# Patient Record
Sex: FEMALE | Race: WHITE | NOT HISPANIC OR LATINO | Employment: FULL TIME | URBAN - METROPOLITAN AREA
[De-identification: names, ages, dates, MRNs, and addresses within clinical notes are randomized per-mention and may not be internally consistent; named-entity substitution may affect disease eponyms.]

---

## 2019-09-09 RX ORDER — LEVOTHYROXINE SODIUM 0.05 MG/1
50 TABLET ORAL DAILY
Qty: 90 TABLET | Refills: 0 | Status: SHIPPED | OUTPATIENT
Start: 2019-09-09 | End: 2019-12-09 | Stop reason: SDUPTHER

## 2019-10-23 ENCOUNTER — OFFICE VISIT (OUTPATIENT)
Dept: FAMILY MEDICINE CLINIC | Facility: CLINIC | Age: 44
End: 2019-10-23

## 2019-10-23 VITALS
SYSTOLIC BLOOD PRESSURE: 104 MMHG | HEART RATE: 67 BPM | OXYGEN SATURATION: 98 % | HEIGHT: 65 IN | WEIGHT: 162.4 LBS | BODY MASS INDEX: 27.06 KG/M2 | TEMPERATURE: 98.3 F | DIASTOLIC BLOOD PRESSURE: 62 MMHG

## 2019-10-23 DIAGNOSIS — G89.29 CHRONIC BILATERAL LOW BACK PAIN WITHOUT SCIATICA: Primary | ICD-10-CM

## 2019-10-23 DIAGNOSIS — N93.9 VAGINAL BLEEDING: ICD-10-CM

## 2019-10-23 DIAGNOSIS — H66.001 RIGHT ACUTE SUPPURATIVE OTITIS MEDIA: ICD-10-CM

## 2019-10-23 DIAGNOSIS — M54.50 CHRONIC BILATERAL LOW BACK PAIN WITHOUT SCIATICA: Primary | ICD-10-CM

## 2019-10-23 LAB
BILIRUB BLD-MCNC: ABNORMAL MG/DL
CLARITY, POC: CLEAR
COLOR UR: YELLOW
GLUCOSE UR STRIP-MCNC: NEGATIVE MG/DL
KETONES UR QL: NEGATIVE
LEUKOCYTE EST, POC: NEGATIVE
NITRITE UR-MCNC: NEGATIVE MG/ML
PH UR: 6 [PH] (ref 5–8)
PROT UR STRIP-MCNC: NEGATIVE MG/DL
RBC # UR STRIP: ABNORMAL /UL
SP GR UR: 1.02 (ref 1–1.03)
UROBILINOGEN UR QL: NORMAL

## 2019-10-23 PROCEDURE — 81003 URINALYSIS AUTO W/O SCOPE: CPT | Performed by: FAMILY MEDICINE

## 2019-10-23 PROCEDURE — 99214 OFFICE O/P EST MOD 30 MIN: CPT | Performed by: FAMILY MEDICINE

## 2019-10-23 RX ORDER — MELOXICAM 15 MG/1
15 TABLET ORAL DAILY
Qty: 30 TABLET | Refills: 0 | Status: SHIPPED | OUTPATIENT
Start: 2019-10-23 | End: 2020-04-01

## 2019-10-23 RX ORDER — CEFDINIR 300 MG/1
300 CAPSULE ORAL 2 TIMES DAILY
Qty: 20 CAPSULE | Refills: 0 | Status: SHIPPED | OUTPATIENT
Start: 2019-10-23 | End: 2019-11-18

## 2019-10-23 RX ORDER — CYCLOBENZAPRINE HCL 10 MG
10 TABLET ORAL 3 TIMES DAILY PRN
Qty: 30 TABLET | Refills: 0 | Status: SHIPPED | OUTPATIENT
Start: 2019-10-23 | End: 2020-04-01 | Stop reason: SDUPTHER

## 2019-10-23 RX ORDER — FLUOXETINE HYDROCHLORIDE 40 MG/1
CAPSULE ORAL
COMMUNITY
Start: 2019-10-15 | End: 2019-10-30 | Stop reason: SDUPTHER
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
## 2019-10-30 RX ORDER — FLUOXETINE HYDROCHLORIDE 40 MG/1
80 CAPSULE ORAL DAILY
Qty: 60 CAPSULE | Refills: 2 | Status: SHIPPED | OUTPATIENT
Start: 2019-10-30 | End: 2019-11-18 | Stop reason: SDUPTHER

## 2019-11-04 ENCOUNTER — OFFICE VISIT (OUTPATIENT)
Dept: FAMILY MEDICINE CLINIC | Facility: CLINIC | Age: 44
End: 2019-11-04

## 2019-11-04 VITALS
OXYGEN SATURATION: 99 % | HEART RATE: 80 BPM | SYSTOLIC BLOOD PRESSURE: 128 MMHG | DIASTOLIC BLOOD PRESSURE: 72 MMHG | TEMPERATURE: 98.1 F | WEIGHT: 164.6 LBS | BODY MASS INDEX: 27.42 KG/M2 | HEIGHT: 65 IN

## 2019-11-04 DIAGNOSIS — M54.50 CHRONIC BILATERAL LOW BACK PAIN WITHOUT SCIATICA: Primary | ICD-10-CM

## 2019-11-04 DIAGNOSIS — G89.29 CHRONIC BILATERAL LOW BACK PAIN WITHOUT SCIATICA: Primary | ICD-10-CM

## 2019-11-04 PROCEDURE — 96372 THER/PROPH/DIAG INJ SC/IM: CPT | Performed by: NURSE PRACTITIONER

## 2019-11-04 PROCEDURE — 99213 OFFICE O/P EST LOW 20 MIN: CPT | Performed by: NURSE PRACTITIONER

## 2019-11-04 RX ORDER — KETOROLAC TROMETHAMINE 30 MG/ML
60 INJECTION, SOLUTION INTRAMUSCULAR; INTRAVENOUS ONCE
Status: COMPLETED | OUTPATIENT
Start: 2019-11-04 | End: 2019-11-04

## 2019-11-04 RX ORDER — TRIAMCINOLONE ACETONIDE 40 MG/ML
40 INJECTION, SUSPENSION INTRA-ARTICULAR; INTRAMUSCULAR ONCE
Status: COMPLETED | OUTPATIENT
Start: 2019-11-04 | End: 2019-11-04

## 2019-11-04 RX ADMIN — KETOROLAC TROMETHAMINE 60 MG: 30 INJECTION, SOLUTION INTRAMUSCULAR; INTRAVENOUS at 16:12

## 2019-11-04 RX ADMIN — TRIAMCINOLONE ACETONIDE 40 MG: 40 INJECTION, SUSPENSION INTRA-ARTICULAR; INTRAMUSCULAR at 16:13

## 2019-11-04 NOTE — PROGRESS NOTES
Subjective   Nereida Woodruff is a 44 y.o. female.     Chief Complaint   Patient presents with   • Back Pain        History of Present Illness       Patient is here today for follow up on back pain. This is my first time seeing this patient. She saw Dr. Kehrer for back pain  On 10/23/2019.  She states she has not started PT.    Has been taking meloxicam and muscle relaxer and was helping, but threw it back out today.     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    Is having more muscle spasm in it.      Hasn't gotten call, but was trying to get other issues from that visit worked out.           The following portions of the patient's history were reviewed and updated as appropriate: allergies, current medications, past family history, past medical history, past social history, past surgical history and problem list.    Past Medical History:   Diagnosis Date   • Asthma    • Cancer (CMS/HCC)     BREAST   • COPD (chronic obstructive pulmonary disease) (CMS/HCC)    • Disease of thyroid gland        Past Surgical History:   Procedure Laterality Date   • APPENDECTOMY     • CHOLECYSTECTOMY     • HYSTERECTOMY     • LUNG SURGERY     • MASTECTOMY         Family History   Problem Relation Age of Onset   • Thyroid disease Mother    • Cancer Father        Social History     Socioeconomic History   • Marital status:      Spouse name: Not on file   • Number of children: Not on file   • Years of education: Not on file   • Highest education level: Not on file   Tobacco Use   • Smoking status: Never Smoker   • Smokeless tobacco: Never Used   Substance and Sexual Activity   • Alcohol use: No     Frequency: Never   • Drug use: No         Current Outpatient Medications:   •  albuterol (PROVENTIL HFA;VENTOLIN HFA) 108 (90 BASE) MCG/ACT inhaler, Inhale 2 puffs Every 4 (Four) Hours As Needed for wheezing., Disp: , Rfl:   •  albuterol (PROVENTIL) (2.5 MG/3ML) 0.083% nebulizer solution, Inhale 2.5  mg., Disp: , Rfl:   •  albuterol (PROVENTIL) 4 MG tablet, Take 4 mg by mouth 3 (Three) Times a Day., Disp: , Rfl:   •  cefdinir (OMNICEF) 300 MG capsule, Take 1 capsule by mouth 2 (Two) Times a Day., Disp: 20 capsule, Rfl: 0  •  cyclobenzaprine (FLEXERIL) 10 MG tablet, Take 1 tablet by mouth 3 (Three) Times a Day As Needed for Muscle Spasms., Disp: 30 tablet, Rfl: 0  •  fexofenadine (ALLEGRA) 180 MG tablet, Take 180 mg by mouth Daily., Disp: , Rfl:   •  FLUoxetine (PROzac) 40 MG capsule, Take 2 capsules by mouth Daily., Disp: 60 capsule, Rfl: 2  •  Fluticasone Furoate-Vilanterol (BREO ELLIPTA) 100-25 MCG/INH inhaler, Inhale 1 puff Daily., Disp: , Rfl:   •  leuprolide (LUPRON) 11.25 MG injection, Inject  into the appropriate muscle as directed by prescriber., Disp: , Rfl:   •  levothyroxine (SYNTHROID, LEVOTHROID) 50 MCG tablet, Take 1 tablet by mouth Daily., Disp: 90 tablet, Rfl: 0  •  meloxicam (MOBIC) 15 MG tablet, Take 1 tablet by mouth Daily., Disp: 30 tablet, Rfl: 0  •  montelukast (SINGULAIR) 10 MG tablet, Take 10 mg by mouth Daily., Disp: , Rfl:   •  Multiple Vitamin (MULTI-VITAMIN DAILY PO), Take  by mouth Daily., Disp: , Rfl:   •  MULTIPLE VITAMINS-MINERALS ER PO, Take  by mouth Daily., Disp: , Rfl:   •  ondansetron (ZOFRAN) 4 MG tablet, Take 4 mg by mouth., Disp: , Rfl:   •  pseudoephedrine-guaifenesin (MUCINEX D)  MG per 12 hr tablet, Take 1 tablet by mouth Daily., Disp: , Rfl:   •  TAMOXIFEN CITRATE PO, Take  by mouth., Disp: , Rfl:   •  THEOPHYLLINE CR PO, Take  by mouth., Disp: , Rfl:   •  valACYclovir (VALTREX) 500 MG tablet, Take 500 mg by mouth Daily., Disp: , Rfl:     Review of Systems   Constitutional: Negative for fatigue and fever.   Musculoskeletal: Positive for back pain (denies radicular symptoms. ) and myalgias. Negative for neck pain.       Objective   Vitals:    11/04/19 1546   BP: 128/72   Pulse: 80   Temp: 98.1 °F (36.7 °C)   SpO2: 99%   Weight: 74.7 kg (164 lb 9.6 oz)   Height:  "165.1 cm (65\")     Body mass index is 27.39 kg/m².  Physical Exam   Constitutional: She is oriented to person, place, and time. She appears well-developed and well-nourished.   Musculoskeletal:        Cervical back: She exhibits normal range of motion.        Thoracic back: She exhibits normal range of motion.        Lumbar back: She exhibits decreased range of motion, tenderness, swelling and spasm.   Neurological: She is alert and oriented to person, place, and time.         Assessment/Plan   Nereida was seen today for back pain.    Diagnoses and all orders for this visit:    Chronic bilateral low back pain without sciatica  -     ketorolac (TORADOL) injection 60 mg  -     triamcinolone acetonide (KENALOG-40) injection 40 mg                 Patient Instructions   Low Back Sprain Rehab  Ask your health care provider which exercises are safe for you. Do exercises exactly as told by your health care provider and adjust them as directed. It is normal to feel mild stretching, pulling, tightness, or discomfort as you do these exercises, but you should stop right away if you feel sudden pain or your pain gets worse. Do not begin these exercises until told by your health care provider.  Stretching and range of motion exercises  These exercises warm up your muscles and joints and improve the movement and flexibility of your back. These exercises also help to relieve pain, numbness, and tingling.  Exercise A: Lumbar rotation    1. Lie on your back on a firm surface and bend your knees.  2. Straighten your arms out to your sides so each arm forms an \"L\" shape with a side of your body (a 90 degree angle).  3. Slowly move both of your knees to one side of your body until you feel a stretch in your lower back. Try not to let your shoulders move off of the floor.  4. Hold for __________ seconds.  5. Tense your abdominal muscles and slowly move your knees back to the starting position.  6. Repeat this exercise on the other side " of your body.  Repeat __________ times. Complete this exercise __________ times a day.  Exercise B: Prone extension on elbows    1. Lie on your abdomen on a firm surface.  2. Prop yourself up on your elbows.  3. Use your arms to help lift your chest up until you feel a gentle stretch in your abdomen and your lower back.  ? This will place some of your body weight on your elbows. If this is uncomfortable, try stacking pillows under your chest.  ? Your hips should stay down, against the surface that you are lying on. Keep your hip and back muscles relaxed.  4. Hold for __________ seconds.  5. Slowly relax your upper body and return to the starting position.  Repeat __________ times. Complete this exercise __________ times a day.  Strengthening exercises  These exercises build strength and endurance in your back. Endurance is the ability to use your muscles for a long time, even after they get tired.  Exercise C: Pelvic tilt  1. Lie on your back on a firm surface. Bend your knees and keep your feet flat.  2. Tense your abdominal muscles. Tip your pelvis up toward the ceiling and flatten your lower back into the floor.  ? To help with this exercise, you may place a small towel under your lower back and try to push your back into the towel.  3. Hold for __________ seconds.  4. Let your muscles relax completely before you repeat this exercise.  Repeat __________ times. Complete this exercise __________ times a day.  Exercise D: Alternating arm and leg raises    1. Get on your hands and knees on a firm surface. If you are on a hard floor, you may want to use padding to cushion your knees, such as an exercise mat.  2. Line up your arms and legs. Your hands should be below your shoulders, and your knees should be below your hips.  3. Lift your left leg behind you. At the same time, raise your right arm and straighten it in front of you.  ? Do not lift your leg higher than your hip.  ? Do not lift your arm higher than your  shoulder.  ? Keep your abdominal and back muscles tight.  ? Keep your hips facing the ground.  ? Do not arch your back.  ? Keep your balance carefully, and do not hold your breath.  4. Hold for __________ seconds.  5. Slowly return to the starting position and repeat with your right leg and your left arm.  Repeat __________ times. Complete this exercise __________ times a day.  Exercise E: Abdominal set with straight leg raise    1. Lie on your back on a firm surface.  2. Bend one of your knees and keep your other leg straight.  3. Tense your abdominal muscles and lift your straight leg up, 4-6 inches (10-15 cm) off the ground.  4. Keep your abdominal muscles tight and hold for __________ seconds.  ? Do not hold your breath.  ? Do not arch your back. Keep it flat against the ground.  5. Keep your abdominal muscles tense as you slowly lower your leg back to the starting position.  6. Repeat with your other leg.  Repeat __________ times. Complete this exercise __________ times a day.  Posture and body mechanics    Body mechanics refers to the movements and positions of your body while you do your daily activities. Posture is part of body mechanics. Good posture and healthy body mechanics can help to relieve stress in your body's tissues and joints. Good posture means that your spine is in its natural S-curve position (your spine is neutral), your shoulders are pulled back slightly, and your head is not tipped forward. The following are general guidelines for applying improved posture and body mechanics to your everyday activities.  Standing    · When standing, keep your spine neutral and your feet about hip-width apart. Keep a slight bend in your knees. Your ears, shoulders, and hips should line up.  · When you do a task in which you  one place for a long time, place one foot up on a stable object that is 2-4 inches (5-10 cm) high, such as a footstool. This helps keep your spine neutral.  Sitting    · When  sitting, keep your spine neutral and keep your feet flat on the floor. Use a footrest, if necessary, and keep your thighs parallel to the floor. Avoid rounding your shoulders, and avoid tilting your head forward.  · When working at a desk or a computer, keep your desk at a height where your hands are slightly lower than your elbows. Slide your chair under your desk so you are close enough to maintain good posture.  · When working at a computer, place your monitor at a height where you are looking straight ahead and you do not have to tilt your head forward or downward to look at the screen.  Resting    · When lying down and resting, avoid positions that are most painful for you.  · If you have pain with activities such as sitting, bending, stooping, or squatting (flexion-based activities), lie in a position in which your body does not bend very much. For example, avoid curling up on your side with your arms and knees near your chest (fetal position).  · If you have pain with activities such as standing for a long time or reaching with your arms (extension-based activities), lie with your spine in a neutral position and bend your knees slightly. Try the following positions:  · Lying on your side with a pillow between your knees.  · Lying on your back with a pillow under your knees.  Lifting    · When lifting objects, keep your feet at least shoulder-width apart and tighten your abdominal muscles.  · Bend your knees and hips and keep your spine neutral. It is important to lift using the strength of your legs, not your back. Do not lock your knees straight out.  · Always ask for help to lift heavy or awkward objects.  This information is not intended to replace advice given to you by your health care provider. Make sure you discuss any questions you have with your health care provider.  Document Released: 12/18/2006 Document Revised: 08/24/2017 Document Reviewed: 09/28/2016  [employer] Interactive Patient Education ©  2019 [employer]      Referral reprinted for PT.    Continue use of medication as prescribed by Dr. Kehrer

## 2019-11-04 NOTE — PATIENT INSTRUCTIONS
"Low Back Sprain Rehab  Ask your health care provider which exercises are safe for you. Do exercises exactly as told by your health care provider and adjust them as directed. It is normal to feel mild stretching, pulling, tightness, or discomfort as you do these exercises, but you should stop right away if you feel sudden pain or your pain gets worse. Do not begin these exercises until told by your health care provider.  Stretching and range of motion exercises  These exercises warm up your muscles and joints and improve the movement and flexibility of your back. These exercises also help to relieve pain, numbness, and tingling.  Exercise A: Lumbar rotation    1. Lie on your back on a firm surface and bend your knees.  2. Straighten your arms out to your sides so each arm forms an \"L\" shape with a side of your body (a 90 degree angle).  3. Slowly move both of your knees to one side of your body until you feel a stretch in your lower back. Try not to let your shoulders move off of the floor.  4. Hold for __________ seconds.  5. Tense your abdominal muscles and slowly move your knees back to the starting position.  6. Repeat this exercise on the other side of your body.  Repeat __________ times. Complete this exercise __________ times a day.  Exercise B: Prone extension on elbows    1. Lie on your abdomen on a firm surface.  2. Prop yourself up on your elbows.  3. Use your arms to help lift your chest up until you feel a gentle stretch in your abdomen and your lower back.  ? This will place some of your body weight on your elbows. If this is uncomfortable, try stacking pillows under your chest.  ? Your hips should stay down, against the surface that you are lying on. Keep your hip and back muscles relaxed.  4. Hold for __________ seconds.  5. Slowly relax your upper body and return to the starting position.  Repeat __________ times. Complete this exercise __________ times a day.  Strengthening exercises  These " exercises build strength and endurance in your back. Endurance is the ability to use your muscles for a long time, even after they get tired.  Exercise C: Pelvic tilt  1. Lie on your back on a firm surface. Bend your knees and keep your feet flat.  2. Tense your abdominal muscles. Tip your pelvis up toward the ceiling and flatten your lower back into the floor.  ? To help with this exercise, you may place a small towel under your lower back and try to push your back into the towel.  3. Hold for __________ seconds.  4. Let your muscles relax completely before you repeat this exercise.  Repeat __________ times. Complete this exercise __________ times a day.  Exercise D: Alternating arm and leg raises    1. Get on your hands and knees on a firm surface. If you are on a hard floor, you may want to use padding to cushion your knees, such as an exercise mat.  2. Line up your arms and legs. Your hands should be below your shoulders, and your knees should be below your hips.  3. Lift your left leg behind you. At the same time, raise your right arm and straighten it in front of you.  ? Do not lift your leg higher than your hip.  ? Do not lift your arm higher than your shoulder.  ? Keep your abdominal and back muscles tight.  ? Keep your hips facing the ground.  ? Do not arch your back.  ? Keep your balance carefully, and do not hold your breath.  4. Hold for __________ seconds.  5. Slowly return to the starting position and repeat with your right leg and your left arm.  Repeat __________ times. Complete this exercise __________ times a day.  Exercise E: Abdominal set with straight leg raise    1. Lie on your back on a firm surface.  2. Bend one of your knees and keep your other leg straight.  3. Tense your abdominal muscles and lift your straight leg up, 4-6 inches (10-15 cm) off the ground.  4. Keep your abdominal muscles tight and hold for __________ seconds.  ? Do not hold your breath.  ? Do not arch your back. Keep it  flat against the ground.  5. Keep your abdominal muscles tense as you slowly lower your leg back to the starting position.  6. Repeat with your other leg.  Repeat __________ times. Complete this exercise __________ times a day.  Posture and body mechanics    Body mechanics refers to the movements and positions of your body while you do your daily activities. Posture is part of body mechanics. Good posture and healthy body mechanics can help to relieve stress in your body's tissues and joints. Good posture means that your spine is in its natural S-curve position (your spine is neutral), your shoulders are pulled back slightly, and your head is not tipped forward. The following are general guidelines for applying improved posture and body mechanics to your everyday activities.  Standing    · When standing, keep your spine neutral and your feet about hip-width apart. Keep a slight bend in your knees. Your ears, shoulders, and hips should line up.  · When you do a task in which you  one place for a long time, place one foot up on a stable object that is 2-4 inches (5-10 cm) high, such as a footstool. This helps keep your spine neutral.  Sitting    · When sitting, keep your spine neutral and keep your feet flat on the floor. Use a footrest, if necessary, and keep your thighs parallel to the floor. Avoid rounding your shoulders, and avoid tilting your head forward.  · When working at a desk or a computer, keep your desk at a height where your hands are slightly lower than your elbows. Slide your chair under your desk so you are close enough to maintain good posture.  · When working at a computer, place your monitor at a height where you are looking straight ahead and you do not have to tilt your head forward or downward to look at the screen.  Resting    · When lying down and resting, avoid positions that are most painful for you.  · If you have pain with activities such as sitting, bending, stooping, or squatting  (flexion-based activities), lie in a position in which your body does not bend very much. For example, avoid curling up on your side with your arms and knees near your chest (fetal position).  · If you have pain with activities such as standing for a long time or reaching with your arms (extension-based activities), lie with your spine in a neutral position and bend your knees slightly. Try the following positions:  · Lying on your side with a pillow between your knees.  · Lying on your back with a pillow under your knees.  Lifting    · When lifting objects, keep your feet at least shoulder-width apart and tighten your abdominal muscles.  · Bend your knees and hips and keep your spine neutral. It is important to lift using the strength of your legs, not your back. Do not lock your knees straight out.  · Always ask for help to lift heavy or awkward objects.  This information is not intended to replace advice given to you by your health care provider. Make sure you discuss any questions you have with your health care provider.  Document Released: 12/18/2006 Document Revised: 08/24/2017 Document Reviewed: 09/28/2016  Elsevier Interactive Patient Education © 2019 [employer]      Referral reprinted for PT.    Continue use of medication as prescribed by Dr. Kehrer

## 2019-11-18 ENCOUNTER — OFFICE VISIT (OUTPATIENT)
Dept: FAMILY MEDICINE CLINIC | Facility: CLINIC | Age: 44
End: 2019-11-18

## 2019-11-18 VITALS
HEART RATE: 72 BPM | WEIGHT: 165 LBS | BODY MASS INDEX: 27.49 KG/M2 | OXYGEN SATURATION: 98 % | HEIGHT: 65 IN | DIASTOLIC BLOOD PRESSURE: 80 MMHG | SYSTOLIC BLOOD PRESSURE: 126 MMHG

## 2019-11-18 DIAGNOSIS — J45.50 SEVERE PERSISTENT ASTHMA, UNSPECIFIED WHETHER COMPLICATED: ICD-10-CM

## 2019-11-18 DIAGNOSIS — F41.9 ANXIETY: ICD-10-CM

## 2019-11-18 DIAGNOSIS — E03.8 HYPOTHYROIDISM DUE TO HASHIMOTO'S THYROIDITIS: ICD-10-CM

## 2019-11-18 DIAGNOSIS — E06.3 HYPOTHYROIDISM DUE TO HASHIMOTO'S THYROIDITIS: ICD-10-CM

## 2019-11-18 DIAGNOSIS — E06.3 HASHIMOTO'S THYROIDITIS: Primary | ICD-10-CM

## 2019-11-18 DIAGNOSIS — F33.42 RECURRENT MAJOR DEPRESSIVE DISORDER, IN FULL REMISSION (HCC): ICD-10-CM

## 2019-11-18 PROBLEM — J45.909 ASTHMA: Status: ACTIVE | Noted: 2019-11-18

## 2019-11-18 PROBLEM — F32.9 DEPRESSION, MAJOR: Status: ACTIVE | Noted: 2019-11-18

## 2019-11-18 PROBLEM — E03.9 HYPOTHYROIDISM: Status: ACTIVE | Noted: 2019-11-18

## 2019-11-18 PROCEDURE — 99213 OFFICE O/P EST LOW 20 MIN: CPT | Performed by: FAMILY MEDICINE

## 2019-11-18 RX ORDER — FLUOXETINE HYDROCHLORIDE 40 MG/1
80 CAPSULE ORAL DAILY
Qty: 60 CAPSULE | Refills: 2 | Status: SHIPPED | OUTPATIENT
Start: 2019-11-18 | End: 2020-02-13 | Stop reason: SDUPTHER

## 2019-11-18 NOTE — PROGRESS NOTES
Subjective   Nereida Woodruff is a 44 y.o. female.     Chief Complaint   Patient presents with   • Hypothyroidism        Patient presents for routine follow-up and to establish my new office.  She has a longstanding history of depression and severe asthma.  She is also a breast cancer survivor doing well following up with her oncologist.  She is due for a TSH check on her Hashimoto's thyroiditis.  She has been compliant with her medication and has no significant complaints.  She states her mood is doing well with her fluoxetine.  She denies any SI or HI.  She also has OCD and that is under control.           The following portions of the patient's history were reviewed and updated as appropriate: allergies, current medications, past family history, past medical history, past social history, past surgical history and problem list.    Past Medical History:   Diagnosis Date   • Acute upper respiratory infection    • Allergic rhinitis    • Anxiety    • Asthma    • BMI 25.0-25.9,adult    • Cancer (CMS/[facility])     BREAST   • Community acquired pneumonia    • COPD (chronic obstructive pulmonary disease) (CMS/[facility])    • Depression, major    • Disease of thyroid gland    • Foreign body in ear    • Hashimoto's thyroiditis    • History of abnormal mammogram     Left breast   • History of acute sinusitis    • History of adenocarcinoma of breast    • History of cholelithiasis    • History of COPD    • History of dyspareunia in female    • History of lump of left breast    • History of malignant neoplasm of left breast    • History of nausea and vomiting    • History of seborrheic dermatitis    • History of strep pharyngitis    • History of suicidal ideation    • Hypothyroidism    • Hypoxemia     History of Hypoxemia   • Obsessive compulsive disorder    • Other screening mammogram    • Personal history of asthma    • Recurrent genital herpes simplex     History of    • Sore throat    • Symptomatic states associated with  artificial menopause     History of        Past Surgical History:   Procedure Laterality Date   • APPENDECTOMY     • BREAST AUGMENTATION Bilateral     Revised 2005, initial implants 1992   • CERVICAL CERCLAGE      x 2 during pregnancy, incompetent cervix   • CHOLECYSTECTOMY     • HYSTERECTOMY      due to uterine prolapse   • LUNG SURGERY     • MASTECTOMY         Family History   Problem Relation Age of Onset   • Thyroid disease Mother    • Arthritis Mother    • Uterine cancer Mother         in her 40's   • Cancer Father    • Bipolar disorder Father    • Depression Father    • Hyperlipidemia Father    • Hypertension Father    • Arthritis Maternal Grandmother    • Bleeding Disorder Maternal Grandmother    • Hyperlipidemia Maternal Grandmother    • Hypertension Maternal Grandmother    • Hypertension Maternal Grandfather    • Hypertension Paternal Grandfather    • Hyperlipidemia Paternal Grandfather    • Coronary artery disease Other         Maternal GrGF   • Skin cancer Other         Maternal GrGF   • Diabetes Other         Paternal GrGF   • Heart disease Other         FH in females before the age of 65       Social History     Socioeconomic History   • Marital status:      Spouse name: Not on file   • Number of children: Not on file   • Years of education: Not on file   • Highest education level: Not on file   Tobacco Use   • Smoking status: Never Smoker   • Smokeless tobacco: Never Used   Substance and Sexual Activity   • Alcohol use: Yes     Frequency: Never     Comment: rare use   • Drug use: No         Current Outpatient Medications:   •  albuterol (PROVENTIL HFA;VENTOLIN HFA) 108 (90 BASE) MCG/ACT inhaler, Inhale 2 puffs Every 4 (Four) Hours As Needed for wheezing., Disp: , Rfl:   •  albuterol (PROVENTIL) (2.5 MG/3ML) 0.083% nebulizer solution, Inhale 2.5 mg., Disp: , Rfl:   •  albuterol (PROVENTIL) 4 MG tablet, Take 4 mg by mouth 3 (Three) Times a Day., Disp: , Rfl:   •  cyclobenzaprine (FLEXERIL) 10 MG  tablet, Take 1 tablet by mouth 3 (Three) Times a Day As Needed for Muscle Spasms., Disp: 30 tablet, Rfl: 0  •  fexofenadine (ALLEGRA) 180 MG tablet, Take 180 mg by mouth Daily., Disp: , Rfl:   •  FLUoxetine (PROzac) 40 MG capsule, Take 2 capsules by mouth Daily., Disp: 60 capsule, Rfl: 2  •  Fluticasone Furoate-Vilanterol (BREO ELLIPTA) 100-25 MCG/INH inhaler, Inhale 1 puff Daily., Disp: , Rfl:   •  leuprolide (LUPRON) 11.25 MG injection, Inject  into the appropriate muscle as directed by prescriber., Disp: , Rfl:   •  levothyroxine (SYNTHROID, LEVOTHROID) 50 MCG tablet, Take 1 tablet by mouth Daily., Disp: 90 tablet, Rfl: 0  •  meloxicam (MOBIC) 15 MG tablet, Take 1 tablet by mouth Daily., Disp: 30 tablet, Rfl: 0  •  montelukast (SINGULAIR) 10 MG tablet, Take 10 mg by mouth Daily., Disp: , Rfl:   •  Multiple Vitamin (MULTI-VITAMIN DAILY PO), Take  by mouth Daily., Disp: , Rfl:   •  pseudoephedrine-guaifenesin (MUCINEX D)  MG per 12 hr tablet, Take 1 tablet by mouth Daily., Disp: , Rfl:   •  TAMOXIFEN CITRATE PO, Take  by mouth., Disp: , Rfl:   •  THEOPHYLLINE CR PO, Take  by mouth., Disp: , Rfl:   •  valACYclovir (VALTREX) 500 MG tablet, Take 500 mg by mouth Daily., Disp: , Rfl:     Review of Systems   Constitutional: Negative for chills, fatigue and fever.   HENT: Negative for congestion, rhinorrhea and sore throat.    Respiratory: Negative for cough and shortness of breath.    Cardiovascular: Negative for chest pain and leg swelling.   Gastrointestinal: Negative for abdominal pain.   Endocrine: Negative for polydipsia and polyuria.   Genitourinary: Negative for dysuria.   Musculoskeletal: Negative for arthralgias and myalgias.   Skin: Negative for rash.   Neurological: Negative for dizziness.   Hematological: Does not bruise/bleed easily.   Psychiatric/Behavioral: Negative for sleep disturbance.       Objective   Vitals:    11/18/19 1550   BP: 126/80   Pulse: 72   SpO2: 98%   Weight: 74.8 kg (165 lb)  "  Height: 165.1 cm (65\")     Body mass index is 27.46 kg/m².  Physical Exam   Constitutional: She is oriented to person, place, and time. She appears well-developed and well-nourished.   HENT:   Head: Normocephalic and atraumatic.   Eyes: Conjunctivae and EOM are normal. Pupils are equal, round, and reactive to light.   Neck: Neck supple. No thyromegaly present.   Cardiovascular: Normal rate and regular rhythm.   No murmur heard.  Pulmonary/Chest: Effort normal and breath sounds normal. She has no wheezes.   Abdominal: Soft.   Musculoskeletal: Normal range of motion.   Neurological: She is alert and oriented to person, place, and time. No cranial nerve deficit.   Skin: Skin is warm and dry.   Psychiatric: She has a normal mood and affect.   Nursing note and vitals reviewed.        Assessment/Plan   Nereida was seen today for hypothyroidism.    Diagnoses and all orders for this visit:    Hashimoto's thyroiditis    Hypothyroidism due to Hashimoto's thyroiditis  -     TSH    Recurrent major depressive disorder, in full remission (CMS/[facility])  -     FLUoxetine (PROzac) 40 MG capsule; Take 2 capsules by mouth Daily.    Anxiety    Severe persistent asthma, unspecified whether complicated               There are no Patient Instructions on file for this visit.  "

## 2019-11-19 LAB — TSH SERPL DL<=0.005 MIU/L-ACNC: 1.72 UIU/ML (ref 0.45–4.5)

## 2019-12-09 DIAGNOSIS — E06.3 HYPOTHYROIDISM DUE TO HASHIMOTO'S THYROIDITIS: Primary | ICD-10-CM

## 2019-12-09 DIAGNOSIS — E03.8 HYPOTHYROIDISM DUE TO HASHIMOTO'S THYROIDITIS: Primary | ICD-10-CM

## 2019-12-09 RX ORDER — LEVOTHYROXINE SODIUM 50 MCG
TABLET ORAL
Qty: 90 TABLET | Refills: 0 | Status: SHIPPED | OUTPATIENT
Start: 2019-12-09 | End: 2020-05-19 | Stop reason: SDUPTHER

## 2020-02-13 ENCOUNTER — OFFICE VISIT (OUTPATIENT)
Dept: FAMILY MEDICINE CLINIC | Facility: CLINIC | Age: 45
End: 2020-02-13

## 2020-02-13 VITALS
SYSTOLIC BLOOD PRESSURE: 112 MMHG | BODY MASS INDEX: 25.09 KG/M2 | TEMPERATURE: 98.7 F | WEIGHT: 150.6 LBS | HEIGHT: 65 IN | OXYGEN SATURATION: 96 % | DIASTOLIC BLOOD PRESSURE: 68 MMHG | HEART RATE: 84 BPM

## 2020-02-13 DIAGNOSIS — F33.42 RECURRENT MAJOR DEPRESSIVE DISORDER, IN FULL REMISSION (HCC): ICD-10-CM

## 2020-02-13 DIAGNOSIS — J45.901 EXACERBATION OF ASTHMA, UNSPECIFIED ASTHMA SEVERITY, UNSPECIFIED WHETHER PERSISTENT: ICD-10-CM

## 2020-02-13 DIAGNOSIS — R50.9 FEVER, UNSPECIFIED FEVER CAUSE: ICD-10-CM

## 2020-02-13 DIAGNOSIS — J11.1 INFLUENZA: Primary | ICD-10-CM

## 2020-02-13 LAB
EXPIRATION DATE: NORMAL
EXPIRATION DATE: NORMAL
FLUAV AG NPH QL: NEGATIVE
FLUBV AG NPH QL: NEGATIVE
INTERNAL CONTROL: NORMAL
INTERNAL CONTROL: NORMAL
Lab: NORMAL
Lab: NORMAL
S PYO AG THROAT QL: NEGATIVE

## 2020-02-13 PROCEDURE — 87804 INFLUENZA ASSAY W/OPTIC: CPT | Performed by: FAMILY MEDICINE

## 2020-02-13 PROCEDURE — 87880 STREP A ASSAY W/OPTIC: CPT | Performed by: FAMILY MEDICINE

## 2020-02-13 PROCEDURE — 99214 OFFICE O/P EST MOD 30 MIN: CPT | Performed by: FAMILY MEDICINE

## 2020-02-13 RX ORDER — PREDNISONE 20 MG/1
20 TABLET ORAL 2 TIMES DAILY
Qty: 10 TABLET | Refills: 0 | Status: SHIPPED | OUTPATIENT
Start: 2020-02-13 | End: 2020-02-18

## 2020-02-13 RX ORDER — THEOPHYLLINE 300 MG/1
TABLET, EXTENDED RELEASE ORAL
COMMUNITY
Start: 2019-11-27 | End: 2020-04-01 | Stop reason: SDUPTHER

## 2020-02-13 RX ORDER — FLUOXETINE HYDROCHLORIDE 40 MG/1
80 CAPSULE ORAL DAILY
Qty: 180 CAPSULE | Refills: 3 | Status: SHIPPED | OUTPATIENT
Start: 2020-02-13 | End: 2020-11-23 | Stop reason: SDUPTHER

## 2020-02-13 RX ORDER — OSELTAMIVIR PHOSPHATE 75 MG/1
75 CAPSULE ORAL 2 TIMES DAILY
Qty: 10 CAPSULE | Refills: 0 | Status: SHIPPED | OUTPATIENT
Start: 2020-02-13 | End: 2020-05-19

## 2020-02-13 RX ORDER — TAMOXIFEN CITRATE 20 MG/1
TABLET ORAL
COMMUNITY
Start: 2020-02-10 | End: 2021-11-05

## 2020-02-13 NOTE — PROGRESS NOTES
Subjective   Nereida Woodruff is a 44 y.o. female.     Chief Complaint   Patient presents with   • Cough   • Headache   • Fever   • Sore Throat        Patient presents with fever chills cough congestion headache and myalgias for the past 24+ hours.  Her temperature went to 100.8 last night.  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  She has had a little bit of wheezing and cough is a little productive.  She has a history of severe asthma with multiple hospitalizations in the past.  No nausea vomiting or diarrhea.         The following portions of the patient's history were reviewed and updated as appropriate: allergies, current medications, past family history, past medical history, past social history, past surgical history and problem list.    Past Medical History:   Diagnosis Date   • Acute upper respiratory infection    • Allergic rhinitis    • Anxiety    • Asthma    • BMI 25.0-25.9,adult    • Cancer (CMS/[facility])     BREAST   • Community acquired pneumonia    • COPD (chronic obstructive pulmonary disease) (CMS/[facility])    • Depression, major    • Disease of thyroid gland    • Foreign body in ear    • Hashimoto's thyroiditis    • History of abnormal mammogram     Left breast   • History of acute sinusitis    • History of adenocarcinoma of breast    • History of cholelithiasis    • History of COPD    • History of dyspareunia in female    • History of lump of left breast    • History of malignant neoplasm of left breast    • History of nausea and vomiting    • History of seborrheic dermatitis    • History of strep pharyngitis    • History of suicidal ideation    • Hypothyroidism    • Hypoxemia     History of Hypoxemia   • Obsessive compulsive disorder    • Other screening mammogram    • Personal history of asthma    • Recurrent genital herpes simplex     History of    • Sore throat    • Symptomatic states associated with artificial menopause     History of        Past Surgical History:    Procedure Laterality Date   • APPENDECTOMY     • BREAST AUGMENTATION Bilateral     Revised 2005, initial implants 1992   • CERVICAL CERCLAGE      x 2 during pregnancy, incompetent cervix   • CHOLECYSTECTOMY     • HYSTERECTOMY      due to uterine prolapse   • LUNG SURGERY     • MASTECTOMY         Family History   Problem Relation Age of Onset   • Thyroid disease Mother    • Arthritis Mother    • Uterine cancer Mother         in her 40's   • Cancer Father    • Bipolar disorder Father    • Depression Father    • Hyperlipidemia Father    • Hypertension Father    • Arthritis Maternal Grandmother    • Bleeding Disorder Maternal Grandmother    • Hyperlipidemia Maternal Grandmother    • Hypertension Maternal Grandmother    • Hypertension Maternal Grandfather    • Hypertension Paternal Grandfather    • Hyperlipidemia Paternal Grandfather    • Coronary artery disease Other         Maternal GrGF   • Skin cancer Other         Maternal GrGF   • Diabetes Other         Paternal GrGF   • Heart disease Other         FH in females before the age of 65       Social History     Socioeconomic History   • Marital status:      Spouse name: Not on file   • Number of children: Not on file   • Years of education: Not on file   • Highest education level: Not on file   Tobacco Use   • Smoking status: Never Smoker   • Smokeless tobacco: Never Used   Substance and Sexual Activity   • Alcohol use: Yes     Frequency: Never     Comment: rare use   • Drug use: No         Current Outpatient Medications:   •  albuterol (PROVENTIL HFA;VENTOLIN HFA) 108 (90 BASE) MCG/ACT inhaler, Inhale 2 puffs Every 4 (Four) Hours As Needed for wheezing., Disp: , Rfl:   •  albuterol (PROVENTIL) (2.5 MG/3ML) 0.083% nebulizer solution, Inhale 2.5 mg., Disp: , Rfl:   •  cyclobenzaprine (FLEXERIL) 10 MG tablet, Take 1 tablet by mouth 3 (Three) Times a Day As Needed for Muscle Spasms., Disp: 30 tablet, Rfl: 0  •  fexofenadine (ALLEGRA) 180 MG tablet, Take 180 mg by  "mouth Daily., Disp: , Rfl:   •  FLUoxetine (PROzac) 40 MG capsule, Take 2 capsules by mouth Daily., Disp: 60 capsule, Rfl: 2  •  Fluticasone Furoate-Vilanterol (BREO ELLIPTA) 100-25 MCG/INH inhaler, Inhale 1 puff Daily., Disp: , Rfl:   •  leuprolide (LUPRON) 11.25 MG injection, Inject  into the appropriate muscle as directed by prescriber., Disp: , Rfl:   •  montelukast (SINGULAIR) 10 MG tablet, Take 10 mg by mouth Daily., Disp: , Rfl:   •  Multiple Vitamin (MULTI-VITAMIN DAILY PO), Take  by mouth Daily., Disp: , Rfl:   •  pseudoephedrine-guaifenesin (MUCINEX D)  MG per 12 hr tablet, Take 1 tablet by mouth Daily., Disp: , Rfl:   •  SYNTHROID 50 MCG tablet, TAKE ONE TABLET BY MOUTH DAILY, Disp: 90 tablet, Rfl: 0  •  tamoxifen (NOLVADEX) 20 MG chemo tablet, , Disp: , Rfl:   •  theophylline (THEODUR) 300 MG 12 hr tablet, , Disp: , Rfl:   •  valACYclovir (VALTREX) 500 MG tablet, Take 500 mg by mouth Daily., Disp: , Rfl:   •  meloxicam (MOBIC) 15 MG tablet, Take 1 tablet by mouth Daily., Disp: 30 tablet, Rfl: 0    Review of Systems   Constitutional: Positive for chills and fever. Negative for fatigue.   HENT: Positive for congestion, ear pain, rhinorrhea, sinus pressure and sore throat.    Respiratory: Positive for cough and shortness of breath.    Cardiovascular: Negative for chest pain and leg swelling.   Gastrointestinal: Negative for abdominal pain, diarrhea and vomiting.   Endocrine: Negative for polydipsia and polyuria.   Genitourinary: Negative for dysuria.   Musculoskeletal: Negative for arthralgias and myalgias.   Skin: Negative for rash.   Neurological: Positive for headaches. Negative for dizziness.   Hematological: Does not bruise/bleed easily.   Psychiatric/Behavioral: Negative for sleep disturbance.       Objective   Vitals:    02/13/20 0949   BP: 112/68   Pulse: 84   Temp: 98.7 °F (37.1 °C)   SpO2: 96%   Weight: 68.3 kg (150 lb 9.6 oz)   Height: 165.1 cm (65\")     Body mass index is 25.06 " kg/m².  Physical Exam   Constitutional: She is oriented to person, place, and time. She appears well-developed and well-nourished.   HENT:   Head: Normocephalic and atraumatic.   Right Ear: Tympanic membrane and ear canal normal.   Left Ear: Tympanic membrane and ear canal normal.   Nose: Mucosal edema and rhinorrhea present.   Mouth/Throat: Posterior oropharyngeal erythema present. No oropharyngeal exudate or posterior oropharyngeal edema.   Eyes: Pupils are equal, round, and reactive to light. Conjunctivae and EOM are normal.   Neck: Neck supple. No thyromegaly present.   Cardiovascular: Normal rate and regular rhythm.   No murmur heard.  Pulmonary/Chest: Effort normal. No respiratory distress. She has wheezes. She has no rhonchi. She has no rales.   Abdominal: Soft.   Musculoskeletal: Normal range of motion.   Neurological: She is alert and oriented to person, place, and time. No cranial nerve deficit.   Skin: Skin is warm and dry.   Psychiatric: She has a normal mood and affect.       Office Visit on 02/13/2020   Component Date Value Ref Range Status   • Rapid Strep A Screen 02/13/2020 Negative  Negative, VALID, INVALID, Not Performed Final   • Internal Control 02/13/2020 Passed  Passed Final   • Lot Number 02/13/2020 [id number]   Final   • Expiration Date 02/13/2020 4/18/2022   Final   • Rapid Influenza A Ag 02/13/2020 Negative  Negative Final   • Rapid Influenza B Ag 02/13/2020 Negative  Negative Final   • Internal Control 02/13/2020 Passed  Passed Final   • Lot Number 02/13/2020 [id number]   Final   • Expiration Date 02/13/2020 5/6/2022   Final          Assessment/Plan   Nereida was seen today for cough, headache, fever and sore throat.    Diagnoses and all orders for this visit:    Influenza    Fever, unspecified fever cause  -     POC Rapid Strep A  -     POC Influenza A / B    Exacerbation of asthma, unspecified asthma severity, unspecified whether persistent               There are no Patient Instructions  on file for this visit.

## 2020-02-14 ENCOUNTER — TELEPHONE (OUTPATIENT)
Dept: FAMILY MEDICINE CLINIC | Facility: CLINIC | Age: 45
End: 2020-02-14

## 2020-02-14 NOTE — TELEPHONE ENCOUNTER
Pt called stated she was seen yesterday for the flu and started on Tamiflu. She stated she has since developed lightheadedness. I spoke with the APRN who stated that Tamiflu can cause the lightheadedness. Instructed patient if symptoms worsen over the weekend to go to the ER, but that a side effect of the Tamiflu is lightheadedness. Patient stated understanding.

## 2020-03-20 RX ORDER — ALBUTEROL SULFATE 90 UG/1
2 AEROSOL, METERED RESPIRATORY (INHALATION) EVERY 4 HOURS PRN
Qty: 8.5 INHALER | Refills: 2 | Status: SHIPPED | OUTPATIENT
Start: 2020-03-20 | End: 2021-05-28 | Stop reason: SDUPTHER

## 2020-04-01 ENCOUNTER — TELEPHONE (OUTPATIENT)
Dept: FAMILY MEDICINE CLINIC | Facility: CLINIC | Age: 45
End: 2020-04-01

## 2020-04-01 ENCOUNTER — TELEMEDICINE (OUTPATIENT)
Dept: FAMILY MEDICINE CLINIC | Facility: CLINIC | Age: 45
End: 2020-04-01

## 2020-04-01 DIAGNOSIS — M54.50 CHRONIC BILATERAL LOW BACK PAIN WITHOUT SCIATICA: ICD-10-CM

## 2020-04-01 DIAGNOSIS — G89.29 CHRONIC BILATERAL LOW BACK PAIN WITHOUT SCIATICA: ICD-10-CM

## 2020-04-01 DIAGNOSIS — M54.31 SCIATICA OF RIGHT SIDE: Primary | ICD-10-CM

## 2020-04-01 PROCEDURE — 99214 OFFICE O/P EST MOD 30 MIN: CPT | Performed by: FAMILY MEDICINE

## 2020-04-01 RX ORDER — KETOROLAC TROMETHAMINE 10 MG/1
10 TABLET, FILM COATED ORAL EVERY 6 HOURS PRN
Qty: 20 TABLET | Refills: 0 | Status: SHIPPED | OUTPATIENT
Start: 2020-04-01 | End: 2020-04-01

## 2020-04-01 RX ORDER — CYCLOBENZAPRINE HCL 10 MG
10 TABLET ORAL 3 TIMES DAILY PRN
Qty: 30 TABLET | Refills: 0 | Status: SHIPPED | OUTPATIENT
Start: 2020-04-01 | End: 2020-05-19 | Stop reason: SDUPTHER

## 2020-04-01 RX ORDER — ALBUTEROL SULFATE 4 MG/1
4 TABLET ORAL NIGHTLY
Qty: 90 TABLET | Refills: 0 | Status: SHIPPED | OUTPATIENT
Start: 2020-04-01 | End: 2020-05-19 | Stop reason: SDUPTHER

## 2020-04-01 RX ORDER — THEOPHYLLINE 300 MG/1
300 TABLET, EXTENDED RELEASE ORAL NIGHTLY
Qty: 90 TABLET | Refills: 0 | Status: SHIPPED | OUTPATIENT
Start: 2020-04-01 | End: 2020-04-21 | Stop reason: SDUPTHER

## 2020-04-01 RX ORDER — TRAMADOL HYDROCHLORIDE 50 MG/1
50-100 TABLET ORAL EVERY 6 HOURS PRN
Qty: 30 TABLET | Refills: 0 | Status: SHIPPED | OUTPATIENT
Start: 2020-04-01 | End: 2020-09-10 | Stop reason: SDUPTHER

## 2020-04-01 NOTE — PATIENT INSTRUCTIONS
Call back if not better early next week.      Back Exercises  The following exercises strengthen the muscles that help to support the trunk and back. They also help to keep the lower back flexible. Doing these exercises can help to prevent back pain or lessen existing pain.  · If you have back pain or discomfort, try doing these exercises 2-3 times each day or as told by your health care provider.  · As your pain improves, do them once each day, but increase the number of times that you repeat the steps for each exercise (do more repetitions).  · To prevent the recurrence of back pain, continue to do these exercises once each day or as told by your health care provider.  Do exercises exactly as told by your health care provider and adjust them as directed. It is normal to feel mild stretching, pulling, tightness, or discomfort as you do these exercises, but you should stop right away if you feel sudden pain or your pain gets worse.  Exercises  Single knee to chest  Repeat these steps 3-5 times for each le. Lie on your back on a firm bed or the floor with your legs extended.  2. Bring one knee to your chest. Your other leg should stay extended and in contact with the floor.  3. Hold your knee in place by grabbing your knee or thigh with both hands and hold.  4. Pull on your knee until you feel a gentle stretch in your lower back or buttocks.  5. Hold the stretch for 10-30 seconds.  6. Slowly release and straighten your leg.  Pelvic tilt  Repeat these steps 5-10 times:  1. Lie on your back on a firm bed or the floor with your legs extended.  2. Bend your knees so they are pointing toward the ceiling and your feet are flat on the floor.  3. Tighten your lower abdominal muscles to press your lower back against the floor. This motion will tilt your pelvis so your tailbone points up toward the ceiling instead of pointing to your feet or the floor.  4. With gentle tension and even breathing, hold this position for  5-10 seconds.  Cat-cow  Repeat these steps until your lower back becomes more flexible:  1. Get into a hands-and-knees position on a firm surface. Keep your hands under your shoulders, and keep your knees under your hips. You may place padding under your knees for comfort.  2. Let your head hang down toward your chest. Contract your abdominal muscles and point your tailbone toward the floor so your lower back becomes rounded like the back of a cat.  3. Hold this position for 5 seconds.  4. Slowly lift your head, let your abdominal muscles relax and point your tailbone up toward the ceiling so your back forms a sagging arch like the back of a cow.  5. Hold this position for 5 seconds.    Press-ups  Repeat these steps 5-10 times:  1. Lie on your abdomen (face-down) on the floor.  2. Place your palms near your head, about shoulder-width apart.  3. Keeping your back as relaxed as possible and keeping your hips on the floor, slowly straighten your arms to raise the top half of your body and lift your shoulders. Do not use your back muscles to raise your upper torso. You may adjust the placement of your hands to make yourself more comfortable.  4. Hold this position for 5 seconds while you keep your back relaxed.  5. Slowly return to lying flat on the floor.    Bridges  Repeat these steps 10 times:  1. Lie on your back on a firm surface.  2. Bend your knees so they are pointing toward the ceiling and your feet are flat on the floor. Your arms should be flat at your sides, next to your body.  3. Tighten your buttocks muscles and lift your buttocks off the floor until your waist is at almost the same height as your knees. You should feel the muscles working in your buttocks and the back of your thighs. If you do not feel these muscles, slide your feet 1-2 inches farther away from your buttocks.  4. Hold this position for 3-5 seconds.  5. Slowly lower your hips to the starting position, and allow your buttocks muscles to  relax completely.  If this exercise is too easy, try doing it with your arms crossed over your chest.  Abdominal crunches  Repeat these steps 5-10 times:  1. Lie on your back on a firm bed or the floor with your legs extended.  2. Bend your knees so they are pointing toward the ceiling and your feet are flat on the floor.  3. Cross your arms over your chest.  4. Tip your chin slightly toward your chest without bending your neck.  5. Tighten your abdominal muscles and slowly raise your trunk (torso) high enough to lift your shoulder blades a tiny bit off the floor. Avoid raising your torso higher than that because it can put too much stress on your low back and does not help to strengthen your abdominal muscles.  6. Slowly return to your starting position.  Back lifts  Repeat these steps 5-10 times:  1. Lie on your abdomen (face-down) with your arms at your sides, and rest your forehead on the floor.  2. Tighten the muscles in your legs and your buttocks.  3. Slowly lift your chest off the floor while you keep your hips pressed to the floor. Keep the back of your head in line with the curve in your back. Your eyes should be looking at the floor.  4. Hold this position for 3-5 seconds.  5. Slowly return to your starting position.  Contact a health care provider if:  · Your back pain or discomfort gets much worse when you do an exercise.  · Your worsening back pain or discomfort does not lessen within 2 hours after you exercise.  If you have any of these problems, stop doing these exercises right away. Do not do them again unless your health care provider says that you can.  Get help right away if:  · You develop sudden, severe back pain. If this happens, stop doing the exercises right away. Do not do them again unless your health care provider says that you can.  This information is not intended to replace advice given to you by your health care provider. Make sure you discuss any questions you have with your health  care provider.  Document Released: 01/25/2006 Document Revised: 09/19/2019 Document Reviewed: 09/19/2019  [employer] Interactive Patient Education © 2020 [employer]    Sciatica    Sciatica is pain, numbness, weakness, or tingling along the path of the sciatic nerve. The sciatic nerve starts in the lower back and runs down the back of each leg. The nerve controls the muscles in the lower leg and in the back of the knee. It also provides feeling (sensation) to the back of the thigh, the lower leg, and the sole of the foot. Sciatica is a symptom of another medical condition that pinches or puts pressure on the sciatic nerve.  Generally, sciatica only affects one side of the body. Sciatica usually goes away on its own or with treatment. In some cases, sciatica may keep coming back (recur).  What are the causes?  This condition is caused by pressure on the sciatic nerve, or pinching of the sciatic nerve. This may be the result of:  · A disk in between the bones of the spine (vertebrae) bulging out too far (herniated disk).  · Age-related changes in the spinal disks (degenerative disk disease).  · A pain disorder that affects a muscle in the buttock (piriformis syndrome).  · Extra bone growth (bone spur) near the sciatic nerve.  · An injury or break (fracture) of the pelvis.  · Pregnancy.  · Tumor (rare).  What increases the risk?  The following factors may make you more likely to develop this condition:  · Playing sports that place pressure or stress on the spine, such as football or weight lifting.  · Having poor strength and flexibility.  · A history of back injury.  · A history of back surgery.  · Sitting for long periods of time.  · Doing activities that involve repetitive bending or lifting.  · Obesity.  What are the signs or symptoms?  Symptoms can vary from mild to very severe, and they may include:  · Any of these problems in the lower back, leg, hip, or buttock:  ? Mild tingling or dull aches.  ? Burning  sensations.  ? Sharp pains.  · Numbness in the back of the calf or the sole of the foot.  · Leg weakness.  · Severe back pain that makes movement difficult.  These symptoms may get worse when you cough, sneeze, or laugh, or when you sit or stand for long periods of time. Being overweight may also make symptoms worse. In some cases, symptoms may recur over time.  How is this diagnosed?  This condition may be diagnosed based on:  · Your symptoms.  · A physical exam. Your health care provider may ask you to do certain movements to check whether those movements trigger your symptoms.  · You may have tests, including:  ? Blood tests.  ? X-rays.  ? MRI.  ? CT scan.  How is this treated?  In many cases, this condition improves on its own, without any treatment. However, treatment may include:  · Reducing or modifying physical activity during periods of pain.  · Exercising and stretching to strengthen your abdomen and improve the flexibility of your spine.  · Icing and applying heat to the affected area.  · Medicines that help:  ? To relieve pain and swelling.  ? To relax your muscles.  · Injections of medicines that help to relieve pain, irritation, and inflammation around the sciatic nerve (steroids).  · Surgery.  Follow these instructions at home:  Medicines  · Take over-the-counter and prescription medicines only as told by your health care provider.  · Do not drive or operate heavy machinery while taking prescription pain medicine.  Managing pain  · If directed, apply ice to the affected area.  ? Put ice in a plastic bag.  ? Place a towel between your skin and the bag.  ? Leave the ice on for 20 minutes, 2-3 times a day.  · After icing, apply heat to the affected area before you exercise or as often as told by your health care provider. Use the heat source that your health care provider recommends, such as a moist heat pack or a heating pad.  ? Place a towel between your skin and the heat source.  ? Leave the heat on  for 20-30 minutes.  ? Remove the heat if your skin turns bright red. This is especially important if you are unable to feel pain, heat, or cold. You may have a greater risk of getting burned.  Activity  · Return to your normal activities as told by your health care provider. Ask your health care provider what activities are safe for you.  ? Avoid activities that make your symptoms worse.  · Take brief periods of rest throughout the day. Resting in a lying or standing position is usually better than sitting to rest.  ? When you rest for longer periods, mix in some mild activity or stretching between periods of rest. This will help to prevent stiffness and pain.  ? Avoid sitting for long periods of time without moving. Get up and move around at least one time each hour.  · Exercise and stretch regularly, as told by your health care provider.  · Do not lift anything that is heavier than 10 lb (4.5 kg) while you have symptoms of sciatica. When you do not have symptoms, you should still avoid heavy lifting, especially repetitive heavy lifting.  · When you lift objects, always use proper lifting technique, which includes:  ? Bending your knees.  ? Keeping the load close to your body.  ? Avoiding twisting.  General instructions  · Use good posture.  ? Avoid leaning forward while sitting.  ? Avoid hunching over while standing.  · Maintain a healthy weight. Excess weight puts extra stress on your back and makes it difficult to maintain good posture.  · Wear supportive, comfortable shoes. Avoid wearing high heels.  · Avoid sleeping on a mattress that is too soft or too hard. A mattress that is firm enough to support your back when you sleep may help to reduce your pain.  · Keep all follow-up visits as told by your health care provider. This is important.  Contact a health care provider if:  · You have pain that wakes you up when you are sleeping.  · You have pain that gets worse when you lie down.  · Your pain is worse than  you have experienced in the past.  · Your pain lasts longer than 4 weeks.  · You experience unexplained weight loss.  Get help right away if:  · You lose control of your bowel or bladder (incontinence).  · You have:  ? Weakness in your lower back, pelvis, buttocks, or legs that gets worse.  ? Redness or swelling of your back.  ? A burning sensation when you urinate.  This information is not intended to replace advice given to you by your health care provider. Make sure you discuss any questions you have with your health care provider.  Document Released: 12/12/2002 Document Revised: 05/23/2017 Document Reviewed: 08/26/2016  [employer] Interactive Patient Education © 2019 [employer]

## 2020-04-01 NOTE — PROGRESS NOTES
Subjective   Nereida Woodruff is a 44 y.o. female.     Chief Complaint   Patient presents with   • Back Pain        Patient attempted video visit but her phone would not support the platform.  We had 20-minute discussion over the telephone instead.  Patient did not seem to be in distress over the audio.  She has had severe right-sided back pain for the past 2 days that go down into her right buttock.  There is no numbness or tingling going down the leg.  There is generalized weakness.  There is no loss of control of bowel or bladder.  She has had similar episodes in the past that have responded well to treatment.  She is tried some leftover cyclobenzaprine that she had over the past couple days with minimal relief.  She has not tried any anti-inflammatories or tried ice it yet.  She had been to a boarding school to  her son in [facility].  She states she is trying to self isolate but does live with a young adult daughter and a teenage son.           The following portions of the patient's history were reviewed and updated as appropriate: allergies, current medications, past family history, past medical history, past social history, past surgical history and problem list.    Past Medical History:   Diagnosis Date   • Acute upper respiratory infection    • Allergic rhinitis    • Anxiety    • Asthma    • BMI 25.0-25.9,adult    • Cancer (CMS/HCC)     BREAST   • Community acquired pneumonia    • COPD (chronic obstructive pulmonary disease) (CMS/HCC)    • Depression, major    • Disease of thyroid gland    • Foreign body in ear    • Hashimoto's thyroiditis    • History of abnormal mammogram     Left breast   • History of acute sinusitis    • History of adenocarcinoma of breast    • History of cholelithiasis    • History of COPD    • History of dyspareunia in female    • History of lump of left breast    • History of malignant neoplasm of left breast    • History of nausea and vomiting    • History of  seborrheic dermatitis    • History of strep pharyngitis    • History of suicidal ideation    • Hypothyroidism    • Hypoxemia     History of Hypoxemia   • Obsessive compulsive disorder    • Other screening mammogram    • Personal history of asthma    • Recurrent genital herpes simplex     History of    • Sore throat    • Symptomatic states associated with artificial menopause     History of        Past Surgical History:   Procedure Laterality Date   • APPENDECTOMY     • BREAST AUGMENTATION Bilateral     Revised 2005, initial implants 1992   • CERVICAL CERCLAGE      x 2 during pregnancy, incompetent cervix   • CHOLECYSTECTOMY     • HYSTERECTOMY      due to uterine prolapse   • LUNG SURGERY     • MASTECTOMY         Family History   Problem Relation Age of Onset   • Thyroid disease Mother    • Arthritis Mother    • Uterine cancer Mother         in her 40's   • Cancer Father    • Bipolar disorder Father    • Depression Father    • Hyperlipidemia Father    • Hypertension Father    • Arthritis Maternal Grandmother    • Bleeding Disorder Maternal Grandmother    • Hyperlipidemia Maternal Grandmother    • Hypertension Maternal Grandmother    • Hypertension Maternal Grandfather    • Hypertension Paternal Grandfather    • Hyperlipidemia Paternal Grandfather    • Coronary artery disease Other         Maternal GrGF   • Skin cancer Other         Maternal GrGF   • Diabetes Other         Paternal GrGF   • Heart disease Other         FH in females before the age of 65       Social History     Socioeconomic History   • Marital status:      Spouse name: Not on file   • Number of children: Not on file   • Years of education: Not on file   • Highest education level: Not on file   Tobacco Use   • Smoking status: Never Smoker   • Smokeless tobacco: Never Used   Substance and Sexual Activity   • Alcohol use: Yes     Frequency: Never     Comment: rare use   • Drug use: No         Current Outpatient Medications:   •  albuterol  (PROVENTIL) (2.5 MG/3ML) 0.083% nebulizer solution, Inhale 2.5 mg., Disp: , Rfl:   •  albuterol sulfate  (90 Base) MCG/ACT inhaler, Inhale 2 puffs Every 4 (Four) Hours As Needed for Wheezing., Disp: 8.5 inhaler, Rfl: 2  •  cyclobenzaprine (FLEXERIL) 10 MG tablet, Take 1 tablet by mouth 3 (Three) Times a Day As Needed for Muscle Spasms., Disp: 30 tablet, Rfl: 0  •  fexofenadine (ALLEGRA) 180 MG tablet, Take 180 mg by mouth Daily., Disp: , Rfl:   •  FLUoxetine (PROzac) 40 MG capsule, Take 2 capsules by mouth Daily., Disp: 180 capsule, Rfl: 3  •  Fluticasone Furoate-Vilanterol (BREO ELLIPTA) 100-25 MCG/INH inhaler, Inhale 1 puff Daily., Disp: , Rfl:   •  leuprolide (LUPRON) 11.25 MG injection, Inject  into the appropriate muscle as directed by prescriber., Disp: , Rfl:   •  montelukast (SINGULAIR) 10 MG tablet, Take 10 mg by mouth Daily., Disp: , Rfl:   •  Multiple Vitamin (MULTI-VITAMIN DAILY PO), Take  by mouth Daily., Disp: , Rfl:   •  SYNTHROID 50 MCG tablet, TAKE ONE TABLET BY MOUTH DAILY, Disp: 90 tablet, Rfl: 0  •  tamoxifen (NOLVADEX) 20 MG chemo tablet, , Disp: , Rfl:   •  theophylline (THEODUR) 300 MG 12 hr tablet, Take 1 tablet by mouth Every Night., Disp: 90 tablet, Rfl: 0  •  valACYclovir (VALTREX) 500 MG tablet, Take 500 mg by mouth Daily., Disp: , Rfl:   •  albuterol (PROVENTIL) 4 MG tablet, Take 1 tablet by mouth Every Night., Disp: 90 tablet, Rfl: 0  •  ketorolac (TORADOL) 10 MG tablet, Take 1 tablet by mouth Every 6 (Six) Hours As Needed for Moderate Pain ., Disp: 20 tablet, Rfl: 0  •  oseltamivir (TAMIFLU) 75 MG capsule, Take 1 capsule by mouth 2 (Two) Times a Day., Disp: 10 capsule, Rfl: 0  •  pseudoephedrine-guaifenesin (MUCINEX D)  MG per 12 hr tablet, Take 1 tablet by mouth Daily., Disp: , Rfl:   •  traMADol (ULTRAM) 50 MG tablet, Take 1-2 tablets by mouth Every 6 (Six) Hours As Needed for Moderate Pain  or Severe Pain ., Disp: 30 tablet, Rfl: 0    Review of Systems   Constitutional:  Negative for chills, fatigue and fever.   HENT: Negative for congestion, rhinorrhea and sore throat.    Respiratory: Negative for cough and shortness of breath.    Cardiovascular: Negative for chest pain and leg swelling.   Gastrointestinal: Negative for abdominal pain.   Endocrine: Negative for polydipsia and polyuria.   Genitourinary: Negative for dysuria.   Musculoskeletal: Positive for back pain. Negative for arthralgias and myalgias.   Skin: Negative for rash.   Neurological: Positive for weakness. Negative for dizziness and numbness.   Hematological: Does not bruise/bleed easily.   Psychiatric/Behavioral: Negative for sleep disturbance.       Objective   There were no vitals filed for this visit.  There is no height or weight on file to calculate BMI.  Physical Exam   Phone visit      Assessment/Plan   Nereida was seen today for back pain.    Diagnoses and all orders for this visit:    Sciatica of right side  -     ketorolac (TORADOL) 10 MG tablet; Take 1 tablet by mouth Every 6 (Six) Hours As Needed for Moderate Pain .  -     traMADol (ULTRAM) 50 MG tablet; Take 1-2 tablets by mouth Every 6 (Six) Hours As Needed for Moderate Pain  or Severe Pain .  -     cyclobenzaprine (FLEXERIL) 10 MG tablet; Take 1 tablet by mouth 3 (Three) Times a Day As Needed for Muscle Spasms.    Chronic bilateral low back pain without sciatica    Other orders  -     theophylline (THEODUR) 300 MG 12 hr tablet; Take 1 tablet by mouth Every Night.  -     albuterol (PROVENTIL) 4 MG tablet; Take 1 tablet by mouth Every Night.               Patient Instructions   Call back if not better early next week.      Back Exercises  The following exercises strengthen the muscles that help to support the trunk and back. They also help to keep the lower back flexible. Doing these exercises can help to prevent back pain or lessen existing pain.  · If you have back pain or discomfort, try doing these exercises 2-3 times each day or as told by your  health care provider.  · As your pain improves, do them once each day, but increase the number of times that you repeat the steps for each exercise (do more repetitions).  · To prevent the recurrence of back pain, continue to do these exercises once each day or as told by your health care provider.  Do exercises exactly as told by your health care provider and adjust them as directed. It is normal to feel mild stretching, pulling, tightness, or discomfort as you do these exercises, but you should stop right away if you feel sudden pain or your pain gets worse.  Exercises  Single knee to chest  Repeat these steps 3-5 times for each le. Lie on your back on a firm bed or the floor with your legs extended.  2. Bring one knee to your chest. Your other leg should stay extended and in contact with the floor.  3. Hold your knee in place by grabbing your knee or thigh with both hands and hold.  4. Pull on your knee until you feel a gentle stretch in your lower back or buttocks.  5. Hold the stretch for 10-30 seconds.  6. Slowly release and straighten your leg.  Pelvic tilt  Repeat these steps 5-10 times:  1. Lie on your back on a firm bed or the floor with your legs extended.  2. Bend your knees so they are pointing toward the ceiling and your feet are flat on the floor.  3. Tighten your lower abdominal muscles to press your lower back against the floor. This motion will tilt your pelvis so your tailbone points up toward the ceiling instead of pointing to your feet or the floor.  4. With gentle tension and even breathing, hold this position for 5-10 seconds.  Cat-cow  Repeat these steps until your lower back becomes more flexible:  1. Get into a hands-and-knees position on a firm surface. Keep your hands under your shoulders, and keep your knees under your hips. You may place padding under your knees for comfort.  2. Let your head hang down toward your chest. Contract your abdominal muscles and point your tailbone  toward the floor so your lower back becomes rounded like the back of a cat.  3. Hold this position for 5 seconds.  4. Slowly lift your head, let your abdominal muscles relax and point your tailbone up toward the ceiling so your back forms a sagging arch like the back of a cow.  5. Hold this position for 5 seconds.    Press-ups  Repeat these steps 5-10 times:  1. Lie on your abdomen (face-down) on the floor.  2. Place your palms near your head, about shoulder-width apart.  3. Keeping your back as relaxed as possible and keeping your hips on the floor, slowly straighten your arms to raise the top half of your body and lift your shoulders. Do not use your back muscles to raise your upper torso. You may adjust the placement of your hands to make yourself more comfortable.  4. Hold this position for 5 seconds while you keep your back relaxed.  5. Slowly return to lying flat on the floor.    Bridges  Repeat these steps 10 times:  1. Lie on your back on a firm surface.  2. Bend your knees so they are pointing toward the ceiling and your feet are flat on the floor. Your arms should be flat at your sides, next to your body.  3. Tighten your buttocks muscles and lift your buttocks off the floor until your waist is at almost the same height as your knees. You should feel the muscles working in your buttocks and the back of your thighs. If you do not feel these muscles, slide your feet 1-2 inches farther away from your buttocks.  4. Hold this position for 3-5 seconds.  5. Slowly lower your hips to the starting position, and allow your buttocks muscles to relax completely.  If this exercise is too easy, try doing it with your arms crossed over your chest.  Abdominal crunches  Repeat these steps 5-10 times:  1. Lie on your back on a firm bed or the floor with your legs extended.  2. Bend your knees so they are pointing toward the ceiling and your feet are flat on the floor.  3. Cross your arms over your chest.  4. Tip your chin  slightly toward your chest without bending your neck.  5. Tighten your abdominal muscles and slowly raise your trunk (torso) high enough to lift your shoulder blades a tiny bit off the floor. Avoid raising your torso higher than that because it can put too much stress on your low back and does not help to strengthen your abdominal muscles.  6. Slowly return to your starting position.  Back lifts  Repeat these steps 5-10 times:  1. Lie on your abdomen (face-down) with your arms at your sides, and rest your forehead on the floor.  2. Tighten the muscles in your legs and your buttocks.  3. Slowly lift your chest off the floor while you keep your hips pressed to the floor. Keep the back of your head in line with the curve in your back. Your eyes should be looking at the floor.  4. Hold this position for 3-5 seconds.  5. Slowly return to your starting position.  Contact a health care provider if:  · Your back pain or discomfort gets much worse when you do an exercise.  · Your worsening back pain or discomfort does not lessen within 2 hours after you exercise.  If you have any of these problems, stop doing these exercises right away. Do not do them again unless your health care provider says that you can.  Get help right away if:  · You develop sudden, severe back pain. If this happens, stop doing the exercises right away. Do not do them again unless your health care provider says that you can.  This information is not intended to replace advice given to you by your health care provider. Make sure you discuss any questions you have with your health care provider.  Document Released: 01/25/2006 Document Revised: 09/19/2019 Document Reviewed: 09/19/2019  [employer] Interactive Patient Education © 2020 [employer]    Sciatica    Sciatica is pain, numbness, weakness, or tingling along the path of the sciatic nerve. The sciatic nerve starts in the lower back and runs down the back of each leg. The nerve controls the muscles in  the lower leg and in the back of the knee. It also provides feeling (sensation) to the back of the thigh, the lower leg, and the sole of the foot. Sciatica is a symptom of another medical condition that pinches or puts pressure on the sciatic nerve.  Generally, sciatica only affects one side of the body. Sciatica usually goes away on its own or with treatment. In some cases, sciatica may keep coming back (recur).  What are the causes?  This condition is caused by pressure on the sciatic nerve, or pinching of the sciatic nerve. This may be the result of:  · A disk in between the bones of the spine (vertebrae) bulging out too far (herniated disk).  · Age-related changes in the spinal disks (degenerative disk disease).  · A pain disorder that affects a muscle in the buttock (piriformis syndrome).  · Extra bone growth (bone spur) near the sciatic nerve.  · An injury or break (fracture) of the pelvis.  · Pregnancy.  · Tumor (rare).  What increases the risk?  The following factors may make you more likely to develop this condition:  · Playing sports that place pressure or stress on the spine, such as football or weight lifting.  · Having poor strength and flexibility.  · A history of back injury.  · A history of back surgery.  · Sitting for long periods of time.  · Doing activities that involve repetitive bending or lifting.  · Obesity.  What are the signs or symptoms?  Symptoms can vary from mild to very severe, and they may include:  · Any of these problems in the lower back, leg, hip, or buttock:  ? Mild tingling or dull aches.  ? Burning sensations.  ? Sharp pains.  · Numbness in the back of the calf or the sole of the foot.  · Leg weakness.  · Severe back pain that makes movement difficult.  These symptoms may get worse when you cough, sneeze, or laugh, or when you sit or stand for long periods of time. Being overweight may also make symptoms worse. In some cases, symptoms may recur over time.  How is this  diagnosed?  This condition may be diagnosed based on:  · Your symptoms.  · A physical exam. Your health care provider may ask you to do certain movements to check whether those movements trigger your symptoms.  · You may have tests, including:  ? Blood tests.  ? X-rays.  ? MRI.  ? CT scan.  How is this treated?  In many cases, this condition improves on its own, without any treatment. However, treatment may include:  · Reducing or modifying physical activity during periods of pain.  · Exercising and stretching to strengthen your abdomen and improve the flexibility of your spine.  · Icing and applying heat to the affected area.  · Medicines that help:  ? To relieve pain and swelling.  ? To relax your muscles.  · Injections of medicines that help to relieve pain, irritation, and inflammation around the sciatic nerve (steroids).  · Surgery.  Follow these instructions at home:  Medicines  · Take over-the-counter and prescription medicines only as told by your health care provider.  · Do not drive or operate heavy machinery while taking prescription pain medicine.  Managing pain  · If directed, apply ice to the affected area.  ? Put ice in a plastic bag.  ? Place a towel between your skin and the bag.  ? Leave the ice on for 20 minutes, 2-3 times a day.  · After icing, apply heat to the affected area before you exercise or as often as told by your health care provider. Use the heat source that your health care provider recommends, such as a moist heat pack or a heating pad.  ? Place a towel between your skin and the heat source.  ? Leave the heat on for 20-30 minutes.  ? Remove the heat if your skin turns bright red. This is especially important if you are unable to feel pain, heat, or cold. You may have a greater risk of getting burned.  Activity  · Return to your normal activities as told by your health care provider. Ask your health care provider what activities are safe for you.  ? Avoid activities that make your  symptoms worse.  · Take brief periods of rest throughout the day. Resting in a lying or standing position is usually better than sitting to rest.  ? When you rest for longer periods, mix in some mild activity or stretching between periods of rest. This will help to prevent stiffness and pain.  ? Avoid sitting for long periods of time without moving. Get up and move around at least one time each hour.  · Exercise and stretch regularly, as told by your health care provider.  · Do not lift anything that is heavier than 10 lb (4.5 kg) while you have symptoms of sciatica. When you do not have symptoms, you should still avoid heavy lifting, especially repetitive heavy lifting.  · When you lift objects, always use proper lifting technique, which includes:  ? Bending your knees.  ? Keeping the load close to your body.  ? Avoiding twisting.  General instructions  · Use good posture.  ? Avoid leaning forward while sitting.  ? Avoid hunching over while standing.  · Maintain a healthy weight. Excess weight puts extra stress on your back and makes it difficult to maintain good posture.  · Wear supportive, comfortable shoes. Avoid wearing high heels.  · Avoid sleeping on a mattress that is too soft or too hard. A mattress that is firm enough to support your back when you sleep may help to reduce your pain.  · Keep all follow-up visits as told by your health care provider. This is important.  Contact a health care provider if:  · You have pain that wakes you up when you are sleeping.  · You have pain that gets worse when you lie down.  · Your pain is worse than you have experienced in the past.  · Your pain lasts longer than 4 weeks.  · You experience unexplained weight loss.  Get help right away if:  · You lose control of your bowel or bladder (incontinence).  · You have:  ? Weakness in your lower back, pelvis, buttocks, or legs that gets worse.  ? Redness or swelling of your back.  ? A burning sensation when you urinate.  This  information is not intended to replace advice given to you by your health care provider. Make sure you discuss any questions you have with your health care provider.  Document Released: 12/12/2002 Document Revised: 05/23/2017 Document Reviewed: 08/26/2016  [employer] Interactive Patient Education © 2019 [employer]

## 2020-04-01 NOTE — TELEPHONE ENCOUNTER
DAQUANNIDIA CALLED STATED THEY CAN NOT DISPENSE THE ORAL TORADOL UNTIL SHE HAS HAD A RECENT SHOT OF IT DUE TO THERE BEING A BLACK BOX WARNING. I EXPLAINED TO THEM WE ARE TRYING TO KEEP HIGH RISK PATIENT OUT OF THE OFFICE AND SHE IS HIGH RISK BUT SHE HAS PREVIOUSLY HAD A SHOT THE LAST ONE BEING IN November 2019. THEY STATED THEY ARE STILL UNABLE TO DISPENSE THE TORADOL.

## 2020-04-01 NOTE — TELEPHONE ENCOUNTER
There is concern for steroids weakening the immune system, I was just going to do a Medrol Dosepak which is not a high dose steroid.

## 2020-04-01 NOTE — TELEPHONE ENCOUNTER
Pt aware. Per Dr. Kehrer patient has at home prednisone 10mg she was instructed to do 2 tablets for two days and then one tablet for three days instead of the toradolol shot.

## 2020-04-01 NOTE — TELEPHONE ENCOUNTER
Ask the patient if she is okay to do a steroid Dosepak for the inflammation instead of the Toradol.

## 2020-04-01 NOTE — TELEPHONE ENCOUNTER
Patient called stated her concern with the steroid dose pack is what will it do to her immune system.

## 2020-04-21 ENCOUNTER — TELEMEDICINE (OUTPATIENT)
Dept: FAMILY MEDICINE CLINIC | Facility: CLINIC | Age: 45
End: 2020-04-21

## 2020-04-21 VITALS — OXYGEN SATURATION: 97 %

## 2020-04-21 DIAGNOSIS — J45.51 SEVERE PERSISTENT ASTHMA WITH ACUTE EXACERBATION: ICD-10-CM

## 2020-04-21 DIAGNOSIS — J45.901 EXACERBATION OF ASTHMA, UNSPECIFIED ASTHMA SEVERITY, UNSPECIFIED WHETHER PERSISTENT: Primary | ICD-10-CM

## 2020-04-21 PROCEDURE — 99213 OFFICE O/P EST LOW 20 MIN: CPT | Performed by: FAMILY MEDICINE

## 2020-04-21 RX ORDER — SULFAMETHOXAZOLE AND TRIMETHOPRIM 800; 160 MG/1; MG/1
1 TABLET ORAL 2 TIMES DAILY
Qty: 14 TABLET | Refills: 0 | Status: SHIPPED | OUTPATIENT
Start: 2020-04-21 | End: 2020-05-19

## 2020-04-21 RX ORDER — THEOPHYLLINE 300 MG/1
300 TABLET, EXTENDED RELEASE ORAL NIGHTLY
Qty: 90 TABLET | Refills: 0 | Status: SHIPPED | OUTPATIENT
Start: 2020-04-21 | End: 2020-05-19 | Stop reason: SDUPTHER

## 2020-04-21 RX ORDER — PREDNISONE 20 MG/1
20 TABLET ORAL 2 TIMES DAILY
Qty: 10 TABLET | Refills: 0 | Status: SHIPPED | OUTPATIENT
Start: 2020-04-21 | End: 2020-05-19

## 2020-04-21 NOTE — PROGRESS NOTES
This was an audio and video enabled telemedicine encounter.  Spent 28 minutes on video encounter.    Subjective   Nereida Woodruff is a 44 y.o. female.     Chief Complaint   Patient presents with   • Shortness of Breath   Spent 28 minutes on video encounter.    Patient requests a video visit during the global pandemic.  She has a longstanding history of severe asthma on theophylline.    She is also managed by pulmonary.  Her mother and her mother's new  have had COVID 19 disease.  He was in the hospital for 16 days.  She is dropped food over there but has not been exposed to them.  Patient is concerned because she has had some increased shortness of breath over the past couple of days but has not heard a lot of wheezing.  She denies fever chills or headache.    She has had some nausea but no vomiting or diarrhea.    Her children have been practicing good social distancing and they have all been in quarantine for the past 4 weeks.  Her sats have been doing well at home running from 95 to 98%.  She does not know where her peak flow meter is.         The following portions of the patient's history were reviewed and updated as appropriate: allergies, current medications, past family history, past medical history, past social history, past surgical history and problem list.    Past Medical History:   Diagnosis Date   • Acute upper respiratory infection    • Allergic rhinitis    • Anxiety    • Asthma    • BMI 25.0-25.9,adult    • Cancer (CMS/[facility])     BREAST   • Community acquired pneumonia    • COPD (chronic obstructive pulmonary disease) (CMS/[facility])    • Depression, major    • Disease of thyroid gland    • Foreign body in ear    • Hashimoto's thyroiditis    • History of abnormal mammogram     Left breast   • History of acute sinusitis    • History of adenocarcinoma of breast    • History of cholelithiasis    • History of COPD    • History of dyspareunia in female    • History of lump of left breast    •  History of malignant neoplasm of left breast    • History of nausea and vomiting    • History of seborrheic dermatitis    • History of strep pharyngitis    • History of suicidal ideation    • Hypothyroidism    • Hypoxemia     History of Hypoxemia   • Obsessive compulsive disorder    • Other screening mammogram    • Personal history of asthma    • Recurrent genital herpes simplex     History of    • Sore throat    • Symptomatic states associated with artificial menopause     History of        Past Surgical History:   Procedure Laterality Date   • APPENDECTOMY     • BREAST AUGMENTATION Bilateral     Revised 2005, initial implants 1992   • CERVICAL CERCLAGE      x 2 during pregnancy, incompetent cervix   • CHOLECYSTECTOMY     • HYSTERECTOMY      due to uterine prolapse   • LUNG SURGERY     • MASTECTOMY         Family History   Problem Relation Age of Onset   • Thyroid disease Mother    • Arthritis Mother    • Uterine cancer Mother         in her 40's   • Cancer Father    • Bipolar disorder Father    • Depression Father    • Hyperlipidemia Father    • Hypertension Father    • Arthritis Maternal Grandmother    • Bleeding Disorder Maternal Grandmother    • Hyperlipidemia Maternal Grandmother    • Hypertension Maternal Grandmother    • Hypertension Maternal Grandfather    • Hypertension Paternal Grandfather    • Hyperlipidemia Paternal Grandfather    • Coronary artery disease Other         Maternal GrGF   • Skin cancer Other         Maternal GrGF   • Diabetes Other         Paternal GrGF   • Heart disease Other         FH in females before the age of 65       Social History     Socioeconomic History   • Marital status:      Spouse name: Not on file   • Number of children: Not on file   • Years of education: Not on file   • Highest education level: Not on file   Tobacco Use   • Smoking status: Never Smoker   • Smokeless tobacco: Never Used   Substance and Sexual Activity   • Alcohol use: Yes     Frequency: Never      Comment: rare use   • Drug use: No         Current Outpatient Medications:   •  albuterol (PROVENTIL) (2.5 MG/3ML) 0.083% nebulizer solution, Inhale 2.5 mg., Disp: , Rfl:   •  albuterol (PROVENTIL) 4 MG tablet, Take 1 tablet by mouth Every Night., Disp: 90 tablet, Rfl: 0  •  albuterol sulfate  (90 Base) MCG/ACT inhaler, Inhale 2 puffs Every 4 (Four) Hours As Needed for Wheezing., Disp: 8.5 inhaler, Rfl: 2  •  cyclobenzaprine (FLEXERIL) 10 MG tablet, Take 1 tablet by mouth 3 (Three) Times a Day As Needed for Muscle Spasms., Disp: 30 tablet, Rfl: 0  •  fexofenadine (ALLEGRA) 180 MG tablet, Take 180 mg by mouth Daily., Disp: , Rfl:   •  FLUoxetine (PROzac) 40 MG capsule, Take 2 capsules by mouth Daily., Disp: 180 capsule, Rfl: 3  •  Fluticasone Furoate-Vilanterol (BREO ELLIPTA) 100-25 MCG/INH inhaler, Inhale 1 puff Daily., Disp: , Rfl:   •  leuprolide (LUPRON) 11.25 MG injection, Inject  into the appropriate muscle as directed by prescriber., Disp: , Rfl:   •  montelukast (SINGULAIR) 10 MG tablet, Take 10 mg by mouth Daily., Disp: , Rfl:   •  Multiple Vitamin (MULTI-VITAMIN DAILY PO), Take  by mouth Daily., Disp: , Rfl:   •  oseltamivir (TAMIFLU) 75 MG capsule, Take 1 capsule by mouth 2 (Two) Times a Day., Disp: 10 capsule, Rfl: 0  •  predniSONE (DELTASONE) 20 MG tablet, Take 1 tablet by mouth 2 (Two) Times a Day., Disp: 10 tablet, Rfl: 0  •  pseudoephedrine-guaifenesin (MUCINEX D)  MG per 12 hr tablet, Take 1 tablet by mouth Daily., Disp: , Rfl:   •  sulfamethoxazole-trimethoprim (Bactrim DS) 800-160 MG per tablet, Take 1 tablet by mouth 2 (Two) Times a Day., Disp: 14 tablet, Rfl: 0  •  SYNTHROID 50 MCG tablet, TAKE ONE TABLET BY MOUTH DAILY, Disp: 90 tablet, Rfl: 0  •  tamoxifen (NOLVADEX) 20 MG chemo tablet, , Disp: , Rfl:   •  theophylline (THEODUR) 300 MG 12 hr tablet, Take 1 tablet by mouth Every Night., Disp: 90 tablet, Rfl: 0  •  traMADol (ULTRAM) 50 MG tablet, Take 1-2 tablets by mouth Every 6  (Six) Hours As Needed for Moderate Pain  or Severe Pain ., Disp: 30 tablet, Rfl: 0  •  valACYclovir (VALTREX) 500 MG tablet, Take 500 mg by mouth Daily., Disp: , Rfl:     Review of Systems   Constitutional: Negative for chills, fatigue and fever.   HENT: Negative for congestion, rhinorrhea and sore throat.    Respiratory: Positive for shortness of breath. Negative for cough and wheezing.    Cardiovascular: Negative for chest pain and leg swelling.   Gastrointestinal: Positive for nausea. Negative for abdominal pain, diarrhea and vomiting.   Endocrine: Negative for polydipsia and polyuria.   Genitourinary: Negative for dysuria.   Musculoskeletal: Negative for arthralgias and myalgias.   Skin: Negative for rash.   Neurological: Negative for dizziness.   Hematological: Does not bruise/bleed easily.   Psychiatric/Behavioral: Negative for sleep disturbance.       Objective   Vitals:    04/21/20 1320   SpO2: 97%     There is no height or weight on file to calculate BMI.  Physical Exam   Constitutional: She is oriented to person, place, and time. She appears well-developed and well-nourished. No distress.   HENT:   Head: Normocephalic and atraumatic.   Eyes: Pupils are equal, round, and reactive to light. Conjunctivae are normal.   Pulmonary/Chest: Effort normal. No respiratory distress.   Neurological: She is alert and oriented to person, place, and time.   Psychiatric: She has a normal mood and affect.   Nursing note and vitals reviewed.        Assessment/Plan   Nereida was seen today for shortness of breath.    Diagnoses and all orders for this visit:    Exacerbation of asthma, unspecified asthma severity, unspecified whether persistent    Severe persistent asthma with acute exacerbation  -     predniSONE (DELTASONE) 20 MG tablet; Take 1 tablet by mouth 2 (Two) Times a Day.  -     sulfamethoxazole-trimethoprim (Bactrim DS) 800-160 MG per tablet; Take 1 tablet by mouth 2 (Two) Times a Day.  -     theophylline (THEODUR)  300 MG 12 hr tablet; Take 1 tablet by mouth Every Night.               There are no Patient Instructions on file for this visit.

## 2020-04-21 NOTE — PATIENT INSTRUCTIONS
We will treat as an asthma exacerbation.  Let me know if any worrisome symptoms develop.  Use your inhaler or nebulizer several times a day for the next couple days.

## 2020-05-19 ENCOUNTER — TELEMEDICINE (OUTPATIENT)
Dept: FAMILY MEDICINE CLINIC | Facility: CLINIC | Age: 45
End: 2020-05-19

## 2020-05-19 DIAGNOSIS — E06.3 HYPOTHYROIDISM DUE TO HASHIMOTO'S THYROIDITIS: ICD-10-CM

## 2020-05-19 DIAGNOSIS — J45.51 SEVERE PERSISTENT ASTHMA WITH ACUTE EXACERBATION: ICD-10-CM

## 2020-05-19 DIAGNOSIS — E03.8 HYPOTHYROIDISM DUE TO HASHIMOTO'S THYROIDITIS: ICD-10-CM

## 2020-05-19 DIAGNOSIS — F33.42 RECURRENT MAJOR DEPRESSIVE DISORDER, IN FULL REMISSION (HCC): Primary | ICD-10-CM

## 2020-05-19 DIAGNOSIS — M54.31 SCIATICA OF RIGHT SIDE: ICD-10-CM

## 2020-05-19 PROCEDURE — 99214 OFFICE O/P EST MOD 30 MIN: CPT | Performed by: FAMILY MEDICINE

## 2020-05-19 RX ORDER — ALBUTEROL SULFATE 4 MG/1
4 TABLET ORAL NIGHTLY
Qty: 90 TABLET | Refills: 1 | Status: SHIPPED | OUTPATIENT
Start: 2020-05-19 | End: 2021-02-08

## 2020-05-19 RX ORDER — THEOPHYLLINE 300 MG/1
300 TABLET, EXTENDED RELEASE ORAL NIGHTLY
Qty: 90 TABLET | Refills: 1 | Status: SHIPPED | OUTPATIENT
Start: 2020-05-19 | End: 2020-11-23 | Stop reason: SDUPTHER

## 2020-05-19 RX ORDER — LEVOTHYROXINE SODIUM 50 MCG
50 TABLET ORAL DAILY
Qty: 90 TABLET | Refills: 3 | Status: SHIPPED | OUTPATIENT
Start: 2020-05-19 | End: 2020-11-23 | Stop reason: SDUPTHER

## 2020-05-19 RX ORDER — CYCLOBENZAPRINE HCL 10 MG
10 TABLET ORAL 3 TIMES DAILY PRN
Qty: 30 TABLET | Refills: 2 | Status: SHIPPED | OUTPATIENT
Start: 2020-05-19 | End: 2020-12-02 | Stop reason: SDUPTHER

## 2020-05-19 NOTE — PROGRESS NOTES
This was an audio and video enabled telemedicine encounter.  Length of video visit 28 minutes.    Subjective   Nereida Woodruff is a 44 y.o. female.     Chief Complaint   Patient presents with   • Hypothyroidism   • Depression   • Anxiety   • Asthma        Patient presents for video visit during global pandemic.  She is doing very well in general.  She has not had any flares of her asthma.  As a cancer survivor in a patient with severe asthma she has been working really hard on her social distancing.  Her mother's new  had COVID pneumonia was in the hospital for couple weeks.  He has been recovering at home now.  Her depression and anxiety have been under control with her medication without complaints.  She sees her pulmonologist later this summer.  She is due for a TSH.  She is compliant with all her medications as listed.  She has the occasional flare of her back pain and would like to have a refill of the Flexeril at home.         The following portions of the patient's history were reviewed and updated as appropriate: allergies, current medications, past family history, past medical history, past social history, past surgical history and problem list.    Past Medical History:   Diagnosis Date   • Acute upper respiratory infection    • Allergic rhinitis    • Anxiety    • Asthma    • BMI 25.0-25.9,adult    • Cancer (CMS/HCC)     BREAST   • Community acquired pneumonia    • COPD (chronic obstructive pulmonary disease) (CMS/HCC)    • Depression, major    • Disease of thyroid gland    • Foreign body in ear    • Hashimoto's thyroiditis    • History of abnormal mammogram     Left breast   • History of acute sinusitis    • History of adenocarcinoma of breast    • History of cholelithiasis    • History of COPD    • History of dyspareunia in female    • History of lump of left breast    • History of malignant neoplasm of left breast    • History of nausea and vomiting    • History of seborrheic dermatitis     • History of strep pharyngitis    • History of suicidal ideation    • Hypothyroidism    • Hypoxemia     History of Hypoxemia   • Obsessive compulsive disorder    • Other screening mammogram    • Personal history of asthma    • Recurrent genital herpes simplex     History of    • Sore throat    • Symptomatic states associated with artificial menopause     History of        Past Surgical History:   Procedure Laterality Date   • APPENDECTOMY     • BREAST AUGMENTATION Bilateral     Revised 2005, initial implants 1992   • CERVICAL CERCLAGE      x 2 during pregnancy, incompetent cervix   • CHOLECYSTECTOMY     • HYSTERECTOMY      due to uterine prolapse   • LUNG SURGERY     • MASTECTOMY         Family History   Problem Relation Age of Onset   • Thyroid disease Mother    • Arthritis Mother    • Uterine cancer Mother         in her 40's   • Cancer Father    • Bipolar disorder Father    • Depression Father    • Hyperlipidemia Father    • Hypertension Father    • Arthritis Maternal Grandmother    • Bleeding Disorder Maternal Grandmother    • Hyperlipidemia Maternal Grandmother    • Hypertension Maternal Grandmother    • Hypertension Maternal Grandfather    • Hypertension Paternal Grandfather    • Hyperlipidemia Paternal Grandfather    • Coronary artery disease Other         Maternal GrGF   • Skin cancer Other         Maternal GrGF   • Diabetes Other         Paternal GrGF   • Heart disease Other         FH in females before the age of 65       Social History     Socioeconomic History   • Marital status:      Spouse name: Not on file   • Number of children: Not on file   • Years of education: Not on file   • Highest education level: Not on file   Tobacco Use   • Smoking status: Never Smoker   • Smokeless tobacco: Never Used   Substance and Sexual Activity   • Alcohol use: Yes     Frequency: Never     Comment: rare use   • Drug use: No         Current Outpatient Medications:   •  albuterol (PROVENTIL) (2.5 MG/3ML) 0.083%  nebulizer solution, Inhale 2.5 mg., Disp: , Rfl:   •  albuterol (PROVENTIL) 4 MG tablet, Take 1 tablet by mouth Every Night., Disp: 90 tablet, Rfl: 1  •  albuterol sulfate  (90 Base) MCG/ACT inhaler, Inhale 2 puffs Every 4 (Four) Hours As Needed for Wheezing., Disp: 8.5 inhaler, Rfl: 2  •  cyclobenzaprine (FLEXERIL) 10 MG tablet, Take 1 tablet by mouth 3 (Three) Times a Day As Needed for Muscle Spasms., Disp: 30 tablet, Rfl: 2  •  fexofenadine (ALLEGRA) 180 MG tablet, Take 180 mg by mouth Daily., Disp: , Rfl:   •  FLUoxetine (PROzac) 40 MG capsule, Take 2 capsules by mouth Daily., Disp: 180 capsule, Rfl: 3  •  Fluticasone Furoate-Vilanterol (BREO ELLIPTA) 100-25 MCG/INH inhaler, Inhale 1 puff Daily., Disp: , Rfl:   •  leuprolide (LUPRON) 11.25 MG injection, Inject  into the appropriate muscle as directed by prescriber., Disp: , Rfl:   •  montelukast (SINGULAIR) 10 MG tablet, Take 10 mg by mouth Daily., Disp: , Rfl:   •  Multiple Vitamin (MULTI-VITAMIN DAILY PO), Take  by mouth Daily., Disp: , Rfl:   •  SYNTHROID 50 MCG tablet, Take 1 tablet by mouth Daily., Disp: 90 tablet, Rfl: 3  •  tamoxifen (NOLVADEX) 20 MG chemo tablet, , Disp: , Rfl:   •  theophylline (THEODUR) 300 MG 12 hr tablet, Take 1 tablet by mouth Every Night., Disp: 90 tablet, Rfl: 1  •  valACYclovir (VALTREX) 500 MG tablet, Take 500 mg by mouth Daily., Disp: , Rfl:   •  traMADol (ULTRAM) 50 MG tablet, Take 1-2 tablets by mouth Every 6 (Six) Hours As Needed for Moderate Pain  or Severe Pain ., Disp: 30 tablet, Rfl: 0    Review of Systems   Constitutional: Negative for chills, fatigue and fever.   HENT: Negative for congestion, rhinorrhea and sore throat.    Respiratory: Negative for cough and shortness of breath.    Cardiovascular: Negative for chest pain and leg swelling.   Gastrointestinal: Negative for abdominal pain.   Endocrine: Negative for polydipsia and polyuria.   Genitourinary: Negative for dysuria.   Musculoskeletal: Negative for  arthralgias and myalgias.   Skin: Negative for rash.   Neurological: Negative for dizziness.   Hematological: Does not bruise/bleed easily.   Psychiatric/Behavioral: Negative for decreased concentration, dysphoric mood, hallucinations, self-injury, sleep disturbance and suicidal ideas. The patient is not nervous/anxious and is not hyperactive.        Objective   There were no vitals filed for this visit.  There is no height or weight on file to calculate BMI.  Physical Exam   Constitutional: She is oriented to person, place, and time. She appears well-developed and well-nourished. No distress.   HENT:   Head: Normocephalic and atraumatic.   Eyes: Pupils are equal, round, and reactive to light. Conjunctivae are normal.   Pulmonary/Chest: Effort normal. No respiratory distress.   Neurological: She is alert and oriented to person, place, and time.   Psychiatric: She has a normal mood and affect.         Assessment/Plan   Nereida was seen today for hypothyroidism, depression, anxiety and asthma.    Diagnoses and all orders for this visit:    Recurrent major depressive disorder, in full remission (CMS/[facility])    Hypothyroidism due to Hashimoto's thyroiditis  -     SYNTHROID 50 MCG tablet; Take 1 tablet by mouth Daily.  -     TSH; Future    Severe persistent asthma with acute exacerbation  -     albuterol (PROVENTIL) 4 MG tablet; Take 1 tablet by mouth Every Night.  -     theophylline (THEODUR) 300 MG 12 hr tablet; Take 1 tablet by mouth Every Night.    Sciatica of right side  -     cyclobenzaprine (FLEXERIL) 10 MG tablet; Take 1 tablet by mouth 3 (Three) Times a Day As Needed for Muscle Spasms.               There are no Patient Instructions on file for this visit.

## 2020-05-22 ENCOUNTER — TELEPHONE (OUTPATIENT)
Dept: FAMILY MEDICINE CLINIC | Facility: CLINIC | Age: 45
End: 2020-05-22

## 2020-05-22 ENCOUNTER — RESULTS ENCOUNTER (OUTPATIENT)
Dept: FAMILY MEDICINE CLINIC | Facility: CLINIC | Age: 45
End: 2020-05-22

## 2020-05-22 DIAGNOSIS — E06.3 HYPOTHYROIDISM DUE TO HASHIMOTO'S THYROIDITIS: ICD-10-CM

## 2020-05-22 DIAGNOSIS — E03.8 HYPOTHYROIDISM DUE TO HASHIMOTO'S THYROIDITIS: ICD-10-CM

## 2020-05-22 NOTE — TELEPHONE ENCOUNTER
PATIENT IS REQUESTING A NOTE STATING SHE CAN NOT BE AROUND CIGARETTE SMOKE. SHE HAS NEIGHBORS THAT SMOKE IN HER APARTMENT BUILDING.    PLEASE CALL [phone]    THANKS!

## 2020-05-23 LAB — TSH SERPL DL<=0.005 MIU/L-ACNC: 1.46 UIU/ML (ref 0.45–4.5)

## 2020-05-26 NOTE — TELEPHONE ENCOUNTER
She has severe asthma and this is true.  If you can write a note, I would appreciate it or send this back to me and I will do it when I have time sometime this week.

## 2020-05-26 NOTE — TELEPHONE ENCOUNTER
I would just say that she has severe persistent asthma and she is at very high risk of exacerbations if she is around any cigarette smoke whatsoever.

## 2020-09-10 ENCOUNTER — OFFICE VISIT (OUTPATIENT)
Dept: FAMILY MEDICINE CLINIC | Facility: CLINIC | Age: 45
End: 2020-09-10

## 2020-09-10 VITALS
BODY MASS INDEX: 36.84 KG/M2 | WEIGHT: 159.2 LBS | TEMPERATURE: 97.3 F | DIASTOLIC BLOOD PRESSURE: 82 MMHG | SYSTOLIC BLOOD PRESSURE: 114 MMHG | HEIGHT: 55 IN | OXYGEN SATURATION: 99 % | HEART RATE: 57 BPM

## 2020-09-10 DIAGNOSIS — S39.012A STRAIN OF LUMBAR REGION, INITIAL ENCOUNTER: Primary | ICD-10-CM

## 2020-09-10 DIAGNOSIS — M54.31 SCIATICA OF RIGHT SIDE: ICD-10-CM

## 2020-09-10 PROCEDURE — 99214 OFFICE O/P EST MOD 30 MIN: CPT | Performed by: FAMILY MEDICINE

## 2020-09-10 PROCEDURE — 96372 THER/PROPH/DIAG INJ SC/IM: CPT | Performed by: FAMILY MEDICINE

## 2020-09-10 RX ORDER — KETOROLAC TROMETHAMINE 30 MG/ML
60 INJECTION, SOLUTION INTRAMUSCULAR; INTRAVENOUS ONCE
Status: COMPLETED | OUTPATIENT
Start: 2020-09-10 | End: 2020-09-10

## 2020-09-10 RX ORDER — MELOXICAM 15 MG/1
15 TABLET ORAL DAILY
Qty: 30 TABLET | Refills: 0 | Status: SHIPPED | OUTPATIENT
Start: 2020-09-10 | End: 2022-01-13

## 2020-09-10 RX ORDER — TRAMADOL HYDROCHLORIDE 50 MG/1
50-100 TABLET ORAL EVERY 6 HOURS PRN
Qty: 30 TABLET | Refills: 0 | Status: SHIPPED | OUTPATIENT
Start: 2020-09-10 | End: 2021-01-14

## 2020-09-10 RX ADMIN — KETOROLAC TROMETHAMINE 60 MG: 30 INJECTION, SOLUTION INTRAMUSCULAR; INTRAVENOUS at 10:56

## 2020-09-10 NOTE — PATIENT INSTRUCTIONS
Follow up if no better next week.      Low Back Sprain or Strain Rehab  Ask your health care provider which exercises are safe for you. Do exercises exactly as told by your health care provider and adjust them as directed. It is normal to feel mild stretching, pulling, tightness, or discomfort as you do these exercises. Stop right away if you feel sudden pain or your pain gets worse. Do not begin these exercises until told by your health care provider.  Stretching and range-of-motion exercises  These exercises warm up your muscles and joints and improve the movement and flexibility of your back. These exercises also help to relieve pain, numbness, and tingling.  Lumbar rotation    1. Lie on your back on a firm surface and bend your knees.  2. Straighten your arms out to your sides so each arm forms a 90-degree angle (right angle) with a side of your body.  3. Slowly move (rotate) both of your knees to one side of your body until you feel a stretch in your lower back (lumbar). Try not to let your shoulders lift off the floor.  4. Hold this position for __________ seconds.  5. Tense your abdominal muscles and slowly move your knees back to the starting position.  6. Repeat this exercise on the other side of your body.  Repeat __________ times. Complete this exercise __________ times a day.  Single knee to chest    1. Lie on your back on a firm surface with both legs straight.  2. Bend one of your knees. Use your hands to move your knee up toward your chest until you feel a gentle stretch in your lower back and buttock.  ? Hold your leg in this position by holding on to the front of your knee.  ? Keep your other leg as straight as possible.  3. Hold this position for __________ seconds.  4. Slowly return to the starting position.  5. Repeat with your other leg.  Repeat __________ times. Complete this exercise __________ times a day.  Prone extension on elbows    1. Lie on your abdomen on a firm surface (prone  position).  2. Prop yourself up on your elbows.  3. Use your arms to help lift your chest up until you feel a gentle stretch in your abdomen and your lower back.  ? This will place some of your body weight on your elbows. If this is uncomfortable, try stacking pillows under your chest.  ? Your hips should stay down, against the surface that you are lying on. Keep your hip and back muscles relaxed.  4. Hold this position for __________ seconds.  5. Slowly relax your upper body and return to the starting position.  Repeat __________ times. Complete this exercise __________ times a day.  Strengthening exercises  These exercises build strength and endurance in your back. Endurance is the ability to use your muscles for a long time, even after they get tired.  Pelvic tilt  This exercise strengthens the muscles that lie deep in the abdomen.  1. Lie on your back on a firm surface. Bend your knees and keep your feet flat on the floor.  2. Tense your abdominal muscles. Tip your pelvis up toward the ceiling and flatten your lower back into the floor.  ? To help with this exercise, you may place a small towel under your lower back and try to push your back into the towel.  3. Hold this position for __________ seconds.  4. Let your muscles relax completely before you repeat this exercise.  Repeat __________ times. Complete this exercise __________ times a day.  Alternating arm and leg raises    1. Get on your hands and knees on a firm surface. If you are on a hard floor, you may want to use padding, such as an exercise mat, to cushion your knees.  2. Line up your arms and legs. Your hands should be directly below your shoulders, and your knees should be directly below your hips.  3. Lift your left leg behind you. At the same time, raise your right arm and straighten it in front of you.  ? Do not lift your leg higher than your hip.  ? Do not lift your arm higher than your shoulder.  ? Keep your abdominal and back muscles  tight.  ? Keep your hips facing the ground.  ? Do not arch your back.  ? Keep your balance carefully, and do not hold your breath.  4. Hold this position for __________ seconds.  5. Slowly return to the starting position.  6. Repeat with your right leg and your left arm.  Repeat __________ times. Complete this exercise __________ times a day.  Abdominal set with straight leg raise    1. Lie on your back on a firm surface.  2. Bend one of your knees and keep your other leg straight.  3. Tense your abdominal muscles and lift your straight leg up, 4-6 inches (10-15 cm) off the ground.  4. Keep your abdominal muscles tight and hold this position for __________ seconds.  ? Do not hold your breath.  ? Do not arch your back. Keep it flat against the ground.  5. Keep your abdominal muscles tense as you slowly lower your leg back to the starting position.  6. Repeat with your other leg.  Repeat __________ times. Complete this exercise __________ times a day.  Single leg lower with bent knees  1. Lie on your back on a firm surface.  2. Tense your abdominal muscles and lift your feet off the floor, one foot at a time, so your knees and hips are bent in 90-degree angles (right angles).  ? Your knees should be over your hips and your lower legs should be parallel to the floor.  3. Keeping your abdominal muscles tense and your knee bent, slowly lower one of your legs so your toe touches the ground.  4. Lift your leg back up to return to the starting position.  ? Do not hold your breath.  ? Do not let your back arch. Keep your back flat against the ground.  5. Repeat with your other leg.  Repeat __________ times. Complete this exercise __________ times a day.  Posture and body mechanics  Good posture and healthy body mechanics can help to relieve stress in your body's tissues and joints. Body mechanics refers to the movements and positions of your body while you do your daily activities. Posture is part of body mechanics. Good  posture means:  · Your spine is in its natural S-curve position (neutral).  · Your shoulders are pulled back slightly.  · Your head is not tipped forward.  Follow these guidelines to improve your posture and body mechanics in your everyday activities.  Standing    · When standing, keep your spine neutral and your feet about hip width apart. Keep a slight bend in your knees. Your ears, shoulders, and hips should line up.  · When you do a task in which you  one place for a long time, place one foot up on a stable object that is 2-4 inches (5-10 cm) high, such as a footstool. This helps keep your spine neutral.  Sitting    · When sitting, keep your spine neutral and keep your feet flat on the floor. Use a footrest, if necessary, and keep your thighs parallel to the floor. Avoid rounding your shoulders, and avoid tilting your head forward.  · When working at a desk or a computer, keep your desk at a height where your hands are slightly lower than your elbows. Slide your chair under your desk so you are close enough to maintain good posture.  · When working at a computer, place your monitor at a height where you are looking straight ahead and you do not have to tilt your head forward or downward to look at the screen.  Resting  · When lying down and resting, avoid positions that are most painful for you.  · If you have pain with activities such as sitting, bending, stooping, or squatting, lie in a position in which your body does not bend very much. For example, avoid curling up on your side with your arms and knees near your chest (fetal position).  · If you have pain with activities such as standing for a long time or reaching with your arms, lie with your spine in a neutral position and bend your knees slightly. Try the following positions:  ? Lying on your side with a pillow between your knees.  ? Lying on your back with a pillow under your knees.  Lifting    · When lifting objects, keep your feet at least  shoulder width apart and tighten your abdominal muscles.  · Bend your knees and hips and keep your spine neutral. It is important to lift using the strength of your legs, not your back. Do not lock your knees straight out.  · Always ask for help to lift heavy or awkward objects.  This information is not intended to replace advice given to you by your health care provider. Make sure you discuss any questions you have with your health care provider.  Document Released: 12/18/2006 Document Revised: 04/10/2020 Document Reviewed: 01/09/2020  [employer]vier Patient Education © 2020 [employer]

## 2020-09-10 NOTE — PROGRESS NOTES
Subjective   Nereida Woodruff is a 44 y.o. female.     Chief Complaint   Patient presents with   • Back Pain     Bent over to  object and came back up with severe pain        Sudden onset lower back pain yesterday bending over.  In the middle but goes toward the right.  No sciatia.  Has used heat, ice, flexeril, and lidocaine patch.  Some sleep last night.  Resting helps the most.  Did not try an NSAID.            The following portions of the patient's history were reviewed and updated as appropriate: allergies, current medications, past family history, past medical history, past social history, past surgical history and problem list.    Past Medical History:   Diagnosis Date   • Acute upper respiratory infection    • Allergic rhinitis    • Anxiety    • Asthma    • BMI 25.0-25.9,adult    • Cancer (CMS/[facility])     BREAST   • Community acquired pneumonia    • COPD (chronic obstructive pulmonary disease) (CMS/[facility])    • Depression, major    • Disease of thyroid gland    • Foreign body in ear    • Hashimoto's thyroiditis    • History of abnormal mammogram     Left breast   • History of acute sinusitis    • History of adenocarcinoma of breast    • History of cholelithiasis    • History of COPD    • History of dyspareunia in female    • History of lump of left breast    • History of malignant neoplasm of left breast    • History of nausea and vomiting    • History of seborrheic dermatitis    • History of strep pharyngitis    • History of suicidal ideation    • Hypothyroidism    • Hypoxemia     History of Hypoxemia   • Obsessive compulsive disorder    • Other screening mammogram    • Personal history of asthma    • Recurrent genital herpes simplex     History of    • Sore throat    • Symptomatic states associated with artificial menopause     History of        Past Surgical History:   Procedure Laterality Date   • APPENDECTOMY     • BREAST AUGMENTATION Bilateral     Revised 2005, initial implants 1992   •  CERVICAL CERCLAGE      x 2 during pregnancy, incompetent cervix   • CHOLECYSTECTOMY     • HYSTERECTOMY      due to uterine prolapse   • LUNG SURGERY     • MASTECTOMY         Family History   Problem Relation Age of Onset   • Thyroid disease Mother    • Arthritis Mother    • Uterine cancer Mother         in her 40's   • Cancer Father    • Bipolar disorder Father    • Depression Father    • Hyperlipidemia Father    • Hypertension Father    • Arthritis Maternal Grandmother    • Bleeding Disorder Maternal Grandmother    • Hyperlipidemia Maternal Grandmother    • Hypertension Maternal Grandmother    • Hypertension Maternal Grandfather    • Hypertension Paternal Grandfather    • Hyperlipidemia Paternal Grandfather    • Coronary artery disease Other         Maternal GrGF   • Skin cancer Other         Maternal GrGF   • Diabetes Other         Paternal GrGF   • Heart disease Other         FH in females before the age of 65       Social History     Socioeconomic History   • Marital status:      Spouse name: Not on file   • Number of children: Not on file   • Years of education: Not on file   • Highest education level: Not on file   Tobacco Use   • Smoking status: Never Smoker   • Smokeless tobacco: Never Used   Substance and Sexual Activity   • Alcohol use: Yes     Frequency: Never     Comment: rare use   • Drug use: No         Current Outpatient Medications:   •  albuterol (PROVENTIL) (2.5 MG/3ML) 0.083% nebulizer solution, Inhale 2.5 mg., Disp: , Rfl:   •  albuterol (PROVENTIL) 4 MG tablet, Take 1 tablet by mouth Every Night., Disp: 90 tablet, Rfl: 1  •  albuterol sulfate  (90 Base) MCG/ACT inhaler, Inhale 2 puffs Every 4 (Four) Hours As Needed for Wheezing., Disp: 8.5 inhaler, Rfl: 2  •  cyclobenzaprine (FLEXERIL) 10 MG tablet, Take 1 tablet by mouth 3 (Three) Times a Day As Needed for Muscle Spasms., Disp: 30 tablet, Rfl: 2  •  fexofenadine (ALLEGRA) 180 MG tablet, Take 180 mg by mouth Daily., Disp: , Rfl:  "  •  FLUoxetine (PROzac) 40 MG capsule, Take 2 capsules by mouth Daily., Disp: 180 capsule, Rfl: 3  •  Fluticasone Furoate-Vilanterol (BREO ELLIPTA) 100-25 MCG/INH inhaler, Inhale 1 puff Daily., Disp: , Rfl:   •  leuprolide (LUPRON) 11.25 MG injection, Inject  into the appropriate muscle as directed by prescriber., Disp: , Rfl:   •  montelukast (SINGULAIR) 10 MG tablet, Take 10 mg by mouth Daily., Disp: , Rfl:   •  Multiple Vitamin (MULTI-VITAMIN DAILY PO), Take  by mouth Daily., Disp: , Rfl:   •  SYNTHROID 50 MCG tablet, Take 1 tablet by mouth Daily., Disp: 90 tablet, Rfl: 3  •  tamoxifen (NOLVADEX) 20 MG chemo tablet, , Disp: , Rfl:   •  theophylline (THEODUR) 300 MG 12 hr tablet, Take 1 tablet by mouth Every Night., Disp: 90 tablet, Rfl: 1  •  traMADol (ULTRAM) 50 MG tablet, Take 1-2 tablets by mouth Every 6 (Six) Hours As Needed for Moderate Pain  or Severe Pain ., Disp: 30 tablet, Rfl: 0  •  valACYclovir (VALTREX) 500 MG tablet, Take 500 mg by mouth Daily., Disp: , Rfl:     Review of Systems   Constitutional: Negative for chills, fatigue and fever.   HENT: Negative for congestion, rhinorrhea and sore throat.    Respiratory: Negative for cough and shortness of breath.    Cardiovascular: Negative for chest pain and leg swelling.   Gastrointestinal: Negative for abdominal pain.   Endocrine: Negative for polydipsia and polyuria.   Genitourinary: Negative for dysuria.   Musculoskeletal: Positive for back pain. Negative for arthralgias and myalgias.   Skin: Negative for rash.   Neurological: Negative for dizziness.   Hematological: Does not bruise/bleed easily.   Psychiatric/Behavioral: Negative for sleep disturbance.       Objective   Vitals:    09/10/20 1028   BP: 114/82   Pulse: 57   Temp: 97.3 °F (36.3 °C)   SpO2: 99%   Weight: 72.2 kg (159 lb 3.2 oz)   Height: 65 cm (25.59\")     Body mass index is 170.91 kg/m².  Physical Exam   Constitutional: She is oriented to person, place, and time. She appears " well-developed and well-nourished. No distress.   HENT:   Head: Normocephalic and atraumatic.   Eyes: Pupils are equal, round, and reactive to light. Conjunctivae are normal.   Pulmonary/Chest: Effort normal. No respiratory distress.   Musculoskeletal: She exhibits tenderness.   Very tender to palpation bilateral lower lumbar paraspinous muscles.  Spine nontender.  Range of motion limited by pain.  Negative straight leg raise.   Neurological: She is alert and oriented to person, place, and time. She displays normal reflexes. No sensory deficit. She exhibits normal muscle tone. Coordination normal.   Psychiatric: She has a normal mood and affect.   Nursing note and vitals reviewed.        Assessment/Plan   Nereida was seen today for back pain.    Diagnoses and all orders for this visit:    Strain of lumbar region, initial encounter    Sciatica of right side               Patient Instructions   Follow up if no better next week.      Low Back Sprain or Strain Rehab  Ask your health care provider which exercises are safe for you. Do exercises exactly as told by your health care provider and adjust them as directed. It is normal to feel mild stretching, pulling, tightness, or discomfort as you do these exercises. Stop right away if you feel sudden pain or your pain gets worse. Do not begin these exercises until told by your health care provider.  Stretching and range-of-motion exercises  These exercises warm up your muscles and joints and improve the movement and flexibility of your back. These exercises also help to relieve pain, numbness, and tingling.  Lumbar rotation    1. Lie on your back on a firm surface and bend your knees.  2. Straighten your arms out to your sides so each arm forms a 90-degree angle (right angle) with a side of your body.  3. Slowly move (rotate) both of your knees to one side of your body until you feel a stretch in your lower back (lumbar). Try not to let your shoulders lift off the  floor.  4. Hold this position for __________ seconds.  5. Tense your abdominal muscles and slowly move your knees back to the starting position.  6. Repeat this exercise on the other side of your body.  Repeat __________ times. Complete this exercise __________ times a day.  Single knee to chest    1. Lie on your back on a firm surface with both legs straight.  2. Bend one of your knees. Use your hands to move your knee up toward your chest until you feel a gentle stretch in your lower back and buttock.  ? Hold your leg in this position by holding on to the front of your knee.  ? Keep your other leg as straight as possible.  3. Hold this position for __________ seconds.  4. Slowly return to the starting position.  5. Repeat with your other leg.  Repeat __________ times. Complete this exercise __________ times a day.  Prone extension on elbows    1. Lie on your abdomen on a firm surface (prone position).  2. Prop yourself up on your elbows.  3. Use your arms to help lift your chest up until you feel a gentle stretch in your abdomen and your lower back.  ? This will place some of your body weight on your elbows. If this is uncomfortable, try stacking pillows under your chest.  ? Your hips should stay down, against the surface that you are lying on. Keep your hip and back muscles relaxed.  4. Hold this position for __________ seconds.  5. Slowly relax your upper body and return to the starting position.  Repeat __________ times. Complete this exercise __________ times a day.  Strengthening exercises  These exercises build strength and endurance in your back. Endurance is the ability to use your muscles for a long time, even after they get tired.  Pelvic tilt  This exercise strengthens the muscles that lie deep in the abdomen.  1. Lie on your back on a firm surface. Bend your knees and keep your feet flat on the floor.  2. Tense your abdominal muscles. Tip your pelvis up toward the ceiling and flatten your lower back  into the floor.  ? To help with this exercise, you may place a small towel under your lower back and try to push your back into the towel.  3. Hold this position for __________ seconds.  4. Let your muscles relax completely before you repeat this exercise.  Repeat __________ times. Complete this exercise __________ times a day.  Alternating arm and leg raises    1. Get on your hands and knees on a firm surface. If you are on a hard floor, you may want to use padding, such as an exercise mat, to cushion your knees.  2. Line up your arms and legs. Your hands should be directly below your shoulders, and your knees should be directly below your hips.  3. Lift your left leg behind you. At the same time, raise your right arm and straighten it in front of you.  ? Do not lift your leg higher than your hip.  ? Do not lift your arm higher than your shoulder.  ? Keep your abdominal and back muscles tight.  ? Keep your hips facing the ground.  ? Do not arch your back.  ? Keep your balance carefully, and do not hold your breath.  4. Hold this position for __________ seconds.  5. Slowly return to the starting position.  6. Repeat with your right leg and your left arm.  Repeat __________ times. Complete this exercise __________ times a day.  Abdominal set with straight leg raise    1. Lie on your back on a firm surface.  2. Bend one of your knees and keep your other leg straight.  3. Tense your abdominal muscles and lift your straight leg up, 4-6 inches (10-15 cm) off the ground.  4. Keep your abdominal muscles tight and hold this position for __________ seconds.  ? Do not hold your breath.  ? Do not arch your back. Keep it flat against the ground.  5. Keep your abdominal muscles tense as you slowly lower your leg back to the starting position.  6. Repeat with your other leg.  Repeat __________ times. Complete this exercise __________ times a day.  Single leg lower with bent knees  1. Lie on your back on a firm surface.  2. Tense  your abdominal muscles and lift your feet off the floor, one foot at a time, so your knees and hips are bent in 90-degree angles (right angles).  ? Your knees should be over your hips and your lower legs should be parallel to the floor.  3. Keeping your abdominal muscles tense and your knee bent, slowly lower one of your legs so your toe touches the ground.  4. Lift your leg back up to return to the starting position.  ? Do not hold your breath.  ? Do not let your back arch. Keep your back flat against the ground.  5. Repeat with your other leg.  Repeat __________ times. Complete this exercise __________ times a day.  Posture and body mechanics  Good posture and healthy body mechanics can help to relieve stress in your body's tissues and joints. Body mechanics refers to the movements and positions of your body while you do your daily activities. Posture is part of body mechanics. Good posture means:  · Your spine is in its natural S-curve position (neutral).  · Your shoulders are pulled back slightly.  · Your head is not tipped forward.  Follow these guidelines to improve your posture and body mechanics in your everyday activities.  Standing    · When standing, keep your spine neutral and your feet about hip width apart. Keep a slight bend in your knees. Your ears, shoulders, and hips should line up.  · When you do a task in which you  one place for a long time, place one foot up on a stable object that is 2-4 inches (5-10 cm) high, such as a footstool. This helps keep your spine neutral.  Sitting    · When sitting, keep your spine neutral and keep your feet flat on the floor. Use a footrest, if necessary, and keep your thighs parallel to the floor. Avoid rounding your shoulders, and avoid tilting your head forward.  · When working at a desk or a computer, keep your desk at a height where your hands are slightly lower than your elbows. Slide your chair under your desk so you are close enough to maintain good  posture.  · When working at a computer, place your monitor at a height where you are looking straight ahead and you do not have to tilt your head forward or downward to look at the screen.  Resting  · When lying down and resting, avoid positions that are most painful for you.  · If you have pain with activities such as sitting, bending, stooping, or squatting, lie in a position in which your body does not bend very much. For example, avoid curling up on your side with your arms and knees near your chest (fetal position).  · If you have pain with activities such as standing for a long time or reaching with your arms, lie with your spine in a neutral position and bend your knees slightly. Try the following positions:  ? Lying on your side with a pillow between your knees.  ? Lying on your back with a pillow under your knees.  Lifting    · When lifting objects, keep your feet at least shoulder width apart and tighten your abdominal muscles.  · Bend your knees and hips and keep your spine neutral. It is important to lift using the strength of your legs, not your back. Do not lock your knees straight out.  · Always ask for help to lift heavy or awkward objects.  This information is not intended to replace advice given to you by your health care provider. Make sure you discuss any questions you have with your health care provider.  Document Released: 12/18/2006 Document Revised: 04/10/2020 Document Reviewed: 01/09/2020  [employer] Patient Education © 2020 [employer]

## 2020-11-23 ENCOUNTER — OFFICE VISIT (OUTPATIENT)
Dept: FAMILY MEDICINE CLINIC | Facility: CLINIC | Age: 45
End: 2020-11-23

## 2020-11-23 VITALS
DIASTOLIC BLOOD PRESSURE: 84 MMHG | HEIGHT: 65 IN | BODY MASS INDEX: 26.12 KG/M2 | HEART RATE: 67 BPM | OXYGEN SATURATION: 98 % | SYSTOLIC BLOOD PRESSURE: 110 MMHG | TEMPERATURE: 98.2 F | WEIGHT: 156.8 LBS

## 2020-11-23 DIAGNOSIS — J45.51 SEVERE PERSISTENT ASTHMA WITH ACUTE EXACERBATION: ICD-10-CM

## 2020-11-23 DIAGNOSIS — J45.50 SEVERE PERSISTENT ASTHMA, UNSPECIFIED WHETHER COMPLICATED: ICD-10-CM

## 2020-11-23 DIAGNOSIS — L21.9 SEBORRHEIC DERMATITIS: ICD-10-CM

## 2020-11-23 DIAGNOSIS — F33.42 RECURRENT MAJOR DEPRESSIVE DISORDER, IN FULL REMISSION (HCC): ICD-10-CM

## 2020-11-23 DIAGNOSIS — E06.3 HYPOTHYROIDISM DUE TO HASHIMOTO'S THYROIDITIS: Primary | ICD-10-CM

## 2020-11-23 DIAGNOSIS — E03.8 HYPOTHYROIDISM DUE TO HASHIMOTO'S THYROIDITIS: Primary | ICD-10-CM

## 2020-11-23 PROCEDURE — 99214 OFFICE O/P EST MOD 30 MIN: CPT | Performed by: FAMILY MEDICINE

## 2020-11-23 RX ORDER — FLUOXETINE HYDROCHLORIDE 40 MG/1
80 CAPSULE ORAL DAILY
Qty: 180 CAPSULE | Refills: 3 | Status: SHIPPED | OUTPATIENT
Start: 2020-11-23 | End: 2021-05-28 | Stop reason: SDUPTHER

## 2020-11-23 RX ORDER — KETOCONAZOLE 20 MG/G
CREAM TOPICAL DAILY PRN
Qty: 30 G | Refills: 0 | Status: SHIPPED | OUTPATIENT
Start: 2020-11-23 | End: 2021-05-28

## 2020-11-23 RX ORDER — THEOPHYLLINE 300 MG/1
300 TABLET, EXTENDED RELEASE ORAL NIGHTLY
Qty: 90 TABLET | Refills: 1 | Status: SHIPPED | OUTPATIENT
Start: 2020-11-23 | End: 2021-05-28 | Stop reason: SDUPTHER

## 2020-11-23 RX ORDER — LEVOTHYROXINE SODIUM 50 MCG
50 TABLET ORAL DAILY
Qty: 90 TABLET | Refills: 3 | Status: SHIPPED | OUTPATIENT
Start: 2020-11-23 | End: 2021-05-28 | Stop reason: SDUPTHER

## 2020-11-23 NOTE — PROGRESS NOTES
Subjective   Nereida Woodruff is a 45 y.o. female.     Chief Complaint   Patient presents with   • Med Refill   • Skin Lesion     Dry skin, mouth, eyes and nose        Patient presents for follow-up.  She is due for her thyroid blood work.  He is compliant with her medication daily.  She is doing very well with her depression on her fluoxetine.  Her mood is good and her OCD has been under control.  She is up-to-date with her oncologist for her breast cancer.  She wants to be getting her axillary flaps removed soon.  Her asthma has been under good control.  She states her pulmonologist in a few months.  She has had some dry skin around her mouth eyes and nose.  She has tried steroids as well as moisturizers without any results.         The following portions of the patient's history were reviewed and updated as appropriate: allergies, current medications, past family history, past medical history, past social history, past surgical history and problem list.    Past Medical History:   Diagnosis Date   • Acute upper respiratory infection    • Allergic rhinitis    • Anxiety    • Asthma    • BMI 25.0-25.9,adult    • Cancer (CMS/Formerly [facility])     BREAST   • Community acquired pneumonia    • COPD (chronic obstructive pulmonary disease) (CMS/Formerly [facility])    • Depression, major    • Disease of thyroid gland    • Foreign body in ear    • Hashimoto's thyroiditis    • History of abnormal mammogram     Left breast   • History of acute sinusitis    • History of adenocarcinoma of breast    • History of cholelithiasis    • History of COPD    • History of dyspareunia in female    • History of lump of left breast    • History of malignant neoplasm of left breast    • History of nausea and vomiting    • History of seborrheic dermatitis    • History of strep pharyngitis    • History of suicidal ideation    • Hypothyroidism    • Hypoxemia     History of Hypoxemia   • Obsessive compulsive disorder    • Other screening mammogram    • Personal  history of asthma    • Recurrent genital herpes simplex     History of    • Sore throat    • Symptomatic states associated with artificial menopause     History of        Past Surgical History:   Procedure Laterality Date   • APPENDECTOMY     • BREAST AUGMENTATION Bilateral     Revised 2005, initial implants 1992   • CERVICAL CERCLAGE      x 2 during pregnancy, incompetent cervix   • CHOLECYSTECTOMY     • HYSTERECTOMY      due to uterine prolapse   • LUNG SURGERY     • MASTECTOMY         Family History   Problem Relation Age of Onset   • Thyroid disease Mother    • Arthritis Mother    • Uterine cancer Mother         in her 40's   • Cancer Father    • Bipolar disorder Father    • Depression Father    • Hyperlipidemia Father    • Hypertension Father    • Arthritis Maternal Grandmother    • Bleeding Disorder Maternal Grandmother    • Hyperlipidemia Maternal Grandmother    • Hypertension Maternal Grandmother    • Hypertension Maternal Grandfather    • Hypertension Paternal Grandfather    • Hyperlipidemia Paternal Grandfather    • Coronary artery disease Other         Maternal GrGF   • Skin cancer Other         Maternal GrGF   • Diabetes Other         Paternal GrGF   • Heart disease Other         FH in females before the age of 65       Social History     Socioeconomic History   • Marital status:      Spouse name: Not on file   • Number of children: Not on file   • Years of education: Not on file   • Highest education level: Not on file   Tobacco Use   • Smoking status: Never Smoker   • Smokeless tobacco: Never Used   Substance and Sexual Activity   • Alcohol use: Yes     Frequency: Never     Comment: rare use   • Drug use: No         Current Outpatient Medications:   •  albuterol (PROVENTIL) (2.5 MG/3ML) 0.083% nebulizer solution, Inhale 2.5 mg., Disp: , Rfl:   •  albuterol (PROVENTIL) 4 MG tablet, Take 1 tablet by mouth Every Night., Disp: 90 tablet, Rfl: 1  •  albuterol sulfate  (90 Base) MCG/ACT  inhaler, Inhale 2 puffs Every 4 (Four) Hours As Needed for Wheezing., Disp: 8.5 inhaler, Rfl: 2  •  cyclobenzaprine (FLEXERIL) 10 MG tablet, Take 1 tablet by mouth 3 (Three) Times a Day As Needed for Muscle Spasms., Disp: 30 tablet, Rfl: 2  •  fexofenadine (ALLEGRA) 180 MG tablet, Take 180 mg by mouth Daily., Disp: , Rfl:   •  FLUoxetine (PROzac) 40 MG capsule, Take 2 capsules by mouth Daily., Disp: 180 capsule, Rfl: 3  •  Fluticasone Furoate-Vilanterol (BREO ELLIPTA) 100-25 MCG/INH inhaler, Inhale 1 puff Daily., Disp: , Rfl:   •  leuprolide (LUPRON) 11.25 MG injection, Inject  into the appropriate muscle as directed by prescriber., Disp: , Rfl:   •  meloxicam (MOBIC) 15 MG tablet, Take 1 tablet by mouth Daily., Disp: 30 tablet, Rfl: 0  •  montelukast (SINGULAIR) 10 MG tablet, Take 10 mg by mouth Daily., Disp: , Rfl:   •  Multiple Vitamin (MULTI-VITAMIN DAILY PO), Take  by mouth Daily., Disp: , Rfl:   •  Synthroid 50 MCG tablet, Take 1 tablet by mouth Daily., Disp: 90 tablet, Rfl: 3  •  tamoxifen (NOLVADEX) 20 MG chemo tablet, , Disp: , Rfl:   •  theophylline (THEODUR) 300 MG 12 hr tablet, Take 1 tablet by mouth Every Night., Disp: 90 tablet, Rfl: 1  •  traMADol (ULTRAM) 50 MG tablet, Take 1-2 tablets by mouth Every 6 (Six) Hours As Needed for Moderate Pain  or Severe Pain ., Disp: 30 tablet, Rfl: 0  •  valACYclovir (VALTREX) 500 MG tablet, Take 500 mg by mouth Daily., Disp: , Rfl:   •  ketoconazole (NIZORAL) 2 % cream, Apply  topically to the appropriate area as directed Daily As Needed for Itching or Rash., Disp: 30 g, Rfl: 0    Review of Systems   Constitutional: Negative for chills, fatigue and fever.   HENT: Negative for congestion, rhinorrhea and sore throat.    Respiratory: Negative for cough and shortness of breath.    Cardiovascular: Negative for chest pain and leg swelling.   Gastrointestinal: Negative for abdominal pain.   Endocrine: Negative for polydipsia and polyuria.   Genitourinary: Negative for  "dysuria.   Musculoskeletal: Negative for arthralgias and myalgias.   Skin: Positive for rash.   Neurological: Negative for dizziness.   Hematological: Does not bruise/bleed easily.   Psychiatric/Behavioral: Negative for sleep disturbance.       Objective   Vitals:    11/23/20 1343   BP: 110/84   Pulse: 67   Temp: 98.2 °F (36.8 °C)   SpO2: 98%   Weight: 71.1 kg (156 lb 12.8 oz)   Height: 165.1 cm (65\")     Body mass index is 26.09 kg/m².  Physical Exam  Constitutional:       General: She is not in acute distress.     Appearance: Normal appearance. She is well-developed.   HENT:      Head: Normocephalic and atraumatic.      Right Ear: Tympanic membrane, ear canal and external ear normal.      Left Ear: Tympanic membrane, ear canal and external ear normal.      Mouth/Throat:      Mouth: Mucous membranes are moist.      Pharynx: Oropharynx is clear.   Eyes:      Conjunctiva/sclera: Conjunctivae normal.      Pupils: Pupils are equal, round, and reactive to light.   Neck:      Musculoskeletal: Neck supple.      Thyroid: No thyromegaly.   Cardiovascular:      Rate and Rhythm: Normal rate and regular rhythm.      Heart sounds: No murmur.   Pulmonary:      Effort: Pulmonary effort is normal.      Breath sounds: Normal breath sounds. No wheezing.   Abdominal:      General: Bowel sounds are normal.      Palpations: Abdomen is soft.      Tenderness: There is no abdominal tenderness.   Musculoskeletal: Normal range of motion.   Lymphadenopathy:      Cervical: No cervical adenopathy.   Skin:     General: Skin is warm and dry.      Comments: Erythema and scale in the center of face   Neurological:      Mental Status: She is alert and oriented to person, place, and time.   Psychiatric:         Mood and Affect: Mood normal.         Behavior: Behavior normal.           Assessment/Plan   Diagnoses and all orders for this visit:    1. Hypothyroidism due to Hashimoto's thyroiditis (Primary)  -     TSH  -     Synthroid 50 MCG tablet; " Take 1 tablet by mouth Daily.  Dispense: 90 tablet; Refill: 3    2. Recurrent major depressive disorder, in full remission (CMS/HCC)  -     FLUoxetine (PROzac) 40 MG capsule; Take 2 capsules by mouth Daily.  Dispense: 180 capsule; Refill: 3    3. Severe persistent asthma, unspecified whether complicated    4. Seborrheic dermatitis  -     ketoconazole (NIZORAL) 2 % cream; Apply  topically to the appropriate area as directed Daily As Needed for Itching or Rash.  Dispense: 30 g; Refill: 0    5. Severe persistent asthma with acute exacerbation  -     theophylline (THEODUR) 300 MG 12 hr tablet; Take 1 tablet by mouth Every Night.  Dispense: 90 tablet; Refill: 1               Patient Instructions   Let me know if ketoconazole does not help rash.

## 2020-11-24 LAB — TSH SERPL DL<=0.005 MIU/L-ACNC: 1.47 UIU/ML (ref 0.45–4.5)

## 2020-12-01 ENCOUNTER — TELEPHONE (OUTPATIENT)
Dept: FAMILY MEDICINE CLINIC | Facility: CLINIC | Age: 45
End: 2020-12-01

## 2020-12-01 DIAGNOSIS — L21.9 SEBORRHEIC DERMATITIS: Primary | ICD-10-CM

## 2020-12-01 RX ORDER — DESONIDE 0.5 MG/G
CREAM TOPICAL 2 TIMES DAILY
Qty: 15 G | Refills: 0 | Status: SHIPPED | OUTPATIENT
Start: 2020-12-01 | End: 2021-05-28

## 2020-12-01 NOTE — TELEPHONE ENCOUNTER
Called pt and made her aware per as below. She states it has gotten worse, it has spread and now it goes up her nose and around other parts of her face that it wasn't before. Please advise..

## 2020-12-01 NOTE — TELEPHONE ENCOUNTER
I sent in a moderate potency steroid cream for her to try.  Go ahead and stop the antifungal since it is making it worse.

## 2020-12-01 NOTE — TELEPHONE ENCOUNTER
If it is not making it worse, I would recommend she stick with it.  The antifungals can take a few weeks to work.

## 2020-12-02 ENCOUNTER — OFFICE VISIT (OUTPATIENT)
Dept: FAMILY MEDICINE CLINIC | Facility: CLINIC | Age: 45
End: 2020-12-02

## 2020-12-02 VITALS
HEIGHT: 65 IN | DIASTOLIC BLOOD PRESSURE: 80 MMHG | TEMPERATURE: 97.3 F | WEIGHT: 159 LBS | OXYGEN SATURATION: 98 % | SYSTOLIC BLOOD PRESSURE: 110 MMHG | HEART RATE: 62 BPM | BODY MASS INDEX: 26.49 KG/M2

## 2020-12-02 DIAGNOSIS — M54.50 MIDLINE LOW BACK PAIN WITHOUT SCIATICA, UNSPECIFIED CHRONICITY: Primary | ICD-10-CM

## 2020-12-02 DIAGNOSIS — M54.31 SCIATICA OF RIGHT SIDE: ICD-10-CM

## 2020-12-02 PROCEDURE — 99213 OFFICE O/P EST LOW 20 MIN: CPT | Performed by: FAMILY MEDICINE

## 2020-12-02 PROCEDURE — 96372 THER/PROPH/DIAG INJ SC/IM: CPT | Performed by: FAMILY MEDICINE

## 2020-12-02 RX ORDER — CYCLOBENZAPRINE HCL 10 MG
10 TABLET ORAL 3 TIMES DAILY PRN
Qty: 30 TABLET | Refills: 2 | Status: SHIPPED | OUTPATIENT
Start: 2020-12-02 | End: 2021-05-28 | Stop reason: SDUPTHER

## 2020-12-02 RX ORDER — KETOROLAC TROMETHAMINE 30 MG/ML
60 INJECTION, SOLUTION INTRAMUSCULAR; INTRAVENOUS ONCE
Status: COMPLETED | OUTPATIENT
Start: 2020-12-02 | End: 2020-12-02

## 2020-12-02 RX ADMIN — KETOROLAC TROMETHAMINE 60 MG: 30 INJECTION, SOLUTION INTRAMUSCULAR; INTRAVENOUS at 10:42

## 2020-12-02 NOTE — PATIENT INSTRUCTIONS
Let me know if symptoms get worse instead of better.  We can set you up for physical therapy next week if not improved.  Use the meloxicam that you have at home starting tomorrow and her muscle relaxer.  Do not forget to ice it for 20 minutes a few times a day.      Low Back Sprain or Strain Rehab  Ask your health care provider which exercises are safe for you. Do exercises exactly as told by your health care provider and adjust them as directed. It is normal to feel mild stretching, pulling, tightness, or discomfort as you do these exercises. Stop right away if you feel sudden pain or your pain gets worse. Do not begin these exercises until told by your health care provider.  Stretching and range-of-motion exercises  These exercises warm up your muscles and joints and improve the movement and flexibility of your back. These exercises also help to relieve pain, numbness, and tingling.  Lumbar rotation    1. Lie on your back on a firm surface and bend your knees.  2. Straighten your arms out to your sides so each arm forms a 90-degree angle (right angle) with a side of your body.  3. Slowly move (rotate) both of your knees to one side of your body until you feel a stretch in your lower back (lumbar). Try not to let your shoulders lift off the floor.  4. Hold this position for __________ seconds.  5. Tense your abdominal muscles and slowly move your knees back to the starting position.  6. Repeat this exercise on the other side of your body.  Repeat __________ times. Complete this exercise __________ times a day.  Single knee to chest    1. Lie on your back on a firm surface with both legs straight.  2. Bend one of your knees. Use your hands to move your knee up toward your chest until you feel a gentle stretch in your lower back and buttock.  ? Hold your leg in this position by holding on to the front of your knee.  ? Keep your other leg as straight as possible.  3. Hold this position for __________  seconds.  4. Slowly return to the starting position.  5. Repeat with your other leg.  Repeat __________ times. Complete this exercise __________ times a day.  Prone extension on elbows    1. Lie on your abdomen on a firm surface (prone position).  2. Prop yourself up on your elbows.  3. Use your arms to help lift your chest up until you feel a gentle stretch in your abdomen and your lower back.  ? This will place some of your body weight on your elbows. If this is uncomfortable, try stacking pillows under your chest.  ? Your hips should stay down, against the surface that you are lying on. Keep your hip and back muscles relaxed.  4. Hold this position for __________ seconds.  5. Slowly relax your upper body and return to the starting position.  Repeat __________ times. Complete this exercise __________ times a day.  Strengthening exercises  These exercises build strength and endurance in your back. Endurance is the ability to use your muscles for a long time, even after they get tired.  Pelvic tilt  This exercise strengthens the muscles that lie deep in the abdomen.  1. Lie on your back on a firm surface. Bend your knees and keep your feet flat on the floor.  2. Tense your abdominal muscles. Tip your pelvis up toward the ceiling and flatten your lower back into the floor.  ? To help with this exercise, you may place a small towel under your lower back and try to push your back into the towel.  3. Hold this position for __________ seconds.  4. Let your muscles relax completely before you repeat this exercise.  Repeat __________ times. Complete this exercise __________ times a day.  Alternating arm and leg raises    1. Get on your hands and knees on a firm surface. If you are on a hard floor, you may want to use padding, such as an exercise mat, to cushion your knees.  2. Line up your arms and legs. Your hands should be directly below your shoulders, and your knees should be directly below your hips.  3. Lift your  left leg behind you. At the same time, raise your right arm and straighten it in front of you.  ? Do not lift your leg higher than your hip.  ? Do not lift your arm higher than your shoulder.  ? Keep your abdominal and back muscles tight.  ? Keep your hips facing the ground.  ? Do not arch your back.  ? Keep your balance carefully, and do not hold your breath.  4. Hold this position for __________ seconds.  5. Slowly return to the starting position.  6. Repeat with your right leg and your left arm.  Repeat __________ times. Complete this exercise __________ times a day.  Abdominal set with straight leg raise    1. Lie on your back on a firm surface.  2. Bend one of your knees and keep your other leg straight.  3. Tense your abdominal muscles and lift your straight leg up, 4-6 inches (10-15 cm) off the ground.  4. Keep your abdominal muscles tight and hold this position for __________ seconds.  ? Do not hold your breath.  ? Do not arch your back. Keep it flat against the ground.  5. Keep your abdominal muscles tense as you slowly lower your leg back to the starting position.  6. Repeat with your other leg.  Repeat __________ times. Complete this exercise __________ times a day.  Single leg lower with bent knees  1. Lie on your back on a firm surface.  2. Tense your abdominal muscles and lift your feet off the floor, one foot at a time, so your knees and hips are bent in 90-degree angles (right angles).  ? Your knees should be over your hips and your lower legs should be parallel to the floor.  3. Keeping your abdominal muscles tense and your knee bent, slowly lower one of your legs so your toe touches the ground.  4. Lift your leg back up to return to the starting position.  ? Do not hold your breath.  ? Do not let your back arch. Keep your back flat against the ground.  5. Repeat with your other leg.  Repeat __________ times. Complete this exercise __________ times a day.  Posture and body mechanics  Good posture  and healthy body mechanics can help to relieve stress in your body's tissues and joints. Body mechanics refers to the movements and positions of your body while you do your daily activities. Posture is part of body mechanics. Good posture means:  · Your spine is in its natural S-curve position (neutral).  · Your shoulders are pulled back slightly.  · Your head is not tipped forward.  Follow these guidelines to improve your posture and body mechanics in your everyday activities.  Standing    · When standing, keep your spine neutral and your feet about hip width apart. Keep a slight bend in your knees. Your ears, shoulders, and hips should line up.  · When you do a task in which you  one place for a long time, place one foot up on a stable object that is 2-4 inches (5-10 cm) high, such as a footstool. This helps keep your spine neutral.  Sitting    · When sitting, keep your spine neutral and keep your feet flat on the floor. Use a footrest, if necessary, and keep your thighs parallel to the floor. Avoid rounding your shoulders, and avoid tilting your head forward.  · When working at a desk or a computer, keep your desk at a height where your hands are slightly lower than your elbows. Slide your chair under your desk so you are close enough to maintain good posture.  · When working at a computer, place your monitor at a height where you are looking straight ahead and you do not have to tilt your head forward or downward to look at the screen.  Resting  · When lying down and resting, avoid positions that are most painful for you.  · If you have pain with activities such as sitting, bending, stooping, or squatting, lie in a position in which your body does not bend very much. For example, avoid curling up on your side with your arms and knees near your chest (fetal position).  · If you have pain with activities such as standing for a long time or reaching with your arms, lie with your spine in a neutral position  and bend your knees slightly. Try the following positions:  ? Lying on your side with a pillow between your knees.  ? Lying on your back with a pillow under your knees.  Lifting    · When lifting objects, keep your feet at least shoulder width apart and tighten your abdominal muscles.  · Bend your knees and hips and keep your spine neutral. It is important to lift using the strength of your legs, not your back. Do not lock your knees straight out.  · Always ask for help to lift heavy or awkward objects.  This information is not intended to replace advice given to you by your health care provider. Make sure you discuss any questions you have with your health care provider.  Document Revised: 04/10/2020 Document Reviewed: 01/09/2020  [employer]vier Patient Education © 2020 [employer]    Lumbar Strain  A lumbar strain, which is sometimes called a low-back strain, is a stretch or tear in a muscle or the strong cords of tissue that attach muscle to bone (tendons) in the lower back (lumbar spine). This type of injury occurs when muscles or tendons are torn or are stretched beyond their limits.  Lumbar strains can range from mild to severe. Mild strains may involve stretching a muscle or tendon without tearing it. These may heal in 1-2 weeks. More severe strains involve tearing of muscle fibers or tendons. These will cause more pain and may take 6-8 weeks to heal.  What are the causes?  This condition may be caused by:  · Trauma, such as a fall or a hit to the body.  · Twisting or overstretching the back. This may result from doing activities that need a lot of energy, such as lifting heavy objects.  What increases the risk?  This injury is more common in:  · Athletes.  · People with obesity.  · People who do repeated lifting, bending, or other movements that involve their back.  What are the signs or symptoms?  Symptoms of this condition may include:  · Sharp or dull pain in the lower back that does not go away. The pain may  extend to the buttocks.  · Stiffness or limited range of motion.  · Sudden muscle tightening (spasms).  How is this diagnosed?  This condition may be diagnosed based on:  · Your symptoms.  · Your medical history.  · A physical exam.  · Imaging tests, such as:  ? X-rays.  ? MRI.  How is this treated?  Treatment for this condition may include:  · Rest.  · Applying heat and cold to the affected area.  · Over-the-counter medicines to help relieve pain and inflammation, such as NSAIDs.  · Prescription pain medicine and muscle relaxants may be needed for a short time.  · Physical therapy.  Follow these instructions at home:  Managing pain, stiffness, and swelling         · If directed, put ice on the injured area during the first 24 hours after your injury.  ? Put ice in a plastic bag.  ? Place a towel between your skin and the bag.  ? Leave the ice on for 20 minutes, 2-3 times a day.  · If directed, apply heat to the affected area as often as told by your health care provider. Use the heat source that your health care provider recommends, such as a moist heat pack or a heating pad.  ? Place a towel between your skin and the heat source.  ? Leave the heat on for 20-30 minutes.  ? Remove the heat if your skin turns bright red. This is especially important if you are unable to feel pain, heat, or cold. You may have a greater risk of getting burned.  Activity  · Rest and return to your normal activities as told by your health care provider. Ask your health care provider what activities are safe for you.  · Do exercises as told by your health care provider.  Medicines  · Take over-the-counter and prescription medicines only as told by your health care provider.  · Ask your health care provider if the medicine prescribed to you:  ? Requires you to avoid driving or using heavy machinery.  ? Can cause constipation. You may need to take these actions to prevent or treat constipation:  § Drink enough fluid to keep your urine pale  yellow.  § Take over-the-counter or prescription medicines.  § Eat foods that are high in fiber, such as beans, whole grains, and fresh fruits and vegetables.  § Limit foods that are high in fat and processed sugars, such as fried or sweet foods.  Injury prevention  To prevent a future low-back injury:  · Always warm up properly before physical activity or sports.  · Cool down and stretch after being active.  · Use correct form when playing sports and lifting heavy objects. Bend your knees before you lift heavy objects.  · Use good posture when sitting and standing.  · Stay physically fit and keep a healthy weight.  ? Do at least 150 minutes of moderate-intensity exercise each week, such as brisk walking or water aerobics.  ? Do strength exercises at least 2 times each week.    General instructions  · Do not use any products that contain nicotine or tobacco, such as cigarettes, e-cigarettes, and chewing tobacco. If you need help quitting, ask your health care provider.  · Keep all follow-up visits as told by your health care provider. This is important.  Contact a health care provider if:  · Your back pain does not improve after 6 weeks of treatment.  · Your symptoms get worse.  Get help right away if:  · Your back pain is severe.  · You are unable to stand or walk.  · You develop pain in your legs.  · You develop weakness in your buttocks or legs.  · You have difficulty controlling when you urinate or when you have a bowel movement.  ? You have frequent, painful, or bloody urination.  ? You have a temperature over 101.0°F (38.3°C)  Summary  · A lumbar strain, which is sometimes called a low-back strain, is a stretch or tear in a muscle or the strong cords of tissue that attach muscle to bone (tendons) in the lower back (lumbar spine).  · This type of injury occurs when muscles or tendons are torn or are stretched beyond their limits.  · Rest and return to your normal activities as told by your health care provider.  If directed, apply heat and ice to the affected area as often as told by your health care provider.  · Take over-the-counter and prescription medicines only as told by your health care provider.  · Contact a health care provider if you have new or worsening symptoms.  This information is not intended to replace advice given to you by your health care provider. Make sure you discuss any questions you have with your health care provider.  Document Revised: 10/17/2019 Document Reviewed: 10/17/2019  Elsevier Patient Education © 2020 [employer]

## 2020-12-02 NOTE — PROGRESS NOTES
Subjective   Nereida Woodruff is a 45 y.o. female.     Chief Complaint   Patient presents with   • Back Pain     TRIED TO PICK SOMETHING UP LAST NIGHT AND HER BACK WENT OUT        Patient's presents with a severe flare of her chronic recurrent low back pain.  She had a hydrocodone from previously and tramadol.  Neither 1 of those helped her yesterday.  It all happened when she bent over the couch to pick something up.  The pain is all located in her low back and buttocks.  It does not radiate down the legs like it did last time.  She had really good relief with the Toradol last time.  She still has some meloxicam and Flexeril at home but does need a refill of the Flexeril.         The following portions of the patient's history were reviewed and updated as appropriate: allergies, current medications, past family history, past medical history, past social history, past surgical history and problem list.    Past Medical History:   Diagnosis Date   • Acute upper respiratory infection    • Allergic rhinitis    • Anxiety    • Asthma    • BMI 25.0-25.9,adult    • Cancer (CMS/[facility])     BREAST   • Community acquired pneumonia    • COPD (chronic obstructive pulmonary disease) (CMS/[facility])    • Depression, major    • Disease of thyroid gland    • Foreign body in ear    • Hashimoto's thyroiditis    • History of abnormal mammogram     Left breast   • History of acute sinusitis    • History of adenocarcinoma of breast    • History of cholelithiasis    • History of COPD    • History of dyspareunia in female    • History of lump of left breast    • History of malignant neoplasm of left breast    • History of nausea and vomiting    • History of seborrheic dermatitis    • History of strep pharyngitis    • History of suicidal ideation    • Hypothyroidism    • Hypoxemia     History of Hypoxemia   • Obsessive compulsive disorder    • Other screening mammogram    • Personal history of asthma    • Recurrent genital herpes simplex      History of    • Sore throat    • Symptomatic states associated with artificial menopause     History of        Past Surgical History:   Procedure Laterality Date   • APPENDECTOMY     • BREAST AUGMENTATION Bilateral     Revised 2005, initial implants 1992   • CERVICAL CERCLAGE      x 2 during pregnancy, incompetent cervix   • CHOLECYSTECTOMY     • HYSTERECTOMY      due to uterine prolapse   • LUNG SURGERY     • MASTECTOMY         Family History   Problem Relation Age of Onset   • Thyroid disease Mother    • Arthritis Mother    • Uterine cancer Mother         in her 40's   • Cancer Father    • Bipolar disorder Father    • Depression Father    • Hyperlipidemia Father    • Hypertension Father    • Arthritis Maternal Grandmother    • Bleeding Disorder Maternal Grandmother    • Hyperlipidemia Maternal Grandmother    • Hypertension Maternal Grandmother    • Hypertension Maternal Grandfather    • Hypertension Paternal Grandfather    • Hyperlipidemia Paternal Grandfather    • Coronary artery disease Other         Maternal GrGF   • Skin cancer Other         Maternal GrGF   • Diabetes Other         Paternal GrGF   • Heart disease Other         FH in females before the age of 65       Social History     Socioeconomic History   • Marital status:      Spouse name: Not on file   • Number of children: Not on file   • Years of education: Not on file   • Highest education level: Not on file   Tobacco Use   • Smoking status: Never Smoker   • Smokeless tobacco: Never Used   Substance and Sexual Activity   • Alcohol use: Yes     Frequency: Never     Comment: rare use   • Drug use: No         Current Outpatient Medications:   •  albuterol (PROVENTIL) (2.5 MG/3ML) 0.083% nebulizer solution, Inhale 2.5 mg., Disp: , Rfl:   •  albuterol (PROVENTIL) 4 MG tablet, Take 1 tablet by mouth Every Night., Disp: 90 tablet, Rfl: 1  •  albuterol sulfate  (90 Base) MCG/ACT inhaler, Inhale 2 puffs Every 4 (Four) Hours As Needed for  Wheezing., Disp: 8.5 inhaler, Rfl: 2  •  cyclobenzaprine (FLEXERIL) 10 MG tablet, Take 1 tablet by mouth 3 (Three) Times a Day As Needed for Muscle Spasms., Disp: 30 tablet, Rfl: 2  •  desonide (DesOwen) 0.05 % cream, Apply  topically to the appropriate area as directed 2 (Two) Times a Day., Disp: 15 g, Rfl: 0  •  fexofenadine (ALLEGRA) 180 MG tablet, Take 180 mg by mouth Daily., Disp: , Rfl:   •  FLUoxetine (PROzac) 40 MG capsule, Take 2 capsules by mouth Daily., Disp: 180 capsule, Rfl: 3  •  Fluticasone Furoate-Vilanterol (BREO ELLIPTA) 100-25 MCG/INH inhaler, Inhale 1 puff Daily., Disp: , Rfl:   •  ketoconazole (NIZORAL) 2 % cream, Apply  topically to the appropriate area as directed Daily As Needed for Itching or Rash., Disp: 30 g, Rfl: 0  •  leuprolide (LUPRON) 11.25 MG injection, Inject  into the appropriate muscle as directed by prescriber., Disp: , Rfl:   •  meloxicam (MOBIC) 15 MG tablet, Take 1 tablet by mouth Daily., Disp: 30 tablet, Rfl: 0  •  montelukast (SINGULAIR) 10 MG tablet, Take 10 mg by mouth Daily., Disp: , Rfl:   •  Multiple Vitamin (MULTI-VITAMIN DAILY PO), Take  by mouth Daily., Disp: , Rfl:   •  Synthroid 50 MCG tablet, Take 1 tablet by mouth Daily., Disp: 90 tablet, Rfl: 3  •  tamoxifen (NOLVADEX) 20 MG chemo tablet, , Disp: , Rfl:   •  theophylline (THEODUR) 300 MG 12 hr tablet, Take 1 tablet by mouth Every Night., Disp: 90 tablet, Rfl: 1  •  traMADol (ULTRAM) 50 MG tablet, Take 1-2 tablets by mouth Every 6 (Six) Hours As Needed for Moderate Pain  or Severe Pain ., Disp: 30 tablet, Rfl: 0  •  valACYclovir (VALTREX) 500 MG tablet, Take 500 mg by mouth Daily., Disp: , Rfl:   No current facility-administered medications for this visit.     Review of Systems   Constitutional: Negative for chills, fatigue and fever.   HENT: Negative for congestion, rhinorrhea and sore throat.    Respiratory: Negative for cough and shortness of breath.    Cardiovascular: Negative for chest pain and leg swelling.  "  Gastrointestinal: Negative for abdominal pain.   Endocrine: Negative for polydipsia and polyuria.   Genitourinary: Negative for dysuria.   Musculoskeletal: Positive for back pain. Negative for arthralgias and myalgias.   Skin: Negative for rash.   Neurological: Negative for dizziness, weakness and numbness.   Hematological: Does not bruise/bleed easily.   Psychiatric/Behavioral: Negative for sleep disturbance.       Objective   Vitals:    12/02/20 1013   BP: 110/80   Pulse: 62   Temp: 97.3 °F (36.3 °C)   SpO2: 98%   Weight: 72.1 kg (159 lb)   Height: 165.1 cm (65\")     Body mass index is 26.46 kg/m².  Physical Exam  Constitutional:       General: She is not in acute distress.     Appearance: She is well-developed.   HENT:      Head: Normocephalic and atraumatic.   Eyes:      Conjunctiva/sclera: Conjunctivae normal.      Pupils: Pupils are equal, round, and reactive to light.   Pulmonary:      Effort: Pulmonary effort is normal. No respiratory distress.   Musculoskeletal: Normal range of motion.         General: Tenderness present. No swelling, deformity or signs of injury.      Right lower leg: No edema.      Left lower leg: No edema.      Comments: Very tender to palpation bilateral lower lumbar paraspinous muscles.  Pain with range of motion.  Negative straight leg raise bilaterally.   Neurological:      Mental Status: She is alert and oriented to person, place, and time.      Sensory: No sensory deficit.      Motor: No weakness.      Coordination: Coordination normal.      Gait: Gait normal.      Deep Tendon Reflexes: Reflexes normal.           Assessment/Plan   Diagnoses and all orders for this visit:    1. Midline low back pain without sciatica, unspecified chronicity (Primary)  -     ketorolac (TORADOL) injection 60 mg    2. Sciatica of right side  -     cyclobenzaprine (FLEXERIL) 10 MG tablet; Take 1 tablet by mouth 3 (Three) Times a Day As Needed for Muscle Spasms.  Dispense: 30 tablet; Refill: 2           "     Patient Instructions   Let me know if symptoms get worse instead of better.  We can set you up for physical therapy next week if not improved.  Use the meloxicam that you have at home starting tomorrow and her muscle relaxer.  Do not forget to ice it for 20 minutes a few times a day.      Low Back Sprain or Strain Rehab  Ask your health care provider which exercises are safe for you. Do exercises exactly as told by your health care provider and adjust them as directed. It is normal to feel mild stretching, pulling, tightness, or discomfort as you do these exercises. Stop right away if you feel sudden pain or your pain gets worse. Do not begin these exercises until told by your health care provider.  Stretching and range-of-motion exercises  These exercises warm up your muscles and joints and improve the movement and flexibility of your back. These exercises also help to relieve pain, numbness, and tingling.  Lumbar rotation    1. Lie on your back on a firm surface and bend your knees.  2. Straighten your arms out to your sides so each arm forms a 90-degree angle (right angle) with a side of your body.  3. Slowly move (rotate) both of your knees to one side of your body until you feel a stretch in your lower back (lumbar). Try not to let your shoulders lift off the floor.  4. Hold this position for __________ seconds.  5. Tense your abdominal muscles and slowly move your knees back to the starting position.  6. Repeat this exercise on the other side of your body.  Repeat __________ times. Complete this exercise __________ times a day.  Single knee to chest    1. Lie on your back on a firm surface with both legs straight.  2. Bend one of your knees. Use your hands to move your knee up toward your chest until you feel a gentle stretch in your lower back and buttock.  ? Hold your leg in this position by holding on to the front of your knee.  ? Keep your other leg as straight as possible.  3. Hold this position for  __________ seconds.  4. Slowly return to the starting position.  5. Repeat with your other leg.  Repeat __________ times. Complete this exercise __________ times a day.  Prone extension on elbows    1. Lie on your abdomen on a firm surface (prone position).  2. Prop yourself up on your elbows.  3. Use your arms to help lift your chest up until you feel a gentle stretch in your abdomen and your lower back.  ? This will place some of your body weight on your elbows. If this is uncomfortable, try stacking pillows under your chest.  ? Your hips should stay down, against the surface that you are lying on. Keep your hip and back muscles relaxed.  4. Hold this position for __________ seconds.  5. Slowly relax your upper body and return to the starting position.  Repeat __________ times. Complete this exercise __________ times a day.  Strengthening exercises  These exercises build strength and endurance in your back. Endurance is the ability to use your muscles for a long time, even after they get tired.  Pelvic tilt  This exercise strengthens the muscles that lie deep in the abdomen.  1. Lie on your back on a firm surface. Bend your knees and keep your feet flat on the floor.  2. Tense your abdominal muscles. Tip your pelvis up toward the ceiling and flatten your lower back into the floor.  ? To help with this exercise, you may place a small towel under your lower back and try to push your back into the towel.  3. Hold this position for __________ seconds.  4. Let your muscles relax completely before you repeat this exercise.  Repeat __________ times. Complete this exercise __________ times a day.  Alternating arm and leg raises    1. Get on your hands and knees on a firm surface. If you are on a hard floor, you may want to use padding, such as an exercise mat, to cushion your knees.  2. Line up your arms and legs. Your hands should be directly below your shoulders, and your knees should be directly below your  hips.  3. Lift your left leg behind you. At the same time, raise your right arm and straighten it in front of you.  ? Do not lift your leg higher than your hip.  ? Do not lift your arm higher than your shoulder.  ? Keep your abdominal and back muscles tight.  ? Keep your hips facing the ground.  ? Do not arch your back.  ? Keep your balance carefully, and do not hold your breath.  4. Hold this position for __________ seconds.  5. Slowly return to the starting position.  6. Repeat with your right leg and your left arm.  Repeat __________ times. Complete this exercise __________ times a day.  Abdominal set with straight leg raise    1. Lie on your back on a firm surface.  2. Bend one of your knees and keep your other leg straight.  3. Tense your abdominal muscles and lift your straight leg up, 4-6 inches (10-15 cm) off the ground.  4. Keep your abdominal muscles tight and hold this position for __________ seconds.  ? Do not hold your breath.  ? Do not arch your back. Keep it flat against the ground.  5. Keep your abdominal muscles tense as you slowly lower your leg back to the starting position.  6. Repeat with your other leg.  Repeat __________ times. Complete this exercise __________ times a day.  Single leg lower with bent knees  1. Lie on your back on a firm surface.  2. Tense your abdominal muscles and lift your feet off the floor, one foot at a time, so your knees and hips are bent in 90-degree angles (right angles).  ? Your knees should be over your hips and your lower legs should be parallel to the floor.  3. Keeping your abdominal muscles tense and your knee bent, slowly lower one of your legs so your toe touches the ground.  4. Lift your leg back up to return to the starting position.  ? Do not hold your breath.  ? Do not let your back arch. Keep your back flat against the ground.  5. Repeat with your other leg.  Repeat __________ times. Complete this exercise __________ times a day.  Posture and body  mechanics  Good posture and healthy body mechanics can help to relieve stress in your body's tissues and joints. Body mechanics refers to the movements and positions of your body while you do your daily activities. Posture is part of body mechanics. Good posture means:  · Your spine is in its natural S-curve position (neutral).  · Your shoulders are pulled back slightly.  · Your head is not tipped forward.  Follow these guidelines to improve your posture and body mechanics in your everyday activities.  Standing    · When standing, keep your spine neutral and your feet about hip width apart. Keep a slight bend in your knees. Your ears, shoulders, and hips should line up.  · When you do a task in which you  one place for a long time, place one foot up on a stable object that is 2-4 inches (5-10 cm) high, such as a footstool. This helps keep your spine neutral.  Sitting    · When sitting, keep your spine neutral and keep your feet flat on the floor. Use a footrest, if necessary, and keep your thighs parallel to the floor. Avoid rounding your shoulders, and avoid tilting your head forward.  · When working at a desk or a computer, keep your desk at a height where your hands are slightly lower than your elbows. Slide your chair under your desk so you are close enough to maintain good posture.  · When working at a computer, place your monitor at a height where you are looking straight ahead and you do not have to tilt your head forward or downward to look at the screen.  Resting  · When lying down and resting, avoid positions that are most painful for you.  · If you have pain with activities such as sitting, bending, stooping, or squatting, lie in a position in which your body does not bend very much. For example, avoid curling up on your side with your arms and knees near your chest (fetal position).  · If you have pain with activities such as standing for a long time or reaching with your arms, lie with your  spine in a neutral position and bend your knees slightly. Try the following positions:  ? Lying on your side with a pillow between your knees.  ? Lying on your back with a pillow under your knees.  Lifting    · When lifting objects, keep your feet at least shoulder width apart and tighten your abdominal muscles.  · Bend your knees and hips and keep your spine neutral. It is important to lift using the strength of your legs, not your back. Do not lock your knees straight out.  · Always ask for help to lift heavy or awkward objects.  This information is not intended to replace advice given to you by your health care provider. Make sure you discuss any questions you have with your health care provider.  Document Revised: 04/10/2020 Document Reviewed: 01/09/2020  [employer]vier Patient Education © 2020 [employer]    Lumbar Strain  A lumbar strain, which is sometimes called a low-back strain, is a stretch or tear in a muscle or the strong cords of tissue that attach muscle to bone (tendons) in the lower back (lumbar spine). This type of injury occurs when muscles or tendons are torn or are stretched beyond their limits.  Lumbar strains can range from mild to severe. Mild strains may involve stretching a muscle or tendon without tearing it. These may heal in 1-2 weeks. More severe strains involve tearing of muscle fibers or tendons. These will cause more pain and may take 6-8 weeks to heal.  What are the causes?  This condition may be caused by:  · Trauma, such as a fall or a hit to the body.  · Twisting or overstretching the back. This may result from doing activities that need a lot of energy, such as lifting heavy objects.  What increases the risk?  This injury is more common in:  · Athletes.  · People with obesity.  · People who do repeated lifting, bending, or other movements that involve their back.  What are the signs or symptoms?  Symptoms of this condition may include:  · Sharp or dull pain in the lower back that  does not go away. The pain may extend to the buttocks.  · Stiffness or limited range of motion.  · Sudden muscle tightening (spasms).  How is this diagnosed?  This condition may be diagnosed based on:  · Your symptoms.  · Your medical history.  · A physical exam.  · Imaging tests, such as:  ? X-rays.  ? MRI.  How is this treated?  Treatment for this condition may include:  · Rest.  · Applying heat and cold to the affected area.  · Over-the-counter medicines to help relieve pain and inflammation, such as NSAIDs.  · Prescription pain medicine and muscle relaxants may be needed for a short time.  · Physical therapy.  Follow these instructions at home:  Managing pain, stiffness, and swelling         · If directed, put ice on the injured area during the first 24 hours after your injury.  ? Put ice in a plastic bag.  ? Place a towel between your skin and the bag.  ? Leave the ice on for 20 minutes, 2-3 times a day.  · If directed, apply heat to the affected area as often as told by your health care provider. Use the heat source that your health care provider recommends, such as a moist heat pack or a heating pad.  ? Place a towel between your skin and the heat source.  ? Leave the heat on for 20-30 minutes.  ? Remove the heat if your skin turns bright red. This is especially important if you are unable to feel pain, heat, or cold. You may have a greater risk of getting burned.  Activity  · Rest and return to your normal activities as told by your health care provider. Ask your health care provider what activities are safe for you.  · Do exercises as told by your health care provider.  Medicines  · Take over-the-counter and prescription medicines only as told by your health care provider.  · Ask your health care provider if the medicine prescribed to you:  ? Requires you to avoid driving or using heavy machinery.  ? Can cause constipation. You may need to take these actions to prevent or treat constipation:  § Drink enough  fluid to keep your urine pale yellow.  § Take over-the-counter or prescription medicines.  § Eat foods that are high in fiber, such as beans, whole grains, and fresh fruits and vegetables.  § Limit foods that are high in fat and processed sugars, such as fried or sweet foods.  Injury prevention  To prevent a future low-back injury:  · Always warm up properly before physical activity or sports.  · Cool down and stretch after being active.  · Use correct form when playing sports and lifting heavy objects. Bend your knees before you lift heavy objects.  · Use good posture when sitting and standing.  · Stay physically fit and keep a healthy weight.  ? Do at least 150 minutes of moderate-intensity exercise each week, such as brisk walking or water aerobics.  ? Do strength exercises at least 2 times each week.    General instructions  · Do not use any products that contain nicotine or tobacco, such as cigarettes, e-cigarettes, and chewing tobacco. If you need help quitting, ask your health care provider.  · Keep all follow-up visits as told by your health care provider. This is important.  Contact a health care provider if:  · Your back pain does not improve after 6 weeks of treatment.  · Your symptoms get worse.  Get help right away if:  · Your back pain is severe.  · You are unable to stand or walk.  · You develop pain in your legs.  · You develop weakness in your buttocks or legs.  · You have difficulty controlling when you urinate or when you have a bowel movement.  ? You have frequent, painful, or bloody urination.  ? You have a temperature over 101.0°F (38.3°C)  Summary  · A lumbar strain, which is sometimes called a low-back strain, is a stretch or tear in a muscle or the strong cords of tissue that attach muscle to bone (tendons) in the lower back (lumbar spine).  · This type of injury occurs when muscles or tendons are torn or are stretched beyond their limits.  · Rest and return to your normal activities as told  by your health care provider. If directed, apply heat and ice to the affected area as often as told by your health care provider.  · Take over-the-counter and prescription medicines only as told by your health care provider.  · Contact a health care provider if you have new or worsening symptoms.  This information is not intended to replace advice given to you by your health care provider. Make sure you discuss any questions you have with your health care provider.  Document Revised: 10/17/2019 Document Reviewed: 10/17/2019  Elsevier Patient Education © 2020 [employer]

## 2021-01-14 ENCOUNTER — TELEMEDICINE (OUTPATIENT)
Dept: FAMILY MEDICINE CLINIC | Facility: CLINIC | Age: 46
End: 2021-01-14

## 2021-01-14 VITALS — TEMPERATURE: 97.1 F | OXYGEN SATURATION: 98 %

## 2021-01-14 DIAGNOSIS — J45.901 EXACERBATION OF ASTHMA, UNSPECIFIED ASTHMA SEVERITY, UNSPECIFIED WHETHER PERSISTENT: Chronic | ICD-10-CM

## 2021-01-14 DIAGNOSIS — J06.9 UPPER RESPIRATORY TRACT INFECTION, UNSPECIFIED TYPE: Primary | ICD-10-CM

## 2021-01-14 PROCEDURE — 99214 OFFICE O/P EST MOD 30 MIN: CPT | Performed by: FAMILY MEDICINE

## 2021-01-14 RX ORDER — PREDNISONE 20 MG/1
TABLET ORAL
Qty: 12 TABLET | Refills: 0 | Status: SHIPPED | OUTPATIENT
Start: 2021-01-14 | End: 2021-01-19

## 2021-01-14 RX ORDER — DOXYCYCLINE 100 MG/1
1 TABLET ORAL 2 TIMES DAILY
COMMUNITY
Start: 2020-12-18 | End: 2021-01-25 | Stop reason: HOSPADM

## 2021-01-14 NOTE — PROGRESS NOTES
This was an audio and video enabled telemedicine encounter.  Length of visit 15 minutes.    Chief Complaint  Cough    Subjective          Nereida Woodruff presents to [facility] for   Started feeling congested 4 days ago.  The next day started using Vicks.  Feels it in chest and sinuses.  No fever.  No known ill contacts.  Son was around a sick contact so he left the house.   She has been compliant with her chronic medications for her severe theophylline-dependent asthma.  She has had no loss of taste and smell.  She has a headache and aches all over.  Her son is gone from the house.  He left yesterday after finding out that one of his friends had been around last week had Covid.      Objective   Vital Signs:   Temp 97.1 °F (36.2 °C)   SpO2 98%     Physical Exam  Constitutional:       General: She is not in acute distress.     Appearance: Normal appearance. She is well-developed.   HENT:      Head: Normocephalic and atraumatic.      Right Ear: Tympanic membrane, ear canal and external ear normal.      Left Ear: Tympanic membrane, ear canal and external ear normal.      Mouth/Throat:      Mouth: Mucous membranes are moist.      Pharynx: Oropharynx is clear.   Eyes:      Conjunctiva/sclera: Conjunctivae normal.      Pupils: Pupils are equal, round, and reactive to light.   Neck:      Musculoskeletal: Neck supple.      Thyroid: No thyromegaly.   Cardiovascular:      Rate and Rhythm: Normal rate and regular rhythm.      Heart sounds: No murmur.   Pulmonary:      Effort: Pulmonary effort is normal.      Breath sounds: Normal breath sounds. No wheezing.   Abdominal:      General: Bowel sounds are normal.      Palpations: Abdomen is soft.      Tenderness: There is no abdominal tenderness.   Musculoskeletal: Normal range of motion.   Lymphadenopathy:      Cervical: No cervical adenopathy.   Skin:     General: Skin is warm and dry.   Neurological:      Mental Status: She is alert and  oriented to person, place, and time.   Psychiatric:         Mood and Affect: Mood normal.         Behavior: Behavior normal.        Result Review :                 Assessment and Plan    Problem List Items Addressed This Visit     None      Visit Diagnoses     Upper respiratory tract infection, unspecified type    -  Primary    Relevant Medications    doxycycline (ADOXA) 100 MG tablet    Exacerbation of asthma, unspecified asthma severity, unspecified whether persistent  (Chronic)             Follow Up   No follow-ups on file.  Patient was given instructions and counseling regarding her condition or for health maintenance advice. Please see specific information pulled into the AVS if appropriate.

## 2021-01-16 LAB — SARS-COV-2 RNA RESP QL NAA+PROBE: DETECTED

## 2021-01-20 ENCOUNTER — HOSPITAL ENCOUNTER (INPATIENT)
Facility: HOSPITAL | Age: 46
LOS: 5 days | Discharge: HOME | End: 2021-01-25
Attending: EMERGENCY MEDICINE | Admitting: HOSPITALIST

## 2021-01-20 ENCOUNTER — APPOINTMENT (OUTPATIENT)
Dept: GENERAL RADIOLOGY | Facility: HOSPITAL | Age: 46
End: 2021-01-20

## 2021-01-20 ENCOUNTER — APPOINTMENT (OUTPATIENT)
Dept: CT IMAGING | Facility: HOSPITAL | Age: 46
End: 2021-01-20

## 2021-01-20 ENCOUNTER — TELEPHONE (OUTPATIENT)
Dept: FAMILY MEDICINE CLINIC | Facility: CLINIC | Age: 46
End: 2021-01-20

## 2021-01-20 DIAGNOSIS — J12.82 PNEUMONIA DUE TO COVID-19 VIRUS: Primary | ICD-10-CM

## 2021-01-20 DIAGNOSIS — R09.02 HYPOXIA: ICD-10-CM

## 2021-01-20 DIAGNOSIS — R06.02 SHORTNESS OF BREATH: ICD-10-CM

## 2021-01-20 DIAGNOSIS — U07.1 PNEUMONIA DUE TO COVID-19 VIRUS: Primary | ICD-10-CM

## 2021-01-20 LAB
ALBUMIN SERPL-MCNC: 3.8 G/DL (ref 3.5–5.2)
ALBUMIN/GLOB SERPL: 1.3 G/DL
ALP SERPL-CCNC: 75 U/L (ref 39–117)
ALT SERPL W P-5'-P-CCNC: 19 U/L (ref 1–33)
ANION GAP SERPL CALCULATED.3IONS-SCNC: 11 MMOL/L (ref 5–15)
AST SERPL-CCNC: 16 U/L (ref 1–32)
BASOPHILS # BLD AUTO: 0.04 10*3/MM3 (ref 0–0.2)
BASOPHILS NFR BLD AUTO: 0.5 % (ref 0–1.5)
BILIRUB SERPL-MCNC: 0.3 MG/DL (ref 0–1.2)
BUN SERPL-MCNC: 15 MG/DL (ref 6–20)
BUN/CREAT SERPL: 22.4 (ref 7–25)
CALCIUM SPEC-SCNC: 8.9 MG/DL (ref 8.6–10.5)
CHLORIDE SERPL-SCNC: 105 MMOL/L (ref 98–107)
CO2 SERPL-SCNC: 27 MMOL/L (ref 22–29)
CREAT SERPL-MCNC: 0.67 MG/DL (ref 0.57–1)
D-LACTATE SERPL-SCNC: 1.1 MMOL/L (ref 0.5–2)
DEPRECATED RDW RBC AUTO: 40.3 FL (ref 37–54)
EOSINOPHIL # BLD AUTO: 0.38 10*3/MM3 (ref 0–0.4)
EOSINOPHIL NFR BLD AUTO: 5 % (ref 0.3–6.2)
ERYTHROCYTE [DISTWIDTH] IN BLOOD BY AUTOMATED COUNT: 12.5 % (ref 12.3–15.4)
GFR SERPL CREATININE-BSD FRML MDRD: 94 ML/MIN/1.73
GLOBULIN UR ELPH-MCNC: 2.9 GM/DL
GLUCOSE SERPL-MCNC: 115 MG/DL (ref 65–99)
HCT VFR BLD AUTO: 41.8 % (ref 34–46.6)
HGB BLD-MCNC: 14.5 G/DL (ref 12–15.9)
IMM GRANULOCYTES # BLD AUTO: 0.03 10*3/MM3 (ref 0–0.05)
IMM GRANULOCYTES NFR BLD AUTO: 0.4 % (ref 0–0.5)
LYMPHOCYTES # BLD AUTO: 0.77 10*3/MM3 (ref 0.7–3.1)
LYMPHOCYTES NFR BLD AUTO: 10.2 % (ref 19.6–45.3)
MCH RBC QN AUTO: 31.1 PG (ref 26.6–33)
MCHC RBC AUTO-ENTMCNC: 34.7 G/DL (ref 31.5–35.7)
MCV RBC AUTO: 89.7 FL (ref 79–97)
MONOCYTES # BLD AUTO: 0.4 10*3/MM3 (ref 0.1–0.9)
MONOCYTES NFR BLD AUTO: 5.3 % (ref 5–12)
NEUTROPHILS NFR BLD AUTO: 5.95 10*3/MM3 (ref 1.7–7)
NEUTROPHILS NFR BLD AUTO: 78.6 % (ref 42.7–76)
NRBC BLD AUTO-RTO: 0 /100 WBC (ref 0–0.2)
NT-PROBNP SERPL-MCNC: 40.7 PG/ML (ref 0–450)
PLATELET # BLD AUTO: 276 10*3/MM3 (ref 140–450)
PMV BLD AUTO: 9.6 FL (ref 6–12)
POTASSIUM SERPL-SCNC: 3.3 MMOL/L (ref 3.5–5.2)
PROCALCITONIN SERPL-MCNC: 0.06 NG/ML (ref 0–0.25)
PROT SERPL-MCNC: 6.7 G/DL (ref 6–8.5)
RBC # BLD AUTO: 4.66 10*6/MM3 (ref 3.77–5.28)
SODIUM SERPL-SCNC: 143 MMOL/L (ref 136–145)
THEOPHYLLINE SERPL-MCNC: 9.9 MCG/ML (ref 10–20)
TROPONIN T SERPL-MCNC: <0.01 NG/ML (ref 0–0.03)
WBC # BLD AUTO: 7.57 10*3/MM3 (ref 3.4–10.8)

## 2021-01-20 PROCEDURE — 84145 PROCALCITONIN (PCT): CPT | Performed by: EMERGENCY MEDICINE

## 2021-01-20 PROCEDURE — 84484 ASSAY OF TROPONIN QUANT: CPT | Performed by: EMERGENCY MEDICINE

## 2021-01-20 PROCEDURE — 87040 BLOOD CULTURE FOR BACTERIA: CPT | Performed by: EMERGENCY MEDICINE

## 2021-01-20 PROCEDURE — 71045 X-RAY EXAM CHEST 1 VIEW: CPT

## 2021-01-20 PROCEDURE — 93010 ELECTROCARDIOGRAM REPORT: CPT | Performed by: INTERNAL MEDICINE

## 2021-01-20 PROCEDURE — 93005 ELECTROCARDIOGRAM TRACING: CPT | Performed by: EMERGENCY MEDICINE

## 2021-01-20 PROCEDURE — 80053 COMPREHEN METABOLIC PANEL: CPT | Performed by: EMERGENCY MEDICINE

## 2021-01-20 PROCEDURE — 83605 ASSAY OF LACTIC ACID: CPT | Performed by: EMERGENCY MEDICINE

## 2021-01-20 PROCEDURE — 0 IOPAMIDOL PER 1 ML: Performed by: EMERGENCY MEDICINE

## 2021-01-20 PROCEDURE — 83880 ASSAY OF NATRIURETIC PEPTIDE: CPT | Performed by: EMERGENCY MEDICINE

## 2021-01-20 PROCEDURE — 71275 CT ANGIOGRAPHY CHEST: CPT

## 2021-01-20 PROCEDURE — 25010000002 DEXAMETHASONE SODIUM PHOSPHATE 10 MG/ML SOLUTION: Performed by: EMERGENCY MEDICINE

## 2021-01-20 PROCEDURE — 80198 ASSAY OF THEOPHYLLINE: CPT | Performed by: EMERGENCY MEDICINE

## 2021-01-20 PROCEDURE — 85025 COMPLETE CBC W/AUTO DIFF WBC: CPT | Performed by: EMERGENCY MEDICINE

## 2021-01-20 PROCEDURE — 99284 EMERGENCY DEPT VISIT MOD MDM: CPT

## 2021-01-20 RX ORDER — SODIUM CHLORIDE 0.9 % (FLUSH) 0.9 %
10 SYRINGE (ML) INJECTION AS NEEDED
Status: DISCONTINUED | OUTPATIENT
Start: 2021-01-20 | End: 2021-01-25 | Stop reason: HOSPADM

## 2021-01-20 RX ORDER — DEXAMETHASONE SODIUM PHOSPHATE 10 MG/ML
6 INJECTION, SOLUTION INTRAMUSCULAR; INTRAVENOUS ONCE
Status: COMPLETED | OUTPATIENT
Start: 2021-01-20 | End: 2021-01-20

## 2021-01-20 RX ADMIN — DEXAMETHASONE SODIUM PHOSPHATE 6 MG: 10 INJECTION, SOLUTION INTRAMUSCULAR; INTRAVENOUS at 20:49

## 2021-01-20 RX ADMIN — SODIUM CHLORIDE, POTASSIUM CHLORIDE, SODIUM LACTATE AND CALCIUM CHLORIDE 1000 ML: 600; 310; 30; 20 INJECTION, SOLUTION INTRAVENOUS at 18:58

## 2021-01-20 RX ADMIN — IOPAMIDOL 95 ML: 755 INJECTION, SOLUTION INTRAVENOUS at 20:44

## 2021-01-21 PROBLEM — R06.02 SHORTNESS OF BREATH: Status: ACTIVE | Noted: 2021-01-21

## 2021-01-21 PROBLEM — R09.02 HYPOXIA: Status: ACTIVE | Noted: 2021-01-21

## 2021-01-21 LAB
ALBUMIN SERPL-MCNC: 3.9 G/DL (ref 3.5–5.2)
ALP SERPL-CCNC: 73 U/L (ref 39–117)
ALT SERPL W P-5'-P-CCNC: 21 U/L (ref 1–33)
AST SERPL-CCNC: 25 U/L (ref 1–32)
BILIRUB CONJ SERPL-MCNC: <0.2 MG/DL (ref 0–0.3)
BILIRUB INDIRECT SERPL-MCNC: NORMAL MG/DL
BILIRUB SERPL-MCNC: 0.3 MG/DL (ref 0–1.2)
CREAT SERPL-MCNC: 0.61 MG/DL (ref 0.57–1)
D DIMER PPP FEU-MCNC: 0.36 MCGFEU/ML (ref 0–0.49)
FERRITIN SERPL-MCNC: 85 NG/ML (ref 13–150)
GFR SERPL CREATININE-BSD FRML MDRD: 105 ML/MIN/1.73
LDH SERPL-CCNC: 153 U/L (ref 135–214)
PROCALCITONIN SERPL-MCNC: 0.08 NG/ML (ref 0–0.25)
PROT SERPL-MCNC: 6.6 G/DL (ref 6–8.5)
QT INTERVAL: 429 MS

## 2021-01-21 PROCEDURE — 94799 UNLISTED PULMONARY SVC/PX: CPT

## 2021-01-21 PROCEDURE — 85379 FIBRIN DEGRADATION QUANT: CPT | Performed by: NURSE PRACTITIONER

## 2021-01-21 PROCEDURE — 82728 ASSAY OF FERRITIN: CPT | Performed by: NURSE PRACTITIONER

## 2021-01-21 PROCEDURE — 63710000001 PROMETHAZINE PER 12.5 MG: Performed by: NURSE PRACTITIONER

## 2021-01-21 PROCEDURE — 82565 ASSAY OF CREATININE: CPT | Performed by: HOSPITALIST

## 2021-01-21 PROCEDURE — 84145 PROCALCITONIN (PCT): CPT | Performed by: NURSE PRACTITIONER

## 2021-01-21 PROCEDURE — XW033E5 INTRODUCTION OF REMDESIVIR ANTI-INFECTIVE INTO PERIPHERAL VEIN, PERCUTANEOUS APPROACH, NEW TECHNOLOGY GROUP 5: ICD-10-PCS | Performed by: HOSPITALIST

## 2021-01-21 PROCEDURE — 94640 AIRWAY INHALATION TREATMENT: CPT

## 2021-01-21 PROCEDURE — 25010000002 ENOXAPARIN PER 10 MG: Performed by: HOSPITALIST

## 2021-01-21 PROCEDURE — 94664 DEMO&/EVAL PT USE INHALER: CPT

## 2021-01-21 PROCEDURE — 25010000002 DEXAMETHASONE SODIUM PHOSPHATE 10 MG/ML SOLUTION: Performed by: NURSE PRACTITIONER

## 2021-01-21 PROCEDURE — 80076 HEPATIC FUNCTION PANEL: CPT | Performed by: HOSPITALIST

## 2021-01-21 PROCEDURE — 83615 LACTATE (LD) (LDH) ENZYME: CPT | Performed by: NURSE PRACTITIONER

## 2021-01-21 RX ORDER — GUAIFENESIN/DEXTROMETHORPHAN 100-10MG/5
5 SYRUP ORAL EVERY 4 HOURS PRN
Status: DISCONTINUED | OUTPATIENT
Start: 2021-01-21 | End: 2021-01-25 | Stop reason: HOSPADM

## 2021-01-21 RX ORDER — CETIRIZINE HYDROCHLORIDE 10 MG/1
10 TABLET ORAL DAILY
Status: DISCONTINUED | OUTPATIENT
Start: 2021-01-21 | End: 2021-01-25 | Stop reason: HOSPADM

## 2021-01-21 RX ORDER — SODIUM CHLORIDE 0.9 % (FLUSH) 0.9 %
10 SYRINGE (ML) INJECTION EVERY 12 HOURS SCHEDULED
Status: DISCONTINUED | OUTPATIENT
Start: 2021-01-21 | End: 2021-01-25 | Stop reason: HOSPADM

## 2021-01-21 RX ORDER — ASPIRIN 81 MG/1
81 TABLET ORAL DAILY
Status: DISCONTINUED | OUTPATIENT
Start: 2021-01-21 | End: 2021-01-25 | Stop reason: HOSPADM

## 2021-01-21 RX ORDER — PROMETHAZINE HYDROCHLORIDE 12.5 MG/1
12.5 TABLET ORAL EVERY 6 HOURS PRN
Status: DISCONTINUED | OUTPATIENT
Start: 2021-01-21 | End: 2021-01-25 | Stop reason: HOSPADM

## 2021-01-21 RX ORDER — DIPHENOXYLATE HYDROCHLORIDE AND ATROPINE SULFATE 2.5; .025 MG/1; MG/1
1 TABLET ORAL DAILY
Status: DISCONTINUED | OUTPATIENT
Start: 2021-01-21 | End: 2021-01-25 | Stop reason: HOSPADM

## 2021-01-21 RX ORDER — DEXAMETHASONE 6 MG/1
6 TABLET ORAL
Status: DISCONTINUED | OUTPATIENT
Start: 2021-01-21 | End: 2021-01-25 | Stop reason: HOSPADM

## 2021-01-21 RX ORDER — ZOLPIDEM TARTRATE 5 MG/1
5 TABLET ORAL NIGHTLY PRN
Status: DISCONTINUED | OUTPATIENT
Start: 2021-01-21 | End: 2021-01-25 | Stop reason: HOSPADM

## 2021-01-21 RX ORDER — THEOPHYLLINE 300 MG/1
300 TABLET, EXTENDED RELEASE ORAL NIGHTLY
Status: DISCONTINUED | OUTPATIENT
Start: 2021-01-21 | End: 2021-01-25 | Stop reason: HOSPADM

## 2021-01-21 RX ORDER — POTASSIUM CHLORIDE 750 MG/1
40 TABLET, FILM COATED, EXTENDED RELEASE ORAL DAILY
Status: DISCONTINUED | OUTPATIENT
Start: 2021-01-21 | End: 2021-01-25 | Stop reason: HOSPADM

## 2021-01-21 RX ORDER — MONTELUKAST SODIUM 10 MG/1
10 TABLET ORAL DAILY
Status: DISCONTINUED | OUTPATIENT
Start: 2021-01-21 | End: 2021-01-25 | Stop reason: HOSPADM

## 2021-01-21 RX ORDER — DEXAMETHASONE SODIUM PHOSPHATE 10 MG/ML
6 INJECTION, SOLUTION INTRAMUSCULAR; INTRAVENOUS
Status: DISCONTINUED | OUTPATIENT
Start: 2021-01-21 | End: 2021-01-25

## 2021-01-21 RX ORDER — LEVOTHYROXINE SODIUM 0.05 MG/1
50 TABLET ORAL
Status: DISCONTINUED | OUTPATIENT
Start: 2021-01-21 | End: 2021-01-25 | Stop reason: HOSPADM

## 2021-01-21 RX ORDER — ACETAMINOPHEN 650 MG/1
650 SUPPOSITORY RECTAL EVERY 4 HOURS PRN
Status: DISCONTINUED | OUTPATIENT
Start: 2021-01-21 | End: 2021-01-25 | Stop reason: HOSPADM

## 2021-01-21 RX ORDER — BUDESONIDE AND FORMOTEROL FUMARATE DIHYDRATE 80; 4.5 UG/1; UG/1
2 AEROSOL RESPIRATORY (INHALATION)
Status: DISCONTINUED | OUTPATIENT
Start: 2021-01-21 | End: 2021-01-25 | Stop reason: HOSPADM

## 2021-01-21 RX ORDER — ACETAMINOPHEN 325 MG/1
650 TABLET ORAL EVERY 4 HOURS PRN
Status: DISCONTINUED | OUTPATIENT
Start: 2021-01-21 | End: 2021-01-25 | Stop reason: HOSPADM

## 2021-01-21 RX ORDER — SODIUM CHLORIDE 0.9 % (FLUSH) 0.9 %
10 SYRINGE (ML) INJECTION AS NEEDED
Status: DISCONTINUED | OUTPATIENT
Start: 2021-01-21 | End: 2021-01-25 | Stop reason: HOSPADM

## 2021-01-21 RX ORDER — TAMOXIFEN CITRATE 10 MG/1
20 TABLET ORAL DAILY
Status: DISCONTINUED | OUTPATIENT
Start: 2021-01-21 | End: 2021-01-25 | Stop reason: HOSPADM

## 2021-01-21 RX ORDER — FLUOXETINE HYDROCHLORIDE 20 MG/1
80 CAPSULE ORAL DAILY
Status: DISCONTINUED | OUTPATIENT
Start: 2021-01-21 | End: 2021-01-21 | Stop reason: DRUGHIGH

## 2021-01-21 RX ORDER — FLUOXETINE HYDROCHLORIDE 20 MG/1
40 CAPSULE ORAL 2 TIMES DAILY
Status: DISCONTINUED | OUTPATIENT
Start: 2021-01-21 | End: 2021-01-25 | Stop reason: HOSPADM

## 2021-01-21 RX ORDER — ACETAMINOPHEN 160 MG/5ML
650 SOLUTION ORAL EVERY 4 HOURS PRN
Status: DISCONTINUED | OUTPATIENT
Start: 2021-01-21 | End: 2021-01-25 | Stop reason: HOSPADM

## 2021-01-21 RX ORDER — BENZONATATE 100 MG/1
200 CAPSULE ORAL 3 TIMES DAILY PRN
Status: DISCONTINUED | OUTPATIENT
Start: 2021-01-21 | End: 2021-01-25 | Stop reason: HOSPADM

## 2021-01-21 RX ADMIN — ENOXAPARIN SODIUM 40 MG: 40 INJECTION SUBCUTANEOUS at 14:24

## 2021-01-21 RX ADMIN — PROMETHAZINE HYDROCHLORIDE 12.5 MG: 12.5 TABLET ORAL at 02:49

## 2021-01-21 RX ADMIN — ZOLPIDEM TARTRATE 5 MG: 5 TABLET ORAL at 21:54

## 2021-01-21 RX ADMIN — BUDESONIDE AND FORMOTEROL FUMARATE DIHYDRATE 2 PUFF: 80; 4.5 AEROSOL RESPIRATORY (INHALATION) at 11:00

## 2021-01-21 RX ADMIN — REMDESIVIR 200 MG: 100 INJECTION, POWDER, LYOPHILIZED, FOR SOLUTION INTRAVENOUS at 14:24

## 2021-01-21 RX ADMIN — BUDESONIDE AND FORMOTEROL FUMARATE DIHYDRATE 2 PUFF: 80; 4.5 AEROSOL RESPIRATORY (INHALATION) at 20:35

## 2021-01-21 RX ADMIN — ACETAMINOPHEN 650 MG: 325 TABLET, FILM COATED ORAL at 21:54

## 2021-01-21 RX ADMIN — POTASSIUM CHLORIDE 40 MEQ: 750 TABLET, EXTENDED RELEASE ORAL at 14:24

## 2021-01-21 RX ADMIN — ASPIRIN 81 MG: 81 TABLET, COATED ORAL at 14:24

## 2021-01-21 RX ADMIN — SODIUM CHLORIDE, PRESERVATIVE FREE 10 ML: 5 INJECTION INTRAVENOUS at 02:49

## 2021-01-21 RX ADMIN — ACETAMINOPHEN 650 MG: 325 TABLET, FILM COATED ORAL at 02:48

## 2021-01-21 RX ADMIN — LEVOTHYROXINE SODIUM 50 MCG: 0.05 TABLET ORAL at 06:04

## 2021-01-21 RX ADMIN — Medication 1 TABLET: at 08:43

## 2021-01-21 RX ADMIN — THEOPHYLLINE 300 MG: 300 TABLET, EXTENDED RELEASE ORAL at 02:49

## 2021-01-21 RX ADMIN — FLUOXETINE HYDROCHLORIDE 40 MG: 20 CAPSULE ORAL at 08:43

## 2021-01-21 RX ADMIN — TAMOXIFEN CITRATE 20 MG: 10 TABLET, FILM COATED ORAL at 08:43

## 2021-01-21 RX ADMIN — THEOPHYLLINE 300 MG: 300 TABLET, EXTENDED RELEASE ORAL at 20:22

## 2021-01-21 RX ADMIN — SODIUM CHLORIDE, PRESERVATIVE FREE 10 ML: 5 INJECTION INTRAVENOUS at 20:22

## 2021-01-21 RX ADMIN — DEXAMETHASONE SODIUM PHOSPHATE 6 MG: 10 INJECTION, SOLUTION INTRAMUSCULAR; INTRAVENOUS at 08:52

## 2021-01-21 RX ADMIN — FLUOXETINE HYDROCHLORIDE 40 MG: 20 CAPSULE ORAL at 20:22

## 2021-01-21 RX ADMIN — MONTELUKAST SODIUM 10 MG: 10 TABLET, FILM COATED ORAL at 08:42

## 2021-01-21 RX ADMIN — SODIUM CHLORIDE, PRESERVATIVE FREE 10 ML: 5 INJECTION INTRAVENOUS at 08:43

## 2021-01-22 LAB
ALBUMIN SERPL-MCNC: 3.4 G/DL (ref 3.5–5.2)
ALBUMIN/GLOB SERPL: 1.3 G/DL
ALP SERPL-CCNC: 58 U/L (ref 39–117)
ALT SERPL W P-5'-P-CCNC: 14 U/L (ref 1–33)
ANION GAP SERPL CALCULATED.3IONS-SCNC: 5.9 MMOL/L (ref 5–15)
AST SERPL-CCNC: 10 U/L (ref 1–32)
BASOPHILS # BLD AUTO: 0.03 10*3/MM3 (ref 0–0.2)
BASOPHILS NFR BLD AUTO: 0.4 % (ref 0–1.5)
BILIRUB CONJ SERPL-MCNC: <0.2 MG/DL (ref 0–0.3)
BILIRUB SERPL-MCNC: 0.2 MG/DL (ref 0–1.2)
BUN SERPL-MCNC: 14 MG/DL (ref 6–20)
BUN/CREAT SERPL: 21.5 (ref 7–25)
CALCIUM SPEC-SCNC: 8.6 MG/DL (ref 8.6–10.5)
CHLORIDE SERPL-SCNC: 108 MMOL/L (ref 98–107)
CK SERPL-CCNC: 31 U/L (ref 20–180)
CO2 SERPL-SCNC: 25.1 MMOL/L (ref 22–29)
CREAT SERPL-MCNC: 0.65 MG/DL (ref 0.57–1)
CRP SERPL-MCNC: 1.29 MG/DL (ref 0–0.5)
D DIMER PPP FEU-MCNC: <0.27 MCGFEU/ML (ref 0–0.49)
DEPRECATED RDW RBC AUTO: 40 FL (ref 37–54)
EOSINOPHIL # BLD AUTO: 0.09 10*3/MM3 (ref 0–0.4)
EOSINOPHIL NFR BLD AUTO: 1.3 % (ref 0.3–6.2)
ERYTHROCYTE [DISTWIDTH] IN BLOOD BY AUTOMATED COUNT: 12.3 % (ref 12.3–15.4)
FERRITIN SERPL-MCNC: 87.2 NG/ML (ref 13–150)
FIBRINOGEN PPP-MCNC: 319 MG/DL (ref 219–464)
GFR SERPL CREATININE-BSD FRML MDRD: 98 ML/MIN/1.73
GLOBULIN UR ELPH-MCNC: 2.6 GM/DL
GLUCOSE SERPL-MCNC: 90 MG/DL (ref 65–99)
HCT VFR BLD AUTO: 38.4 % (ref 34–46.6)
HGB BLD-MCNC: 12.6 G/DL (ref 12–15.9)
IMM GRANULOCYTES # BLD AUTO: 0.02 10*3/MM3 (ref 0–0.05)
IMM GRANULOCYTES NFR BLD AUTO: 0.3 % (ref 0–0.5)
LDH SERPL-CCNC: 152 U/L (ref 135–214)
LYMPHOCYTES # BLD AUTO: 1.42 10*3/MM3 (ref 0.7–3.1)
LYMPHOCYTES NFR BLD AUTO: 20.5 % (ref 19.6–45.3)
MCH RBC QN AUTO: 29.9 PG (ref 26.6–33)
MCHC RBC AUTO-ENTMCNC: 32.8 G/DL (ref 31.5–35.7)
MCV RBC AUTO: 91.2 FL (ref 79–97)
MONOCYTES # BLD AUTO: 0.43 10*3/MM3 (ref 0.1–0.9)
MONOCYTES NFR BLD AUTO: 6.2 % (ref 5–12)
NEUTROPHILS NFR BLD AUTO: 4.94 10*3/MM3 (ref 1.7–7)
NEUTROPHILS NFR BLD AUTO: 71.3 % (ref 42.7–76)
NRBC BLD AUTO-RTO: 0 /100 WBC (ref 0–0.2)
PLATELET # BLD AUTO: 256 10*3/MM3 (ref 140–450)
PMV BLD AUTO: 10 FL (ref 6–12)
POTASSIUM SERPL-SCNC: 3.4 MMOL/L (ref 3.5–5.2)
PROT SERPL-MCNC: 6 G/DL (ref 6–8.5)
RBC # BLD AUTO: 4.21 10*6/MM3 (ref 3.77–5.28)
SODIUM SERPL-SCNC: 139 MMOL/L (ref 136–145)
WBC # BLD AUTO: 6.93 10*3/MM3 (ref 3.4–10.8)

## 2021-01-22 PROCEDURE — 86140 C-REACTIVE PROTEIN: CPT | Performed by: NURSE PRACTITIONER

## 2021-01-22 PROCEDURE — 82550 ASSAY OF CK (CPK): CPT | Performed by: NURSE PRACTITIONER

## 2021-01-22 PROCEDURE — 82248 BILIRUBIN DIRECT: CPT | Performed by: HOSPITALIST

## 2021-01-22 PROCEDURE — 82728 ASSAY OF FERRITIN: CPT | Performed by: NURSE PRACTITIONER

## 2021-01-22 PROCEDURE — 85379 FIBRIN DEGRADATION QUANT: CPT | Performed by: NURSE PRACTITIONER

## 2021-01-22 PROCEDURE — 80053 COMPREHEN METABOLIC PANEL: CPT | Performed by: NURSE PRACTITIONER

## 2021-01-22 PROCEDURE — 94799 UNLISTED PULMONARY SVC/PX: CPT

## 2021-01-22 PROCEDURE — 63710000001 PROMETHAZINE PER 12.5 MG: Performed by: NURSE PRACTITIONER

## 2021-01-22 PROCEDURE — 85025 COMPLETE CBC W/AUTO DIFF WBC: CPT | Performed by: NURSE PRACTITIONER

## 2021-01-22 PROCEDURE — 25010000002 ENOXAPARIN PER 10 MG: Performed by: HOSPITALIST

## 2021-01-22 PROCEDURE — 83615 LACTATE (LD) (LDH) ENZYME: CPT | Performed by: NURSE PRACTITIONER

## 2021-01-22 PROCEDURE — 85384 FIBRINOGEN ACTIVITY: CPT | Performed by: NURSE PRACTITIONER

## 2021-01-22 RX ADMIN — LEVOTHYROXINE SODIUM 50 MCG: 0.05 TABLET ORAL at 06:05

## 2021-01-22 RX ADMIN — ACETAMINOPHEN 650 MG: 325 TABLET, FILM COATED ORAL at 14:09

## 2021-01-22 RX ADMIN — BUDESONIDE AND FORMOTEROL FUMARATE DIHYDRATE 2 PUFF: 80; 4.5 AEROSOL RESPIRATORY (INHALATION) at 19:33

## 2021-01-22 RX ADMIN — REMDESIVIR 100 MG: 100 INJECTION, POWDER, LYOPHILIZED, FOR SOLUTION INTRAVENOUS at 14:10

## 2021-01-22 RX ADMIN — SODIUM CHLORIDE, PRESERVATIVE FREE 10 ML: 5 INJECTION INTRAVENOUS at 08:14

## 2021-01-22 RX ADMIN — FLUOXETINE HYDROCHLORIDE 40 MG: 20 CAPSULE ORAL at 08:13

## 2021-01-22 RX ADMIN — Medication 1 TABLET: at 08:14

## 2021-01-22 RX ADMIN — CETIRIZINE HYDROCHLORIDE ALLERGY 10 MG: 10 TABLET ORAL at 08:14

## 2021-01-22 RX ADMIN — ASPIRIN 81 MG: 81 TABLET, COATED ORAL at 08:14

## 2021-01-22 RX ADMIN — ENOXAPARIN SODIUM 40 MG: 40 INJECTION SUBCUTANEOUS at 08:13

## 2021-01-22 RX ADMIN — FLUOXETINE HYDROCHLORIDE 40 MG: 20 CAPSULE ORAL at 20:25

## 2021-01-22 RX ADMIN — ENOXAPARIN SODIUM 40 MG: 40 INJECTION SUBCUTANEOUS at 20:26

## 2021-01-22 RX ADMIN — POTASSIUM CHLORIDE 40 MEQ: 750 TABLET, EXTENDED RELEASE ORAL at 08:14

## 2021-01-22 RX ADMIN — PROMETHAZINE HYDROCHLORIDE 12.5 MG: 12.5 TABLET ORAL at 15:17

## 2021-01-22 RX ADMIN — BUDESONIDE AND FORMOTEROL FUMARATE DIHYDRATE 2 PUFF: 80; 4.5 AEROSOL RESPIRATORY (INHALATION) at 11:13

## 2021-01-22 RX ADMIN — MONTELUKAST SODIUM 10 MG: 10 TABLET, FILM COATED ORAL at 08:14

## 2021-01-22 RX ADMIN — PROMETHAZINE HYDROCHLORIDE 12.5 MG: 12.5 TABLET ORAL at 20:31

## 2021-01-22 RX ADMIN — ACETAMINOPHEN 650 MG: 325 TABLET, FILM COATED ORAL at 20:26

## 2021-01-22 RX ADMIN — TAMOXIFEN CITRATE 20 MG: 10 TABLET, FILM COATED ORAL at 08:14

## 2021-01-22 RX ADMIN — DEXAMETHASONE 6 MG: 6 TABLET ORAL at 08:14

## 2021-01-22 RX ADMIN — SODIUM CHLORIDE, PRESERVATIVE FREE 10 ML: 5 INJECTION INTRAVENOUS at 20:26

## 2021-01-22 RX ADMIN — THEOPHYLLINE 300 MG: 300 TABLET, EXTENDED RELEASE ORAL at 20:25

## 2021-01-23 LAB
ALBUMIN SERPL-MCNC: 3.6 G/DL (ref 3.5–5.2)
ALBUMIN/GLOB SERPL: 1.5 G/DL
ALP SERPL-CCNC: 57 U/L (ref 39–117)
ALT SERPL W P-5'-P-CCNC: 14 U/L (ref 1–33)
ANION GAP SERPL CALCULATED.3IONS-SCNC: 7.2 MMOL/L (ref 5–15)
AST SERPL-CCNC: 11 U/L (ref 1–32)
BASOPHILS # BLD AUTO: 0.01 10*3/MM3 (ref 0–0.2)
BASOPHILS NFR BLD AUTO: 0.1 % (ref 0–1.5)
BILIRUB CONJ SERPL-MCNC: <0.2 MG/DL (ref 0–0.3)
BILIRUB SERPL-MCNC: <0.2 MG/DL (ref 0–1.2)
BUN SERPL-MCNC: 12 MG/DL (ref 6–20)
BUN/CREAT SERPL: 24.5 (ref 7–25)
CALCIUM SPEC-SCNC: 8.5 MG/DL (ref 8.6–10.5)
CHLORIDE SERPL-SCNC: 110 MMOL/L (ref 98–107)
CK SERPL-CCNC: 27 U/L (ref 20–180)
CO2 SERPL-SCNC: 26.8 MMOL/L (ref 22–29)
CREAT SERPL-MCNC: 0.49 MG/DL (ref 0.57–1)
CRP SERPL-MCNC: 0.52 MG/DL (ref 0–0.5)
DEPRECATED RDW RBC AUTO: 40.9 FL (ref 37–54)
EOSINOPHIL # BLD AUTO: 0.05 10*3/MM3 (ref 0–0.4)
EOSINOPHIL NFR BLD AUTO: 0.7 % (ref 0.3–6.2)
ERYTHROCYTE [DISTWIDTH] IN BLOOD BY AUTOMATED COUNT: 12.5 % (ref 12.3–15.4)
FERRITIN SERPL-MCNC: 73.3 NG/ML (ref 13–150)
GFR SERPL CREATININE-BSD FRML MDRD: 135 ML/MIN/1.73
GLOBULIN UR ELPH-MCNC: 2.4 GM/DL
GLUCOSE SERPL-MCNC: 95 MG/DL (ref 65–99)
HCT VFR BLD AUTO: 36.4 % (ref 34–46.6)
HGB BLD-MCNC: 12.6 G/DL (ref 12–15.9)
IMM GRANULOCYTES # BLD AUTO: 0.02 10*3/MM3 (ref 0–0.05)
IMM GRANULOCYTES NFR BLD AUTO: 0.3 % (ref 0–0.5)
LYMPHOCYTES # BLD AUTO: 1.28 10*3/MM3 (ref 0.7–3.1)
LYMPHOCYTES NFR BLD AUTO: 17.6 % (ref 19.6–45.3)
MCH RBC QN AUTO: 31.1 PG (ref 26.6–33)
MCHC RBC AUTO-ENTMCNC: 34.6 G/DL (ref 31.5–35.7)
MCV RBC AUTO: 89.9 FL (ref 79–97)
MONOCYTES # BLD AUTO: 0.62 10*3/MM3 (ref 0.1–0.9)
MONOCYTES NFR BLD AUTO: 8.5 % (ref 5–12)
NEUTROPHILS NFR BLD AUTO: 5.29 10*3/MM3 (ref 1.7–7)
NEUTROPHILS NFR BLD AUTO: 72.8 % (ref 42.7–76)
NRBC BLD AUTO-RTO: 0 /100 WBC (ref 0–0.2)
PLATELET # BLD AUTO: 275 10*3/MM3 (ref 140–450)
PMV BLD AUTO: 9.8 FL (ref 6–12)
POTASSIUM SERPL-SCNC: 3.6 MMOL/L (ref 3.5–5.2)
PROT SERPL-MCNC: 6 G/DL (ref 6–8.5)
RBC # BLD AUTO: 4.05 10*6/MM3 (ref 3.77–5.28)
SODIUM SERPL-SCNC: 144 MMOL/L (ref 136–145)
WBC # BLD AUTO: 7.27 10*3/MM3 (ref 3.4–10.8)

## 2021-01-23 PROCEDURE — 25010000002 ENOXAPARIN PER 10 MG: Performed by: HOSPITALIST

## 2021-01-23 PROCEDURE — 94799 UNLISTED PULMONARY SVC/PX: CPT

## 2021-01-23 PROCEDURE — 82550 ASSAY OF CK (CPK): CPT | Performed by: NURSE PRACTITIONER

## 2021-01-23 PROCEDURE — 82728 ASSAY OF FERRITIN: CPT | Performed by: NURSE PRACTITIONER

## 2021-01-23 PROCEDURE — 86140 C-REACTIVE PROTEIN: CPT | Performed by: NURSE PRACTITIONER

## 2021-01-23 PROCEDURE — 85025 COMPLETE CBC W/AUTO DIFF WBC: CPT | Performed by: NURSE PRACTITIONER

## 2021-01-23 PROCEDURE — 82248 BILIRUBIN DIRECT: CPT | Performed by: HOSPITALIST

## 2021-01-23 PROCEDURE — 80053 COMPREHEN METABOLIC PANEL: CPT | Performed by: NURSE PRACTITIONER

## 2021-01-23 PROCEDURE — 63710000001 PROMETHAZINE PER 12.5 MG: Performed by: NURSE PRACTITIONER

## 2021-01-23 RX ADMIN — MONTELUKAST SODIUM 10 MG: 10 TABLET, FILM COATED ORAL at 08:55

## 2021-01-23 RX ADMIN — SODIUM CHLORIDE, PRESERVATIVE FREE 10 ML: 5 INJECTION INTRAVENOUS at 08:56

## 2021-01-23 RX ADMIN — BUDESONIDE AND FORMOTEROL FUMARATE DIHYDRATE 2 PUFF: 80; 4.5 AEROSOL RESPIRATORY (INHALATION) at 19:51

## 2021-01-23 RX ADMIN — REMDESIVIR 100 MG: 100 INJECTION, POWDER, LYOPHILIZED, FOR SOLUTION INTRAVENOUS at 12:40

## 2021-01-23 RX ADMIN — FLUOXETINE HYDROCHLORIDE 40 MG: 20 CAPSULE ORAL at 20:36

## 2021-01-23 RX ADMIN — ENOXAPARIN SODIUM 40 MG: 40 INJECTION SUBCUTANEOUS at 08:56

## 2021-01-23 RX ADMIN — THEOPHYLLINE 300 MG: 300 TABLET, EXTENDED RELEASE ORAL at 20:36

## 2021-01-23 RX ADMIN — ASPIRIN 81 MG: 81 TABLET, COATED ORAL at 08:55

## 2021-01-23 RX ADMIN — BUDESONIDE AND FORMOTEROL FUMARATE DIHYDRATE 2 PUFF: 80; 4.5 AEROSOL RESPIRATORY (INHALATION) at 11:06

## 2021-01-23 RX ADMIN — DEXAMETHASONE 6 MG: 6 TABLET ORAL at 08:55

## 2021-01-23 RX ADMIN — ENOXAPARIN SODIUM 40 MG: 40 INJECTION SUBCUTANEOUS at 20:36

## 2021-01-23 RX ADMIN — LEVOTHYROXINE SODIUM 50 MCG: 0.05 TABLET ORAL at 05:16

## 2021-01-23 RX ADMIN — FLUOXETINE HYDROCHLORIDE 40 MG: 20 CAPSULE ORAL at 08:55

## 2021-01-23 RX ADMIN — ACETAMINOPHEN 650 MG: 325 TABLET, FILM COATED ORAL at 05:16

## 2021-01-23 RX ADMIN — POTASSIUM CHLORIDE 40 MEQ: 750 TABLET, EXTENDED RELEASE ORAL at 08:55

## 2021-01-23 RX ADMIN — PROMETHAZINE HYDROCHLORIDE 12.5 MG: 12.5 TABLET ORAL at 05:16

## 2021-01-23 RX ADMIN — TAMOXIFEN CITRATE 20 MG: 10 TABLET, FILM COATED ORAL at 12:39

## 2021-01-23 RX ADMIN — Medication 1 TABLET: at 08:55

## 2021-01-24 LAB
ALBUMIN SERPL-MCNC: 3.4 G/DL (ref 3.5–5.2)
ALBUMIN/GLOB SERPL: 1.3 G/DL
ALP SERPL-CCNC: 58 U/L (ref 39–117)
ALT SERPL W P-5'-P-CCNC: 11 U/L (ref 1–33)
ANION GAP SERPL CALCULATED.3IONS-SCNC: 9.3 MMOL/L (ref 5–15)
AST SERPL-CCNC: 10 U/L (ref 1–32)
BASOPHILS # BLD AUTO: 0.01 10*3/MM3 (ref 0–0.2)
BASOPHILS NFR BLD AUTO: 0.1 % (ref 0–1.5)
BILIRUB CONJ SERPL-MCNC: <0.2 MG/DL (ref 0–0.3)
BILIRUB SERPL-MCNC: <0.2 MG/DL (ref 0–1.2)
BUN SERPL-MCNC: 14 MG/DL (ref 6–20)
BUN/CREAT SERPL: 28 (ref 7–25)
CALCIUM SPEC-SCNC: 8.8 MG/DL (ref 8.6–10.5)
CHLORIDE SERPL-SCNC: 109 MMOL/L (ref 98–107)
CK SERPL-CCNC: 21 U/L (ref 20–180)
CO2 SERPL-SCNC: 23.7 MMOL/L (ref 22–29)
CREAT SERPL-MCNC: 0.5 MG/DL (ref 0.57–1)
CRP SERPL-MCNC: 0.24 MG/DL (ref 0–0.5)
D DIMER PPP FEU-MCNC: <0.27 MCGFEU/ML (ref 0–0.49)
DEPRECATED RDW RBC AUTO: 41.3 FL (ref 37–54)
EOSINOPHIL # BLD AUTO: 0.01 10*3/MM3 (ref 0–0.4)
EOSINOPHIL NFR BLD AUTO: 0.1 % (ref 0.3–6.2)
ERYTHROCYTE [DISTWIDTH] IN BLOOD BY AUTOMATED COUNT: 12.6 % (ref 12.3–15.4)
FERRITIN SERPL-MCNC: 65.9 NG/ML (ref 13–150)
FIBRINOGEN PPP-MCNC: 309 MG/DL (ref 219–464)
GFR SERPL CREATININE-BSD FRML MDRD: 132 ML/MIN/1.73
GLOBULIN UR ELPH-MCNC: 2.6 GM/DL
GLUCOSE SERPL-MCNC: 91 MG/DL (ref 65–99)
HCT VFR BLD AUTO: 38.1 % (ref 34–46.6)
HGB BLD-MCNC: 12.8 G/DL (ref 12–15.9)
IMM GRANULOCYTES # BLD AUTO: 0.05 10*3/MM3 (ref 0–0.05)
IMM GRANULOCYTES NFR BLD AUTO: 0.6 % (ref 0–0.5)
LDH SERPL-CCNC: 139 U/L (ref 135–214)
LYMPHOCYTES # BLD AUTO: 1.22 10*3/MM3 (ref 0.7–3.1)
LYMPHOCYTES NFR BLD AUTO: 15.4 % (ref 19.6–45.3)
MCH RBC QN AUTO: 30.3 PG (ref 26.6–33)
MCHC RBC AUTO-ENTMCNC: 33.6 G/DL (ref 31.5–35.7)
MCV RBC AUTO: 90.1 FL (ref 79–97)
MONOCYTES # BLD AUTO: 0.55 10*3/MM3 (ref 0.1–0.9)
MONOCYTES NFR BLD AUTO: 7 % (ref 5–12)
NEUTROPHILS NFR BLD AUTO: 6.07 10*3/MM3 (ref 1.7–7)
NEUTROPHILS NFR BLD AUTO: 76.8 % (ref 42.7–76)
NRBC BLD AUTO-RTO: 0 /100 WBC (ref 0–0.2)
PLATELET # BLD AUTO: 299 10*3/MM3 (ref 140–450)
PMV BLD AUTO: 9.8 FL (ref 6–12)
POTASSIUM SERPL-SCNC: 3.7 MMOL/L (ref 3.5–5.2)
PROT SERPL-MCNC: 6 G/DL (ref 6–8.5)
RBC # BLD AUTO: 4.23 10*6/MM3 (ref 3.77–5.28)
SODIUM SERPL-SCNC: 142 MMOL/L (ref 136–145)
WBC # BLD AUTO: 7.91 10*3/MM3 (ref 3.4–10.8)

## 2021-01-24 PROCEDURE — 82248 BILIRUBIN DIRECT: CPT | Performed by: HOSPITALIST

## 2021-01-24 PROCEDURE — 85384 FIBRINOGEN ACTIVITY: CPT | Performed by: NURSE PRACTITIONER

## 2021-01-24 PROCEDURE — 85025 COMPLETE CBC W/AUTO DIFF WBC: CPT | Performed by: NURSE PRACTITIONER

## 2021-01-24 PROCEDURE — 82550 ASSAY OF CK (CPK): CPT | Performed by: NURSE PRACTITIONER

## 2021-01-24 PROCEDURE — 86140 C-REACTIVE PROTEIN: CPT | Performed by: NURSE PRACTITIONER

## 2021-01-24 PROCEDURE — 83615 LACTATE (LD) (LDH) ENZYME: CPT | Performed by: NURSE PRACTITIONER

## 2021-01-24 PROCEDURE — 85379 FIBRIN DEGRADATION QUANT: CPT | Performed by: NURSE PRACTITIONER

## 2021-01-24 PROCEDURE — 80053 COMPREHEN METABOLIC PANEL: CPT | Performed by: NURSE PRACTITIONER

## 2021-01-24 PROCEDURE — 63710000001 PROMETHAZINE PER 12.5 MG: Performed by: NURSE PRACTITIONER

## 2021-01-24 PROCEDURE — 25010000002 ENOXAPARIN PER 10 MG: Performed by: HOSPITALIST

## 2021-01-24 PROCEDURE — 82728 ASSAY OF FERRITIN: CPT | Performed by: NURSE PRACTITIONER

## 2021-01-24 RX ADMIN — MONTELUKAST SODIUM 10 MG: 10 TABLET, FILM COATED ORAL at 08:37

## 2021-01-24 RX ADMIN — ENOXAPARIN SODIUM 40 MG: 40 INJECTION SUBCUTANEOUS at 20:43

## 2021-01-24 RX ADMIN — ASPIRIN 81 MG: 81 TABLET, COATED ORAL at 08:36

## 2021-01-24 RX ADMIN — FLUOXETINE HYDROCHLORIDE 40 MG: 20 CAPSULE ORAL at 20:43

## 2021-01-24 RX ADMIN — PROMETHAZINE HYDROCHLORIDE 12.5 MG: 12.5 TABLET ORAL at 20:43

## 2021-01-24 RX ADMIN — TAMOXIFEN CITRATE 20 MG: 10 TABLET, FILM COATED ORAL at 08:37

## 2021-01-24 RX ADMIN — FLUOXETINE HYDROCHLORIDE 40 MG: 20 CAPSULE ORAL at 08:36

## 2021-01-24 RX ADMIN — REMDESIVIR 100 MG: 100 INJECTION, POWDER, LYOPHILIZED, FOR SOLUTION INTRAVENOUS at 12:18

## 2021-01-24 RX ADMIN — DEXAMETHASONE 6 MG: 6 TABLET ORAL at 08:37

## 2021-01-24 RX ADMIN — SODIUM CHLORIDE, PRESERVATIVE FREE 10 ML: 5 INJECTION INTRAVENOUS at 20:44

## 2021-01-24 RX ADMIN — LEVOTHYROXINE SODIUM 50 MCG: 0.05 TABLET ORAL at 05:22

## 2021-01-24 RX ADMIN — POTASSIUM CHLORIDE 40 MEQ: 750 TABLET, EXTENDED RELEASE ORAL at 08:37

## 2021-01-24 RX ADMIN — THEOPHYLLINE 300 MG: 300 TABLET, EXTENDED RELEASE ORAL at 20:43

## 2021-01-24 RX ADMIN — BUDESONIDE AND FORMOTEROL FUMARATE DIHYDRATE 2 PUFF: 80; 4.5 AEROSOL RESPIRATORY (INHALATION) at 08:41

## 2021-01-24 RX ADMIN — Medication 1 TABLET: at 08:37

## 2021-01-24 RX ADMIN — ENOXAPARIN SODIUM 40 MG: 40 INJECTION SUBCUTANEOUS at 08:37

## 2021-01-25 ENCOUNTER — READMISSION MANAGEMENT (OUTPATIENT)
Dept: CALL CENTER | Facility: HOSPITAL | Age: 46
End: 2021-01-25

## 2021-01-25 VITALS
WEIGHT: 155 LBS | RESPIRATION RATE: 20 BRPM | SYSTOLIC BLOOD PRESSURE: 114 MMHG | HEART RATE: 55 BPM | DIASTOLIC BLOOD PRESSURE: 68 MMHG | OXYGEN SATURATION: 95 % | BODY MASS INDEX: 25.83 KG/M2 | HEIGHT: 65 IN | TEMPERATURE: 97.5 F

## 2021-01-25 LAB
ALBUMIN SERPL-MCNC: 3.4 G/DL (ref 3.5–5.2)
ALBUMIN/GLOB SERPL: 1.4 G/DL
ALP SERPL-CCNC: 55 U/L (ref 39–117)
ALT SERPL W P-5'-P-CCNC: 18 U/L (ref 1–33)
ANION GAP SERPL CALCULATED.3IONS-SCNC: 9.8 MMOL/L (ref 5–15)
AST SERPL-CCNC: 16 U/L (ref 1–32)
BACTERIA SPEC AEROBE CULT: NORMAL
BACTERIA SPEC AEROBE CULT: NORMAL
BASOPHILS # BLD AUTO: 0.02 10*3/MM3 (ref 0–0.2)
BASOPHILS NFR BLD AUTO: 0.2 % (ref 0–1.5)
BILIRUB CONJ SERPL-MCNC: <0.2 MG/DL (ref 0–0.3)
BILIRUB SERPL-MCNC: <0.2 MG/DL (ref 0–1.2)
BUN SERPL-MCNC: 15 MG/DL (ref 6–20)
BUN/CREAT SERPL: 25.4 (ref 7–25)
CALCIUM SPEC-SCNC: 8.7 MG/DL (ref 8.6–10.5)
CHLORIDE SERPL-SCNC: 107 MMOL/L (ref 98–107)
CK SERPL-CCNC: 20 U/L (ref 20–180)
CO2 SERPL-SCNC: 25.2 MMOL/L (ref 22–29)
CREAT SERPL-MCNC: 0.59 MG/DL (ref 0.57–1)
CRP SERPL-MCNC: 0.13 MG/DL (ref 0–0.5)
DEPRECATED RDW RBC AUTO: 41.3 FL (ref 37–54)
EOSINOPHIL # BLD AUTO: 0.02 10*3/MM3 (ref 0–0.4)
EOSINOPHIL NFR BLD AUTO: 0.2 % (ref 0.3–6.2)
ERYTHROCYTE [DISTWIDTH] IN BLOOD BY AUTOMATED COUNT: 12.7 % (ref 12.3–15.4)
FERRITIN SERPL-MCNC: 63.6 NG/ML (ref 13–150)
GFR SERPL CREATININE-BSD FRML MDRD: 109 ML/MIN/1.73
GLOBULIN UR ELPH-MCNC: 2.4 GM/DL
GLUCOSE SERPL-MCNC: 88 MG/DL (ref 65–99)
HCT VFR BLD AUTO: 38 % (ref 34–46.6)
HGB BLD-MCNC: 12.7 G/DL (ref 12–15.9)
IMM GRANULOCYTES # BLD AUTO: 0.06 10*3/MM3 (ref 0–0.05)
IMM GRANULOCYTES NFR BLD AUTO: 0.7 % (ref 0–0.5)
LYMPHOCYTES # BLD AUTO: 1.54 10*3/MM3 (ref 0.7–3.1)
LYMPHOCYTES NFR BLD AUTO: 19 % (ref 19.6–45.3)
MCH RBC QN AUTO: 30.1 PG (ref 26.6–33)
MCHC RBC AUTO-ENTMCNC: 33.4 G/DL (ref 31.5–35.7)
MCV RBC AUTO: 90 FL (ref 79–97)
MONOCYTES # BLD AUTO: 0.58 10*3/MM3 (ref 0.1–0.9)
MONOCYTES NFR BLD AUTO: 7.2 % (ref 5–12)
NEUTROPHILS NFR BLD AUTO: 5.88 10*3/MM3 (ref 1.7–7)
NEUTROPHILS NFR BLD AUTO: 72.7 % (ref 42.7–76)
NRBC BLD AUTO-RTO: 0 /100 WBC (ref 0–0.2)
PLATELET # BLD AUTO: 309 10*3/MM3 (ref 140–450)
PMV BLD AUTO: 10 FL (ref 6–12)
POTASSIUM SERPL-SCNC: 3.4 MMOL/L (ref 3.5–5.2)
PROT SERPL-MCNC: 5.8 G/DL (ref 6–8.5)
RBC # BLD AUTO: 4.22 10*6/MM3 (ref 3.77–5.28)
SODIUM SERPL-SCNC: 142 MMOL/L (ref 136–145)
TSH SERPL DL<=0.05 MIU/L-ACNC: 3.48 UIU/ML (ref 0.27–4.2)
WBC # BLD AUTO: 8.1 10*3/MM3 (ref 3.4–10.8)

## 2021-01-25 PROCEDURE — 82248 BILIRUBIN DIRECT: CPT | Performed by: HOSPITALIST

## 2021-01-25 PROCEDURE — 82728 ASSAY OF FERRITIN: CPT | Performed by: NURSE PRACTITIONER

## 2021-01-25 PROCEDURE — 85025 COMPLETE CBC W/AUTO DIFF WBC: CPT | Performed by: NURSE PRACTITIONER

## 2021-01-25 PROCEDURE — 94799 UNLISTED PULMONARY SVC/PX: CPT

## 2021-01-25 PROCEDURE — 80053 COMPREHEN METABOLIC PANEL: CPT | Performed by: NURSE PRACTITIONER

## 2021-01-25 PROCEDURE — 86140 C-REACTIVE PROTEIN: CPT | Performed by: NURSE PRACTITIONER

## 2021-01-25 PROCEDURE — 82550 ASSAY OF CK (CPK): CPT | Performed by: NURSE PRACTITIONER

## 2021-01-25 PROCEDURE — 84443 ASSAY THYROID STIM HORMONE: CPT | Performed by: HOSPITALIST

## 2021-01-25 PROCEDURE — 25010000002 ENOXAPARIN PER 10 MG: Performed by: HOSPITALIST

## 2021-01-25 RX ORDER — POTASSIUM CHLORIDE 1.5 G/1.77G
20 POWDER, FOR SOLUTION ORAL ONCE
Status: COMPLETED | OUTPATIENT
Start: 2021-01-25 | End: 2021-01-25

## 2021-01-25 RX ORDER — BENZONATATE 200 MG/1
200 CAPSULE ORAL 3 TIMES DAILY PRN
Qty: 15 CAPSULE | Refills: 0 | Status: SHIPPED | OUTPATIENT
Start: 2021-01-25 | End: 2021-06-29 | Stop reason: SDUPTHER

## 2021-01-25 RX ORDER — DEXAMETHASONE 6 MG/1
6 TABLET ORAL
Qty: 5 TABLET | Refills: 0 | Status: SHIPPED | OUTPATIENT
Start: 2021-01-26 | End: 2021-01-31

## 2021-01-25 RX ADMIN — LEVOTHYROXINE SODIUM 50 MCG: 0.05 TABLET ORAL at 06:06

## 2021-01-25 RX ADMIN — Medication 1 TABLET: at 09:02

## 2021-01-25 RX ADMIN — ENOXAPARIN SODIUM 40 MG: 40 INJECTION SUBCUTANEOUS at 09:02

## 2021-01-25 RX ADMIN — FLUOXETINE HYDROCHLORIDE 40 MG: 20 CAPSULE ORAL at 09:02

## 2021-01-25 RX ADMIN — TAMOXIFEN CITRATE 20 MG: 10 TABLET, FILM COATED ORAL at 09:03

## 2021-01-25 RX ADMIN — BUDESONIDE AND FORMOTEROL FUMARATE DIHYDRATE 2 PUFF: 80; 4.5 AEROSOL RESPIRATORY (INHALATION) at 11:58

## 2021-01-25 RX ADMIN — POTASSIUM CHLORIDE 20 MEQ: 1.5 POWDER, FOR SOLUTION ORAL at 12:48

## 2021-01-25 RX ADMIN — REMDESIVIR 100 MG: 100 INJECTION, POWDER, LYOPHILIZED, FOR SOLUTION INTRAVENOUS at 12:46

## 2021-01-25 RX ADMIN — SODIUM CHLORIDE, PRESERVATIVE FREE 10 ML: 5 INJECTION INTRAVENOUS at 09:02

## 2021-01-25 RX ADMIN — ASPIRIN 81 MG: 81 TABLET, COATED ORAL at 09:01

## 2021-01-25 RX ADMIN — DEXAMETHASONE 6 MG: 6 TABLET ORAL at 09:01

## 2021-01-25 RX ADMIN — MONTELUKAST SODIUM 10 MG: 10 TABLET, FILM COATED ORAL at 09:02

## 2021-01-25 RX ADMIN — POTASSIUM CHLORIDE 40 MEQ: 750 TABLET, EXTENDED RELEASE ORAL at 09:01

## 2021-01-25 NOTE — OUTREACH NOTE
Prep Survey      Responses   [facility] patient discharged from?  [place]   Is LACE score < 7 ?  No   Emergency Room discharge w/ pulse ox?  No   Eligibility  [facility]   Date of Admission  01/20/21   Date of Discharge  01/25/21   Discharge Disposition  Home or Self Care   Discharge diagnosis  PNA due to Covid 19   Does the patient have one of the following disease processes/diagnoses(primary or secondary)?  COVID-19   Does the patient have Home health ordered?  No   Is there a DME ordered?  No   Prep survey completed?  Yes          Caty Bass RN

## 2021-01-26 ENCOUNTER — TRANSITIONAL CARE MANAGEMENT TELEPHONE ENCOUNTER (OUTPATIENT)
Dept: CALL CENTER | Facility: HOSPITAL | Age: 46
End: 2021-01-26

## 2021-01-26 NOTE — OUTREACH NOTE
Call Center TCM Note      Responses   [facility] [place] patient discharged from?  [place]   Does the patient have one of the following disease processes/diagnoses(primary or secondary)?  COVID-19   COVID-19 underlying condition?  None   TCM attempt successful?  Yes   Call start time  0939   Call end time  0945   Discharge diagnosis  PNA due to Covid 19   Meds reviewed with patient/caregiver?  Yes   Is the patient having any side effects they believe may be caused by any medication additions or changes?  No   Does the patient have all medications ordered at discharge?  Yes   Is the patient taking all medications as directed (includes completed medication regime)?  Yes   Does the patient have a primary care provider?   Yes   Does the patient have an appointment with their PCP or specialist within 7 days of discharge?  No   Urgent appointment interventions  Facilitated patient appointment   Comments  [facility] appt for 1/28 @ 3:45   Has home health visited the patient within 72 hours of discharge?  N/A   Psychosocial issues?  No   Did the patient receive a copy of their discharge instructions?  Yes   Did the patient receive a copy of COVID-19 specific instructions?  Yes   Nursing interventions  Reviewed instructions with patient   What is the patient's perception of their health status since discharge?  Improving   Does the patient have any of the following symptoms?  Cough, Shortness of breath, Loss of taste/smell [Taste is coming back but still has loss of smell]   Nursing Interventions  Nurse provided patient education   Pulse Ox monitoring  Intermittent   Pulse Ox device source  Patient   O2 Sat comments  95-96% on Room air   O2 Sat: education provided  Sat levels, When to seek care   Is the patient/caregiver able to teach back steps to recovery at home?  Set small, achievable goals for return to baseline health, Rest and rebuild strength, gradually increase activity, Make a list of questions for provider's  appointment, Eat a well-balance diet   If the patient is a current smoker, are they able to teach back resources for cessation?  Not a smoker   Is the patient/caregiver able to teach back the hierarchy of who to call/visit for symptoms/problems? PCP, Specialist, Home health nurse, Urgent Care, ED, 911  Yes   TCM call completed?  Yes   Wrap up additional comments  States she has cleaned high traffic areas and changed out toothbrush.  She states she is no longer in quarantine due to symptoms started over 13 days ago          Misty Cunningham LPN    1/26/2021, 09:48 EST

## 2021-01-27 ENCOUNTER — READMISSION MANAGEMENT (OUTPATIENT)
Dept: CALL CENTER | Facility: HOSPITAL | Age: 46
End: 2021-01-27

## 2021-01-27 NOTE — OUTREACH NOTE
COVID-19 Week 1 Survey      Responses   [facility] patient discharged from?  [place]   Does the patient have one of the following disease processes/diagnoses(primary or secondary)?  COVID-19   COVID-19 underlying condition?  None   Call Number  Call 2   Week 1 Call successful?  Yes   Call start time  1132   Call end time  1136   Discharge diagnosis  PNA due to Covid 19   Meds reviewed with patient/caregiver?  Yes   Is the patient having any side effects they believe may be caused by any medication additions or changes?  No   Does the patient have all medications ordered at discharge?  Yes   Is the patient taking all medications as directed (includes completed medication regime)?  Yes   Does the patient have a primary care provider?   Yes   Does the patient have an appointment with their PCP or specialist within 7 days of discharge?  Yes   Has the patient kept scheduled appointments due by today?  N/A   Comments  [facility] appt for 1/28 @ 3:45   Has home health visited the patient within 72 hours of discharge?  N/A   Psychosocial issues?  No   Did the patient receive a copy of their discharge instructions?  Yes   Did the patient receive a copy of COVID-19 specific instructions?  Yes   Nursing interventions  Reviewed instructions with patient   What is the patient's perception of their health status since discharge?  Improving   Does the patient have any of the following symptoms?  Cough, Loss of taste/smell, Shortness of breath [Mild cough and loss of smell]   Nursing Interventions  Nurse provided patient education   Pulse Ox monitoring  Intermittent   Pulse Ox device source  Patient   O2 Sat comments  95-96% on room air   O2 Sat: education provided  Sat levels, When to seek care   Is the patient/caregiver able to teach back steps to recovery at home?  Set small, achievable goals for return to baseline health, Rest and rebuild strength, gradually increase activity, Eat a well-balance diet, Make a list of  questions for provider's appointment   If the patient is a current smoker, are they able to teach back resources for cessation?  Not a smoker   Is the patient/caregiver able to teach back the hierarchy of who to call/visit for symptoms/problems? PCP, Specialist, Home health nurse, Urgent Care, ED, 911  Yes   COVID-19 call completed?  Yes   Wrap up additional comments  States she is doing good.  With no questions or needs at this time          Misty Cunningham LPN

## 2021-01-28 ENCOUNTER — READMISSION MANAGEMENT (OUTPATIENT)
Dept: CALL CENTER | Facility: HOSPITAL | Age: 46
End: 2021-01-28

## 2021-01-28 ENCOUNTER — OFFICE VISIT (OUTPATIENT)
Dept: FAMILY MEDICINE CLINIC | Facility: CLINIC | Age: 46
End: 2021-01-28

## 2021-01-28 VITALS
TEMPERATURE: 95.7 F | OXYGEN SATURATION: 98 % | SYSTOLIC BLOOD PRESSURE: 132 MMHG | HEART RATE: 81 BPM | HEIGHT: 65 IN | BODY MASS INDEX: 26.86 KG/M2 | DIASTOLIC BLOOD PRESSURE: 68 MMHG | WEIGHT: 161.2 LBS

## 2021-01-28 DIAGNOSIS — E87.6 HYPOKALEMIA: ICD-10-CM

## 2021-01-28 DIAGNOSIS — E06.3 HYPOTHYROIDISM DUE TO HASHIMOTO'S THYROIDITIS: ICD-10-CM

## 2021-01-28 DIAGNOSIS — J45.901 EXACERBATION OF ASTHMA, UNSPECIFIED ASTHMA SEVERITY, UNSPECIFIED WHETHER PERSISTENT: ICD-10-CM

## 2021-01-28 DIAGNOSIS — J12.82 PNEUMONIA DUE TO COVID-19 VIRUS: Primary | ICD-10-CM

## 2021-01-28 DIAGNOSIS — E03.8 HYPOTHYROIDISM DUE TO HASHIMOTO'S THYROIDITIS: ICD-10-CM

## 2021-01-28 DIAGNOSIS — U07.1 PNEUMONIA DUE TO COVID-19 VIRUS: Primary | ICD-10-CM

## 2021-01-28 DIAGNOSIS — R00.1 BRADYCARDIA: ICD-10-CM

## 2021-01-28 PROCEDURE — 99214 OFFICE O/P EST MOD 30 MIN: CPT | Performed by: FAMILY MEDICINE

## 2021-01-28 NOTE — PROGRESS NOTES
"Chief Complaint  Hospital Follow Up Visit ([facility]  01/14/2021-01/2/2021 for covid )    Subjective          Nereida Woodruff presents to [facility] for   Patient presents for follow-up of Covid pneumonia admission to the hospital.  She was discharged 3 days ago.  She is now 19 days since she started symptoms.  She is tired and weak but her breathing is feeling better.  She was discharged not requiring oxygen.  She has 3 more days of once daily Decadron.  She has not needed her nebulizer since discharge.  She is hoping she will feel strong enough to go back to work in a couple weeks.  During the hospitalization she was noted to have bradycardia and the thyroid was normal.  She also was hypokalemic at discharge.  Her inflammatory markers were never very elevated.        Objective   Vital Signs:   /68   Pulse 81   Temp 95.7 °F (35.4 °C)   Ht 165.1 cm (65\")   Wt 73.1 kg (161 lb 3.2 oz)   SpO2 98%   BMI 26.83 kg/m²     Physical Exam  Constitutional:       General: She is not in acute distress.     Appearance: Normal appearance. She is well-developed.   HENT:      Head: Normocephalic and atraumatic.      Right Ear: Tympanic membrane, ear canal and external ear normal.      Left Ear: Tympanic membrane, ear canal and external ear normal.      Mouth/Throat:      Mouth: Mucous membranes are moist.      Pharynx: Oropharynx is clear.   Eyes:      Conjunctiva/sclera: Conjunctivae normal.      Pupils: Pupils are equal, round, and reactive to light.   Neck:      Musculoskeletal: Neck supple.      Thyroid: No thyromegaly.   Cardiovascular:      Rate and Rhythm: Normal rate and regular rhythm.      Heart sounds: No murmur.   Pulmonary:      Effort: Pulmonary effort is normal.      Breath sounds: Normal breath sounds. No wheezing.   Abdominal:      General: Bowel sounds are normal.      Palpations: Abdomen is soft.      Tenderness: There is no abdominal tenderness. "   Musculoskeletal: Normal range of motion.   Lymphadenopathy:      Cervical: No cervical adenopathy.   Skin:     General: Skin is warm and dry.   Neurological:      Mental Status: She is alert and oriented to person, place, and time.   Psychiatric:         Mood and Affect: Mood normal.         Behavior: Behavior normal.        Result Review :   The following data was reviewed by: Meredith Lea Kehrer, MD on 01/28/2021:  Common labs    Common Labsle 1/23/21 1/23/21 1/24/21 1/24/21 1/25/21 1/25/21    [id number] 0704 0505 0505   BUN  12  14  15   Creatinine  0.49 (A)  0.50 (A)  0.59   eGFR Non  Am  135  132  109   Sodium  144  142  142   Potassium  3.6  3.7  3.4 (A)   Chloride  110 (A)  109 (A)  107   Calcium  8.5 (A)  8.8  8.7   Albumin  3.60  3.40 (A)  3.40 (A)   Total Bilirubin  <0.2  <0.2  <0.2   Alkaline Phosphatase  57  58  55   AST (SGOT)  11  10  16   ALT (SGPT)  14  11  18   WBC 7.27  7.91  8.10    Hemoglobin 12.6  12.8  12.7    Hematocrit 36.4  38.1  38.0    Platelets 275  299  309    (A) Abnormal value            Data reviewed: Recent hospitalization notes Below H&P and discharge summary read.         ED Provider Notes by Austin Wisdom MD (01/20/2021 18:51)  Discharge Summary by Donis Jackman MD (01/25/2021 10:46)  Bilirubin, Direct (01/25/2021 05:05)  TSH (01/25/2021 11:53)  Ferritin (01/25/2021 05:05)  C-reactive Protein (01/25/2021 05:05)  CK (01/25/2021 05:05)  Comprehensive Metabolic Panel (01/25/2021 05:05)  CBC & Differential (01/25/2021 05:05)    Assessment and Plan    Problem List Items Addressed This Visit        Endocrine and Metabolic    Hypothyroidism       Other    Pneumonia due to COVID-19 virus - Primary      Other Visit Diagnoses     Exacerbation of asthma, unspecified asthma severity, unspecified whether persistent        Bradycardia        Hypokalemia        Relevant Orders    Basic Metabolic Panel          Follow Up   Return in about 2 weeks (around 2/11/2021) for  Recheck after covid.  Patient was given instructions and counseling regarding her condition or for health maintenance advice. Please see specific information pulled into the AVS if appropriate.

## 2021-01-28 NOTE — OUTREACH NOTE
COVID-19 Week 1 Survey      Responses   [facility] patient discharged from?  [place]   Does the patient have one of the following disease processes/diagnoses(primary or secondary)?  COVID-19   COVID-19 underlying condition?  None   Call Number  Call 3   Week 1 Call successful?  Yes   Call start time  1401   Call end time  1401   Discharge diagnosis  PNA due to Covid 19   Comments regarding PCP  1/28/21   Has the patient kept scheduled appointments due by today?  Yes   COVID-19 call completed?  Yes   Wrap up additional comments  Call was brief as patient was sitting in PCP office          Josette Grimes RN

## 2021-01-29 LAB
BUN SERPL-MCNC: 20 MG/DL (ref 6–24)
BUN/CREAT SERPL: 24 (ref 9–23)
CALCIUM SERPL-MCNC: 10.2 MG/DL (ref 8.7–10.2)
CHLORIDE SERPL-SCNC: 103 MMOL/L (ref 96–106)
CO2 SERPL-SCNC: 20 MMOL/L (ref 20–29)
CREAT SERPL-MCNC: 0.83 MG/DL (ref 0.57–1)
GLUCOSE SERPL-MCNC: 111 MG/DL (ref 65–99)
POTASSIUM SERPL-SCNC: 4.1 MMOL/L (ref 3.5–5.2)
SODIUM SERPL-SCNC: 142 MMOL/L (ref 134–144)

## 2021-02-03 ENCOUNTER — TELEPHONE (OUTPATIENT)
Dept: FAMILY MEDICINE CLINIC | Facility: CLINIC | Age: 46
End: 2021-02-03

## 2021-02-03 ENCOUNTER — READMISSION MANAGEMENT (OUTPATIENT)
Dept: CALL CENTER | Facility: HOSPITAL | Age: 46
End: 2021-02-03

## 2021-02-03 NOTE — OUTREACH NOTE
COVID-19 Week 2 Survey      Responses   [facility] patient discharged from?  [place]   Does the patient have one of the following disease processes/diagnoses(primary or secondary)?  COVID-19   COVID-19 underlying condition?  None   Call Number  Call 1   COVID-19 Week 2: Call 1 attempt successful?  No   Discharge diagnosis  PNA due to Covid 19          Madhuri Sanchez RN

## 2021-02-03 NOTE — TELEPHONE ENCOUNTER
PATIENT WOULD LIKE TO LET THE DR KNOW SHE HAS NO ENERGY. SHE ALSO GOT A NOTE FROM WORK THAT SHE COULD GET THE VACCINE NEXT WEEK. SHE WANTS TO KNOW WHEN SHE CAN TAKE THE VACCINE SINCE SHE IS RECOVERING FROM IT NOW ( 3 WEEKS)  [phone]

## 2021-02-06 DIAGNOSIS — J45.51 SEVERE PERSISTENT ASTHMA WITH ACUTE EXACERBATION: ICD-10-CM

## 2021-02-08 RX ORDER — ALBUTEROL SULFATE 4 MG/1
TABLET ORAL
Qty: 90 TABLET | Refills: 0 | Status: SHIPPED | OUTPATIENT
Start: 2021-02-08 | End: 2021-05-10

## 2021-02-09 ENCOUNTER — TELEMEDICINE (OUTPATIENT)
Dept: FAMILY MEDICINE CLINIC | Facility: CLINIC | Age: 46
End: 2021-02-09

## 2021-02-09 VITALS
OXYGEN SATURATION: 95 % | HEIGHT: 65 IN | WEIGHT: 165.4 LBS | SYSTOLIC BLOOD PRESSURE: 123 MMHG | BODY MASS INDEX: 27.56 KG/M2 | DIASTOLIC BLOOD PRESSURE: 78 MMHG | TEMPERATURE: 95.7 F | HEART RATE: 70 BPM

## 2021-02-09 DIAGNOSIS — R07.9 CHEST PAIN, UNSPECIFIED TYPE: ICD-10-CM

## 2021-02-09 DIAGNOSIS — J02.9 SORE THROAT: Primary | ICD-10-CM

## 2021-02-09 DIAGNOSIS — J12.82 PNEUMONIA DUE TO COVID-19 VIRUS: ICD-10-CM

## 2021-02-09 DIAGNOSIS — J45.50 SEVERE PERSISTENT ASTHMA, UNSPECIFIED WHETHER COMPLICATED: ICD-10-CM

## 2021-02-09 DIAGNOSIS — U07.1 PNEUMONIA DUE TO COVID-19 VIRUS: ICD-10-CM

## 2021-02-09 LAB
EXPIRATION DATE: NORMAL
INTERNAL CONTROL: NORMAL
Lab: NORMAL
S PYO AG THROAT QL: NEGATIVE

## 2021-02-09 PROCEDURE — 99214 OFFICE O/P EST MOD 30 MIN: CPT | Performed by: FAMILY MEDICINE

## 2021-02-09 PROCEDURE — 87880 STREP A ASSAY W/OPTIC: CPT | Performed by: FAMILY MEDICINE

## 2021-02-09 NOTE — PROGRESS NOTES
"This was an audio and video enabled telemedicine encounter.  Length of visit 30 minutes.     Chief Complaint  Shortness of Breath (pt was supposed to have a covid follow up on thursday but moved to today due to symptoms), Headache, Sore Throat, and Pain (pain between shoulder blades and rib pain)    Subjective          Nereida Woodruff presents to [facility]  Here for virtual visit.  Started with new symptoms a couple days ago.  Sats 93-96%.  Drops wit exertion but no lower than 93%.  Throat looks red but no white patches.  Has been around her children, fiance and mother.  No one else is sick.  Now she has cough and chest pain again.  Pain is between her shoulders.  Discussed headache.  She had a negative CTA of the chest initially.    Patient was asked to come in person for visit.          Review of Systems   Constitutional: Negative for chills and fever.   HENT: Positive for sore throat.    Respiratory: Positive for cough, chest tightness and shortness of breath.    Cardiovascular: Positive for chest pain.   Musculoskeletal: Positive for arthralgias.     Objective   Vital Signs:   /78   Pulse 70   Temp 95.7 °F (35.4 °C)   Ht 165.1 cm (65\")   Wt 75 kg (165 lb 6.4 oz)   SpO2 95%   BMI 27.52 kg/m²     Physical Exam  Constitutional:       General: She is not in acute distress.     Appearance: Normal appearance. She is well-developed.   HENT:      Head: Normocephalic and atraumatic.      Right Ear: Tympanic membrane, ear canal and external ear normal.      Left Ear: Tympanic membrane, ear canal and external ear normal.      Mouth/Throat:      Mouth: Mucous membranes are moist.      Pharynx: Oropharynx is clear. Posterior oropharyngeal erythema present. No oropharyngeal exudate.      Comments: Mild erythema of throat without any red or white patches or petechiae.  Eyes:      Conjunctiva/sclera: Conjunctivae normal.      Pupils: Pupils are equal, round, and reactive to " light.   Neck:      Musculoskeletal: Neck supple.      Thyroid: No thyromegaly.      Comments: Shotty anterior cervical lymphadenopathy.  Cardiovascular:      Rate and Rhythm: Normal rate and regular rhythm.      Heart sounds: No murmur.   Pulmonary:      Effort: Pulmonary effort is normal.      Breath sounds: Decreased breath sounds present. No wheezing, rhonchi or rales.      Comments: Sounds are decreased but she has good air movement throughout without any adventitious sounds.  Abdominal:      General: Bowel sounds are normal.      Palpations: Abdomen is soft.      Tenderness: There is no abdominal tenderness.   Musculoskeletal: Normal range of motion.   Lymphadenopathy:      Cervical: Cervical adenopathy present.   Skin:     General: Skin is warm and dry.   Neurological:      Mental Status: She is alert and oriented to person, place, and time.   Psychiatric:         Mood and Affect: Mood normal.         Behavior: Behavior normal.        Result Review :   The following data was reviewed by: Meredith Lea Kehrer, MD on 02/09/2021:  Strep    Common Labsle 2/9/21   POC Strep A, Molecular Negative                ECG 12 Lead    Date/Time: 2/9/2021 12:11 PM  Performed by: Kehrer, Meredith Lea, MD  Authorized by: Kehrer, Meredith Lea, MD   Comparison: not compared with previous ECG   Previous ECG: no previous ECG available  Rhythm: sinus rhythm and sinus bradycardia  Rate: bradycardic  BPM: 55  Conduction: conduction normal  ST Segments: ST segments normal  QRS axis: normal  Other: no other findings    Clinical impression: normal ECG  Comments: EKG normal except bradycardia             XR Chest 2 View (02/09/2021 11:54)  XR Chest AP (01/20/2021 19:42)  CT Angiogram Chest (01/20/2021 20:43)  Discharge Summary by Donis Jackman MD (01/25/2021 10:46)    Assessment and Plan    Diagnoses and all orders for this visit:    1. Sore throat (Primary)  -     POC Rapid Strep A  -     Virus, Respiratory Panel DFA - Lavage,  Nasopharynx    2. Pneumonia due to COVID-19 virus  -     XR Chest 2 View; Future  -     D-dimer, Quantitative; Future  -     Troponin I ([facility]); Future  -     CK; Future  -     CK MB; Future  -     C-reactive Protein; Future  -     CBC & Differential; Future  -     Comprehensive Metabolic Panel; Future  -     Ferritin; Future    3. Chest pain, unspecified type  -     XR Chest 2 View; Future  -     D-dimer, Quantitative; Future  -     Troponin I ([facility]); Future  -     CK; Future  -     CK MB; Future  -     C-reactive Protein; Future  -     CBC & Differential; Future  -     Comprehensive Metabolic Panel; Future  -     Ferritin; Future  -     ECG 12 Lead    4. Severe persistent asthma, unspecified whether complicated  -     D-dimer, Quantitative; Future  -     Troponin I ([facility]); Future  -     CK; Future  -     CK MB; Future  -     C-reactive Protein; Future  -     CBC & Differential; Future  -     Comprehensive Metabolic Panel; Future  -     Ferritin; Future        Follow Up   No follow-ups on file.  Patient was given instructions and counseling regarding her condition or for health maintenance advice. Please see specific information pulled into the AVS if appropriate.

## 2021-02-10 DIAGNOSIS — J45.50 SEVERE PERSISTENT ASTHMA, UNSPECIFIED WHETHER COMPLICATED: ICD-10-CM

## 2021-02-10 DIAGNOSIS — U07.1 PNEUMONIA DUE TO COVID-19 VIRUS: ICD-10-CM

## 2021-02-10 DIAGNOSIS — R07.9 CHEST PAIN, UNSPECIFIED TYPE: ICD-10-CM

## 2021-02-10 DIAGNOSIS — J12.82 PNEUMONIA DUE TO COVID-19 VIRUS: ICD-10-CM

## 2021-02-11 ENCOUNTER — CLINICAL SUPPORT (OUTPATIENT)
Dept: FAMILY MEDICINE CLINIC | Facility: CLINIC | Age: 46
End: 2021-02-11

## 2021-02-11 ENCOUNTER — TELEPHONE (OUTPATIENT)
Dept: FAMILY MEDICINE CLINIC | Facility: CLINIC | Age: 46
End: 2021-02-11

## 2021-02-11 DIAGNOSIS — U07.1 PNEUMONIA DUE TO COVID-19 VIRUS: Primary | ICD-10-CM

## 2021-02-11 DIAGNOSIS — J12.82 PNEUMONIA DUE TO COVID-19 VIRUS: Primary | ICD-10-CM

## 2021-02-11 DIAGNOSIS — J02.9 SORE THROAT: Primary | ICD-10-CM

## 2021-02-11 DIAGNOSIS — U07.1 PNEUMONIA DUE TO COVID-19 VIRUS: ICD-10-CM

## 2021-02-11 DIAGNOSIS — J45.901 EXACERBATION OF ASTHMA, UNSPECIFIED ASTHMA SEVERITY, UNSPECIFIED WHETHER PERSISTENT: ICD-10-CM

## 2021-02-11 DIAGNOSIS — J02.9 SORE THROAT: ICD-10-CM

## 2021-02-11 DIAGNOSIS — J12.82 PNEUMONIA DUE TO COVID-19 VIRUS: ICD-10-CM

## 2021-02-11 LAB
FLUAV AG SPEC QL IF: NORMAL
FLUBV AG SPEC QL IF: NORMAL
HADV AG SPEC QL IF: NORMAL
HMPV AG SPEC QL IF: NORMAL
HPIV AG SPEC QL IF: NORMAL
RSV AG SPEC QL IF: NORMAL
SPECIMEN STATUS: NORMAL

## 2021-02-11 RX ORDER — DEXAMETHASONE 2 MG/1
TABLET ORAL
Qty: 13 TABLET | Refills: 0 | Status: SHIPPED | OUTPATIENT
Start: 2021-02-11 | End: 2021-02-18

## 2021-02-11 NOTE — TELEPHONE ENCOUNTER
Pt stated that when you were seeing her son yesterday you told her that you would send her in another RX of dexamethazone,  [facility].  Pt also needs another note to be off work from tomorrow 02/12/2021 thu next Friday 02/19/2021.   Please advise

## 2021-02-15 LAB
FLUAV AG SPEC QL IF: NEGATIVE
FLUBV AG SPEC QL IF: NEGATIVE
HADV AG SPEC QL IF: NEGATIVE
HMPV AG SPEC QL IF: NEGATIVE
HPIV AG SPEC QL IF: NEGATIVE
RSV AG SPEC QL IF: NEGATIVE

## 2021-02-19 ENCOUNTER — TELEPHONE (OUTPATIENT)
Dept: FAMILY MEDICINE CLINIC | Facility: CLINIC | Age: 46
End: 2021-02-19

## 2021-02-19 NOTE — TELEPHONE ENCOUNTER
Patient said she is ready to go back she just wants her employer to know that she may be SOA with exertion. She did not want to do a visit (in person or video)

## 2021-02-19 NOTE — TELEPHONE ENCOUNTER
PATIENT CALLED STATING THAT SHE NEEDS A LETTER FROM DR. KEHRER STATING THAT SHE CAN RETURN TO WORK ON 02/22/21, THIS LETTER ALSO NEEDS TO STATE ANY OR NO RESTRICTIONS THAT THE PATIENT WILL HAVE TO FOLLOW.    PATIENT REQUESTED THIS BE PUT IN HER MYCHART SO THAT SHE CAN PRINT IT OFF FROM THERE. REQUESTING HIGH PRIORITY SO THAT THIS CAN BE COMPLETED ON TIME.    PLEASE ADVISE  [phone]

## 2021-02-19 NOTE — TELEPHONE ENCOUNTER
That may go on for a long time.  The question is can she go back to work on Monday?  If she is unsure, have her set up a visit with me this afternoon to discuss.

## 2021-02-22 NOTE — TELEPHONE ENCOUNTER
PT  STATED THAT SHE HER RETURN TO WORK TO SAY SHE CAN RETURN TO WORK AND NOTHING ELSE. PLEASE CONTACT PT IF ANY QUESTIONS. PT WOULD ALSO LIKE TO BE NOTIFIED WHEN ITS AVAILABLE TO PRINT FROM MY CHART.    CALL BACK:[phone]

## 2021-04-02 ENCOUNTER — BULK ORDERING (OUTPATIENT)
Dept: CASE MANAGEMENT | Facility: OTHER | Age: 46
End: 2021-04-02

## 2021-04-02 DIAGNOSIS — Z23 IMMUNIZATION DUE: ICD-10-CM

## 2021-04-23 ENCOUNTER — OFFICE VISIT (OUTPATIENT)
Dept: FAMILY MEDICINE CLINIC | Facility: CLINIC | Age: 46
End: 2021-04-23

## 2021-04-23 VITALS
TEMPERATURE: 97.7 F | BODY MASS INDEX: 27.89 KG/M2 | DIASTOLIC BLOOD PRESSURE: 64 MMHG | WEIGHT: 167.4 LBS | SYSTOLIC BLOOD PRESSURE: 116 MMHG | OXYGEN SATURATION: 97 % | HEART RATE: 72 BPM | HEIGHT: 65 IN

## 2021-04-23 DIAGNOSIS — J40 BRONCHITIS: ICD-10-CM

## 2021-04-23 DIAGNOSIS — J01.00 ACUTE MAXILLARY SINUSITIS, RECURRENCE NOT SPECIFIED: ICD-10-CM

## 2021-04-23 DIAGNOSIS — R68.83 CHILLS: Primary | ICD-10-CM

## 2021-04-23 LAB
EXPIRATION DATE: NORMAL
EXPIRATION DATE: NORMAL
FLUAV AG NPH QL: NEGATIVE
FLUBV AG NPH QL: NEGATIVE
INTERNAL CONTROL: NORMAL
INTERNAL CONTROL: NORMAL
Lab: 2780
Lab: 3708
S PYO AG THROAT QL: NEGATIVE

## 2021-04-23 PROCEDURE — 99214 OFFICE O/P EST MOD 30 MIN: CPT | Performed by: NURSE PRACTITIONER

## 2021-04-23 PROCEDURE — 87880 STREP A ASSAY W/OPTIC: CPT | Performed by: NURSE PRACTITIONER

## 2021-04-23 PROCEDURE — 87804 INFLUENZA ASSAY W/OPTIC: CPT | Performed by: NURSE PRACTITIONER

## 2021-04-23 RX ORDER — LEVOFLOXACIN 500 MG/1
500 TABLET, FILM COATED ORAL DAILY
Qty: 7 TABLET | Refills: 0 | Status: SHIPPED | OUTPATIENT
Start: 2021-04-23 | End: 2021-05-28

## 2021-04-23 NOTE — PROGRESS NOTES
"Chief Complaint  Cough, Nasal Congestion, Sore Throat, and Chills    Subjective          Nereida Woodruff presents to [facility]  History of Present Illness    Patient is here today with complaint of allergy like symptoms that started over a week ago.  This is my first time seeing this patient.  She is a patient of Dr. Meredith Kehrer.  She reports a couple days ago started with worsening symptoms of chills, cough, congestion, and sore throat    Denies fever that she is aware of.     OTC: ibuprofen and tylenol  Takes allegra and singulair for allergy symptoms.     Had covid back in January.       Review of Systems   Constitutional: Positive for chills and fatigue. Negative for fever.   HENT: Positive for congestion, ear pain, rhinorrhea, sinus pressure and sore throat.    Respiratory: Positive for cough, chest tightness, shortness of breath and wheezing (upper respiratory reported).    Cardiovascular: Negative for chest pain.   Gastrointestinal: Negative for abdominal pain, blood in stool, constipation, nausea and vomiting.   Genitourinary: Negative for dysuria and urgency.   Skin: Negative.    Neurological: Negative for dizziness.     Objective   Vital Signs:   /64   Pulse 72   Temp 97.7 °F (36.5 °C)   Ht 165.1 cm (65\")   Wt 75.9 kg (167 lb 6.4 oz)   SpO2 97%   BMI 27.86 kg/m²     Physical Exam  Constitutional:       Appearance: Normal appearance.   HENT:      Head: Normocephalic and atraumatic.      Right Ear: Tympanic membrane has decreased mobility.      Left Ear: Tympanic membrane has decreased mobility.      Nose: Congestion and rhinorrhea present.      Right Sinus: Maxillary sinus tenderness present. No frontal sinus tenderness.      Left Sinus: Maxillary sinus tenderness present. No frontal sinus tenderness.      Mouth/Throat:      Lips: Pink.      Mouth: Mucous membranes are moist.      Tonsils: 0 on the right. 0 on the left.      Comments: Post nasal " drainage noted  Cardiovascular:      Rate and Rhythm: Normal rate and regular rhythm.   Pulmonary:      Effort: Pulmonary effort is normal.      Breath sounds: Decreased breath sounds present.   Neurological:      Mental Status: She is alert and oriented to person, place, and time.   Psychiatric:         Mood and Affect: Mood normal.         Behavior: Behavior normal.         Thought Content: Thought content normal.         Judgment: Judgment normal.        Result Review :                 Assessment and Plan    Diagnoses and all orders for this visit:    1. Chills (Primary)  -     POCT Influenza A/B  -     POCT rapid strep A    2. Acute maxillary sinusitis, recurrence not specified    3. Bronchitis    Other orders  -     levoFLOXacin (Levaquin) 500 MG tablet; Take 1 tablet by mouth Daily.  Dispense: 7 tablet; Refill: 0      Will treat for sinus infection and bronchitis.  She wants to try to avoid steroids if possible.  She has doses at home however.  We will treat with antibiotic.  Continue allergy and asthma medication.  Follow-up if no improvement.  Verbalized understanding.      Follow Up   Return if symptoms worsen or fail to improve.  Patient was given instructions and counseling regarding her condition or for health maintenance advice. Please see specific information pulled into the AVS if appropriate.

## 2021-05-09 DIAGNOSIS — J45.51 SEVERE PERSISTENT ASTHMA WITH ACUTE EXACERBATION: ICD-10-CM

## 2021-05-10 RX ORDER — ALBUTEROL SULFATE 4 MG/1
TABLET ORAL
Qty: 90 TABLET | Refills: 0 | Status: SHIPPED | OUTPATIENT
Start: 2021-05-10 | End: 2021-05-28 | Stop reason: SDUPTHER

## 2021-05-11 RX ORDER — VALACYCLOVIR HYDROCHLORIDE 500 MG/1
500 TABLET, FILM COATED ORAL DAILY
Qty: 90 TABLET | Refills: 1 | Status: SHIPPED | OUTPATIENT
Start: 2021-05-11 | End: 2021-05-28 | Stop reason: SDUPTHER

## 2021-05-28 ENCOUNTER — OFFICE VISIT (OUTPATIENT)
Dept: FAMILY MEDICINE CLINIC | Facility: CLINIC | Age: 46
End: 2021-05-28

## 2021-05-28 VITALS
TEMPERATURE: 98 F | SYSTOLIC BLOOD PRESSURE: 126 MMHG | HEART RATE: 76 BPM | WEIGHT: 167.2 LBS | BODY MASS INDEX: 27.86 KG/M2 | HEIGHT: 65 IN | OXYGEN SATURATION: 99 % | DIASTOLIC BLOOD PRESSURE: 84 MMHG

## 2021-05-28 DIAGNOSIS — F33.42 RECURRENT MAJOR DEPRESSIVE DISORDER, IN FULL REMISSION (HCC): ICD-10-CM

## 2021-05-28 DIAGNOSIS — J45.50 SEVERE PERSISTENT ASTHMA, UNSPECIFIED WHETHER COMPLICATED: ICD-10-CM

## 2021-05-28 DIAGNOSIS — M54.31 SCIATICA OF RIGHT SIDE: ICD-10-CM

## 2021-05-28 DIAGNOSIS — R63.5 WEIGHT GAIN: ICD-10-CM

## 2021-05-28 DIAGNOSIS — E03.8 HYPOTHYROIDISM DUE TO HASHIMOTO'S THYROIDITIS: Primary | ICD-10-CM

## 2021-05-28 DIAGNOSIS — J45.51 SEVERE PERSISTENT ASTHMA WITH ACUTE EXACERBATION: ICD-10-CM

## 2021-05-28 DIAGNOSIS — E06.3 HYPOTHYROIDISM DUE TO HASHIMOTO'S THYROIDITIS: Primary | ICD-10-CM

## 2021-05-28 PROCEDURE — 99214 OFFICE O/P EST MOD 30 MIN: CPT | Performed by: FAMILY MEDICINE

## 2021-05-28 RX ORDER — LEVOTHYROXINE SODIUM 50 MCG
50 TABLET ORAL DAILY
Qty: 90 TABLET | Refills: 3 | Status: SHIPPED | OUTPATIENT
Start: 2021-05-28 | End: 2021-10-21 | Stop reason: SDUPTHER

## 2021-05-28 RX ORDER — MONTELUKAST SODIUM 10 MG/1
10 TABLET ORAL DAILY
Qty: 90 TABLET | Refills: 3 | Status: SHIPPED | OUTPATIENT
Start: 2021-05-28 | End: 2021-11-05 | Stop reason: SDUPTHER

## 2021-05-28 RX ORDER — ALBUTEROL SULFATE 4 MG/1
4 TABLET ORAL NIGHTLY
Qty: 90 TABLET | Refills: 1 | Status: SHIPPED | OUTPATIENT
Start: 2021-05-28 | End: 2021-11-05 | Stop reason: SDUPTHER

## 2021-05-28 RX ORDER — THEOPHYLLINE 300 MG/1
300 TABLET, EXTENDED RELEASE ORAL NIGHTLY
Qty: 90 TABLET | Refills: 1 | Status: SHIPPED | OUTPATIENT
Start: 2021-05-28 | End: 2021-11-05 | Stop reason: SDUPTHER

## 2021-05-28 RX ORDER — CYCLOBENZAPRINE HCL 10 MG
10 TABLET ORAL 3 TIMES DAILY PRN
Qty: 30 TABLET | Refills: 2 | Status: SHIPPED | OUTPATIENT
Start: 2021-05-28 | End: 2022-06-15

## 2021-05-28 RX ORDER — VALACYCLOVIR HYDROCHLORIDE 500 MG/1
500 TABLET, FILM COATED ORAL DAILY
Qty: 90 TABLET | Refills: 3 | Status: SHIPPED | OUTPATIENT
Start: 2021-05-28 | End: 2021-11-05 | Stop reason: SDUPTHER

## 2021-05-28 RX ORDER — FLUOXETINE HYDROCHLORIDE 40 MG/1
80 CAPSULE ORAL DAILY
Qty: 180 CAPSULE | Refills: 3 | Status: SHIPPED | OUTPATIENT
Start: 2021-05-28 | End: 2021-11-05 | Stop reason: SDUPTHER

## 2021-05-28 RX ORDER — ALBUTEROL SULFATE 90 UG/1
2 AEROSOL, METERED RESPIRATORY (INHALATION) EVERY 4 HOURS PRN
Qty: 36 G | Refills: 3 | Status: SHIPPED | OUTPATIENT
Start: 2021-05-28 | End: 2021-11-05 | Stop reason: SDUPTHER

## 2021-05-28 NOTE — PATIENT INSTRUCTIONS
For your allergies doing nondrowsy in the morning like Zyrtec or Allegra.  Take a sedating, older generation antihistamine at night such as Benadryl or Chlor-Trimeton.

## 2021-05-28 NOTE — PROGRESS NOTES
"Chief Complaint  Med Refill, Hypothyroidism, and Asthma    Subjective          Nereida Woodruff presents to [facility]  Presents for follow-up on her depression, hypothyroidism and asthma.  She is doing well in general except for some weight gain.  Her asthma is been a little flared because of allergies.  She is compliant with her allergy medicine.  She has not had any significant shortness of breath.  As far as her depression she denies any suicidal homicidal ideation.        Objective   Vital Signs:   /84   Pulse 76   Temp 98 °F (36.7 °C)   Ht 165.1 cm (65\")   Wt 75.8 kg (167 lb 3.2 oz)   SpO2 99%   BMI 27.82 kg/m²     Physical Exam  Constitutional:       General: She is not in acute distress.     Appearance: Normal appearance. She is well-developed.   HENT:      Head: Normocephalic and atraumatic.      Right Ear: Tympanic membrane, ear canal and external ear normal.      Left Ear: Tympanic membrane, ear canal and external ear normal.      Mouth/Throat:      Mouth: Mucous membranes are moist.      Pharynx: Oropharynx is clear.   Eyes:      Conjunctiva/sclera: Conjunctivae normal.      Pupils: Pupils are equal, round, and reactive to light.   Neck:      Thyroid: No thyromegaly.   Cardiovascular:      Rate and Rhythm: Normal rate and regular rhythm.      Heart sounds: No murmur heard.     Pulmonary:      Effort: Pulmonary effort is normal.      Breath sounds: Normal breath sounds. No wheezing.   Musculoskeletal:         General: Normal range of motion.      Cervical back: Neck supple.   Lymphadenopathy:      Cervical: No cervical adenopathy.   Skin:     General: Skin is warm and dry.   Neurological:      Mental Status: She is alert and oriented to person, place, and time.   Psychiatric:         Mood and Affect: Mood normal.         Behavior: Behavior normal.        Result Review :                 Assessment and Plan    Diagnoses and all orders for this visit:    1. " Hypothyroidism due to Hashimoto's thyroiditis (Primary)  -     TSH  -     Synthroid 50 MCG tablet; Take 1 tablet by mouth Daily.  Dispense: 90 tablet; Refill: 3    2. Recurrent major depressive disorder, in full remission (CMS/HCC)  -     FLUoxetine (PROzac) 40 MG capsule; Take 2 capsules by mouth Daily.  Dispense: 180 capsule; Refill: 3    3. Severe persistent asthma, unspecified whether complicated    4. Weight gain  -     TSH  -     CBC & Differential  -     Comprehensive Metabolic Panel  -     Cortisol    5. Sciatica of right side  -     cyclobenzaprine (FLEXERIL) 10 MG tablet; Take 1 tablet by mouth 3 (Three) Times a Day As Needed for Muscle Spasms.  Dispense: 30 tablet; Refill: 2    6. Severe persistent asthma with acute exacerbation  -     albuterol (PROVENTIL) 4 MG tablet; Take 1 tablet by mouth Every Night.  Dispense: 90 tablet; Refill: 1  -     theophylline (THEODUR) 300 MG 12 hr tablet; Take 1 tablet by mouth Every Night.  Dispense: 90 tablet; Refill: 1    Other orders  -     albuterol sulfate  (90 Base) MCG/ACT inhaler; Inhale 2 puffs Every 4 (Four) Hours As Needed for Wheezing.  Dispense: 36 g; Refill: 3  -     Fluticasone Furoate-Vilanterol (Breo Ellipta) 100-25 MCG/INH inhaler; Inhale 1 puff Daily.  Dispense: 180 each; Refill: 3  -     montelukast (SINGULAIR) 10 MG tablet; Take 1 tablet by mouth Daily.  Dispense: 90 tablet; Refill: 3  -     valACYclovir (VALTREX) 500 MG tablet; Take 1 tablet by mouth Daily.  Dispense: 90 tablet; Refill: 3        Follow Up   Return in about 6 months (around 11/28/2021) for Annual physical.  Patient was given instructions and counseling regarding her condition or for health maintenance advice. Please see specific information pulled into the AVS if appropriate.

## 2021-05-29 LAB
ALBUMIN SERPL-MCNC: 4.1 G/DL (ref 3.8–4.8)
ALBUMIN/GLOB SERPL: 1.9 {RATIO} (ref 1.2–2.2)
ALP SERPL-CCNC: 70 IU/L (ref 48–121)
ALT SERPL-CCNC: 14 IU/L (ref 0–32)
AST SERPL-CCNC: 20 IU/L (ref 0–40)
BASOPHILS # BLD AUTO: 0.1 X10E3/UL (ref 0–0.2)
BASOPHILS NFR BLD AUTO: 1 %
BILIRUB SERPL-MCNC: <0.2 MG/DL (ref 0–1.2)
BUN SERPL-MCNC: 12 MG/DL (ref 6–24)
BUN/CREAT SERPL: 14 (ref 9–23)
CALCIUM SERPL-MCNC: 9.2 MG/DL (ref 8.7–10.2)
CHLORIDE SERPL-SCNC: 107 MMOL/L (ref 96–106)
CO2 SERPL-SCNC: 26 MMOL/L (ref 20–29)
CORTIS SERPL-MCNC: 11.5 UG/DL
CREAT SERPL-MCNC: 0.86 MG/DL (ref 0.57–1)
EOSINOPHIL # BLD AUTO: 0.7 X10E3/UL (ref 0–0.4)
EOSINOPHIL NFR BLD AUTO: 14 %
ERYTHROCYTE [DISTWIDTH] IN BLOOD BY AUTOMATED COUNT: 12.6 % (ref 11.7–15.4)
GLOBULIN SER CALC-MCNC: 2.2 G/DL (ref 1.5–4.5)
GLUCOSE SERPL-MCNC: 101 MG/DL (ref 65–99)
HCT VFR BLD AUTO: 37.7 % (ref 34–46.6)
HGB BLD-MCNC: 12.3 G/DL (ref 11.1–15.9)
IMM GRANULOCYTES # BLD AUTO: 0 X10E3/UL (ref 0–0.1)
IMM GRANULOCYTES NFR BLD AUTO: 0 %
LYMPHOCYTES # BLD AUTO: 1 X10E3/UL (ref 0.7–3.1)
LYMPHOCYTES NFR BLD AUTO: 21 %
MCH RBC QN AUTO: 30 PG (ref 26.6–33)
MCHC RBC AUTO-ENTMCNC: 32.6 G/DL (ref 31.5–35.7)
MCV RBC AUTO: 92 FL (ref 79–97)
MONOCYTES # BLD AUTO: 0.4 X10E3/UL (ref 0.1–0.9)
MONOCYTES NFR BLD AUTO: 8 %
NEUTROPHILS # BLD AUTO: 2.8 X10E3/UL (ref 1.4–7)
NEUTROPHILS NFR BLD AUTO: 56 %
PLATELET # BLD AUTO: 315 X10E3/UL (ref 150–450)
POTASSIUM SERPL-SCNC: 3.9 MMOL/L (ref 3.5–5.2)
PROT SERPL-MCNC: 6.3 G/DL (ref 6–8.5)
RBC # BLD AUTO: 4.1 X10E6/UL (ref 3.77–5.28)
SODIUM SERPL-SCNC: 144 MMOL/L (ref 134–144)
TSH SERPL DL<=0.005 MIU/L-ACNC: 1.49 UIU/ML (ref 0.45–4.5)
WBC # BLD AUTO: 5 X10E3/UL (ref 3.4–10.8)

## 2021-06-01 ENCOUNTER — E-VISIT (OUTPATIENT)
Dept: FAMILY MEDICINE CLINIC | Facility: TELEHEALTH | Age: 46
End: 2021-06-01

## 2021-06-01 ENCOUNTER — TELEPHONE (OUTPATIENT)
Dept: FAMILY MEDICINE CLINIC | Facility: CLINIC | Age: 46
End: 2021-06-01

## 2021-06-01 DIAGNOSIS — H10.9 CONJUNCTIVITIS OF BOTH EYES, UNSPECIFIED CONJUNCTIVITIS TYPE: Primary | ICD-10-CM

## 2021-06-01 PROCEDURE — 99422 OL DIG E/M SVC 11-20 MIN: CPT | Performed by: NURSE PRACTITIONER

## 2021-06-01 RX ORDER — TOBRAMYCIN 3 MG/ML
1 SOLUTION/ DROPS OPHTHALMIC EVERY 6 HOURS SCHEDULED
Qty: 5 ML | Refills: 0 | Status: SHIPPED | OUTPATIENT
Start: 2021-06-01 | End: 2021-09-10

## 2021-06-01 NOTE — TELEPHONE ENCOUNTER
PATIENT CALLED C/O WAKING UP WITH PINK EYE IN BOTH EYES, REQUESTED MEDICATION TO BE CALLED INTO HER PHARMACY     [facility] AT  [address] - [phone]  - [phone] FX

## 2021-06-01 NOTE — TELEPHONE ENCOUNTER
It is unlikely to be bacterial if she woke up with both her eyes.  Does she think it could be allergic?  It would be good if we can get a hold of her to discuss this further.  Most infectious pinkeye is viral.

## 2021-06-01 NOTE — PROGRESS NOTES
Nereida Woodruff    1975  [id number]    I have reviewed the e-Visit questionnaire and patient's answers, my assessment and plan are as follows:      [facility] [place]  Nereida Woodruff is a 45 y.o. with a 1 day history of eye redness, redness of skin around eyes, itching eyes, drainage and crusting, eyelid swelling.  She denies fever, change in vision, injury to eyes, foreign body in eyes.    Review of Systems - Negative except symptoms listed in [facility] [place]      Diagnoses and all orders for this visit:    1. Conjunctivitis of both eyes, unspecified conjunctivitis type (Primary)  -     tobramycin (Tobrex) 0.3 % solution ophthalmic solution; Administer 1 drop to both eyes Every 6 (Six) Hours.  Dispense: 5 mL; Refill: 0    --apply drops to both eyes for 7 days as prescribed  --if no improvement in 24-48 hours, will need to follow-up with eye doctor for further evaluation    Any medications prescribed have been sent electronically to   [address] - [address] [place] [address] AT  [address] - [phone]  - [phone] [address] [address]  Phone: [phone] Fax: [phone]      Time Documentation  Counseled patient  Counseling topics: diagnosis, treatment options and return instructions  Total encounter time: counseling time more than 50% of visit: 20 minutes        RISHI Dean  06/01/21  11:43 EDT

## 2021-06-01 NOTE — TELEPHONE ENCOUNTER
You just saw her Friday, I know she didn't have this at the time. Called pt to see if she could make an appt to be seen for this na and no vm

## 2021-06-29 ENCOUNTER — OFFICE VISIT (OUTPATIENT)
Dept: FAMILY MEDICINE CLINIC | Facility: CLINIC | Age: 46
End: 2021-06-29

## 2021-06-29 VITALS
OXYGEN SATURATION: 98 % | BODY MASS INDEX: 27.22 KG/M2 | SYSTOLIC BLOOD PRESSURE: 122 MMHG | TEMPERATURE: 98.6 F | WEIGHT: 163.4 LBS | HEART RATE: 72 BPM | DIASTOLIC BLOOD PRESSURE: 68 MMHG | HEIGHT: 65 IN

## 2021-06-29 DIAGNOSIS — J06.9 ACUTE URI: Primary | ICD-10-CM

## 2021-06-29 DIAGNOSIS — J02.9 SORE THROAT: ICD-10-CM

## 2021-06-29 PROCEDURE — 87880 STREP A ASSAY W/OPTIC: CPT | Performed by: FAMILY MEDICINE

## 2021-06-29 PROCEDURE — 99213 OFFICE O/P EST LOW 20 MIN: CPT | Performed by: FAMILY MEDICINE

## 2021-06-29 PROCEDURE — 87804 INFLUENZA ASSAY W/OPTIC: CPT | Performed by: FAMILY MEDICINE

## 2021-06-29 RX ORDER — DEXTROMETHORPHAN HYDROBROMIDE AND PROMETHAZINE HYDROCHLORIDE 15; 6.25 MG/5ML; MG/5ML
5 SYRUP ORAL 4 TIMES DAILY PRN
Qty: 118 ML | Refills: 0 | Status: SHIPPED | OUTPATIENT
Start: 2021-06-29 | End: 2021-11-05

## 2021-06-29 RX ORDER — BENZONATATE 200 MG/1
200 CAPSULE ORAL 3 TIMES DAILY PRN
Qty: 30 CAPSULE | Refills: 0 | Status: SHIPPED | OUTPATIENT
Start: 2021-06-29 | End: 2021-11-18 | Stop reason: SDUPTHER

## 2021-06-29 NOTE — PROGRESS NOTES
"Chief Complaint  Sore Throat, URI, Generalized Body Aches, and Headache    Subjective          Nereida Woodruff presents to [facility]  Patient is here today with a new problem.  She generally sees Dr. Kehrer.  Her symptoms started 2 days ago.  She started with sore throat and chest congestion.  She has had a cough and headache as well.  She denies any fever or chills.  She works as a early .  Patient has had the COVID-19 vaccine.  Patient denies any known ill contacts.  Patient did have COVID-19 in January of this year.  Patient has not lost her sense of taste or smell      Objective   Vital Signs:   /68   Pulse 72   Temp 98.6 °F (37 °C)   Ht 165.1 cm (65\")   Wt 74.1 kg (163 lb 6.4 oz)   SpO2 98%   BMI 27.19 kg/m²     Physical Exam  Vitals and nursing note reviewed.   Constitutional:       Appearance: Normal appearance. She is well-developed.   HENT:      Head: Normocephalic and atraumatic.      Right Ear: External ear normal.      Left Ear: External ear normal.      Nose: Nose normal.   Eyes:      General: No scleral icterus.     Conjunctiva/sclera: Conjunctivae normal.   Cardiovascular:      Rate and Rhythm: Normal rate and regular rhythm.      Heart sounds: Normal heart sounds.   Pulmonary:      Effort: Pulmonary effort is normal.      Breath sounds: Normal breath sounds.   Musculoskeletal:      Cervical back: Normal range of motion and neck supple.   Lymphadenopathy:      Cervical: No cervical adenopathy.   Skin:     General: Skin is warm and dry.      Findings: No rash.   Neurological:      Mental Status: She is alert and oriented to person, place, and time.   Psychiatric:         Mood and Affect: Mood normal.         Behavior: Behavior normal.         Thought Content: Thought content normal.         Judgment: Judgment normal.        Result Review :                 Assessment and Plan    Diagnoses and all orders for this visit:    1. Acute URI " (Primary)  -     COVID-19,[facility] ROUTINE, NP/OP SWAB IN TRANSPORT MEDIA OR ESWAB 72 HR TAT - Swab, Oropharynx  -     benzonatate (TESSALON) 200 MG capsule; Take 1 capsule by mouth 3 (Three) Times a Day As Needed for Cough.  Dispense: 30 capsule; Refill: 0  -     promethazine-dextromethorphan (PROMETHAZINE-DM) 6.25-15 MG/5ML syrup; Take 5 mL by mouth 4 (Four) Times a Day As Needed for Cough.  Dispense: 118 mL; Refill: 0    2. Sore throat  -     POC Influenza A / B  -     POC Rapid Strep A    Patient is here today for a new problem of acute URI.  I have advised her to follow the guidelines of social distancing, wearing a mask, and good hand hygiene order not to spread any infection to others.  We will go ahead and test for COVID-19 considering her work atmosphere.  Patient was advised that if her symptoms are worsening after a week she should follow-up.      Follow Up   Return if symptoms worsen or fail to improve.  Patient was given instructions and counseling regarding her condition or for health maintenance advice. Please see specific information pulled into the AVS if appropriate.

## 2021-08-13 ENCOUNTER — TELEPHONE (OUTPATIENT)
Dept: FAMILY MEDICINE CLINIC | Facility: CLINIC | Age: 46
End: 2021-08-13

## 2021-08-13 NOTE — TELEPHONE ENCOUNTER
Caller: Steff Bhatt (Mother)    Pharmacy: [address] AT  [address] - [phone] [facility] [phone] FX  [phone]      Phone Number: [phone] (M)    PATIENT IS ASKING IF THEY CAN COME IN TODAY AND GRAB THE TUBING AND A BASKET TO A NEBULIZER. PATIENT'S CURRENT ONE HAS GONE BAD AND THE MEDICATION ISN'T TRANSMITTING PROPERLY.    PLEASE CALL AND ADVISE.

## 2021-08-13 NOTE — TELEPHONE ENCOUNTER
----- Message from Patria Sanders MA sent at 8/13/2021 12:00 PM EDT -----  Regarding: FW: Visit Follow-Up Question  Contact: [phone]      ----- Message -----  From: Nereida Myles  Sent: 8/13/2021  10:41 AM EDT  To: Raquel [facility]  Subject: Visit Follow-Up Question                         Is there any way I could come by the office today and pickup new tubing for my nebulizer? I am sick and I cannot get to [facility]. Thank you.

## 2021-08-17 ENCOUNTER — OFFICE VISIT (OUTPATIENT)
Dept: FAMILY MEDICINE CLINIC | Facility: CLINIC | Age: 46
End: 2021-08-17

## 2021-08-17 ENCOUNTER — TELEPHONE (OUTPATIENT)
Dept: FAMILY MEDICINE CLINIC | Facility: CLINIC | Age: 46
End: 2021-08-17

## 2021-08-17 VITALS
DIASTOLIC BLOOD PRESSURE: 72 MMHG | BODY MASS INDEX: 27.86 KG/M2 | HEIGHT: 65 IN | HEART RATE: 75 BPM | SYSTOLIC BLOOD PRESSURE: 126 MMHG | TEMPERATURE: 98.1 F | WEIGHT: 167.2 LBS | OXYGEN SATURATION: 96 %

## 2021-08-17 DIAGNOSIS — J06.9 ACUTE URI: Primary | ICD-10-CM

## 2021-08-17 DIAGNOSIS — J02.9 SORE THROAT: ICD-10-CM

## 2021-08-17 DIAGNOSIS — J45.901 EXACERBATION OF ASTHMA, UNSPECIFIED ASTHMA SEVERITY, UNSPECIFIED WHETHER PERSISTENT: ICD-10-CM

## 2021-08-17 LAB
EXPIRATION DATE: NORMAL
INTERNAL CONTROL: NORMAL
Lab: NORMAL
S PYO AG THROAT QL: NEGATIVE

## 2021-08-17 PROCEDURE — 87880 STREP A ASSAY W/OPTIC: CPT | Performed by: FAMILY MEDICINE

## 2021-08-17 PROCEDURE — 99214 OFFICE O/P EST MOD 30 MIN: CPT | Performed by: FAMILY MEDICINE

## 2021-08-17 RX ORDER — PREDNISONE 20 MG/1
20 TABLET ORAL 2 TIMES DAILY
Qty: 10 TABLET | Refills: 0 | Status: SHIPPED | OUTPATIENT
Start: 2021-08-17 | End: 2021-09-10

## 2021-08-17 NOTE — TELEPHONE ENCOUNTER
We had discussed her staying off until I got her Covid test results.  I am okay giving her the rest of this week off because of her severe asthma.

## 2021-08-17 NOTE — PROGRESS NOTES
"Chief Complaint  Sinusitis and Asthma    Subjective          Nereida Myles presents to [facility]  Has been sick for the past 6 days with SOA.  Congestion, sinus pressure.  Using her nebulizer.  Got worse Sunday.  No fever but chills.  ST, Cough, HA, fatigue.  No ill contacts.       Objective   Vital Signs:   /72   Pulse 75   Temp 98.1 °F (36.7 °C)   Ht 165.1 cm (65\")   Wt 75.8 kg (167 lb 3.2 oz)   SpO2 96%   BMI 27.82 kg/m²     Physical Exam  Constitutional:       General: She is not in acute distress.     Appearance: Normal appearance. She is well-developed.   HENT:      Head: Normocephalic and atraumatic.      Right Ear: Tympanic membrane, ear canal and external ear normal.      Left Ear: Tympanic membrane, ear canal and external ear normal.      Nose: Congestion present.      Mouth/Throat:      Mouth: Mucous membranes are moist.      Pharynx: Oropharynx is clear. Posterior oropharyngeal erythema present.   Eyes:      Conjunctiva/sclera: Conjunctivae normal.      Pupils: Pupils are equal, round, and reactive to light.   Neck:      Thyroid: No thyromegaly.   Cardiovascular:      Rate and Rhythm: Normal rate and regular rhythm.      Heart sounds: No murmur heard.     Pulmonary:      Effort: Pulmonary effort is normal.      Breath sounds: Wheezing and rales present.   Abdominal:      General: Bowel sounds are normal.      Palpations: Abdomen is soft.      Tenderness: There is no abdominal tenderness.   Musculoskeletal:         General: Normal range of motion.      Cervical back: Neck supple.   Lymphadenopathy:      Cervical: No cervical adenopathy.   Skin:     General: Skin is warm and dry.   Neurological:      Mental Status: She is alert and oriented to person, place, and time.   Psychiatric:         Mood and Affect: Mood normal.         Behavior: Behavior normal.        Result Review :                 Assessment and Plan    Diagnoses and all orders for this " visit:    1. Acute URI (Primary)  -     XR Chest 2 View; Future  -     COVID-19,[facility] ROUTINE, NP/OP SWAB IN TRANSPORT MEDIA OR ESWAB 72 HR TAT - Swab, Oropharynx; Future  -     COVID-19,[facility] ROUTINE, NP/OP SWAB IN TRANSPORT MEDIA OR ESWAB 72 HR TAT - Swab, Oropharynx    2. Exacerbation of asthma, unspecified asthma severity, unspecified whether persistent  -     XR Chest 2 View; Future  -     predniSONE (DELTASONE) 20 MG tablet; Take 1 tablet by mouth 2 (Two) Times a Day.  Dispense: 10 tablet; Refill: 0    3. Sore throat  -     COVID-19,[facility] ROUTINE, NP/OP SWAB IN TRANSPORT MEDIA OR ESWAB 72 HR TAT - Swab, Oropharynx; Future  -     POC Rapid Strep A  -     COVID-19,[facility] ROUTINE, NP/OP SWAB IN TRANSPORT MEDIA OR ESWAB 72 HR TAT - Swab, Oropharynx        Follow Up   No follow-ups on file.  Patient was given instructions and counseling regarding her condition or for health maintenance advice. Please see specific information pulled into the AVS if appropriate.

## 2021-08-18 ENCOUNTER — TELEPHONE (OUTPATIENT)
Dept: FAMILY MEDICINE CLINIC | Facility: CLINIC | Age: 46
End: 2021-08-18

## 2021-08-18 DIAGNOSIS — J06.9 ACUTE URI: ICD-10-CM

## 2021-08-18 DIAGNOSIS — J45.901 EXACERBATION OF ASTHMA, UNSPECIFIED ASTHMA SEVERITY, UNSPECIFIED WHETHER PERSISTENT: ICD-10-CM

## 2021-08-18 LAB
LABCORP SARS-COV-2, NAA 2 DAY TAT: NORMAL
SARS-COV-2 RNA RESP QL NAA+PROBE: NOT DETECTED

## 2021-09-10 ENCOUNTER — OFFICE VISIT (OUTPATIENT)
Dept: FAMILY MEDICINE CLINIC | Facility: CLINIC | Age: 46
End: 2021-09-10

## 2021-09-10 VITALS
BODY MASS INDEX: 27.92 KG/M2 | HEIGHT: 65 IN | SYSTOLIC BLOOD PRESSURE: 126 MMHG | DIASTOLIC BLOOD PRESSURE: 78 MMHG | HEART RATE: 90 BPM | OXYGEN SATURATION: 99 % | TEMPERATURE: 99.6 F | WEIGHT: 167.6 LBS

## 2021-09-10 DIAGNOSIS — S39.012D STRAIN OF LUMBAR REGION, SUBSEQUENT ENCOUNTER: Primary | ICD-10-CM

## 2021-09-10 PROCEDURE — 96372 THER/PROPH/DIAG INJ SC/IM: CPT | Performed by: NURSE PRACTITIONER

## 2021-09-10 PROCEDURE — 99213 OFFICE O/P EST LOW 20 MIN: CPT | Performed by: NURSE PRACTITIONER

## 2021-09-10 RX ORDER — KETOROLAC TROMETHAMINE 30 MG/ML
60 INJECTION, SOLUTION INTRAMUSCULAR; INTRAVENOUS ONCE
Status: COMPLETED | OUTPATIENT
Start: 2021-09-10 | End: 2021-09-10

## 2021-09-10 RX ADMIN — KETOROLAC TROMETHAMINE 60 MG: 30 INJECTION, SOLUTION INTRAMUSCULAR; INTRAVENOUS at 14:44

## 2021-09-10 NOTE — PROGRESS NOTES
"Chief Complaint  Back Pain (threw back out standing up from chair)    Subjective          Nereida Myles presents to [facility]  History of Present Illness    Patient is a patient of Dr. Kehrer's. She states that she threw her back out this morning at work standing up from chair. States it has been bothering her last couple days, but went out this morning.      Takes meloxicam just when back is strained as well as muscle relaxer.  Doesn't take regularly.       Took meloxicam when it happened. Didn't touch it. Will take meloxicam when gets home    Objective   Vital Signs:   /78   Pulse 90   Temp 99.6 °F (37.6 °C)   Ht 165.1 cm (65\")   Wt 76 kg (167 lb 9.6 oz)   SpO2 99%   BMI 27.89 kg/m²     Physical Exam  Constitutional:       Appearance: Normal appearance.   Musculoskeletal:      Lumbar back: Tenderness present. Decreased range of motion.   Neurological:      Mental Status: She is alert and oriented to person, place, and time.   Psychiatric:         Mood and Affect: Mood normal.         Behavior: Behavior normal.         Thought Content: Thought content normal.         Judgment: Judgment normal.        Result Review :                 Assessment and Plan    Diagnoses and all orders for this visit:    1. Strain of lumbar region, subsequent encounter (Primary)  -     ketorolac (TORADOL) injection 60 mg      Restart meloxicam and Flexeril.  Patient has some left at home.  Toradol injection given today.  I discussed stretches and use of ice 20-minute increments 4 times daily.  She verbalizes understanding.  Follow-up as needed      Follow Up   No follow-ups on file.  Patient was given instructions and counseling regarding her condition or for health maintenance advice. Please see specific information pulled into the AVS if appropriate.       "

## 2021-10-21 DIAGNOSIS — E03.8 HYPOTHYROIDISM DUE TO HASHIMOTO'S THYROIDITIS: ICD-10-CM

## 2021-10-21 DIAGNOSIS — E06.3 HYPOTHYROIDISM DUE TO HASHIMOTO'S THYROIDITIS: ICD-10-CM

## 2021-10-21 RX ORDER — LEVOTHYROXINE SODIUM 50 MCG
50 TABLET ORAL DAILY
Qty: 90 TABLET | Refills: 0 | Status: SHIPPED | OUTPATIENT
Start: 2021-10-21 | End: 2021-12-23

## 2021-10-25 ENCOUNTER — OFFICE VISIT (OUTPATIENT)
Dept: FAMILY MEDICINE CLINIC | Facility: CLINIC | Age: 46
End: 2021-10-25

## 2021-10-25 VITALS
TEMPERATURE: 98 F | HEART RATE: 65 BPM | WEIGHT: 167.4 LBS | BODY MASS INDEX: 27.89 KG/M2 | HEIGHT: 65 IN | OXYGEN SATURATION: 98 % | SYSTOLIC BLOOD PRESSURE: 122 MMHG | DIASTOLIC BLOOD PRESSURE: 60 MMHG

## 2021-10-25 DIAGNOSIS — A05.9 FOOD POISONING: Primary | ICD-10-CM

## 2021-10-25 PROCEDURE — 96372 THER/PROPH/DIAG INJ SC/IM: CPT | Performed by: FAMILY MEDICINE

## 2021-10-25 PROCEDURE — 99213 OFFICE O/P EST LOW 20 MIN: CPT | Performed by: FAMILY MEDICINE

## 2021-10-25 RX ORDER — ONDANSETRON 2 MG/ML
4 INJECTION INTRAMUSCULAR; INTRAVENOUS EVERY 6 HOURS PRN
Status: DISCONTINUED | OUTPATIENT
Start: 2021-10-25 | End: 2022-01-13

## 2021-10-25 RX ADMIN — ONDANSETRON 4 MG: 2 INJECTION INTRAMUSCULAR; INTRAVENOUS at 16:41

## 2021-10-25 NOTE — PATIENT INSTRUCTIONS
Food Poisoning  Food poisoning is an illness that is caused by eating or drinking contaminated foods or drinks. In most cases, food poisoning is mild and lasts 1-2 days. However, some cases can be serious, especially for people who have weak body defense systems (immune systems), older people, children and infants, and pregnant women.  What are the causes?  This condition is caused by contaminated food. Foods can become contaminated with viruses, bacteria, parasites, or mold due to:  · Poor personal hygiene, such as poor hand-washing practices.  · Storing food improperly, such as not refrigerating raw meat.  · Using unclean surfaces for preparing, serving, and storing food.  · Cooking or eating with unclean utensils.  If contaminated food is eaten, viruses, bacteria, or parasites can harm the intestine. This often causes severe diarrhea. The most common causes of food poisoning include:  · Viruses, such as:  ? Norovirus.  ? Rotavirus.  · Bacteria, such as:  ? Salmonella.  ? Listeria.  ? E. coli (Escherichia coli).  · Parasites, such as:  ? Giardia.  ? Toxoplasma gondii.  What are the signs or symptoms?  Symptoms may take several hours to appear after you consume contaminated food or drink. Symptoms include:  · Nausea.  · Vomiting.  · Cramping.  · Diarrhea.  · Fever and chills.  · Muscle aches.  · Dehydration. Dehydration can cause you to be tired and thirsty, have a dry mouth, and urinate less frequently.  How is this diagnosed?  Your health care provider can diagnose food poisoning with your medical history and a physical exam. This will include asking you what you have recently eaten. You may also have tests, including:  · Blood tests.  · Stool tests.  How is this treated?  Treatment focuses on relieving your symptoms and making sure that you are hydrated. You may also be given medicines. In severe cases, hospitalization may be required and you may need to receive fluids through an IV.  Follow these instructions  at home:  Eating and drinking    · Drink enough fluids to keep your urine pale yellow. You may need to drink small amounts of clear liquids frequently.  · Avoid milk, caffeine, and alcohol.  · Ask your health care provider for specific rehydration instructions.  · Eat small, frequent meals rather than large meals.    Medicines  · Take over-the-counter and prescription medicines only as told by your health care provider. Ask your health care provider if you should continue to take any of your regular prescribed and over-the-counter medicines.  · If you were prescribed an antibiotic medicine, take it as told by your health care provider. Do not stop taking the antibiotic even if you start to feel better.  General instructions    · Wash your hands thoroughly before you prepare food and after you go to the bathroom (use the toilet). Make sure that the people who live with you also wash their hands often.  · Rest at home until you feel better.  · Clean surfaces that you touch with a product that contains chlorine bleach.  · Keep all follow-up visits as told by your health care provider. This is important.    How is this prevented?  · Wash your hands, food preparation surfaces, and utensils thoroughly before and after you handle raw foods.  · Use separate food preparation surfaces and storage spaces for raw meat and for fruits and vegetables.  · Keep refrigerated foods colder than 40°F (5°C).  · Serve hot foods immediately or keep them heated above 140°F (60°C).  · Store dry foods in cool, dry spaces away from excess heat or moisture. Throw out any foods that do not smell right or are in cans that are bulging.  · Follow approved elyse procedures.  · Heat canned foods thoroughly before you taste them.  · Drink bottled or sterile water when you travel.  Get help right away if:  · You have difficulty breathing, swallowing, talking, or moving.  · You develop blurred vision.  · You cannot eat or drink without  vomiting.  · You faint.  · Your eyes turn yellow.  · Your vomiting or diarrhea is persistent.  · Abdominal pain develops, increases, or localizes in one small area.  · You have a fever.  · You have blood or mucus in your stools, or your stools look dark black and tarry.  · You have signs of dehydration, such as:  ? Dark urine, very little urine, or no urine.  ? Cracked lips.  ? Not making tears while crying.  ? Dry mouth.  ? Sunken eyes.  ? Sleepiness.  ? Weakness.  ? Dizziness.  These symptoms may represent a serious problem that is an emergency. Do not wait to see if the symptoms will go away. Get medical help right away. Call your local emergency services (911 in the [place] Do not drive yourself to the hospital.  Summary  · Food poisoning is an illness that is caused by eating or drinking contaminated foods or drinks.  · Symptoms may include nausea, vomiting, diarrhea, muscle aches, cramping, fever, chills, and dehydration.  · In most cases, food poisoning is mild and lasts 1-2 days.  · In severe cases, hospitalization may be required.  This information is not intended to replace advice given to you by your health care provider. Make sure you discuss any questions you have with your health care provider.  Document Revised: 10/02/2019 Document Reviewed: 10/02/2019  [email or web address] Patient Education © 2021 [employer]

## 2021-10-25 NOTE — PROGRESS NOTES
"Chief Complaint  Vomiting (food poisoning )    Subjective          Nereida Myles presents to [facility]  Started vomiting yesterday afternoon about 1 PM.  Diarrhea started later that evening.  Still having emesis.  Diarrhea has lessened.  Was up all night on the commode.  No blood or bile in emesis.  No coffee grounds.  No abdominal pain.  No black or bloody stool.  Last emesis was a couple hours ago.  Has not been able to keep anything down yet.  Has not been able to take her medication yet.   Ate some potato soup yesterday am at a restaurant..  No one else ate the potato soup and no one else has gotten sick.      Objective   Vital Signs:   /60   Pulse 65   Temp 98 °F (36.7 °C)   Ht 165.1 cm (65\")   Wt 75.9 kg (167 lb 6.4 oz)   SpO2 98%   BMI 27.86 kg/m²     Physical Exam  Constitutional:       General: She is not in acute distress.     Appearance: Normal appearance. She is well-developed.   HENT:      Head: Normocephalic and atraumatic.      Right Ear: Tympanic membrane, ear canal and external ear normal.      Left Ear: Tympanic membrane, ear canal and external ear normal.      Mouth/Throat:      Mouth: Mucous membranes are moist.      Pharynx: Oropharynx is clear.   Eyes:      Conjunctiva/sclera: Conjunctivae normal.      Pupils: Pupils are equal, round, and reactive to light.   Neck:      Thyroid: No thyromegaly.   Cardiovascular:      Rate and Rhythm: Normal rate and regular rhythm.      Heart sounds: No murmur heard.      Pulmonary:      Effort: Pulmonary effort is normal.      Breath sounds: Normal breath sounds. No wheezing.   Abdominal:      General: Bowel sounds are increased. There is no distension.      Palpations: Abdomen is soft.      Tenderness: There is no abdominal tenderness. There is no guarding or rebound.   Musculoskeletal:         General: Normal range of motion.      Cervical back: Neck supple.   Lymphadenopathy:      Cervical: No cervical " adenopathy.   Skin:     General: Skin is warm and dry.      Capillary Refill: Capillary refill takes less than 2 seconds.   Neurological:      Mental Status: She is alert and oriented to person, place, and time.   Psychiatric:         Mood and Affect: Mood normal.         Behavior: Behavior normal.        Result Review :                 Assessment and Plan    Diagnoses and all orders for this visit:    1. Food poisoning (Primary)  -     ondansetron (ZOFRAN) injection 4 mg    Vomiting and diarrhea-likely food poisoning with history, do a shot of Zofran here and she will go home and try to get fluids in her.  Patient voiced understanding of how to advance diet when she starts consuming liquids.  She was given warnings on when to go to the emergency room.    Follow Up   No follow-ups on file.  Patient was given instructions and counseling regarding her condition or for health maintenance advice. Please see specific information pulled into the AVS if appropriate.

## 2021-10-26 ENCOUNTER — TELEPHONE (OUTPATIENT)
Dept: FAMILY MEDICINE CLINIC | Facility: CLINIC | Age: 46
End: 2021-10-26

## 2021-10-26 NOTE — TELEPHONE ENCOUNTER
Spoke with pt and she wants to stay on the LIDIA prescription. I called [facility] and let them know

## 2021-10-27 ENCOUNTER — TELEPHONE (OUTPATIENT)
Dept: FAMILY MEDICINE CLINIC | Facility: CLINIC | Age: 46
End: 2021-10-27

## 2021-10-27 NOTE — TELEPHONE ENCOUNTER
Caller: Nereida Myles    Relationship: Self    Best call back number: [phone]    What is the best time to reach you: ANYTIME    Who are you requesting to speak with (clinical staff, provider,  specific staff member): CLINICAL STAFF    What was the call regarding: PATIENT VISITED ER YESTERDAY WITH A LACERATION ON HER RIGHT HAND AND GOT 5 STICHES. PATIENT NEEDS A LETTER FOR HER WORK STATING SHE CAN WORK WEARING A GLOVE OR ANYTHING THAT CAN GET HER BACK INTO WORK.      Do you require a callback: YES

## 2021-10-27 NOTE — TELEPHONE ENCOUNTER
Regarding: FW: Laceration on R hand       ----- Message -----  From: Kelsey Chung MA  Sent: 10/27/2021   7:49 AM EDT  To: Meredith Lea Kehrer, MD  Subject: Laceration on R hand                             ----- Message from Kelsey Chung MA sent at 10/27/2021  7:49 AM EDT -----       ----- Message from Kelty, Elizabeth J to Kehrer, Meredith Lea, MD sent at 10/26/2021  8:22 PM -----   I Visited the emergency room today with a laceration on my right hand. I receive five stitches. My work is requiring a letter stating my follow up care and if I can wash my hands. The emergency room physician suggested covering my hand with a glove to do my job. He did not put this on my discharge papers. Would you be able to write a letter stating that I can wear a glove and wash my hands with the glove on to protect the sutures? The recommendation is to follow up with you in 14 days to remove my sutures.I look forward to your response. Thank you, Nereida Myles

## 2021-11-01 ENCOUNTER — TELEPHONE (OUTPATIENT)
Dept: FAMILY MEDICINE CLINIC | Facility: CLINIC | Age: 46
End: 2021-11-01

## 2021-11-01 NOTE — TELEPHONE ENCOUNTER
Caller: Nereida Myles    Relationship to patient: Self    Best call back number: [phone]    Chief complaint: SUTURE REMOVAL     Type of visit: IN OFFICE PROCEDURE     Requested date: 11/05    If rescheduling, when is the original appointment: 11/08    Additional notes: HUB UNABLE TO SCHEDULE PER WORKFLOW

## 2021-11-05 ENCOUNTER — TELEPHONE (OUTPATIENT)
Dept: FAMILY MEDICINE CLINIC | Facility: CLINIC | Age: 46
End: 2021-11-05

## 2021-11-05 ENCOUNTER — OFFICE VISIT (OUTPATIENT)
Dept: FAMILY MEDICINE CLINIC | Facility: CLINIC | Age: 46
End: 2021-11-05

## 2021-11-05 VITALS
OXYGEN SATURATION: 97 % | DIASTOLIC BLOOD PRESSURE: 64 MMHG | BODY MASS INDEX: 27.59 KG/M2 | HEIGHT: 65 IN | HEART RATE: 69 BPM | SYSTOLIC BLOOD PRESSURE: 122 MMHG | WEIGHT: 165.6 LBS | TEMPERATURE: 97.5 F

## 2021-11-05 DIAGNOSIS — F33.42 RECURRENT MAJOR DEPRESSIVE DISORDER, IN FULL REMISSION (HCC): ICD-10-CM

## 2021-11-05 DIAGNOSIS — Z48.02 VISIT FOR SUTURE REMOVAL: Primary | ICD-10-CM

## 2021-11-05 DIAGNOSIS — J45.51 SEVERE PERSISTENT ASTHMA WITH ACUTE EXACERBATION: ICD-10-CM

## 2021-11-05 PROCEDURE — 99212 OFFICE O/P EST SF 10 MIN: CPT | Performed by: NURSE PRACTITIONER

## 2021-11-05 RX ORDER — ALBUTEROL SULFATE 90 UG/1
2 AEROSOL, METERED RESPIRATORY (INHALATION) EVERY 4 HOURS PRN
Qty: 36 G | Refills: 0 | Status: SHIPPED | OUTPATIENT
Start: 2021-11-05 | End: 2022-03-21

## 2021-11-05 RX ORDER — ALBUTEROL SULFATE 4 MG/1
4 TABLET ORAL NIGHTLY
Qty: 90 TABLET | Refills: 0 | Status: SHIPPED | OUTPATIENT
Start: 2021-11-05 | End: 2022-02-07

## 2021-11-05 RX ORDER — VALACYCLOVIR HYDROCHLORIDE 500 MG/1
500 TABLET, FILM COATED ORAL DAILY
Qty: 90 TABLET | Refills: 0 | Status: SHIPPED | OUTPATIENT
Start: 2021-11-05 | End: 2022-02-07

## 2021-11-05 RX ORDER — FLUOXETINE HYDROCHLORIDE 40 MG/1
40 CAPSULE ORAL DAILY
Qty: 90 CAPSULE | Refills: 0 | Status: SHIPPED | OUTPATIENT
Start: 2021-11-05 | End: 2022-01-11

## 2021-11-05 RX ORDER — LETROZOLE 2.5 MG/1
2.5 TABLET, FILM COATED ORAL NIGHTLY
COMMUNITY
Start: 2021-10-26 | End: 2022-11-04

## 2021-11-05 RX ORDER — MONTELUKAST SODIUM 10 MG/1
10 TABLET ORAL DAILY
Qty: 90 TABLET | Refills: 0 | Status: SHIPPED | OUTPATIENT
Start: 2021-11-05 | End: 2021-12-15 | Stop reason: SDUPTHER

## 2021-11-05 RX ORDER — THEOPHYLLINE 300 MG/1
300 TABLET, EXTENDED RELEASE ORAL NIGHTLY
Qty: 90 TABLET | Refills: 0 | Status: SHIPPED | OUTPATIENT
Start: 2021-11-05 | End: 2022-02-07

## 2021-11-05 RX ORDER — DUPILUMAB 300 MG/2ML
300 INJECTION, SOLUTION SUBCUTANEOUS
COMMUNITY
Start: 2021-11-02

## 2021-11-05 NOTE — TELEPHONE ENCOUNTER
PATIENT STATES [facility] HAS SENT A PRIOR AUTHORIZATION FOR 5 MEDICATIONS ON 10/20.    PATIENT IS WONDERING IF THE OFFICE HAS RECEIVED THIS REQUEST     CALLBACK [phone]

## 2021-11-05 NOTE — PROGRESS NOTES
"Chief Complaint  Hospital Follow Up Visit (er [facility] ) and Suture / Staple Removal    Subjective          Nereida Myles presents to [facility]  History of Present Illness     Patient is here today for suture removal .She had them placed at  on 10/26/2021.  She reports she is having itching but no pain or inflammation at this point.  She cut her hand on dishes at that time.    Objective   Vital Signs:   /64   Pulse 69   Temp 97.5 °F (36.4 °C)   Ht 165.1 cm (65\")   Wt 75.1 kg (165 lb 9.6 oz)   SpO2 97%   BMI 27.56 kg/m²       Physical Exam  Constitutional:       Appearance: Normal appearance.   Skin:     General: Skin is warm and dry.      Comments: Sutures in place base right pointer finger x5.   Neurological:      Mental Status: She is alert and oriented to person, place, and time.   Psychiatric:         Mood and Affect: Mood normal.         Behavior: Behavior normal.         Thought Content: Thought content normal.         Judgment: Judgment normal.        Result Review :                 Assessment and Plan    Diagnoses and all orders for this visit:    1. Visit for suture removal (Primary)      Suture Removal    Date/Time: 11/5/2021 10:24 AM  Performed by: Km Wagner APRN  Authorized by: Km Wagner APRN   Body area: upper extremity  Location details: right hand  Wound Appearance: clean  Sutures Removed: 5  Patient tolerance: patient tolerated the procedure well with no immediate complications  Comments: Incision left open to air.  Completely clean and intact.  Discussed with patient she may want to cover if she is doing any activity that could catch scabbing.  Otherwise may leave open to air and just clean and regular interval.  If any signs of infection notify provider for further evaluation.  She verbalizes understanding.            Follow Up   No follow-ups on file.  Patient was given instructions and counseling regarding her condition or for health " maintenance advice. Please see specific information pulled into the AVS if appropriate.

## 2021-11-18 ENCOUNTER — TELEMEDICINE (OUTPATIENT)
Dept: FAMILY MEDICINE CLINIC | Facility: CLINIC | Age: 46
End: 2021-11-18

## 2021-11-18 ENCOUNTER — TELEPHONE (OUTPATIENT)
Dept: FAMILY MEDICINE CLINIC | Facility: CLINIC | Age: 46
End: 2021-11-18

## 2021-11-18 DIAGNOSIS — J02.9 SORE THROAT: ICD-10-CM

## 2021-11-18 DIAGNOSIS — J06.9 ACUTE URI: Primary | ICD-10-CM

## 2021-11-18 DIAGNOSIS — J45.51 SEVERE PERSISTENT ASTHMA WITH ACUTE EXACERBATION: ICD-10-CM

## 2021-11-18 PROCEDURE — 87880 STREP A ASSAY W/OPTIC: CPT | Performed by: FAMILY MEDICINE

## 2021-11-18 PROCEDURE — 87804 INFLUENZA ASSAY W/OPTIC: CPT | Performed by: FAMILY MEDICINE

## 2021-11-18 PROCEDURE — 99214 OFFICE O/P EST MOD 30 MIN: CPT | Performed by: FAMILY MEDICINE

## 2021-11-18 RX ORDER — AMOXICILLIN 500 MG/1
1000 CAPSULE ORAL 2 TIMES DAILY
Qty: 28 CAPSULE | Refills: 0 | Status: SHIPPED | OUTPATIENT
Start: 2021-11-18 | End: 2021-11-25

## 2021-11-18 RX ORDER — BENZONATATE 200 MG/1
200 CAPSULE ORAL 3 TIMES DAILY PRN
Qty: 30 CAPSULE | Refills: 0 | Status: SHIPPED | OUTPATIENT
Start: 2021-11-18 | End: 2022-07-18 | Stop reason: SDUPTHER

## 2021-11-18 RX ORDER — DEXTROMETHORPHAN HYDROBROMIDE AND PROMETHAZINE HYDROCHLORIDE 15; 6.25 MG/5ML; MG/5ML
5 SYRUP ORAL 4 TIMES DAILY PRN
Qty: 180 ML | Refills: 0 | OUTPATIENT
Start: 2021-11-18 | End: 2022-01-13

## 2021-11-18 NOTE — TELEPHONE ENCOUNTER
Spoke to patient and informed she would need to be seen since this is new symptoms. Transferred up front to schedule appt.

## 2021-11-18 NOTE — TELEPHONE ENCOUNTER
Caller: Nereida Myles    Relationship: Self    Best call back number: [phone]    What medication are you requesting: COUGH MEDICINE FOR NIGHT TIME     What are your current symptoms: CONGESTION, COUGH, SINUS PRESSURE     How long have you been experiencing symptoms: 4 DAYS     Have you had these symptoms before:    [x] Yes  [] No    Have you been treated for these symptoms before:   [x] Yes  [] No    If a prescription is needed, what is your preferred pharmacy and phone number: [facility] [address] - [phone]  - [phone] FX

## 2021-11-18 NOTE — PROGRESS NOTES
Mode of Visit: Video  Location of patient: other: car  You have chosen to receive care through a telehealth visit.  Does the patient consent to use a video/audio connection for your medical care today? Yes  The visit included audio and video interaction. No technical issues occurred during this visit.     Chief Complaint  Cough (needs cough medicine) and Sore Throat    Subjective          Nereida Myles presents to [facility]  Patient presents for video visit for exacerbation of her asthma from a cold.  She started with cough and congestion and sore throat and headache.  She doesn't feel like she has much wheezing is usually with asthma exacerbation.  She has been taking all her maintenance medication.  She denies any significant shortness of breath.      Objective   Vital Signs:   There were no vitals taken for this visit.    Physical Exam   Constitutional: She appears well-developed and well-nourished. No distress.   HENT:   Head: Normocephalic and atraumatic.   Eyes: Conjunctivae are normal.   Psychiatric: She has a normal mood and affect.     Result Review :                 Assessment and Plan    Diagnoses and all orders for this visit:    1. Acute URI (Primary)  -     promethazine-dextromethorphan (PROMETHAZINE-DM) 6.25-15 MG/5ML syrup; Take 5 mL by mouth 4 (Four) Times a Day As Needed for Cough.  Dispense: 180 mL; Refill: 0  -     POC Influenza A / B  -     COVID-19,[facility] ROUTINE, NP/OP SWAB IN TRANSPORT MEDIA OR ESWAB 72 HR TAT - Swab, Oropharynx; Future  -     benzonatate (TESSALON) 200 MG capsule; Take 1 capsule by mouth 3 (Three) Times a Day As Needed for Cough.  Dispense: 30 capsule; Refill: 0  -     amoxicillin (AMOXIL) 500 MG capsule; Take 2 capsules by mouth 2 (Two) Times a Day for 7 days.  Dispense: 28 capsule; Refill: 0  -     COVID-19,[facility] ROUTINE, NP/OP SWAB IN TRANSPORT MEDIA OR ESWAB 72 HR TAT - Swab, Oropharynx    2. Severe persistent asthma with acute  exacerbation    3. Sore throat  -     POC Rapid Strep A  -     amoxicillin (AMOXIL) 500 MG capsule; Take 2 capsules by mouth 2 (Two) Times a Day for 7 days.  Dispense: 28 capsule; Refill: 0    Other orders  -     SARS-CoV-2, ALESIA 2 DAY TAT - ,    URI-we'll rule out Covid since flu test was negative  Due to her severe persistent asthma, will give a course of antibiotics and await steroids for now. Continues inhaler and cough medicine as needed    Follow Up   No follow-ups on file.  Patient was given instructions and counseling regarding her condition or for health maintenance advice. Please see specific information pulled into the AVS if appropriate.

## 2021-11-19 LAB
LABCORP SARS-COV-2, NAA 2 DAY TAT: NORMAL
SARS-COV-2 RNA RESP QL NAA+PROBE: NOT DETECTED

## 2021-12-03 ENCOUNTER — OFFICE VISIT (OUTPATIENT)
Dept: FAMILY MEDICINE CLINIC | Facility: CLINIC | Age: 46
End: 2021-12-03

## 2021-12-03 ENCOUNTER — PATIENT MESSAGE (OUTPATIENT)
Dept: FAMILY MEDICINE CLINIC | Facility: CLINIC | Age: 46
End: 2021-12-03

## 2021-12-03 VITALS
HEIGHT: 65 IN | SYSTOLIC BLOOD PRESSURE: 128 MMHG | BODY MASS INDEX: 27.96 KG/M2 | HEART RATE: 72 BPM | WEIGHT: 167.8 LBS | DIASTOLIC BLOOD PRESSURE: 64 MMHG | OXYGEN SATURATION: 99 % | TEMPERATURE: 97.7 F

## 2021-12-03 DIAGNOSIS — J45.50 SEVERE PERSISTENT ASTHMA, UNSPECIFIED WHETHER COMPLICATED: Primary | ICD-10-CM

## 2021-12-03 PROCEDURE — 99214 OFFICE O/P EST MOD 30 MIN: CPT | Performed by: NURSE PRACTITIONER

## 2021-12-03 RX ORDER — LEVOFLOXACIN 500 MG/1
500 TABLET, FILM COATED ORAL DAILY
Qty: 10 TABLET | Refills: 0 | OUTPATIENT
Start: 2021-12-03 | End: 2022-01-13

## 2021-12-03 NOTE — PROGRESS NOTES
"Chief Complaint  Asthma    Subjective          Nereida Myles presents to [facility]  History of Present Illness    Patient is here today for URI symptoms. States she was seen as telehealth with Dr. Kehrer and got better and then symptoms started yesterday.  States ribs, back, lungs hurt.  Feeling short of breath, coughing, exhaustion, nauseous    Denies fever, loss of sense of taste or smell.     Had strep, flu and COVID 2 weeks ago and was all negative.        Objective   Vital Signs:   /64   Pulse 72   Temp 97.7 °F (36.5 °C)   Ht 165.1 cm (65\")   Wt 76.1 kg (167 lb 12.8 oz)   SpO2 99%   BMI 27.92 kg/m²     Physical Exam  Constitutional:       Appearance: Normal appearance.   Cardiovascular:      Rate and Rhythm: Normal rate and regular rhythm.      Pulses: Normal pulses.   Pulmonary:      Effort: Pulmonary effort is normal.      Comments: bronchial sounds noted  Skin:     General: Skin is warm and dry.   Neurological:      Mental Status: She is alert.   Psychiatric:         Mood and Affect: Mood normal.         Behavior: Behavior normal.         Thought Content: Thought content normal.         Judgment: Judgment normal.        Result Review :                 Assessment and Plan    Diagnoses and all orders for this visit:    1. Severe persistent asthma, unspecified whether complicated (Primary)    Other orders  -     levoFLOXacin (Levaquin) 500 MG tablet; Take 1 tablet by mouth Daily.  Dispense: 10 tablet; Refill: 0      Patient has steroid at home.  Tries not to take them. Will start if symptoms don't improve with antibiotic in next couple days.    Follow up as needed. Go to ER if worsening shortness of breath.        Follow Up   No follow-ups on file.  Patient was given instructions and counseling regarding her condition or for health maintenance advice. Please see specific information pulled into the AVS if appropriate.       "

## 2021-12-03 NOTE — TELEPHONE ENCOUNTER
From: Nereida Myles  To: Meredith Lea Kehrer, MD  Sent: 12/3/2021 8:43 AM EST  Subject: Concerned     I had a zoom w you a couple weeks ago. I did get better but this has hit me again yesterday I’m my lungs, head, and chest. Taking mucinex, cough meds, Tylenol, inhaler, treatments at night. Pulse ox is still 96/97.

## 2021-12-15 RX ORDER — MONTELUKAST SODIUM 10 MG/1
10 TABLET ORAL DAILY
Qty: 90 TABLET | Refills: 0 | Status: SHIPPED | OUTPATIENT
Start: 2021-12-15 | End: 2022-03-21

## 2021-12-22 DIAGNOSIS — E06.3 HYPOTHYROIDISM DUE TO HASHIMOTO'S THYROIDITIS: ICD-10-CM

## 2021-12-22 DIAGNOSIS — E03.8 HYPOTHYROIDISM DUE TO HASHIMOTO'S THYROIDITIS: ICD-10-CM

## 2021-12-23 RX ORDER — LEVOTHYROXINE SODIUM 50 MCG
TABLET ORAL
Qty: 90 TABLET | Refills: 0 | Status: SHIPPED | OUTPATIENT
Start: 2021-12-23 | End: 2022-03-21

## 2022-01-07 ENCOUNTER — APPOINTMENT (OUTPATIENT)
Dept: PREADMISSION TESTING | Facility: HOSPITAL | Age: 47
End: 2022-01-07

## 2022-01-10 DIAGNOSIS — F33.42 RECURRENT MAJOR DEPRESSIVE DISORDER, IN FULL REMISSION: ICD-10-CM

## 2022-01-11 RX ORDER — FLUOXETINE HYDROCHLORIDE 40 MG/1
CAPSULE ORAL
Qty: 90 CAPSULE | Refills: 3 | Status: SHIPPED | OUTPATIENT
Start: 2022-01-11 | End: 2022-08-18 | Stop reason: SDUPTHER

## 2022-01-17 ENCOUNTER — OFFICE VISIT (OUTPATIENT)
Dept: FAMILY MEDICINE CLINIC | Facility: CLINIC | Age: 47
End: 2022-01-17

## 2022-01-17 VITALS
DIASTOLIC BLOOD PRESSURE: 78 MMHG | OXYGEN SATURATION: 99 % | HEIGHT: 65 IN | HEART RATE: 86 BPM | BODY MASS INDEX: 28.69 KG/M2 | TEMPERATURE: 97.5 F | WEIGHT: 172.2 LBS | SYSTOLIC BLOOD PRESSURE: 120 MMHG

## 2022-01-17 DIAGNOSIS — E03.8 HYPOTHYROIDISM DUE TO HASHIMOTO'S THYROIDITIS: ICD-10-CM

## 2022-01-17 DIAGNOSIS — E06.3 HYPOTHYROIDISM DUE TO HASHIMOTO'S THYROIDITIS: ICD-10-CM

## 2022-01-17 DIAGNOSIS — F33.42 RECURRENT MAJOR DEPRESSIVE DISORDER, IN FULL REMISSION: ICD-10-CM

## 2022-01-17 DIAGNOSIS — Z00.00 ROUTINE HEALTH MAINTENANCE: Primary | ICD-10-CM

## 2022-01-17 DIAGNOSIS — J45.50 SEVERE PERSISTENT ASTHMA, UNSPECIFIED WHETHER COMPLICATED: ICD-10-CM

## 2022-01-17 DIAGNOSIS — J06.9 UPPER RESPIRATORY TRACT INFECTION, UNSPECIFIED TYPE: ICD-10-CM

## 2022-01-17 DIAGNOSIS — Z85.3 HISTORY OF BREAST CANCER: ICD-10-CM

## 2022-01-17 PROCEDURE — 99396 PREV VISIT EST AGE 40-64: CPT | Performed by: FAMILY MEDICINE

## 2022-01-17 NOTE — PROGRESS NOTES
Subjective   Nereida Myles is a 46 y.o. female who presents for annual female wellness exam.  Chief Complaint   Patient presents with   • Annual Exam     Here for annual.  Has had a cold.  Tested negative for Covid.  + exposure though.  Little SOA with exertion.  Doing well with her mood medication and her thyroid medication.  Up-to-date with her oncologist.    Menstrual History: s/p hysterectomy  Pregnancy History: G3  Sexual History: with    Contraception: na  Hormone Replacement Therapy: na  Diet: standard  Exercise: standard  Do you feel safe? yes  Have you ever been abused? No      Mammogram: na  Pap Smear: na  Bone Density: na  Colon Cancer Screening: up to date, 11/2019    Immunization History   Administered Date(s) Administered   • COVID-19 (MODERNA) 1st, 2nd, 3rd Dose Only 02/25/2021, 03/25/2021   • Flu Vaccine Intradermal Quad 18-64YR 09/19/2017, 10/16/2019   • Flu Vaccine Quad PF >18YRS 10/19/2016   • Flu Vaccine Split Quad 10/19/2016, 09/19/2017, 09/28/2020   • FluLaval/Fluarix/Fluzone >6 10/19/2016, 09/28/2020   • Influenza Quad Vaccine (Inpatient) 10/19/2016   • Influenza Split Preservative Free ID 10/06/2015   • Influenza TIV (IM) 10/19/2016   • Influenza, Unspecified 09/24/2018, 10/09/2019, 10/11/2021   • Pneumococcal Conjugate 13-Valent (PCV13) 10/06/2015   • Tdap 03/27/2015       The following portions of the patient's history were reviewed and updated as appropriate: allergies, current medications, past family history, past medical history, past social history, past surgical history and problem list.    Past Medical History:   Diagnosis Date   • Acute upper respiratory infection    • Allergic rhinitis    • Anxiety    • Asthma    • BMI 25.0-25.9,adult    • Cancer (HCC)     BREAST   • Community acquired pneumonia    • COPD (chronic obstructive pulmonary disease) (HCC)    • Depression, major    • Disease of thyroid gland    • Foreign body in ear    • Hashimoto's thyroiditis    • History of  abnormal mammogram     Left breast   • History of acute sinusitis    • History of adenocarcinoma of breast    • History of cholelithiasis    • History of COPD    • History of dyspareunia in female    • History of lump of left breast    • History of malignant neoplasm of left breast    • History of nausea and vomiting    • History of seborrheic dermatitis    • History of strep pharyngitis    • History of suicidal ideation    • Hypothyroidism    • Hypoxemia     History of Hypoxemia   • Obsessive compulsive disorder    • Other screening mammogram    • Personal history of asthma    • Recurrent genital herpes simplex     History of    • Sore throat    • Symptomatic states associated with artificial menopause     History of        Past Surgical History:   Procedure Laterality Date   • APPENDECTOMY     • BREAST AUGMENTATION Bilateral     Revised 2005, initial implants 1992   • CERVICAL CERCLAGE      x 2 during pregnancy, incompetent cervix   • CHOLECYSTECTOMY     • HYSTERECTOMY      due to uterine prolapse   • LUNG SURGERY     • MASTECTOMY         Family History   Problem Relation Age of Onset   • Thyroid disease Mother    • Arthritis Mother    • Uterine cancer Mother         in her 40's   • Cancer Father    • Bipolar disorder Father    • Depression Father    • Hyperlipidemia Father    • Hypertension Father    • Arthritis Maternal Grandmother    • Bleeding Disorder Maternal Grandmother    • Hyperlipidemia Maternal Grandmother    • Hypertension Maternal Grandmother    • Hypertension Maternal Grandfather    • Hypertension Paternal Grandfather    • Hyperlipidemia Paternal Grandfather    • Coronary artery disease Other         Maternal GrGF   • Skin cancer Other         Maternal GrGF   • Diabetes Other         Paternal GrGF   • Heart disease Other         FH in females before the age of 65       Social History     Socioeconomic History   • Marital status:    Tobacco Use   • Smoking status: Never Smoker   • Smokeless  tobacco: Never Used   Substance and Sexual Activity   • Alcohol use: Yes     Comment: rare use   • Drug use: No         Current Outpatient Medications:   •  albuterol (PROVENTIL) (2.5 MG/3ML) 0.083% nebulizer solution, Inhale 2.5 mg., Disp: , Rfl:   •  albuterol (PROVENTIL) 4 MG tablet, Take 1 tablet by mouth Every Night., Disp: 90 tablet, Rfl: 0  •  albuterol sulfate  (90 Base) MCG/ACT inhaler, Inhale 2 puffs Every 4 (Four) Hours As Needed for Wheezing., Disp: 36 g, Rfl: 0  •  benzonatate (TESSALON) 200 MG capsule, Take 1 capsule by mouth 3 (Three) Times a Day As Needed for Cough., Disp: 30 capsule, Rfl: 0  •  cyclobenzaprine (FLEXERIL) 10 MG tablet, Take 1 tablet by mouth 3 (Three) Times a Day As Needed for Muscle Spasms., Disp: 30 tablet, Rfl: 2  •  Dupixent 300 MG/2ML solution pen-injector, , Disp: , Rfl:   •  fexofenadine (ALLEGRA) 180 MG tablet, Take 180 mg by mouth Daily., Disp: , Rfl:   •  FLUoxetine (PROzac) 40 MG capsule, TAKE 1 CAPSULE DAILY, Disp: 90 capsule, Rfl: 3  •  Fluticasone Furoate-Vilanterol (Breo Ellipta) 100-25 MCG/INH inhaler, Inhale 1 puff Daily., Disp: 180 each, Rfl: 3  •  guaiFENesin-codeine (GUAIFENESIN AC) 100-10 MG/5ML liquid, 1-2 tsp po  q6 hours prn, Disp: 118 mL, Rfl: 0  •  letrozole (FEMARA) 2.5 MG tablet, Take 2.5 mg by mouth Daily., Disp: , Rfl:   •  leuprolide (LUPRON) 11.25 MG injection, Inject  into the appropriate muscle as directed by prescriber., Disp: , Rfl:   •  montelukast (SINGULAIR) 10 MG tablet, Take 1 tablet by mouth Daily., Disp: 90 tablet, Rfl: 0  •  Multiple Vitamin (MULTI-VITAMIN DAILY PO), Take  by mouth Daily., Disp: , Rfl:   •  predniSONE (DELTASONE) 20 MG tablet, Take 3 tablets po every morning, Disp: 15 tablet, Rfl: 0  •  Synthroid 50 MCG tablet, TAKE 1 TABLET DAILY, Disp: 90 tablet, Rfl: 0  •  theophylline (THEODUR) 300 MG 12 hr tablet, Take 1 tablet by mouth Every Night., Disp: 90 tablet, Rfl: 0  •  valACYclovir (VALTREX) 500 MG tablet, Take 1 tablet  "by mouth Daily., Disp: 90 tablet, Rfl: 0    Review of Systems   Constitutional: Negative for chills, fatigue and fever.   HENT: Positive for congestion. Negative for rhinorrhea and sore throat.    Respiratory: Positive for cough. Negative for shortness of breath.    Cardiovascular: Negative for chest pain and leg swelling.   Gastrointestinal: Negative for abdominal pain.   Endocrine: Negative for polydipsia and polyuria.   Genitourinary: Negative for dysuria.   Musculoskeletal: Negative for arthralgias and myalgias.   Skin: Negative for rash.   Neurological: Negative for dizziness.   Hematological: Does not bruise/bleed easily.   Psychiatric/Behavioral: Negative for sleep disturbance.       Objective   Vitals:    01/17/22 1450   BP: 120/78   BP Location: Right arm   Patient Position: Sitting   Pulse: 86   Temp: 97.5 °F (36.4 °C)   SpO2: 99%   Weight: 78.1 kg (172 lb 3.2 oz)   Height: 165.1 cm (65\")     Body mass index is 28.66 kg/m².  Physical Exam  Vitals and nursing note reviewed.   Constitutional:       General: She is not in acute distress.     Appearance: Normal appearance. She is well-developed.   HENT:      Head: Normocephalic and atraumatic.      Right Ear: Tympanic membrane, ear canal and external ear normal.      Left Ear: Tympanic membrane, ear canal and external ear normal.      Nose: Nose normal.      Mouth/Throat:      Mouth: Mucous membranes are moist.      Pharynx: Oropharynx is clear. No oropharyngeal exudate or posterior oropharyngeal erythema.   Eyes:      Conjunctiva/sclera: Conjunctivae normal.      Pupils: Pupils are equal, round, and reactive to light.   Neck:      Thyroid: No thyromegaly.   Cardiovascular:      Rate and Rhythm: Normal rate and regular rhythm.      Heart sounds: No murmur heard.      Pulmonary:      Effort: Pulmonary effort is normal.      Breath sounds: Normal breath sounds. No wheezing.   Abdominal:      General: Abdomen is flat. Bowel sounds are normal. There is no " distension.      Palpations: Abdomen is soft. There is no mass.      Tenderness: There is no abdominal tenderness.      Hernia: No hernia is present.   Musculoskeletal:         General: No swelling. Normal range of motion.      Cervical back: Normal range of motion and neck supple.      Right lower leg: No edema.      Left lower leg: No edema.   Lymphadenopathy:      Cervical: No cervical adenopathy.   Skin:     General: Skin is warm and dry.      Capillary Refill: Capillary refill takes less than 2 seconds.      Findings: No rash.   Neurological:      General: No focal deficit present.      Mental Status: She is alert and oriented to person, place, and time.      Cranial Nerves: No cranial nerve deficit.   Psychiatric:         Mood and Affect: Mood normal.         Behavior: Behavior normal.           Assessment/Plan   Diagnoses and all orders for this visit:    1. Routine health maintenance (Primary)  -     Lipid Panel  -     CBC & Differential  -     Comprehensive Metabolic Panel    2. Recurrent major depressive disorder, in full remission (HCC)    3. Hypothyroidism due to Hashimoto's thyroiditis  -     TSH  -     Theophylline level    4. Severe persistent asthma, unspecified whether complicated    5. History of breast cancer  -     Lipid Panel  -     CBC & Differential  -     Comprehensive Metabolic Panel    6. Upper respiratory tract infection, unspecified type               Discussed the importance of maintaining a healthy weight and getting regular exercise.  Educated patient on the benefits of healthy diet.  Advise follow-up annually for wellness exams.    There are no Patient Instructions on file for this visit.

## 2022-01-18 LAB
ALBUMIN SERPL-MCNC: 4.5 G/DL (ref 3.8–4.8)
ALBUMIN/GLOB SERPL: 1.9 {RATIO} (ref 1.2–2.2)
ALP SERPL-CCNC: 94 IU/L (ref 44–121)
ALT SERPL-CCNC: 24 IU/L (ref 0–32)
AST SERPL-CCNC: 22 IU/L (ref 0–40)
BASOPHILS # BLD AUTO: 0 X10E3/UL (ref 0–0.2)
BASOPHILS NFR BLD AUTO: 1 %
BILIRUB SERPL-MCNC: 0.2 MG/DL (ref 0–1.2)
BUN SERPL-MCNC: 15 MG/DL (ref 6–24)
BUN/CREAT SERPL: 21 (ref 9–23)
CALCIUM SERPL-MCNC: 9.8 MG/DL (ref 8.7–10.2)
CHLORIDE SERPL-SCNC: 104 MMOL/L (ref 96–106)
CHOLEST SERPL-MCNC: 193 MG/DL (ref 100–199)
CO2 SERPL-SCNC: 23 MMOL/L (ref 20–29)
CREAT SERPL-MCNC: 0.7 MG/DL (ref 0.57–1)
EOSINOPHIL # BLD AUTO: 0.4 X10E3/UL (ref 0–0.4)
EOSINOPHIL NFR BLD AUTO: 8 %
ERYTHROCYTE [DISTWIDTH] IN BLOOD BY AUTOMATED COUNT: 13.1 % (ref 11.7–15.4)
GLOBULIN SER CALC-MCNC: 2.4 G/DL (ref 1.5–4.5)
GLUCOSE SERPL-MCNC: 83 MG/DL (ref 65–99)
HCT VFR BLD AUTO: 40.9 % (ref 34–46.6)
HDLC SERPL-MCNC: 80 MG/DL
HGB BLD-MCNC: 13.9 G/DL (ref 11.1–15.9)
IMM GRANULOCYTES # BLD AUTO: 0 X10E3/UL (ref 0–0.1)
IMM GRANULOCYTES NFR BLD AUTO: 0 %
LDLC SERPL CALC-MCNC: 81 MG/DL (ref 0–99)
LYMPHOCYTES # BLD AUTO: 0.6 X10E3/UL (ref 0.7–3.1)
LYMPHOCYTES NFR BLD AUTO: 11 %
MCH RBC QN AUTO: 30.4 PG (ref 26.6–33)
MCHC RBC AUTO-ENTMCNC: 34 G/DL (ref 31.5–35.7)
MCV RBC AUTO: 90 FL (ref 79–97)
MONOCYTES # BLD AUTO: 0.4 X10E3/UL (ref 0.1–0.9)
MONOCYTES NFR BLD AUTO: 7 %
NEUTROPHILS # BLD AUTO: 3.9 X10E3/UL (ref 1.4–7)
NEUTROPHILS NFR BLD AUTO: 73 %
PLATELET # BLD AUTO: 331 X10E3/UL (ref 150–450)
POTASSIUM SERPL-SCNC: 4 MMOL/L (ref 3.5–5.2)
PROT SERPL-MCNC: 6.9 G/DL (ref 6–8.5)
RBC # BLD AUTO: 4.57 X10E6/UL (ref 3.77–5.28)
SODIUM SERPL-SCNC: 140 MMOL/L (ref 134–144)
THEOPHYLLINE SERPL-MCNC: 7.5 UG/ML (ref 10–20)
TRIGL SERPL-MCNC: 192 MG/DL (ref 0–149)
TSH SERPL DL<=0.005 MIU/L-ACNC: 1.51 UIU/ML (ref 0.45–4.5)
VLDLC SERPL CALC-MCNC: 32 MG/DL (ref 5–40)
WBC # BLD AUTO: 5.3 X10E3/UL (ref 3.4–10.8)

## 2022-01-24 ENCOUNTER — PATIENT MESSAGE (OUTPATIENT)
Dept: FAMILY MEDICINE CLINIC | Facility: CLINIC | Age: 47
End: 2022-01-24

## 2022-01-24 ENCOUNTER — TELEMEDICINE (OUTPATIENT)
Dept: FAMILY MEDICINE CLINIC | Facility: CLINIC | Age: 47
End: 2022-01-24

## 2022-01-24 DIAGNOSIS — J45.51 SEVERE PERSISTENT ASTHMA WITH ACUTE EXACERBATION: Primary | ICD-10-CM

## 2022-01-24 DIAGNOSIS — J20.9 ACUTE BRONCHITIS, UNSPECIFIED ORGANISM: ICD-10-CM

## 2022-01-24 PROCEDURE — 99214 OFFICE O/P EST MOD 30 MIN: CPT | Performed by: FAMILY MEDICINE

## 2022-01-24 RX ORDER — DOXYCYCLINE HYCLATE 100 MG/1
100 CAPSULE ORAL 2 TIMES DAILY
Qty: 20 CAPSULE | Refills: 0 | Status: SHIPPED | OUTPATIENT
Start: 2022-01-24 | End: 2022-02-03

## 2022-01-24 RX ORDER — PREDNISONE 20 MG/1
TABLET ORAL
Qty: 19 TABLET | Refills: 0 | Status: SHIPPED | OUTPATIENT
Start: 2022-01-24 | End: 2022-02-03

## 2022-01-24 NOTE — TELEPHONE ENCOUNTER
From: Nereida Myles  To: Meredith Lea Kehrer, MD  Sent: 1/24/2022 3:02 AM EST  Subject: Telehealth Apt    Does Dr Kehrer, or any of the physicians, have an available appointment today over zoom? I had a cold when I saw her last Monday and it has gone into bronchitis.   Thank you-

## 2022-01-24 NOTE — PROGRESS NOTES
Mode of Visit: Video  Location of patient: other: work  You have chosen to receive care through a telehealth visit.  The patient has signed the video visit consent form.  The visit included audio and video interaction. No technical issues occurred during this visit.     Chief Complaint  Bronchitis, Fever, Cough, Sore Throat, and Nasal Congestion    Subjective          Nereida Myles presents to [facility]  Patient presents for video visit because of worsening cough.    She has had increased wheezing and using her nebulizer.  She has had more sore throat and congestion and sweats and chills since I last saw her.  She is tested negative for Covid twice.  No ill contacts.  Pulse ox has been doing well at home and last checked was 97.  She does not have a fever this morning.        Objective   Vital Signs:   There were no vitals taken for this visit.    Physical Exam   Constitutional: She appears well-developed and well-nourished. No distress.   HENT:   Head: Normocephalic and atraumatic.   Eyes: Conjunctivae are normal.   Psychiatric: She has a normal mood and affect.     Result Review :                 Assessment and Plan    Diagnoses and all orders for this visit:    1. Severe persistent asthma with acute exacerbation (Primary)  -     predniSONE (DELTASONE) 20 MG tablet; Take 3 tablets by mouth Daily for 3 days, THEN 2 tablets Daily for 3 days, THEN 1 tablet Daily for 4 days.  Dispense: 19 tablet; Refill: 0  -     doxycycline (VIBRAMYCIN) 100 MG capsule; Take 1 capsule by mouth 2 (Two) Times a Day for 10 days.  Dispense: 20 capsule; Refill: 0    2. Acute bronchitis, unspecified organism  -     predniSONE (DELTASONE) 20 MG tablet; Take 3 tablets by mouth Daily for 3 days, THEN 2 tablets Daily for 3 days, THEN 1 tablet Daily for 4 days.  Dispense: 19 tablet; Refill: 0  -     doxycycline (VIBRAMYCIN) 100 MG capsule; Take 1 capsule by mouth 2 (Two) Times a Day for 10 days.  Dispense: 20  capsule; Refill: 0    URI causing exacerbation of asthma bronchitis-we will go ahead and start antibiotic and 10-day prednisone taper from 60-20, she will go and get another rapid Covid test and follow-up with me for PCR if she wants to if that is negative, use her cough and cold symptomatic relief medications as needed, use albuterol as needed    Follow Up   No follow-ups on file.  Patient was given instructions and counseling regarding her condition or for health maintenance advice. Please see specific information pulled into the AVS if appropriate.

## 2022-02-06 DIAGNOSIS — J45.51 SEVERE PERSISTENT ASTHMA WITH ACUTE EXACERBATION: ICD-10-CM

## 2022-02-07 RX ORDER — THEOPHYLLINE 300 MG/1
TABLET, EXTENDED RELEASE ORAL
Qty: 90 TABLET | Refills: 3 | Status: SHIPPED | OUTPATIENT
Start: 2022-02-07

## 2022-02-07 RX ORDER — VALACYCLOVIR HYDROCHLORIDE 500 MG/1
TABLET, FILM COATED ORAL
Qty: 90 TABLET | Refills: 3 | Status: SHIPPED | OUTPATIENT
Start: 2022-02-07 | End: 2023-03-20

## 2022-02-07 RX ORDER — ALBUTEROL SULFATE 4 MG/1
TABLET ORAL
Qty: 90 TABLET | Refills: 3 | Status: SHIPPED | OUTPATIENT
Start: 2022-02-07

## 2022-02-15 ENCOUNTER — TELEPHONE (OUTPATIENT)
Dept: FAMILY MEDICINE CLINIC | Facility: CLINIC | Age: 47
End: 2022-02-15

## 2022-02-15 NOTE — TELEPHONE ENCOUNTER
These rarely are used in an outpatient setting.  If she needs to be seen today, we can get her worked in this morning.  Please find out what anti-inflammatory she has tried.

## 2022-02-15 NOTE — TELEPHONE ENCOUNTER
Spoke to patient and she is stating that she would like to have the pills if possible.  Informed her that we are booked for the day and she may need to try UC please advise if we can call this in

## 2022-02-15 NOTE — TELEPHONE ENCOUNTER
Caller: Nereida Myles    Relationship: Self    Best call back number: [phone]     What medication are you requesting: TORADOL    What are your current symptoms: BACK PAIN    How long have you been experiencing symptoms: ONGOING    Have you had these symptoms before:    [x] Yes  [] No    Have you been treated for these symptoms before:   [x] Yes  [] No    If a prescription is needed, what is your preferred pharmacy and phone number: [address] - [phone]  - [phone] FX     Additional notes: PATIENT STATED THAT SHE'S BEEN GIVEN THIS MEDICATION IN THE PAST.    UNABLE TO FIND ON MED LIST.

## 2022-02-15 NOTE — TELEPHONE ENCOUNTER
If she has tried 800 mg of ibuprofen and tried her Flexeril as well as using ice for her back then she probably needs to be seen. Toradol is usually only started after somebody has been seen and given an injection first.

## 2022-03-07 ENCOUNTER — APPOINTMENT (OUTPATIENT)
Dept: PREADMISSION TESTING | Facility: HOSPITAL | Age: 47
End: 2022-03-07

## 2022-03-20 DIAGNOSIS — E03.8 HYPOTHYROIDISM DUE TO HASHIMOTO'S THYROIDITIS: ICD-10-CM

## 2022-03-20 DIAGNOSIS — E06.3 HYPOTHYROIDISM DUE TO HASHIMOTO'S THYROIDITIS: ICD-10-CM

## 2022-03-21 RX ORDER — ALBUTEROL SULFATE 90 UG/1
AEROSOL, METERED RESPIRATORY (INHALATION)
Qty: 34 G | Refills: 0 | Status: SHIPPED | OUTPATIENT
Start: 2022-03-21

## 2022-03-21 RX ORDER — MONTELUKAST SODIUM 10 MG/1
TABLET ORAL
Qty: 90 TABLET | Refills: 0 | Status: SHIPPED | OUTPATIENT
Start: 2022-03-21 | End: 2022-07-29

## 2022-03-21 RX ORDER — LEVOTHYROXINE SODIUM 50 MCG
TABLET ORAL
Qty: 90 TABLET | Refills: 0 | Status: SHIPPED | OUTPATIENT
Start: 2022-03-21 | End: 2022-07-29

## 2022-03-22 ENCOUNTER — TELEPHONE (OUTPATIENT)
Dept: FAMILY MEDICINE CLINIC | Facility: CLINIC | Age: 47
End: 2022-03-22

## 2022-03-22 NOTE — TELEPHONE ENCOUNTER
fyi      [facility] called stating that they could not fill the patient synthroid because it was marked LIDIA but they use synthroid as the generic.  I did not see LIDIA on script informed pharm Julio Martino to take the LIDIA off the script and fill the patients sythroid.

## 2022-03-29 NOTE — TELEPHONE ENCOUNTER
Caller: Nereida Myles    Relationship: Self    Best call back number: [phone]     Requested Prescriptions:   Requested Prescriptions     Pending Prescriptions Disp Refills   • albuterol (PROVENTIL) (2.5 MG/3ML) 0.083% nebulizer solution       Si.5 mg.        Pharmacy where request should be sent: [facility] [address] [facility] [address] - [phone] [facility] [phone] FX     Does the patient have less than a 3 day supply:  [x] Yes  [] No    Romy Ragland Rep   22 16:13 EDT

## 2022-03-30 RX ORDER — ALBUTEROL SULFATE 2.5 MG/3ML
2.5 SOLUTION RESPIRATORY (INHALATION) EVERY 4 HOURS PRN
Qty: 360 ML | Refills: 3 | Status: SHIPPED | OUTPATIENT
Start: 2022-03-30 | End: 2022-04-29

## 2022-05-25 ENCOUNTER — APPOINTMENT (OUTPATIENT)
Dept: GENERAL RADIOLOGY | Facility: HOSPITAL | Age: 47
End: 2022-05-25

## 2022-05-25 ENCOUNTER — OFFICE VISIT (OUTPATIENT)
Dept: FAMILY MEDICINE CLINIC | Facility: CLINIC | Age: 47
End: 2022-05-25

## 2022-05-25 ENCOUNTER — HOSPITAL ENCOUNTER (EMERGENCY)
Facility: HOSPITAL | Age: 47
Discharge: HOME | End: 2022-05-25
Attending: EMERGENCY MEDICINE | Admitting: EMERGENCY MEDICINE

## 2022-05-25 VITALS
HEART RATE: 71 BPM | RESPIRATION RATE: 24 BRPM | SYSTOLIC BLOOD PRESSURE: 120 MMHG | HEIGHT: 65 IN | TEMPERATURE: 100.8 F | BODY MASS INDEX: 27.49 KG/M2 | OXYGEN SATURATION: 90 % | WEIGHT: 165 LBS | DIASTOLIC BLOOD PRESSURE: 81 MMHG

## 2022-05-25 VITALS
WEIGHT: 164 LBS | RESPIRATION RATE: 32 BRPM | TEMPERATURE: 98.4 F | BODY MASS INDEX: 27.32 KG/M2 | HEIGHT: 65 IN | OXYGEN SATURATION: 97 % | HEART RATE: 110 BPM

## 2022-05-25 DIAGNOSIS — R50.9 FEVER IN ADULT: ICD-10-CM

## 2022-05-25 DIAGNOSIS — J45.901 EXACERBATION OF ASTHMA, UNSPECIFIED ASTHMA SEVERITY, UNSPECIFIED WHETHER PERSISTENT: ICD-10-CM

## 2022-05-25 DIAGNOSIS — B34.8 RHINOVIRUS INFECTION: Primary | ICD-10-CM

## 2022-05-25 DIAGNOSIS — J45.51 SEVERE PERSISTENT ASTHMA WITH EXACERBATION: Primary | ICD-10-CM

## 2022-05-25 LAB
ALBUMIN SERPL-MCNC: 4.5 G/DL (ref 3.5–5.2)
ALBUMIN/GLOB SERPL: 1.9 G/DL
ALP SERPL-CCNC: 95 U/L (ref 39–117)
ALT SERPL W P-5'-P-CCNC: 22 U/L (ref 1–33)
ANION GAP SERPL CALCULATED.3IONS-SCNC: 10.7 MMOL/L (ref 5–15)
AST SERPL-CCNC: 17 U/L (ref 1–32)
B PARAPERT DNA SPEC QL NAA+PROBE: NOT DETECTED
B PERT DNA SPEC QL NAA+PROBE: NOT DETECTED
BASOPHILS # BLD AUTO: 0.04 10*3/MM3 (ref 0–0.2)
BASOPHILS NFR BLD AUTO: 0.7 % (ref 0–1.5)
BILIRUB SERPL-MCNC: 0.3 MG/DL (ref 0–1.2)
BUN SERPL-MCNC: 8 MG/DL (ref 6–20)
BUN/CREAT SERPL: 10.1 (ref 7–25)
C PNEUM DNA NPH QL NAA+NON-PROBE: NOT DETECTED
CALCIUM SPEC-SCNC: 9.1 MG/DL (ref 8.6–10.5)
CHLORIDE SERPL-SCNC: 102 MMOL/L (ref 98–107)
CO2 SERPL-SCNC: 22.3 MMOL/L (ref 22–29)
CREAT SERPL-MCNC: 0.79 MG/DL (ref 0.57–1)
DEPRECATED RDW RBC AUTO: 46.1 FL (ref 37–54)
EGFRCR SERPLBLD CKD-EPI 2021: 92.4 ML/MIN/1.73
EOSINOPHIL # BLD AUTO: 0.24 10*3/MM3 (ref 0–0.4)
EOSINOPHIL NFR BLD AUTO: 4.1 % (ref 0.3–6.2)
ERYTHROCYTE [DISTWIDTH] IN BLOOD BY AUTOMATED COUNT: 13.3 % (ref 12.3–15.4)
FLUAV SUBTYP SPEC NAA+PROBE: NOT DETECTED
FLUBV RNA ISLT QL NAA+PROBE: NOT DETECTED
GLOBULIN UR ELPH-MCNC: 2.4 GM/DL
GLUCOSE SERPL-MCNC: 94 MG/DL (ref 65–99)
HADV DNA SPEC NAA+PROBE: NOT DETECTED
HCOV 229E RNA SPEC QL NAA+PROBE: NOT DETECTED
HCOV HKU1 RNA SPEC QL NAA+PROBE: NOT DETECTED
HCOV NL63 RNA SPEC QL NAA+PROBE: NOT DETECTED
HCOV OC43 RNA SPEC QL NAA+PROBE: NOT DETECTED
HCT VFR BLD AUTO: 41.2 % (ref 34–46.6)
HGB BLD-MCNC: 13.3 G/DL (ref 12–15.9)
HMPV RNA NPH QL NAA+NON-PROBE: NOT DETECTED
HPIV1 RNA ISLT QL NAA+PROBE: NOT DETECTED
HPIV2 RNA SPEC QL NAA+PROBE: NOT DETECTED
HPIV3 RNA NPH QL NAA+PROBE: NOT DETECTED
HPIV4 P GENE NPH QL NAA+PROBE: NOT DETECTED
IMM GRANULOCYTES # BLD AUTO: 0.01 10*3/MM3 (ref 0–0.05)
IMM GRANULOCYTES NFR BLD AUTO: 0.2 % (ref 0–0.5)
LYMPHOCYTES # BLD AUTO: 0.35 10*3/MM3 (ref 0.7–3.1)
LYMPHOCYTES NFR BLD AUTO: 5.9 % (ref 19.6–45.3)
M PNEUMO IGG SER IA-ACNC: NOT DETECTED
MCH RBC QN AUTO: 30.4 PG (ref 26.6–33)
MCHC RBC AUTO-ENTMCNC: 32.3 G/DL (ref 31.5–35.7)
MCV RBC AUTO: 94.1 FL (ref 79–97)
MONOCYTES # BLD AUTO: 0.53 10*3/MM3 (ref 0.1–0.9)
MONOCYTES NFR BLD AUTO: 9 % (ref 5–12)
NEUTROPHILS NFR BLD AUTO: 4.74 10*3/MM3 (ref 1.7–7)
NEUTROPHILS NFR BLD AUTO: 80.1 % (ref 42.7–76)
NRBC BLD AUTO-RTO: 0 /100 WBC (ref 0–0.2)
PLATELET # BLD AUTO: 305 10*3/MM3 (ref 140–450)
PMV BLD AUTO: 9.5 FL (ref 6–12)
POTASSIUM SERPL-SCNC: 3.5 MMOL/L (ref 3.5–5.2)
PROT SERPL-MCNC: 6.9 G/DL (ref 6–8.5)
QT INTERVAL: 365 MS
RBC # BLD AUTO: 4.38 10*6/MM3 (ref 3.77–5.28)
RHINOVIRUS RNA SPEC NAA+PROBE: DETECTED
RSV RNA NPH QL NAA+NON-PROBE: NOT DETECTED
SARS-COV-2 RNA NPH QL NAA+NON-PROBE: NOT DETECTED
SODIUM SERPL-SCNC: 135 MMOL/L (ref 136–145)
THEOPHYLLINE SERPL-MCNC: 3.1 MCG/ML (ref 10–20)
WBC NRBC COR # BLD: 5.91 10*3/MM3 (ref 3.4–10.8)

## 2022-05-25 PROCEDURE — 96375 TX/PRO/DX INJ NEW DRUG ADDON: CPT

## 2022-05-25 PROCEDURE — 25010000002 METHYLPREDNISOLONE PER 125 MG: Performed by: NURSE PRACTITIONER

## 2022-05-25 PROCEDURE — 94799 UNLISTED PULMONARY SVC/PX: CPT

## 2022-05-25 PROCEDURE — 94664 DEMO&/EVAL PT USE INHALER: CPT

## 2022-05-25 PROCEDURE — 96374 THER/PROPH/DIAG INJ IV PUSH: CPT

## 2022-05-25 PROCEDURE — 93010 ELECTROCARDIOGRAM REPORT: CPT | Performed by: INTERNAL MEDICINE

## 2022-05-25 PROCEDURE — 0202U NFCT DS 22 TRGT SARS-COV-2: CPT | Performed by: NURSE PRACTITIONER

## 2022-05-25 PROCEDURE — 80053 COMPREHEN METABOLIC PANEL: CPT | Performed by: NURSE PRACTITIONER

## 2022-05-25 PROCEDURE — 71045 X-RAY EXAM CHEST 1 VIEW: CPT

## 2022-05-25 PROCEDURE — 94761 N-INVAS EAR/PLS OXIMETRY MLT: CPT

## 2022-05-25 PROCEDURE — 94640 AIRWAY INHALATION TREATMENT: CPT

## 2022-05-25 PROCEDURE — 85025 COMPLETE CBC W/AUTO DIFF WBC: CPT | Performed by: NURSE PRACTITIONER

## 2022-05-25 PROCEDURE — 99214 OFFICE O/P EST MOD 30 MIN: CPT | Performed by: FAMILY MEDICINE

## 2022-05-25 PROCEDURE — 25010000002 ONDANSETRON PER 1 MG: Performed by: NURSE PRACTITIONER

## 2022-05-25 PROCEDURE — 80198 ASSAY OF THEOPHYLLINE: CPT

## 2022-05-25 PROCEDURE — 93005 ELECTROCARDIOGRAM TRACING: CPT

## 2022-05-25 PROCEDURE — 93005 ELECTROCARDIOGRAM TRACING: CPT | Performed by: EMERGENCY MEDICINE

## 2022-05-25 PROCEDURE — 99283 EMERGENCY DEPT VISIT LOW MDM: CPT

## 2022-05-25 RX ORDER — METHYLPREDNISOLONE 4 MG/1
TABLET ORAL
Qty: 1 EACH | Refills: 0 | Status: SHIPPED | OUTPATIENT
Start: 2022-05-25 | End: 2022-06-02

## 2022-05-25 RX ORDER — ONDANSETRON 4 MG/1
4 TABLET, ORALLY DISINTEGRATING ORAL EVERY 8 HOURS PRN
Qty: 10 TABLET | Refills: 0 | Status: SHIPPED | OUTPATIENT
Start: 2022-05-25 | End: 2022-06-21 | Stop reason: HOSPADM

## 2022-05-25 RX ORDER — ALBUTEROL SULFATE 90 UG/1
2 AEROSOL, METERED RESPIRATORY (INHALATION) ONCE
Status: COMPLETED | OUTPATIENT
Start: 2022-05-25 | End: 2022-05-25

## 2022-05-25 RX ORDER — ACETAMINOPHEN 500 MG
1000 TABLET ORAL ONCE
Status: COMPLETED | OUTPATIENT
Start: 2022-05-25 | End: 2022-05-25

## 2022-05-25 RX ORDER — ONDANSETRON 2 MG/ML
4 INJECTION INTRAMUSCULAR; INTRAVENOUS ONCE
Status: COMPLETED | OUTPATIENT
Start: 2022-05-25 | End: 2022-05-25

## 2022-05-25 RX ORDER — METHYLPREDNISOLONE SODIUM SUCCINATE 125 MG/2ML
125 INJECTION, POWDER, LYOPHILIZED, FOR SOLUTION INTRAMUSCULAR; INTRAVENOUS ONCE
Status: COMPLETED | OUTPATIENT
Start: 2022-05-25 | End: 2022-05-25

## 2022-05-25 RX ADMIN — ONDANSETRON 4 MG: 2 INJECTION INTRAMUSCULAR; INTRAVENOUS at 14:55

## 2022-05-25 RX ADMIN — ALBUTEROL SULFATE 2 PUFF: 90 AEROSOL, METERED RESPIRATORY (INHALATION) at 15:22

## 2022-05-25 RX ADMIN — ACETAMINOPHEN 1000 MG: 500 TABLET ORAL at 14:53

## 2022-05-25 RX ADMIN — SODIUM CHLORIDE 1000 ML: 9 INJECTION, SOLUTION INTRAVENOUS at 14:54

## 2022-05-25 RX ADMIN — METHYLPREDNISOLONE SODIUM SUCCINATE 125 MG: 125 INJECTION, POWDER, FOR SOLUTION INTRAMUSCULAR; INTRAVENOUS at 14:56

## 2022-05-25 NOTE — PROGRESS NOTES
"Chief Complaint  Sinusitis, Cough, and Vomiting    Subjective          Nereida Myles presents to [facility] PRIMARY [facility]  History of Present Illness.    Sinus pressure  The patient reports that she has been experiencing sinus pressure, productive cough and unable to keep anything down due to her persistent vomiting. She reports use of Tylenol and Allegra that provided no relief of symptoms and notes taking theophyline this morning.  The patient denies use of nebulizer treatment before today's visit because she was too shaky to do anything. Currently she feels mild shortness of breath and noticed the onset of rigors to begin this morning. The patient states that she had a fever of 100 degrees fahrenheit accompanied by headache pain that developed last night. The patient reports that she tried her inhaler approximately 1 hour ago however was unable to get  2 puffs in before vomiting. She denies exposure to anyone sick and indicates mild  that she is sore between her shoulder blades. The patient confirms having a nebulizer at home however states she was to unstable and \"shaky\" to do anything. She confirms driving to [facility] appointment herself.     Surgical history  The patient reports that she had carpal tunnel surgery 3 weeks ago to remove her thumb tendon.    Objective   Vital Signs:  Pulse 110   Temp 98.4 °F (36.9 °C)   Resp (!) 32   Ht 165.1 cm (65\")   Wt 74.4 kg (164 lb)   SpO2 97%   BMI 27.29 kg/m²         Physical Exam  Constitutional:       General: She is not in acute distress.     Appearance: Normal appearance. She is well-developed.   HENT:      Head: Normocephalic and atraumatic.      Right Ear: Tympanic membrane, ear canal and external ear normal.      Left Ear: Tympanic membrane, ear canal and external ear normal.      Mouth/Throat:      Mouth: Mucous membranes are moist.      Pharynx: Oropharynx is clear.   Eyes:      Conjunctiva/sclera: Conjunctivae normal.      Pupils: " Pupils are equal, round, and reactive to light.   Neck:      Thyroid: No thyromegaly.   Cardiovascular:      Rate and Rhythm: Normal rate and regular rhythm.      Heart sounds: No murmur heard.  Pulmonary:      Effort: Pulmonary effort is normal. Tachypnea present.      Breath sounds: Normal breath sounds. No wheezing.      Comments: *Moderate tachypnea with increased work of breathing.   *Lungs relatively clear with mild end expiratory wheezes but decreased breath sounds.  *Moderate erythema posterior pharynx and erythematous boggy nasal mucosa.  * Symptoms not improved after nebulizer treatment, still tachypneic with diffuse wheezing and shaking  Abdominal:      General: Bowel sounds are normal.      Palpations: Abdomen is soft.      Tenderness: There is no abdominal tenderness.   Musculoskeletal:         General: Normal range of motion.      Cervical back: Neck supple.   Lymphadenopathy:      Cervical: No cervical adenopathy.   Skin:     General: Skin is warm and dry.   Neurological:      Mental Status: She is alert and oriented to person, place, and time.   Psychiatric:         Mood and Affect: Mood normal.         Behavior: Behavior normal.        Result Review :                  Assessment and Plan    Diagnoses and all orders for this visit:    1. Severe persistent asthma with exacerbation (Primary)    Patient still significantly tachypneic after nebula agreed to go to ER.   came to pick her up.  Recheck sats still 98%.       Follow Up   No follow-ups on file.  Patient was given instructions and counseling regarding her condition or for health maintenance advice. Please see specific information pulled into the AVS if appropriate.     Transcribed from ambient dictation for Meredith Lea Kehrer, MD by Annelise Cast.  05/25/22   12:48 EDT    Patient verbalized consent to the visit recording.

## 2022-06-02 ENCOUNTER — PRE-ADMISSION TESTING (OUTPATIENT)
Dept: PREADMISSION TESTING | Facility: HOSPITAL | Age: 47
End: 2022-06-02

## 2022-06-02 LAB
ANION GAP SERPL CALCULATED.3IONS-SCNC: 11.8 MMOL/L (ref 5–15)
BUN SERPL-MCNC: 12 MG/DL (ref 6–20)
BUN/CREAT SERPL: 17.4 (ref 7–25)
CALCIUM SPEC-SCNC: 9.1 MG/DL (ref 8.6–10.5)
CHLORIDE SERPL-SCNC: 103 MMOL/L (ref 98–107)
CO2 SERPL-SCNC: 27.2 MMOL/L (ref 22–29)
CREAT SERPL-MCNC: 0.69 MG/DL (ref 0.57–1)
DEPRECATED RDW RBC AUTO: 40.7 FL (ref 37–54)
EGFRCR SERPLBLD CKD-EPI 2021: 107.2 ML/MIN/1.73
ERYTHROCYTE [DISTWIDTH] IN BLOOD BY AUTOMATED COUNT: 12.6 % (ref 12.3–15.4)
GLUCOSE SERPL-MCNC: 81 MG/DL (ref 65–99)
HCT VFR BLD AUTO: 39.2 % (ref 34–46.6)
HGB BLD-MCNC: 13.3 G/DL (ref 12–15.9)
MCH RBC QN AUTO: 30.3 PG (ref 26.6–33)
MCHC RBC AUTO-ENTMCNC: 33.9 G/DL (ref 31.5–35.7)
MCV RBC AUTO: 89.3 FL (ref 79–97)
PLATELET # BLD AUTO: 334 10*3/MM3 (ref 140–450)
PMV BLD AUTO: 9.6 FL (ref 6–12)
POTASSIUM SERPL-SCNC: 3.7 MMOL/L (ref 3.5–5.2)
RBC # BLD AUTO: 4.39 10*6/MM3 (ref 3.77–5.28)
SODIUM SERPL-SCNC: 142 MMOL/L (ref 136–145)
WBC NRBC COR # BLD: 5.18 10*3/MM3 (ref 3.4–10.8)

## 2022-06-02 PROCEDURE — 80048 BASIC METABOLIC PNL TOTAL CA: CPT

## 2022-06-02 PROCEDURE — 36415 COLL VENOUS BLD VENIPUNCTURE: CPT

## 2022-06-02 PROCEDURE — 85027 COMPLETE CBC AUTOMATED: CPT

## 2022-06-02 RX ORDER — ALBUTEROL SULFATE 2.5 MG/3ML
2.5 SOLUTION RESPIRATORY (INHALATION) EVERY 4 HOURS PRN
COMMUNITY

## 2022-06-02 NOTE — DISCHARGE INSTRUCTIONS
Take the following medications the morning of surgery:    Allegra   synthroid   valtrex   theodur   prozac   and any as needed meds or inhaler or neb treatments    If you are on prescription narcotic pain medication to control your pain you may also take that medication the morning of surgery.    General Instructions:  Do not eat solid food after midnight the night before surgery.  You may drink clear liquids day of surgery but must stop at least one hour before your hospital arrival time.  It is beneficial for you to have a clear drink that contains carbohydrates the day of surgery.  We suggest a 12 to 20 ounce bottle of Gatorade or Powerade for non-diabetic patients or a 12 to 20 ounce bottle of G2 or Powerade Zero for diabetic patients. (Pediatric patients, are not advised to drink a 12 to 20 ounce carbohydrate drink)    Clear liquids are liquids you can see through.  Nothing red in color.     Plain water                               Sports drinks  Sodas                                   Gelatin (Jell-O)  Fruit juices without pulp such as white grape juice and apple juice  Popsicles that contain no fruit or yogurt  Tea or coffee (no cream or milk added)  Gatorade / Powerade  G2 / Powerade Zero    Infants may have breast milk up to four hours before surgery.  Infants drinking formula may drink formula up to six hours before surgery.   Patients who avoid smoking, chewing tobacco and alcohol for 4 weeks prior to surgery have a reduced risk of post-operative complications.  Quit smoking as many days before surgery as you can.  Do not smoke, use chewing tobacco or drink alcohol the day of surgery.   If applicable bring your C-PAP/ BI-PAP machine.  Bring any papers given to you in the doctor’s office.  Wear clean comfortable clothes.  Do not wear contact lenses, false eyelashes or make-up.  Bring a case for your glasses.   Bring crutches or walker if applicable.  Remove all piercings.  Leave jewelry and any other  valuables at home.  Hair extensions with metal clips must be removed prior to surgery.  The Pre-Admission Testing nurse will instruct you to bring medications if unable to obtain an accurate list in Pre-Admission Testing.        If you were given a blood bank ID arm band remember to bring it with you the day of surgery.    Preventing a Surgical Site Infection:  For 2 to 3 days before surgery, avoid shaving with a razor because the razor can irritate skin and make it easier to develop an infection.    Any areas of open skin can increase the risk of a post-operative wound infection by allowing bacteria to enter and travel throughout the body.  Notify your surgeon if you have any skin wounds / rashes even if it is not near the expected surgical site.  The area will need assessed to determine if surgery should be delayed until it is healed.  The night prior to surgery shower using a fresh bar of anti-bacterial soap (such as Dial) and clean washcloth.  Sleep in a clean bed with clean clothing.  Do not allow pets to sleep with you.  Shower on the morning of surgery using a fresh bar of anti-bacterial soap (such as Dial) and clean washcloth.  Dry with a clean towel and dress in clean clothing.  Ask your surgeon if you will be receiving antibiotics prior to surgery.  Make sure you, your family, and all healthcare providers clean their hands with soap and water or an alcohol based hand  before caring for you or your wound.    Day of surgery:6/21/2022  1100  Your arrival time is approximately two hours before your scheduled surgery time.  Upon arrival, a Pre-op nurse and Anesthesiologist will review your health history, obtain vital signs, and answer questions you may have.  The only belongings needed at this time will be a list of your home medications and if applicable your C-PAP/BI-PAP machine.  A Pre-op nurse will start an IV and you may receive medication in preparation for surgery, including something to help  you relax.     Please be aware that surgery does come with discomfort.  We want to make every effort to control your discomfort so please discuss any uncontrolled symptoms with your nurse.   Your doctor will most likely have prescribed pain medications.      If you are going home after surgery you will receive individualized written care instructions before being discharged.  A responsible adult must drive you to and from the hospital on the day of your surgery and stay with you for 24 hours.  Discharge prescriptions can be filled by the hospital pharmacy during regular pharmacy hours.  If you are having surgery late in the day/evening your prescription may be e-prescribed to your pharmacy.  Please verify your pharmacy hours or chose a 24 hour pharmacy to avoid not having access to your prescription because your pharmacy has closed for the day.    If you are staying overnight following surgery, you will be transported to your hospital room following the recovery period.  [facility] has all private rooms.    If you have any questions please call Pre-Admission Testing at [phone].  Deductibles and co-payments are collected on the day of service. Please be prepared to pay the required co-pay, deductible or deposit on the day of service as defined by your plan.    Patient Education for Self-Quarantine Process    Following your COVID testing, we strongly recommend that you wear a mask when you are with other people and practice social distancing.   Limit your activities to only required outings.  Wash your hands with soap and water frequently for at least 20 seconds.   Avoid touching your eyes, nose and mouth with unwashed hands.  Do not share anything - utensils, drinking glasses, food from the same bowl.   Sanitize household surfaces daily. Include all high touch areas (door handles, light switches, phones, countertops, etc.)    Call your surgeon immediately if you experience any of the following  symptoms:  Sore Throat  Shortness of Breath or difficulty breathing  Cough  Chills  Body soreness or muscle pain  Headache  Fever  New loss of taste or smell  Do not arrive for your surgery ill.  Your procedure will need to be rescheduled to another time.  You will need to call your physician before the day of surgery to avoid any unnecessary exposure to hospital staff as well as other patients.

## 2022-06-15 DIAGNOSIS — M54.31 SCIATICA OF RIGHT SIDE: ICD-10-CM

## 2022-06-15 RX ORDER — CYCLOBENZAPRINE HCL 10 MG
TABLET ORAL
Qty: 30 TABLET | Refills: 2 | Status: SHIPPED | OUTPATIENT
Start: 2022-06-15

## 2022-06-18 ENCOUNTER — LAB (OUTPATIENT)
Dept: LAB | Facility: HOSPITAL | Age: 47
End: 2022-06-18

## 2022-06-18 LAB — SARS-COV-2 ORF1AB RESP QL NAA+PROBE: NOT DETECTED

## 2022-06-18 PROCEDURE — C9803 HOPD COVID-19 SPEC COLLECT: HCPCS

## 2022-06-18 PROCEDURE — U0004 COV-19 TEST NON-CDC HGH THRU: HCPCS

## 2022-06-21 ENCOUNTER — HOSPITAL ENCOUNTER (OUTPATIENT)
Facility: HOSPITAL | Age: 47
Setting detail: HOSPITAL OUTPATIENT SURGERY
Discharge: HOME | End: 2022-06-21
Attending: PLASTIC SURGERY | Admitting: PLASTIC SURGERY

## 2022-06-21 ENCOUNTER — ANESTHESIA (OUTPATIENT)
Dept: PERIOP | Facility: HOSPITAL | Age: 47
End: 2022-06-21

## 2022-06-21 ENCOUNTER — ANESTHESIA EVENT (OUTPATIENT)
Dept: PERIOP | Facility: HOSPITAL | Age: 47
End: 2022-06-21

## 2022-06-21 VITALS
DIASTOLIC BLOOD PRESSURE: 79 MMHG | WEIGHT: 169.53 LBS | OXYGEN SATURATION: 95 % | SYSTOLIC BLOOD PRESSURE: 140 MMHG | RESPIRATION RATE: 16 BRPM | BODY MASS INDEX: 28.21 KG/M2 | TEMPERATURE: 98.4 F | HEART RATE: 87 BPM

## 2022-06-21 DIAGNOSIS — Z90.13 STATUS POST BILATERAL MASTECTOMY: ICD-10-CM

## 2022-06-21 DIAGNOSIS — Z85.3 HISTORY OF LEFT BREAST CANCER: ICD-10-CM

## 2022-06-21 DIAGNOSIS — C50.919 MALIGNANT NEOPLASM OF FEMALE BREAST, UNSPECIFIED ESTROGEN RECEPTOR STATUS, UNSPECIFIED LATERALITY, UNSPECIFIED SITE OF BREAST: Primary | ICD-10-CM

## 2022-06-21 PROCEDURE — 25010000002 PHENYLEPHRINE 10 MG/ML SOLUTION: Performed by: NURSE ANESTHETIST, CERTIFIED REGISTERED

## 2022-06-21 PROCEDURE — 25010000002 CEFAZOLIN IN DEXTROSE 2-4 GM/100ML-% SOLUTION: Performed by: PLASTIC SURGERY

## 2022-06-21 PROCEDURE — 25010000002 FENTANYL CITRATE (PF) 50 MCG/ML SOLUTION: Performed by: ANESTHESIOLOGY

## 2022-06-21 PROCEDURE — 25010000002 FENTANYL CITRATE (PF) 50 MCG/ML SOLUTION: Performed by: NURSE ANESTHETIST, CERTIFIED REGISTERED

## 2022-06-21 PROCEDURE — 25010000002 PROPOFOL 10 MG/ML EMULSION: Performed by: NURSE ANESTHETIST, CERTIFIED REGISTERED

## 2022-06-21 PROCEDURE — 88302 TISSUE EXAM BY PATHOLOGIST: CPT | Performed by: PLASTIC SURGERY

## 2022-06-21 PROCEDURE — 25010000002 MIDAZOLAM PER 1 MG: Performed by: ANESTHESIOLOGY

## 2022-06-21 PROCEDURE — 25010000002 DEXAMETHASONE PER 1 MG: Performed by: NURSE ANESTHETIST, CERTIFIED REGISTERED

## 2022-06-21 PROCEDURE — 25010000002 ONDANSETRON PER 1 MG: Performed by: NURSE ANESTHETIST, CERTIFIED REGISTERED

## 2022-06-21 PROCEDURE — 25010000002 HYDROMORPHONE PER 4 MG: Performed by: NURSE ANESTHETIST, CERTIFIED REGISTERED

## 2022-06-21 DEVICE — KNOTLESS TISSUE CONTROL DEVICE, UNDYED UNIDIRECTIONAL (ANTIBACTERIAL) SYNTHETIC ABSORBABLE DEVICE
Type: IMPLANTABLE DEVICE | Site: BREAST | Status: FUNCTIONAL
Brand: STRATAFIX

## 2022-06-21 RX ORDER — ONDANSETRON 2 MG/ML
4 INJECTION INTRAMUSCULAR; INTRAVENOUS ONCE AS NEEDED
Status: DISCONTINUED | OUTPATIENT
Start: 2022-06-21 | End: 2022-06-21 | Stop reason: HOSPADM

## 2022-06-21 RX ORDER — DIPHENHYDRAMINE HCL 25 MG
25 CAPSULE ORAL
Status: DISCONTINUED | OUTPATIENT
Start: 2022-06-21 | End: 2022-06-21 | Stop reason: HOSPADM

## 2022-06-21 RX ORDER — NALOXONE HCL 0.4 MG/ML
0.2 VIAL (ML) INJECTION AS NEEDED
Status: DISCONTINUED | OUTPATIENT
Start: 2022-06-21 | End: 2022-06-21 | Stop reason: HOSPADM

## 2022-06-21 RX ORDER — FLUMAZENIL 0.1 MG/ML
0.2 INJECTION INTRAVENOUS AS NEEDED
Status: DISCONTINUED | OUTPATIENT
Start: 2022-06-21 | End: 2022-06-21 | Stop reason: HOSPADM

## 2022-06-21 RX ORDER — SCOLOPAMINE TRANSDERMAL SYSTEM 1 MG/1
1 PATCH, EXTENDED RELEASE TRANSDERMAL ONCE
Status: DISCONTINUED | OUTPATIENT
Start: 2022-06-21 | End: 2022-06-21 | Stop reason: SDUPTHER

## 2022-06-21 RX ORDER — SODIUM CHLORIDE, SODIUM LACTATE, POTASSIUM CHLORIDE, CALCIUM CHLORIDE 600; 310; 30; 20 MG/100ML; MG/100ML; MG/100ML; MG/100ML
9 INJECTION, SOLUTION INTRAVENOUS CONTINUOUS
Status: DISCONTINUED | OUTPATIENT
Start: 2022-06-21 | End: 2022-06-21 | Stop reason: HOSPADM

## 2022-06-21 RX ORDER — GLYCOPYRROLATE 0.2 MG/ML
INJECTION INTRAMUSCULAR; INTRAVENOUS AS NEEDED
Status: DISCONTINUED | OUTPATIENT
Start: 2022-06-21 | End: 2022-06-21 | Stop reason: SURG

## 2022-06-21 RX ORDER — FAMOTIDINE 10 MG/ML
20 INJECTION, SOLUTION INTRAVENOUS ONCE
Status: COMPLETED | OUTPATIENT
Start: 2022-06-21 | End: 2022-06-21

## 2022-06-21 RX ORDER — ONDANSETRON 2 MG/ML
INJECTION INTRAMUSCULAR; INTRAVENOUS AS NEEDED
Status: DISCONTINUED | OUTPATIENT
Start: 2022-06-21 | End: 2022-06-21 | Stop reason: SURG

## 2022-06-21 RX ORDER — PROMETHAZINE HYDROCHLORIDE 25 MG/1
25 TABLET ORAL ONCE AS NEEDED
Status: DISCONTINUED | OUTPATIENT
Start: 2022-06-21 | End: 2022-06-21 | Stop reason: HOSPADM

## 2022-06-21 RX ORDER — SCOLOPAMINE TRANSDERMAL SYSTEM 1 MG/1
1 PATCH, EXTENDED RELEASE TRANSDERMAL ONCE
Status: DISCONTINUED | OUTPATIENT
Start: 2022-06-21 | End: 2022-06-21 | Stop reason: HOSPADM

## 2022-06-21 RX ORDER — DIPHENHYDRAMINE HYDROCHLORIDE 50 MG/ML
12.5 INJECTION INTRAMUSCULAR; INTRAVENOUS
Status: DISCONTINUED | OUTPATIENT
Start: 2022-06-21 | End: 2022-06-21 | Stop reason: HOSPADM

## 2022-06-21 RX ORDER — HYDROCODONE BITARTRATE AND ACETAMINOPHEN 7.5; 325 MG/1; MG/1
1 TABLET ORAL ONCE AS NEEDED
Status: COMPLETED | OUTPATIENT
Start: 2022-06-21 | End: 2022-06-21

## 2022-06-21 RX ORDER — PROMETHAZINE HYDROCHLORIDE 25 MG/1
25 SUPPOSITORY RECTAL ONCE AS NEEDED
Status: DISCONTINUED | OUTPATIENT
Start: 2022-06-21 | End: 2022-06-21 | Stop reason: HOSPADM

## 2022-06-21 RX ORDER — SODIUM CHLORIDE 0.9 % (FLUSH) 0.9 %
3 SYRINGE (ML) INJECTION EVERY 12 HOURS SCHEDULED
Status: DISCONTINUED | OUTPATIENT
Start: 2022-06-21 | End: 2022-06-21 | Stop reason: HOSPADM

## 2022-06-21 RX ORDER — PROPOFOL 10 MG/ML
VIAL (ML) INTRAVENOUS AS NEEDED
Status: DISCONTINUED | OUTPATIENT
Start: 2022-06-21 | End: 2022-06-21 | Stop reason: SURG

## 2022-06-21 RX ORDER — CEFAZOLIN SODIUM 2 G/100ML
2 INJECTION, SOLUTION INTRAVENOUS ONCE
Status: COMPLETED | OUTPATIENT
Start: 2022-06-21 | End: 2022-06-21

## 2022-06-21 RX ORDER — MIDAZOLAM HYDROCHLORIDE 1 MG/ML
1 INJECTION INTRAMUSCULAR; INTRAVENOUS
Status: DISCONTINUED | OUTPATIENT
Start: 2022-06-21 | End: 2022-06-21 | Stop reason: HOSPADM

## 2022-06-21 RX ORDER — HYDRALAZINE HYDROCHLORIDE 20 MG/ML
5 INJECTION INTRAMUSCULAR; INTRAVENOUS
Status: DISCONTINUED | OUTPATIENT
Start: 2022-06-21 | End: 2022-06-21 | Stop reason: HOSPADM

## 2022-06-21 RX ORDER — LIDOCAINE HYDROCHLORIDE 20 MG/ML
INJECTION, SOLUTION INFILTRATION; PERINEURAL AS NEEDED
Status: DISCONTINUED | OUTPATIENT
Start: 2022-06-21 | End: 2022-06-21 | Stop reason: SURG

## 2022-06-21 RX ORDER — HYDROCODONE BITARTRATE AND ACETAMINOPHEN 7.5; 325 MG/1; MG/1
1 TABLET ORAL EVERY 4 HOURS PRN
Qty: 30 TABLET | Refills: 0 | Status: SHIPPED | OUTPATIENT
Start: 2022-06-21 | End: 2023-01-10 | Stop reason: HOSPADM

## 2022-06-21 RX ORDER — FENTANYL CITRATE 50 UG/ML
50 INJECTION, SOLUTION INTRAMUSCULAR; INTRAVENOUS
Status: DISCONTINUED | OUTPATIENT
Start: 2022-06-21 | End: 2022-06-21 | Stop reason: HOSPADM

## 2022-06-21 RX ORDER — HYDROMORPHONE HYDROCHLORIDE 1 MG/ML
0.5 INJECTION, SOLUTION INTRAMUSCULAR; INTRAVENOUS; SUBCUTANEOUS
Status: DISCONTINUED | OUTPATIENT
Start: 2022-06-21 | End: 2022-06-21 | Stop reason: HOSPADM

## 2022-06-21 RX ORDER — DEXAMETHASONE SODIUM PHOSPHATE 10 MG/ML
INJECTION INTRAMUSCULAR; INTRAVENOUS AS NEEDED
Status: DISCONTINUED | OUTPATIENT
Start: 2022-06-21 | End: 2022-06-21 | Stop reason: SURG

## 2022-06-21 RX ORDER — BUPIVACAINE HYDROCHLORIDE AND EPINEPHRINE 5; 5 MG/ML; UG/ML
INJECTION, SOLUTION EPIDURAL; INTRACAUDAL; PERINEURAL AS NEEDED
Status: DISCONTINUED | OUTPATIENT
Start: 2022-06-21 | End: 2022-06-21 | Stop reason: HOSPADM

## 2022-06-21 RX ORDER — LIDOCAINE HYDROCHLORIDE 10 MG/ML
0.5 INJECTION, SOLUTION EPIDURAL; INFILTRATION; INTRACAUDAL; PERINEURAL ONCE AS NEEDED
Status: DISCONTINUED | OUTPATIENT
Start: 2022-06-21 | End: 2022-06-21 | Stop reason: HOSPADM

## 2022-06-21 RX ORDER — MAGNESIUM HYDROXIDE 1200 MG/15ML
LIQUID ORAL AS NEEDED
Status: DISCONTINUED | OUTPATIENT
Start: 2022-06-21 | End: 2022-06-21 | Stop reason: HOSPADM

## 2022-06-21 RX ORDER — OXYCODONE AND ACETAMINOPHEN 7.5; 325 MG/1; MG/1
1 TABLET ORAL EVERY 4 HOURS PRN
Status: DISCONTINUED | OUTPATIENT
Start: 2022-06-21 | End: 2022-06-21 | Stop reason: HOSPADM

## 2022-06-21 RX ORDER — SODIUM CHLORIDE, SODIUM LACTATE, POTASSIUM CHLORIDE, CALCIUM CHLORIDE 600; 310; 30; 20 MG/100ML; MG/100ML; MG/100ML; MG/100ML
INJECTION, SOLUTION INTRAVENOUS CONTINUOUS PRN
Status: DISCONTINUED | OUTPATIENT
Start: 2022-06-21 | End: 2022-06-21 | Stop reason: SURG

## 2022-06-21 RX ORDER — FENTANYL CITRATE 50 UG/ML
INJECTION, SOLUTION INTRAMUSCULAR; INTRAVENOUS AS NEEDED
Status: DISCONTINUED | OUTPATIENT
Start: 2022-06-21 | End: 2022-06-21 | Stop reason: SURG

## 2022-06-21 RX ORDER — EPHEDRINE SULFATE 50 MG/ML
5 INJECTION, SOLUTION INTRAVENOUS ONCE AS NEEDED
Status: DISCONTINUED | OUTPATIENT
Start: 2022-06-21 | End: 2022-06-21 | Stop reason: HOSPADM

## 2022-06-21 RX ORDER — LABETALOL HYDROCHLORIDE 5 MG/ML
5 INJECTION, SOLUTION INTRAVENOUS
Status: DISCONTINUED | OUTPATIENT
Start: 2022-06-21 | End: 2022-06-21 | Stop reason: HOSPADM

## 2022-06-21 RX ORDER — SODIUM CHLORIDE 0.9 % (FLUSH) 0.9 %
3-10 SYRINGE (ML) INJECTION AS NEEDED
Status: DISCONTINUED | OUTPATIENT
Start: 2022-06-21 | End: 2022-06-21 | Stop reason: HOSPADM

## 2022-06-21 RX ORDER — PROMETHAZINE HYDROCHLORIDE 25 MG/1
25 TABLET ORAL EVERY 6 HOURS PRN
Qty: 12 TABLET | Refills: 2 | Status: SHIPPED | OUTPATIENT
Start: 2022-06-21

## 2022-06-21 RX ORDER — PHENYLEPHRINE HYDROCHLORIDE 10 MG/ML
INJECTION INTRAVENOUS AS NEEDED
Status: DISCONTINUED | OUTPATIENT
Start: 2022-06-21 | End: 2022-06-21 | Stop reason: SURG

## 2022-06-21 RX ADMIN — LIDOCAINE HYDROCHLORIDE 60 MG: 20 INJECTION, SOLUTION INFILTRATION; PERINEURAL at 13:23

## 2022-06-21 RX ADMIN — GLYCOPYRROLATE 0.2 MG: 0.2 INJECTION INTRAMUSCULAR; INTRAVENOUS at 13:31

## 2022-06-21 RX ADMIN — PROPOFOL 25 MCG/KG/MIN: 10 INJECTION, EMULSION INTRAVENOUS at 13:34

## 2022-06-21 RX ADMIN — SCOPALAMINE 1 PATCH: 1 PATCH, EXTENDED RELEASE TRANSDERMAL at 12:37

## 2022-06-21 RX ADMIN — FENTANYL CITRATE 50 MCG: 50 INJECTION INTRAMUSCULAR; INTRAVENOUS at 16:22

## 2022-06-21 RX ADMIN — PROPOFOL 200 MG: 10 INJECTION, EMULSION INTRAVENOUS at 13:23

## 2022-06-21 RX ADMIN — FENTANYL CITRATE 50 MCG: 50 INJECTION INTRAMUSCULAR; INTRAVENOUS at 15:40

## 2022-06-21 RX ADMIN — SODIUM CHLORIDE, POTASSIUM CHLORIDE, SODIUM LACTATE AND CALCIUM CHLORIDE 9 ML/HR: 600; 310; 30; 20 INJECTION, SOLUTION INTRAVENOUS at 12:37

## 2022-06-21 RX ADMIN — FENTANYL CITRATE 50 MCG: 50 INJECTION INTRAMUSCULAR; INTRAVENOUS at 13:48

## 2022-06-21 RX ADMIN — FENTANYL CITRATE 50 MCG: 50 INJECTION INTRAMUSCULAR; INTRAVENOUS at 12:43

## 2022-06-21 RX ADMIN — MIDAZOLAM 1 MG: 1 INJECTION INTRAMUSCULAR; INTRAVENOUS at 12:44

## 2022-06-21 RX ADMIN — ONDANSETRON 4 MG: 2 INJECTION INTRAMUSCULAR; INTRAVENOUS at 15:28

## 2022-06-21 RX ADMIN — SODIUM CHLORIDE, POTASSIUM CHLORIDE, SODIUM LACTATE AND CALCIUM CHLORIDE: 600; 310; 30; 20 INJECTION, SOLUTION INTRAVENOUS at 13:14

## 2022-06-21 RX ADMIN — HYDROMORPHONE HYDROCHLORIDE 0.5 MG: 1 INJECTION, SOLUTION INTRAMUSCULAR; INTRAVENOUS; SUBCUTANEOUS at 16:52

## 2022-06-21 RX ADMIN — FENTANYL CITRATE 50 MCG: 50 INJECTION INTRAMUSCULAR; INTRAVENOUS at 13:23

## 2022-06-21 RX ADMIN — FENTANYL CITRATE 50 MCG: 50 INJECTION INTRAMUSCULAR; INTRAVENOUS at 15:28

## 2022-06-21 RX ADMIN — FAMOTIDINE 20 MG: 10 INJECTION INTRAVENOUS at 12:44

## 2022-06-21 RX ADMIN — CEFAZOLIN SODIUM 2 G: 2 INJECTION, SOLUTION INTRAVENOUS at 13:06

## 2022-06-21 RX ADMIN — PHENYLEPHRINE HYDROCHLORIDE 100 MCG: 10 INJECTION, SOLUTION INTRAVENOUS at 13:31

## 2022-06-21 RX ADMIN — DEXAMETHASONE SODIUM PHOSPHATE 8 MG: 10 INJECTION INTRAMUSCULAR; INTRAVENOUS at 14:08

## 2022-06-21 RX ADMIN — HYDROCODONE BITARTRATE AND ACETAMINOPHEN 1 TABLET: 7.5; 325 TABLET ORAL at 16:16

## 2022-06-21 NOTE — ANESTHESIA PROCEDURE NOTES
Airway  Urgency: elective    Date/Time: 6/21/2022 1:25 PM  Airway not difficult    General Information and Staff    Patient location during procedure: OR    Indications and Patient Condition  Indications for airway management: airway protection    Preoxygenated: yes  Mask difficulty assessment: 0 - not attempted    Final Airway Details  Final airway type: supraglottic airway      Successful airway: classic  Size 4    Number of attempts at approach: 1  Assessment: lips, teeth, and gum same as pre-op and atraumatic intubation

## 2022-06-21 NOTE — OP NOTE
PREOPERATIVE DIAGNOSIS: Irregular contour, right and left chest, following mastectomies and radiation therapy to the left chest.    POSTOPERATIVE DIAGNOSIS: Irregular contour, right and left chest, following mastectomies and radiation therapy to the left chest.    PROCEDURES PERFORMED:  1.  Revision right chest wall and mastectomy scar, 25 cm x approximately 5 cm at greatest length and width, with primary closure.  2.  Revision left mastectomy scar with elevation and advancement of upper and lower skin flaps, for a total of 27 cm length, with primary closure.      SURGEON: Maurine Waterhouse, MD    ANESTHESIA: General endotracheal anesthesia, supplemented with local injection using 0.25% Marcaine with epinephrine.     INDICATIONS: This is a 46-year-old female who underwent bilateral mastectomies approximately 4 years ago. She had an initial attempt at reconstruction with expanders, however, due to the tight skin closure and radiation resulted in loss of the expander. She has been considering her options with either tissue flap reconstruction or revision of the mastectomies, and has decided to proceed with mastectomy revision in order to obtain a smooth contour of the entire chest with the plan to wear a bra with the prosthesis.     DESCRIPTION OF PROCEDURE: The patient was marked preoperatively and brought to the operating room, where she was placed under general endotracheal anesthesia. The patient was marked preoperatively in a standing position to josefa the large dog ears laterally on each chest extending up into the axilla. A small dog ear medially on the right scar was also marked out. The left chest was marked with an incision following her previous scar. The upper skin was moderately redundant and flapped over the scar with very tight lower pole skin following radiation, resulting in this very tightly adherent to the chest wall.     The chest was prepped and draped out. The excess skin and adipose tissue on  the right was injected with local anesthetic. This was excised along the marking and divided with cautery extending down to prepectoral fascia. Bleeding points were cauterized. This was then closed with interrupted and running 2-0 Vicryl subcutaneous and dermal stitches, followed by a 2-0 Vicryl dermal stitch and a 3-0 Monocryl Stratafix skin closure.     The left side was then treated, first injecting the marked incision along the mid portion of the chest. The skin was incised, and the skin and underlying adipose tissue elevated with cautery, releasing scar tissue from the prepectoral fascia resulting in laxity of this upper flap. The lower skin was then elevated using also using a rake and cautery to elevate all of the prepectoral fascia, moving down to near her previous inframammary fold where a tight radiated scar had created this flap to be very adherent. Once this was adequately loosened, further dissection was carried laterally incising along the redundant dog ear extending up into the axilla and excising this. Bleeding points were cauterized. Transversely the skin was close with interrupted 2-0 Vicryl subcutaneous sutures. The skin was then approximated centrally with staples and the patient sat upright. There appeared a mild amount of redundant skin centrally and therefore this excess skin was marked along the lower flap in order to remove the damaged radiated skin. The patinet was returned to supine position. This excess skin was removed sharply and the incision then closed with interrupted and running 2-0 Vicryl subcutaneous and dermal stitches, followed by skin closure with a 3-0 Monocryl Stratafix skin closure. Benzoin and Steri-Strips were placed across all incisions. The patient was sat upright and wrapped with Kerlix and Ace wrap. The excised left breast scar was sent to pathology for permanent section. Blood loss was approximately 50 mL. The patient tolerated the procedure well and was discharged to  the recovery room in stable condition.

## 2022-06-21 NOTE — H&P
Patient Care Team:  Kehrer, Meredith Lea, MD as PCP - General (Family Medicine)  Demarcus Baker MD as Consulting Physician (Pulmonary Disease)  Trevin Rosen MD as Consulting Physician (Hematology and Oncology)  Miguel A Cyr MD as Surgeon (Hand Surgery)    Chief complaint irregular contour chest wall s/p bilateral mastectomies    Subjective     Patient is a 46 y.o. female presents with large dog ears at lateral chest to axilla and irregular surface to radiated left chest    Review of Systems   Pertinent items are noted in HPI, all other systems reviewed and negative    History  Past Medical History:   Diagnosis Date   • Allergic rhinitis    • Anxiety    • Asthma     has inhaler and neb treatments   • BMI 25.0-25.9,adult    • Cancer (HCC)     left breast   • Community acquired pneumonia    • COPD (chronic obstructive pulmonary disease) (HCC)    • Depression, major    • Disease of thyroid gland    • Diverticular disease    • Foreign body in ear    • GERD (gastroesophageal reflux disease)    • Hashimoto's thyroiditis    • History of abnormal mammogram     Left breast   • History of acute sinusitis    • History of adenocarcinoma of breast    • History of COPD    • History of dyspareunia in female    • History of kidney stones    • History of lump of left breast    • History of malignant neoplasm of left breast    • History of nausea and vomiting    • History of seborrheic dermatitis    • History of strep pharyngitis    • History of suicidal ideation    • Hypothyroidism    • Hypoxemia     History of Hypoxemia   • Obsessive compulsive disorder    • Other screening mammogram    • Personal history of asthma    • PONV (postoperative nausea and vomiting)    • Recurrent genital herpes simplex     History of    • Symptomatic states associated with artificial menopause     History of      Past Surgical History:   Procedure Laterality Date   • APPENDECTOMY     • BREAST AUGMENTATION Bilateral     Revised  2005, initial implants 1992   • BREAST RECONSTRUCTION, BREAST TISSUE EXPANDER INSERTION Bilateral    • CERVICAL CERCLAGE      x 2 during pregnancy, incompetent cervix   • CHOLECYSTECTOMY     • COLONOSCOPY     • HYSTERECTOMY      due to uterine prolapse   • KIDNEY STONE SURGERY     • LUNG SURGERY      bronchoscopy   • MASTECTOMY Bilateral 01/2016    removed lymph nodes on left side   • TISSUE EXPANDER PLACEMENT Bilateral     due to infection     Medications Prior to Admission   Medication Sig Dispense Refill Last Dose   • albuterol (PROVENTIL) 4 MG tablet TAKE 1 TABLET EVERY NIGHT 90 tablet 3 6/20/2022 at 2130   • FLUoxetine (PROzac) 40 MG capsule TAKE 1 CAPSULE DAILY (Patient taking differently: Take 40 mg by mouth 2 (Two) Times a Day.) 90 capsule 3 6/21/2022 at 0930   • Fluticasone Furoate-Vilanterol (Breo Ellipta) 100-25 MCG/INH inhaler Inhale 1 puff Daily. 180 each 3 6/20/2022 at 0800   • letrozole (FEMARA) 2.5 MG tablet Take 2.5 mg by mouth Every Night.   6/20/2022 at 2130   • leuprolide (LUPRON) 11.25 MG injection Inject  into the appropriate muscle as directed by prescriber Every 30 (Thirty) Days.   6/20/2022 at 0900   • montelukast (SINGULAIR) 10 MG tablet TAKE 1 TABLET DAILY (Patient taking differently: Every Night.) 90 tablet 0 6/20/2022 at 2130   • Synthroid 50 MCG tablet TAKE 1 TABLET DAILY (Patient taking differently: Take 50 mcg by mouth Every Morning.) 90 tablet 0 6/21/2022 at 0930   • theophylline (THEODUR) 300 MG 12 hr tablet TAKE 1 TABLET EVERY NIGHT (Patient taking differently: Every Morning.) 90 tablet 3 6/21/2022 at 0930   • valACYclovir (VALTREX) 500 MG tablet TAKE 1 TABLET DAILY (Patient taking differently: Take 500 mg by mouth Every Morning.) 90 tablet 3 6/21/2022 at 0930   • albuterol (PROVENTIL) (2.5 MG/3ML) 0.083% nebulizer solution Take 2.5 mg by nebulization Every 4 (Four) Hours As Needed for Wheezing.   6/7/2022   • albuterol sulfate  (90 Base) MCG/ACT inhaler USE 2 INHALATIONS  EVERY 4 HOURS AS NEEDED FOR WHEEZING 34 g 0 6/18/2022   • benzonatate (TESSALON) 200 MG capsule Take 1 capsule by mouth 3 (Three) Times a Day As Needed for Cough. 30 capsule 0 More than a month at Unknown time   • cyclobenzaprine (FLEXERIL) 10 MG tablet TAKE ONE TABLET BY MOUTH THREE TIMES A DAY AS NEEDED FOR MUSCLE SPASMS 30 tablet 2 6/19/2022   • Dupixent 300 MG/2ML solution pen-injector Inject 300 mg under the skin into the appropriate area as directed. Every 15 days or 2 times monthly   6/15/2022   • fexofenadine (ALLEGRA) 180 MG tablet Take 180 mg by mouth Every Morning.   6/7/2022   • guaiFENesin-codeine (GUAIFENESIN AC) 100-10 MG/5ML liquid 1-2 tsp po  q6 hours prn 118 mL 0 More than a month at Unknown time   • Multiple Vitamin (MULTI-VITAMIN DAILY PO) Take 1 tablet by mouth Every Night.   6/13/2022   • ondansetron ODT (ZOFRAN-ODT) 4 MG disintegrating tablet Place 1 tablet on the tongue Every 8 (Eight) Hours As Needed for Vomiting. 10 tablet 0 More than a month at Unknown time     Allergies:  Patient has no known allergies.    Objective     Vital Signs  Temp:  [98 °F (36.7 °C)] 98 °F (36.7 °C)  Heart Rate:  [70-74] 74  Resp:  [20] 20  BP: (122)/(58) 122/58    Physical Exam:      General Appearance:    Alert, cooperative, in no acute distress   Head:    Normocephalic, without obvious abnormality, atraumatic   Eyes:            Lids and lashes normal, conjunctivae and sclerae normal, no   icterus, no pallor, corneas clear, PERRLA   Ears:    Ears appear intact with no abnormalities noted   Throat:   No oral lesions, no thrush, oral mucosa moist   Neck:   No adenopathy, supple, trachea midline, no thyromegaly, no     carotid bruit, no JVD   Back:     No kyphosis present, no scoliosis present, no skin lesions,       erythema or scars, no tenderness to percussion or                   palpation,   range of motion normal   Lungs:     Clear to auscultation,respirations regular, even and                   unlabored     Heart:    Regular rhythm and normal rate, normal S1 and S2, no            murmur, no gallop, no rub, no click   Breast Exam:    Large lateral chest dog ears and irregular contour to left radiated chest   Abdomen:     Normal bowel sounds, no masses, no organomegaly, soft        non-tender, non-distended, no guarding, no rebound                 tenderness   Genitalia:    Deferred   Extremities:   Moves all extremities well, no edema, no cyanosis, no              redness   Pulses:   Pulses palpable and equal bilaterally   Skin:   No bleeding, bruising or rash   Lymph nodes:   No palpable adenopathy   Neurologic:   Cranial nerves 2 - 12 grossly intact, sensation intact, DTR        present and equal bilaterally              Assessment & Plan       * No active hospital problems. *      Contour irregularities s/p bilateral mastectomies and left XRT- requests contouring closure    I discussed the patients findings and my recommendations with patient.     Maurine Waterhouse, MD  06/21/22  13:02 EDT

## 2022-06-21 NOTE — ANESTHESIA PREPROCEDURE EVALUATION
Anesthesia Evaluation     Patient summary reviewed and Nursing notes reviewed   NPO Solid Status: > 8 hours  NPO Liquid Status: > 8 hours           Airway   Mallampati: II  Neck ROM: full  No difficulty expected  Dental - normal exam     Pulmonary     breath sounds clear to auscultation  (+) pneumonia , COPD, asthma,shortness of breath,   Cardiovascular     Rhythm: regular        Neuro/Psych  (+) psychiatric history Depression and Anxiety,    GI/Hepatic/Renal/Endo    (+)  GERD,  thyroid problem hypothyroidism    Musculoskeletal     Abdominal    Substance History      OB/GYN          Other      history of cancer      Other Comment: Recent surgery on right wrist                  Anesthesia Plan    ASA 2     general     intravenous induction     Anesthetic plan, risks, benefits, and alternatives have been provided, discussed and informed consent has been obtained with: patient.        CODE STATUS:

## 2022-06-21 NOTE — ANESTHESIA POSTPROCEDURE EVALUATION
Patient: Nereida Myles    Procedure Summary     Date: 06/21/22 Room / Location: [facility]  / [facility] OR    Anesthesia Start: 1314 Anesthesia Stop: 1547    Procedure: SCAR REVISION LEFT CHEST 27 CENTIMETERS AND RIGHT CHEST 25 CENTIMETERS FOR AESTHETIC FLAT CLOSURE FOLLOWING MASTECTOMY (Bilateral Breast) Diagnosis:     Surgeons: Waterhouse, Maurine, MD Provider: Cricket Molina MD    Anesthesia Type: general ASA Status: 2          Anesthesia Type: general    Vitals  Vitals Value Taken Time   /71 06/21/22 1601   Temp 36.6 °C (97.9 °F) 06/21/22 1544   Pulse 84 06/21/22 1603   Resp 16 06/21/22 1550   SpO2 91 % 06/21/22 1603   Vitals shown include unvalidated device data.        Post Anesthesia Care and Evaluation    Patient location during evaluation: PACU  Patient participation: complete - patient participated  Level of consciousness: awake and alert  Pain management: adequate    Airway patency: patent  Anesthetic complications: No anesthetic complications    Cardiovascular status: acceptable  Respiratory status: acceptable  Hydration status: acceptable    Comments: --------------------            06/21/22               1550     --------------------   BP:       133/67     Pulse:      84       Resp:       16       Temp:                SpO2:      94%      --------------------

## 2022-06-23 LAB
LAB AP CASE REPORT: NORMAL
PATH REPORT.FINAL DX SPEC: NORMAL
PATH REPORT.GROSS SPEC: NORMAL

## 2022-07-18 ENCOUNTER — PATIENT MESSAGE (OUTPATIENT)
Dept: FAMILY MEDICINE CLINIC | Facility: CLINIC | Age: 47
End: 2022-07-18

## 2022-07-18 DIAGNOSIS — J06.9 ACUTE URI: ICD-10-CM

## 2022-07-18 RX ORDER — BENZONATATE 200 MG/1
200 CAPSULE ORAL 3 TIMES DAILY PRN
Qty: 30 CAPSULE | Refills: 0 | Status: SHIPPED | OUTPATIENT
Start: 2022-07-18 | End: 2023-01-18

## 2022-07-18 NOTE — TELEPHONE ENCOUNTER
From: Nereida Myles  To: Meredith Lea Kehrer, MD  Sent: 7/18/2022 6:59 AM EDT  Subject: Perscription    Good morning,     Dr Kehrer had previously given me Benzonatate for coughing. I do not have a refill. Can I please have it called in to [facility] in [place]?     Thank you.

## 2022-07-28 DIAGNOSIS — E03.8 HYPOTHYROIDISM DUE TO HASHIMOTO'S THYROIDITIS: ICD-10-CM

## 2022-07-28 DIAGNOSIS — E06.3 HYPOTHYROIDISM DUE TO HASHIMOTO'S THYROIDITIS: ICD-10-CM

## 2022-07-29 RX ORDER — MONTELUKAST SODIUM 10 MG/1
TABLET ORAL
Qty: 90 TABLET | Refills: 0 | Status: SHIPPED | OUTPATIENT
Start: 2022-07-29 | End: 2022-12-12

## 2022-07-29 RX ORDER — LEVOTHYROXINE SODIUM 0.05 MG/1
TABLET ORAL
Qty: 90 TABLET | Refills: 0 | Status: SHIPPED | OUTPATIENT
Start: 2022-07-29 | End: 2022-10-11

## 2022-08-03 ENCOUNTER — PATIENT MESSAGE (OUTPATIENT)
Dept: FAMILY MEDICINE CLINIC | Facility: CLINIC | Age: 47
End: 2022-08-03

## 2022-08-03 DIAGNOSIS — E06.3 HYPOTHYROIDISM DUE TO HASHIMOTO'S THYROIDITIS: Primary | ICD-10-CM

## 2022-08-03 DIAGNOSIS — E03.8 HYPOTHYROIDISM DUE TO HASHIMOTO'S THYROIDITIS: Primary | ICD-10-CM

## 2022-08-04 NOTE — TELEPHONE ENCOUNTER
From: Nereida Myles  Sent: 8/3/2022 6:10 PM EDT  To: [facility]  Subject: Thyroid    That was not the question I asked. I am asking:”Am I due to have my thyroid checked?” Thank you.

## 2022-08-17 DIAGNOSIS — J45.51 SEVERE PERSISTENT ASTHMA WITH ACUTE EXACERBATION: ICD-10-CM

## 2022-08-17 DIAGNOSIS — F33.42 RECURRENT MAJOR DEPRESSIVE DISORDER, IN FULL REMISSION: ICD-10-CM

## 2022-08-17 RX ORDER — THEOPHYLLINE 300 MG/1
TABLET, EXTENDED RELEASE ORAL
Qty: 30 TABLET | OUTPATIENT
Start: 2022-08-17

## 2022-08-17 RX ORDER — FLUOXETINE HYDROCHLORIDE 40 MG/1
CAPSULE ORAL
Qty: 60 CAPSULE | OUTPATIENT
Start: 2022-08-17

## 2022-08-18 DIAGNOSIS — F33.42 RECURRENT MAJOR DEPRESSIVE DISORDER, IN FULL REMISSION: ICD-10-CM

## 2022-08-18 RX ORDER — FLUOXETINE HYDROCHLORIDE 40 MG/1
80 CAPSULE ORAL DAILY
Qty: 180 CAPSULE | Refills: 1 | Status: ON HOLD | OUTPATIENT
Start: 2022-08-18 | End: 2023-01-09

## 2022-09-19 ENCOUNTER — TELEPHONE (OUTPATIENT)
Dept: FAMILY MEDICINE CLINIC | Facility: CLINIC | Age: 47
End: 2022-09-19

## 2022-09-21 ENCOUNTER — OFFICE VISIT (OUTPATIENT)
Dept: FAMILY MEDICINE CLINIC | Facility: CLINIC | Age: 47
End: 2022-09-21

## 2022-09-21 VITALS
SYSTOLIC BLOOD PRESSURE: 120 MMHG | DIASTOLIC BLOOD PRESSURE: 80 MMHG | WEIGHT: 173.4 LBS | HEIGHT: 65 IN | BODY MASS INDEX: 28.89 KG/M2 | OXYGEN SATURATION: 98 % | HEART RATE: 76 BPM | TEMPERATURE: 98.4 F

## 2022-09-21 DIAGNOSIS — K59.00 CONSTIPATION, UNSPECIFIED CONSTIPATION TYPE: Primary | ICD-10-CM

## 2022-09-21 DIAGNOSIS — R12 HEARTBURN: ICD-10-CM

## 2022-09-21 PROCEDURE — 99213 OFFICE O/P EST LOW 20 MIN: CPT | Performed by: FAMILY MEDICINE

## 2022-09-21 NOTE — PROGRESS NOTES
"Chief Complaint  GI Problem (Indigestion  abnormal bowel movements  going on about 3 weeks )    Subjective        Nereida Myles presents to [facility]  History of Present Illness  Nereida Myles is a 46-year-old female who presents to clinic today with complaints of indigestion and abnormal bowel movements. She is a patient of Dr. Kehrer.     The patient states that she has been experiencing GI symptoms for the last couple of weeks that include indigestion, heartburn, reflux, and significantly abnormal bowel movements. She has not had issues with heartburn in the past. She notes that she is typically constipated and it is normal for her to have a bowel movement once every 3 days. She states that she has not been on a bowel regimen consisting of products such as Benefiber or MiraLax for years, but she has used fiber smoothies in the past. However, over the past couple of weeks, the frequency of bowel movements has been reduced and the size of her bowel movements have decreased as well. She feels significantly bloated and \"full\". She also has had some mild abdominal pain, but notes that this has only been occasional and is not currently experiencing any pain, just discomfort due to bloating. She has noticed a mucus substance in her bowel movements and is concerned about this.     She has tried taking Prevacid for her heartburn. She has also tried Senokot for the constipation, which she used during chemotherapy. She states she tried taking 2 of these for 2 or 3 days, but was only was eliminating mucus. She states that she then used a Fleet Enema on Monday, 09/19/2022, but only eliminated a small amount of very formed stool. She notes that she has a hemorrhoid, thus using the enema caused a burning sensation. She has since eliminated small amounts of stool \"here and there\".     She states that she has been experiencing nausea when she eats and is unsure if being bloated may be " "attributing to this. She reports that she is eating, however.    Of note, she has a history of diverticulitis and as well as had a volvulus approximately 9 years ago. She notes that the volvulus was thought to have been a complication from a prior hysterectomy. She had a colonoscopy performed 2 to 3 years ago and reports that this was normal.     Objective   Vital Signs:  /80   Pulse 76   Temp 98.4 °F (36.9 °C)   Ht 165.1 cm (65\")   Wt 78.7 kg (173 lb 6.4 oz)   SpO2 98%   BMI 28.86 kg/m²   Estimated body mass index is 28.86 kg/m² as calculated from the following:    Height as of this encounter: 165.1 cm (65\").    Weight as of this encounter: 78.7 kg (173 lb 6.4 oz).          Physical Exam  Vitals and nursing note reviewed.   Constitutional:       Appearance: Normal appearance. She is well-developed. She is obese.   HENT:      Head: Normocephalic and atraumatic.      Right Ear: External ear normal.      Left Ear: External ear normal.      Nose: Nose normal.   Eyes:      General: No scleral icterus.     Conjunctiva/sclera: Conjunctivae normal.   Pulmonary:      Effort: Pulmonary effort is normal.   Abdominal:      General: Bowel sounds are normal. There is no distension.      Palpations: Abdomen is soft. There is no mass.      Tenderness: There is no abdominal tenderness. There is no right CVA tenderness, left CVA tenderness, guarding or rebound.      Hernia: No hernia is present.   Musculoskeletal:      Cervical back: Normal range of motion and neck supple.      Right lower leg: No edema.      Left lower leg: No edema.   Lymphadenopathy:      Cervical: No cervical adenopathy.   Skin:     General: Skin is warm and dry.      Findings: No rash.   Neurological:      Mental Status: She is alert and oriented to person, place, and time.   Psychiatric:         Mood and Affect: Mood normal.         Behavior: Behavior normal.         Thought Content: Thought content normal.         Judgment: Judgment normal.      "   Result Review :  The following data was reviewed by: Liz Justice DO on 09/21/2022:  Common labs    Common Labs 1/17/22 1/17/22 1/17/22 5/25/22 5/25/22 6/2/22 6/2/22    1535 1535 1535 1408 1408 1526 1526   Glucose   83  94  81   BUN   15  8  12   Creatinine   0.70  0.79  0.69   eGFR Non  Am   103       eGFR African Am   118       Sodium   140  135 (A)  142   Potassium   4.0  3.5  3.7   Chloride   104  102  103   Calcium   9.8  9.1  9.1   Total Protein   6.9       Albumin   4.5  4.50     Total Bilirubin   0.2  0.3     Alkaline Phosphatase   94  95     AST (SGOT)   22  17     ALT (SGPT)   24  22     WBC  5.3  5.91  5.18    Hemoglobin  13.9  13.3  13.3    Hematocrit  40.9  41.2  39.2    Platelets  331  305  334    Total Cholesterol 193         Triglycerides 192 (A)         HDL Cholesterol 80         LDL Cholesterol  81         (A) Abnormal value       Comments are available for some flowsheets but are not being displayed.                     Assessment and Plan   Diagnoses and all orders for this visit:    1. Constipation, unspecified constipation type (Primary)    2. Heartburn      I discussed that, based on her subjective report of her symptoms, this is most likely constipation. On exam, she did not experience pain when pressing on her abdomen and her abdomen felt soft. Additionally, she is not currently experiencing any pain, thus, I explained that we have the option of trying to resolve the constipation using a few different tactics.  I recommend she try using a total of 2 glycerin suppositories, 1 every 12 hours. If she has no improvement in symptoms after the second suppository, she can then try a Fleet Enema. I discussed that A&E ointment or Aquaphor can be used to protect her rectum prior to insertion. I advised her to go to the ER if she is experiencing any pain at all, as pain would be an indication that these self-treatment methods are not working. I discussed that if she is able to tolerate  MiraLAX, she can take 8 ounces of MiraLAX twice a day, once in the morning and once in the evening.  I advised her to hydrate throughout the day and to consume more plant-based proteins than animal-based proteins as well as minimize her dairy intake. I also advised her to avoid caffeine, alcohol, and carbonated beverages.  If these efforts work and the patient has resolution of her constipation she should continue maintenance MiraLAX dosing daily.    For heartburn, I recommend she use Pepcid 20 mg twice a day, as this typically works faster and is more effective than Prevacid or omeprazole. I advised her against taking Tums or other calcium products as this could cause increased constipation.          Follow Up   No follow-ups on file.  Patient was given instructions and counseling regarding her condition or for health maintenance advice. Please see specific information pulled into the AVS if appropriate.     Transcribed from ambient dictation for Liz Justice DO by KEELEY MCMAHON, Quality .  09/21/22   15:20 EDT    Patient verbalized consent to the visit recording.

## 2022-10-11 DIAGNOSIS — E03.8 HYPOTHYROIDISM DUE TO HASHIMOTO'S THYROIDITIS: ICD-10-CM

## 2022-10-11 DIAGNOSIS — E06.3 HYPOTHYROIDISM DUE TO HASHIMOTO'S THYROIDITIS: ICD-10-CM

## 2022-10-11 RX ORDER — LEVOTHYROXINE SODIUM 0.05 MG/1
TABLET ORAL
Qty: 90 TABLET | Refills: 3 | Status: SHIPPED | OUTPATIENT
Start: 2022-10-11

## 2022-10-19 ENCOUNTER — FLU SHOT (OUTPATIENT)
Dept: FAMILY MEDICINE CLINIC | Facility: CLINIC | Age: 47
End: 2022-10-19

## 2022-10-19 DIAGNOSIS — Z23 NEEDS FLU SHOT: Primary | ICD-10-CM

## 2022-10-19 DIAGNOSIS — Z23 NEED FOR PROPHYLACTIC VACCINATION AGAINST STREPTOCOCCUS PNEUMONIAE (PNEUMOCOCCUS): ICD-10-CM

## 2022-10-19 PROCEDURE — 90471 IMMUNIZATION ADMIN: CPT | Performed by: FAMILY MEDICINE

## 2022-10-19 PROCEDURE — 90677 PCV20 VACCINE IM: CPT | Performed by: FAMILY MEDICINE

## 2022-10-19 PROCEDURE — 90472 IMMUNIZATION ADMIN EACH ADD: CPT | Performed by: FAMILY MEDICINE

## 2022-10-19 PROCEDURE — 90686 IIV4 VACC NO PRSV 0.5 ML IM: CPT | Performed by: FAMILY MEDICINE

## 2022-11-03 ENCOUNTER — HOSPITAL ENCOUNTER (OUTPATIENT)
Dept: CT IMAGING | Facility: HOSPITAL | Age: 47
Discharge: HOME | End: 2022-11-03
Admitting: FAMILY MEDICINE

## 2022-11-03 ENCOUNTER — OFFICE VISIT (OUTPATIENT)
Dept: FAMILY MEDICINE CLINIC | Facility: CLINIC | Age: 47
End: 2022-11-03

## 2022-11-03 ENCOUNTER — TELEPHONE (OUTPATIENT)
Dept: FAMILY MEDICINE CLINIC | Facility: CLINIC | Age: 47
End: 2022-11-03

## 2022-11-03 VITALS
WEIGHT: 172.2 LBS | DIASTOLIC BLOOD PRESSURE: 70 MMHG | BODY MASS INDEX: 28.69 KG/M2 | HEART RATE: 85 BPM | SYSTOLIC BLOOD PRESSURE: 106 MMHG | HEIGHT: 65 IN | TEMPERATURE: 98.4 F | OXYGEN SATURATION: 100 %

## 2022-11-03 DIAGNOSIS — R10.31 RIGHT LOWER QUADRANT ABDOMINAL PAIN: ICD-10-CM

## 2022-11-03 DIAGNOSIS — R31.29 OTHER MICROSCOPIC HEMATURIA: ICD-10-CM

## 2022-11-03 DIAGNOSIS — R11.0 NAUSEA: ICD-10-CM

## 2022-11-03 DIAGNOSIS — N20.0 KIDNEY STONE: Primary | ICD-10-CM

## 2022-11-03 LAB
BILIRUB BLD-MCNC: NEGATIVE MG/DL
CLARITY, POC: CLEAR
COLOR UR: YELLOW
EXPIRATION DATE: ABNORMAL
GLUCOSE UR STRIP-MCNC: NEGATIVE MG/DL
KETONES UR QL: NEGATIVE
LEUKOCYTE EST, POC: NEGATIVE
Lab: ABNORMAL
NITRITE UR-MCNC: NEGATIVE MG/ML
PH UR: 6 [PH] (ref 5–8)
PROT UR STRIP-MCNC: NEGATIVE MG/DL
RBC # UR STRIP: ABNORMAL /UL
SP GR UR: 1.02 (ref 1–1.03)
UROBILINOGEN UR QL: NORMAL

## 2022-11-03 PROCEDURE — 81003 URINALYSIS AUTO W/O SCOPE: CPT | Performed by: FAMILY MEDICINE

## 2022-11-03 PROCEDURE — 96372 THER/PROPH/DIAG INJ SC/IM: CPT | Performed by: FAMILY MEDICINE

## 2022-11-03 PROCEDURE — 74176 CT ABD & PELVIS W/O CONTRAST: CPT

## 2022-11-03 PROCEDURE — 99214 OFFICE O/P EST MOD 30 MIN: CPT | Performed by: FAMILY MEDICINE

## 2022-11-03 RX ORDER — ONDANSETRON 2 MG/ML
4 INJECTION INTRAMUSCULAR; INTRAVENOUS ONCE
Status: COMPLETED | OUTPATIENT
Start: 2022-11-03 | End: 2022-11-03

## 2022-11-03 RX ORDER — TAMSULOSIN HYDROCHLORIDE 0.4 MG/1
1 CAPSULE ORAL DAILY
Qty: 14 CAPSULE | Refills: 0 | Status: SHIPPED | OUTPATIENT
Start: 2022-11-03

## 2022-11-03 RX ORDER — TAMOXIFEN CITRATE 20 MG/1
20 TABLET ORAL DAILY
Qty: 30 TABLET | Refills: 2 | COMMUNITY
Start: 2022-10-17 | End: 2023-01-15

## 2022-11-03 RX ORDER — KETOROLAC TROMETHAMINE 30 MG/ML
60 INJECTION, SOLUTION INTRAMUSCULAR; INTRAVENOUS ONCE
Status: COMPLETED | OUTPATIENT
Start: 2022-11-03 | End: 2022-11-03

## 2022-11-03 RX ADMIN — ONDANSETRON 4 MG: 2 INJECTION INTRAMUSCULAR; INTRAVENOUS at 09:50

## 2022-11-03 RX ADMIN — KETOROLAC TROMETHAMINE 60 MG: 30 INJECTION, SOLUTION INTRAMUSCULAR; INTRAVENOUS at 09:49

## 2022-11-03 NOTE — PROGRESS NOTES
"Chief Complaint  Flank Pain (Right side history of kidney stones )    Subjective        Neredia Myles presents to [facility]  History of Present Illness    The patient presents today with complaints of right lower back pain. She is a patient of Dr. Kehrer.     The patient states she has been experiencing right lower back pain for approximately 6 weeks. She states she was switched from Femara to tamoxifen due to muscular and joint pain and \"horrible\" side effects. The patient states she believes she has a kidney stone. She states she has had kidney stones in the past, and the pain is coming around the right flank into her abdomen now. The patient states the more she walks, the more it hurts into the right side of her abdomen.     She states she has been urinating more frequently. The patient denies any urinary symptoms such as burning. She states the last time she had issues with a kidney stone it was on the Right side    She states she was diagnosed with breast cancer in 10/2015. The patient states she is currently in remission. She states she was on tamoxifen for a long time and then she was put on Femara for 6 months, but it did not help. The patient states it was to the point she could not walk or function due to side effects of Femara. She states she will be on tamoxifen for 10 years. The patient states over the past few days her pain has been getting progressively worse and it has gotten more intense. She denies any new locations of pain, but states she has been experiencing it more frequently.     The patient states she has been nauseous for the past 24 hours. She states she has had a cholecystectomy. The patient states she had chills last night.  Denies fevers.  She states she has Zofran and Phenergan at home, but she has not been able to swallow a pill.     Objective   Vital Signs:  /70   Pulse 85   Temp 98.4 °F (36.9 °C)   Ht 165.1 cm (65\")   Wt 78.1 kg (172 lb " "3.2 oz)   SpO2 100%   BMI 28.66 kg/m²   Estimated body mass index is 28.66 kg/m² as calculated from the following:    Height as of this encounter: 165.1 cm (65\").    Weight as of this encounter: 78.1 kg (172 lb 3.2 oz).          Physical Exam  Vitals and nursing note reviewed.   Constitutional:       Appearance: Normal appearance. She is well-developed and overweight.   HENT:      Head: Normocephalic and atraumatic.      Right Ear: External ear normal.      Left Ear: External ear normal.      Nose: Nose normal.   Eyes:      General: No scleral icterus.     Conjunctiva/sclera: Conjunctivae normal.   Cardiovascular:      Rate and Rhythm: Normal rate and regular rhythm.      Heart sounds: Normal heart sounds.   Pulmonary:      Effort: Pulmonary effort is normal.      Breath sounds: Normal breath sounds.   Abdominal:      General: Bowel sounds are normal. There is no distension.      Palpations: Abdomen is soft. There is no mass.      Tenderness: There is abdominal tenderness (Mild RLQ). There is no right CVA tenderness, left CVA tenderness, guarding or rebound.      Hernia: No hernia is present.   Musculoskeletal:      Cervical back: Normal range of motion and neck supple.   Lymphadenopathy:      Cervical: No cervical adenopathy.   Skin:     General: Skin is warm and dry.      Findings: No rash.   Neurological:      Mental Status: She is alert and oriented to person, place, and time.   Psychiatric:         Mood and Affect: Mood normal.         Behavior: Behavior normal.         Thought Content: Thought content normal.         Judgment: Judgment normal.        Result Review :      Data reviewed: Radiologic studies Below         CT Abdomen Pelvis Without Contrast (11/03/2022 14:35)  CT Abdomen & Pelvis W IV Contrast Without Oral Contrast (08/19/2019 15:30)    Assessment and Plan   Diagnoses and all orders for this visit:    1. Kidney stone (Primary)  -     POCT urinalysis dipstick, automated    2. Right lower quadrant " "abdominal pain  -     Urine Culture - Urine, Urine, Clean Catch  -     Amylase  -     CBC & Differential  -     Comprehensive Metabolic Panel  -     Lipase  -     CT Abdomen Pelvis Without Contrast; Future  -     ketorolac (TORADOL) injection 60 mg    3. Nausea  -     Amylase  -     CBC & Differential  -     Comprehensive Metabolic Panel  -     Lipase  -     CT Abdomen Pelvis Without Contrast; Future  -     ondansetron (ZOFRAN) injection 4 mg    4. Other microscopic hematuria  -     Urine Culture - Urine, Urine, Clean Catch  -     CT Abdomen Pelvis Without Contrast; Future        Patient seen today for an acute new problem of right lower quadrant and right flank pain, microscopic hematuria and nausea.  Her CT showed a right ureteral stone as well as a nonobstructive stone in the right kidney.  There was no hydronephrosis but there was slight dilation in the right renal collecting system.  Patient has seen Dr. Ornelas, urology in the past.  I entered a urgent referral.  The patient was given a prescription for Flomax and advised to take that daily until she sees the urologist.  If she has any worsening symptoms she was advised to go to the ER.  She was also advised to drink plenty of water and avoid alcohol and caffeine until her symptoms are gone.  Results of the blood work will be called to the patient tomorrow when available.  Of note the CT without contrast noted nonspecific low-attenuation liver lesions.  Although the patient is a breast cancer survivor she has had a prior CT with contrast of the abdomen and pelvis in 2019 that showed \"multiple liver cysts.\"  Therefore I discussed with the patient that I did not feel it necessary at this time to work this up further but that if she felt it was important at a later date she should bring it up with her PCP, Dr. Kehrer.    I spent 36 minutes caring for Nereida on this date of service. This time includes time spent by me in the following activities:performing a " medically appropriate examination and/or evaluation , counseling and educating the patient/family/caregiver, ordering medications, tests, or procedures, documenting information in the medical record and independently interpreting results and communicating that information with the patient/family/caregiver  Follow Up   Return per results of imaging and labs.  Patient was given instructions and counseling regarding her condition or for health maintenance advice. Please see specific information pulled into the AVS if appropriate.     Transcribed from ambient dictation for Liz Justice DO by Dipika Chavez.  11/03/22   11:03 EDT    Patient or patient representative verbalized consent to the visit recording.  I have personally performed the services described in this document as transcribed by the above individual, and it is both accurate and complete.

## 2022-11-03 NOTE — TELEPHONE ENCOUNTER
Caller: Nereida Myles    Relationship: Self    Best call back number: [phone]    What test was performed: CT SCAN    When was the test performed: 11/03/2022    Additional notes: PATIENT STATED THAT SHE HAD A CT SCAN DONE TODAY AND WANTED TO CHECK IF HER RESULTS WERE AVAILABLE BEFORE THE OFFICE CLOSES. PLEASE ADVISE.

## 2022-11-05 LAB
ALBUMIN SERPL-MCNC: 4.7 G/DL (ref 3.5–5.2)
ALBUMIN/GLOB SERPL: 2.4 G/DL
ALP SERPL-CCNC: 129 U/L (ref 39–117)
ALT SERPL-CCNC: 15 U/L (ref 1–33)
AMYLASE SERPL-CCNC: 48 U/L (ref 28–100)
AST SERPL-CCNC: 19 U/L (ref 1–32)
BACTERIA UR CULT: NORMAL
BACTERIA UR CULT: NORMAL
BASOPHILS # BLD AUTO: 0.06 10*3/MM3 (ref 0–0.2)
BASOPHILS NFR BLD AUTO: 0.6 % (ref 0–1.5)
BILIRUB SERPL-MCNC: 0.7 MG/DL (ref 0–1.2)
BUN SERPL-MCNC: 11 MG/DL (ref 6–20)
BUN/CREAT SERPL: 14.1 (ref 7–25)
CALCIUM SERPL-MCNC: 9.8 MG/DL (ref 8.6–10.5)
CHLORIDE SERPL-SCNC: 103 MMOL/L (ref 98–107)
CO2 SERPL-SCNC: 26 MMOL/L (ref 22–29)
CREAT SERPL-MCNC: 0.78 MG/DL (ref 0.57–1)
EGFRCR SERPLBLD CKD-EPI 2021: 93.2 ML/MIN/1.73
EOSINOPHIL # BLD AUTO: 0.37 10*3/MM3 (ref 0–0.4)
EOSINOPHIL NFR BLD AUTO: 3.7 % (ref 0.3–6.2)
ERYTHROCYTE [DISTWIDTH] IN BLOOD BY AUTOMATED COUNT: 13.1 % (ref 12.3–15.4)
GLOBULIN SER CALC-MCNC: 2 GM/DL
GLUCOSE SERPL-MCNC: 97 MG/DL (ref 65–99)
HCT VFR BLD AUTO: 40 % (ref 34–46.6)
HGB BLD-MCNC: 13.5 G/DL (ref 12–15.9)
IMM GRANULOCYTES # BLD AUTO: 0.03 10*3/MM3 (ref 0–0.05)
IMM GRANULOCYTES NFR BLD AUTO: 0.3 % (ref 0–0.5)
LIPASE SERPL-CCNC: 24 U/L (ref 13–60)
LYMPHOCYTES # BLD AUTO: 1.08 10*3/MM3 (ref 0.7–3.1)
LYMPHOCYTES NFR BLD AUTO: 10.8 % (ref 19.6–45.3)
MCH RBC QN AUTO: 30.4 PG (ref 26.6–33)
MCHC RBC AUTO-ENTMCNC: 33.8 G/DL (ref 31.5–35.7)
MCV RBC AUTO: 90.1 FL (ref 79–97)
MONOCYTES # BLD AUTO: 0.73 10*3/MM3 (ref 0.1–0.9)
MONOCYTES NFR BLD AUTO: 7.3 % (ref 5–12)
NEUTROPHILS # BLD AUTO: 7.7 10*3/MM3 (ref 1.7–7)
NEUTROPHILS NFR BLD AUTO: 77.3 % (ref 42.7–76)
NRBC BLD AUTO-RTO: 0 /100 WBC (ref 0–0.2)
PLATELET # BLD AUTO: 406 10*3/MM3 (ref 140–450)
POTASSIUM SERPL-SCNC: 4.4 MMOL/L (ref 3.5–5.2)
PROT SERPL-MCNC: 6.7 G/DL (ref 6–8.5)
RBC # BLD AUTO: 4.44 10*6/MM3 (ref 3.77–5.28)
SODIUM SERPL-SCNC: 140 MMOL/L (ref 136–145)
WBC # BLD AUTO: 9.97 10*3/MM3 (ref 3.4–10.8)

## 2022-12-12 RX ORDER — MONTELUKAST SODIUM 10 MG/1
TABLET ORAL
Qty: 90 TABLET | Refills: 1 | Status: SHIPPED | OUTPATIENT
Start: 2022-12-12 | End: 2023-01-18 | Stop reason: SDUPTHER

## 2023-01-07 ENCOUNTER — HOSPITAL ENCOUNTER (OUTPATIENT)
Facility: HOSPITAL | Age: 48
Setting detail: OBSERVATION
Discharge: HOME | End: 2023-01-10
Attending: EMERGENCY MEDICINE | Admitting: HOSPITALIST
Payer: COMMERCIAL

## 2023-01-07 ENCOUNTER — APPOINTMENT (OUTPATIENT)
Dept: CT IMAGING | Facility: HOSPITAL | Age: 48
End: 2023-01-07
Payer: COMMERCIAL

## 2023-01-07 DIAGNOSIS — R11.2 NAUSEA AND VOMITING, UNSPECIFIED VOMITING TYPE: ICD-10-CM

## 2023-01-07 DIAGNOSIS — R50.9 FEVER, UNSPECIFIED FEVER CAUSE: ICD-10-CM

## 2023-01-07 DIAGNOSIS — C50.919 MALIGNANT NEOPLASM OF FEMALE BREAST, UNSPECIFIED ESTROGEN RECEPTOR STATUS, UNSPECIFIED LATERALITY, UNSPECIFIED SITE OF BREAST: ICD-10-CM

## 2023-01-07 DIAGNOSIS — F33.42 RECURRENT MAJOR DEPRESSIVE DISORDER, IN FULL REMISSION: ICD-10-CM

## 2023-01-07 DIAGNOSIS — N12 PYELONEPHRITIS: Primary | ICD-10-CM

## 2023-01-07 PROBLEM — J44.9 COPD (CHRONIC OBSTRUCTIVE PULMONARY DISEASE): Status: ACTIVE | Noted: 2023-01-07

## 2023-01-07 LAB
ALBUMIN SERPL-MCNC: 4.2 G/DL (ref 3.5–5.2)
ALBUMIN/GLOB SERPL: 1.5 G/DL
ALP SERPL-CCNC: 97 U/L (ref 39–117)
ALT SERPL W P-5'-P-CCNC: 14 U/L (ref 1–33)
ANION GAP SERPL CALCULATED.3IONS-SCNC: 10 MMOL/L (ref 5–15)
AST SERPL-CCNC: 15 U/L (ref 1–32)
BACTERIA UR QL AUTO: ABNORMAL /HPF
BASOPHILS # BLD AUTO: 0.04 10*3/MM3 (ref 0–0.2)
BASOPHILS NFR BLD AUTO: 0.4 % (ref 0–1.5)
BILIRUB SERPL-MCNC: 0.7 MG/DL (ref 0–1.2)
BILIRUB UR QL STRIP: NEGATIVE
BUN SERPL-MCNC: 13 MG/DL (ref 6–20)
BUN/CREAT SERPL: 15.1 (ref 7–25)
CALCIUM SPEC-SCNC: 9.2 MG/DL (ref 8.6–10.5)
CHLORIDE SERPL-SCNC: 106 MMOL/L (ref 98–107)
CLARITY UR: ABNORMAL
CO2 SERPL-SCNC: 24 MMOL/L (ref 22–29)
COLOR UR: ABNORMAL
CREAT SERPL-MCNC: 0.86 MG/DL (ref 0.57–1)
D-LACTATE SERPL-SCNC: 0.9 MMOL/L (ref 0.5–2)
DEPRECATED RDW RBC AUTO: 41.5 FL (ref 37–54)
EGFRCR SERPLBLD CKD-EPI 2021: 82.9 ML/MIN/1.73
EOSINOPHIL # BLD AUTO: 0.07 10*3/MM3 (ref 0–0.4)
EOSINOPHIL NFR BLD AUTO: 0.7 % (ref 0.3–6.2)
ERYTHROCYTE [DISTWIDTH] IN BLOOD BY AUTOMATED COUNT: 12.7 % (ref 12.3–15.4)
GLOBULIN UR ELPH-MCNC: 2.8 GM/DL
GLUCOSE SERPL-MCNC: 100 MG/DL (ref 65–99)
GLUCOSE UR STRIP-MCNC: NEGATIVE MG/DL
HCT VFR BLD AUTO: 37.8 % (ref 34–46.6)
HGB BLD-MCNC: 12.6 G/DL (ref 12–15.9)
HGB UR QL STRIP.AUTO: ABNORMAL
HYALINE CASTS UR QL AUTO: ABNORMAL /LPF
IMM GRANULOCYTES # BLD AUTO: 0.05 10*3/MM3 (ref 0–0.05)
IMM GRANULOCYTES NFR BLD AUTO: 0.5 % (ref 0–0.5)
KETONES UR QL STRIP: ABNORMAL
LEUKOCYTE ESTERASE UR QL STRIP.AUTO: ABNORMAL
LYMPHOCYTES # BLD AUTO: 0.8 10*3/MM3 (ref 0.7–3.1)
LYMPHOCYTES NFR BLD AUTO: 8 % (ref 19.6–45.3)
MCH RBC QN AUTO: 29.3 PG (ref 26.6–33)
MCHC RBC AUTO-ENTMCNC: 33.3 G/DL (ref 31.5–35.7)
MCV RBC AUTO: 87.9 FL (ref 79–97)
MONOCYTES # BLD AUTO: 0.84 10*3/MM3 (ref 0.1–0.9)
MONOCYTES NFR BLD AUTO: 8.4 % (ref 5–12)
NEUTROPHILS NFR BLD AUTO: 8.25 10*3/MM3 (ref 1.7–7)
NEUTROPHILS NFR BLD AUTO: 82 % (ref 42.7–76)
NITRITE UR QL STRIP: POSITIVE
NRBC BLD AUTO-RTO: 0 /100 WBC (ref 0–0.2)
PH UR STRIP.AUTO: 7.5 [PH] (ref 5–8)
PLATELET # BLD AUTO: 311 10*3/MM3 (ref 140–450)
PMV BLD AUTO: 9.5 FL (ref 6–12)
POTASSIUM SERPL-SCNC: 3.8 MMOL/L (ref 3.5–5.2)
PROCALCITONIN SERPL-MCNC: 0.07 NG/ML (ref 0–0.25)
PROT SERPL-MCNC: 7 G/DL (ref 6–8.5)
PROT UR QL STRIP: ABNORMAL
RBC # BLD AUTO: 4.3 10*6/MM3 (ref 3.77–5.28)
RBC # UR STRIP: ABNORMAL /HPF
REF LAB TEST METHOD: ABNORMAL
SODIUM SERPL-SCNC: 140 MMOL/L (ref 136–145)
SP GR UR STRIP: 1.01 (ref 1–1.03)
SQUAMOUS #/AREA URNS HPF: ABNORMAL /HPF
UROBILINOGEN UR QL STRIP: ABNORMAL
WBC # UR STRIP: ABNORMAL /HPF
WBC NRBC COR # BLD: 10.05 10*3/MM3 (ref 3.4–10.8)

## 2023-01-07 PROCEDURE — 83605 ASSAY OF LACTIC ACID: CPT | Performed by: EMERGENCY MEDICINE

## 2023-01-07 PROCEDURE — G0378 HOSPITAL OBSERVATION PER HR: HCPCS

## 2023-01-07 PROCEDURE — 99284 EMERGENCY DEPT VISIT MOD MDM: CPT

## 2023-01-07 PROCEDURE — 87040 BLOOD CULTURE FOR BACTERIA: CPT | Performed by: EMERGENCY MEDICINE

## 2023-01-07 PROCEDURE — 25010000002 HYDROMORPHONE PER 4 MG: Performed by: EMERGENCY MEDICINE

## 2023-01-07 PROCEDURE — 87086 URINE CULTURE/COLONY COUNT: CPT | Performed by: EMERGENCY MEDICINE

## 2023-01-07 PROCEDURE — 96375 TX/PRO/DX INJ NEW DRUG ADDON: CPT

## 2023-01-07 PROCEDURE — 74176 CT ABD & PELVIS W/O CONTRAST: CPT

## 2023-01-07 PROCEDURE — 81001 URINALYSIS AUTO W/SCOPE: CPT | Performed by: EMERGENCY MEDICINE

## 2023-01-07 PROCEDURE — 96365 THER/PROPH/DIAG IV INF INIT: CPT

## 2023-01-07 PROCEDURE — 85025 COMPLETE CBC W/AUTO DIFF WBC: CPT | Performed by: EMERGENCY MEDICINE

## 2023-01-07 PROCEDURE — 84145 PROCALCITONIN (PCT): CPT | Performed by: EMERGENCY MEDICINE

## 2023-01-07 PROCEDURE — 36415 COLL VENOUS BLD VENIPUNCTURE: CPT

## 2023-01-07 PROCEDURE — 25010000002 ONDANSETRON PER 1 MG: Performed by: EMERGENCY MEDICINE

## 2023-01-07 PROCEDURE — 80053 COMPREHEN METABOLIC PANEL: CPT | Performed by: EMERGENCY MEDICINE

## 2023-01-07 PROCEDURE — 25010000002 HYDROMORPHONE PER 4 MG: Performed by: INTERNAL MEDICINE

## 2023-01-07 PROCEDURE — 96376 TX/PRO/DX INJ SAME DRUG ADON: CPT

## 2023-01-07 PROCEDURE — 25010000002 CEFTRIAXONE PER 250 MG: Performed by: EMERGENCY MEDICINE

## 2023-01-07 RX ORDER — ALBUTEROL SULFATE 2.5 MG/3ML
2.5 SOLUTION RESPIRATORY (INHALATION) EVERY 4 HOURS PRN
Status: DISCONTINUED | OUTPATIENT
Start: 2023-01-07 | End: 2023-01-10 | Stop reason: HOSPADM

## 2023-01-07 RX ORDER — MONTELUKAST SODIUM 10 MG/1
10 TABLET ORAL DAILY
Status: DISCONTINUED | OUTPATIENT
Start: 2023-01-08 | End: 2023-01-10 | Stop reason: HOSPADM

## 2023-01-07 RX ORDER — ACETAMINOPHEN 500 MG
1000 TABLET ORAL ONCE
Status: COMPLETED | OUTPATIENT
Start: 2023-01-07 | End: 2023-01-07

## 2023-01-07 RX ORDER — PHENAZOPYRIDINE HYDROCHLORIDE 200 MG/1
200 TABLET, FILM COATED ORAL
Status: DISCONTINUED | OUTPATIENT
Start: 2023-01-07 | End: 2023-01-10 | Stop reason: HOSPADM

## 2023-01-07 RX ORDER — HYDROMORPHONE HYDROCHLORIDE 1 MG/ML
0.5 INJECTION, SOLUTION INTRAMUSCULAR; INTRAVENOUS; SUBCUTANEOUS ONCE
Status: COMPLETED | OUTPATIENT
Start: 2023-01-07 | End: 2023-01-07

## 2023-01-07 RX ORDER — CYCLOBENZAPRINE HCL 10 MG
10 TABLET ORAL 3 TIMES DAILY PRN
Status: DISCONTINUED | OUTPATIENT
Start: 2023-01-07 | End: 2023-01-10 | Stop reason: HOSPADM

## 2023-01-07 RX ORDER — HYDROMORPHONE HYDROCHLORIDE 1 MG/ML
0.5 INJECTION, SOLUTION INTRAMUSCULAR; INTRAVENOUS; SUBCUTANEOUS
Status: DISCONTINUED | OUTPATIENT
Start: 2023-01-07 | End: 2023-01-08

## 2023-01-07 RX ORDER — DIPHENOXYLATE HYDROCHLORIDE AND ATROPINE SULFATE 2.5; .025 MG/1; MG/1
1 TABLET ORAL NIGHTLY
Status: DISCONTINUED | OUTPATIENT
Start: 2023-01-07 | End: 2023-01-10 | Stop reason: HOSPADM

## 2023-01-07 RX ORDER — ONDANSETRON 2 MG/ML
4 INJECTION INTRAMUSCULAR; INTRAVENOUS ONCE
Status: COMPLETED | OUTPATIENT
Start: 2023-01-07 | End: 2023-01-07

## 2023-01-07 RX ORDER — ACETAMINOPHEN 500 MG
500 TABLET ORAL EVERY 6 HOURS PRN
Status: DISCONTINUED | OUTPATIENT
Start: 2023-01-07 | End: 2023-01-10 | Stop reason: HOSPADM

## 2023-01-07 RX ORDER — FLUOXETINE HYDROCHLORIDE 20 MG/1
80 CAPSULE ORAL DAILY
Status: DISCONTINUED | OUTPATIENT
Start: 2023-01-07 | End: 2023-01-10 | Stop reason: HOSPADM

## 2023-01-07 RX ORDER — CETIRIZINE HYDROCHLORIDE 10 MG/1
10 TABLET ORAL DAILY
Status: DISCONTINUED | OUTPATIENT
Start: 2023-01-08 | End: 2023-01-10 | Stop reason: HOSPADM

## 2023-01-07 RX ORDER — SODIUM CHLORIDE 0.9 % (FLUSH) 0.9 %
10 SYRINGE (ML) INJECTION AS NEEDED
Status: DISCONTINUED | OUTPATIENT
Start: 2023-01-07 | End: 2023-01-10 | Stop reason: HOSPADM

## 2023-01-07 RX ORDER — LEVOTHYROXINE SODIUM 0.05 MG/1
50 TABLET ORAL
Status: DISCONTINUED | OUTPATIENT
Start: 2023-01-08 | End: 2023-01-10 | Stop reason: HOSPADM

## 2023-01-07 RX ORDER — PROMETHAZINE HYDROCHLORIDE 25 MG/1
25 TABLET ORAL EVERY 6 HOURS PRN
Status: DISCONTINUED | OUTPATIENT
Start: 2023-01-07 | End: 2023-01-10 | Stop reason: HOSPADM

## 2023-01-07 RX ORDER — TAMOXIFEN CITRATE 10 MG/1
20 TABLET ORAL DAILY
Status: DISCONTINUED | OUTPATIENT
Start: 2023-01-08 | End: 2023-01-10 | Stop reason: HOSPADM

## 2023-01-07 RX ORDER — HYDROCODONE BITARTRATE AND ACETAMINOPHEN 5; 325 MG/1; MG/1
1 TABLET ORAL EVERY 4 HOURS PRN
Status: DISCONTINUED | OUTPATIENT
Start: 2023-01-07 | End: 2023-01-09

## 2023-01-07 RX ORDER — ALBUTEROL SULFATE 4 MG/1
4 TABLET ORAL NIGHTLY
Status: DISCONTINUED | OUTPATIENT
Start: 2023-01-07 | End: 2023-01-10 | Stop reason: HOSPADM

## 2023-01-07 RX ORDER — SODIUM CHLORIDE, SODIUM LACTATE, POTASSIUM CHLORIDE, CALCIUM CHLORIDE 600; 310; 30; 20 MG/100ML; MG/100ML; MG/100ML; MG/100ML
125 INJECTION, SOLUTION INTRAVENOUS CONTINUOUS
Status: DISCONTINUED | OUTPATIENT
Start: 2023-01-07 | End: 2023-01-10 | Stop reason: HOSPADM

## 2023-01-07 RX ORDER — BUDESONIDE AND FORMOTEROL FUMARATE DIHYDRATE 80; 4.5 UG/1; UG/1
2 AEROSOL RESPIRATORY (INHALATION)
Status: DISCONTINUED | OUTPATIENT
Start: 2023-01-07 | End: 2023-01-10 | Stop reason: HOSPADM

## 2023-01-07 RX ORDER — TAMSULOSIN HYDROCHLORIDE 0.4 MG/1
0.4 CAPSULE ORAL DAILY
Status: DISCONTINUED | OUTPATIENT
Start: 2023-01-08 | End: 2023-01-10 | Stop reason: HOSPADM

## 2023-01-07 RX ORDER — THEOPHYLLINE 300 MG/1
300 TABLET, EXTENDED RELEASE ORAL DAILY
Status: DISCONTINUED | OUTPATIENT
Start: 2023-01-08 | End: 2023-01-10 | Stop reason: HOSPADM

## 2023-01-07 RX ORDER — BENZONATATE 100 MG/1
200 CAPSULE ORAL 3 TIMES DAILY PRN
Status: DISCONTINUED | OUTPATIENT
Start: 2023-01-07 | End: 2023-01-10 | Stop reason: HOSPADM

## 2023-01-07 RX ADMIN — ACETAMINOPHEN 1000 MG: 500 TABLET ORAL at 17:28

## 2023-01-07 RX ADMIN — PHENAZOPYRIDINE 200 MG: 200 TABLET ORAL at 23:43

## 2023-01-07 RX ADMIN — HYDROMORPHONE HYDROCHLORIDE 0.5 MG: 1 INJECTION, SOLUTION INTRAMUSCULAR; INTRAVENOUS; SUBCUTANEOUS at 21:16

## 2023-01-07 RX ADMIN — SODIUM CHLORIDE, POTASSIUM CHLORIDE, SODIUM LACTATE AND CALCIUM CHLORIDE 75 ML/HR: 600; 310; 30; 20 INJECTION, SOLUTION INTRAVENOUS at 23:46

## 2023-01-07 RX ADMIN — SODIUM CHLORIDE, POTASSIUM CHLORIDE, SODIUM LACTATE AND CALCIUM CHLORIDE 1000 ML: 600; 310; 30; 20 INJECTION, SOLUTION INTRAVENOUS at 17:30

## 2023-01-07 RX ADMIN — HYDROMORPHONE HYDROCHLORIDE 0.5 MG: 1 INJECTION, SOLUTION INTRAMUSCULAR; INTRAVENOUS; SUBCUTANEOUS at 17:28

## 2023-01-07 RX ADMIN — CEFTRIAXONE SODIUM 1 G: 1 INJECTION, POWDER, FOR SOLUTION INTRAMUSCULAR; INTRAVENOUS at 19:59

## 2023-01-07 RX ADMIN — Medication 1 TABLET: at 23:43

## 2023-01-07 RX ADMIN — FLUOXETINE HYDROCHLORIDE 40 MG: 20 CAPSULE ORAL at 23:43

## 2023-01-07 RX ADMIN — ONDANSETRON 4 MG: 2 INJECTION INTRAMUSCULAR; INTRAVENOUS at 17:28

## 2023-01-07 NOTE — ED TRIAGE NOTES
Patient from home via private vehicle. Reports developing dysuria in the middle of the week. Today patient is febrile with bilateral flank pain.

## 2023-01-07 NOTE — ED PROVIDER NOTES
EMERGENCY DEPARTMENT ENCOUNTER    Room Number:  36/36  Date seen:  1/7/2023  PCP: Kehrer, Meredith Lea, MD  Historian: Patient      HPI:  Chief Complaint: Flank pain, dysuria, fever  A complete HPI/ROS/PMH/PSH/SH/FH are unobtainable due to: N/A  Context: Nereida Myles is a 47 y.o. female who presents to the ED c/o new onset dysuria symptoms about 3 days ago.  She began taking some Pyridium tablets at home but did not bring any relief for her dysuria.  Now, she has had increasing dysuria symptoms with new onset flank pain all across the back, worse on the right side and radiating into the abdomen.  She additionally developed a fever with some nausea and vomiting today.  Patient has had previous kidney stone problems in the past.  [facility] has [facility] and has required ureteral stenting in the past.  Last kidney stone problem was approximately 1 month ago.      PAST MEDICAL HISTORY  Active Ambulatory Problems     Diagnosis Date Noted   • Hashimoto's thyroiditis 11/18/2019   • Hypothyroidism 11/18/2019   • Depression, major 11/18/2019   • Anxiety 11/18/2019   • Asthma 11/18/2019   • Pneumonia due to COVID-19 virus 01/20/2021   • Hypoxia 01/21/2021   • Shortness of breath 01/21/2021     Resolved Ambulatory Problems     Diagnosis Date Noted   • No Resolved Ambulatory Problems     Past Medical History:   Diagnosis Date   • Allergic rhinitis    • BMI 25.0-25.9,adult    • Cancer (HCC)    • Community acquired pneumonia    • COPD (chronic obstructive pulmonary disease) (HCC)    • Disease of thyroid gland    • Diverticular disease    • Foreign body in ear    • GERD (gastroesophageal reflux disease)    • History of abnormal mammogram    • History of acute sinusitis    • History of adenocarcinoma of breast    • History of COPD    • History of dyspareunia in female    • History of kidney stones    • History of lump of left breast    • History of malignant neoplasm of left breast    • History  of nausea and vomiting    • History of seborrheic dermatitis    • History of strep pharyngitis    • History of suicidal ideation    • Hypoxemia    • Obsessive compulsive disorder    • Other screening mammogram    • Personal history of asthma    • PONV (postoperative nausea and vomiting)    • Recurrent genital herpes simplex    • Symptomatic states associated with artificial menopause          PAST SURGICAL HISTORY  Past Surgical History:   Procedure Laterality Date   • APPENDECTOMY     • BREAST AUGMENTATION Bilateral     Revised 2005, initial implants 1992   • BREAST RECONSTRUCTION, BREAST TISSUE EXPANDER INSERTION Bilateral    • CERVICAL CERCLAGE      x 2 during pregnancy, incompetent cervix   • CHOLECYSTECTOMY     • COLONOSCOPY     • HAND SURGERY Right     may2022   • HYSTERECTOMY      due to uterine prolapse   • KIDNEY STONE SURGERY     • LUNG SURGERY      bronchoscopy   • MASTECTOMY Bilateral 01/2016    removed lymph nodes on left side   • SCAR REVISION BREAST Bilateral 06/21/2022    Procedure: SCAR REVISION LEFT CHEST 27 CENTIMETERS AND RIGHT CHEST 25 CENTIMETERS FOR AESTHETIC FLAT CLOSURE FOLLOWING MASTECTOMY;  Surgeon: Waterhouse, Maurine, MD;  Location: [facility];  Service: Plastics;  Laterality: Bilateral;   • TISSUE EXPANDER PLACEMENT Bilateral     due to infection         FAMILY HISTORY  Family History   Problem Relation Age of Onset   • Thyroid disease Mother    • Arthritis Mother    • Uterine cancer Mother         in her 40's   • Cancer Father    • Bipolar disorder Father    • Depression Father    • Hyperlipidemia Father    • Hypertension Father    • Arthritis Maternal Grandmother    • Bleeding Disorder Maternal Grandmother    • Hyperlipidemia Maternal Grandmother    • Hypertension Maternal Grandmother    • Hypertension Maternal Grandfather    • Hypertension Paternal Grandfather    • Hyperlipidemia Paternal Grandfather    • Coronary artery disease Other         Maternal GrGF   • Skin cancer Other          Maternal GrGF   • Diabetes Other         Paternal GrGF   • Heart disease Other         FH in females before the age of 65   • Malig Hyperthermia Neg Hx          SOCIAL HISTORY  Social History     Socioeconomic History   • Marital status:    Tobacco Use   • Smoking status: Never   • Smokeless tobacco: Never   Vaping Use   • Vaping Use: Never used   Substance and Sexual Activity   • Alcohol use: Yes     Alcohol/week: 4.0 standard drinks     Types: 4 Glasses of wine per week   • Drug use: Never         ALLERGIES  Patient has no known allergies.        REVIEW OF SYSTEMS  Review of Systems   Constitutional: Positive for activity change, appetite change, chills and fever.   HENT: Negative.    Eyes: Negative for pain and visual disturbance.   Respiratory: Negative for cough and shortness of breath.    Cardiovascular: Negative for chest pain.   Gastrointestinal: Positive for abdominal pain, nausea and vomiting. Negative for diarrhea.   Genitourinary: Positive for dysuria, flank pain, frequency and urgency.   Skin: Negative for color change and rash.   Neurological: Negative for syncope and headaches.   All other systems reviewed and are negative.       PHYSICAL EXAM  ED Triage Vitals [01/07/23 1657]   Temp Heart Rate Resp BP SpO2   (!) 101.4 °F (38.6 °C) 108 19 -- 95 %      Temp src Heart Rate Source Patient Position BP Location FiO2 (%)   -- -- -- -- --       Physical Exam      GENERAL: Pleasant lady, appears somewhat ill and uncomfortable, no diaphoresis, lying on her side  HENT: nares patent, normocephalic and atraumatic  EYES: no scleral icterus, EOMI, normal conjunctiva  CV: regular rhythm, normal rate, no murmurs  RESPIRATORY: normal effort, lungs clear  ABDOMEN: soft, mild to moderate tenderness in the right hemiabdomen as well as bilateral CVA regions.  No peritoneal features noted  MUSCULOSKELETAL: no deformity, no edema  NEURO: alert, moves all extremities, follows commands  PSYCH:  calm,  cooperative  SKIN: warm, dry    Vital signs and nursing notes reviewed.          LAB RESULTS  Recent Results (from the past 24 hour(s))   Comprehensive Metabolic Panel    Collection Time: 01/07/23  5:21 PM    Specimen: Blood   Result Value Ref Range    Glucose 100 (H) 65 - 99 mg/dL    BUN 13 6 - 20 mg/dL    Creatinine 0.86 0.57 - 1.00 mg/dL    Sodium 140 136 - 145 mmol/L    Potassium 3.8 3.5 - 5.2 mmol/L    Chloride 106 98 - 107 mmol/L    CO2 24.0 22.0 - 29.0 mmol/L    Calcium 9.2 8.6 - 10.5 mg/dL    Total Protein 7.0 6.0 - 8.5 g/dL    Albumin 4.2 3.5 - 5.2 g/dL    ALT (SGPT) 14 1 - 33 U/L    AST (SGOT) 15 1 - 32 U/L    Alkaline Phosphatase 97 39 - 117 U/L    Total Bilirubin 0.7 0.0 - 1.2 mg/dL    Globulin 2.8 gm/dL    A/G Ratio 1.5 g/dL    BUN/Creatinine Ratio 15.1 7.0 - 25.0    Anion Gap 10.0 5.0 - 15.0 mmol/L    eGFR 82.9 >60.0 mL/min/1.73   Lactic Acid, Plasma    Collection Time: 01/07/23  5:21 PM    Specimen: Blood   Result Value Ref Range    Lactate 0.9 0.5 - 2.0 mmol/L   Procalcitonin    Collection Time: 01/07/23  5:21 PM    Specimen: Blood   Result Value Ref Range    Procalcitonin 0.07 0.00 - 0.25 ng/mL   CBC Auto Differential    Collection Time: 01/07/23  5:21 PM    Specimen: Blood   Result Value Ref Range    WBC 10.05 3.40 - 10.80 10*3/mm3    RBC 4.30 3.77 - 5.28 10*6/mm3    Hemoglobin 12.6 12.0 - 15.9 g/dL    Hematocrit 37.8 34.0 - 46.6 %    MCV 87.9 79.0 - 97.0 fL    MCH 29.3 26.6 - 33.0 pg    MCHC 33.3 31.5 - 35.7 g/dL    RDW 12.7 12.3 - 15.4 %    RDW-SD 41.5 37.0 - 54.0 fl    MPV 9.5 6.0 - 12.0 fL    Platelets 311 140 - 450 10*3/mm3    Neutrophil % 82.0 (H) 42.7 - 76.0 %    Lymphocyte % 8.0 (L) 19.6 - 45.3 %    Monocyte % 8.4 5.0 - 12.0 %    Eosinophil % 0.7 0.3 - 6.2 %    Basophil % 0.4 0.0 - 1.5 %    Immature Grans % 0.5 0.0 - 0.5 %    Neutrophils, Absolute 8.25 (H) 1.70 - 7.00 10*3/mm3    Lymphocytes, Absolute 0.80 0.70 - 3.10 10*3/mm3    Monocytes, Absolute 0.84 0.10 - 0.90 10*3/mm3    Eosinophils,  Absolute 0.07 0.00 - 0.40 10*3/mm3    Basophils, Absolute 0.04 0.00 - 0.20 10*3/mm3    Immature Grans, Absolute 0.05 0.00 - 0.05 10*3/mm3    nRBC 0.0 0.0 - 0.2 /100 WBC   Urinalysis With Culture If Indicated - Urine, Clean Catch    Collection Time: 01/07/23  7:06 PM    Specimen: Urine, Clean Catch   Result Value Ref Range    Color, UA Dark Yellow (A) Yellow, Straw    Appearance, UA Turbid (A) Clear    pH, UA 7.5 5.0 - 8.0    Specific Gravity, UA 1.014 1.005 - 1.030    Glucose, UA Negative Negative    Ketones, UA Trace (A) Negative    Bilirubin, UA Negative Negative    Blood, UA Small (1+) (A) Negative    Protein,  mg/dL (2+) (A) Negative    Leuk Esterase, UA Large (3+) (A) Negative    Nitrite, UA Positive (A) Negative    Urobilinogen, UA 1.0 E.U./dL 0.2 - 1.0 E.U./dL   Urinalysis, Microscopic Only - Urine, Clean Catch    Collection Time: 01/07/23  7:06 PM    Specimen: Urine, Clean Catch   Result Value Ref Range    RBC, UA 21-30 (A) None Seen, 0-2 /HPF    WBC, UA Too Numerous to Count (A) None Seen, 0-2 /HPF    Bacteria, UA 4+ (A) None Seen /HPF    Squamous Epithelial Cells, UA 21-30 (A) None Seen, 0-2 /HPF    Hyaline Casts, UA 0-2 None Seen /LPF    Methodology Automated Microscopy        Ordered the above labs and reviewed the results.        RADIOLOGY  CT Abdomen Pelvis Without Contrast    Result Date: 1/7/2023  CT OF THE ABDOMEN AND PELVIS WITHOUT CONTRAST  HISTORY: Bilateral flank pain, right greater than left  COMPARISON: None available.  TECHNIQUE: Axial CT imaging was obtained through the abdomen and pelvis. No IV contrast was administered.  FINDINGS: Images through the lung bases are clear. The stomach, duodenum, adrenal glands and spleen, and pancreas are all normal. Gallbladder is absent. A few tiny cysts are noted within the liver. The patient has mild right-sided pelvocaliectasis. There is perinephric stranding noted on the right. No ureteral or bladder stones are seen. There is no hydronephrosis on  the left. No distal ureteral or bladder stones are seen on the left. Urinary bladder is thick walled, with adjacent perivesical stranding. Constellation of findings is favored to represent cystitis and right-sided pyelonephritis. Full assessment of the right kidney is limited in the absence of contrast material. Appendix is absent. There is no bowel obstruction. No acute osseous abnormalities are seen. There is mild lumbar scoliosis, with convexity to the right.       1. Mild right-sided pelvocaliectasis, with adjacent perinephric stranding. Urinary bladder is also thick walled, with perivesical stranding. Constellation of findings is favored to represent cystitis and right-sided pyelonephritis. Full assessment is limited in the absence of contrast material.  Radiation dose reduction techniques were utilized, including automated exposure control and exposure modulation based on body size.  This report was finalized on 1/7/2023 7:09 PM by Dr. Josette Bhardwaj M.D.        Ordered the above noted radiological studies. Reviewed by me in PACS.            PROCEDURES  Procedures        MEDICATIONS GIVEN IN ER  Medications   sodium chloride 0.9 % flush 10 mL (has no administration in time range)   cefTRIAXone (ROCEPHIN) 1 g in sodium chloride 0.9 % 100 mL IVPB-VTB (has no administration in time range)   lactated ringers bolus 1,000 mL (0 mL Intravenous Stopped 1/7/23 1902)   acetaminophen (TYLENOL) tablet 1,000 mg (1,000 mg Oral Given 1/7/23 1728)   ondansetron (ZOFRAN) injection 4 mg (4 mg Intravenous Given 1/7/23 1728)   HYDROmorphone (DILAUDID) injection 0.5 mg (0.5 mg Intravenous Given 1/7/23 1728)                   MEDICAL DECISION MAKING, PROGRESS, and CONSULTS    All labs have been independently reviewed by me.  All radiology studies have been reviewed by me and I have also reviewed the radiology report.   EKG's independently viewed and interpreted by me.  Discussion below represents my analysis of pertinent  findings related to patient's condition, differential diagnosis, treatment plan and final disposition.      Additional sources:  - Discussed/ obtained information from independent historians: None    - External (non-ED) record review: I reviewed patient's most recent progress note from PCP on November 3, 2022 when she was evaluated for kidney stone with right lower quadrant pain    - Chronic or social conditions impacting care: History of recurrent UTI problems as well as kidney stones.      Orders placed during this visit:  Orders Placed This Encounter   Procedures   • Blood Culture - Blood,   • Blood Culture - Blood,   • Urine Culture - Urine,   • CT Abdomen Pelvis Without Contrast   • Comprehensive Metabolic Panel   • Lactic Acid, Plasma   • Procalcitonin   • Urinalysis With Culture If Indicated - Urine, Clean Catch   • CBC Auto Differential   • Urinalysis, Microscopic Only - Urine, Clean Catch   • [facility] (on-call MD unless specified) Details   • Insert Peripheral IV   • Initiate Observation Status   • ED Bed Request   • CBC & Differential               Differential diagnosis:    My differential diagnosis for abdominal pain includes but is not limited to:  Gastritis, gastroenteritis, peptic ulcer disease, GERD, esophageal perforation, acute appendicitis, mesenteric adenitis, Meckel’s diverticulum, epiploic appendagitis, diverticulitis, colon cancer, ulcerative colitis, Crohn’s disease, intussusception, small bowel obstruction, adhesions, ischemic bowel, perforated viscus, ileus, obstipation, biliary colic, cholecystitis, cholelithiasis, Kali-Grant Jace, hepatitis, pancreatitis, common bile duct obstruction, cholangitis, bile leak, splenic trauma, splenic rupture, splenic infarction, splenic abscess, abdominal abscess, ascites, spontaneous bacterial peritonitis, hernia, UTI, cystitis, prostatitis, ureterolithiasis, urinary obstruction, AAA, myocardial infarction, pneumonia, cancer, porphyria, DKA, medications,  sickle cell, viral syndrome, zoster        Independent interpretation of labs, radiology studies, and discussions with consultants:  ED Course as of 01/07/23 1956   Sat Jan 07, 2023 1709 Patient presents with flank pain, dysuria and fever.  Initial impression is worrisome for pyelonephritis.  She has had kidney stones in the past as well.  We will check CT scan without contrast to evaluate for any new stone obstructive uropathy problem.  Treating her fever with Tylenol and some IV fluids.  Anticipate IV antibiotics [MICHEAL]   1926 Nitrite, UA(!): Positive [MICHEAL]   1952 Bacteria, UA(!): 4+ [MICHEAL]   1952 WBC, UA(!): Too Numerous to Count [MICHEAL]   1952 RBC, UA(!): 21-30 [MICHEAL]   1952 Urinalysis is strongly indicative of pyelonephritis.  This is confirmed with CT features suggesting inflammatory changes towards the right kidney.  Patient seems to be improving clinically after temperature is coming down and IV fluids given.  Starting a dose of Rocephin at this time.  There are no previous recent urine microbiology cultures to guide antibiotic selection. [MICHEAL]   1954 To spoke with Dr. Sosa from [facility].  He agrees to accept patient to hospitalist service [facility] for further supportive care.  Patient may benefit from urology consult as well given her history of recurrent complicated UTI problems. [MICHEAL]      ED Course User Index  [MICHEAL] Johnny Finn MD             Patient was placed in face mask during triage.  Patient was wearing face mask throughout encounter.  I wore personal protective equipment throughout the encounter.  Hand hygiene was performed before and after patient encounter.     DIAGNOSIS  Final diagnoses:   Pyelonephritis   Fever, unspecified fever cause   Nausea and vomiting, unspecified vomiting type         DISPOSITION  Observation to [facility]            Latest Documented Vital Signs:  As of 19:56 EST  BP-   HR- 77 Temp- (!) 101.4 °F (38.6 °C) O2 sat- 93%              --    Please note that portions of this were completed  with a voice recognition program.       Note Disclaimer: At [facility], we believe that sharing information builds trust and better relationships. You are receiving this note because you are receiving care at [facility] or recently visited. It is possible you will see health information before a provider has talked with you about it. This kind of information can be easy to misunderstand. To help you fully understand what it means for your health, we urge you to discuss this note with your provider.           Johnny Finn MD  01/07/23 1956

## 2023-01-07 NOTE — Clinical Note
Level of Care: Med/Surg [1]   Diagnosis: Pyelonephritis [918026]   Admitting Physician: OCHOA MARTINEZ [[id number]]   Attending Physician: OCHOA MARTINEZ [[id number]]

## 2023-01-08 LAB — BACTERIA SPEC AEROBE CULT: NORMAL

## 2023-01-08 PROCEDURE — G0378 HOSPITAL OBSERVATION PER HR: HCPCS

## 2023-01-08 PROCEDURE — 63710000001 PROMETHAZINE PER 25 MG: Performed by: NURSE PRACTITIONER

## 2023-01-08 PROCEDURE — 94799 UNLISTED PULMONARY SVC/PX: CPT

## 2023-01-08 PROCEDURE — 96361 HYDRATE IV INFUSION ADD-ON: CPT

## 2023-01-08 PROCEDURE — 94640 AIRWAY INHALATION TREATMENT: CPT

## 2023-01-08 PROCEDURE — 25010000002 HYDROMORPHONE PER 4 MG: Performed by: NURSE PRACTITIONER

## 2023-01-08 PROCEDURE — 25010000002 ONDANSETRON PER 1 MG: Performed by: HOSPITALIST

## 2023-01-08 PROCEDURE — 96376 TX/PRO/DX INJ SAME DRUG ADON: CPT

## 2023-01-08 PROCEDURE — 25010000002 CEFTRIAXONE PER 250 MG: Performed by: NURSE PRACTITIONER

## 2023-01-08 PROCEDURE — 94761 N-INVAS EAR/PLS OXIMETRY MLT: CPT

## 2023-01-08 PROCEDURE — 25010000002 MORPHINE PER 10 MG: Performed by: HOSPITALIST

## 2023-01-08 PROCEDURE — 96375 TX/PRO/DX INJ NEW DRUG ADDON: CPT

## 2023-01-08 RX ORDER — MORPHINE SULFATE 2 MG/ML
2 INJECTION, SOLUTION INTRAMUSCULAR; INTRAVENOUS EVERY 4 HOURS PRN
Status: DISCONTINUED | OUTPATIENT
Start: 2023-01-08 | End: 2023-01-09

## 2023-01-08 RX ORDER — DOCUSATE SODIUM 100 MG/1
100 CAPSULE, LIQUID FILLED ORAL 2 TIMES DAILY
Status: DISCONTINUED | OUTPATIENT
Start: 2023-01-08 | End: 2023-01-09

## 2023-01-08 RX ORDER — ONDANSETRON 2 MG/ML
4 INJECTION INTRAMUSCULAR; INTRAVENOUS EVERY 6 HOURS PRN
Status: DISCONTINUED | OUTPATIENT
Start: 2023-01-08 | End: 2023-01-10 | Stop reason: HOSPADM

## 2023-01-08 RX ORDER — MORPHINE SULFATE 2 MG/ML
4 INJECTION, SOLUTION INTRAMUSCULAR; INTRAVENOUS EVERY 4 HOURS PRN
Status: DISCONTINUED | OUTPATIENT
Start: 2023-01-08 | End: 2023-01-09

## 2023-01-08 RX ORDER — AMOXICILLIN 250 MG
2 CAPSULE ORAL NIGHTLY
Status: DISCONTINUED | OUTPATIENT
Start: 2023-01-08 | End: 2023-01-09

## 2023-01-08 RX ORDER — AMOXICILLIN 250 MG
1 CAPSULE ORAL 2 TIMES DAILY PRN
Status: DISCONTINUED | OUTPATIENT
Start: 2023-01-08 | End: 2023-01-08

## 2023-01-08 RX ORDER — POLYETHYLENE GLYCOL 3350 17 G/17G
17 POWDER, FOR SOLUTION ORAL 2 TIMES DAILY
Status: DISCONTINUED | OUTPATIENT
Start: 2023-01-08 | End: 2023-01-10 | Stop reason: HOSPADM

## 2023-01-08 RX ADMIN — TAMSULOSIN HYDROCHLORIDE 0.4 MG: 0.4 CAPSULE ORAL at 08:07

## 2023-01-08 RX ADMIN — PHENAZOPYRIDINE 200 MG: 200 TABLET ORAL at 12:38

## 2023-01-08 RX ADMIN — PROMETHAZINE HYDROCHLORIDE 25 MG: 25 TABLET ORAL at 08:39

## 2023-01-08 RX ADMIN — HYDROMORPHONE HYDROCHLORIDE 0.5 MG: 1 INJECTION, SOLUTION INTRAMUSCULAR; INTRAVENOUS; SUBCUTANEOUS at 10:39

## 2023-01-08 RX ADMIN — MORPHINE SULFATE 2 MG: 2 INJECTION, SOLUTION INTRAMUSCULAR; INTRAVENOUS at 17:20

## 2023-01-08 RX ADMIN — ONDANSETRON 4 MG: 2 INJECTION INTRAMUSCULAR; INTRAVENOUS at 17:16

## 2023-01-08 RX ADMIN — HYDROMORPHONE HYDROCHLORIDE 0.5 MG: 1 INJECTION, SOLUTION INTRAMUSCULAR; INTRAVENOUS; SUBCUTANEOUS at 00:35

## 2023-01-08 RX ADMIN — CETIRIZINE HYDROCHLORIDE 10 MG: 10 TABLET ORAL at 08:07

## 2023-01-08 RX ADMIN — HYDROCODONE BITARTRATE AND ACETAMINOPHEN 1 TABLET: 5; 325 TABLET ORAL at 01:46

## 2023-01-08 RX ADMIN — SODIUM CHLORIDE, POTASSIUM CHLORIDE, SODIUM LACTATE AND CALCIUM CHLORIDE 75 ML/HR: 600; 310; 30; 20 INJECTION, SOLUTION INTRAVENOUS at 12:38

## 2023-01-08 RX ADMIN — PHENAZOPYRIDINE 200 MG: 200 TABLET ORAL at 18:09

## 2023-01-08 RX ADMIN — PHENAZOPYRIDINE 200 MG: 200 TABLET ORAL at 08:07

## 2023-01-08 RX ADMIN — CEFTRIAXONE SODIUM 1 G: 1 INJECTION, POWDER, FOR SOLUTION INTRAMUSCULAR; INTRAVENOUS at 20:54

## 2023-01-08 RX ADMIN — MONTELUKAST SODIUM 10 MG: 10 TABLET, FILM COATED ORAL at 08:07

## 2023-01-08 RX ADMIN — TAMOXIFEN CITRATE 20 MG: 10 TABLET, FILM COATED ORAL at 00:35

## 2023-01-08 RX ADMIN — PROMETHAZINE HYDROCHLORIDE 25 MG: 25 TABLET ORAL at 01:46

## 2023-01-08 RX ADMIN — THEOPHYLLINE 300 MG: 300 TABLET, EXTENDED RELEASE ORAL at 08:07

## 2023-01-08 RX ADMIN — BUDESONIDE AND FORMOTEROL FUMARATE DIHYDRATE 2 PUFF: 80; 4.5 AEROSOL RESPIRATORY (INHALATION) at 07:21

## 2023-01-08 RX ADMIN — LEVOTHYROXINE SODIUM 50 MCG: 0.05 TABLET ORAL at 06:13

## 2023-01-08 RX ADMIN — MORPHINE SULFATE 2 MG: 2 INJECTION, SOLUTION INTRAMUSCULAR; INTRAVENOUS at 21:20

## 2023-01-08 RX ADMIN — SODIUM CHLORIDE, POTASSIUM CHLORIDE, SODIUM LACTATE AND CALCIUM CHLORIDE 75 ML/HR: 600; 310; 30; 20 INJECTION, SOLUTION INTRAVENOUS at 23:24

## 2023-01-08 RX ADMIN — Medication 1 TABLET: at 20:58

## 2023-01-08 RX ADMIN — ACETAMINOPHEN 500 MG: 500 TABLET, FILM COATED ORAL at 18:09

## 2023-01-08 RX ADMIN — DOCUSATE SODIUM 50MG AND SENNOSIDES 8.6MG 1 TABLET: 8.6; 5 TABLET, FILM COATED ORAL at 10:21

## 2023-01-08 RX ADMIN — HYDROCODONE BITARTRATE AND ACETAMINOPHEN 1 TABLET: 5; 325 TABLET ORAL at 06:10

## 2023-01-08 RX ADMIN — PROMETHAZINE HYDROCHLORIDE 25 MG: 25 TABLET ORAL at 20:55

## 2023-01-08 RX ADMIN — ALBUTEROL SULFATE 4 MG: 4 TABLET ORAL at 23:22

## 2023-01-08 RX ADMIN — HYDROMORPHONE HYDROCHLORIDE 0.5 MG: 1 INJECTION, SOLUTION INTRAMUSCULAR; INTRAVENOUS; SUBCUTANEOUS at 14:27

## 2023-01-08 RX ADMIN — CYCLOBENZAPRINE 10 MG: 10 TABLET, FILM COATED ORAL at 05:06

## 2023-01-08 RX ADMIN — HYDROMORPHONE HYDROCHLORIDE 0.5 MG: 1 INJECTION, SOLUTION INTRAMUSCULAR; INTRAVENOUS; SUBCUTANEOUS at 07:45

## 2023-01-08 RX ADMIN — HYDROMORPHONE HYDROCHLORIDE 0.5 MG: 1 INJECTION, SOLUTION INTRAMUSCULAR; INTRAVENOUS; SUBCUTANEOUS at 04:13

## 2023-01-08 RX ADMIN — BUDESONIDE AND FORMOTEROL FUMARATE DIHYDRATE 2 PUFF: 80; 4.5 AEROSOL RESPIRATORY (INHALATION) at 20:50

## 2023-01-08 NOTE — PROGRESS NOTES
Name: Nereida Myles ADMIT: 2023   : 1975  PCP: Kehrer, Meredith Lea, MD    MRN: [id number] LOS: 0 days   AGE/SEX: 47 y.o. female  ROOM: [facility]     Subjective   Subjective   Still complaining of pain not much improvement with Dilaudid not lasting long enough. She states she has had morphine before that she thinks worked better. Not much of an appetite. No chest pain or shortness of breath. She does report having a history of problems with constipation.    Review of Systems   Constitutional: Positive for appetite change. Negative for fever.   Respiratory: Negative for cough and shortness of breath.    Cardiovascular: Negative for chest pain.   Gastrointestinal: Negative for abdominal pain, diarrhea, nausea and vomiting.   Genitourinary: Positive for flank pain.   Neurological: Negative for headaches.      Objective   Objective   Vital Signs  Temp:  [98.2 °F (36.8 °C)-101.4 °F (38.6 °C)] 99 °F (37.2 °C)  Heart Rate:  [] 64  Resp:  [18-19] 18  BP: (104-125)/(60-70) 125/66  SpO2:  [91 %-96 %] 95 %  on  Flow (L/min):  [1] 1;   Device (Oxygen Therapy): nasal cannula  Body mass index is 28.87 kg/m².    Physical Exam  Constitutional:       General: She is not in acute distress.     Appearance: Normal appearance. She is ill-appearing. She is not toxic-appearing.   HENT:      Head: Normocephalic and atraumatic.   Cardiovascular:      Rate and Rhythm: Normal rate and regular rhythm.   Pulmonary:      Effort: Pulmonary effort is normal. No respiratory distress.      Breath sounds: Normal breath sounds. No wheezing or rhonchi.   Abdominal:      General: Bowel sounds are normal.      Palpations: Abdomen is soft.      Tenderness: There is no abdominal tenderness. There is no guarding or rebound.   Musculoskeletal:         General: No swelling.      Cervical back: Normal range of motion.   Skin:     General: Skin is warm and dry.   Neurological:      General: No focal deficit present.      Mental Status:  She is alert and oriented to person, place, and time.   Psychiatric:         Mood and Affect: Mood normal.         Behavior: Behavior normal.         Thought Content: Thought content normal.     Results Review  I reviewed the patient's new clinical results.  Results from last 7 days   Lab Units 01/07/23  1721   WBC 10*3/mm3 10.05   HEMOGLOBIN g/dL 12.6   PLATELETS 10*3/mm3 311     Results from last 7 days   Lab Units 01/07/23  1721   SODIUM mmol/L 140   POTASSIUM mmol/L 3.8   CHLORIDE mmol/L 106   CO2 mmol/L 24.0   BUN mg/dL 13   CREATININE mg/dL 0.86   GLUCOSE mg/dL 100*     Lab Results   Component Value Date    ANIONGAP 10.0 01/07/2023     Estimated Creatinine Clearance: 82.1 mL/min (by C-G formula based on SCr of 0.86 mg/dL).    Results from last 7 days   Lab Units 01/07/23  1721   ALBUMIN g/dL 4.2   BILIRUBIN mg/dL 0.7   ALK PHOS U/L 97   AST (SGOT) U/L 15   ALT (SGPT) U/L 14     Results from last 7 days   Lab Units 01/07/23  1721   CALCIUM mg/dL 9.2   ALBUMIN g/dL 4.2     Results from last 7 days   Lab Units 01/07/23  1721   PROCALCITONIN ng/mL 0.07   LACTATE mmol/L 0.9     No results found for: HGBA1C, POCGLU    CT Abdomen Pelvis Without Contrast    Result Date: 1/7/2023   1. Mild right-sided pelvocaliectasis, with adjacent perinephric stranding. Urinary bladder is also thick walled, with perivesical stranding. Constellation of findings is favored to represent cystitis and right-sided pyelonephritis. Full assessment is limited in the absence of contrast material.  Radiation dose reduction techniques were utilized, including automated exposure control and exposure modulation based on body size.  This report was finalized on 1/7/2023 7:09 PM by Dr. Josette Bhardwaj M.D.        Scheduled Meds  albuterol, 4 mg, Oral, Nightly  budesonide-formoterol, 2 puff, Inhalation, BID - RT  cefTRIAXone, 1 g, Intravenous, Q24H  cetirizine, 10 mg, Oral, Daily  FLUoxetine, 80 mg, Oral, Daily  levothyroxine, 50 mcg, Oral, Q  AM  montelukast, 10 mg, Oral, Daily  multivitamin, 1 tablet, Oral, Nightly  phenazopyridine, 200 mg, Oral, TID With Meals  tamoxifen, 20 mg, Oral, Daily  tamsulosin, 0.4 mg, Oral, Daily  theophylline, 300 mg, Oral, Daily    Continuous Infusions  lactated ringers, 75 mL/hr, Last Rate: 75 mL/hr (01/08/23 1238)    PRN Meds  •  acetaminophen  •  albuterol  •  benzonatate  •  cyclobenzaprine  •  HYDROcodone-acetaminophen  •  Morphine **OR** Morphine  •  promethazine  •  senna-docusate sodium  •  [COMPLETED] Insert Peripheral IV **AND** sodium chloride    lactated ringers, 75 mL/hr, Last Rate: 75 mL/hr (01/08/23 1238)    Diet  Diet: Regular/House Diet; Texture: Regular Texture (IDDSI 7); Fluid Consistency: Thin (IDDSI 0)    I have personally reviewed:  [x]  Laboratory   [x]  Microbiology   [x]  Radiology   []  EKG/Telemetry   []  Cardiology/Vascular   []  Pathology   []  Records     Assessment/Plan     Active Hospital Problems    Diagnosis  POA   • **Pyelonephritis [N12]  Yes   • COPD (chronic obstructive pulmonary disease) (HCC) [J44.9]  Yes   • Anxiety [F41.9]  Yes   • Hypothyroidism [E03.9]  Yes   • Depression, major [F32.9]  Yes      Resolved Hospital Problems   No resolved problems to display.     47 y.o. female with a history of recurrent UTIs presented with dysuria and flank pain and fever    Pyelonephritis  -Continue antibiotics awaiting cultures  -IV antibiotics  -Appreciate urology. No acute urologic surgical intervention at this time  -Adjust IV narcotic pain medication. Add bowel medications    COPD  -No wheezing    Hypothyroidism  -Levothyroxine    History of breast cancer    SCDs for DVT prophylaxis    Discussed with patient, family and nursing staff    Discharge: MONTRELL Scherer MD  [facility]  01/08/23

## 2023-01-08 NOTE — CONSULTS
Urology Consult Note    Patient:Nereida Myles :1975  Room:[facility]  Admit Date2023  Age:47 y.o.     SEX:female     DOS:2023     MR:[id number]     Visit:[id number]       Attending: Bayron Scherer MD  Referring Provider: RISHI Muse  Reason for Consultation: Pyelonephritis    Patient Care Team:  Kehrer, Meredith Lea, MD as PCP - General (Family Medicine)  Demarcus Baker MD as Consulting Physician (Pulmonary Disease)  Trevin Rosen MD as Consulting Physician (Hematology and Oncology)  Miguel A Cyr MD as Surgeon (Hand Surgery)    Chief complaint I am having pain    Subjective .     History of present illness:    Patient is known to our practice, followed by Dr. Al Toscano. She has a history of recurrent urinary tract infections and renal stones. She reports having right flank pain and bladder pain since last Tuesday. She had been taking pyridium but her symptoms worsened and she presented to the ER. She reports having a temperature of 101F at home and had T 101F while inpatient.  Her pain remains elevated and is in her right flank and general abdomen and pelvis. She hasn't had a bowel movement for a few days. I discussed starting a stool softener.  Her blood and urine cultures are pending and she is on Rocephin IV.    Review of Systems  Constitutional:  Fever, pain and nausea  Opthalmalogic: No change in vision or blurred vision  Otolaryngeal:  No epistaxis  Cardiovascular: No recent chest pain  Pulmonary:  No cough, sputum production, hemoptysis  Gastrointestinal: Nausea   Genitourinary:  See HPI  Hematologic:  No tendency for easy bruising  Musculoskeletal: Right flank pain  Neurologic:  No Seizures, new focal deficits      History  Past Medical History:   Diagnosis Date   • Allergic rhinitis    • Anxiety    • Asthma     has inhaler and neb treatments   • BMI 25.0-25.9,adult    • Cancer (HCC)     left breast   • Community acquired pneumonia    • COPD (chronic  obstructive pulmonary disease) (HCC)    • Depression, major    • Disease of thyroid gland    • Diverticular disease    • Foreign body in ear    • GERD (gastroesophageal reflux disease)    • Hashimoto's thyroiditis    • History of abnormal mammogram     Left breast   • History of acute sinusitis    • History of adenocarcinoma of breast    • History of COPD    • History of dyspareunia in female    • History of kidney stones    • History of lump of left breast    • History of malignant neoplasm of left breast    • History of nausea and vomiting    • History of seborrheic dermatitis    • History of strep pharyngitis    • History of suicidal ideation    • Hypothyroidism    • Hypoxemia     History of Hypoxemia   • Obsessive compulsive disorder    • Other screening mammogram    • Personal history of asthma    • PONV (postoperative nausea and vomiting)    • Recurrent genital herpes simplex     History of    • Symptomatic states associated with artificial menopause     History of      Past Surgical History:   Procedure Laterality Date   • APPENDECTOMY     • BREAST AUGMENTATION Bilateral     Revised 2005, initial implants 1992   • BREAST RECONSTRUCTION, BREAST TISSUE EXPANDER INSERTION Bilateral    • CERVICAL CERCLAGE      x 2 during pregnancy, incompetent cervix   • CHOLECYSTECTOMY     • COLONOSCOPY     • HAND SURGERY Right     may2022   • HYSTERECTOMY      due to uterine prolapse   • KIDNEY STONE SURGERY     • LUNG SURGERY      bronchoscopy   • MASTECTOMY Bilateral 01/2016    removed lymph nodes on left side   • SCAR REVISION BREAST Bilateral 06/21/2022    Procedure: SCAR REVISION LEFT CHEST 27 CENTIMETERS AND RIGHT CHEST 25 CENTIMETERS FOR AESTHETIC FLAT CLOSURE FOLLOWING MASTECTOMY;  Surgeon: Waterhouse, Maurine, MD;  Location: [facility];  Service: Plastics;  Laterality: Bilateral;   • TISSUE EXPANDER PLACEMENT Bilateral     due to infection     Social History     Socioeconomic History   • Marital status:     Tobacco Use   • Smoking status: Never   • Smokeless tobacco: Never   Vaping Use   • Vaping Use: Never used   Substance and Sexual Activity   • Alcohol use: Yes     Alcohol/week: 4.0 standard drinks     Types: 4 Glasses of wine per week   • Drug use: Never     Family History   Problem Relation Age of Onset   • Thyroid disease Mother    • Arthritis Mother    • Uterine cancer Mother         in her 40's   • Cancer Father    • Bipolar disorder Father    • Depression Father    • Hyperlipidemia Father    • Hypertension Father    • Arthritis Maternal Grandmother    • Bleeding Disorder Maternal Grandmother    • Hyperlipidemia Maternal Grandmother    • Hypertension Maternal Grandmother    • Hypertension Maternal Grandfather    • Hypertension Paternal Grandfather    • Hyperlipidemia Paternal Grandfather    • Coronary artery disease Other         Maternal GrGF   • Skin cancer Other         Maternal GrGF   • Diabetes Other         Paternal GrGF   • Heart disease Other         FH in females before the age of 65   • Malig Hyperthermia Neg Hx      No Known Allergies  Prior to Admission medications    Medication Sig Start Date End Date Taking? Authorizing Provider   albuterol (PROVENTIL) 4 MG tablet TAKE 1 TABLET EVERY NIGHT 2/7/22  Yes Kehrer, Meredith Lea, MD   albuterol sulfate  (90 Base) MCG/ACT inhaler USE 2 INHALATIONS EVERY 4 HOURS AS NEEDED FOR WHEEZING 3/21/22  Yes Liz Justice DO   cyclobenzaprine (FLEXERIL) 10 MG tablet TAKE ONE TABLET BY MOUTH THREE TIMES A DAY AS NEEDED FOR MUSCLE SPASMS 6/15/22  Yes Kehrer, Meredith Lea, MD   Dupixent 300 MG/2ML solution pen-injector Inject 300 mg under the skin into the appropriate area as directed. Every 15 days or 2 times monthly 11/2/21  Yes Provider, MD Melissa   fexofenadine (ALLEGRA) 180 MG tablet Take 180 mg by mouth Every Morning.   Yes Emergency, Nurse Lee, RN   FLUoxetine (PROzac) 40 MG capsule Take 2 capsules by mouth Daily. 8/18/22  Yes Kehrer, Meredith  MD Shannon   Fluticasone Furoate-Vilanterol (Breo Ellipta) 100-25 MCG/INH inhaler Inhale 1 puff Daily. 12/15/21  Yes Kehrer, Meredith Lea, MD   guaiFENesin-codeine (GUAIFENESIN AC) 100-10 MG/5ML liquid 1-2 tsp po  q6 hours prn 1/13/22  Yes Tyler Pemberton MD   HYDROcodone-acetaminophen (NORCO) 7.5-325 MG per tablet Take 1 tablet by mouth Every 4 (Four) Hours As Needed for Moderate Pain. 6/21/22  Yes Waterhouse, Maurine, MD   leuprolide (LUPRON) 11.25 MG injection Inject  into the appropriate muscle as directed by prescriber Every 30 (Thirty) Days.   Yes Emergency, Nurse MARISOL Howard   levothyroxine (SYNTHROID, LEVOTHROID) 50 MCG tablet TAKE 1 TABLET DAILY 10/11/22  Yes Kehrer, Meredith Lea, MD   montelukast (SINGULAIR) 10 MG tablet TAKE ONE TABLET BY MOUTH DAILY 12/12/22  Yes Kehrer, Meredith Lea, MD   Multiple Vitamin (MULTI-VITAMIN DAILY PO) Take 1 tablet by mouth Every Night.   Yes Emergency, Nurse MARISOL Howard   promethazine (PHENERGAN) 25 MG tablet Take 1 tablet by mouth Every 6 (Six) Hours As Needed for Nausea or Vomiting. 6/21/22  Yes Waterhouse, Maurine, MD   tamoxifen (NOLVADEX) 20 MG chemo tablet Take 1 tablet by mouth Daily. 10/17/22 1/15/23 Yes ProviderMelissa MD   tamsulosin (FLOMAX) 0.4 MG capsule 24 hr capsule Take 1 capsule by mouth Daily. 11/3/22  Yes Liz uJstice DO   theophylline (THEODUR) 300 MG 12 hr tablet TAKE 1 TABLET EVERY NIGHT  Patient taking differently: Every Morning. 2/7/22  Yes Kehrer, Meredith Lea, MD   valACYclovir (VALTREX) 500 MG tablet TAKE 1 TABLET DAILY  Patient taking differently: Take 500 mg by mouth Every Morning. 2/7/22  Yes Kehrer, Meredith Lea, MD   albuterol (PROVENTIL) (2.5 MG/3ML) 0.083% nebulizer solution Take 2.5 mg by nebulization Every 4 (Four) Hours As Needed for Wheezing.    Provider, MD Melissa   benzonatate (TESSALON) 200 MG capsule Take 1 capsule by mouth 3 (Three) Times a Day As Needed for Cough. 7/18/22   Kehrer, Meredith Lea, MD         Objective  "    tMax 24 hours:  Temp (24hrs), Av.5 °F (37.5 °C), Min:98.2 °F (36.8 °C), Max:101.4 °F (38.6 °C)    Vital Sign Ranges:  Temp:  [98.2 °F (36.8 °C)-101.4 °F (38.6 °C)] 98.2 °F (36.8 °C)  Heart Rate:  [] 71  Resp:  [18-19] 18  BP: (104-117)/(60-70) 114/70  Intake and Output Last 3 Shifts:  I/O last 3 completed shifts:  In: 1100 [IV Piggyback:1100]  Out: 500 [Urine:500]      Physical Exam:     Vital signs: /70 (BP Location: Right arm, Patient Position: Lying)   Pulse 71   Temp 98.2 °F (36.8 °C) (Oral)   Resp 18   Ht 165.1 cm (65\")   Wt 78.7 kg (173 lb 8 oz)   LMP  (LMP Unknown)   SpO2 96%   BMI 28.87 kg/m²   96%     General: Well developed, well nourished, alert and oriented x 3    Appears stated age. No apparent distress.    HEENT: Moist mucous membranes of the oral mucosa & nasal mucosa.    Lips are not cyanotic.    Extraocular movements intact    Neck:  Trachea position is midline.     Respiratory: Respiratory rhythm & depth: Normal.    Respiratory effort:  Normal.    On supplemental O2 at this time      GI:  Nondistended. Nontender.    Skin:  Inspection: No rash.    Palpation:  Warm, dry. No induration.     Extremities: No clubbing & no cyanosis.    No edema    :  Exam deferred          Results Review:     Lab Results (last 24 hours)     Procedure Component Value Units Date/Time    Urinalysis, Microscopic Only - Urine, Clean Catch [[id number]]  (Abnormal) Collected: 23    Specimen: Urine, Clean Catch Updated: 23     RBC, UA 21-30 /HPF      WBC, UA Too Numerous to Count /HPF      Bacteria, UA 4+ /HPF      Squamous Epithelial Cells, UA 21-30 /HPF      Hyaline Casts, UA 0-2 /LPF      Methodology Automated Microscopy    Urinalysis With Culture If Indicated - Urine, Clean Catch [[id number]]  (Abnormal) Collected: 23    Specimen: Urine, Clean Catch Updated: 23     Color, UA Dark Yellow     Appearance, UA Turbid     pH, UA 7.5     Specific Gravity, UA " "1.014     Glucose, UA Negative     Ketones, UA Trace     Bilirubin, UA Negative     Blood, UA Small (1+)     Protein,  mg/dL (2+)     Leuk Esterase, UA Large (3+)     Nitrite, UA Positive     Urobilinogen, UA 1.0 E.U./dL    Narrative:      In absence of clinical symptoms, the presence of pyuria, bacteria, and/or nitrites on the urinalysis result does not correlate with infection.    Urine Culture - Urine, Urine, Clean Catch [[id number]] Collected: 01/07/23 1906    Specimen: Urine, Clean Catch Updated: 01/07/23 1922    Procalcitonin [[id number]]  (Normal) Collected: 01/07/23 1721    Specimen: Blood Updated: 01/07/23 1828     Procalcitonin 0.07 ng/mL     Narrative:      As a Marker for Sepsis (Non-Neonates):    1. <0.5 ng/mL represents a low risk of severe sepsis and/or septic shock.  2. >2 ng/mL represents a high risk of severe sepsis and/or septic shock.    As a Marker for Lower Respiratory Tract Infections that require antibiotic therapy:    PCT on Admission    Antibiotic Therapy       6-12 Hrs later    >0.5                Strongly Recommended  >0.25 - <0.5        Recommended   0.1 - 0.25          Discouraged              Remeasure/reassess PCT  <0.1                Strongly Discouraged     Remeasure/reassess PCT    As 28 day mortality risk marker: \"Change in Procalcitonin Result\" (>80% or <=80%) if Day 0 (or Day 1) and Day 4 values are available. Refer to http://www.[facility]-pct-calculator.com    Change in PCT <=80%  A decrease of PCT levels below or equal to 80% defines a positive change in PCT test result representing a higher risk for 28-day all-cause mortality of patients diagnosed with severe sepsis for septic shock.    Change in PCT >80%  A decrease of PCT levels of more than 80% defines a negative change in PCT result representing a lower risk for 28-day all-cause mortality of patients diagnosed with severe sepsis or septic shock.       Comprehensive Metabolic Panel [[id number]]  (Abnormal) Collected: " 01/07/23 1721    Specimen: Blood Updated: 01/07/23 1821     Glucose 100 mg/dL      BUN 13 mg/dL      Creatinine 0.86 mg/dL      Sodium 140 mmol/L      Potassium 3.8 mmol/L      Chloride 106 mmol/L      CO2 24.0 mmol/L      Calcium 9.2 mg/dL      Total Protein 7.0 g/dL      Albumin 4.2 g/dL      ALT (SGPT) 14 U/L      AST (SGOT) 15 U/L      Alkaline Phosphatase 97 U/L      Total Bilirubin 0.7 mg/dL      Globulin 2.8 gm/dL      A/G Ratio 1.5 g/dL      BUN/Creatinine Ratio 15.1     Anion Gap 10.0 mmol/L      eGFR 82.9 mL/min/1.73      Comment: National Kidney Foundation and American Society of Nephrology (ASN) Task Force recommended calculation based on the Chronic Kidney Disease Epidemiology Collaboration (CKD-EPI) equation refit without adjustment for race.       Narrative:      GFR Normal >60  Chronic Kidney Disease <60  Kidney Failure <15      Lactic Acid, Plasma [[id number]]  (Normal) Collected: 01/07/23 1721    Specimen: Blood Updated: 01/07/23 1820     Lactate 0.9 mmol/L     Blood Culture - Blood, Arm, Left [[id number]] Collected: 01/07/23 1739    Specimen: Blood from Arm, Left Updated: 01/07/23 1743    CBC & Differential [[id number]]  (Abnormal) Collected: 01/07/23 1721    Specimen: Blood Updated: 01/07/23 1737    Narrative:      The following orders were created for panel order CBC & Differential.  Procedure                               Abnormality         Status                     ---------                               -----------         ------                     CBC Auto Differential[[id number]]        Abnormal            Final result                 Please view results for these tests on the individual orders.    CBC Auto Differential [[id number]]  (Abnormal) Collected: 01/07/23 1721    Specimen: Blood Updated: 01/07/23 1737     WBC 10.05 10*3/mm3      RBC 4.30 10*6/mm3      Hemoglobin 12.6 g/dL      Hematocrit 37.8 %      MCV 87.9 fL      MCH 29.3 pg      MCHC 33.3 g/dL      RDW 12.7 %      RDW-SD 41.5 fl       MPV 9.5 fL      Platelets 311 10*3/mm3      Neutrophil % 82.0 %      Lymphocyte % 8.0 %      Monocyte % 8.4 %      Eosinophil % 0.7 %      Basophil % 0.4 %      Immature Grans % 0.5 %      Neutrophils, Absolute 8.25 10*3/mm3      Lymphocytes, Absolute 0.80 10*3/mm3      Monocytes, Absolute 0.84 10*3/mm3      Eosinophils, Absolute 0.07 10*3/mm3      Basophils, Absolute 0.04 10*3/mm3      Immature Grans, Absolute 0.05 10*3/mm3      nRBC 0.0 /100 WBC     Blood Culture - Blood, Arm, Left [[id number]] Collected: 01/07/23 1721    Specimen: Blood from Arm, Left Updated: 01/07/23 1727         No results found for: URINECX     Imaging Results (Last 7 Days)     Procedure Component Value Units Date/Time    CT Abdomen Pelvis Without Contrast [[id number]] Collected: 01/07/23 1901     Updated: 01/07/23 1912    Narrative:      CT OF THE ABDOMEN AND PELVIS WITHOUT CONTRAST     HISTORY: Bilateral flank pain, right greater than left     COMPARISON: None available.     TECHNIQUE: Axial CT imaging was obtained through the abdomen and pelvis.  No IV contrast was administered.     FINDINGS:  Images through the lung bases are clear. The stomach, duodenum, adrenal  glands and spleen, and pancreas are all normal. Gallbladder is absent. A  few tiny cysts are noted within the liver. The patient has mild  right-sided pelvocaliectasis. There is perinephric stranding noted on  the right. No ureteral or bladder stones are seen. There is no  hydronephrosis on the left. No distal ureteral or bladder stones are  seen on the left. Urinary bladder is thick walled, with adjacent  perivesical stranding. Constellation of findings is favored to represent  cystitis and right-sided pyelonephritis. Full assessment of the right  kidney is limited in the absence of contrast material. Appendix is  absent. There is no bowel obstruction. No acute osseous abnormalities  are seen. There is mild lumbar scoliosis, with convexity to the right.       Impression:          1. Mild right-sided pelvocaliectasis, with adjacent perinephric  stranding. Urinary bladder is also thick walled, with perivesical  stranding. Constellation of findings is favored to represent cystitis  and right-sided pyelonephritis. Full assessment is limited in the  absence of contrast material.     Radiation dose reduction techniques were utilized, including automated  exposure control and exposure modulation based on body size.     This report was finalized on 1/7/2023 7:09 PM by Dr. Josette Bhardwaj M.D.                Inpatient Meds:   Current Meds:        Scheduled Meds:albuterol, 4 mg, Oral, Nightly  budesonide-formoterol, 2 puff, Inhalation, BID - RT  cefTRIAXone, 1 g, Intravenous, Q24H  cetirizine, 10 mg, Oral, Daily  FLUoxetine, 80 mg, Oral, Daily  levothyroxine, 50 mcg, Oral, Q AM  montelukast, 10 mg, Oral, Daily  multivitamin, 1 tablet, Oral, Nightly  phenazopyridine, 200 mg, Oral, TID With Meals  tamoxifen, 20 mg, Oral, Daily  tamsulosin, 0.4 mg, Oral, Daily  theophylline, 300 mg, Oral, Daily       Continuous Infusions:lactated ringers, 75 mL/hr, Last Rate: 75 mL/hr (01/08/23 0414)       PRN Meds:.•  acetaminophen  •  albuterol  •  benzonatate  •  cyclobenzaprine  •  HYDROcodone-acetaminophen  •  HYDROmorphone  •  promethazine  •  [COMPLETED] Insert Peripheral IV **AND** sodium chloride      Impression:       Pyelonephritis    Hypothyroidism    Depression, major    Anxiety    COPD (chronic obstructive pulmonary disease) ([facility])     Plan:  Awaiting UCS and Blood culture results  Continue pain and nausea management  Continue IV Rocephin  No acute urologic surgical intervention at this time. Consider Mag 3 Renal Scan in am if afebrile.  I/S  PVR after next void    I discussed the patients findings and my recommendations with patient.    Daniela Beltre, APRN  01/08/23  09:39 EST

## 2023-01-08 NOTE — H&P
Patient Name:  Nereida Myles  YOB: 1975  MRN:  [id number]  Admit Date:  1/7/2023  Patient Care Team:  Kehrer, Meredith Lea, MD as PCP - General (Family Medicine)  Demarcus Baker MD as Consulting Physician (Pulmonary Disease)  Trevin Rosen MD as Consulting Physician (Hematology and Oncology)  Miguel A Cyr MD as Surgeon (Hand Surgery)      Subjective   History Present Illness     Chief Complaint   Patient presents with   • Flank Pain   • Dysuria     History of Present Illness   Ms. Myles is a 47 y.o. female with a history of COD, adenocarcinoma of the left breast, hypothyroidism, OCD, anxiety, depression, and kidney stones, that required ureteral stenting.  Leann Jay is baseline for an every is off last night recurrent pyelonephritis that presents to [facility] complaining 4 day history of dysuria, and associated nausea, vomiting and flank pain today. She reports the pain as a 10/10 aching, sharp pain  starts on the right side of her lower back and radiates around to her right lower abdomen. She denies any known exacerbating or eliminating factors.  She states that she has had prior kidney stones, that required ureteral stents back in November 2022, with [facility],  She does endorse some onset of shortness of breath that is exacerbated by her pain. She does have a history of COPD and Asthma, is usually controlled but is triggered by emotional stress. She reports her albuterol rescue inhaler was successful in treating her shortness of breath.  She denies any other associated symptoms or acute distress chest pain, palpitations, lightheadedness, syncope, headache, chills, diarrhea, or constipation.    Initial evaluation in the emergency department remarkable for fever temperature 101.4, otherwise stable vital signs blood pressure 117/65, heart rate 63, respiratory rate 19, oxygen saturation 94% on room air.    Unremarkable CMP with exception of slightly  elevated glucose of 100, unremarkable CBC, her white count is 10.0 with 82.5% neutrophils   Urinalysis remarkable for trace ketones, 1+ blood, positive nitrates, 3+ leukocytes, 2+ protein, 21-30 RBCs, too numerous to count WBCs, 4+ bacteria, questionable contamination with 21-30 squamous cell epithelium.  Blood and urine cultures are pending.  CT scan of abdomen and pelvis remarkable for right-sided pyelocaliectasis, adjacent perinephric stranding, thickened urinary bladder wall, with perivesical stranding.  Consistent with cystitis and right-sided pyelonephritis..    On the emergency department she was initiated on ceftriaxone, was administered 0.5 mg hydromorphone, 4 mg ondansetron, gauze milligrams acetaminophen and 1 L bolus of LR    Patient will be admitted to the hospitalist group for further evaluation and treatment of pyelonephritis and other acute and or chronic conditions.         Review of Systems   Constitutional: Positive for fever.   Eyes: Negative.    Respiratory: Positive for shortness of breath.    Cardiovascular: Negative.    Gastrointestinal: Positive for abdominal pain.   Endocrine: Negative.    Genitourinary: Positive for dysuria, flank pain, hematuria and pelvic pain.   Musculoskeletal: Positive for back pain.   Skin: Negative.    Allergic/Immunologic: Negative.    Neurological: Negative.    Hematological: Negative.    Psychiatric/Behavioral: Negative.    All other systems reviewed and are negative.       Personal History     Past Medical History:   Diagnosis Date   • Allergic rhinitis    • Anxiety    • Asthma     has inhaler and neb treatments   • BMI 25.0-25.9,adult    • Cancer (HCC)     left breast   • Community acquired pneumonia    • COPD (chronic obstructive pulmonary disease) ([facility])    • Depression, major    • Disease of thyroid gland    • Diverticular disease    • Foreign body in ear    • GERD (gastroesophageal reflux disease)    • Hashimoto's thyroiditis    • History of abnormal  mammogram     Left breast   • History of acute sinusitis    • History of adenocarcinoma of breast    • History of COPD    • History of dyspareunia in female    • History of kidney stones    • History of lump of left breast    • History of malignant neoplasm of left breast    • History of nausea and vomiting    • History of seborrheic dermatitis    • History of strep pharyngitis    • History of suicidal ideation    • Hypothyroidism    • Hypoxemia     History of Hypoxemia   • Obsessive compulsive disorder    • Other screening mammogram    • Personal history of asthma    • PONV (postoperative nausea and vomiting)    • Recurrent genital herpes simplex     History of    • Symptomatic states associated with artificial menopause     History of      Past Surgical History:   Procedure Laterality Date   • APPENDECTOMY     • BREAST AUGMENTATION Bilateral     Revised 2005, initial implants 1992   • BREAST RECONSTRUCTION, BREAST TISSUE EXPANDER INSERTION Bilateral    • CERVICAL CERCLAGE      x 2 during pregnancy, incompetent cervix   • CHOLECYSTECTOMY     • COLONOSCOPY     • HAND SURGERY Right     may2022   • HYSTERECTOMY      due to uterine prolapse   • KIDNEY STONE SURGERY     • LUNG SURGERY      bronchoscopy   • MASTECTOMY Bilateral 01/2016    removed lymph nodes on left side   • SCAR REVISION BREAST Bilateral 06/21/2022    Procedure: SCAR REVISION LEFT CHEST 27 CENTIMETERS AND RIGHT CHEST 25 CENTIMETERS FOR AESTHETIC FLAT CLOSURE FOLLOWING MASTECTOMY;  Surgeon: Waterhouse, Maurine, MD;  Location: [facility];  Service: Plastics;  Laterality: Bilateral;   • TISSUE EXPANDER PLACEMENT Bilateral     due to infection     Family History   Problem Relation Age of Onset   • Thyroid disease Mother    • Arthritis Mother    • Uterine cancer Mother         in her 40's   • Cancer Father    • Bipolar disorder Father    • Depression Father    • Hyperlipidemia Father    • Hypertension Father    • Arthritis Maternal Grandmother    •  Bleeding Disorder Maternal Grandmother    • Hyperlipidemia Maternal Grandmother    • Hypertension Maternal Grandmother    • Hypertension Maternal Grandfather    • Hypertension Paternal Grandfather    • Hyperlipidemia Paternal Grandfather    • Coronary artery disease Other         Maternal GrGF   • Skin cancer Other         Maternal GrGF   • Diabetes Other         Paternal GrGF   • Heart disease Other         FH in females before the age of 65   • Malig Hyperthermia Neg Hx      Social History     Tobacco Use   • Smoking status: Never   • Smokeless tobacco: Never   Vaping Use   • Vaping Use: Never used   Substance Use Topics   • Alcohol use: Yes     Alcohol/week: 4.0 standard drinks     Types: 4 Glasses of wine per week   • Drug use: Never     No current facility-administered medications on file prior to encounter.     Current Outpatient Medications on File Prior to Encounter   Medication Sig Dispense Refill   • albuterol (PROVENTIL) (2.5 MG/3ML) 0.083% nebulizer solution Take 2.5 mg by nebulization Every 4 (Four) Hours As Needed for Wheezing.     • albuterol (PROVENTIL) 4 MG tablet TAKE 1 TABLET EVERY NIGHT 90 tablet 3   • albuterol sulfate  (90 Base) MCG/ACT inhaler USE 2 INHALATIONS EVERY 4 HOURS AS NEEDED FOR WHEEZING 34 g 0   • benzonatate (TESSALON) 200 MG capsule Take 1 capsule by mouth 3 (Three) Times a Day As Needed for Cough. 30 capsule 0   • cyclobenzaprine (FLEXERIL) 10 MG tablet TAKE ONE TABLET BY MOUTH THREE TIMES A DAY AS NEEDED FOR MUSCLE SPASMS 30 tablet 2   • Dupixent 300 MG/2ML solution pen-injector Inject 300 mg under the skin into the appropriate area as directed. Every 15 days or 2 times monthly     • fexofenadine (ALLEGRA) 180 MG tablet Take 180 mg by mouth Every Morning.     • FLUoxetine (PROzac) 40 MG capsule Take 2 capsules by mouth Daily. 180 capsule 1   • Fluticasone Furoate-Vilanterol (Breo Ellipta) 100-25 MCG/INH inhaler Inhale 1 puff Daily. 180 each 3   • guaiFENesin-codeine  (GUAIFENESIN AC) 100-10 MG/5ML liquid 1-2 tsp po  q6 hours prn 118 mL 0   • HYDROcodone-acetaminophen (NORCO) 7.5-325 MG per tablet Take 1 tablet by mouth Every 4 (Four) Hours As Needed for Moderate Pain. 30 tablet 0   • leuprolide (LUPRON) 11.25 MG injection Inject  into the appropriate muscle as directed by prescriber Every 30 (Thirty) Days.     • levothyroxine (SYNTHROID, LEVOTHROID) 50 MCG tablet TAKE 1 TABLET DAILY 90 tablet 3   • montelukast (SINGULAIR) 10 MG tablet TAKE ONE TABLET BY MOUTH DAILY 90 tablet 1   • Multiple Vitamin (MULTI-VITAMIN DAILY PO) Take 1 tablet by mouth Every Night.     • promethazine (PHENERGAN) 25 MG tablet Take 1 tablet by mouth Every 6 (Six) Hours As Needed for Nausea or Vomiting. 12 tablet 2   • tamoxifen (NOLVADEX) 20 MG chemo tablet Take 1 tablet by mouth Daily. 30 tablet 2   • tamsulosin (FLOMAX) 0.4 MG capsule 24 hr capsule Take 1 capsule by mouth Daily. 14 capsule 0   • theophylline (THEODUR) 300 MG 12 hr tablet TAKE 1 TABLET EVERY NIGHT (Patient taking differently: Every Morning.) 90 tablet 3   • valACYclovir (VALTREX) 500 MG tablet TAKE 1 TABLET DAILY (Patient taking differently: Take 1 tablet by mouth Every Morning.) 90 tablet 3     No Known Allergies    Objective    Objective     Vital Signs  Temp:  [101.4 °F (38.6 °C)] 101.4 °F (38.6 °C)  Heart Rate:  [] 77  Resp:  [19] 19  SpO2:  [93 %-95 %] 93 %  on   ;      There is no height or weight on file to calculate BMI.    Physical Exam  Vitals and nursing note reviewed.   Constitutional:       Appearance: Normal appearance.   HENT:      Head: Atraumatic.      Mouth/Throat:      Mouth: Mucous membranes are dry.   Eyes:      Conjunctiva/sclera: Conjunctivae normal.   Cardiovascular:      Rate and Rhythm: Normal rate and regular rhythm.      Pulses: Normal pulses.           Radial pulses are 2+ on the right side and 2+ on the left side.        Dorsalis pedis pulses are 2+ on the right side and 2+ on the left side.         Posterior tibial pulses are 2+ on the right side and 2+ on the left side.      Heart sounds: Normal heart sounds.   Pulmonary:      Effort: Pulmonary effort is normal.      Breath sounds: Normal breath sounds.   Abdominal:      General: Bowel sounds are normal.      Palpations: Abdomen is soft.      Tenderness: There is abdominal tenderness in the right lower quadrant. There is right CVA tenderness.   Musculoskeletal:         General: Normal range of motion.      Comments: Right flank pain positive point tenderness   Skin:     General: Skin is warm and dry.      Capillary Refill: Capillary refill takes less than 2 seconds.   Neurological:      General: No focal deficit present.      Mental Status: She is oriented to person, place, and time.   Psychiatric:         Mood and Affect: Mood normal.         Results Review:  I reviewed the patient's new clinical results.  I reviewed the patient's new imaging results and agree with the interpretation.  I reviewed the patient's other test results and agree with the interpretation  I personally viewed and interpreted the patient's EKG/Telemetry data  Discussed with ED provider.    Lab Results (last 24 hours)     Procedure Component Value Units Date/Time    CBC & Differential [[id number]]  (Abnormal) Collected: 01/07/23 1721    Specimen: Blood Updated: 01/07/23 1737    Narrative:      The following orders were created for panel order CBC & Differential.  Procedure                               Abnormality         Status                     ---------                               -----------         ------                     CBC Auto Differential[[id number]]        Abnormal            Final result                 Please view results for these tests on the individual orders.    Comprehensive Metabolic Panel [[id number]]  (Abnormal) Collected: 01/07/23 1721    Specimen: Blood Updated: 01/07/23 1821     Glucose 100 mg/dL      BUN 13 mg/dL      Creatinine 0.86 mg/dL      Sodium 140  "mmol/L      Potassium 3.8 mmol/L      Chloride 106 mmol/L      CO2 24.0 mmol/L      Calcium 9.2 mg/dL      Total Protein 7.0 g/dL      Albumin 4.2 g/dL      ALT (SGPT) 14 U/L      AST (SGOT) 15 U/L      Alkaline Phosphatase 97 U/L      Total Bilirubin 0.7 mg/dL      Globulin 2.8 gm/dL      A/G Ratio 1.5 g/dL      BUN/Creatinine Ratio 15.1     Anion Gap 10.0 mmol/L      eGFR 82.9 mL/min/1.73      Comment: National Kidney Foundation and American Society of Nephrology (ASN) Task Force recommended calculation based on the Chronic Kidney Disease Epidemiology Collaboration (CKD-EPI) equation refit without adjustment for race.       Narrative:      GFR Normal >60  Chronic Kidney Disease <60  Kidney Failure <15      Blood Culture - Blood, Arm, Left [[id number]] Collected: 01/07/23 1721    Specimen: Blood from Arm, Left Updated: 01/07/23 1727    Lactic Acid, Plasma [[id number]]  (Normal) Collected: 01/07/23 1721    Specimen: Blood Updated: 01/07/23 1820     Lactate 0.9 mmol/L     Procalcitonin [[id number]]  (Normal) Collected: 01/07/23 1721    Specimen: Blood Updated: 01/07/23 1828     Procalcitonin 0.07 ng/mL     Narrative:      As a Marker for Sepsis (Non-Neonates):    1. <0.5 ng/mL represents a low risk of severe sepsis and/or septic shock.  2. >2 ng/mL represents a high risk of severe sepsis and/or septic shock.    As a Marker for Lower Respiratory Tract Infections that require antibiotic therapy:    PCT on Admission    Antibiotic Therapy       6-12 Hrs later    >0.5                Strongly Recommended  >0.25 - <0.5        Recommended   0.1 - 0.25          Discouraged              Remeasure/reassess PCT  <0.1                Strongly Discouraged     Remeasure/reassess PCT    As 28 day mortality risk marker: \"Change in Procalcitonin Result\" (>80% or <=80%) if Day 0 (or Day 1) and Day 4 values are available. Refer to http://www.[employer].com [email or web address] [place]-pct[facility]calculator.[email or web address]    Change in PCT <=80%  A decrease of PCT levels below or equal to 80% " defines a positive change in PCT test result representing a higher risk for 28-day all-cause mortality of patients diagnosed with severe sepsis for septic shock.    Change in PCT >80%  A decrease of PCT levels of more than 80% defines a negative change in PCT result representing a lower risk for 28-day all-cause mortality of patients diagnosed with severe sepsis or septic shock.       CBC Auto Differential [[id number]]  (Abnormal) Collected: 01/07/23 1721    Specimen: Blood Updated: 01/07/23 1737     WBC 10.05 10*3/mm3      RBC 4.30 10*6/mm3      Hemoglobin 12.6 g/dL      Hematocrit 37.8 %      MCV 87.9 fL      MCH 29.3 pg      MCHC 33.3 g/dL      RDW 12.7 %      RDW-SD 41.5 fl      MPV 9.5 fL      Platelets 311 10*3/mm3      Neutrophil % 82.0 %      Lymphocyte % 8.0 %      Monocyte % 8.4 %      Eosinophil % 0.7 %      Basophil % 0.4 %      Immature Grans % 0.5 %      Neutrophils, Absolute 8.25 10*3/mm3      Lymphocytes, Absolute 0.80 10*3/mm3      Monocytes, Absolute 0.84 10*3/mm3      Eosinophils, Absolute 0.07 10*3/mm3      Basophils, Absolute 0.04 10*3/mm3      Immature Grans, Absolute 0.05 10*3/mm3      nRBC 0.0 /100 WBC     Blood Culture - Blood, Arm, Left [[id number]] Collected: 01/07/23 1739    Specimen: Blood from Arm, Left Updated: 01/07/23 1743    Urinalysis With Culture If Indicated - Urine, Clean Catch [[id number]]  (Abnormal) Collected: 01/07/23 1906    Specimen: Urine, Clean Catch Updated: 01/07/23 1922     Color, UA Dark Yellow     Appearance, UA Turbid     pH, UA 7.5     Specific Gravity, UA 1.014     Glucose, UA Negative     Ketones, UA Trace     Bilirubin, UA Negative     Blood, UA Small (1+)     Protein,  mg/dL (2+)     Leuk Esterase, UA Large (3+)     Nitrite, UA Positive     Urobilinogen, UA 1.0 E.U./dL    Narrative:      In absence of clinical symptoms, the presence of pyuria, bacteria, and/or nitrites on the urinalysis result does not correlate with infection.    Urinalysis, Microscopic  Only - Urine, Clean Catch [[id number]]  (Abnormal) Collected: 01/07/23 1906    Specimen: Urine, Clean Catch Updated: 01/07/23 1922     RBC, UA 21-30 /HPF      WBC, UA Too Numerous to Count /HPF      Bacteria, UA 4+ /HPF      Squamous Epithelial Cells, UA 21-30 /HPF      Hyaline Casts, UA 0-2 /LPF      Methodology Automated Microscopy    Urine Culture - Urine, Urine, Clean Catch [[id number]] Collected: 01/07/23 1906    Specimen: Urine, Clean Catch Updated: 01/07/23 1922          Imaging Results (Last 24 Hours)     Procedure Component Value Units Date/Time    CT Abdomen Pelvis Without Contrast [[id number]] Collected: 01/07/23 1901     Updated: 01/07/23 1912    Narrative:      CT OF THE ABDOMEN AND PELVIS WITHOUT CONTRAST     HISTORY: Bilateral flank pain, right greater than left     COMPARISON: None available.     TECHNIQUE: Axial CT imaging was obtained through the abdomen and pelvis.  No IV contrast was administered.     FINDINGS:  Images through the lung bases are clear. The stomach, duodenum, adrenal  glands and spleen, and pancreas are all normal. Gallbladder is absent. A  few tiny cysts are noted within the liver. The patient has mild  right-sided pelvocaliectasis. There is perinephric stranding noted on  the right. No ureteral or bladder stones are seen. There is no  hydronephrosis on the left. No distal ureteral or bladder stones are  seen on the left. Urinary bladder is thick walled, with adjacent  perivesical stranding. Constellation of findings is favored to represent  cystitis and right-sided pyelonephritis. Full assessment of the right  kidney is limited in the absence of contrast material. Appendix is  absent. There is no bowel obstruction. No acute osseous abnormalities  are seen. There is mild lumbar scoliosis, with convexity to the right.       Impression:         1. Mild right-sided pelvocaliectasis, with adjacent perinephric  stranding. Urinary bladder is also thick walled, with perivesical  stranding.  Constellation of findings is favored to represent cystitis  and right-sided pyelonephritis. Full assessment is limited in the  absence of contrast material.     Radiation dose reduction techniques were utilized, including automated  exposure control and exposure modulation based on body size.     This report was finalized on 1/7/2023 7:09 PM by Dr. Josette Bhardwaj M.D.                 No orders to display        Assessment/Plan     Active Hospital Problems    Diagnosis  POA   • **Pyelonephritis [N12]  Yes   • COPD (chronic obstructive pulmonary disease) (HCC) [J44.9]  Yes   • Anxiety [F41.9]  Yes   • Hypothyroidism [E03.9]  Yes   • Depression, major [F32.9]  Yes      Resolved Hospital Problems   No resolved problems to display.     Pyelonephritis   Acute-Recurrent   Continue ceftriaxone  Continue Norco, hydromorphone for pain management  Patient presented with fever, continue acetaminophen as needed for fever  Continue LR infusion  CT scan abdomen pelvis reviewed remarkable for right pyelonephritis  Urinalysis reviewed positive bacteria, nitrates, leukocytes possible contamination  Urine culture pending   Monitor I's and O's  Recent renal calculi requiring ureteral stent reported  Consult urology  secondary to complicated UTIs    Chronic obstructive pulmonary disease  Asthma  Chronic usually well controlled  Not oxygen dependent   not in exacerbation  Supplemental oxygen as needed for hypoxia/respiratory distress to maintain oxygen saturation greater than 90%.  Continue home albuterol as needed for shortness of breath/wheezing  Continue home Breo, Singulair, and Theophylline  Encourage pulmonary hygiene turn cough deep breathe, incentive spirometry    Hypothyroidism  Chronic   Continue home levothyroxine    Depression, major  Anxiety  Chronic stable continue home/  Fluoxetine    Adenocarcinoma of left breast  Limb precautions  Continue home tamoxifen   Consider hematology oncology consult if clinically  appropriate  Patient due to Lupron injection on Monday, January 9 2022.    · I discussed the patient's findings and my recommendations with patient.    VTE Prophylaxis - SCDs.  Code Status - Full code.       RISHI Riley  [facility]  01/07/23  20:09 EST

## 2023-01-08 NOTE — PLAN OF CARE
Goal Outcome Evaluation:  Plan of Care Reviewed With: patient           Outcome Evaluation: From ER, VSS, up ad sanjiv, voiding freely, reg diet, c/o pain in flank and abd. radiating down thighs, given dilaudid and Norco alternating to manage pain, nausea managed with PRN meds x1, consults to be called prior to shift end, will continue to monitor.

## 2023-01-08 NOTE — ED NOTES
Nursing report ED to floor  Nereida Myles  47 y.o.  female    HPI :   Chief Complaint   Patient presents with    Flank Pain    Dysuria       Admitting doctor:   Favio Sosa MD    Admitting diagnosis:   The primary encounter diagnosis was Pyelonephritis. Diagnoses of Fever, unspecified fever cause and Nausea and vomiting, unspecified vomiting type were also pertinent to this visit.    Code status:   Current Code Status       Date Active Code Status Order ID Comments User Context       Prior            Allergies:   Patient has no known allergies.    Isolation:   No active isolations    Intake and Output    Intake/Output Summary (Last 24 hours) at 1/7/2023 2003  Last data filed at 1/7/2023 1902  Gross per 24 hour   Intake 1000 ml   Output --   Net 1000 ml       Weight:   There were no vitals filed for this visit.    Most recent vitals:   Vitals:    01/07/23 1657 01/07/23 1740 01/07/23 1802 01/07/23 1844   Pulse: 108 80 71 77   Resp: 19      Temp: (!) 101.4 °F (38.6 °C)      SpO2: 95% 93% 94% 93%       Active LDAs/IV Access:   Lines, Drains & Airways       Active LDAs       Name Placement date Placement time Site Days    Peripheral IV 01/07/23 1727 Right Antecubital 01/07/23  1727  Antecubital  less than 1                    Labs (abnormal labs have a star):   Labs Reviewed   COMPREHENSIVE METABOLIC PANEL - Abnormal; Notable for the following components:       Result Value    Glucose 100 (*)     All other components within normal limits    Narrative:     GFR Normal >60  Chronic Kidney Disease <60  Kidney Failure <15     URINALYSIS W/ CULTURE IF INDICATED - Abnormal; Notable for the following components:    Color, UA Dark Yellow (*)     Appearance, UA Turbid (*)     Ketones, UA Trace (*)     Blood, UA Small (1+) (*)     Protein,  mg/dL (2+) (*)     Leuk Esterase, UA Large (3+) (*)     Nitrite, UA Positive (*)     All other components within normal limits    Narrative:     In absence of clinical  "symptoms, the presence of pyuria, bacteria, and/or nitrites on the urinalysis result does not correlate with infection.   CBC WITH AUTO DIFFERENTIAL - Abnormal; Notable for the following components:    Neutrophil % 82.0 (*)     Lymphocyte % 8.0 (*)     Neutrophils, Absolute 8.25 (*)     All other components within normal limits   URINALYSIS, MICROSCOPIC ONLY - Abnormal; Notable for the following components:    RBC, UA 21-30 (*)     WBC, UA Too Numerous to Count (*)     Bacteria, UA 4+ (*)     Squamous Epithelial Cells, UA 21-30 (*)     All other components within normal limits   LACTIC ACID, PLASMA - Normal   PROCALCITONIN - Normal    Narrative:     As a Marker for Sepsis (Non-Neonates):    1. <0.5 ng/mL represents a low risk of severe sepsis and/or septic shock.  2. >2 ng/mL represents a high risk of severe sepsis and/or septic shock.    As a Marker for Lower Respiratory Tract Infections that require antibiotic therapy:    PCT on Admission    Antibiotic Therapy       6-12 Hrs later    >0.5                Strongly Recommended  >0.25 - <0.5        Recommended   0.1 - 0.25          Discouraged              Remeasure/reassess PCT  <0.1                Strongly Discouraged     Remeasure/reassess PCT    As 28 day mortality risk marker: \"Change in Procalcitonin Result\" (>80% or <=80%) if Day 0 (or Day 1) and Day 4 values are available. Refer to http://www.[employer]-pct-calculator.com    Change in PCT <=80%  A decrease of PCT levels below or equal to 80% defines a positive change in PCT test result representing a higher risk for 28-day all-cause mortality of patients diagnosed with severe sepsis for septic shock.    Change in PCT >80%  A decrease of PCT levels of more than 80% defines a negative change in PCT result representing a lower risk for 28-day all-cause mortality of patients diagnosed with severe sepsis or septic shock.      BLOOD CULTURE   BLOOD CULTURE   URINE CULTURE   CBC AND DIFFERENTIAL    Narrative:     The " following orders were created for panel order CBC & Differential.  Procedure                               Abnormality         Status                     ---------                               -----------         ------                     CBC Auto Differential[[id number]]        Abnormal            Final result                 Please view results for these tests on the individual orders.       EKG:   No orders to display       Meds given in ED:   Medications   sodium chloride 0.9 % flush 10 mL (has no administration in time range)   cefTRIAXone (ROCEPHIN) 1 g in sodium chloride 0.9 % 100 mL IVPB-VTB (1 g Intravenous New Bag 1/7/23 1959)   lactated ringers bolus 1,000 mL (0 mL Intravenous Stopped 1/7/23 1902)   acetaminophen (TYLENOL) tablet 1,000 mg (1,000 mg Oral Given 1/7/23 1728)   ondansetron (ZOFRAN) injection 4 mg (4 mg Intravenous Given 1/7/23 1728)   HYDROmorphone (DILAUDID) injection 0.5 mg (0.5 mg Intravenous Given 1/7/23 1728)       Imaging results:  CT Abdomen Pelvis Without Contrast    Result Date: 1/7/2023   1. Mild right-sided pelvocaliectasis, with adjacent perinephric stranding. Urinary bladder is also thick walled, with perivesical stranding. Constellation of findings is favored to represent cystitis and right-sided pyelonephritis. Full assessment is limited in the absence of contrast material.  Radiation dose reduction techniques were utilized, including automated exposure control and exposure modulation based on body size.  This report was finalized on 1/7/2023 7:09 PM by Dr. Josette Bhardwaj M.D.       Ambulatory status:   - as tolerated    Social issues:   Social History     Socioeconomic History    Marital status:    Tobacco Use    Smoking status: Never    Smokeless tobacco: Never   Vaping Use    Vaping Use: Never used   Substance and Sexual Activity    Alcohol use: Yes     Alcohol/week: 4.0 standard drinks     Types: 4 Glasses of wine per week    Drug use: Never       NIH Stroke  Scale:         Isidro Teran RN  01/07/23 20:03 EST

## 2023-01-08 NOTE — PLAN OF CARE
Goal Outcome Evaluation:  Plan of Care Reviewed With: patient        Progress: no change  Outcome Evaluation: pt c/o severe pain even with IV narcotics, has tremors w movement especially in her hands, her and her family state that when she becomes ill she always gets them, emesis x1 this afternoon, order for zofran obtained and administered to pt, pt's temp is elevated this evening so a dose of tylenol was given and we will recheck it, pt has only voided once on this shift, I encouraged her to get up as soon as she starts feeling better and attmept to urinate again, we checked a PVR after she urinated earlier today and it was 0mL, mother and  at bedside

## 2023-01-09 ENCOUNTER — APPOINTMENT (OUTPATIENT)
Dept: MRI IMAGING | Facility: HOSPITAL | Age: 48
End: 2023-01-09
Payer: COMMERCIAL

## 2023-01-09 LAB
BACTERIA UR QL AUTO: ABNORMAL /HPF
BILIRUB UR QL STRIP: NEGATIVE
CLARITY UR: CLEAR
COLOR UR: ABNORMAL
GLUCOSE UR STRIP-MCNC: ABNORMAL MG/DL
HGB UR QL STRIP.AUTO: NEGATIVE
HYALINE CASTS UR QL AUTO: ABNORMAL /LPF
KETONES UR QL STRIP: NEGATIVE
LEUKOCYTE ESTERASE UR QL STRIP.AUTO: ABNORMAL
NITRITE UR QL STRIP: POSITIVE
PH UR STRIP.AUTO: 7.5 [PH] (ref 5–8)
PROT UR QL STRIP: ABNORMAL
RBC # UR STRIP: ABNORMAL /HPF
REF LAB TEST METHOD: ABNORMAL
SP GR UR STRIP: 1.01 (ref 1–1.03)
SQUAMOUS #/AREA URNS HPF: ABNORMAL /HPF
UROBILINOGEN UR QL STRIP: ABNORMAL
WBC # UR STRIP: ABNORMAL /HPF

## 2023-01-09 PROCEDURE — 72157 MRI CHEST SPINE W/O & W/DYE: CPT

## 2023-01-09 PROCEDURE — 72158 MRI LUMBAR SPINE W/O & W/DYE: CPT

## 2023-01-09 PROCEDURE — 25010000002 ONDANSETRON PER 1 MG: Performed by: HOSPITALIST

## 2023-01-09 PROCEDURE — 96361 HYDRATE IV INFUSION ADD-ON: CPT

## 2023-01-09 PROCEDURE — 63710000001 PROMETHAZINE PER 25 MG: Performed by: NURSE PRACTITIONER

## 2023-01-09 PROCEDURE — 25010000002 MORPHINE PER 10 MG: Performed by: HOSPITALIST

## 2023-01-09 PROCEDURE — A9577 INJ MULTIHANCE: HCPCS | Performed by: HOSPITALIST

## 2023-01-09 PROCEDURE — 25010000002 KETOROLAC TROMETHAMINE PER 15 MG: Performed by: UROLOGY

## 2023-01-09 PROCEDURE — 96366 THER/PROPH/DIAG IV INF ADDON: CPT

## 2023-01-09 PROCEDURE — G0378 HOSPITAL OBSERVATION PER HR: HCPCS

## 2023-01-09 PROCEDURE — 96376 TX/PRO/DX INJ SAME DRUG ADON: CPT

## 2023-01-09 PROCEDURE — 94799 UNLISTED PULMONARY SVC/PX: CPT

## 2023-01-09 PROCEDURE — 0 GADOBENATE DIMEGLUMINE 529 MG/ML SOLUTION: Performed by: HOSPITALIST

## 2023-01-09 PROCEDURE — 25010000002 CEFTRIAXONE PER 250 MG: Performed by: NURSE PRACTITIONER

## 2023-01-09 PROCEDURE — 87086 URINE CULTURE/COLONY COUNT: CPT | Performed by: NURSE PRACTITIONER

## 2023-01-09 PROCEDURE — 25010000002 MORPHINE PER 10 MG: Performed by: NURSE PRACTITIONER

## 2023-01-09 PROCEDURE — 94664 DEMO&/EVAL PT USE INHALER: CPT

## 2023-01-09 PROCEDURE — 96375 TX/PRO/DX INJ NEW DRUG ADDON: CPT

## 2023-01-09 PROCEDURE — 81001 URINALYSIS AUTO W/SCOPE: CPT | Performed by: NURSE PRACTITIONER

## 2023-01-09 RX ORDER — AMOXICILLIN 250 MG
2 CAPSULE ORAL 2 TIMES DAILY
Status: DISCONTINUED | OUTPATIENT
Start: 2023-01-09 | End: 2023-01-10 | Stop reason: HOSPADM

## 2023-01-09 RX ORDER — MORPHINE SULFATE 2 MG/ML
1 INJECTION, SOLUTION INTRAMUSCULAR; INTRAVENOUS EVERY 4 HOURS PRN
Status: DISCONTINUED | OUTPATIENT
Start: 2023-01-09 | End: 2023-01-10

## 2023-01-09 RX ORDER — FLUOXETINE HYDROCHLORIDE 40 MG/1
CAPSULE ORAL
Qty: 180 CAPSULE | Refills: 3 | Status: SHIPPED | OUTPATIENT
Start: 2023-01-09

## 2023-01-09 RX ORDER — KETOROLAC TROMETHAMINE 30 MG/ML
15 INJECTION, SOLUTION INTRAMUSCULAR; INTRAVENOUS EVERY 6 HOURS
Status: DISCONTINUED | OUTPATIENT
Start: 2023-01-09 | End: 2023-01-10 | Stop reason: HOSPADM

## 2023-01-09 RX ORDER — HYDROCODONE BITARTRATE AND ACETAMINOPHEN 5; 325 MG/1; MG/1
1 TABLET ORAL EVERY 6 HOURS PRN
Status: DISCONTINUED | OUTPATIENT
Start: 2023-01-09 | End: 2023-01-10 | Stop reason: HOSPADM

## 2023-01-09 RX ADMIN — ALBUTEROL SULFATE 4 MG: 4 TABLET ORAL at 20:14

## 2023-01-09 RX ADMIN — POLYETHYLENE GLYCOL 3350 17 G: 17 POWDER, FOR SOLUTION ORAL at 14:47

## 2023-01-09 RX ADMIN — KETOROLAC TROMETHAMINE 15 MG: 30 INJECTION, SOLUTION INTRAMUSCULAR; INTRAVENOUS at 14:55

## 2023-01-09 RX ADMIN — GADOBENATE DIMEGLUMINE 16 ML: 529 INJECTION, SOLUTION INTRAVENOUS at 09:58

## 2023-01-09 RX ADMIN — MORPHINE SULFATE 2 MG: 2 INJECTION, SOLUTION INTRAMUSCULAR; INTRAVENOUS at 04:05

## 2023-01-09 RX ADMIN — BUDESONIDE AND FORMOTEROL FUMARATE DIHYDRATE 2 PUFF: 80; 4.5 AEROSOL RESPIRATORY (INHALATION) at 19:50

## 2023-01-09 RX ADMIN — CEFTRIAXONE SODIUM 1 G: 1 INJECTION, POWDER, FOR SOLUTION INTRAMUSCULAR; INTRAVENOUS at 20:00

## 2023-01-09 RX ADMIN — TAMOXIFEN CITRATE 20 MG: 10 TABLET, FILM COATED ORAL at 14:42

## 2023-01-09 RX ADMIN — ACETAMINOPHEN 500 MG: 500 TABLET, FILM COATED ORAL at 03:57

## 2023-01-09 RX ADMIN — BUDESONIDE AND FORMOTEROL FUMARATE DIHYDRATE 2 PUFF: 80; 4.5 AEROSOL RESPIRATORY (INHALATION) at 10:45

## 2023-01-09 RX ADMIN — PHENAZOPYRIDINE 200 MG: 200 TABLET ORAL at 14:40

## 2023-01-09 RX ADMIN — DOCUSATE SODIUM 50MG AND SENNOSIDES 8.6MG 2 TABLET: 8.6; 5 TABLET, FILM COATED ORAL at 20:00

## 2023-01-09 RX ADMIN — KETOROLAC TROMETHAMINE 15 MG: 30 INJECTION, SOLUTION INTRAMUSCULAR; INTRAVENOUS at 22:27

## 2023-01-09 RX ADMIN — LEVOTHYROXINE SODIUM 50 MCG: 0.05 TABLET ORAL at 05:53

## 2023-01-09 RX ADMIN — Medication 1 TABLET: at 20:14

## 2023-01-09 RX ADMIN — CETIRIZINE HYDROCHLORIDE 10 MG: 10 TABLET ORAL at 14:40

## 2023-01-09 RX ADMIN — MONTELUKAST SODIUM 10 MG: 10 TABLET, FILM COATED ORAL at 14:41

## 2023-01-09 RX ADMIN — KETOROLAC TROMETHAMINE 15 MG: 30 INJECTION, SOLUTION INTRAMUSCULAR; INTRAVENOUS at 08:35

## 2023-01-09 RX ADMIN — CYCLOBENZAPRINE 10 MG: 10 TABLET, FILM COATED ORAL at 22:32

## 2023-01-09 RX ADMIN — FLUOXETINE HYDROCHLORIDE 80 MG: 20 CAPSULE ORAL at 14:39

## 2023-01-09 RX ADMIN — SODIUM CHLORIDE, POTASSIUM CHLORIDE, SODIUM LACTATE AND CALCIUM CHLORIDE 75 ML/HR: 600; 310; 30; 20 INJECTION, SOLUTION INTRAVENOUS at 14:55

## 2023-01-09 RX ADMIN — POLYETHYLENE GLYCOL 3350 17 G: 17 POWDER, FOR SOLUTION ORAL at 20:00

## 2023-01-09 RX ADMIN — ONDANSETRON 4 MG: 2 INJECTION INTRAMUSCULAR; INTRAVENOUS at 12:23

## 2023-01-09 RX ADMIN — PHENAZOPYRIDINE 200 MG: 200 TABLET ORAL at 18:07

## 2023-01-09 RX ADMIN — TAMSULOSIN HYDROCHLORIDE 0.4 MG: 0.4 CAPSULE ORAL at 14:40

## 2023-01-09 RX ADMIN — THEOPHYLLINE 300 MG: 300 TABLET, EXTENDED RELEASE ORAL at 14:47

## 2023-01-09 RX ADMIN — MORPHINE SULFATE 1 MG: 2 INJECTION, SOLUTION INTRAMUSCULAR; INTRAVENOUS at 20:08

## 2023-01-09 RX ADMIN — ONDANSETRON 4 MG: 2 INJECTION INTRAMUSCULAR; INTRAVENOUS at 18:03

## 2023-01-09 RX ADMIN — PROMETHAZINE HYDROCHLORIDE 25 MG: 25 TABLET ORAL at 21:23

## 2023-01-09 NOTE — PROGRESS NOTES
FU for flank pain, fever, recent ureteroscopy    Patient reports continued flank discomfort. Currently worse on the left side. Worse with movement, particularly transferring from sit-to-stand.     Tm 101.3, Tc 98  VSS  Abd soft, nondistended  No significant CVA tenderness    UCx: mixed eulogio, likely contaminant.    Plan:  - MRI T/L spine  - Toradol  - continue ceftriaxone

## 2023-01-09 NOTE — PROGRESS NOTES
Name: Nereida Myles ADMIT: 2023   : 1975  PCP: Kehrer, Meredith Lea, MD    MRN: [id number] LOS: 0 days   AGE/SEX: 47 y.o. female  ROOM: [facility]     Subjective   Subjective   Patient appears generally weak & relatively comfortable.  Family at bedside.  Still with nausea.  Feeling better today.    Review of Systems   Constitutional: Negative for chills and fever.   Respiratory: Negative for cough and shortness of breath.    Cardiovascular: Negative for chest pain and leg swelling.   Gastrointestinal: Positive for abdominal pain (bilateral lower quadrants) and nausea.   Genitourinary: Positive for dysuria and frequency.   Musculoskeletal: Negative for gait problem and myalgias.        Objective   Objective   Vital Signs  Temp:  [97.1 °F (36.2 °C)-101.3 °F (38.5 °C)] 97.1 °F (36.2 °C)  Heart Rate:  [56-79] 58  Resp:  [16-18] 16  BP: ()/(57-79) 90/57  SpO2:  [91 %-97 %] 97 %  on  Flow (L/min):  [1] 1;   Device (Oxygen Therapy): nasal cannula  Body mass index is 28.87 kg/m².     Physical Exam  Constitutional:       General: She is not in acute distress.     Appearance: She is not toxic-appearing.   Cardiovascular:      Rate and Rhythm: Bradycardia present.      Heart sounds: Normal heart sounds.   Pulmonary:      Effort: Pulmonary effort is normal.      Breath sounds: Normal breath sounds.   Abdominal:      General: Bowel sounds are normal.      Palpations: Abdomen is soft.      Tenderness: There is abdominal tenderness (bilateral lower quadrants).   Musculoskeletal:      Right lower leg: No edema.      Left lower leg: No edema.   Skin:     General: Skin is warm and dry.   Neurological:      Mental Status: She is alert.       Results Review     I reviewed the patient's new clinical results.  Results from last 7 days   Lab Units 23  1721   WBC 10*3/mm3 10.05   HEMOGLOBIN g/dL 12.6   PLATELETS 10*3/mm3 311     Results from last 7 days   Lab Units 23  1721   SODIUM mmol/L 140   POTASSIUM  mmol/L 3.8   CHLORIDE mmol/L 106   CO2 mmol/L 24.0   BUN mg/dL 13   CREATININE mg/dL 0.86   GLUCOSE mg/dL 100*   EGFR mL/min/1.73 82.9     Results from last 7 days   Lab Units 01/07/23  1721   ALBUMIN g/dL 4.2   BILIRUBIN mg/dL 0.7   ALK PHOS U/L 97   AST (SGOT) U/L 15   ALT (SGPT) U/L 14     Results from last 7 days   Lab Units 01/07/23  1721   CALCIUM mg/dL 9.2   ALBUMIN g/dL 4.2     Results from last 7 days   Lab Units 01/07/23  1721   PROCALCITONIN ng/mL 0.07   LACTATE mmol/L 0.9     No results found for: HGBA1C, POCGLU    CT Abdomen Pelvis Without Contrast    Result Date: 1/7/2023   1. Mild right-sided pelvocaliectasis, with adjacent perinephric stranding. Urinary bladder is also thick walled, with perivesical stranding. Constellation of findings is favored to represent cystitis and right-sided pyelonephritis. Full assessment is limited in the absence of contrast material.  Radiation dose reduction techniques were utilized, including automated exposure control and exposure modulation based on body size.  This report was finalized on 1/7/2023 7:09 PM by Dr. Josette Bhardwaj M.D.      MRI Thoracic Spine With & Without Contrast    Result Date: 1/9/2023  At T8-9 there is a posterior central disc bulge or small disc protrusion that only minimally narrows the thecal sac. Otherwise the thoracic spine MRI is normal with no evidence of spinal infection in the thoracic spine.  MRI OF THE LUMBAR SPINE TECHNIQUE: Sagittal T1, proton density fast spin echo T2 and repeat fat-suppressed T2 and postcontrast sagittal fat-suppressed T1-weighted images were obtained of the lumbar spine. In addition, thin cut axial T1-weighted images were obtained angled through the interspaces from L3 to S1 and axial T2 and postcontrast axial T1-weighted images were obtained from T12 to S2.  COMPARISON: There are no prior lumbar spine imaging studies from [facility] for comparison.  FINDINGS: The distal thoracic cord and the conus  are normal in signal intensity. The conus terminates in the posterior midline of the thecal sac at the level of the inferior body of L2 which is probably lower limits of normal.  The T12-L1, L1-2 and L2-3 disc space and facets are normal with no canal or foraminal narrowing from T12 to L3.  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  At L3-4 there is minimal right and mild left facet overgrowth and a small amount of fluid in the left facets. There is some mild disc space narrowing and degenerative endplate changes most pronounced in the right side of the endplates along the inner margin of the levoscoliotic curve. There is a 3 mm retrolisthesis of L3 with respect to L4 with disc material bulging along the posterior superior endplate of L4. There is essentially no narrowing of the thecal sac. There is mild right and no left foraminal narrowing.  At L4-5 there is minimal facet overgrowth. There is 3 mm retrolisthesis of L4 with respect to L5 and some bulging disc material extending along the posterior superior endplate of L5. There is no canal narrowing. There is minimal right and mild left foraminal narrowing.  At L5-S1 there is minimal facet overgrowth. There is disc space narrowing, mild degenerative endplate changes, 3-4 mm retrolisthesis of L5 with respect to S1, and minimal posterior spurring. There is no canal or lateral recess narrowing. There is mild spurring into the inferior aspect of the foramina and there is mild bilateral foraminal narrowing.  Other than the degenerative marrow edema in the right side of the endplates at L3-4 and minimal degenerative marrow edema of the right posterior lateral endplates at L4-5, marrow signal intensity in the lumbar spine is within normal limits. I see no evidence of spinal infection in the lumbar spine.  IMPRESSION: 1. No convincing acute abnormality is seen in the lumbar spine with no direct evidence of  spinal infection within the lumbar spine. 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 2. Disc space and facets are normal in appearance with no canal or foraminal narrowing from T10 down to L3. 3. There is disc space narrowing and degenerative endplate changes at L3-4 most pronounced in the right side of the endplates along the inner margin of the levoscoliotic curve and there is a 3-4 mm retrolisthesis of L3 with respect to L4 and minimal facet overgrowth but essentially no canal narrowing and there is only mild right foraminal narrowing at L3-4. 4. At L4-5 there is minimal facet overgrowth and a 3-4 mm retrolisthesis of L4 with respect to L5. There is no canal narrowing. There is minimal right and mild left foraminal narrowing at L4-5. 5. At L5-S1 there is disc space narrowing, degenerative endplate changes, and 3-4 mm retrolisthesis of L5 with respect to S1. There is no canal or lateral recess narrowing. There is spurring into the foramina and mild bilateral foraminal narrowing. 6. The remainder of the lumbar spine MRI is normal with no convincing direct evidence of spinal infection in the lumbar spine. Marrow edema in the right side of the endplates at L3-4 and right posterior lateral endplates at L4-5 is felt to be degenerative marrow edema.        MRI Lumbar Spine With & Without Contrast    Result Date: 1/9/2023  At T8-9 there is a posterior central disc bulge or small disc protrusion that only minimally narrows the thecal sac. Otherwise the thoracic spine MRI is normal with no evidence of spinal infection in the thoracic spine.  MRI OF THE LUMBAR SPINE TECHNIQUE: Sagittal T1, proton density fast spin echo T2 and repeat fat-suppressed T2 and postcontrast sagittal fat-suppressed T1-weighted images were obtained of the lumbar spine. In addition, thin cut axial T1-weighted images were obtained angled through the interspaces from L3 to S1 and axial T2 and  postcontrast axial T1-weighted images were obtained from T12 to S2.  COMPARISON: There are no prior lumbar spine imaging studies from [facility] for comparison.  FINDINGS: The distal thoracic cord and the conus are normal in signal intensity. The conus terminates in the posterior midline of the thecal sac at the level of the inferior body of L2 which is probably lower limits of normal.  The T12-L1, L1-2 and L2-3 disc space and facets are normal with no canal or foraminal narrowing from T12 to L3.  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  At L3-4 there is minimal right and mild left facet overgrowth and a small amount of fluid in the left facets. There is some mild disc space narrowing and degenerative endplate changes most pronounced in the right side of the endplates along the inner margin of the levoscoliotic curve. There is a 3 mm retrolisthesis of L3 with respect to L4 with disc material bulging along the posterior superior endplate of L4. There is essentially no narrowing of the thecal sac. There is mild right and no left foraminal narrowing.  At L4-5 there is minimal facet overgrowth. There is 3 mm retrolisthesis of L4 with respect to L5 and some bulging disc material extending along the posterior superior endplate of L5. There is no canal narrowing. There is minimal right and mild left foraminal narrowing.  At L5-S1 there is minimal facet overgrowth. There is disc space narrowing, mild degenerative endplate changes, 3-4 mm retrolisthesis of L5 with respect to S1, and minimal posterior spurring. There is no canal or lateral recess narrowing. There is mild spurring into the inferior aspect of the foramina and there is mild bilateral foraminal narrowing.  Other than the degenerative marrow edema in the right side of the endplates at L3-4 and minimal degenerative marrow edema of the right posterior lateral endplates at L4-5, marrow  signal intensity in the lumbar spine is within normal limits. I see no evidence of spinal infection in the lumbar spine.  IMPRESSION: 1. No convincing acute abnormality is seen in the lumbar spine with no direct evidence of spinal infection within the lumbar spine. 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 2. Disc space and facets are normal in appearance with no canal or foraminal narrowing from T10 down to L3. 3. There is disc space narrowing and degenerative endplate changes at L3-4 most pronounced in the right side of the endplates along the inner margin of the levoscoliotic curve and there is a 3-4 mm retrolisthesis of L3 with respect to L4 and minimal facet overgrowth but essentially no canal narrowing and there is only mild right foraminal narrowing at L3-4. 4. At L4-5 there is minimal facet overgrowth and a 3-4 mm retrolisthesis of L4 with respect to L5. There is no canal narrowing. There is minimal right and mild left foraminal narrowing at L4-5. 5. At L5-S1 there is disc space narrowing, degenerative endplate changes, and 3-4 mm retrolisthesis of L5 with respect to S1. There is no canal or lateral recess narrowing. There is spurring into the foramina and mild bilateral foraminal narrowing. 6. The remainder of the lumbar spine MRI is normal with no convincing direct evidence of spinal infection in the lumbar spine. Marrow edema in the right side of the endplates at L3-4 and right posterior lateral endplates at L4-5 is felt to be degenerative marrow edema.        Scheduled Medications  albuterol, 4 mg, Oral, Nightly  budesonide-formoterol, 2 puff, Inhalation, BID - RT  cefTRIAXone, 1 g, Intravenous, Q24H  cetirizine, 10 mg, Oral, Daily  FLUoxetine, 80 mg, Oral, Daily  ketorolac, 15 mg, Intravenous, Q6H  levothyroxine, 50 mcg, Oral, Q AM  montelukast, 10 mg, Oral, Daily  multivitamin, 1 tablet, Oral, Nightly  phenazopyridine, 200 mg, Oral, TID With  Meals  polyethylene glycol, 17 g, Oral, BID  senna-docusate sodium, 2 tablet, Oral, BID  tamoxifen, 20 mg, Oral, Daily  tamsulosin, 0.4 mg, Oral, Daily  theophylline, 300 mg, Oral, Daily    Infusions  lactated ringers, 125 mL/hr, Last Rate: 75 mL/hr (01/09/23 1455)    Diet  Diet: Regular/House Diet; Texture: Regular Texture (IDDSI 7); Fluid Consistency: Thin (IDDSI 0)       Assessment/Plan     Active Hospital Problems    Diagnosis  POA   • **Pyelonephritis [N12]  Yes   • COPD (chronic obstructive pulmonary disease) (HCC) [J44.9]  Yes   • Anxiety [F41.9]  Yes   • Hypothyroidism [E03.9]  Yes   • Depression, major [F32.9]  Yes      Resolved Hospital Problems   No resolved problems to display.       47 y.o. female admitted with Pyelonephritis.    Pyelonephritis: Continue empiric IV ceftriaxone now given improved symptoms--urology agrees.  Urine culture mixed eulogio--indeterminate.  Repeat UA/CS.  Urology recommend MRI T/L-spine (no signs of infection or acute abnormality on TL spine), Toradol as well.  No necessity for acute urologic surgical intervention at this time.  Bowel regimen provided.  Attempt to wean IV narcotic pain medication as needed.  Flomax 0.4 mg p.o. daily.    Hypotension: BP soft.  No current antihypertensive agents contributing or PTA.  Most likely aggravated by IV narcotics as needed.  Increase rate IV fluid hydration from 75 mL/h to 125 mill per hour.  Monitor BP for changes.  Falls risk/cautions.    COPD: Lungs clear to auscultation on exam.  Continue inhaled corticosteroid/long-acting beta agonist, bronchodilators as needed.  Theophylline.    Hypothyroidism: Levothyroxine continued.    History of breast cancer: Status postchemotherapy and bilateral mastectomy.  Continue tamoxifen per home dose regimen.      · SCD for DVT prophylaxis.  · CPR  · Discussed with patient & RN.  · Anticipate discharge       RISHI Hsu  [facility]  01/09/23  15:02 EST

## 2023-01-09 NOTE — PLAN OF CARE
Goal Outcome Evaluation:           Progress: no change  Outcome Evaluation: Max temp 100.7, otherwise VSS, on 1 L O2 NC, PO Tylenol and IV Morphine given for fever and flank pain, IV abx given, PO Phenergan given for nausea, IVFs infusing, up ad sanjiv, voiding freely, tremors in hands and with movement cont.

## 2023-01-09 NOTE — PROGRESS NOTES
Continued Stay Note  [facility]     Patient Name: Nereida Myles  MRN: [id number]  Today's Date: 1/9/2023    Admit Date: 1/7/2023    Plan: Home no needs   Discharge Plan     Row Name 01/09/23 1417       Plan    Plan Home no needs    Plan Comments Met with patient who confirmed DC plan is home. Family will assist as needed and will provide transportation at DC. Denies any needs/equipment.               Discharge Codes    No documentation.               Expected Discharge Date and Time     Expected Discharge Date Expected Discharge Time    Brian 10, 2023             Venus Davis RN

## 2023-01-10 ENCOUNTER — READMISSION MANAGEMENT (OUTPATIENT)
Dept: CALL CENTER | Facility: HOSPITAL | Age: 48
End: 2023-01-10
Payer: COMMERCIAL

## 2023-01-10 VITALS
BODY MASS INDEX: 28.91 KG/M2 | SYSTOLIC BLOOD PRESSURE: 144 MMHG | DIASTOLIC BLOOD PRESSURE: 83 MMHG | HEART RATE: 54 BPM | WEIGHT: 173.5 LBS | OXYGEN SATURATION: 96 % | RESPIRATION RATE: 16 BRPM | HEIGHT: 65 IN | TEMPERATURE: 98.1 F

## 2023-01-10 LAB
ANION GAP SERPL CALCULATED.3IONS-SCNC: 7 MMOL/L (ref 5–15)
BACTERIA SPEC AEROBE CULT: NO GROWTH
BUN SERPL-MCNC: 5 MG/DL (ref 6–20)
BUN/CREAT SERPL: 10.6 (ref 7–25)
CALCIUM SPEC-SCNC: 8.3 MG/DL (ref 8.6–10.5)
CHLORIDE SERPL-SCNC: 110 MMOL/L (ref 98–107)
CO2 SERPL-SCNC: 28 MMOL/L (ref 22–29)
CREAT SERPL-MCNC: 0.47 MG/DL (ref 0.57–1)
DEPRECATED RDW RBC AUTO: 40.4 FL (ref 37–54)
EGFRCR SERPLBLD CKD-EPI 2021: 116.9 ML/MIN/1.73
ERYTHROCYTE [DISTWIDTH] IN BLOOD BY AUTOMATED COUNT: 12.4 % (ref 12.3–15.4)
GLUCOSE SERPL-MCNC: 104 MG/DL (ref 65–99)
HCT VFR BLD AUTO: 30.6 % (ref 34–46.6)
HGB BLD-MCNC: 10.1 G/DL (ref 12–15.9)
MCH RBC QN AUTO: 30 PG (ref 26.6–33)
MCHC RBC AUTO-ENTMCNC: 33 G/DL (ref 31.5–35.7)
MCV RBC AUTO: 90.8 FL (ref 79–97)
PLATELET # BLD AUTO: 261 10*3/MM3 (ref 140–450)
PMV BLD AUTO: 9.6 FL (ref 6–12)
POTASSIUM SERPL-SCNC: 3.4 MMOL/L (ref 3.5–5.2)
RBC # BLD AUTO: 3.37 10*6/MM3 (ref 3.77–5.28)
SODIUM SERPL-SCNC: 145 MMOL/L (ref 136–145)
WBC NRBC COR # BLD: 3.86 10*3/MM3 (ref 3.4–10.8)

## 2023-01-10 PROCEDURE — 94664 DEMO&/EVAL PT USE INHALER: CPT

## 2023-01-10 PROCEDURE — 96361 HYDRATE IV INFUSION ADD-ON: CPT

## 2023-01-10 PROCEDURE — 96376 TX/PRO/DX INJ SAME DRUG ADON: CPT

## 2023-01-10 PROCEDURE — 80048 BASIC METABOLIC PNL TOTAL CA: CPT | Performed by: NURSE PRACTITIONER

## 2023-01-10 PROCEDURE — G0378 HOSPITAL OBSERVATION PER HR: HCPCS

## 2023-01-10 PROCEDURE — 94760 N-INVAS EAR/PLS OXIMETRY 1: CPT

## 2023-01-10 PROCEDURE — 25010000002 KETOROLAC TROMETHAMINE PER 15 MG: Performed by: UROLOGY

## 2023-01-10 PROCEDURE — 94799 UNLISTED PULMONARY SVC/PX: CPT

## 2023-01-10 PROCEDURE — 85027 COMPLETE CBC AUTOMATED: CPT | Performed by: NURSE PRACTITIONER

## 2023-01-10 RX ORDER — CEPHALEXIN 500 MG/1
500 CAPSULE ORAL 2 TIMES DAILY
Qty: 4 CAPSULE | Refills: 0 | Status: SHIPPED | OUTPATIENT
Start: 2023-01-10 | End: 2023-01-10 | Stop reason: SDUPTHER

## 2023-01-10 RX ORDER — CEPHALEXIN 500 MG/1
500 CAPSULE ORAL 2 TIMES DAILY
Qty: 8 CAPSULE | Refills: 0 | Status: SHIPPED | OUTPATIENT
Start: 2023-01-10 | End: 2023-01-14

## 2023-01-10 RX ORDER — POTASSIUM CHLORIDE 1.5 G/1.77G
40 POWDER, FOR SOLUTION ORAL AS NEEDED
Status: DISCONTINUED | OUTPATIENT
Start: 2023-01-10 | End: 2023-01-10 | Stop reason: HOSPADM

## 2023-01-10 RX ORDER — POTASSIUM CHLORIDE 7.45 MG/ML
10 INJECTION INTRAVENOUS
Status: DISCONTINUED | OUTPATIENT
Start: 2023-01-10 | End: 2023-01-10 | Stop reason: HOSPADM

## 2023-01-10 RX ORDER — POTASSIUM CHLORIDE 750 MG/1
40 TABLET, FILM COATED, EXTENDED RELEASE ORAL AS NEEDED
Status: DISCONTINUED | OUTPATIENT
Start: 2023-01-10 | End: 2023-01-10 | Stop reason: HOSPADM

## 2023-01-10 RX ADMIN — TAMSULOSIN HYDROCHLORIDE 0.4 MG: 0.4 CAPSULE ORAL at 12:01

## 2023-01-10 RX ADMIN — KETOROLAC TROMETHAMINE 15 MG: 30 INJECTION, SOLUTION INTRAMUSCULAR; INTRAVENOUS at 16:44

## 2023-01-10 RX ADMIN — KETOROLAC TROMETHAMINE 15 MG: 30 INJECTION, SOLUTION INTRAMUSCULAR; INTRAVENOUS at 09:31

## 2023-01-10 RX ADMIN — SODIUM CHLORIDE, POTASSIUM CHLORIDE, SODIUM LACTATE AND CALCIUM CHLORIDE 125 ML/HR: 600; 310; 30; 20 INJECTION, SOLUTION INTRAVENOUS at 01:12

## 2023-01-10 RX ADMIN — LEVOTHYROXINE SODIUM 50 MCG: 0.05 TABLET ORAL at 06:41

## 2023-01-10 RX ADMIN — BUDESONIDE AND FORMOTEROL FUMARATE DIHYDRATE 2 PUFF: 80; 4.5 AEROSOL RESPIRATORY (INHALATION) at 08:16

## 2023-01-10 RX ADMIN — MONTELUKAST SODIUM 10 MG: 10 TABLET, FILM COATED ORAL at 12:01

## 2023-01-10 RX ADMIN — FLUOXETINE HYDROCHLORIDE 80 MG: 20 CAPSULE ORAL at 12:01

## 2023-01-10 RX ADMIN — TAMOXIFEN CITRATE 20 MG: 10 TABLET, FILM COATED ORAL at 12:02

## 2023-01-10 RX ADMIN — THEOPHYLLINE 300 MG: 300 TABLET, EXTENDED RELEASE ORAL at 12:02

## 2023-01-10 RX ADMIN — SODIUM CHLORIDE, POTASSIUM CHLORIDE, SODIUM LACTATE AND CALCIUM CHLORIDE 125 ML/HR: 600; 310; 30; 20 INJECTION, SOLUTION INTRAVENOUS at 09:33

## 2023-01-10 RX ADMIN — POTASSIUM CHLORIDE 40 MEQ: 750 TABLET, EXTENDED RELEASE ORAL at 10:53

## 2023-01-10 RX ADMIN — PHENAZOPYRIDINE 200 MG: 200 TABLET ORAL at 12:02

## 2023-01-10 RX ADMIN — KETOROLAC TROMETHAMINE 15 MG: 30 INJECTION, SOLUTION INTRAMUSCULAR; INTRAVENOUS at 03:23

## 2023-01-10 RX ADMIN — POTASSIUM CHLORIDE 40 MEQ: 750 TABLET, EXTENDED RELEASE ORAL at 16:40

## 2023-01-10 RX ADMIN — PHENAZOPYRIDINE 200 MG: 200 TABLET ORAL at 09:31

## 2023-01-10 NOTE — PLAN OF CARE
Goal Outcome Evaluation:           Progress: improving  Outcome Evaluation: vss, pain relieved with iv toradol. c/o nausea , no emesis, med with iv zofran x2. urine spec sent and was repeated due to contamination with first clean catch.

## 2023-01-10 NOTE — SIGNIFICANT NOTE
01/10/23 1129   OTHER   Discipline physical therapist   Rehab Time/Intention   Session Not Performed   (Full eval not warranted. Pt is independently mobile. Observed pt ambulate 220 ft with SBA and 1 standing rest break. Pt has not returned to baseline level, but is independent and safe to d/c home. PT will sign off.)

## 2023-01-10 NOTE — OUTREACH NOTE
Prep Survey    Flowsheet Row Responses   [facility] patient discharged from? [facility]   Is LACE score < 7 ? Yes   Eligibility [facility]   Date of Admission 01/07/23   Date of Discharge 01/10/23   Discharge Disposition Home or Self Care   Discharge diagnosis Pyelonephritis, copd   Does the patient have one of the following disease processes/diagnoses(primary or secondary)? COPD   Does the patient have Home health ordered? No   Is there a DME ordered? No   Prep survey completed? Yes          CHELSEY ZAMARRIPA - Registered Nurse

## 2023-01-10 NOTE — DISCHARGE SUMMARY
Patient Name: Nereida Myles  : 1975  MRN: [id number]    Date of Admission: 2023  Date of Discharge:  1/10/2023  Primary Care Physician: Kehrer, Meredith Lea, MD      Chief Complaint:   Flank Pain and Dysuria      Discharge Diagnoses     Active Hospital Problems    Diagnosis  POA   • **Pyelonephritis [N12]  Yes   • COPD (chronic obstructive pulmonary disease) (HCC) [J44.9]  Yes   • Anxiety [F41.9]  Yes   • Hypothyroidism [E03.9]  Yes   • Depression, major [F32.9]  Yes      Resolved Hospital Problems   No resolved problems to display.        Hospital Course     Ms. Myles is a 47 y.o. female with a history of adenocarcinoma of the left breast, hypothyroidism, obesity, kidney stones who presented to [facility] initially complaining of flank pain and dysuria.  Please see the admitting history and physical for further details.  She was found to have pyelonephritis and was admitted to the hospital for further evaluation and treatment.      Pleasant 47-year-old with history of recurrent urinary tract infections who presents with a 4-day history of dysuria and went to [facility] emergency room found with acute pyelonephritis suspected on CT.    Recurrent urinary tract infections and follows with [facility] who evaluated patient as well noting necessity for continued course of antibiotic therapy and no current need for acute urologic surgical intervention at this time.    Discussed importance of properly treating urinary tract infections with antibiotics moving forward as she admitted to taking Pyridium for management of symptoms for 4 days prior to hospital admission.    MRI thoracic and lumbar spine obtained given complaints of back pain with unremarkable findings for  acuity or infection.  Patient tolerating ambulation and diet as well--appears medically stable for discharge home with family on 1/10/2023 & remains afebrile greater than 24 hours.  Keflex provided at discharge  to complete 7-day course antibiotic therapy.  Follow-up primary care provider for urine culture results and urology as previously discussed.    Day of Discharge       Physical Exam:  Temp:  [96.9 °F (36.1 °C)-98.6 °F (37 °C)] 98.1 °F (36.7 °C)  Heart Rate:  [54-71] 54  Resp:  [16] 16  BP: (109-144)/(66-83) 144/83  Body mass index is 28.87 kg/m².     Physical Exam  Constitutional:       General: She is not in acute distress.     Appearance: She is not toxic-appearing.   HENT:      Head: Normocephalic and atraumatic.   Eyes:      Extraocular Movements: Extraocular movements intact.      Conjunctiva/sclera: Conjunctivae normal.   Cardiovascular:      Rate and Rhythm: Normal rate.      Heart sounds: Normal heart sounds.   Pulmonary:      Effort: Pulmonary effort is normal.      Breath sounds: Normal breath sounds.   Abdominal:      General: Bowel sounds are normal.      Palpations: Abdomen is soft.   Musculoskeletal:      Cervical back: Normal range of motion and neck supple.      Right lower leg: No edema.      Left lower leg: No edema.   Skin:     General: Skin is warm and dry.   Neurological:      Mental Status: She is alert and oriented to person, place, and time.      Cranial Nerves: No cranial nerve deficit.   Psychiatric:         Behavior: Behavior normal.         Thought Content: Thought content normal.         Consultants     Consult Orders (all) (From admission, onward)     Start     Ordered    01/07/23 2202  Inpatient Urology Consult  Once        Specialty:  Urology  Provider:  Shubham Sosa MD    01/07/23 2202 01/07/23 1928  [facility] (on-call MD unless specified) Details  Once        Specialty:  Hospitalist  Provider:  (Not yet assigned)    01/07/23 1927              Procedures     * Surgery not found *      Imaging Results (All)     Procedure Component Value Units Date/Time    MRI Lumbar Spine With & Without Contrast [[id number]] Collected: 01/09/23 1141     Updated: 01/10/23 0748    Narrative:      MRI  OF THE THORACIC SPINE WITH AND WITHOUT CONTRAST AND MRI OF THE  LUMBAR SPINE WITH AND WITHOUT CONTRAST ON 01/09/2023     CLINICAL HISTORY: Mid and low back pain and flank pain where some  left-sided infection is suspected, history of breast cancer with  mastectomy.     MRI OF THE THORACIC SPINE TECHNIQUE: Sagittal T1, proton density and  fat-suppressed T2 and postcontrast sagittal fat-suppressed T1-weighted  images were obtained of the thoracic spine. In addition axial T1 and T2  and postcontrast axial T1-weighted images were obtained from C7 to L1.     FINDINGS: The thoracic cord is normal in signal intensity. At T8-9 there  is a posterior central disc bulge or small disc protrusion that indents  the ventral aspect of the thecal sac and comes close to the ventral  aspect of the cord resulting in minimal if any canal narrowing. There is  no foraminal narrowing. Otherwise the thoracic disc spaces and facets  are within normal limits with no disc herniation, canal or foraminal  narrowing seen in the thoracic spine. Marrow signal intensity in the  thoracic spine is within normal limits. The paravertebral soft tissue  structures are normal in appearance.       Impression:      1. At T8-9 there is a posterior central disc bulge or small disc  protrusion that only minimally narrows the thecal sac. Otherwise, the  thoracic spine MRI is normal with no evidence of spinal infection in the  thoracic spine.     MRI OF THE LUMBAR SPINE TECHNIQUE: Sagittal T1, proton density fast spin  echo T2 and repeat fat-suppressed T2 and postcontrast sagittal  fat-suppressed T1-weighted images were obtained of the lumbar spine. In  addition, thin cut axial T1-weighted images were obtained angled through  the interspaces from L3 to S1 and axial T2 and postcontrast axial  T1-weighted images were obtained from T12 to S2.     COMPARISON: There are no prior lumbar spine imaging studies from [facility] for comparison.     FINDINGS:  The distal thoracic cord and the conus are normal in signal  intensity. The conus terminates in the posterior midline of the thecal  sac at the level of the inferior body of L2 which is probably lower  limits of normal.     The T12-L1, L1-2 and L2-3 disc space and facets are normal with no canal  or foraminal narrowing from T12 to L3.     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     At L3-4 there is minimal right and mild left facet overgrowth and a  small amount of fluid in the left facets. There is some mild disc space  narrowing and degenerative endplate changes most pronounced in the right  side of the endplates along the inner margin of the levoscoliotic curve.  There is a 3 mm retrolisthesis of L3 with respect to L4 with disc  material bulging along the posterior superior endplate of L4. There is  essentially no narrowing of the thecal sac. There is mild right and no  left foraminal narrowing.     At L4-5 there is minimal facet overgrowth. There is 3 mm retrolisthesis  of L4 with respect to L5 and some bulging disc material extending along  the posterior superior endplate of L5. There is no canal narrowing.  There is minimal right and mild left foraminal narrowing.     At L5-S1 there is minimal facet overgrowth. There is disc space  narrowing, mild degenerative endplate changes, 3-4 mm retrolisthesis of  L5 with respect to S1, and minimal posterior spurring. There is no canal  or lateral recess narrowing. There is mild spurring into the inferior  aspect of the foramina and there is mild bilateral foraminal narrowing.     Other than the degenerative marrow edema in the right side of the  endplates at L3-4 and minimal degenerative marrow edema of the right  posterior lateral endplates at L4-5, marrow signal intensity in the  lumbar spine is within normal limits. I see no evidence of spinal  infection in the lumbar spine.     IMPRESSION:  1. No  convincing acute abnormality is seen in the lumbar spine with no  direct evidence of spinal infection within the lumbar spine. 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  2. Disc space and facets are normal in appearance with no canal or  foraminal narrowing from T10 down to L3.  3. There is disc space narrowing and degenerative endplate changes at  L3-4 most pronounced in the right side of the endplates along the inner  margin of the levoscoliotic curve and there is a 3-4 mm retrolisthesis  of L3 with respect to L4 and minimal facet overgrowth but essentially no  canal narrowing and there is only mild right foraminal narrowing at  L3-4.  4. At L4-5 there is minimal facet overgrowth and a 3-4 mm retrolisthesis  of L4 with respect to L5. There is no canal narrowing. There is minimal  right and mild left foraminal narrowing at L4-5.   5. At L5-S1 there is disc space narrowing, degenerative endplate  changes, and 3-4 mm retrolisthesis of L5 with respect to S1. There is no  canal or lateral recess narrowing. There is spurring into the foramina  and mild bilateral foraminal narrowing.  6. There is mild bone marrow edema in the right side of the endplates at  L3-4 and right posterior lateral endplates at L4-5 which is felt to  almost certainly be degenerative marrow edema. The remainder of the  lumbar spine is within normal limits     This report was finalized on 1/10/2023 7:44 AM by Dr. Isidro Tellez M.D.       MRI Thoracic Spine With & Without Contrast [[id number]] Collected: 01/09/23 1141     Updated: 01/10/23 0748    Narrative:      MRI OF THE THORACIC SPINE WITH AND WITHOUT CONTRAST AND MRI OF THE  LUMBAR SPINE WITH AND WITHOUT CONTRAST ON 01/09/2023     CLINICAL HISTORY: Mid and low back pain and flank pain where some  left-sided infection is suspected, history of breast cancer with  mastectomy.     MRI OF THE THORACIC SPINE TECHNIQUE: Sagittal T1, proton  density and  fat-suppressed T2 and postcontrast sagittal fat-suppressed T1-weighted  images were obtained of the thoracic spine. In addition axial T1 and T2  and postcontrast axial T1-weighted images were obtained from C7 to L1.     FINDINGS: The thoracic cord is normal in signal intensity. At T8-9 there  is a posterior central disc bulge or small disc protrusion that indents  the ventral aspect of the thecal sac and comes close to the ventral  aspect of the cord resulting in minimal if any canal narrowing. There is  no foraminal narrowing. Otherwise the thoracic disc spaces and facets  are within normal limits with no disc herniation, canal or foraminal  narrowing seen in the thoracic spine. Marrow signal intensity in the  thoracic spine is within normal limits. The paravertebral soft tissue  structures are normal in appearance.       Impression:      1. At T8-9 there is a posterior central disc bulge or small disc  protrusion that only minimally narrows the thecal sac. Otherwise, the  thoracic spine MRI is normal with no evidence of spinal infection in the  thoracic spine.     MRI OF THE LUMBAR SPINE TECHNIQUE: Sagittal T1, proton density fast spin  echo T2 and repeat fat-suppressed T2 and postcontrast sagittal  fat-suppressed T1-weighted images were obtained of the lumbar spine. In  addition, thin cut axial T1-weighted images were obtained angled through  the interspaces from L3 to S1 and axial T2 and postcontrast axial  T1-weighted images were obtained from T12 to S2.     COMPARISON: There are no prior lumbar spine imaging studies from [facility] for comparison.     FINDINGS: The distal thoracic cord and the conus are normal in signal  intensity. The conus terminates in the posterior midline of the thecal  sac at the level of the inferior body of L2 which is probably lower  limits of normal.     The T12-L1, L1-2 and L2-3 disc space and facets are normal with no canal  or foraminal narrowing from  T12 to L3.     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     At L3-4 there is minimal right and mild left facet overgrowth and a  small amount of fluid in the left facets. There is some mild disc space  narrowing and degenerative endplate changes most pronounced in the right  side of the endplates along the inner margin of the levoscoliotic curve.  There is a 3 mm retrolisthesis of L3 with respect to L4 with disc  material bulging along the posterior superior endplate of L4. There is  essentially no narrowing of the thecal sac. There is mild right and no  left foraminal narrowing.     At L4-5 there is minimal facet overgrowth. There is 3 mm retrolisthesis  of L4 with respect to L5 and some bulging disc material extending along  the posterior superior endplate of L5. There is no canal narrowing.  There is minimal right and mild left foraminal narrowing.     At L5-S1 there is minimal facet overgrowth. There is disc space  narrowing, mild degenerative endplate changes, 3-4 mm retrolisthesis of  L5 with respect to S1, and minimal posterior spurring. There is no canal  or lateral recess narrowing. There is mild spurring into the inferior  aspect of the foramina and there is mild bilateral foraminal narrowing.     Other than the degenerative marrow edema in the right side of the  endplates at L3-4 and minimal degenerative marrow edema of the right  posterior lateral endplates at L4-5, marrow signal intensity in the  lumbar spine is within normal limits. I see no evidence of spinal  infection in the lumbar spine.     IMPRESSION:  1. No convincing acute abnormality is seen in the lumbar spine with no  direct evidence of spinal infection within the lumbar spine. 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  2. Disc space and facets are normal in appearance with no canal or  foraminal narrowing from T10  down to L3.  3. There is disc space narrowing and degenerative endplate changes at  L3-4 most pronounced in the right side of the endplates along the inner  margin of the levoscoliotic curve and there is a 3-4 mm retrolisthesis  of L3 with respect to L4 and minimal facet overgrowth but essentially no  canal narrowing and there is only mild right foraminal narrowing at  L3-4.  4. At L4-5 there is minimal facet overgrowth and a 3-4 mm retrolisthesis  of L4 with respect to L5. There is no canal narrowing. There is minimal  right and mild left foraminal narrowing at L4-5.   5. At L5-S1 there is disc space narrowing, degenerative endplate  changes, and 3-4 mm retrolisthesis of L5 with respect to S1. There is no  canal or lateral recess narrowing. There is spurring into the foramina  and mild bilateral foraminal narrowing.  6. There is mild bone marrow edema in the right side of the endplates at  L3-4 and right posterior lateral endplates at L4-5 which is felt to  almost certainly be degenerative marrow edema. The remainder of the  lumbar spine is within normal limits     This report was finalized on 1/10/2023 7:44 AM by Dr. Isidro Tellez M.D.       CT Abdomen Pelvis Without Contrast [[id number]] Collected: 01/07/23 1901     Updated: 01/07/23 1912    Narrative:      CT OF THE ABDOMEN AND PELVIS WITHOUT CONTRAST     HISTORY: Bilateral flank pain, right greater than left     COMPARISON: None available.     TECHNIQUE: Axial CT imaging was obtained through the abdomen and pelvis.  No IV contrast was administered.     FINDINGS:  Images through the lung bases are clear. The stomach, duodenum, adrenal  glands and spleen, and pancreas are all normal. Gallbladder is absent. A  few tiny cysts are noted within the liver. The patient has mild  right-sided pelvocaliectasis. There is perinephric stranding noted on  the right. No ureteral or bladder stones are seen. There is no  hydronephrosis on the left. No distal ureteral or bladder  stones are  seen on the left. Urinary bladder is thick walled, with adjacent  perivesical stranding. Constellation of findings is favored to represent  cystitis and right-sided pyelonephritis. Full assessment of the right  kidney is limited in the absence of contrast material. Appendix is  absent. There is no bowel obstruction. No acute osseous abnormalities  are seen. There is mild lumbar scoliosis, with convexity to the right.       Impression:         1. Mild right-sided pelvocaliectasis, with adjacent perinephric  stranding. Urinary bladder is also thick walled, with perivesical  stranding. Constellation of findings is favored to represent cystitis  and right-sided pyelonephritis. Full assessment is limited in the  absence of contrast material.     Radiation dose reduction techniques were utilized, including automated  exposure control and exposure modulation based on body size.     This report was finalized on 1/7/2023 7:09 PM by Dr. Josette Bhardwaj M.D.               Pertinent Labs     Results from last 7 days   Lab Units 01/10/23  0641 01/07/23  1721   WBC 10*3/mm3 3.86 10.05   HEMOGLOBIN g/dL 10.1* 12.6   PLATELETS 10*3/mm3 261 311     Results from last 7 days   Lab Units 01/10/23  0641 01/07/23  1721   SODIUM mmol/L 145 140   POTASSIUM mmol/L 3.4* 3.8   CHLORIDE mmol/L 110* 106   CO2 mmol/L 28.0 24.0   BUN mg/dL 5* 13   CREATININE mg/dL 0.47* 0.86   GLUCOSE mg/dL 104* 100*   EGFR mL/min/1.73 116.9 82.9     Results from last 7 days   Lab Units 01/07/23  1721   ALBUMIN g/dL 4.2   BILIRUBIN mg/dL 0.7   ALK PHOS U/L 97   AST (SGOT) U/L 15   ALT (SGPT) U/L 14     Results from last 7 days   Lab Units 01/10/23  0641 01/07/23  1721   CALCIUM mg/dL 8.3* 9.2   ALBUMIN g/dL  --  4.2               Invalid input(s): LDLCALC  Results from last 7 days   Lab Units 01/09/23  1851 01/07/23  1906 01/07/23  1739 01/07/23  1721   BLOODCX   --   --  No growth at 2 days No growth at 2 days   URINECX  Culture in progress  >100,000 CFU/mL Mixed Krys Isolated  --   --          Test Results Pending at Discharge     Pending Labs     Order Current Status    Blood Culture - Blood, Arm, Left Preliminary result    Blood Culture - Blood, Arm, Left Preliminary result    Urine Culture - Urine, Urine, Clean Catch Preliminary result          Discharge Details        Discharge Medications      New Medications      Instructions Start Date   cephalexin 500 MG capsule  Commonly known as: Keflex   500 mg, Oral, 2 Times Daily         Changes to Medications      Instructions Start Date   theophylline 300 MG 12 hr tablet  Commonly known as: THEODUR  What changed:   · how much to take  · how to take this  · when to take this   TAKE 1 TABLET EVERY NIGHT      valACYclovir 500 MG tablet  Commonly known as: VALTREX  What changed: when to take this   TAKE 1 TABLET DAILY         Continue These Medications      Instructions Start Date   albuterol (2.5 MG/3ML) 0.083% nebulizer solution  Commonly known as: PROVENTIL   2.5 mg, Nebulization, Every 4 Hours PRN      albuterol 4 MG tablet  Commonly known as: PROVENTIL   TAKE 1 TABLET EVERY NIGHT      albuterol sulfate  (90 Base) MCG/ACT inhaler  Commonly known as: PROVENTIL HFA;VENTOLIN HFA;PROAIR HFA   USE 2 INHALATIONS EVERY 4 HOURS AS NEEDED FOR WHEEZING      benzonatate 200 MG capsule  Commonly known as: TESSALON   200 mg, Oral, 3 Times Daily PRN      Breo Ellipta 100-25 MCG/ACT aerosol powder   Generic drug: Fluticasone Furoate-Vilanterol   1 puff, Inhalation, Daily      cyclobenzaprine 10 MG tablet  Commonly known as: FLEXERIL   TAKE ONE TABLET BY MOUTH THREE TIMES A DAY AS NEEDED FOR MUSCLE SPASMS      Dupixent 300 MG/2ML solution pen-injector  Generic drug: Dupilumab   300 mg, Subcutaneous, Every 15 days or 2 times monthly      fexofenadine 180 MG tablet  Commonly known as: ALLEGRA   180 mg, Oral, Every Morning      FLUoxetine 40 MG capsule  Commonly known as: PROzac   TAKE 2 CAPSULES DAILY       guaiFENesin-codeine 100-10 MG/5ML liquid  Commonly known as: GUAIFENESIN AC   1-2 tsp po  q6 hours prn      leuprolide 11.25 MG injection  Commonly known as: LUPRON   Intramuscular, Every 30 Days      levothyroxine 50 MCG tablet  Commonly known as: SYNTHROID, LEVOTHROID   TAKE 1 TABLET DAILY      montelukast 10 MG tablet  Commonly known as: SINGULAIR   TAKE ONE TABLET BY MOUTH DAILY      multivitamin tablet tablet  Commonly known as: THERAGRAN   1 tablet, Oral, Nightly      promethazine 25 MG tablet  Commonly known as: PHENERGAN   Take 1 tablet by mouth Every 6 (Six) Hours As Needed for Nausea or Vomiting.      tamoxifen 20 MG chemo tablet  Commonly known as: NOLVADEX   20 mg, Oral, Daily      tamsulosin 0.4 MG capsule 24 hr capsule  Commonly known as: FLOMAX   0.4 mg, Oral, Daily         Stop These Medications    HYDROcodone-acetaminophen 7.5-325 MG per tablet  Commonly known as: NORCO            No Known Allergies    Discharge Disposition:  Home or Self Care      Discharge Diet:  Diet Order   Procedures   • Diet: Regular/House Diet; Texture: Regular Texture (IDDSI 7); Fluid Consistency: Thin (IDDSI 0)       Discharge Activity:   Activity Instructions     Activity as Tolerated            CODE STATUS:    Code Status and Medical Interventions:   Ordered at: 01/08/23 1454     Code Status (Patient has no pulse and is not breathing):    CPR (Attempt to Resuscitate)     Medical Interventions (Patient has pulse or is breathing):    Full Support     Release to patient:    Routine Release       Future Appointments   Date Time Provider Department Center   1/18/2023  3:30 PM Kehrer, Meredith Lea, MD [facility]  [facility]     Additional Instructions for the Follow-ups that You Need to Schedule     Discharge Follow-up with PCP   As directed       Currently Documented PCP:    Kehrer, Meredith Lea, MD    PCP Phone Number:    [phone]     Follow Up Details: Please call to schedule a one week or earliest available follow-up  appointment PCP; consider repeat urinalysis following completion of Keflex            Follow-up Information     Kehrer, Meredith Lea, MD .    Specialty: Family Medicine  Why: Please call to schedule a one week or earliest available follow-up appointment PCP; consider repeat urinalysis following completion of Keflex  Contact information:  [address] [place] [id number]  [phone]                         Additional Instructions for the Follow-ups that You Need to Schedule     Discharge Follow-up with PCP   As directed       Currently Documented PCP:    Kehrer, Meredith Lea, MD    PCP Phone Number:    [phone]     Follow Up Details: Please call to schedule a one week or earliest available follow-up appointment PCP; consider repeat urinalysis following completion of Keflex           Time Spent on Discharge:  Greater than 30 minutes      RISHI Hsu  [facility]  01/10/23  15:08 EST

## 2023-01-10 NOTE — PLAN OF CARE
Goal Outcome Evaluation:  Plan of Care Reviewed With: patient        Progress: no change  Outcome Evaluation: vss, toradol and flexeril for pain, up with assist, voiding freely, nauseated but no vomiting medicated with zofran, urine sample sent, iv fluid infusing, continue to monitor the pt.

## 2023-01-10 NOTE — PROGRESS NOTES
"  [facility] DAILY PROGRESS NOTE    Patient Identification  Name: Nereida Myles  Age: 47 y.o.  Sex: female  :  1975  MRN: [id number]    Date: 1/10/2023             Subjective:  Pyelonephritis- UCx 22 >100k mixed eulogio    Back pain- b/l lower flanks ttp, no CVA tenderness    Nephrolithiasis- recently underwent URS w/ stent placement in 2022    Back pain improving, no leukocytosis, MRI of T and L spine w/o acute origin of pain, still with nausea     Objective:    Scheduled Meds:albuterol, 4 mg, Oral, Nightly  budesonide-formoterol, 2 puff, Inhalation, BID - RT  cefTRIAXone, 1 g, Intravenous, Q24H  cetirizine, 10 mg, Oral, Daily  FLUoxetine, 80 mg, Oral, Daily  ketorolac, 15 mg, Intravenous, Q6H  levothyroxine, 50 mcg, Oral, Q AM  montelukast, 10 mg, Oral, Daily  multivitamin, 1 tablet, Oral, Nightly  phenazopyridine, 200 mg, Oral, TID With Meals  polyethylene glycol, 17 g, Oral, BID  senna-docusate sodium, 2 tablet, Oral, BID  tamoxifen, 20 mg, Oral, Daily  tamsulosin, 0.4 mg, Oral, Daily  theophylline, 300 mg, Oral, Daily      Continuous Infusions:lactated ringers, 125 mL/hr, Last Rate: 125 mL/hr (01/10/23 0642)      PRN Meds:•  acetaminophen  •  albuterol  •  benzonatate  •  cyclobenzaprine  •  HYDROcodone-acetaminophen  •  Morphine **OR** [DISCONTINUED] Morphine  •  ondansetron  •  promethazine  •  [COMPLETED] Insert Peripheral IV **AND** sodium chloride    Vital signs in last 24 hours:  Temp:  [96.9 °F (36.1 °C)-98.6 °F (37 °C)] 96.9 °F (36.1 °C)  Heart Rate:  [56-71] 64  Resp:  [16-18] 16  BP: ()/(57-71) 109/67    Intake/Output:    Intake/Output Summary (Last 24 hours) at 1/10/2023 0722  Last data filed at 1/10/2023 0112  Gross per 24 hour   Intake 3865 ml   Output 2600 ml   Net 1265 ml       Exam:  /67 (BP Location: Right arm, Patient Position: Lying)   Pulse 64   Temp 96.9 °F (36.1 °C) (Oral)   Resp 16   Ht 165.1 cm (65\")   Wt 78.7 kg (173 lb 8 oz)   LMP  (LMP Unknown)  "  SpO2 95%   BMI 28.87 kg/m²     General Appearance:    Alert, cooperative, no acute distress   Back:     Symmetric, no CVA tenderness   Lungs:     Respirations unlabored   Heart:    Regular rate and rhythm   Abdomen:    soft, nttp, non distended   Genitalia:   normal female genitalia   Extremities:   Extremities normal, atraumatic, no cyanosis or edema   Skin:   Skin color, texture, turgor normal, no rashes or lesions        Data Review:  All labs (24hrs):   Recent Results (from the past 24 hour(s))   Urinalysis With Culture If Indicated - Urine, Clean Catch    Collection Time: 01/09/23  6:51 PM    Specimen: Urine, Clean Catch   Result Value Ref Range    Color, UA Dark Yellow (A) Yellow, Straw    Appearance, UA Clear Clear    pH, UA 7.5 5.0 - 8.0    Specific Gravity, UA 1.010 1.005 - 1.030    Glucose,  mg/dL (Trace) (A) Negative    Ketones, UA Negative Negative    Bilirubin, UA Negative Negative    Blood, UA Negative Negative    Protein, UA Trace (A) Negative    Leuk Esterase, UA Large (3+) (A) Negative    Nitrite, UA Positive (A) Negative    Urobilinogen, UA 2.0 E.U./dL (A) 0.2 - 1.0 E.U./dL   Urinalysis, Microscopic Only - Urine, Clean Catch    Collection Time: 01/09/23  6:51 PM    Specimen: Urine, Clean Catch   Result Value Ref Range    RBC, UA 3-5 (A) None Seen, 0-2 /HPF    WBC, UA 13-20 (A) None Seen, 0-2 /HPF    Bacteria, UA None Seen None Seen /HPF    Squamous Epithelial Cells, UA 3-6 (A) None Seen, 0-2 /HPF    Hyaline Casts, UA 0-2 None Seen /LPF    Methodology Automated Microscopy           Assessment:    Pyelonephritis    Hypothyroidism    Depression, major    Anxiety    COPD (chronic obstructive pulmonary disease) (HCC)      Pyelonephritis- UCx 1/7/23 >100k mixed eulogio, repeat UCx 1/9/23    Back pain- b/l lower flanks ttp, no CVA tenderness    Nephrolithiasis- recently underwent URS w/ stent placement in 12/2022    Plan:  Pt improving, afebrile,  F/u UCx, cont roceptal Tsocano,  MD  1/10/2023  07:22 EST

## 2023-01-11 ENCOUNTER — TRANSITIONAL CARE MANAGEMENT TELEPHONE ENCOUNTER (OUTPATIENT)
Dept: CALL CENTER | Facility: HOSPITAL | Age: 48
End: 2023-01-11
Payer: COMMERCIAL

## 2023-01-11 NOTE — OUTREACH NOTE
Call Center TCM Note    Flowsheet Row Responses   [facility] patient discharged from? [facility]   Does the patient have one of the following disease processes/diagnoses(primary or secondary)? COPD   TCM attempt successful? Yes   Call start time 1107   Call end time 1108   Discharge diagnosis Pyelonephritis, copd   Meds reviewed with patient/caregiver? Yes   Is the patient having any side effects they believe may be caused by any medication additions or changes? No   Does the patient have all medications ordered at discharge? Yes   Is the patient taking all medications as directed (includes completed medication regime)? Yes   Comments Patient has an office visit for 1/18/23-declined scheduling a hosp dc fu apt-will route message to group    Does the patient have an appointment with their PCP within 7 days of discharge? No   Nursing Interventions Patient declined scheduling/rescheduling appointment at this time, Routed TCM call to PCP office, PCP office requested to make appointment - message sent   Pulse Ox monitoring Intermittent   Pulse Ox device source Patient   O2 Sat comments 95% RA   O2 Sat: education provided Sat levels, Monitoring frequency, When to seek care   Psychosocial issues? No   Did the patient receive a copy of their discharge instructions? Yes   Nursing interventions Reviewed instructions with patient   What is the patient's perception of their health status since discharge? Improving   Nursing Interventions Nurse provided patient education   If the patient is a current smoker, are they able to teach back resources for cessation? Not a smoker   Is the patient/caregiver able to teach back the hierarchy of who to call/visit for symptoms/problems? PCP, Specialist, Home health nurse, Urgent Care, ED, 911 Yes   Is the patient able to teach back COPD zones? Yes   Nursing interventions Education provided on various zones   Patient reports what zone on this call? Green Zone   Green Zone Usual  activity and exercise level, Breathing without shortness of breath, Reports doing well   Green Zone interventions: Take daily medications, Use oxygen as prescribed   TCM call completed? Yes   Call end time 1108          Leslie Winston RN    1/11/2023, 11:10 EST

## 2023-01-12 ENCOUNTER — TELEPHONE (OUTPATIENT)
Dept: FAMILY MEDICINE CLINIC | Facility: CLINIC | Age: 48
End: 2023-01-12

## 2023-01-12 LAB
BACTERIA SPEC AEROBE CULT: NORMAL
BACTERIA SPEC AEROBE CULT: NORMAL

## 2023-01-12 NOTE — TELEPHONE ENCOUNTER
Caller: Nereida Myles    Relationship to patient: Self    Best call back number: [phone]       Type of visit: HOSPITAL FOLLOW UP     Requested date: 1/17 1/18, 1/19 , EARLY MORNING BEFORE 9: 30    If rescheduling, when is the original appointment: 1/18/2023    Additional notes:    DOES NOT WANT TO SEE A DIFFERENT PROVIDER

## 2023-01-18 ENCOUNTER — OFFICE VISIT (OUTPATIENT)
Dept: FAMILY MEDICINE CLINIC | Facility: CLINIC | Age: 48
End: 2023-01-18
Payer: COMMERCIAL

## 2023-01-18 DIAGNOSIS — Z09 HOSPITAL DISCHARGE FOLLOW-UP: Primary | ICD-10-CM

## 2023-01-18 DIAGNOSIS — Z11.3 SCREENING FOR STDS (SEXUALLY TRANSMITTED DISEASES): ICD-10-CM

## 2023-01-18 DIAGNOSIS — E87.6 HYPOKALEMIA: ICD-10-CM

## 2023-01-18 DIAGNOSIS — N12 PYELONEPHRITIS: ICD-10-CM

## 2023-01-18 DIAGNOSIS — D64.9 ANEMIA, UNSPECIFIED TYPE: ICD-10-CM

## 2023-01-18 LAB
BILIRUB BLD-MCNC: NEGATIVE MG/DL
CLARITY, POC: CLEAR
COLOR UR: YELLOW
EXPIRATION DATE: ABNORMAL
GLUCOSE UR STRIP-MCNC: NEGATIVE MG/DL
KETONES UR QL: NEGATIVE
LEUKOCYTE EST, POC: ABNORMAL
Lab: ABNORMAL
NITRITE UR-MCNC: NEGATIVE MG/ML
PH UR: 6 [PH] (ref 5–8)
PROT UR STRIP-MCNC: NEGATIVE MG/DL
RBC # UR STRIP: NEGATIVE /UL
SP GR UR: 1.01 (ref 1–1.03)
UROBILINOGEN UR QL: NORMAL

## 2023-01-18 PROCEDURE — 81003 URINALYSIS AUTO W/O SCOPE: CPT | Performed by: FAMILY MEDICINE

## 2023-01-18 PROCEDURE — 99495 TRANSJ CARE MGMT MOD F2F 14D: CPT | Performed by: FAMILY MEDICINE

## 2023-01-18 RX ORDER — LEVOTHYROXINE SODIUM 0.05 MG/1
50 TABLET ORAL DAILY
COMMUNITY
End: 2023-01-18 | Stop reason: SDUPTHER

## 2023-01-18 RX ORDER — MELOXICAM 15 MG/1
TABLET ORAL
COMMUNITY
Start: 2023-01-06

## 2023-01-18 RX ORDER — OXYCODONE HYDROCHLORIDE AND ACETAMINOPHEN 5; 325 MG/1; MG/1
TABLET ORAL
COMMUNITY
Start: 2022-11-04

## 2023-01-18 RX ORDER — MONTELUKAST SODIUM 10 MG/1
10 TABLET ORAL DAILY
Qty: 90 TABLET | Refills: 1 | Status: SHIPPED | OUTPATIENT
Start: 2023-01-18

## 2023-01-18 NOTE — PROGRESS NOTES
Transitional Care Follow Up Visit  Subjective     Nereida Myles is a 47 y.o. female who presents for a transitional care management visit.    Within 48 business hours after discharge our office contacted her via telephone to coordinate her care and needs.      I reviewed and discussed the details of that call along with the discharge summary, hospital problems, inpatient lab results, inpatient diagnostic studies, and consultation reports with Nereida.     Current outpatient and discharge medications have been reconciled for the patient.  Reviewed by: Meredith Lea Kehrer, MD      Date of TCM Phone Call 1/10/2023   [facility]   Date of Admission 1/7/2023   Date of Discharge 1/10/2023   Discharge Disposition Home or Self Care     Risk for Readmission (LACE) Score: 5 (1/10/2023  6:00 AM)  ED Provider Notes by Johnny Finn MD (01/07/2023 17:14)  Discharge Summary by Nicanor Rossi APRN (01/10/2023 15:06)  Outreach Note by Leslie Winston RN (01/11/2023 10:58)  Urine Culture - Urine, Urine, Clean Catch (01/07/2023 19:06)      History of Present Illness   Course During Hospital Stay: See below    The patient presents today for a hospital follow-up from an admission with pyelonephritis.    Pyelonephritis.  The patient was admitted to the hospital on 01/07/2023, and was discharged on 01/10/2023. Her symptoms began mid-week with a urinary tract infection. She knew she had a urinary tract infection, so she tried home remedies. On Friday, 01/13/2023, when she returned home from work she became ill. She states that she went to bed, and woke up on Saturday, 01/14/2023 with a fever, vomiting, and stomach pain. She reports that she had a kidney stone recently, which was before Thanksgiving, and she had a stent placed. She states that she does not think she ever got over it since. She states that she is not experiencing back pain now. The patient was in the hospital for a couple of days for IV  "antibiotics, and she was discharged on Keflex for 4 days. She states that she is feeling better, and she no longer has a fever.    STD testing.  The patient would like to be tested for sexually transmitted disease. She reports that she has not had a new sex partner, and she does not have any concerns about her relationship. She states that she is feeling \"paranoid\" from having a urinary tract infection.    Health maintenance.  The patient is up-to-date with her oncologist.    Hypokalemia.  The patient states her potassium was low while she was in the hospital, and she was given potassium pills. She states when she gets ill, the first thing that bottoms out on her is her potassium. She denies any edema.     The following portions of the patient's history were reviewed and updated as appropriate: allergies, current medications, past family history, past medical history, past social history, past surgical history and problem list.    Review of Systems   Constitutional: Negative for chills, fatigue and fever.   HENT: Negative for congestion, rhinorrhea and sore throat.    Respiratory: Negative for cough and shortness of breath.    Cardiovascular: Negative for chest pain and leg swelling.   Gastrointestinal: Negative for abdominal pain.   Endocrine: Negative for polydipsia and polyuria.   Genitourinary: Negative for dysuria.   Musculoskeletal: Negative for arthralgias and myalgias.   Skin: Negative for rash.   Neurological: Negative for dizziness.   Hematological: Does not bruise/bleed easily.   Psychiatric/Behavioral: Negative for sleep disturbance.       Objective   Physical Exam  Constitutional:       General: She is not in acute distress.     Appearance: Normal appearance. She is well-developed.   HENT:      Head: Normocephalic and atraumatic.      Right Ear: Tympanic membrane, ear canal and external ear normal.      Left Ear: Tympanic membrane, ear canal and external ear normal.      Mouth/Throat:      Mouth: Mucous " membranes are moist.      Pharynx: Oropharynx is clear.   Eyes:      Conjunctiva/sclera: Conjunctivae normal.      Pupils: Pupils are equal, round, and reactive to light.   Neck:      Thyroid: No thyromegaly.   Cardiovascular:      Rate and Rhythm: Normal rate and regular rhythm.      Heart sounds: No murmur heard.  Pulmonary:      Effort: Pulmonary effort is normal.      Breath sounds: Normal breath sounds. No wheezing.   Abdominal:      General: Bowel sounds are normal.      Palpations: Abdomen is soft.      Tenderness: There is no abdominal tenderness. There is no right CVA tenderness or left CVA tenderness.   Musculoskeletal:         General: Normal range of motion.      Cervical back: Neck supple.   Lymphadenopathy:      Cervical: No cervical adenopathy.   Skin:     General: Skin is warm and dry.   Neurological:      Mental Status: She is alert and oriented to person, place, and time.   Psychiatric:         Mood and Affect: Mood normal.         Behavior: Behavior normal.       VPOC Urinalysis Dipstick, Automated (01/18/2023 11:00)    Assessment & Plan   Diagnoses and all orders for this visit:    1. Hospital discharge follow-up (Primary)    2. Pyelonephritis  -     Urine Culture - Urine, Urine, Clean Catch  -     POC Urinalysis Dipstick, Automated  -     Basic Metabolic Panel  -     CBC & Differential    3. Screening for STDs (sexually transmitted diseases)  -     Chlamydia trachomatis, Neisseria gonorrhoeae, PCR - Urine, Urine, Random Void    4. Hypokalemia  -     Basic Metabolic Panel    5. Anemia, unspecified type  -     CBC & Differential    1. Pyelonephritis.  - We will obtain a urinalysis and urine culture today.    2. Anemia.  - We will recheck her CBC today.    3. Hypokalemia.  - We will perform an basic metabolic panel.    The patient will follow-up in 3 months.         Transcribed from ambient dictation for Meredith Lea Kehrer, MD by Clair Hilario.  01/18/23   11:28 EST    Patient or patient  representative verbalized consent to the visit recording.  I have personally performed the services described in this document as transcribed by the above individual, and it is both accurate and complete.

## 2023-01-19 LAB
BASOPHILS # BLD AUTO: 0.05 10*3/MM3 (ref 0–0.2)
BASOPHILS NFR BLD AUTO: 0.9 % (ref 0–1.5)
BUN SERPL-MCNC: 12 MG/DL (ref 6–20)
BUN/CREAT SERPL: 18.5 (ref 7–25)
CALCIUM SERPL-MCNC: 9.7 MG/DL (ref 8.6–10.5)
CHLORIDE SERPL-SCNC: 105 MMOL/L (ref 98–107)
CO2 SERPL-SCNC: 27.9 MMOL/L (ref 22–29)
CREAT SERPL-MCNC: 0.65 MG/DL (ref 0.57–1)
EGFRCR SERPLBLD CKD-EPI 2021: 108.1 ML/MIN/1.73
EOSINOPHIL # BLD AUTO: 0.26 10*3/MM3 (ref 0–0.4)
EOSINOPHIL NFR BLD AUTO: 4.9 % (ref 0.3–6.2)
ERYTHROCYTE [DISTWIDTH] IN BLOOD BY AUTOMATED COUNT: 12.6 % (ref 12.3–15.4)
GLUCOSE SERPL-MCNC: 100 MG/DL (ref 65–99)
HCT VFR BLD AUTO: 37.9 % (ref 34–46.6)
HGB BLD-MCNC: 12.3 G/DL (ref 12–15.9)
IMM GRANULOCYTES # BLD AUTO: 0.02 10*3/MM3 (ref 0–0.05)
IMM GRANULOCYTES NFR BLD AUTO: 0.4 % (ref 0–0.5)
LYMPHOCYTES # BLD AUTO: 1.05 10*3/MM3 (ref 0.7–3.1)
LYMPHOCYTES NFR BLD AUTO: 19.9 % (ref 19.6–45.3)
MCH RBC QN AUTO: 28.9 PG (ref 26.6–33)
MCHC RBC AUTO-ENTMCNC: 32.5 G/DL (ref 31.5–35.7)
MCV RBC AUTO: 89 FL (ref 79–97)
MONOCYTES # BLD AUTO: 0.35 10*3/MM3 (ref 0.1–0.9)
MONOCYTES NFR BLD AUTO: 6.6 % (ref 5–12)
NEUTROPHILS # BLD AUTO: 3.54 10*3/MM3 (ref 1.7–7)
NEUTROPHILS NFR BLD AUTO: 67.3 % (ref 42.7–76)
NRBC BLD AUTO-RTO: 0.2 /100 WBC (ref 0–0.2)
PLATELET # BLD AUTO: 448 10*3/MM3 (ref 140–450)
POTASSIUM SERPL-SCNC: 4.3 MMOL/L (ref 3.5–5.2)
RBC # BLD AUTO: 4.26 10*6/MM3 (ref 3.77–5.28)
SODIUM SERPL-SCNC: 142 MMOL/L (ref 136–145)
WBC # BLD AUTO: 5.27 10*3/MM3 (ref 3.4–10.8)

## 2023-01-20 LAB
BACTERIA UR CULT: NORMAL
BACTERIA UR CULT: NORMAL
C TRACH RRNA SPEC QL NAA+PROBE: NEGATIVE
N GONORRHOEA RRNA SPEC QL NAA+PROBE: NEGATIVE

## 2023-02-28 ENCOUNTER — OFFICE VISIT (OUTPATIENT)
Dept: FAMILY MEDICINE CLINIC | Facility: CLINIC | Age: 48
End: 2023-02-28
Payer: COMMERCIAL

## 2023-02-28 VITALS
TEMPERATURE: 97.8 F | BODY MASS INDEX: 29.42 KG/M2 | HEIGHT: 65 IN | WEIGHT: 176.6 LBS | DIASTOLIC BLOOD PRESSURE: 82 MMHG | OXYGEN SATURATION: 97 % | SYSTOLIC BLOOD PRESSURE: 134 MMHG | HEART RATE: 71 BPM

## 2023-02-28 DIAGNOSIS — E03.8 HYPOTHYROIDISM DUE TO HASHIMOTO'S THYROIDITIS: ICD-10-CM

## 2023-02-28 DIAGNOSIS — F33.2 SEVERE EPISODE OF RECURRENT MAJOR DEPRESSIVE DISORDER, WITHOUT PSYCHOTIC FEATURES: Primary | ICD-10-CM

## 2023-02-28 DIAGNOSIS — E06.3 HYPOTHYROIDISM DUE TO HASHIMOTO'S THYROIDITIS: ICD-10-CM

## 2023-02-28 PROBLEM — R68.3 FINGER CLUBBING: Status: ACTIVE | Noted: 2023-02-28

## 2023-02-28 PROBLEM — C50.919 BREAST CA: Status: ACTIVE | Noted: 2023-02-28

## 2023-02-28 PROCEDURE — 99213 OFFICE O/P EST LOW 20 MIN: CPT | Performed by: FAMILY MEDICINE

## 2023-02-28 RX ORDER — BUPROPION HYDROCHLORIDE 150 MG/1
150 TABLET ORAL DAILY
Qty: 30 TABLET | Refills: 1 | Status: SHIPPED | OUTPATIENT
Start: 2023-02-28 | End: 2023-03-28 | Stop reason: DRUGHIGH

## 2023-02-28 NOTE — PROGRESS NOTES
"Chief Complaint  Med Refill, Anxiety, and Depression    Subjective        Nereida Myles presents to [facility]  History of Present Illness    The patient who presents today with complaints of depression.    Depression  The patient states she is depressed when she goes to bed. She states she can not sleep and does not want to be around people. The patient denies having thoughts of harming herself or others. She states she is feeling hopeless. The patient denies crying all the time. She states she has no motivation. The patient denies having any traumas recently. She states she has a good job, family, and life. The patient states she has gained a lot of weight. She states she started working out and going on a diet, but she ended up hurting her muscles. The patient states she is in physical therapy. She denies having any hallucinations or delusions. The patient states she has looked into seeing a therapist, but it is a time factor now that she is doing physical therapy.    Objective   Vital Signs:  /82   Pulse 71   Temp 97.8 °F (36.6 °C)   Ht 165.1 cm (65\")   Wt 80.1 kg (176 lb 9.6 oz)   SpO2 97%   BMI 29.39 kg/m²   Estimated body mass index is 29.39 kg/m² as calculated from the following:    Height as of this encounter: 165.1 cm (65\").    Weight as of this encounter: 80.1 kg (176 lb 9.6 oz).             Physical Exam  Constitutional:       General: She is not in acute distress.     Appearance: Normal appearance. She is well-developed.   HENT:      Head: Normocephalic and atraumatic.      Right Ear: External ear normal.      Left Ear: External ear normal.   Eyes:      Conjunctiva/sclera: Conjunctivae normal.      Pupils: Pupils are equal, round, and reactive to light.   Neck:      Thyroid: No thyromegaly.   Pulmonary:      Effort: Pulmonary effort is normal.   Neurological:      Mental Status: She is alert and oriented to person, place, and time.   Psychiatric:         " Mood and Affect: Mood normal.         Behavior: Behavior normal.        Result Review :                   Assessment and Plan   Diagnoses and all orders for this visit:    1. Severe episode of recurrent major depressive disorder, without psychotic features (HCC) (Primary)  -     buPROPion XL (Wellbutrin XL) 150 MG 24 hr tablet; Take 1 tablet by mouth Daily.  Dispense: 30 tablet; Refill: 1    2. Hypothyroidism due to Hashimoto's thyroiditis  -     TSH  Patient contracts for safety.  Make sure to get into see a therapist.         Follow Up   Return in about 1 month (around 3/28/2023) for Recheck mood.  Patient was given instructions and counseling regarding her condition or for health maintenance advice. Please see specific information pulled into the AVS if appropriate.     Transcribed from ambient dictation for Meredith Lea Kehrer, MD by Dipika Chavez.  02/28/23   09:22 EST    Patient or patient representative verbalized consent to the visit recording.  I have personally performed the services described in this document as transcribed by the above individual, and it is both accurate and complete.

## 2023-03-01 LAB — TSH SERPL DL<=0.005 MIU/L-ACNC: 2.6 UIU/ML (ref 0.27–4.2)

## 2023-03-15 ENCOUNTER — TELEMEDICINE (OUTPATIENT)
Dept: FAMILY MEDICINE CLINIC | Facility: CLINIC | Age: 48
End: 2023-03-15
Payer: COMMERCIAL

## 2023-03-15 ENCOUNTER — CLINICAL SUPPORT (OUTPATIENT)
Dept: FAMILY MEDICINE CLINIC | Facility: CLINIC | Age: 48
End: 2023-03-15
Payer: COMMERCIAL

## 2023-03-15 DIAGNOSIS — R05.9 COUGH, UNSPECIFIED TYPE: Primary | ICD-10-CM

## 2023-03-15 DIAGNOSIS — R05.9 COUGH, UNSPECIFIED TYPE: ICD-10-CM

## 2023-03-15 DIAGNOSIS — J44.1 COPD WITH EXACERBATION: Primary | ICD-10-CM

## 2023-03-15 LAB
EXPIRATION DATE: NORMAL
FLUAV AG UPPER RESP QL IA.RAPID: NOT DETECTED
FLUBV AG UPPER RESP QL IA.RAPID: NOT DETECTED
INTERNAL CONTROL: NORMAL
Lab: NORMAL
SARS-COV-2 AG UPPER RESP QL IA.RAPID: NOT DETECTED

## 2023-03-15 PROCEDURE — 99213 OFFICE O/P EST LOW 20 MIN: CPT | Performed by: NURSE PRACTITIONER

## 2023-03-15 PROCEDURE — 87428 SARSCOV & INF VIR A&B AG IA: CPT | Performed by: NURSE PRACTITIONER

## 2023-03-15 RX ORDER — BENZONATATE 100 MG/1
100 CAPSULE ORAL 3 TIMES DAILY PRN
Qty: 30 CAPSULE | Refills: 0 | Status: SHIPPED | OUTPATIENT
Start: 2023-03-15

## 2023-03-15 RX ORDER — AMOXICILLIN AND CLAVULANATE POTASSIUM 875; 125 MG/1; MG/1
1 TABLET, FILM COATED ORAL 2 TIMES DAILY
Qty: 14 TABLET | Refills: 0 | Status: SHIPPED | OUTPATIENT
Start: 2023-03-15

## 2023-03-15 NOTE — PROGRESS NOTES
"Chief Complaint  Cough and URI    Subjective         Nereida Myles presents to [facility]  History of Present Illness     Patient is a patient of Dr. Meredith Kehrer.  She presents today via tele-health video visit with complaint of cough, nasal congestion/discharge since last couple days    Reports loss of voice, cough, nausea, chills, ear pressure, feels deep in chest and is wheezy, green sputum production.     Denies vomiting, diarrhea, fever, headache, sore throat, ear pain     Denies any known exposure, but is in and out of  centers.     OTC: mucinex, tylenol.  Helps with symptoms somewhat.       Did a COVID test this morning and was negative.      Between 230-3    Has been continuing breo, and albuterol.      Prednisone and night time cough medication at home.    Asking for day time cough medication and antibiotic.     Objective   Vital Signs:   There were no vitals taken for this visit.    Estimated body mass index is 29.39 kg/m² as calculated from the following:    Height as of 2/28/23: 165.1 cm (65\").    Weight as of 2/28/23: 80.1 kg (176 lb 9.6 oz).     Physical Exam   Constitutional: She appears well-developed and well-nourished.   hoarseness of voice     Neurological: She is alert.   Psychiatric: She has a normal mood and affect.     Result Review :                 Assessment and Plan    Diagnoses and all orders for this visit:    1. COPD with exacerbation (HCC) (Primary)    Other orders  -     benzonatate (Tessalon Perles) 100 MG capsule; Take 1 capsule by mouth 3 (Three) Times a Day As Needed for Cough.  Dispense: 30 capsule; Refill: 0  -     amoxicillin-clavulanate (AUGMENTIN) 875-125 MG per tablet; Take 1 tablet by mouth 2 (Two) Times a Day.  Dispense: 14 tablet; Refill: 0      Will treat with antibiotic and Tessalon Perles.  If worsening symptoms would suggest that she start steroids.  Patient is going to come in later today for COVID and flu test.  We " will call with results any changes needed plan of care.    If worsening symptoms notify provider and we can proceed with chest x-ray.    If shortness of breath, unable to cough across the room, or fever not controlled by medication go to the ER.    Time spent on visit was 10 minutes.      Follow Up   No follow-ups on file.  Patient was given instructions and counseling regarding her condition or for health maintenance advice. Please see specific information pulled into the AVS if appropriate.     Mode of Visit: Video  Location of patient: other: Car patient reports she is at work in [place], [place]  Location of provider: [facility]  You have chosen to receive care through a telehealth visit.  The patient has signed the video visit consent form.  The visit included audio and video interaction. No technical issues occurred during this visit.

## 2023-03-20 RX ORDER — VALACYCLOVIR HYDROCHLORIDE 500 MG/1
TABLET, FILM COATED ORAL
Qty: 90 TABLET | Refills: 3 | Status: SHIPPED | OUTPATIENT
Start: 2023-03-20

## 2023-03-28 ENCOUNTER — OFFICE VISIT (OUTPATIENT)
Dept: FAMILY MEDICINE CLINIC | Facility: CLINIC | Age: 48
End: 2023-03-28
Payer: COMMERCIAL

## 2023-03-28 VITALS
WEIGHT: 178.4 LBS | OXYGEN SATURATION: 100 % | BODY MASS INDEX: 29.72 KG/M2 | SYSTOLIC BLOOD PRESSURE: 142 MMHG | TEMPERATURE: 97.3 F | HEART RATE: 80 BPM | HEIGHT: 65 IN | DIASTOLIC BLOOD PRESSURE: 80 MMHG

## 2023-03-28 DIAGNOSIS — F33.2 SEVERE EPISODE OF RECURRENT MAJOR DEPRESSIVE DISORDER, WITHOUT PSYCHOTIC FEATURES: Primary | ICD-10-CM

## 2023-03-28 PROCEDURE — 99213 OFFICE O/P EST LOW 20 MIN: CPT | Performed by: FAMILY MEDICINE

## 2023-03-28 RX ORDER — BUPROPION HYDROCHLORIDE 300 MG/1
300 TABLET ORAL EVERY MORNING
Qty: 30 TABLET | Refills: 1 | Status: SHIPPED | OUTPATIENT
Start: 2023-03-28

## 2023-03-28 NOTE — PROGRESS NOTES
"Chief Complaint  Depression and Med Refill    Subjective        Nereida Myles presents to [facility]  History of Present Illness  Patient presents for follow-up on depression after adding in Wellbutrin.  She feels that the fluoxetine is still controlling her OCD but she is still some depressed.  She does note some improvement on the Wellbutrin and denies any side effects.  She denies any SI, HI or hopelessness.  She still has not gotten to see a therapist yet.      Objective   Vital Signs:  /80   Pulse 80   Temp 97.3 °F (36.3 °C)   Ht 165.1 cm (65\")   Wt 80.9 kg (178 lb 6.4 oz)   SpO2 100%   BMI 29.69 kg/m²   Estimated body mass index is 29.69 kg/m² as calculated from the following:    Height as of this encounter: 165.1 cm (65\").    Weight as of this encounter: 80.9 kg (178 lb 6.4 oz).             Physical Exam  Constitutional:       General: She is not in acute distress.     Appearance: Normal appearance. She is well-developed.   HENT:      Head: Normocephalic and atraumatic.      Right Ear: External ear normal.      Left Ear: External ear normal.   Eyes:      Conjunctiva/sclera: Conjunctivae normal.      Pupils: Pupils are equal, round, and reactive to light.   Neck:      Thyroid: No thyromegaly.   Pulmonary:      Effort: Pulmonary effort is normal.   Neurological:      Mental Status: She is alert and oriented to person, place, and time.   Psychiatric:         Mood and Affect: Mood normal.         Behavior: Behavior normal.        Result Review :                   Assessment and Plan   Diagnoses and all orders for this visit:    1. Severe episode of recurrent major depressive disorder, without psychotic features (HCC) (Primary)  -     buPROPion XL (Wellbutrin XL) 300 MG 24 hr tablet; Take 1 tablet by mouth Every Morning.  Dispense: 30 tablet; Refill: 1    Severe recurrent depression-slightly improved but not to goal, will increase Wellbutrin and encouraged therapy.  " Keep routine follow-up already scheduled with me.         Follow Up   No follow-ups on file.  Patient was given instructions and counseling regarding her condition or for health maintenance advice. Please see specific information pulled into the AVS if appropriate.

## 2023-04-21 DIAGNOSIS — F33.2 SEVERE EPISODE OF RECURRENT MAJOR DEPRESSIVE DISORDER, WITHOUT PSYCHOTIC FEATURES: ICD-10-CM

## 2023-04-21 RX ORDER — BUPROPION HYDROCHLORIDE 300 MG/1
300 TABLET ORAL EVERY MORNING
Qty: 90 TABLET | Refills: 1 | OUTPATIENT
Start: 2023-04-21

## 2023-04-21 NOTE — TELEPHONE ENCOUNTER
This was a increased dose medicine and patient has appointment follow-up with Dr. Kehrer on May 2.  Should be reevaluated before 90-day supply sent in.

## 2023-04-25 DIAGNOSIS — F33.2 SEVERE EPISODE OF RECURRENT MAJOR DEPRESSIVE DISORDER, WITHOUT PSYCHOTIC FEATURES: ICD-10-CM

## 2023-04-25 RX ORDER — BUPROPION HYDROCHLORIDE 300 MG/1
300 TABLET ORAL EVERY MORNING
Qty: 90 TABLET | Refills: 1 | Status: SHIPPED | OUTPATIENT
Start: 2023-04-25

## 2023-04-25 RX ORDER — BUPROPION HYDROCHLORIDE 300 MG/1
300 TABLET ORAL EVERY MORNING
Qty: 30 TABLET | Refills: 1 | Status: SHIPPED | OUTPATIENT
Start: 2023-04-25 | End: 2023-04-25

## 2023-05-02 DIAGNOSIS — J45.51 SEVERE PERSISTENT ASTHMA WITH ACUTE EXACERBATION: ICD-10-CM

## 2023-05-02 RX ORDER — ALBUTEROL SULFATE 4 MG/1
4 TABLET ORAL NIGHTLY
Qty: 90 TABLET | Refills: 3 | Status: SHIPPED | OUTPATIENT
Start: 2023-05-02

## 2023-05-02 NOTE — TELEPHONE ENCOUNTER
Caller: Nereida Myles    Relationship: Self    Best call back number: [phone]*    Requested Prescriptions:   Requested Prescriptions     Pending Prescriptions Disp Refills   • albuterol (PROVENTIL) 4 MG tablet 90 tablet 3     Sig: Take 1 tablet by mouth Every Night.        Pharmacy where request should be sent: [id number] - [address] [place] [address] - [phone]  - [phone]     Last office visit with prescribing clinician: 3/28/2023   Last telemedicine visit with prescribing clinician: 5/18/2023   Next office visit with prescribing clinician: 5/18/2023     Additional details provided by patient: PATIENT HAS 1 WEEK OF MEDICATION REMAINING BUT NEEDS REFILLED BY Friday, 5/5, AS SHE IS LEAVING FOR A TRIP AND WILL BE AWAY ALL NEXT WEEK.    Does the patient have less than a 3 day supply:  [] Yes  [x] No    Would you like a call back once the refill request has been completed: [x] Yes [] No    If the office needs to give you a call back, can they leave a voicemail: [x] Yes [] No    Ana Loving   05/02/23 07:53 EDT

## 2023-05-18 ENCOUNTER — OFFICE VISIT (OUTPATIENT)
Dept: FAMILY MEDICINE CLINIC | Facility: CLINIC | Age: 48
End: 2023-05-18
Payer: COMMERCIAL

## 2023-05-18 VITALS
HEART RATE: 67 BPM | OXYGEN SATURATION: 98 % | HEIGHT: 65 IN | DIASTOLIC BLOOD PRESSURE: 62 MMHG | BODY MASS INDEX: 28.42 KG/M2 | SYSTOLIC BLOOD PRESSURE: 110 MMHG | TEMPERATURE: 97.8 F | WEIGHT: 170.6 LBS

## 2023-05-18 DIAGNOSIS — E06.3 HYPOTHYROIDISM DUE TO HASHIMOTO'S THYROIDITIS: Primary | ICD-10-CM

## 2023-05-18 DIAGNOSIS — J45.50 SEVERE PERSISTENT ASTHMA, UNSPECIFIED WHETHER COMPLICATED: ICD-10-CM

## 2023-05-18 DIAGNOSIS — E03.8 HYPOTHYROIDISM DUE TO HASHIMOTO'S THYROIDITIS: Primary | ICD-10-CM

## 2023-05-18 DIAGNOSIS — F33.2 SEVERE EPISODE OF RECURRENT MAJOR DEPRESSIVE DISORDER, WITHOUT PSYCHOTIC FEATURES: ICD-10-CM

## 2023-05-18 DIAGNOSIS — F33.42 RECURRENT MAJOR DEPRESSIVE DISORDER, IN FULL REMISSION: ICD-10-CM

## 2023-05-18 DIAGNOSIS — J45.51 SEVERE PERSISTENT ASTHMA WITH ACUTE EXACERBATION: ICD-10-CM

## 2023-05-18 DIAGNOSIS — Z79.899 ENCOUNTER FOR MONITORING OF THEOPHYLLINE THERAPY: ICD-10-CM

## 2023-05-18 DIAGNOSIS — Z51.81 ENCOUNTER FOR MONITORING OF THEOPHYLLINE THERAPY: ICD-10-CM

## 2023-05-18 RX ORDER — MONTELUKAST SODIUM 10 MG/1
10 TABLET ORAL DAILY
Qty: 90 TABLET | Refills: 1 | Status: SHIPPED | OUTPATIENT
Start: 2023-05-18

## 2023-05-18 RX ORDER — LEVOTHYROXINE SODIUM 0.05 MG/1
50 TABLET ORAL DAILY
Qty: 90 TABLET | Refills: 3 | Status: SHIPPED | OUTPATIENT
Start: 2023-05-18

## 2023-05-18 RX ORDER — FLUOXETINE HYDROCHLORIDE 40 MG/1
80 CAPSULE ORAL DAILY
Qty: 180 CAPSULE | Refills: 3 | Status: SHIPPED | OUTPATIENT
Start: 2023-05-18

## 2023-05-18 RX ORDER — BUPROPION HYDROCHLORIDE 300 MG/1
300 TABLET ORAL EVERY MORNING
Qty: 90 TABLET | Refills: 1 | Status: SHIPPED | OUTPATIENT
Start: 2023-05-18

## 2023-05-18 RX ORDER — ALBUTEROL SULFATE 4 MG/1
4 TABLET ORAL NIGHTLY
Qty: 90 TABLET | Refills: 3 | Status: SHIPPED | OUTPATIENT
Start: 2023-05-18

## 2023-05-18 NOTE — PROGRESS NOTES
"Chief Complaint  Anxiety    Subjective        Nereida Myles presents to [facility]  History of Present Illness  Patient presents for follow-up on her mood.  She is doing better with the increased dose of Wellbutrin.  She denies any chest pain or shortness of breath.  She denies any panic attacks.  Is also due to follow-up on her asthma, and hypothyroidism.  She has been well controlled with her asthma recently.      Objective   Vital Signs:  /62 (BP Location: Right arm, Patient Position: Sitting)   Pulse 67   Temp 97.8 °F (36.6 °C) (Temporal)   Ht 165.1 cm (65\")   Wt 77.4 kg (170 lb 9.6 oz)   SpO2 98%   BMI 28.39 kg/m²   Estimated body mass index is 28.39 kg/m² as calculated from the following:    Height as of this encounter: 165.1 cm (65\").    Weight as of this encounter: 77.4 kg (170 lb 9.6 oz).             Physical Exam  Constitutional:       General: She is not in acute distress.     Appearance: Normal appearance. She is well-developed.   HENT:      Head: Normocephalic and atraumatic.      Right Ear: Tympanic membrane, ear canal and external ear normal.      Left Ear: Tympanic membrane, ear canal and external ear normal.      Mouth/Throat:      Mouth: Mucous membranes are moist.      Pharynx: Oropharynx is clear.   Eyes:      Conjunctiva/sclera: Conjunctivae normal.      Pupils: Pupils are equal, round, and reactive to light.   Neck:      Thyroid: No thyromegaly.   Cardiovascular:      Rate and Rhythm: Normal rate and regular rhythm.      Heart sounds: No murmur heard.  Pulmonary:      Effort: Pulmonary effort is normal.      Breath sounds: Normal breath sounds. No wheezing.   Musculoskeletal:         General: Normal range of motion.      Cervical back: Neck supple.   Lymphadenopathy:      Cervical: No cervical adenopathy.   Skin:     General: Skin is warm and dry.   Neurological:      Mental Status: She is alert and oriented to person, place, and time. "   Psychiatric:         Mood and Affect: Mood normal.         Behavior: Behavior normal.        Result Review :                   Assessment and Plan   Diagnoses and all orders for this visit:    1. Hypothyroidism due to Hashimoto's thyroiditis (Primary)  -     CBC & Differential  -     Comprehensive Metabolic Panel  -     Lipid Panel  -     TSH    2. Severe episode of recurrent major depressive disorder, without psychotic features    3. Severe persistent asthma, unspecified whether complicated    4. BMI 28.0-28.9,adult    5. Encounter for monitoring of theophylline therapy  -     Theophylline level    Hypothyroidism-check labs today and keep follow-up for physical in few months.  Depression-improved, continue current medication         Follow Up   No follow-ups on file.  Patient was given instructions and counseling regarding her condition or for health maintenance advice. Please see specific information pulled into the AVS if appropriate.

## 2023-05-19 LAB
ALBUMIN SERPL-MCNC: 4.6 G/DL (ref 3.8–4.8)
ALBUMIN/GLOB SERPL: 1.8 {RATIO} (ref 1.2–2.2)
ALP SERPL-CCNC: 88 IU/L (ref 44–121)
ALT SERPL-CCNC: 21 IU/L (ref 0–32)
AST SERPL-CCNC: 24 IU/L (ref 0–40)
BASOPHILS # BLD AUTO: 0 X10E3/UL (ref 0–0.2)
BASOPHILS NFR BLD AUTO: 1 %
BILIRUB SERPL-MCNC: 0.3 MG/DL (ref 0–1.2)
BUN SERPL-MCNC: 15 MG/DL (ref 6–24)
BUN/CREAT SERPL: 17 (ref 9–23)
CALCIUM SERPL-MCNC: 9.8 MG/DL (ref 8.7–10.2)
CHLORIDE SERPL-SCNC: 101 MMOL/L (ref 96–106)
CHOLEST SERPL-MCNC: 193 MG/DL (ref 100–199)
CO2 SERPL-SCNC: 22 MMOL/L (ref 20–29)
CREAT SERPL-MCNC: 0.86 MG/DL (ref 0.57–1)
EGFRCR SERPLBLD CKD-EPI 2021: 83 ML/MIN/1.73
EOSINOPHIL # BLD AUTO: 0.1 X10E3/UL (ref 0–0.4)
EOSINOPHIL NFR BLD AUTO: 3 %
ERYTHROCYTE [DISTWIDTH] IN BLOOD BY AUTOMATED COUNT: 13.4 % (ref 11.7–15.4)
GLOBULIN SER CALC-MCNC: 2.5 G/DL (ref 1.5–4.5)
GLUCOSE SERPL-MCNC: 83 MG/DL (ref 70–99)
HCT VFR BLD AUTO: 41 % (ref 34–46.6)
HDLC SERPL-MCNC: 83 MG/DL
HGB BLD-MCNC: 13.9 G/DL (ref 11.1–15.9)
IMM GRANULOCYTES # BLD AUTO: 0 X10E3/UL (ref 0–0.1)
IMM GRANULOCYTES NFR BLD AUTO: 0 %
LDLC SERPL CALC-MCNC: 94 MG/DL (ref 0–99)
LYMPHOCYTES # BLD AUTO: 1 X10E3/UL (ref 0.7–3.1)
LYMPHOCYTES NFR BLD AUTO: 23 %
MCH RBC QN AUTO: 30.2 PG (ref 26.6–33)
MCHC RBC AUTO-ENTMCNC: 33.9 G/DL (ref 31.5–35.7)
MCV RBC AUTO: 89 FL (ref 79–97)
MONOCYTES # BLD AUTO: 0.5 X10E3/UL (ref 0.1–0.9)
MONOCYTES NFR BLD AUTO: 11 %
NEUTROPHILS # BLD AUTO: 2.8 X10E3/UL (ref 1.4–7)
NEUTROPHILS NFR BLD AUTO: 62 %
PLATELET # BLD AUTO: 382 X10E3/UL (ref 150–450)
POTASSIUM SERPL-SCNC: 4.2 MMOL/L (ref 3.5–5.2)
PROT SERPL-MCNC: 7.1 G/DL (ref 6–8.5)
RBC # BLD AUTO: 4.61 X10E6/UL (ref 3.77–5.28)
SODIUM SERPL-SCNC: 140 MMOL/L (ref 134–144)
THEOPHYLLINE SERPL-MCNC: 8.8 UG/ML (ref 10–20)
TRIGL SERPL-MCNC: 89 MG/DL (ref 0–149)
TSH SERPL DL<=0.005 MIU/L-ACNC: 2.13 UIU/ML (ref 0.45–4.5)
VLDLC SERPL CALC-MCNC: 16 MG/DL (ref 5–40)
WBC # BLD AUTO: 4.4 X10E3/UL (ref 3.4–10.8)

## 2023-05-30 DIAGNOSIS — F33.2 SEVERE EPISODE OF RECURRENT MAJOR DEPRESSIVE DISORDER, WITHOUT PSYCHOTIC FEATURES: ICD-10-CM

## 2023-05-31 RX ORDER — BUPROPION HYDROCHLORIDE 300 MG/1
300 TABLET ORAL EVERY MORNING
Qty: 30 TABLET | OUTPATIENT
Start: 2023-05-31

## 2023-06-16 ENCOUNTER — OFFICE VISIT (OUTPATIENT)
Dept: FAMILY MEDICINE CLINIC | Facility: CLINIC | Age: 48
End: 2023-06-16
Payer: COMMERCIAL

## 2023-06-16 ENCOUNTER — TELEPHONE (OUTPATIENT)
Dept: FAMILY MEDICINE CLINIC | Facility: CLINIC | Age: 48
End: 2023-06-16
Payer: COMMERCIAL

## 2023-06-16 ENCOUNTER — HOSPITAL ENCOUNTER (OUTPATIENT)
Dept: GENERAL RADIOLOGY | Facility: HOSPITAL | Age: 48
Discharge: HOME | End: 2023-06-16
Payer: COMMERCIAL

## 2023-06-16 VITALS
BODY MASS INDEX: 28.02 KG/M2 | OXYGEN SATURATION: 98 % | WEIGHT: 168.2 LBS | SYSTOLIC BLOOD PRESSURE: 124 MMHG | DIASTOLIC BLOOD PRESSURE: 82 MMHG | HEIGHT: 65 IN | TEMPERATURE: 98.6 F | HEART RATE: 72 BPM

## 2023-06-16 DIAGNOSIS — R07.9 CHEST PAIN, UNSPECIFIED TYPE: Primary | ICD-10-CM

## 2023-06-16 DIAGNOSIS — M54.31 SCIATICA OF RIGHT SIDE: ICD-10-CM

## 2023-06-16 PROCEDURE — 99213 OFFICE O/P EST LOW 20 MIN: CPT | Performed by: FAMILY MEDICINE

## 2023-06-16 PROCEDURE — 71046 X-RAY EXAM CHEST 2 VIEWS: CPT

## 2023-06-16 RX ORDER — CYCLOBENZAPRINE HCL 10 MG
10 TABLET ORAL 3 TIMES DAILY PRN
Qty: 30 TABLET | Refills: 2 | Status: SHIPPED | OUTPATIENT
Start: 2023-06-16

## 2023-06-16 RX ORDER — CYCLOBENZAPRINE HCL 10 MG
10 TABLET ORAL 3 TIMES DAILY PRN
Qty: 30 TABLET | Refills: 2 | Status: CANCELLED | OUTPATIENT
Start: 2023-06-16

## 2023-06-16 NOTE — TELEPHONE ENCOUNTER
Regarding: asthetic flat closure  Contact: [phone]  ----- Message from Liz Justice DO sent at 6/16/2023  7:25 AM EDT -----  Please let the patient know that I feel that she should be evaluated today.  If I have availability in the morning you are welcome to use a morning appointment spot.  I do not think this should wait till the afternoon in case imaging needs to be performed.  However if not the patient could be seen at the urgent care center.     ----- Message sent from Kelsey Chung MA to Nereida Myles at 6/16/2023  7:17 AM -----   Dr. Kehrer is out of the office until Monday.  I will route this to her for her return on Monday.  I will also route to Dr. Justice who is in the office today to see if she has any advise.

## 2023-06-16 NOTE — PROGRESS NOTES
"Chief Complaint  No chief complaint on file.    Subjective        Nereida Myles presents to [facility]  History of Present Illness  Patient of Dr. Kehrer's is here today with a new problem.  She started having pain in the right side of her chest after leaning over the center console in her car to pick something up on the floor.  This happened 5 days ago.  Patient became concerned because she noticed bruising and a bump in the area.  One year ago she had a revision surgery for a bilateral mastectomy that was done in 2016.  She had no complications following her more recent surgery.  And she had normal healing.  Patient denies any difficulty breathing.  Currently patient states that the pain is dull and only occurs with palpation of the area that is bruised.    Patient would also like a refill of muscle relaxers that she uses for her sciatica.    Objective   Vital Signs:  /82   Pulse 72   Temp 98.6 °F (37 °C)   Ht 165.1 cm (65\")   Wt 76.3 kg (168 lb 3.2 oz)   SpO2 98%   BMI 27.99 kg/m²   Estimated body mass index is 27.99 kg/m² as calculated from the following:    Height as of this encounter: 165.1 cm (65\").    Weight as of this encounter: 76.3 kg (168 lb 3.2 oz).             Physical Exam  Vitals and nursing note reviewed.   Constitutional:       Appearance: She is well-developed.   HENT:      Head: Normocephalic and atraumatic.      Right Ear: Tympanic membrane, ear canal and external ear normal.      Left Ear: Tympanic membrane, ear canal and external ear normal.   Cardiovascular:      Rate and Rhythm: Normal rate and regular rhythm.      Heart sounds: Normal heart sounds.   Pulmonary:      Effort: Pulmonary effort is normal.      Breath sounds: Normal breath sounds.   Musculoskeletal:         General: Normal range of motion.      Cervical back: Normal range of motion and neck supple.   Skin:     General: Skin is warm.      Findings: No rash.   Neurological:      " Mental Status: She is alert and oriented to person, place, and time.   Psychiatric:         Behavior: Behavior normal.         Thought Content: Thought content normal.         Judgment: Judgment normal.      Result Review :      Data reviewed : Radiologic studies see below  XR Chest 2 View (06/16/2023 09:05)            Assessment and Plan   Diagnoses and all orders for this visit:    1. Chest pain, unspecified type (Primary)  -     XR Chest 2 View    2. Sciatica of right side  -     cyclobenzaprine (FLEXERIL) 10 MG tablet; Take 1 tablet by mouth 3 (Three) Times a Day As Needed for Muscle Spasms.  Dispense: 30 tablet; Refill: 2    Patient is here today for new problem of chest pain and bruising after mild trauma.  Patient concerned she may have been something to the postsurgical area.  However, her physical exam shows only minimal bruising and tenderness and no gross deformity.  Chest x-ray obtained today was negative for bony or soft tissue abnormality.  I encouraged the patient to let the area with an Ace wrap for support and comfort.  She may use ice and over-the-counter painkillers as needed.  Follow-up if symptoms are worsening or not improving in a few days         Follow Up   Return if symptoms worsen or fail to improve.  Patient was given instructions and counseling regarding her condition or for health maintenance advice. Please see specific information pulled into the AVS if appropriate.

## 2023-06-19 ENCOUNTER — TELEPHONE (OUTPATIENT)
Dept: FAMILY MEDICINE CLINIC | Facility: CLINIC | Age: 48
End: 2023-06-19

## 2023-06-19 NOTE — TELEPHONE ENCOUNTER
Regarding: FW: asthetic flat closure  Contact: [phone]  Did anyone talk to this patient again on Friday after Dr. Justice's message?  ----- Message -----  From: Kelsey Chung MA  Sent: 6/16/2023   7:17 AM EDT  To: Donna Lynn Betz, DO, Meredith Lea Kehrer, MD  Subject: asthetic flat closure                            ----- Message from Kelsey Chung MA sent at 6/16/2023  7:17 AM EDT -----  Kehrers pt     ----- Message sent from Kelsey Chung MA to Nereida Myles at 6/16/2023  7:17 AM -----   Dr. Kehrer is out of the office until Monday.  I will route this to her for her return on Monday.  I will also route to Dr. Justice who is in the office today to see if she has any advise.

## 2023-09-11 ENCOUNTER — OFFICE VISIT (OUTPATIENT)
Dept: FAMILY MEDICINE CLINIC | Facility: CLINIC | Age: 48
End: 2023-09-11
Payer: COMMERCIAL

## 2023-09-11 VITALS
BODY MASS INDEX: 26.73 KG/M2 | RESPIRATION RATE: 20 BRPM | DIASTOLIC BLOOD PRESSURE: 78 MMHG | HEART RATE: 69 BPM | SYSTOLIC BLOOD PRESSURE: 120 MMHG | TEMPERATURE: 98.3 F | OXYGEN SATURATION: 95 % | HEIGHT: 65 IN | WEIGHT: 160.4 LBS

## 2023-09-11 DIAGNOSIS — M54.41 ACUTE RIGHT-SIDED LOW BACK PAIN WITH RIGHT-SIDED SCIATICA: Primary | ICD-10-CM

## 2023-09-11 RX ORDER — KETOROLAC TROMETHAMINE 30 MG/ML
30 INJECTION, SOLUTION INTRAMUSCULAR; INTRAVENOUS EVERY 6 HOURS PRN
Status: DISCONTINUED | OUTPATIENT
Start: 2023-09-11 | End: 2023-09-11

## 2023-09-11 RX ORDER — IBUPROFEN 600 MG/1
600 TABLET ORAL EVERY 6 HOURS PRN
Qty: 60 TABLET | Refills: 0 | Status: SHIPPED | OUTPATIENT
Start: 2023-09-11

## 2023-09-11 RX ORDER — TRIAMCINOLONE ACETONIDE 40 MG/ML
40 INJECTION, SUSPENSION INTRA-ARTICULAR; INTRAMUSCULAR ONCE
Status: COMPLETED | OUTPATIENT
Start: 2023-09-11 | End: 2023-09-11

## 2023-09-11 RX ORDER — LIDOCAINE 50 MG/G
1 PATCH TOPICAL EVERY 24 HOURS
Qty: 14 PATCH | Refills: 0 | Status: SHIPPED | OUTPATIENT
Start: 2023-09-11

## 2023-09-11 RX ORDER — KETOROLAC TROMETHAMINE 30 MG/ML
30 INJECTION, SOLUTION INTRAMUSCULAR; INTRAVENOUS ONCE
Status: COMPLETED | OUTPATIENT
Start: 2023-09-11 | End: 2023-09-11

## 2023-09-11 RX ADMIN — KETOROLAC TROMETHAMINE 30 MG: 30 INJECTION, SOLUTION INTRAMUSCULAR; INTRAVENOUS at 11:07

## 2023-09-11 RX ADMIN — TRIAMCINOLONE ACETONIDE 40 MG: 40 INJECTION, SUSPENSION INTRA-ARTICULAR; INTRAMUSCULAR at 11:08

## 2023-09-11 NOTE — PATIENT INSTRUCTIONS
Please call back if you have any worsening symptoms  Take ibuprofen 600 mg every 6 hours on full stomach  Rest and avoid pain exacerbating activities  Stop meloxicam  Apply lidocaine patch every 24 hours

## 2023-10-04 ENCOUNTER — TELEPHONE (OUTPATIENT)
Dept: FAMILY MEDICINE CLINIC | Facility: CLINIC | Age: 48
End: 2023-10-04
Payer: COMMERCIAL

## 2023-10-04 NOTE — TELEPHONE ENCOUNTER
I called Nereida Myles regarding that Lidocaine 5% ADH Patch. She stated that she never used, her insurance did not cover. She stated that she is doing better and does not need to use them.   I will call the insurance to inform them.

## 2023-10-06 ENCOUNTER — FLU SHOT (OUTPATIENT)
Dept: FAMILY MEDICINE CLINIC | Facility: CLINIC | Age: 48
End: 2023-10-06
Payer: COMMERCIAL

## 2023-10-06 DIAGNOSIS — Z23 NEEDS FLU SHOT: Primary | ICD-10-CM

## 2023-10-06 PROCEDURE — 90471 IMMUNIZATION ADMIN: CPT | Performed by: NURSE PRACTITIONER

## 2023-10-06 PROCEDURE — 90686 IIV4 VACC NO PRSV 0.5 ML IM: CPT | Performed by: NURSE PRACTITIONER

## 2023-11-20 DIAGNOSIS — K21.9 GASTROESOPHAGEAL REFLUX DISEASE WITHOUT ESOPHAGITIS: ICD-10-CM

## 2023-11-20 RX ORDER — OMEPRAZOLE 40 MG/1
40 CAPSULE, DELAYED RELEASE ORAL DAILY
Qty: 90 CAPSULE | Refills: 1 | Status: SHIPPED | OUTPATIENT
Start: 2023-11-20

## 2023-11-20 NOTE — TELEPHONE ENCOUNTER
Caller: Nereida Myles    Relationship: Self    Best call back number:     [phone] (Mobile)       Requested Prescriptions:   Requested Prescriptions     Pending Prescriptions Disp Refills    omeprazole (priLOSEC) 40 MG capsule 90 capsule 1     Sig: Take 1 capsule by mouth Daily.        Pharmacy where request should be sent: [facility] [address] [phone] [facility] [phone] FX     Last office visit with prescribing clinician: 7/20/2023   Last telemedicine visit with prescribing clinician: Visit date not found   Next office visit with prescribing clinician: 1/25/2024     Additional details provided by patient:     Does the patient have less than a 3 day supply:  [] Yes  [] No    Would you like a call back once the refill request has been completed: [] Yes [] No    If the office needs to give you a call back, can they leave a voicemail: [] Yes [] No    Romy Aguilar   11/20/23 09:28 EST

## 2023-12-05 ENCOUNTER — TELEPHONE (OUTPATIENT)
Dept: FAMILY MEDICINE CLINIC | Facility: CLINIC | Age: 48
End: 2023-12-05

## 2023-12-05 ENCOUNTER — OFFICE VISIT (OUTPATIENT)
Dept: FAMILY MEDICINE CLINIC | Facility: CLINIC | Age: 48
End: 2023-12-05
Payer: COMMERCIAL

## 2023-12-05 VITALS
HEART RATE: 79 BPM | WEIGHT: 157.8 LBS | SYSTOLIC BLOOD PRESSURE: 122 MMHG | HEIGHT: 65 IN | DIASTOLIC BLOOD PRESSURE: 78 MMHG | TEMPERATURE: 98.6 F | BODY MASS INDEX: 26.29 KG/M2 | OXYGEN SATURATION: 99 %

## 2023-12-05 DIAGNOSIS — M51.36 DDD (DEGENERATIVE DISC DISEASE), LUMBAR: ICD-10-CM

## 2023-12-05 DIAGNOSIS — M54.16 LUMBAR RADICULOPATHY: Primary | ICD-10-CM

## 2023-12-05 PROCEDURE — 99213 OFFICE O/P EST LOW 20 MIN: CPT | Performed by: FAMILY MEDICINE

## 2023-12-05 RX ORDER — METAXALONE 800 MG/1
400-800 TABLET ORAL 4 TIMES DAILY PRN
Qty: 120 TABLET | Refills: 0 | Status: SHIPPED | OUTPATIENT
Start: 2023-12-05

## 2023-12-05 RX ORDER — DICLOFENAC SODIUM 75 MG/1
75 TABLET, DELAYED RELEASE ORAL 2 TIMES DAILY PRN
Qty: 30 TABLET | Refills: 0 | Status: SHIPPED | OUTPATIENT
Start: 2023-12-05

## 2023-12-05 NOTE — TELEPHONE ENCOUNTER
Caller: Nereida Myles    Relationship to patient: Self    Best call back number: [phone]    Patient is needing: SHE HAS BEEN GOING TO P.T. FOR HER BACK FOR A WHILE NOW AND IT IS NOT GETTING ANY BETTER.  SHE SAID IT STILL HURTS PRETTY BAD.  SHE WOULD LIKE TO KNOW WHAT HER NEXT STEPS ARE.

## 2023-12-05 NOTE — TELEPHONE ENCOUNTER
She can come in to see me to discuss possible MRI and referral or she can go directly to see a back specialist if she does not need the referral since she is straight anthem.

## 2023-12-05 NOTE — PROGRESS NOTES
"Chief Complaint  Back Pain    Subjective        Nereida Myles presents to [facility]  History of Present Illness  Presents to follow up for her low back pain.  Went to physical therapy starting in August and stopped a few weeks ago.  Saw Dr. Chairez once since then.  Pain is just not getting better.  Can not even move some days when she wakes up.  Pain all through low back.  Usually goes into right hip and down leg.  Sometimes goes to left.   No numbness or tingling.  Has RLS symptoms.  No foot drop or loss of control of bowle or bladder.       Back Pain        Objective   Vital Signs:  /78   Pulse 79   Temp 98.6 °F (37 °C)   Ht 165.1 cm (65\")   Wt 71.6 kg (157 lb 12.8 oz)   SpO2 99%   BMI 26.26 kg/m²   Estimated body mass index is 26.26 kg/m² as calculated from the following:    Height as of this encounter: 165.1 cm (65\").    Weight as of this encounter: 71.6 kg (157 lb 12.8 oz).               Physical Exam  Constitutional:       General: She is not in acute distress.     Appearance: Normal appearance. She is well-developed.   HENT:      Head: Normocephalic and atraumatic.      Right Ear: External ear normal.      Left Ear: External ear normal.   Eyes:      Conjunctiva/sclera: Conjunctivae normal.      Pupils: Pupils are equal, round, and reactive to light.   Neck:      Thyroid: No thyromegaly.   Pulmonary:      Effort: Pulmonary effort is normal.   Musculoskeletal:         General: Tenderness present.      Comments: Tender throughout bilateral lower lumbar paraspinous muscles, significant pain on range of motion of the low back, negative straight leg raise bilaterally   Neurological:      Mental Status: She is alert and oriented to person, place, and time.      Sensory: No sensory deficit.      Motor: No weakness.      Coordination: Coordination normal.      Gait: Gait normal.      Deep Tendon Reflexes: Reflexes normal.   Psychiatric:         Mood and Affect: Mood " normal.         Behavior: Behavior normal.        Result Review :        MRI Lumbar Spine With & Without Contrast (01/09/2023 10:20)            Assessment and Plan   Diagnoses and all orders for this visit:    1. Lumbar radiculopathy (Primary)  -     Ambulatory Referral to Pain Management  -     metaxalone (Skelaxin) 800 MG tablet; Take 0.5-1 tablets by mouth 4 (Four) Times a Day As Needed for Muscle Spasms.  Dispense: 120 tablet; Refill: 0  -     diclofenac (VOLTAREN) 75 MG EC tablet; Take 1 tablet by mouth 2 (Two) Times a Day As Needed (pain).  Dispense: 30 tablet; Refill: 0    2. DDD (degenerative disc disease), lumbar  -     Ambulatory Referral to Pain Management  -     metaxalone (Skelaxin) 800 MG tablet; Take 0.5-1 tablets by mouth 4 (Four) Times a Day As Needed for Muscle Spasms.  Dispense: 120 tablet; Refill: 0  -     diclofenac (VOLTAREN) 75 MG EC tablet; Take 1 tablet by mouth 2 (Two) Times a Day As Needed (pain).  Dispense: 30 tablet; Refill: 0    Degenerative disc disease with lumbar radiculopathy not getting any better after months of therapy-we will get her into see pain management, do a trial of Voltaren to see if helps better than ibuprofen and try Skelaxin since Flexeril is too sedating.         Follow Up   No follow-ups on file.  Patient was given instructions and counseling regarding her condition or for health maintenance advice. Please see specific information pulled into the AVS if appropriate.

## 2023-12-11 ENCOUNTER — TELEPHONE (OUTPATIENT)
Dept: PAIN MEDICINE | Facility: CLINIC | Age: 48
End: 2023-12-11
Payer: COMMERCIAL

## 2023-12-12 ENCOUNTER — OFFICE VISIT (OUTPATIENT)
Dept: PAIN MEDICINE | Facility: CLINIC | Age: 48
End: 2023-12-12
Payer: COMMERCIAL

## 2023-12-12 ENCOUNTER — PREP FOR SURGERY (OUTPATIENT)
Dept: SURGERY | Facility: SURGERY CENTER | Age: 48
End: 2023-12-12
Payer: COMMERCIAL

## 2023-12-12 VITALS
HEIGHT: 65 IN | SYSTOLIC BLOOD PRESSURE: 110 MMHG | DIASTOLIC BLOOD PRESSURE: 70 MMHG | TEMPERATURE: 97 F | OXYGEN SATURATION: 97 % | WEIGHT: 158 LBS | BODY MASS INDEX: 26.33 KG/M2 | HEART RATE: 66 BPM

## 2023-12-12 DIAGNOSIS — M54.16 LUMBAR RADICULOPATHY: Primary | ICD-10-CM

## 2023-12-12 DIAGNOSIS — M51.36 DDD (DEGENERATIVE DISC DISEASE), LUMBAR: ICD-10-CM

## 2023-12-12 DIAGNOSIS — M47.816 LUMBAR FACET ARTHROPATHY: ICD-10-CM

## 2023-12-12 PROBLEM — M51.369 DDD (DEGENERATIVE DISC DISEASE), LUMBAR: Status: ACTIVE | Noted: 2023-12-12

## 2023-12-12 PROCEDURE — 99204 OFFICE O/P NEW MOD 45 MIN: CPT | Performed by: PHYSICIAN ASSISTANT

## 2023-12-12 RX ORDER — SODIUM CHLORIDE 0.9 % (FLUSH) 0.9 %
10 SYRINGE (ML) INJECTION AS NEEDED
OUTPATIENT
Start: 2023-12-12

## 2023-12-12 RX ORDER — SODIUM CHLORIDE 0.9 % (FLUSH) 0.9 %
10 SYRINGE (ML) INJECTION EVERY 12 HOURS SCHEDULED
OUTPATIENT
Start: 2023-12-12

## 2023-12-12 NOTE — PROGRESS NOTES
CHIEF COMPLAINT  Patient c/o back pain     Subjective   Nereida Myles is a 48 y.o. female.   She presents to the office for initial evaluation of low back pain. She was referred here by Dr. Meredith Kehrer.  This patient has a history of intermittent lumbosacral spine pain which has progressively worsened over the last 1.5-2 years.  She denies any precipitating trauma or inciting event.  This patient illustrates pain that usually affects the right side of the lumbosacral spine radiating into the lateral aspect of the hip/groin and into the anterior and medial aspect of the right thigh terminating proximal to the knee.  She describes the pain as alternating between dull, aching and electrical sharp sensation.  She finds that turning over in the bed at night can cause the pain to be very intense.    Patient denies any history of cervical or lumbar spine surgery.    She has not had any previous interventional pain management nor procedures.    Patient has completed a formalized course of physical therapy finding that the maneuvers did not alleviate her pain.  She alternates with ice and heat therapy as well as utilization of a TENS unit which have provided mixed results.    This patient has a history of breast cancer with bilateral mastectomy.  She is 7 years in remission.    Pain today 5/10 VAS in severity.      Back Pain  This is a chronic problem. The current episode started more than 1 year ago. The problem occurs constantly. The problem has been gradually worsening since onset. The pain is present in the lumbar spine. The quality of the pain is described as aching (DULL AND SHARP). The pain radiates to the right thigh. The pain is at a severity of 5/10. The pain is moderate. The pain is Worse during the night. The symptoms are aggravated by twisting, bending and position. Associated symptoms include leg pain. Pertinent negatives include no abdominal pain, chest pain, dysuria, fever, headaches or weakness.  Numbness: jd hands occ.       PEG Assessment   What number best describes your pain on average in the past week?7  What number best describes how, during the past week, pain has interfered with your enjoyment of life?8  What number best describes how, during the past week, pain has interfered with your general activity?  8        Current Outpatient Medications:     albuterol (PROVENTIL) (2.5 MG/3ML) 0.083% nebulizer solution, Take 2.5 mg by nebulization Every 4 (Four) Hours As Needed for Wheezing., Disp: , Rfl:     albuterol (PROVENTIL) 4 MG tablet, Take 1 tablet by mouth Every Night., Disp: 90 tablet, Rfl: 3    albuterol sulfate  (90 Base) MCG/ACT inhaler, USE 2 INHALATIONS EVERY 4 HOURS AS NEEDED FOR WHEEZING, Disp: 34 g, Rfl: 0    buPROPion XL (Wellbutrin XL) 300 MG 24 hr tablet, Take 1 tablet by mouth Every Morning., Disp: 90 tablet, Rfl: 1    diclofenac (VOLTAREN) 75 MG EC tablet, Take 1 tablet by mouth 2 (Two) Times a Day As Needed (pain)., Disp: 30 tablet, Rfl: 0    FLUoxetine (PROzac) 40 MG capsule, Take 2 capsules by mouth Daily., Disp: 180 capsule, Rfl: 3    Fluticasone Furoate-Vilanterol (Breo Ellipta) 100-25 MCG/INH inhaler, Inhale 1 puff Daily., Disp: 180 each, Rfl: 3    levothyroxine (SYNTHROID, LEVOTHROID) 50 MCG tablet, Take 1 tablet by mouth Daily., Disp: 90 tablet, Rfl: 3    metaxalone (Skelaxin) 800 MG tablet, Take 0.5-1 tablets by mouth 4 (Four) Times a Day As Needed for Muscle Spasms., Disp: 120 tablet, Rfl: 0    montelukast (SINGULAIR) 10 MG tablet, Take 1 tablet by mouth Daily., Disp: 90 tablet, Rfl: 1    Multiple Vitamin (MULTI-VITAMIN DAILY PO), Take 1 tablet by mouth Every Night., Disp: , Rfl:     omeprazole (priLOSEC) 40 MG capsule, Take 1 capsule by mouth Daily., Disp: 90 capsule, Rfl: 1    tamoxifen (NOLVADEX) 20 MG chemo tablet, Take  by mouth Daily., Disp: , Rfl:     theophylline (THEODUR) 300 MG 12 hr tablet, TAKE 1 TABLET EVERY NIGHT (Patient taking differently: Every  Morning.), Disp: 90 tablet, Rfl: 3    valACYclovir (VALTREX) 500 MG tablet, TAKE 1 TABLET DAILY, Disp: 90 tablet, Rfl: 3    Dupixent 300 MG/2ML solution pen-injector, Inject 2 mL under the skin into the appropriate area as directed. Every 15 days or 2 times monthly (Patient not taking: Reported on 12/12/2023), Disp: , Rfl:     lidocaine (LIDODERM) 5 %, Place 1 patch on the skin as directed by provider Daily. Remove & Discard patch within 12 hours or as directed by MD (Patient not taking: Reported on 12/12/2023), Disp: 14 patch, Rfl: 0    The following portions of the patient's history were reviewed and updated as appropriate: allergies, current medications, past family history, past medical history, past social history, past surgical history, and problem list.      REVIEW OF PERTINENT MEDICAL DATA    MRI OF THE THORACIC SPINE WITH AND WITHOUT CONTRAST AND MRI OF THE  LUMBAR SPINE WITH AND WITHOUT CONTRAST ON 01/09/2023     CLINICAL HISTORY: Mid and low back pain and flank pain where some  left-sided infection is suspected, history of breast cancer with  mastectomy.     MRI OF THE THORACIC SPINE TECHNIQUE: Sagittal T1, proton density and  fat-suppressed T2 and postcontrast sagittal fat-suppressed T1-weighted  images were obtained of the thoracic spine. In addition axial T1 and T2  and postcontrast axial T1-weighted images were obtained from C7 to L1.     FINDINGS: The thoracic cord is normal in signal intensity. At T8-9 there  is a posterior central disc bulge or small disc protrusion that indents  the ventral aspect of the thecal sac and comes close to the ventral  aspect of the cord resulting in minimal if any canal narrowing. There is  no foraminal narrowing. Otherwise the thoracic disc spaces and facets  are within normal limits with no disc herniation, canal or foraminal  narrowing seen in the thoracic spine. Marrow signal intensity in the  thoracic spine is within normal limits. The paravertebral soft  tissue  structures are normal in appearance.     IMPRESSION:  1. At T8-9 there is a posterior central disc bulge or small disc  protrusion that only minimally narrows the thecal sac. Otherwise, the  thoracic spine MRI is normal with no evidence of spinal infection in the  thoracic spine.     MRI OF THE LUMBAR SPINE TECHNIQUE: Sagittal T1, proton density fast spin  echo T2 and repeat fat-suppressed T2 and postcontrast sagittal  fat-suppressed T1-weighted images were obtained of the lumbar spine. In  addition, thin cut axial T1-weighted images were obtained angled through  the interspaces from L3 to S1 and axial T2 and postcontrast axial  T1-weighted images were obtained from T12 to S2.     COMPARISON: There are no prior lumbar spine imaging studies from [facility] for comparison.     FINDINGS: The distal thoracic cord and the conus are normal in signal  intensity. The conus terminates in the posterior midline of the thecal  sac at the level of the inferior body of L2 which is probably lower  limits of normal.     The T12-L1, L1-2 and L2-3 disc space and facets are normal with no canal  or foraminal narrowing from T12 to L3.     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     At L3-4 there is minimal right and mild left facet overgrowth and a  small amount of fluid in the left facets. There is some mild disc space  narrowing and degenerative endplate changes most pronounced in the right  side of the endplates along the inner margin of the levoscoliotic curve.  There is a 3 mm retrolisthesis of L3 with respect to L4 with disc  material bulging along the posterior superior endplate of L4. There is  essentially no narrowing of the thecal sac. There is mild right and no  left foraminal narrowing.     At L4-5 there is minimal facet overgrowth. There is 3 mm retrolisthesis  of L4 with respect to L5 and some bulging disc material extending  along  the posterior superior endplate of L5. There is no canal narrowing.  There is minimal right and mild left foraminal narrowing.     At L5-S1 there is minimal facet overgrowth. There is disc space  narrowing, mild degenerative endplate changes, 3-4 mm retrolisthesis of  L5 with respect to S1, and minimal posterior spurring. There is no canal  or lateral recess narrowing. There is mild spurring into the inferior  aspect of the foramina and there is mild bilateral foraminal narrowing.     Other than the degenerative marrow edema in the right side of the  endplates at L3-4 and minimal degenerative marrow edema of the right  posterior lateral endplates at L4-5, marrow signal intensity in the  lumbar spine is within normal limits. I see no evidence of spinal  infection in the lumbar spine.     IMPRESSION:  1. No convincing acute abnormality is seen in the lumbar spine with no  direct evidence of spinal infection within the lumbar spine. 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  2. Disc space and facets are normal in appearance with no canal or  foraminal narrowing from T10 down to L3.  3. There is disc space narrowing and degenerative endplate changes at  L3-4 most pronounced in the right side of the endplates along the inner  margin of the levoscoliotic curve and there is a 3-4 mm retrolisthesis  of L3 with respect to L4 and minimal facet overgrowth but essentially no  canal narrowing and there is only mild right foraminal narrowing at  L3-4.  4. At L4-5 there is minimal facet overgrowth and a 3-4 mm retrolisthesis  of L4 with respect to L5. There is no canal narrowing. There is minimal  right and mild left foraminal narrowing at L4-5.   5. At L5-S1 there is disc space narrowing, degenerative endplate  changes, and 3-4 mm retrolisthesis of L5 with respect to S1. There is no  canal or lateral recess narrowing. There is spurring into the foramina  and  "mild bilateral foraminal narrowing.  6. There is mild bone marrow edema in the right side of the endplates at  L3-4 and right posterior lateral endplates at L4-5 which is felt to  almost certainly be degenerative marrow edema. The remainder of the  lumbar spine is within normal limits     This report was finalized on 1/10/2023 7:44 AM by Dr. Isidro Tellez M.D.    Review of Systems   Constitutional:  Positive for activity change (decreased). Negative for chills, fatigue and fever.   HENT:  Negative for congestion.    Eyes:  Negative for visual disturbance.   Respiratory:  Negative for chest tightness and shortness of breath.    Cardiovascular:  Negative for chest pain.   Gastrointestinal:  Positive for constipation. Negative for abdominal pain and diarrhea.   Genitourinary:  Negative for difficulty urinating, dyspareunia and dysuria.   Musculoskeletal:  Positive for back pain.   Neurological:  Positive for dizziness and light-headedness. Negative for weakness and headaches. Numbness: jd hands occ.  Psychiatric/Behavioral:  Positive for agitation and sleep disturbance. Negative for self-injury and suicidal ideas. The patient is nervous/anxious.        I have reviewed and confirmed the accuracy of the ROS as documented by the MA/LPN/RN LINDA Villarreal    Vitals:    12/12/23 1106   BP: 110/70   Pulse: 66   Temp: 97 °F (36.1 °C)   SpO2: 97%   Weight: 71.7 kg (158 lb)   Height: 165.1 cm (65\")   PainSc:   5   PainLoc: Back         Objective       Physical Exam  Vitals and nursing note reviewed.   Constitutional:       Appearance: Normal appearance. She is normal weight.      Comments: MILDLY ANXIOUS   HENT:      Head: Normocephalic.   Pulmonary:      Effort: Pulmonary effort is normal.   Musculoskeletal:      Lumbar back: Spasms and tenderness present. Decreased range of motion. Positive right straight leg raise test (TO KNEE ONLY).        Back:    Skin:     General: Skin is warm and dry.   Neurological:      General: " No focal deficit present.      Mental Status: She is alert and oriented to person, place, and time.      Cranial Nerves: Cranial nerves 2-12 are intact.      Sensory: Sensation is intact.      Motor: Motor function is intact.      Gait: Gait is intact.      Deep Tendon Reflexes:      Reflex Scores:       Tricep reflexes are 1+ on the right side and 1+ on the left side.       Bicep reflexes are 1+ on the right side and 1+ on the left side.       Brachioradialis reflexes are 1+ on the right side and 1+ on the left side.       Patellar reflexes are 0 on the right side and 0 on the left side.       Achilles reflexes are 0 on the right side and 0 on the left side.  Psychiatric:         Mood and Affect: Mood normal.         Behavior: Behavior normal.         Thought Content: Thought content normal.         Judgment: Judgment normal.         Assessment & Plan   Diagnoses and all orders for this visit:    1. Lumbar radiculopathy (Primary)    2. DDD (degenerative disc disease), lumbar    3. Lumbar facet arthropathy        --- Nereida Myles reports a pain score of 5.  Given her pain assessment as noted, treatment options were discussed and the following options were decided upon as a follow-up plan to address the patient's pain: educational materials on pain management, patient not interested in pharmacological measures, steroid injections, and use of non-medical modalities (ice, heat, stretching and/or behavior modifications).    --- Based on the patient's physical exam and MRI findings I recommend proceeding with #1 diagnostic right L3-4 lumbar TFESI.  The risk and benefits of the procedure have been discussed with the patient and all of her questions have been addressed.  --- Due to considerations would be to proceed with diagnostic lumbar MBB/RFA for the mechanical component of her back pain.  --- Follow-up in the office 4 weeks after undergoing injective therapy        Pain / Disability Scale    The scale used for  measurement of pain and/or disability for this patient was the [place] back pain disability scale.  The score was 63 on 12/12/2023           ---  Indications for epidural injection:  Plan is to proceed with epidural at the appropriate level.  If the patient receives significant pain reduction and improvement in function and the plan will be to repeat the epidural when the pain worsens.  If a second epidural provides at least 6 weeks of sustained improvement that includes both pain reduction and improvement in function then an epidural injection could be repeated once again at the same level.  This is a mutual decision between the clinician and the patient that includes discussions including risks and benefits in detail as well as alternative therapies.  Patient's questions were answered to their satisfaction and to their understanding.  ---       Dictated utilizing Dragon dictation.

## 2023-12-12 NOTE — H&P (VIEW-ONLY)
CHIEF COMPLAINT  Patient c/o back pain     Subjective   Nereida Myles is a 48 y.o. female.   She presents to the office for initial evaluation of low back pain. She was referred here by Dr. Meredith Kehrer.  This patient has a history of intermittent lumbosacral spine pain which has progressively worsened over the last 1.5-2 years.  She denies any precipitating trauma or inciting event.  This patient illustrates pain that usually affects the right side of the lumbosacral spine radiating into the lateral aspect of the hip/groin and into the anterior and medial aspect of the right thigh terminating proximal to the knee.  She describes the pain as alternating between dull, aching and electrical sharp sensation.  She finds that turning over in the bed at night can cause the pain to be very intense.    Patient denies any history of cervical or lumbar spine surgery.    She has not had any previous interventional pain management nor procedures.    Patient has completed a formalized course of physical therapy finding that the maneuvers did not alleviate her pain.  She alternates with ice and heat therapy as well as utilization of a TENS unit which have provided mixed results.    This patient has a history of breast cancer with bilateral mastectomy.  She is 7 years in remission.    Pain today 5/10 VAS in severity.      Back Pain  This is a chronic problem. The current episode started more than 1 year ago. The problem occurs constantly. The problem has been gradually worsening since onset. The pain is present in the lumbar spine. The quality of the pain is described as aching (DULL AND SHARP). The pain radiates to the right thigh. The pain is at a severity of 5/10. The pain is moderate. The pain is Worse during the night. The symptoms are aggravated by twisting, bending and position. Associated symptoms include leg pain. Pertinent negatives include no abdominal pain, chest pain, dysuria, fever, headaches or weakness.  Numbness: jd hands occ.       PEG Assessment   What number best describes your pain on average in the past week?7  What number best describes how, during the past week, pain has interfered with your enjoyment of life?8  What number best describes how, during the past week, pain has interfered with your general activity?  8        Current Outpatient Medications:     albuterol (PROVENTIL) (2.5 MG/3ML) 0.083% nebulizer solution, Take 2.5 mg by nebulization Every 4 (Four) Hours As Needed for Wheezing., Disp: , Rfl:     albuterol (PROVENTIL) 4 MG tablet, Take 1 tablet by mouth Every Night., Disp: 90 tablet, Rfl: 3    albuterol sulfate  (90 Base) MCG/ACT inhaler, USE 2 INHALATIONS EVERY 4 HOURS AS NEEDED FOR WHEEZING, Disp: 34 g, Rfl: 0    buPROPion XL (Wellbutrin XL) 300 MG 24 hr tablet, Take 1 tablet by mouth Every Morning., Disp: 90 tablet, Rfl: 1    diclofenac (VOLTAREN) 75 MG EC tablet, Take 1 tablet by mouth 2 (Two) Times a Day As Needed (pain)., Disp: 30 tablet, Rfl: 0    FLUoxetine (PROzac) 40 MG capsule, Take 2 capsules by mouth Daily., Disp: 180 capsule, Rfl: 3    Fluticasone Furoate-Vilanterol (Breo Ellipta) 100-25 MCG/INH inhaler, Inhale 1 puff Daily., Disp: 180 each, Rfl: 3    levothyroxine (SYNTHROID, LEVOTHROID) 50 MCG tablet, Take 1 tablet by mouth Daily., Disp: 90 tablet, Rfl: 3    metaxalone (Skelaxin) 800 MG tablet, Take 0.5-1 tablets by mouth 4 (Four) Times a Day As Needed for Muscle Spasms., Disp: 120 tablet, Rfl: 0    montelukast (SINGULAIR) 10 MG tablet, Take 1 tablet by mouth Daily., Disp: 90 tablet, Rfl: 1    Multiple Vitamin (MULTI-VITAMIN DAILY PO), Take 1 tablet by mouth Every Night., Disp: , Rfl:     omeprazole (priLOSEC) 40 MG capsule, Take 1 capsule by mouth Daily., Disp: 90 capsule, Rfl: 1    tamoxifen (NOLVADEX) 20 MG chemo tablet, Take  by mouth Daily., Disp: , Rfl:     theophylline (THEODUR) 300 MG 12 hr tablet, TAKE 1 TABLET EVERY NIGHT (Patient taking differently: Every  Morning.), Disp: 90 tablet, Rfl: 3    valACYclovir (VALTREX) 500 MG tablet, TAKE 1 TABLET DAILY, Disp: 90 tablet, Rfl: 3    Dupixent 300 MG/2ML solution pen-injector, Inject 2 mL under the skin into the appropriate area as directed. Every 15 days or 2 times monthly (Patient not taking: Reported on 12/12/2023), Disp: , Rfl:     lidocaine (LIDODERM) 5 %, Place 1 patch on the skin as directed by provider Daily. Remove & Discard patch within 12 hours or as directed by MD (Patient not taking: Reported on 12/12/2023), Disp: 14 patch, Rfl: 0    The following portions of the patient's history were reviewed and updated as appropriate: allergies, current medications, past family history, past medical history, past social history, past surgical history, and problem list.      REVIEW OF PERTINENT MEDICAL DATA    MRI OF THE THORACIC SPINE WITH AND WITHOUT CONTRAST AND MRI OF THE  LUMBAR SPINE WITH AND WITHOUT CONTRAST ON 01/09/2023     CLINICAL HISTORY: Mid and low back pain and flank pain where some  left-sided infection is suspected, history of breast cancer with  mastectomy.     MRI OF THE THORACIC SPINE TECHNIQUE: Sagittal T1, proton density and  fat-suppressed T2 and postcontrast sagittal fat-suppressed T1-weighted  images were obtained of the thoracic spine. In addition axial T1 and T2  and postcontrast axial T1-weighted images were obtained from C7 to L1.     FINDINGS: The thoracic cord is normal in signal intensity. At T8-9 there  is a posterior central disc bulge or small disc protrusion that indents  the ventral aspect of the thecal sac and comes close to the ventral  aspect of the cord resulting in minimal if any canal narrowing. There is  no foraminal narrowing. Otherwise the thoracic disc spaces and facets  are within normal limits with no disc herniation, canal or foraminal  narrowing seen in the thoracic spine. Marrow signal intensity in the  thoracic spine is within normal limits. The paravertebral soft  tissue  structures are normal in appearance.     IMPRESSION:  1. At T8-9 there is a posterior central disc bulge or small disc  protrusion that only minimally narrows the thecal sac. Otherwise, the  thoracic spine MRI is normal with no evidence of spinal infection in the  thoracic spine.     MRI OF THE LUMBAR SPINE TECHNIQUE: Sagittal T1, proton density fast spin  echo T2 and repeat fat-suppressed T2 and postcontrast sagittal  fat-suppressed T1-weighted images were obtained of the lumbar spine. In  addition, thin cut axial T1-weighted images were obtained angled through  the interspaces from L3 to S1 and axial T2 and postcontrast axial  T1-weighted images were obtained from T12 to S2.     COMPARISON: There are no prior lumbar spine imaging studies from [facility] for comparison.     FINDINGS: The distal thoracic cord and the conus are normal in signal  intensity. The conus terminates in the posterior midline of the thecal  sac at the level of the inferior body of L2 which is probably lower  limits of normal.     The T12-L1, L1-2 and L2-3 disc space and facets are normal with no canal  or foraminal narrowing from T12 to L3.     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     At L3-4 there is minimal right and mild left facet overgrowth and a  small amount of fluid in the left facets. There is some mild disc space  narrowing and degenerative endplate changes most pronounced in the right  side of the endplates along the inner margin of the levoscoliotic curve.  There is a 3 mm retrolisthesis of L3 with respect to L4 with disc  material bulging along the posterior superior endplate of L4. There is  essentially no narrowing of the thecal sac. There is mild right and no  left foraminal narrowing.     At L4-5 there is minimal facet overgrowth. There is 3 mm retrolisthesis  of L4 with respect to L5 and some bulging disc material extending  along  the posterior superior endplate of L5. There is no canal narrowing.  There is minimal right and mild left foraminal narrowing.     At L5-S1 there is minimal facet overgrowth. There is disc space  narrowing, mild degenerative endplate changes, 3-4 mm retrolisthesis of  L5 with respect to S1, and minimal posterior spurring. There is no canal  or lateral recess narrowing. There is mild spurring into the inferior  aspect of the foramina and there is mild bilateral foraminal narrowing.     Other than the degenerative marrow edema in the right side of the  endplates at L3-4 and minimal degenerative marrow edema of the right  posterior lateral endplates at L4-5, marrow signal intensity in the  lumbar spine is within normal limits. I see no evidence of spinal  infection in the lumbar spine.     IMPRESSION:  1. No convincing acute abnormality is seen in the lumbar spine with no  direct evidence of spinal infection within the lumbar spine. 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  2. Disc space and facets are normal in appearance with no canal or  foraminal narrowing from T10 down to L3.  3. There is disc space narrowing and degenerative endplate changes at  L3-4 most pronounced in the right side of the endplates along the inner  margin of the levoscoliotic curve and there is a 3-4 mm retrolisthesis  of L3 with respect to L4 and minimal facet overgrowth but essentially no  canal narrowing and there is only mild right foraminal narrowing at  L3-4.  4. At L4-5 there is minimal facet overgrowth and a 3-4 mm retrolisthesis  of L4 with respect to L5. There is no canal narrowing. There is minimal  right and mild left foraminal narrowing at L4-5.   5. At L5-S1 there is disc space narrowing, degenerative endplate  changes, and 3-4 mm retrolisthesis of L5 with respect to S1. There is no  canal or lateral recess narrowing. There is spurring into the foramina  and  "mild bilateral foraminal narrowing.  6. There is mild bone marrow edema in the right side of the endplates at  L3-4 and right posterior lateral endplates at L4-5 which is felt to  almost certainly be degenerative marrow edema. The remainder of the  lumbar spine is within normal limits     This report was finalized on 1/10/2023 7:44 AM by Dr. Isidro Tellez M.D.    Review of Systems   Constitutional:  Positive for activity change (decreased). Negative for chills, fatigue and fever.   HENT:  Negative for congestion.    Eyes:  Negative for visual disturbance.   Respiratory:  Negative for chest tightness and shortness of breath.    Cardiovascular:  Negative for chest pain.   Gastrointestinal:  Positive for constipation. Negative for abdominal pain and diarrhea.   Genitourinary:  Negative for difficulty urinating, dyspareunia and dysuria.   Musculoskeletal:  Positive for back pain.   Neurological:  Positive for dizziness and light-headedness. Negative for weakness and headaches. Numbness: jd hands occ.  Psychiatric/Behavioral:  Positive for agitation and sleep disturbance. Negative for self-injury and suicidal ideas. The patient is nervous/anxious.        I have reviewed and confirmed the accuracy of the ROS as documented by the MA/LPN/RN LINDA Villarreal    Vitals:    12/12/23 1106   BP: 110/70   Pulse: 66   Temp: 97 °F (36.1 °C)   SpO2: 97%   Weight: 71.7 kg (158 lb)   Height: 165.1 cm (65\")   PainSc:   5   PainLoc: Back         Objective       Physical Exam  Vitals and nursing note reviewed.   Constitutional:       Appearance: Normal appearance. She is normal weight.      Comments: MILDLY ANXIOUS   HENT:      Head: Normocephalic.   Pulmonary:      Effort: Pulmonary effort is normal.   Musculoskeletal:      Lumbar back: Spasms and tenderness present. Decreased range of motion. Positive right straight leg raise test (TO KNEE ONLY).        Back:    Skin:     General: Skin is warm and dry.   Neurological:      General: " No focal deficit present.      Mental Status: She is alert and oriented to person, place, and time.      Cranial Nerves: Cranial nerves 2-12 are intact.      Sensory: Sensation is intact.      Motor: Motor function is intact.      Gait: Gait is intact.      Deep Tendon Reflexes:      Reflex Scores:       Tricep reflexes are 1+ on the right side and 1+ on the left side.       Bicep reflexes are 1+ on the right side and 1+ on the left side.       Brachioradialis reflexes are 1+ on the right side and 1+ on the left side.       Patellar reflexes are 0 on the right side and 0 on the left side.       Achilles reflexes are 0 on the right side and 0 on the left side.  Psychiatric:         Mood and Affect: Mood normal.         Behavior: Behavior normal.         Thought Content: Thought content normal.         Judgment: Judgment normal.         Assessment & Plan   Diagnoses and all orders for this visit:    1. Lumbar radiculopathy (Primary)    2. DDD (degenerative disc disease), lumbar    3. Lumbar facet arthropathy        --- Nereida Myles reports a pain score of 5.  Given her pain assessment as noted, treatment options were discussed and the following options were decided upon as a follow-up plan to address the patient's pain: educational materials on pain management, patient not interested in pharmacological measures, steroid injections, and use of non-medical modalities (ice, heat, stretching and/or behavior modifications).    --- Based on the patient's physical exam and MRI findings I recommend proceeding with #1 diagnostic right L3-4 lumbar TFESI.  The risk and benefits of the procedure have been discussed with the patient and all of her questions have been addressed.  --- Due to considerations would be to proceed with diagnostic lumbar MBB/RFA for the mechanical component of her back pain.  --- Follow-up in the office 4 weeks after undergoing injective therapy        Pain / Disability Scale    The scale used for  measurement of pain and/or disability for this patient was the [place] back pain disability scale.  The score was 63 on 12/12/2023           ---  Indications for epidural injection:  Plan is to proceed with epidural at the appropriate level.  If the patient receives significant pain reduction and improvement in function and the plan will be to repeat the epidural when the pain worsens.  If a second epidural provides at least 6 weeks of sustained improvement that includes both pain reduction and improvement in function then an epidural injection could be repeated once again at the same level.  This is a mutual decision between the clinician and the patient that includes discussions including risks and benefits in detail as well as alternative therapies.  Patient's questions were answered to their satisfaction and to their understanding.  ---       Dictated utilizing Dragon dictation.

## 2023-12-14 ENCOUNTER — TRANSCRIBE ORDERS (OUTPATIENT)
Dept: SURGERY | Facility: SURGERY CENTER | Age: 48
End: 2023-12-14
Payer: COMMERCIAL

## 2023-12-14 DIAGNOSIS — Z41.9 SURGERY, ELECTIVE: Primary | ICD-10-CM

## 2023-12-19 ENCOUNTER — TELEPHONE (OUTPATIENT)
Dept: PAIN MEDICINE | Facility: CLINIC | Age: 48
End: 2023-12-19
Payer: COMMERCIAL

## 2023-12-19 NOTE — TELEPHONE ENCOUNTER
Caller: Nereida Myles    Relationship: Self    Best call back number: [phone]    What is the best time to reach you: ANY     Who are you requesting to speak with (clinical staff, provider,  specific staff member): CLINICAL     Do you know the name of the person who called: YES     What was the call regarding: PATIENT STATES THAT THE VOLTAREN IS NOT HELPING WITH THE PAIN- PATIENT WOULD LIKE TO DISCUSS THE NEXT STEPS

## 2024-01-10 RX ORDER — MIDAZOLAM HYDROCHLORIDE 2 MG/2ML
4 INJECTION, SOLUTION INTRAMUSCULAR; INTRAVENOUS ONCE
Status: COMPLETED | OUTPATIENT
Start: 2024-01-11 | End: 2024-01-11

## 2024-01-11 ENCOUNTER — HOSPITAL ENCOUNTER (OUTPATIENT)
Dept: GENERAL RADIOLOGY | Facility: SURGERY CENTER | Age: 49
Setting detail: HOSPITAL OUTPATIENT SURGERY
End: 2024-01-11
Payer: COMMERCIAL

## 2024-01-11 ENCOUNTER — HOSPITAL ENCOUNTER (OUTPATIENT)
Facility: SURGERY CENTER | Age: 49
Setting detail: HOSPITAL OUTPATIENT SURGERY
Discharge: HOME | End: 2024-01-11
Attending: ANESTHESIOLOGY | Admitting: ANESTHESIOLOGY
Payer: COMMERCIAL

## 2024-01-11 VITALS
DIASTOLIC BLOOD PRESSURE: 71 MMHG | SYSTOLIC BLOOD PRESSURE: 103 MMHG | BODY MASS INDEX: 25.83 KG/M2 | OXYGEN SATURATION: 94 % | RESPIRATION RATE: 16 BRPM | HEART RATE: 58 BPM | WEIGHT: 155 LBS | TEMPERATURE: 97 F | HEIGHT: 65 IN

## 2024-01-11 DIAGNOSIS — M54.16 LUMBAR RADICULOPATHY: ICD-10-CM

## 2024-01-11 DIAGNOSIS — Z41.9 SURGERY, ELECTIVE: ICD-10-CM

## 2024-01-11 PROCEDURE — 77002 NEEDLE LOCALIZATION BY XRAY: CPT

## 2024-01-11 PROCEDURE — 25510000001 IOPAMIDOL 61 % SOLUTION 30 ML VIAL: Performed by: ANESTHESIOLOGY

## 2024-01-11 PROCEDURE — 64483 NJX AA&/STRD TFRM EPI L/S 1: CPT | Performed by: ANESTHESIOLOGY

## 2024-01-11 PROCEDURE — 76000 FLUOROSCOPY <1 HR PHYS/QHP: CPT

## 2024-01-11 PROCEDURE — 25010000002 MIDAZOLAM PER 1MG: Performed by: ANESTHESIOLOGY

## 2024-01-11 PROCEDURE — 25010000002 BUPIVACAINE (PF) 0.5 % SOLUTION 10 ML VIAL: Performed by: ANESTHESIOLOGY

## 2024-01-11 PROCEDURE — 25010000002 METHYLPREDNISOLONE PER 80 MG: Performed by: ANESTHESIOLOGY

## 2024-01-11 RX ORDER — SODIUM CHLORIDE 0.9 % (FLUSH) 0.9 %
10 SYRINGE (ML) INJECTION EVERY 12 HOURS SCHEDULED
Status: DISCONTINUED | OUTPATIENT
Start: 2024-01-11 | End: 2024-01-11 | Stop reason: HOSPADM

## 2024-01-11 RX ORDER — SODIUM CHLORIDE 0.9 % (FLUSH) 0.9 %
10 SYRINGE (ML) INJECTION AS NEEDED
Status: DISCONTINUED | OUTPATIENT
Start: 2024-01-11 | End: 2024-01-11 | Stop reason: HOSPADM

## 2024-01-11 RX ADMIN — MIDAZOLAM HYDROCHLORIDE 4 MG: 2 INJECTION, SOLUTION INTRAMUSCULAR; INTRAVENOUS at 15:22

## 2024-01-11 NOTE — OP NOTE
Lumbar Transforaminal Epidural Steroid Injection (one level Unilateral)  [facility]      PREOPERATIVE DIAGNOSIS:  right Lumbar Radiculopathy    POSTOPERATIVE DIAGNOSIS:  Same as preop diagnosis    PROCEDURE:  CPT 58639 --  Diagnostic Transforaminal Epidural Steroid Injection at the L3 level, on the right     PRE-PROCEDURE DISCUSSION WITH PATIENT:    Risks and complications were discussed with the patient prior to starting the procedure and informed consent was obtained.  We discussed various topics including but not limited to bleeding, infection, injury, nerve injury, paralysis, coma, death, postprocedural painful flare-up, postprocedural site soreness, and a lack of pain relief.  We discussed the diagnostic aspect of transforaminal epidural / selective nerve root blockade.    SURGEON:  Rei Pruitt MD    REASON FOR PROCEDURE:    Degenerative changes are noted in the area. and Radiating pattern of pain is likely consistent with degenerative changes in the area.    SEDATION:  Versed 4mg IV and The patient had higher than average levels of procedural anxiety and the need to provide additional procedural sedation was needed to safely proceed.  ANESTHETIC:  Marcaine 0.25%  STEROID:  Methylprednisolone (DEPO MEDROL) 80mg/ml    DESCRIPTON OF PROCEDURE:  After obtaining informed consent, an I.V. was started in the preoperative area. The patient taken to the operating room and was placed in the prone position with a pillow under the abdomen.  All pressure points were well padded.  EKG, blood pressure, and pulse oximeter were monitored.  The lumbar area was prepped with Chloraprep and draped in a sterile fashion. Under fluoroscopic guidance in an oblique dimension on the above mentioned side, the transverse process of the aforementioned vertebra at the junction of the body at 6 o'clock position was identified. Skin and subcutaneous tissue was anesthetized with 1% lidocaine. A 22-gauge spinal needle  was introduced under fluoroscopic guidance at the above junction into the foramen without parasthesias and into the epidural space. After confirming the position of the needle with PA, lateral, and oblique fluoroscopic views, aspiration was checked and was clear of blood or CSF.  Next, 1 mL of Omnipaque was injected. After seeing adequate spread on the corresponding nerve root, a total volume 3mL of injectate containing 1ml of the above mentioned local anesthetic, 1 ml saline,  and the above mentioned corticosteroid was injected into the epidural space.    The needle was removed intact.  Vital signs were stable throughout.        ESTIMATED BLOOD LOSS:  <5 mL  SPECIMENS:  none    COMPLICATIONS:   No complications were noted., There was no indication of vascular uptake on live injection of contrast dye., There was no indication of intrathecal uptake on live injection of contrast dye., There was not any evidence of dural puncture.  , and The patient did not have any signs of postprocedure numbness nor weakness.    TOLERANCE & DISCHARGE CONDITION:    The patient tolerated the procedure well.  The patient was transported to the recovery area without difficulties.  The patient was discharged to home under the care of family in stable and satisfactory condition.    PLAN OF CARE:  The patient was given our standard instruction sheet.  The patient will Return to clinic 4-6 wks.  The patient will resume all medications as per the medication reconciliation sheet.

## 2024-01-11 NOTE — DISCHARGE INSTRUCTIONS
[facility] - Post-procedure Instructions          --  While there are no absolute restrictions, it is recommended that you do not perform strenuous activity today. In the morning, you may resume your level of activity as before your block.    --  If you have a band-aid at your injection site, please remove it later today. Observe the area for any redness, swelling, pus-like drainage, or a temperature over 101°. If any of these symptoms occur, please call your doctor at [phone]. If after office hours, leave a message and the on-call provider will return your call.    --  Ice may be applied to your injection site. It is recommended you avoid direct heat (heating pad; hot tub) for 1-2 days.    --  Call [facility] at [phone] if you experience persistent headache, persistent bleeding from the injection site, or severe pain not relieved by heat or oral medication.    --  Do not make important decisions today.    --  Due to the effects of the block and/or the I.V. Sedation, DO NOT drive or operate hazardous machinery for 12 hours.  Local anesthetics may cause numbness after procedure and precautions must be taken with regards to operating equipment as well as with walking, even if ambulating with assistance of another person or with an assistive device.    --  Do not drink alcohol for 12 hours.    -- You may return to work tomorrow, or as directed by your referring doctor.    --  Occasionally you may notice a slight increase in your pain after the procedure. This should start to improve within the next 24-48 hours. Radiofrequency ablation procedure pain may last 3-4 weeks.    --  It may take as long as 3-4 days before you notice a gradual improvement in your pain and/or other symptoms.    -- You may continue to take your prescribed pain medication as needed.    --  Some normal possible side effects of steroid use could include fluid retention, increased blood sugar, dull headache,  increased sweating, increased appetite, mood swings and flushing.    --  Diabetics are recommended to watch their blood glucose level closely for 24-48 hours after the injection.    --  Must stay in PACU for 20 min upon arrival and prove no leg weakness before being discharged.    --  IN THE EVENT OF A LIFE THREATENING EMERGENCY, (CHEST PAIN, BREATHING DIFFICULTIES, PARALYSIS…) YOU SHOULD GO TO YOUR NEAREST EMERGENCY ROOM.    --  You should be contacted by our office within 2-3 days to schedule follow up or next appointment date.  If not contacted within 7 days, please call the office at [phone]

## 2024-01-17 RX ORDER — MONTELUKAST SODIUM 10 MG/1
10 TABLET ORAL DAILY
Qty: 90 TABLET | Refills: 3 | Status: SHIPPED | OUTPATIENT
Start: 2024-01-17

## 2024-01-22 DIAGNOSIS — M54.16 LUMBAR RADICULOPATHY: ICD-10-CM

## 2024-01-22 DIAGNOSIS — M51.36 DDD (DEGENERATIVE DISC DISEASE), LUMBAR: ICD-10-CM

## 2024-01-22 RX ORDER — DICLOFENAC SODIUM 75 MG/1
75 TABLET, DELAYED RELEASE ORAL 2 TIMES DAILY PRN
Qty: 30 TABLET | Refills: 0 | Status: SHIPPED | OUTPATIENT
Start: 2024-01-22

## 2024-01-25 ENCOUNTER — OFFICE VISIT (OUTPATIENT)
Dept: FAMILY MEDICINE CLINIC | Facility: CLINIC | Age: 49
End: 2024-01-25
Payer: COMMERCIAL

## 2024-01-25 VITALS
DIASTOLIC BLOOD PRESSURE: 84 MMHG | WEIGHT: 162.8 LBS | SYSTOLIC BLOOD PRESSURE: 120 MMHG | HEART RATE: 68 BPM | OXYGEN SATURATION: 98 % | BODY MASS INDEX: 27.12 KG/M2 | HEIGHT: 65 IN | TEMPERATURE: 97.9 F

## 2024-01-25 DIAGNOSIS — E66.3 OVERWEIGHT WITH BODY MASS INDEX (BMI) OF 27 TO 27.9 IN ADULT: ICD-10-CM

## 2024-01-25 DIAGNOSIS — M51.36 DDD (DEGENERATIVE DISC DISEASE), LUMBAR: ICD-10-CM

## 2024-01-25 DIAGNOSIS — R23.3 EASY BRUISING: ICD-10-CM

## 2024-01-25 DIAGNOSIS — E03.8 HYPOTHYROIDISM DUE TO HASHIMOTO'S THYROIDITIS: Primary | ICD-10-CM

## 2024-01-25 DIAGNOSIS — E06.3 HYPOTHYROIDISM DUE TO HASHIMOTO'S THYROIDITIS: Primary | ICD-10-CM

## 2024-01-25 DIAGNOSIS — F33.42 RECURRENT MAJOR DEPRESSIVE DISORDER, IN FULL REMISSION: ICD-10-CM

## 2024-01-25 DIAGNOSIS — J45.51 SEVERE PERSISTENT ASTHMA WITH ACUTE EXACERBATION: ICD-10-CM

## 2024-01-25 PROCEDURE — 99214 OFFICE O/P EST MOD 30 MIN: CPT | Performed by: FAMILY MEDICINE

## 2024-01-25 RX ORDER — FLUOXETINE HYDROCHLORIDE 40 MG/1
80 CAPSULE ORAL DAILY
Qty: 180 CAPSULE | Refills: 3 | Status: SHIPPED | OUTPATIENT
Start: 2024-01-25

## 2024-01-25 RX ORDER — ALBUTEROL SULFATE 4 MG/1
4 TABLET ORAL NIGHTLY
Qty: 90 TABLET | Refills: 3 | Status: SHIPPED | OUTPATIENT
Start: 2024-01-25

## 2024-01-25 RX ORDER — ONDANSETRON 4 MG/1
4 TABLET, FILM COATED ORAL EVERY 8 HOURS PRN
Qty: 30 TABLET | Refills: 1 | Status: SHIPPED | OUTPATIENT
Start: 2024-01-25

## 2024-01-25 RX ORDER — THEOPHYLLINE 300 MG/1
300 TABLET, EXTENDED RELEASE ORAL EVERY MORNING
Qty: 90 TABLET | Refills: 3 | Status: SHIPPED | OUTPATIENT
Start: 2024-01-25

## 2024-01-25 NOTE — PROGRESS NOTES
"Chief Complaint  Med Refill, Hypothyroidism, Anxiety, and Depression    Subjective        Nereida Myles presents to [facility]  History of Present Illness  Patient presents for routine follow-up on her hypothyroidism, severe asthma, depression and OCD and other medical problems.  She is been doing well in general.  She compliant with medication.  Her mood is doing well on the fluoxetine.  She has had some easy bruising on her forearms.  She has noticed it for the past couple months.      Objective   Vital Signs:  /84   Pulse 68   Temp 97.9 °F (36.6 °C)   Ht 165.1 cm (65\")   Wt 73.8 kg (162 lb 12.8 oz)   SpO2 98%   BMI 27.09 kg/m²   Estimated body mass index is 27.09 kg/m² as calculated from the following:    Height as of this encounter: 165.1 cm (65\").    Weight as of this encounter: 73.8 kg (162 lb 12.8 oz).               Physical Exam  Constitutional:       General: She is not in acute distress.     Appearance: Normal appearance. She is well-developed.   HENT:      Head: Normocephalic and atraumatic.      Right Ear: Tympanic membrane, ear canal and external ear normal.      Left Ear: Tympanic membrane, ear canal and external ear normal.      Mouth/Throat:      Mouth: Mucous membranes are moist.      Pharynx: Oropharynx is clear.   Eyes:      Conjunctiva/sclera: Conjunctivae normal.      Pupils: Pupils are equal, round, and reactive to light.   Neck:      Thyroid: No thyromegaly.   Cardiovascular:      Rate and Rhythm: Normal rate and regular rhythm.      Heart sounds: No murmur heard.  Pulmonary:      Effort: Pulmonary effort is normal.      Breath sounds: Normal breath sounds. No wheezing.   Abdominal:      General: Bowel sounds are normal.      Palpations: Abdomen is soft.      Tenderness: There is no abdominal tenderness.   Musculoskeletal:         General: Normal range of motion.      Cervical back: Neck supple.   Lymphadenopathy:      Cervical: No cervical " adenopathy.   Skin:     General: Skin is warm and dry.      Findings: Bruising present.      Comments: Bilateral forearms with bruises in various stages on extensor surfaces   Neurological:      Mental Status: She is alert and oriented to person, place, and time.   Psychiatric:         Mood and Affect: Mood normal.         Behavior: Behavior normal.        Result Review :                     Assessment and Plan     Diagnoses and all orders for this visit:    1. Hypothyroidism due to Hashimoto's thyroiditis (Primary)  -     TSH    2. Recurrent major depressive disorder, in full remission  -     FLUoxetine (PROzac) 40 MG capsule; Take 2 capsules by mouth Daily.  Dispense: 180 capsule; Refill: 3    3. Overweight with body mass index (BMI) of 27 to 27.9 in adult    4. DDD (degenerative disc disease), lumbar    5. Severe persistent asthma with acute exacerbation  -     theophylline (THEODUR) 300 MG 12 hr tablet; Take 1 tablet by mouth Every Morning.  Dispense: 90 tablet; Refill: 3  -     albuterol (PROVENTIL) 4 MG tablet; Take 1 tablet by mouth Every Night.  Dispense: 90 tablet; Refill: 3    6. Easy bruising  -     CBC & Differential    Other orders  -     ondansetron (ZOFRAN) 4 MG tablet; Take 1 tablet by mouth Every 8 (Eight) Hours As Needed for Nausea or Vomiting.  Dispense: 30 tablet; Refill: 1    Hypothyroidism-due for TSH  Recurrent depression-continue fluoxetine  Overweight-discussed diet  Severe asthma-refill medication  Easy bruising-check lab, patient reassured this is likely just due to thinning skin         Follow Up     Return in about 6 months (around 7/25/2024) for Annual physical.  Patient was given instructions and counseling regarding her condition or for health maintenance advice. Please see specific information pulled into the AVS if appropriate.

## 2024-01-26 LAB
BASOPHILS # BLD AUTO: 0 X10E3/UL (ref 0–0.2)
BASOPHILS NFR BLD AUTO: 1 %
EOSINOPHIL # BLD AUTO: 0.2 X10E3/UL (ref 0–0.4)
EOSINOPHIL NFR BLD AUTO: 3 %
ERYTHROCYTE [DISTWIDTH] IN BLOOD BY AUTOMATED COUNT: 12.7 % (ref 11.7–15.4)
HCT VFR BLD AUTO: 37.3 % (ref 34–46.6)
HGB BLD-MCNC: 12.4 G/DL (ref 11.1–15.9)
IMM GRANULOCYTES # BLD AUTO: 0 X10E3/UL (ref 0–0.1)
IMM GRANULOCYTES NFR BLD AUTO: 0 %
LYMPHOCYTES # BLD AUTO: 1 X10E3/UL (ref 0.7–3.1)
LYMPHOCYTES NFR BLD AUTO: 20 %
MCH RBC QN AUTO: 30.9 PG (ref 26.6–33)
MCHC RBC AUTO-ENTMCNC: 33.2 G/DL (ref 31.5–35.7)
MCV RBC AUTO: 93 FL (ref 79–97)
MONOCYTES # BLD AUTO: 0.4 X10E3/UL (ref 0.1–0.9)
MONOCYTES NFR BLD AUTO: 9 %
NEUTROPHILS # BLD AUTO: 3.5 X10E3/UL (ref 1.4–7)
NEUTROPHILS NFR BLD AUTO: 67 %
PLATELET # BLD AUTO: 335 X10E3/UL (ref 150–450)
RBC # BLD AUTO: 4.01 X10E6/UL (ref 3.77–5.28)
TSH SERPL DL<=0.005 MIU/L-ACNC: 2.83 UIU/ML (ref 0.45–4.5)
WBC # BLD AUTO: 5.1 X10E3/UL (ref 3.4–10.8)

## 2024-02-08 ENCOUNTER — HOSPITAL ENCOUNTER (OUTPATIENT)
Dept: GENERAL RADIOLOGY | Facility: HOSPITAL | Age: 49
Discharge: HOME | End: 2024-02-08
Admitting: PHYSICIAN ASSISTANT
Payer: COMMERCIAL

## 2024-02-08 ENCOUNTER — OFFICE VISIT (OUTPATIENT)
Dept: PAIN MEDICINE | Facility: CLINIC | Age: 49
End: 2024-02-08
Payer: COMMERCIAL

## 2024-02-08 VITALS
RESPIRATION RATE: 18 BRPM | HEIGHT: 65 IN | TEMPERATURE: 97.1 F | WEIGHT: 162.8 LBS | SYSTOLIC BLOOD PRESSURE: 120 MMHG | BODY MASS INDEX: 27.12 KG/M2 | DIASTOLIC BLOOD PRESSURE: 90 MMHG | OXYGEN SATURATION: 97 % | HEART RATE: 71 BPM

## 2024-02-08 DIAGNOSIS — M25.551 PAIN OF RIGHT HIP: Primary | ICD-10-CM

## 2024-02-08 DIAGNOSIS — M51.36 DDD (DEGENERATIVE DISC DISEASE), LUMBAR: ICD-10-CM

## 2024-02-08 DIAGNOSIS — M47.816 LUMBAR FACET ARTHROPATHY: ICD-10-CM

## 2024-02-08 DIAGNOSIS — M54.16 LUMBAR RADICULOPATHY: ICD-10-CM

## 2024-02-08 DIAGNOSIS — M25.551 PAIN OF RIGHT HIP: ICD-10-CM

## 2024-02-08 PROCEDURE — 99214 OFFICE O/P EST MOD 30 MIN: CPT | Performed by: PHYSICIAN ASSISTANT

## 2024-02-08 PROCEDURE — 73502 X-RAY EXAM HIP UNI 2-3 VIEWS: CPT

## 2024-02-08 RX ORDER — CLINDAMYCIN PHOSPHATE 10 MG/ML
SOLUTION TOPICAL
COMMUNITY
Start: 2024-02-07

## 2024-02-08 NOTE — PROGRESS NOTES
CHIEF COMPLAINT  Back pain      Subjective   Nereida Myles is a 48 y.o. female  who presents to the office for follow-up of procedure.  She completed a LUMBAR/SACRAL TRANSFORAMINAL EPIDURAL RIGHT L3-4 #1 on  1/11/24 performed by Dr. Pruitt for management of low back pain. Patient reports 85% ongoing relief from the procedure.  At this time her primary pain complaint continues to be pain along the lateral aspect of the right hip which radiates into the groin affecting the anterior medial aspect of the right thigh terminating proximal to the knee.  This pain continues to be aggravated with getting up and down off the floor, bending over to pick things up as well as performing household chores.  She does feel however that she is sleeping better at night with significant decreased pain following the epidural injection.    This patient has a history of breast cancer with bilateral mastectomy.  She is 7 years in remission.     Pain today 5/10 VAS in severity.    Back Pain  This is a chronic problem. The current episode started more than 1 year ago. The problem occurs constantly. The pain is present in the lumbar spine. The quality of the pain is described as aching. The pain radiates to the right thigh. The pain is at a severity of 3/10. The pain is mild. The pain is Worse during the day. The symptoms are aggravated by position, twisting and bending. Associated symptoms include numbness. Pertinent negatives include no abdominal pain, dysuria, headaches or weakness.   Hip Pain   There was no injury mechanism. The pain is present in the right hip. The quality of the pain is described as aching (Throbbing). The pain is at a severity of 5/10. The pain is moderate. The pain has been Constant since onset. Associated symptoms include numbness.        PEG Assessment   What number best describes your pain on average in the past week?4  What number best describes how, during the past week, pain has interfered with your  enjoyment of life?1  What number best describes how, during the past week, pain has interfered with your general activity?  1    Review of Pertinent Medical Data ---  MRI OF THE LUMBAR SPINE TECHNIQUE: Sagittal T1, proton density fast spin  echo T2 and repeat fat-suppressed T2 and postcontrast sagittal  fat-suppressed T1-weighted images were obtained of the lumbar spine. In  addition, thin cut axial T1-weighted images were obtained angled through  the interspaces from L3 to S1 and axial T2 and postcontrast axial  T1-weighted images were obtained from T12 to S2.     COMPARISON: There are no prior lumbar spine imaging studies from [facility] for comparison.     FINDINGS: The distal thoracic cord and the conus are normal in signal  intensity. The conus terminates in the posterior midline of the thecal  sac at the level of the inferior body of L2 which is probably lower  limits of normal.     The T12-L1, L1-2 and L2-3 disc space and facets are normal with no canal  or foraminal narrowing from T12 to L3.     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     At L3-4 there is minimal right and mild left facet overgrowth and a  small amount of fluid in the left facets. There is some mild disc space  narrowing and degenerative endplate changes most pronounced in the right  side of the endplates along the inner margin of the levoscoliotic curve.  There is a 3 mm retrolisthesis of L3 with respect to L4 with disc  material bulging along the posterior superior endplate of L4. There is  essentially no narrowing of the thecal sac. There is mild right and no  left foraminal narrowing.     At L4-5 there is minimal facet overgrowth. There is 3 mm retrolisthesis  of L4 with respect to L5 and some bulging disc material extending along  the posterior superior endplate of L5. There is no canal narrowing.  There is minimal right and mild left foraminal  narrowing.     At L5-S1 there is minimal facet overgrowth. There is disc space  narrowing, mild degenerative endplate changes, 3-4 mm retrolisthesis of  L5 with respect to S1, and minimal posterior spurring. There is no canal  or lateral recess narrowing. There is mild spurring into the inferior  aspect of the foramina and there is mild bilateral foraminal narrowing.     Other than the degenerative marrow edema in the right side of the  endplates at L3-4 and minimal degenerative marrow edema of the right  posterior lateral endplates at L4-5, marrow signal intensity in the  lumbar spine is within normal limits. I see no evidence of spinal  infection in the lumbar spine.     IMPRESSION:  1. No convincing acute abnormality is seen in the lumbar spine with no  direct evidence of spinal infection within the lumbar spine. 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  2. Disc space and facets are normal in appearance with no canal or  foraminal narrowing from T10 down to L3.  3. There is disc space narrowing and degenerative endplate changes at  L3-4 most pronounced in the right side of the endplates along the inner  margin of the levoscoliotic curve and there is a 3-4 mm retrolisthesis  of L3 with respect to L4 and minimal facet overgrowth but essentially no  canal narrowing and there is only mild right foraminal narrowing at  L3-4.  4. At L4-5 there is minimal facet overgrowth and a 3-4 mm retrolisthesis  of L4 with respect to L5. There is no canal narrowing. There is minimal  right and mild left foraminal narrowing at L4-5.   5. At L5-S1 there is disc space narrowing, degenerative endplate  changes, and 3-4 mm retrolisthesis of L5 with respect to S1. There is no  canal or lateral recess narrowing. There is spurring into the foramina  and mild bilateral foraminal narrowing.  6. There is mild bone marrow edema in the right side of the endplates at  L3-4 and  "right posterior lateral endplates at L4-5 which is felt to  almost certainly be degenerative marrow edema. The remainder of the  lumbar spine is within normal limits     This report was finalized on 1/10/2023 7:44 AM by Dr. Isidro Tellez M.D.    The following portions of the patient's history were reviewed and updated as appropriate: allergies, current medications, past family history, past medical history, past social history, past surgical history, and problem list.    Review of Systems   Constitutional:  Positive for fatigue. Negative for activity change.   Gastrointestinal:  Positive for constipation. Negative for abdominal pain and diarrhea.   Genitourinary:  Negative for difficulty urinating and dysuria.   Musculoskeletal:  Positive for back pain.   Neurological:  Positive for numbness. Negative for dizziness, weakness, light-headedness and headaches.   Psychiatric/Behavioral:  Positive for agitation. Negative for sleep disturbance and suicidal ideas. The patient is nervous/anxious.      I have reviewed and confirmed the accuracy of the ROS as documented by the MA/LPN/RN LINDA Villarreal   Vitals:    02/08/24 1433   BP: 120/90   BP Location: Right arm   Patient Position: Sitting   Cuff Size: Large Adult   Pulse: 71   Resp: 18   Temp: 97.1 °F (36.2 °C)   SpO2: 97%   Weight: 73.8 kg (162 lb 12.8 oz)   Height: 165.1 cm (65\")   PainSc:   5   PainLoc: Back         Objective   Physical Exam  Vitals and nursing note reviewed.   Constitutional:       Appearance: Normal appearance. She is normal weight.      Comments: MILDLY ANXIOUS   HENT:      Head: Normocephalic.   Pulmonary:      Effort: Pulmonary effort is normal.   Musculoskeletal:      Lumbar back: Spasms and tenderness present. Decreased range of motion.        Back:    Skin:     General: Skin is warm and dry.   Neurological:      General: No focal deficit present.      Mental Status: She is alert and oriented to person, place, and time.      Cranial Nerves: " Cranial nerves 2-12 are intact.      Sensory: Sensation is intact.      Motor: Motor function is intact.      Gait: Gait is intact.      Deep Tendon Reflexes:      Reflex Scores:       Tricep reflexes are 1+ on the right side and 1+ on the left side.       Bicep reflexes are 1+ on the right side and 1+ on the left side.       Brachioradialis reflexes are 1+ on the right side and 1+ on the left side.       Patellar reflexes are 0 on the right side and 0 on the left side.       Achilles reflexes are 0 on the right side and 0 on the left side.  Psychiatric:         Mood and Affect: Mood normal.         Behavior: Behavior normal.         Thought Content: Thought content normal.         Judgment: Judgment normal.             Assessment & Plan   Diagnoses and all orders for this visit:    1. Pain of right hip (Primary)  -     XR hip w or wo pelvis 2-3 view right; Future    2. Lumbar facet arthropathy    3. DDD (degenerative disc disease), lumbar    4. Lumbar radiculopathy        Nereida Myles reports a pain score of 5.  Given her pain assessment as noted, treatment options were discussed and the following options were decided upon as a follow-up plan to address the patient's pain: continuation of current treatment plan for pain, use of non-medical modalities (ice, heat, stretching and/or behavior modifications), and   .      --- At this time because the patient continues to have persistent right hip and groin pain I do feel it is reasonable to proceed with right hip x-rays for further evaluation.  --- Depending upon the results of the x-rays she may be a candidate for right hip IA joint injection versus lumbar medial branch block/RFA  --- Follow-up in 4 weeks for further evaluation and treat recommendations to be made                  Dictated utilizing Dragon dictation.

## 2024-02-12 NOTE — PROGRESS NOTES
Please let patient know her xrays do show mild arthritis in both hips with mild bone spurs.  No fracture or abnormality seen.  Also shows degenerative changes in the lower lumbar spine

## 2024-02-16 ENCOUNTER — TELEPHONE (OUTPATIENT)
Dept: PAIN MEDICINE | Facility: CLINIC | Age: 49
End: 2024-02-16
Payer: COMMERCIAL

## 2024-02-16 RX ORDER — METHYLPREDNISOLONE 4 MG/1
TABLET ORAL
Qty: 21 TABLET | Refills: 0 | Status: SHIPPED | OUTPATIENT
Start: 2024-02-16

## 2024-02-19 ENCOUNTER — PREP FOR SURGERY (OUTPATIENT)
Dept: SURGERY | Facility: SURGERY CENTER | Age: 49
End: 2024-02-19
Payer: COMMERCIAL

## 2024-02-19 ENCOUNTER — OFFICE VISIT (OUTPATIENT)
Dept: PAIN MEDICINE | Facility: CLINIC | Age: 49
End: 2024-02-19
Payer: COMMERCIAL

## 2024-02-19 VITALS
WEIGHT: 163.4 LBS | SYSTOLIC BLOOD PRESSURE: 112 MMHG | HEIGHT: 65 IN | BODY MASS INDEX: 27.22 KG/M2 | RESPIRATION RATE: 18 BRPM | HEART RATE: 69 BPM | DIASTOLIC BLOOD PRESSURE: 100 MMHG | OXYGEN SATURATION: 98 % | TEMPERATURE: 96.9 F

## 2024-02-19 DIAGNOSIS — M47.816 LUMBAR FACET ARTHROPATHY: Primary | ICD-10-CM

## 2024-02-19 DIAGNOSIS — M16.0 PRIMARY OSTEOARTHRITIS OF BOTH HIPS: ICD-10-CM

## 2024-02-19 DIAGNOSIS — M51.36 DDD (DEGENERATIVE DISC DISEASE), LUMBAR: ICD-10-CM

## 2024-02-19 DIAGNOSIS — M54.16 LUMBAR RADICULOPATHY: ICD-10-CM

## 2024-02-19 PROCEDURE — 96372 THER/PROPH/DIAG INJ SC/IM: CPT | Performed by: PHYSICIAN ASSISTANT

## 2024-02-19 PROCEDURE — 99214 OFFICE O/P EST MOD 30 MIN: CPT | Performed by: PHYSICIAN ASSISTANT

## 2024-02-19 RX ORDER — DIAZEPAM 5 MG/1
10 TABLET ORAL ONCE
OUTPATIENT
Start: 2024-02-19 | End: 2024-02-19

## 2024-02-19 RX ORDER — KETOROLAC TROMETHAMINE 10 MG/1
10 TABLET, FILM COATED ORAL EVERY 6 HOURS
Status: SHIPPED | OUTPATIENT
Start: 2024-02-19 | End: 2024-02-24

## 2024-02-19 RX ORDER — KETOROLAC TROMETHAMINE 30 MG/ML
60 INJECTION, SOLUTION INTRAMUSCULAR; INTRAVENOUS ONCE
Status: COMPLETED | OUTPATIENT
Start: 2024-02-19 | End: 2024-02-19

## 2024-02-19 RX ADMIN — KETOROLAC TROMETHAMINE 60 MG: 30 INJECTION, SOLUTION INTRAMUSCULAR; INTRAVENOUS at 09:57

## 2024-02-19 NOTE — PROGRESS NOTES
CHIEF COMPLAINT  Back pain  Right hip pain  Patient complain of right back pain, pain is consistent     Subjective   Nereida Myles is a 48 y.o. female  who presents for follow-up.  She has a history of chronic low back and right hip pain.  The patient has noted progressive worsening of primarily right-sided lumbosacral spine pain which radiates into the lateral aspect of the hip and groin and occasionally will affect the anterior medial aspect of the right thigh terminating proximal to the knee.  She finds that the pain is aggravated with any type of household chores, cooking, cleaning, or picking things up off the floor.  Patient completed the x-rays of the hip when the results have been reviewed with her on today.    This patient has a history of breast cancer with bilateral mastectomy.  She is 7 years in remission.      Pain today 8/10 VAS in severity.      Back Pain  This is a chronic problem. The current episode started more than 1 year ago. The problem occurs constantly. The pain is present in the lumbar spine. The quality of the pain is described as aching. The pain radiates to the right thigh. The pain is at a severity of 8/10. The pain is severe. The pain is The same all the time. The symptoms are aggravated by position, twisting and bending (activity). Associated symptoms include abdominal pain, headaches, numbness and weakness. Pertinent negatives include no dysuria.   Hip Pain   There was no injury mechanism. The pain is present in the right hip. The quality of the pain is described as aching (Throbbing). The pain is at a severity of 3/10. The pain is mild. The pain has been Fluctuating since onset. Associated symptoms include numbness.        PEG Assessment   What number best describes your pain on average in the past week?7-8  What number best describes how, during the past week, pain has interfered with your enjoyment of life?10  What number best describes how, during the past week, pain has  interfered with your general activity?  10    Review of Pertinent Medical Data ---    AP PELVIS AND AP/FROG LATERAL RIGHT HIP     HISTORY: Right hip and groin pain.     COMPARISON: CT and pelvis 01/07/2023     FINDINGS: There are mild arthritic changes both hips with mild marginal  spur formation. There is no evidence for hip fracture or acute  abnormality. There are are changes of degenerative disc disease in the  lower lumbar spine where there is disc space narrowing. Small  phleboliths are present in the lower pelvis.     IMPRESSION:  Mild arthritic changes both hips with minor marginal  spurring. No evidence for fracture or acute abnormality of the pelvis or  the right hip.     This report was finalized on 2/9/2024 6:30 AM by Dr. Jose Moreno M.D on Workstation: [id number]        MRI OF THE LUMBAR SPINE TECHNIQUE:   Sagittal T1, proton density fast spin  echo T2 and repeat fat-suppressed T2 and postcontrast sagittal  fat-suppressed T1-weighted images were obtained of the lumbar spine. In  addition, thin cut axial T1-weighted images were obtained angled through  the interspaces from L3 to S1 and axial T2 and postcontrast axial  T1-weighted images were obtained from T12 to S2.     COMPARISON: There are no prior lumbar spine imaging studies from [facility] for comparison.     FINDINGS: The distal thoracic cord and the conus are normal in signal  intensity. The conus terminates in the posterior midline of the thecal  sac at the level of the inferior body of L2 which is probably lower  limits of normal.     The T12-L1, L1-2 and L2-3 disc space and facets are normal with no canal  or foraminal narrowing from T12 to L3.     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     At L3-4 there is minimal right and mild left facet overgrowth and a  small amount of fluid in the left facets. There is some mild disc space  narrowing and degenerative  endplate changes most pronounced in the right  side of the endplates along the inner margin of the levoscoliotic curve.  There is a 3 mm retrolisthesis of L3 with respect to L4 with disc  material bulging along the posterior superior endplate of L4. There is  essentially no narrowing of the thecal sac. There is mild right and no  left foraminal narrowing.     At L4-5 there is minimal facet overgrowth. There is 3 mm retrolisthesis  of L4 with respect to L5 and some bulging disc material extending along  the posterior superior endplate of L5. There is no canal narrowing.  There is minimal right and mild left foraminal narrowing.     At L5-S1 there is minimal facet overgrowth. There is disc space  narrowing, mild degenerative endplate changes, 3-4 mm retrolisthesis of  L5 with respect to S1, and minimal posterior spurring. There is no canal  or lateral recess narrowing. There is mild spurring into the inferior  aspect of the foramina and there is mild bilateral foraminal narrowing.     Other than the degenerative marrow edema in the right side of the  endplates at L3-4 and minimal degenerative marrow edema of the right  posterior lateral endplates at L4-5, marrow signal intensity in the  lumbar spine is within normal limits. I see no evidence of spinal  infection in the lumbar spine.     IMPRESSION:  1. No convincing acute abnormality is seen in the lumbar spine with no  direct evidence of spinal infection within the lumbar spine. 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  2. Disc space and facets are normal in appearance with no canal or  foraminal narrowing from T10 down to L3.  3. There is disc space narrowing and degenerative endplate changes at  L3-4 most pronounced in the right side of the endplates along the inner  margin of the levoscoliotic curve and there is a 3-4 mm retrolisthesis  of L3 with respect to L4 and minimal facet overgrowth but  essentially no  canal narrowing and there is only mild right foraminal narrowing at  L3-4.  4. At L4-5 there is minimal facet overgrowth and a 3-4 mm retrolisthesis  of L4 with respect to L5. There is no canal narrowing. There is minimal  right and mild left foraminal narrowing at L4-5.   5. At L5-S1 there is disc space narrowing, degenerative endplate  changes, and 3-4 mm retrolisthesis of L5 with respect to S1. There is no  canal or lateral recess narrowing. There is spurring into the foramina  and mild bilateral foraminal narrowing.  6. There is mild bone marrow edema in the right side of the endplates at  L3-4 and right posterior lateral endplates at L4-5 which is felt to  almost certainly be degenerative marrow edema. The remainder of the  lumbar spine is within normal limits     This report was finalized on 1/10/2023 7:44 AM by Dr. Isidro Tellez M.D.    The following portions of the patient's history were reviewed and updated as appropriate: allergies, current medications, past family history, past medical history, past social history, past surgical history, and problem list.    Review of Systems   Constitutional:  Positive for activity change and fatigue.   Gastrointestinal:  Positive for abdominal pain, constipation and diarrhea.   Genitourinary:  Positive for difficulty urinating (leakage). Negative for dysuria.   Musculoskeletal:  Positive for back pain.   Neurological:  Positive for dizziness, weakness, light-headedness, numbness and headaches.   Psychiatric/Behavioral:  Positive for agitation and sleep disturbance. Negative for suicidal ideas. The patient is nervous/anxious.      I have reviewed and confirmed the accuracy of the ROS as documented by the MA/PERICO/RN LINDA Villarreal  Vitals:    02/19/24 0932   BP: 112/100   BP Location: Right arm   Patient Position: Sitting   Cuff Size: Large Adult   Pulse: 69   Resp: 18   Temp: 96.9 °F (36.1 °C)   SpO2: 98%   Weight: 74.1 kg (163 lb 6.4 oz)   Height:  "165.1 cm (65\")   PainSc:   8   PainLoc: Hip         Objective   Physical Exam  Vitals and nursing note reviewed.   Constitutional:       Appearance: Normal appearance. She is normal weight.      Comments: MILDLY ANXIOUS   HENT:      Head: Normocephalic.   Pulmonary:      Effort: Pulmonary effort is normal.   Musculoskeletal:      Lumbar back: Spasms and tenderness present. Decreased range of motion.        Back:    Skin:     General: Skin is warm and dry.   Neurological:      General: No focal deficit present.      Mental Status: She is alert and oriented to person, place, and time.      Cranial Nerves: Cranial nerves 2-12 are intact.      Sensory: Sensation is intact.      Motor: Motor function is intact.      Gait: Gait is intact.      Deep Tendon Reflexes:      Reflex Scores:       Tricep reflexes are 1+ on the right side and 1+ on the left side.       Bicep reflexes are 1+ on the right side and 1+ on the left side.       Brachioradialis reflexes are 1+ on the right side and 1+ on the left side.       Patellar reflexes are 0 on the right side and 0 on the left side.       Achilles reflexes are 0 on the right side and 0 on the left side.  Psychiatric:         Mood and Affect: Mood normal.         Behavior: Behavior normal.         Thought Content: Thought content normal.         Judgment: Judgment normal.             Assessment & Plan   Diagnoses and all orders for this visit:    1. Lumbar facet arthropathy (Primary)  -     ketorolac (TORADOL) injection 60 mg  -     ketorolac (TORADOL) tablet 10 mg    2. DDD (degenerative disc disease), lumbar    3. Lumbar radiculopathy    4. Primary osteoarthritis of both hips        Nereidaerika Myles reports a pain score of 8.  Given her pain assessment as noted, treatment options were discussed and the following options were decided upon as a follow-up plan to address the patient's pain: continuation of current treatment plan for pain, educational materials on pain management, " prescription for non-opiod analgesics, and use of non-medical modalities (ice, heat, stretching and/or behavior modifications).      --- Based on the patient's physical exam and MRI findings I recommend proceeding with #1 diagnostic right L3-5 lumbar MBB with plan to proceed to radiofrequency ablation if favorable response is obtained.  The risk and benefits of the procedure were discussed with the patient and questions been addressed.  --- Follow-up in 1 week for further evaluation  --- Due to the patient's extreme pain in the office today I will proceed with ketorolac 60 mg IM injection we will also send a 5-day course of Toradol.  She has been advised to avoid any NSAIDs while taking this medication.  --- This patient has a history of bladder sling/repair performed over 10 years ago and is beginning to have slight urinary leakage.  I have strongly advised her to reach out to her primary care physician for further evaluation of this issue.      Diagnostic Facet Joint Procedure:   MBB     The first diagnostic facet joint procedure is considered medically reasonable and necessary for the diagnosis and treatment of chronic pain for this patient due to the patient meeting all of the following criteria:    - 1. Moderate to severe chronic neck or low back pain, predominantly axial, that causes functional deficit measured on pain or disability scale.  - 2. Pain present for minimum of 3 months with documented failure to respond to noninvasive conservative management (as tolerated)  - 3. Absence of untreated radiculopathy or neurogenic claudication (except for radiculopathy caused by facet joint synovial cyst)  - 4. There is no non-facet pathology per clinical assessment or radiology studies that could explain the source of the patient’s pain, including but not limited to fracture, tumor, infection, or significant deformity.    The confirmatory diagnostic facet joint procedure is considered medically reasonable and  necessary for the diagnosis and treatment of chronic pain for this patient due to the patient meeting all of the following criteria:    - 1. Moderate to severe chronic neck or low back pain, predominantly axial, that causes functional deficit measured on pain or disability scale.  - 2. Pain present for minimum of 3 months with documented failure to respond to noninvasive conservative management (as tolerated)  - 3. Absence of untreated radiculopathy or neurogenic claudication (except for radiculopathy caused by facet joint synovial cyst)  - 4. There is no non-facet pathology per clinical assessment or radiology studies that could explain the source of the patient’s pain, including but not limited to fracture, tumor, infection, or significant deformity.    The patient has also shown a consistent positive response of at least 80% relief of primary (index) pain (with the duration of relief being consistent with the agent used).                 Dictated utilizing Dragon dictation.

## 2024-02-19 NOTE — H&P (VIEW-ONLY)
CHIEF COMPLAINT  Back pain  Right hip pain  Patient complain of right back pain, pain is consistent     Subjective   Nereida Myles is a 48 y.o. female  who presents for follow-up.  She has a history of chronic low back and right hip pain.  The patient has noted progressive worsening of primarily right-sided lumbosacral spine pain which radiates into the lateral aspect of the hip and groin and occasionally will affect the anterior medial aspect of the right thigh terminating proximal to the knee.  She finds that the pain is aggravated with any type of household chores, cooking, cleaning, or picking things up off the floor.  Patient completed the x-rays of the hip when the results have been reviewed with her on today.    This patient has a history of breast cancer with bilateral mastectomy.  She is 7 years in remission.      Pain today 8/10 VAS in severity.      Back Pain  This is a chronic problem. The current episode started more than 1 year ago. The problem occurs constantly. The pain is present in the lumbar spine. The quality of the pain is described as aching. The pain radiates to the right thigh. The pain is at a severity of 8/10. The pain is severe. The pain is The same all the time. The symptoms are aggravated by position, twisting and bending (activity). Associated symptoms include abdominal pain, headaches, numbness and weakness. Pertinent negatives include no dysuria.   Hip Pain   There was no injury mechanism. The pain is present in the right hip. The quality of the pain is described as aching (Throbbing). The pain is at a severity of 3/10. The pain is mild. The pain has been Fluctuating since onset. Associated symptoms include numbness.        PEG Assessment   What number best describes your pain on average in the past week?7-8  What number best describes how, during the past week, pain has interfered with your enjoyment of life?10  What number best describes how, during the past week, pain has  interfered with your general activity?  10    Review of Pertinent Medical Data ---    AP PELVIS AND AP/FROG LATERAL RIGHT HIP     HISTORY: Right hip and groin pain.     COMPARISON: CT and pelvis 01/07/2023     FINDINGS: There are mild arthritic changes both hips with mild marginal  spur formation. There is no evidence for hip fracture or acute  abnormality. There are are changes of degenerative disc disease in the  lower lumbar spine where there is disc space narrowing. Small  phleboliths are present in the lower pelvis.     IMPRESSION:  Mild arthritic changes both hips with minor marginal  spurring. No evidence for fracture or acute abnormality of the pelvis or  the right hip.     This report was finalized on 2/9/2024 6:30 AM by Dr. Jose Moreno M.D on Workstation: [id number]        MRI OF THE LUMBAR SPINE TECHNIQUE:   Sagittal T1, proton density fast spin  echo T2 and repeat fat-suppressed T2 and postcontrast sagittal  fat-suppressed T1-weighted images were obtained of the lumbar spine. In  addition, thin cut axial T1-weighted images were obtained angled through  the interspaces from L3 to S1 and axial T2 and postcontrast axial  T1-weighted images were obtained from T12 to S2.     COMPARISON: There are no prior lumbar spine imaging studies from [facility] for comparison.     FINDINGS: The distal thoracic cord and the conus are normal in signal  intensity. The conus terminates in the posterior midline of the thecal  sac at the level of the inferior body of L2 which is probably lower  limits of normal.     The T12-L1, L1-2 and L2-3 disc space and facets are normal with no canal  or foraminal narrowing from T12 to L3.     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     At L3-4 there is minimal right and mild left facet overgrowth and a  small amount of fluid in the left facets. There is some mild disc space  narrowing and degenerative  endplate changes most pronounced in the right  side of the endplates along the inner margin of the levoscoliotic curve.  There is a 3 mm retrolisthesis of L3 with respect to L4 with disc  material bulging along the posterior superior endplate of L4. There is  essentially no narrowing of the thecal sac. There is mild right and no  left foraminal narrowing.     At L4-5 there is minimal facet overgrowth. There is 3 mm retrolisthesis  of L4 with respect to L5 and some bulging disc material extending along  the posterior superior endplate of L5. There is no canal narrowing.  There is minimal right and mild left foraminal narrowing.     At L5-S1 there is minimal facet overgrowth. There is disc space  narrowing, mild degenerative endplate changes, 3-4 mm retrolisthesis of  L5 with respect to S1, and minimal posterior spurring. There is no canal  or lateral recess narrowing. There is mild spurring into the inferior  aspect of the foramina and there is mild bilateral foraminal narrowing.     Other than the degenerative marrow edema in the right side of the  endplates at L3-4 and minimal degenerative marrow edema of the right  posterior lateral endplates at L4-5, marrow signal intensity in the  lumbar spine is within normal limits. I see no evidence of spinal  infection in the lumbar spine.     IMPRESSION:  1. No convincing acute abnormality is seen in the lumbar spine with no  direct evidence of spinal infection within the lumbar spine. 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  2. Disc space and facets are normal in appearance with no canal or  foraminal narrowing from T10 down to L3.  3. There is disc space narrowing and degenerative endplate changes at  L3-4 most pronounced in the right side of the endplates along the inner  margin of the levoscoliotic curve and there is a 3-4 mm retrolisthesis  of L3 with respect to L4 and minimal facet overgrowth but  essentially no  canal narrowing and there is only mild right foraminal narrowing at  L3-4.  4. At L4-5 there is minimal facet overgrowth and a 3-4 mm retrolisthesis  of L4 with respect to L5. There is no canal narrowing. There is minimal  right and mild left foraminal narrowing at L4-5.   5. At L5-S1 there is disc space narrowing, degenerative endplate  changes, and 3-4 mm retrolisthesis of L5 with respect to S1. There is no  canal or lateral recess narrowing. There is spurring into the foramina  and mild bilateral foraminal narrowing.  6. There is mild bone marrow edema in the right side of the endplates at  L3-4 and right posterior lateral endplates at L4-5 which is felt to  almost certainly be degenerative marrow edema. The remainder of the  lumbar spine is within normal limits     This report was finalized on 1/10/2023 7:44 AM by Dr. Isidro Tellez M.D.    The following portions of the patient's history were reviewed and updated as appropriate: allergies, current medications, past family history, past medical history, past social history, past surgical history, and problem list.    Review of Systems   Constitutional:  Positive for activity change and fatigue.   Gastrointestinal:  Positive for abdominal pain, constipation and diarrhea.   Genitourinary:  Positive for difficulty urinating (leakage). Negative for dysuria.   Musculoskeletal:  Positive for back pain.   Neurological:  Positive for dizziness, weakness, light-headedness, numbness and headaches.   Psychiatric/Behavioral:  Positive for agitation and sleep disturbance. Negative for suicidal ideas. The patient is nervous/anxious.      I have reviewed and confirmed the accuracy of the ROS as documented by the MA/PERICO/RN LINDA Villarreal  Vitals:    02/19/24 0932   BP: 112/100   BP Location: Right arm   Patient Position: Sitting   Cuff Size: Large Adult   Pulse: 69   Resp: 18   Temp: 96.9 °F (36.1 °C)   SpO2: 98%   Weight: 74.1 kg (163 lb 6.4 oz)   Height:  "165.1 cm (65\")   PainSc:   8   PainLoc: Hip         Objective   Physical Exam  Vitals and nursing note reviewed.   Constitutional:       Appearance: Normal appearance. She is normal weight.      Comments: MILDLY ANXIOUS   HENT:      Head: Normocephalic.   Pulmonary:      Effort: Pulmonary effort is normal.   Musculoskeletal:      Lumbar back: Spasms and tenderness present. Decreased range of motion.        Back:    Skin:     General: Skin is warm and dry.   Neurological:      General: No focal deficit present.      Mental Status: She is alert and oriented to person, place, and time.      Cranial Nerves: Cranial nerves 2-12 are intact.      Sensory: Sensation is intact.      Motor: Motor function is intact.      Gait: Gait is intact.      Deep Tendon Reflexes:      Reflex Scores:       Tricep reflexes are 1+ on the right side and 1+ on the left side.       Bicep reflexes are 1+ on the right side and 1+ on the left side.       Brachioradialis reflexes are 1+ on the right side and 1+ on the left side.       Patellar reflexes are 0 on the right side and 0 on the left side.       Achilles reflexes are 0 on the right side and 0 on the left side.  Psychiatric:         Mood and Affect: Mood normal.         Behavior: Behavior normal.         Thought Content: Thought content normal.         Judgment: Judgment normal.             Assessment & Plan   Diagnoses and all orders for this visit:    1. Lumbar facet arthropathy (Primary)  -     ketorolac (TORADOL) injection 60 mg  -     ketorolac (TORADOL) tablet 10 mg    2. DDD (degenerative disc disease), lumbar    3. Lumbar radiculopathy    4. Primary osteoarthritis of both hips        Nereidaerika Myles reports a pain score of 8.  Given her pain assessment as noted, treatment options were discussed and the following options were decided upon as a follow-up plan to address the patient's pain: continuation of current treatment plan for pain, educational materials on pain management, " prescription for non-opiod analgesics, and use of non-medical modalities (ice, heat, stretching and/or behavior modifications).      --- Based on the patient's physical exam and MRI findings I recommend proceeding with #1 diagnostic right L3-5 lumbar MBB with plan to proceed to radiofrequency ablation if favorable response is obtained.  The risk and benefits of the procedure were discussed with the patient and questions been addressed.  --- Follow-up in 1 week for further evaluation  --- Due to the patient's extreme pain in the office today I will proceed with ketorolac 60 mg IM injection we will also send a 5-day course of Toradol.  She has been advised to avoid any NSAIDs while taking this medication.  --- This patient has a history of bladder sling/repair performed over 10 years ago and is beginning to have slight urinary leakage.  I have strongly advised her to reach out to her primary care physician for further evaluation of this issue.      Diagnostic Facet Joint Procedure:   MBB     The first diagnostic facet joint procedure is considered medically reasonable and necessary for the diagnosis and treatment of chronic pain for this patient due to the patient meeting all of the following criteria:    - 1. Moderate to severe chronic neck or low back pain, predominantly axial, that causes functional deficit measured on pain or disability scale.  - 2. Pain present for minimum of 3 months with documented failure to respond to noninvasive conservative management (as tolerated)  - 3. Absence of untreated radiculopathy or neurogenic claudication (except for radiculopathy caused by facet joint synovial cyst)  - 4. There is no non-facet pathology per clinical assessment or radiology studies that could explain the source of the patient’s pain, including but not limited to fracture, tumor, infection, or significant deformity.    The confirmatory diagnostic facet joint procedure is considered medically reasonable and  necessary for the diagnosis and treatment of chronic pain for this patient due to the patient meeting all of the following criteria:    - 1. Moderate to severe chronic neck or low back pain, predominantly axial, that causes functional deficit measured on pain or disability scale.  - 2. Pain present for minimum of 3 months with documented failure to respond to noninvasive conservative management (as tolerated)  - 3. Absence of untreated radiculopathy or neurogenic claudication (except for radiculopathy caused by facet joint synovial cyst)  - 4. There is no non-facet pathology per clinical assessment or radiology studies that could explain the source of the patient’s pain, including but not limited to fracture, tumor, infection, or significant deformity.    The patient has also shown a consistent positive response of at least 80% relief of primary (index) pain (with the duration of relief being consistent with the agent used).                 Dictated utilizing Dragon dictation.

## 2024-02-21 ENCOUNTER — TRANSCRIBE ORDERS (OUTPATIENT)
Dept: SURGERY | Facility: SURGERY CENTER | Age: 49
End: 2024-02-21
Payer: COMMERCIAL

## 2024-02-21 ENCOUNTER — APPOINTMENT (OUTPATIENT)
Dept: GENERAL RADIOLOGY | Facility: HOSPITAL | Age: 49
End: 2024-02-21
Payer: COMMERCIAL

## 2024-02-21 ENCOUNTER — HOSPITAL ENCOUNTER (EMERGENCY)
Facility: HOSPITAL | Age: 49
Discharge: HOME | End: 2024-02-21
Attending: EMERGENCY MEDICINE | Admitting: EMERGENCY MEDICINE
Payer: COMMERCIAL

## 2024-02-21 VITALS
DIASTOLIC BLOOD PRESSURE: 71 MMHG | TEMPERATURE: 98.3 F | WEIGHT: 160 LBS | HEART RATE: 86 BPM | HEIGHT: 65 IN | RESPIRATION RATE: 16 BRPM | OXYGEN SATURATION: 92 % | SYSTOLIC BLOOD PRESSURE: 119 MMHG | BODY MASS INDEX: 26.66 KG/M2

## 2024-02-21 DIAGNOSIS — Z41.9 SURGERY, ELECTIVE: Primary | ICD-10-CM

## 2024-02-21 DIAGNOSIS — J10.1 INFLUENZA A: Primary | ICD-10-CM

## 2024-02-21 DIAGNOSIS — R06.2 WHEEZING: ICD-10-CM

## 2024-02-21 DIAGNOSIS — Z87.09 HISTORY OF ASTHMA: ICD-10-CM

## 2024-02-21 LAB
ALBUMIN SERPL-MCNC: 4.5 G/DL (ref 3.5–5.2)
ALBUMIN/GLOB SERPL: 2.3 G/DL
ALP SERPL-CCNC: 66 U/L (ref 39–117)
ALT SERPL W P-5'-P-CCNC: 24 U/L (ref 1–33)
ANION GAP SERPL CALCULATED.3IONS-SCNC: 13 MMOL/L (ref 5–15)
APTT PPP: 24.4 SECONDS (ref 22.7–35.4)
AST SERPL-CCNC: 20 U/L (ref 1–32)
B PARAPERT DNA SPEC QL NAA+PROBE: NOT DETECTED
B PERT DNA SPEC QL NAA+PROBE: NOT DETECTED
BASOPHILS # BLD AUTO: 0.03 10*3/MM3 (ref 0–0.2)
BASOPHILS NFR BLD AUTO: 0.4 % (ref 0–1.5)
BILIRUB SERPL-MCNC: 0.2 MG/DL (ref 0–1.2)
BUN SERPL-MCNC: 21 MG/DL (ref 6–20)
BUN/CREAT SERPL: 27.3 (ref 7–25)
C PNEUM DNA NPH QL NAA+NON-PROBE: NOT DETECTED
CALCIUM SPEC-SCNC: 9.5 MG/DL (ref 8.6–10.5)
CHLORIDE SERPL-SCNC: 104 MMOL/L (ref 98–107)
CO2 SERPL-SCNC: 25 MMOL/L (ref 22–29)
CREAT SERPL-MCNC: 0.77 MG/DL (ref 0.57–1)
D DIMER PPP FEU-MCNC: <0.27 MCGFEU/ML (ref 0–0.5)
D-LACTATE SERPL-SCNC: 1.9 MMOL/L (ref 0.5–2)
DEPRECATED RDW RBC AUTO: 44.1 FL (ref 37–54)
EGFRCR SERPLBLD CKD-EPI 2021: 94.1 ML/MIN/1.73
EOSINOPHIL # BLD AUTO: 0.01 10*3/MM3 (ref 0–0.4)
EOSINOPHIL NFR BLD AUTO: 0.1 % (ref 0.3–6.2)
ERYTHROCYTE [DISTWIDTH] IN BLOOD BY AUTOMATED COUNT: 13 % (ref 12.3–15.4)
FLUAV H1 2009 PAND RNA NPH QL NAA+PROBE: DETECTED
FLUBV RNA ISLT QL NAA+PROBE: NOT DETECTED
GEN 5 2HR TROPONIN T REFLEX: 8 NG/L
GLOBULIN UR ELPH-MCNC: 2 GM/DL
GLUCOSE SERPL-MCNC: 72 MG/DL (ref 65–99)
HADV DNA SPEC NAA+PROBE: NOT DETECTED
HCOV 229E RNA SPEC QL NAA+PROBE: NOT DETECTED
HCOV HKU1 RNA SPEC QL NAA+PROBE: NOT DETECTED
HCOV NL63 RNA SPEC QL NAA+PROBE: NOT DETECTED
HCOV OC43 RNA SPEC QL NAA+PROBE: NOT DETECTED
HCT VFR BLD AUTO: 39.4 % (ref 34–46.6)
HGB BLD-MCNC: 12.8 G/DL (ref 12–15.9)
HMPV RNA NPH QL NAA+NON-PROBE: NOT DETECTED
HPIV1 RNA ISLT QL NAA+PROBE: NOT DETECTED
HPIV2 RNA SPEC QL NAA+PROBE: NOT DETECTED
HPIV3 RNA NPH QL NAA+PROBE: NOT DETECTED
HPIV4 P GENE NPH QL NAA+PROBE: NOT DETECTED
IMM GRANULOCYTES # BLD AUTO: 0.04 10*3/MM3 (ref 0–0.05)
IMM GRANULOCYTES NFR BLD AUTO: 0.5 % (ref 0–0.5)
INR PPP: 1 (ref 0.9–1.1)
LYMPHOCYTES # BLD AUTO: 0.59 10*3/MM3 (ref 0.7–3.1)
LYMPHOCYTES NFR BLD AUTO: 7.6 % (ref 19.6–45.3)
M PNEUMO IGG SER IA-ACNC: NOT DETECTED
MCH RBC QN AUTO: 30.2 PG (ref 26.6–33)
MCHC RBC AUTO-ENTMCNC: 32.5 G/DL (ref 31.5–35.7)
MCV RBC AUTO: 92.9 FL (ref 79–97)
MONOCYTES # BLD AUTO: 0.32 10*3/MM3 (ref 0.1–0.9)
MONOCYTES NFR BLD AUTO: 4.1 % (ref 5–12)
NEUTROPHILS NFR BLD AUTO: 6.79 10*3/MM3 (ref 1.7–7)
NEUTROPHILS NFR BLD AUTO: 87.3 % (ref 42.7–76)
NRBC BLD AUTO-RTO: 0 /100 WBC (ref 0–0.2)
PLATELET # BLD AUTO: 303 10*3/MM3 (ref 140–450)
PMV BLD AUTO: 9.6 FL (ref 6–12)
POTASSIUM SERPL-SCNC: 3.9 MMOL/L (ref 3.5–5.2)
PROT SERPL-MCNC: 6.5 G/DL (ref 6–8.5)
PROTHROMBIN TIME: 13.3 SECONDS (ref 11.7–14.2)
QT INTERVAL: 416 MS
QTC INTERVAL: 453 MS
RBC # BLD AUTO: 4.24 10*6/MM3 (ref 3.77–5.28)
RHINOVIRUS RNA SPEC NAA+PROBE: NOT DETECTED
RSV RNA NPH QL NAA+NON-PROBE: NOT DETECTED
SARS-COV-2 RNA NPH QL NAA+NON-PROBE: NOT DETECTED
SODIUM SERPL-SCNC: 142 MMOL/L (ref 136–145)
TROPONIN T DELTA: NORMAL
TROPONIN T SERPL HS-MCNC: <6 NG/L
WBC NRBC COR # BLD AUTO: 7.78 10*3/MM3 (ref 3.4–10.8)

## 2024-02-21 PROCEDURE — 85379 FIBRIN DEGRADATION QUANT: CPT | Performed by: EMERGENCY MEDICINE

## 2024-02-21 PROCEDURE — 99284 EMERGENCY DEPT VISIT MOD MDM: CPT

## 2024-02-21 PROCEDURE — 85610 PROTHROMBIN TIME: CPT | Performed by: EMERGENCY MEDICINE

## 2024-02-21 PROCEDURE — 93005 ELECTROCARDIOGRAM TRACING: CPT | Performed by: EMERGENCY MEDICINE

## 2024-02-21 PROCEDURE — 85025 COMPLETE CBC W/AUTO DIFF WBC: CPT | Performed by: EMERGENCY MEDICINE

## 2024-02-21 PROCEDURE — 83605 ASSAY OF LACTIC ACID: CPT | Performed by: EMERGENCY MEDICINE

## 2024-02-21 PROCEDURE — 25010000002 LORAZEPAM PER 2 MG: Performed by: EMERGENCY MEDICINE

## 2024-02-21 PROCEDURE — 84484 ASSAY OF TROPONIN QUANT: CPT | Performed by: EMERGENCY MEDICINE

## 2024-02-21 PROCEDURE — 96374 THER/PROPH/DIAG INJ IV PUSH: CPT

## 2024-02-21 PROCEDURE — 36415 COLL VENOUS BLD VENIPUNCTURE: CPT

## 2024-02-21 PROCEDURE — 25010000002 METHYLPREDNISOLONE PER 125 MG: Performed by: EMERGENCY MEDICINE

## 2024-02-21 PROCEDURE — 0202U NFCT DS 22 TRGT SARS-COV-2: CPT | Performed by: EMERGENCY MEDICINE

## 2024-02-21 PROCEDURE — 93010 ELECTROCARDIOGRAM REPORT: CPT | Performed by: INTERNAL MEDICINE

## 2024-02-21 PROCEDURE — 80053 COMPREHEN METABOLIC PANEL: CPT | Performed by: EMERGENCY MEDICINE

## 2024-02-21 PROCEDURE — 96375 TX/PRO/DX INJ NEW DRUG ADDON: CPT

## 2024-02-21 PROCEDURE — 85730 THROMBOPLASTIN TIME PARTIAL: CPT | Performed by: EMERGENCY MEDICINE

## 2024-02-21 PROCEDURE — 71045 X-RAY EXAM CHEST 1 VIEW: CPT

## 2024-02-21 RX ORDER — METHYLPREDNISOLONE SODIUM SUCCINATE 125 MG/2ML
125 INJECTION, POWDER, LYOPHILIZED, FOR SOLUTION INTRAMUSCULAR; INTRAVENOUS ONCE
Status: COMPLETED | OUTPATIENT
Start: 2024-02-21 | End: 2024-02-21

## 2024-02-21 RX ORDER — OSELTAMIVIR PHOSPHATE 75 MG/1
CAPSULE ORAL
Qty: 10 CAPSULE | Refills: 0 | Status: SHIPPED | OUTPATIENT
Start: 2024-02-21

## 2024-02-21 RX ORDER — SODIUM CHLORIDE 0.9 % (FLUSH) 0.9 %
10 SYRINGE (ML) INJECTION AS NEEDED
Status: DISCONTINUED | OUTPATIENT
Start: 2024-02-21 | End: 2024-02-21 | Stop reason: HOSPADM

## 2024-02-21 RX ORDER — LORAZEPAM 2 MG/ML
0.5 INJECTION INTRAMUSCULAR ONCE
Status: COMPLETED | OUTPATIENT
Start: 2024-02-21 | End: 2024-02-21

## 2024-02-21 RX ORDER — PREDNISONE 20 MG/1
TABLET ORAL
Qty: 10 TABLET | Refills: 0 | Status: SHIPPED | OUTPATIENT
Start: 2024-02-21 | End: 2024-02-28 | Stop reason: DRUGHIGH

## 2024-02-21 RX ADMIN — LORAZEPAM 0.5 MG: 2 INJECTION, SOLUTION INTRAMUSCULAR; INTRAVENOUS at 09:04

## 2024-02-21 RX ADMIN — METHYLPREDNISOLONE SODIUM SUCCINATE 125 MG: 125 INJECTION, POWDER, FOR SOLUTION INTRAMUSCULAR; INTRAVENOUS at 09:03

## 2024-02-21 NOTE — Clinical Note
[facility] EMERGENCY DEPARTMENT  [address] [facility] [address]  Phone: [phone]    Nereida Myles was seen and treated in our emergency department on 2/21/2024.  She may return to work on 02/24/2024.         Thank you for choosing [facility].    Tolu Pemberton MD

## 2024-02-21 NOTE — ED PROVIDER NOTES
EMERGENCY DEPARTMENT ENCOUNTER  Room Number:  35/35  PCP: Kehrer, Meredith Lea, MD  Independent Historians: Patient and her mother      HPI:  Chief Complaint: had concerns including Shortness of Breath.     A complete HPI/ROS/PMH/PSH/SH/FH are unobtainable due to: None    Chronic or social conditions impacting patient care (Social Determinants of Health): None      Context: Nereida Myles is a 48 y.o. female with a medical history of asthma, anxiety, chronic pain who presents to the ED c/o acute dyspnea.  The patient's had a cough for 1 to 2 days has felt very deep with no production or fever.  Specifically she has had no hemoptysis.  No prior history of DVT or PE though she is a breast cancer survivor some 8 years ago and is still on tamoxifen.  She has been using her nebulizer treatments this morning which make her chronic anxiety and tremulousness even worse.  No abdominal pain or chest discomfort reported at this time.      Review of prior external notes (non-ED) -and- Review of prior external test results outside of this encounter:        PAST MEDICAL HISTORY  Active Ambulatory Problems     Diagnosis Date Noted    Hashimoto's thyroiditis 11/18/2019    Hypothyroidism 11/18/2019    Depression, major 11/18/2019    Anxiety 11/18/2019    Asthma 11/18/2019    Pneumonia due to COVID-19 virus 01/20/2021    Hypoxia 01/21/2021    Shortness of breath 01/21/2021    Pyelonephritis 01/07/2023    COPD (chronic obstructive pulmonary disease) 01/07/2023    Breast CA 02/28/2023    Finger clubbing 02/28/2023    Severe episode of recurrent major depressive disorder, without psychotic features 05/18/2023    Severe persistent asthma 05/18/2023    Lumbar radiculopathy 12/12/2023    DDD (degenerative disc disease), lumbar 12/12/2023    Lumbar facet arthropathy 12/12/2023    Pain of right hip 02/08/2024     Resolved Ambulatory Problems     Diagnosis Date Noted    No Resolved Ambulatory Problems     Past Medical History:   Diagnosis  Date    Allergic rhinitis     BMI 25.0-25.9,adult     Cancer     Community acquired pneumonia     Disease of thyroid gland     Diverticular disease     Foreign body in ear     Fractures 2012    GERD (gastroesophageal reflux disease)     History of abnormal mammogram     History of acute sinusitis     History of adenocarcinoma of breast     History of COPD     History of dyspareunia in female     History of kidney stones     History of lump of left breast     History of malignant neoplasm of left breast     History of nausea and vomiting     History of seborrheic dermatitis     History of strep pharyngitis     History of suicidal ideation     Hypoxemia     Joint pain 2021    Low back pain 2023    Lumbosacral disc disease ?    Obsessive compulsive disorder     Other screening mammogram     Personal history of asthma     PONV (postoperative nausea and vomiting)     Recurrent genital herpes simplex     Symptomatic states associated with artificial menopause     TMJ dysfunction 1999         PAST SURGICAL HISTORY  Past Surgical History:   Procedure Laterality Date    APPENDECTOMY      BREAST AUGMENTATION Bilateral     Revised 2005, initial implants 1992    BREAST RECONSTRUCTION, BREAST TISSUE EXPANDER INSERTION Bilateral     CERVICAL CERCLAGE      x 2 during pregnancy, incompetent cervix    CHOLECYSTECTOMY      COLONOSCOPY      EPIDURAL Right 01/11/2024    Procedure: LUMBAR/SACRAL TRANSFORAMINAL EPIDURAL RIGHT L3-4 #1 [id number];  Surgeon: Rei Pruitt MD;  Location: [facility] MAIN OR;  Service: Pain Management;  Laterality: Right;    EPIDURAL BLOCK  1/2024    HAND SURGERY Right     may2022    HYSTERECTOMY      due to uterine prolapse    KIDNEY STONE SURGERY      LUNG SURGERY      bronchoscopy    MASTECTOMY Bilateral 01/2016    removed lymph nodes on left side    SCAR REVISION BREAST Bilateral 06/21/2022    Procedure: SCAR REVISION LEFT CHEST 27 CENTIMETERS AND RIGHT CHEST 25 CENTIMETERS FOR AESTHETIC FLAT CLOSURE FOLLOWING  MASTECTOMY;  Surgeon: Waterhouse, Maurine, MD;  Location: [facility] OR;  Service: Plastics;  Laterality: Bilateral;    TISSUE EXPANDER PLACEMENT Bilateral     due to infection         FAMILY HISTORY  Family History   Problem Relation Age of Onset    Thyroid disease Mother     Arthritis Mother     Uterine cancer Mother         in her 40's    Depression Mother     GI problems Mother     Cancer Father     Bipolar disorder Father     Depression Father     Hyperlipidemia Father     Hypertension Father     Arthritis Father     Arthritis Maternal Grandmother     Bleeding Disorder Maternal Grandmother     Hyperlipidemia Maternal Grandmother     Hypertension Maternal Grandmother     Depression Maternal Grandmother     Hypertension Maternal Grandfather     Hypertension Paternal Grandfather     Hyperlipidemia Paternal Grandfather     Arthritis Paternal Grandfather     Coronary artery disease Other         Maternal GrGF    Skin cancer Other         Maternal GrGF    Diabetes Other         Paternal GrGF    Heart disease Other         FH in females before the age of 65    Arthritis Paternal Grandmother     Malig Hyperthermia Neg Hx          SOCIAL HISTORY  Social History     Socioeconomic History    Marital status:    Tobacco Use    Smoking status: Never    Smokeless tobacco: Never   Vaping Use    Vaping Use: Never used   Substance and Sexual Activity    Alcohol use: Yes     Alcohol/week: 5.0 standard drinks of alcohol     Types: 2 Glasses of wine, 3 Drinks containing 0.5 oz of alcohol per week    Drug use: Never    Sexual activity: Yes     Partners: Male     Birth control/protection: None, Vasectomy, Essure, Hysterectomy         ALLERGIES  Patient has no known allergies.      REVIEW OF SYSTEMS  Review of Systems  Included in HPI  All systems reviewed and negative except for those discussed in HPI.      PHYSICAL EXAM    I have reviewed the triage vital signs and nursing notes.    ED Triage Vitals   Temp Heart Rate Resp BP  SpO2   02/21/24 0830 02/21/24 0830 02/21/24 0832 02/21/24 0834 02/21/24 0830   98.3 °F (36.8 °C) 107 26 131/76 100 %      Temp src Heart Rate Source Patient Position BP Location FiO2 (%)   -- 02/21/24 0830 -- -- --    Monitor          Physical Exam    Physical Exam   Constitutional: No distress.  Nontoxic but very anxious.  Tremulous throughout.  HENT:  Head: Normocephalic and atraumatic.   Oropharynx: Mucous membranes are moist.   Eyes: . No scleral icterus. No conjunctival pallor.  Neck: Normal range of motion. Neck supple.   Cardiovascular: Pink warm and well perfused throughout.  Regular without tachycardia.  Pulmonary/Chest: Tachypnea without increased work of breathing appreciated consistent with hyperventilating.  Mild diffuse expiratory wheeze but otherwise good air movement noted.  Abdominal: Soft. There is no tenderness. There is no rebound and no guarding.  Benign exam  Musculoskeletal: Moves all extremities equally.  No calf tenderness or swelling.  Neurological: Alert and oriented.  No acute focal deficit appreciated.  Skin: Skin is pink, warm, and dry.   Psychiatric: Very anxious mood  Nursing note and vitals reviewed.             LAB RESULTS  Recent Results (from the past 24 hour(s))   Respiratory Panel PCR w/COVID-19(SARS-CoV-2) [facility]/ANA LUISA/BLAIR/PAD/COR/MARIANGEL In-House, NP Swab in UTM/VTM, 2 HR TAT - Swab, Nasopharynx    Collection Time: 02/21/24  8:55 AM    Specimen: Nasopharynx; Swab   Result Value Ref Range    ADENOVIRUS, PCR Not Detected Not Detected    Coronavirus 229E Not Detected Not Detected    Coronavirus HKU1 Not Detected Not Detected    Coronavirus NL63 Not Detected Not Detected    Coronavirus OC43 Not Detected Not Detected    COVID19 Not Detected Not Detected - Ref. Range    Human Metapneumovirus Not Detected Not Detected    Human Rhinovirus/Enterovirus Not Detected Not Detected    Influenza A H1 2009 PCR Detected (A) Not Detected    Influenza B PCR Not Detected Not Detected    Parainfluenza  Virus 1 Not Detected Not Detected    Parainfluenza Virus 2 Not Detected Not Detected    Parainfluenza Virus 3 Not Detected Not Detected    Parainfluenza Virus 4 Not Detected Not Detected    RSV, PCR Not Detected Not Detected    Bordetella pertussis pcr Not Detected Not Detected    Bordetella parapertussis PCR Not Detected Not Detected    Chlamydophila pneumoniae PCR Not Detected Not Detected    Mycoplasma pneumo by PCR Not Detected Not Detected   Comprehensive Metabolic Panel    Collection Time: 02/21/24  8:55 AM    Specimen: Blood   Result Value Ref Range    Glucose 72 65 - 99 mg/dL    BUN 21 (H) 6 - 20 mg/dL    Creatinine 0.77 0.57 - 1.00 mg/dL    Sodium 142 136 - 145 mmol/L    Potassium 3.9 3.5 - 5.2 mmol/L    Chloride 104 98 - 107 mmol/L    CO2 25.0 22.0 - 29.0 mmol/L    Calcium 9.5 8.6 - 10.5 mg/dL    Total Protein 6.5 6.0 - 8.5 g/dL    Albumin 4.5 3.5 - 5.2 g/dL    ALT (SGPT) 24 1 - 33 U/L    AST (SGOT) 20 1 - 32 U/L    Alkaline Phosphatase 66 39 - 117 U/L    Total Bilirubin 0.2 0.0 - 1.2 mg/dL    Globulin 2.0 gm/dL    A/G Ratio 2.3 g/dL    BUN/Creatinine Ratio 27.3 (H) 7.0 - 25.0    Anion Gap 13.0 5.0 - 15.0 mmol/L    eGFR 94.1 >60.0 mL/min/1.73   High Sensitivity Troponin T    Collection Time: 02/21/24  8:55 AM    Specimen: Blood   Result Value Ref Range    HS Troponin T <6 <14 ng/L   Lactic Acid, Plasma    Collection Time: 02/21/24  8:55 AM    Specimen: Blood   Result Value Ref Range    Lactate 1.9 0.5 - 2.0 mmol/L   CBC Auto Differential    Collection Time: 02/21/24  8:55 AM    Specimen: Blood   Result Value Ref Range    WBC 7.78 3.40 - 10.80 10*3/mm3    RBC 4.24 3.77 - 5.28 10*6/mm3    Hemoglobin 12.8 12.0 - 15.9 g/dL    Hematocrit 39.4 34.0 - 46.6 %    MCV 92.9 79.0 - 97.0 fL    MCH 30.2 26.6 - 33.0 pg    MCHC 32.5 31.5 - 35.7 g/dL    RDW 13.0 12.3 - 15.4 %    RDW-SD 44.1 37.0 - 54.0 fl    MPV 9.6 6.0 - 12.0 fL    Platelets 303 140 - 450 10*3/mm3    Neutrophil % 87.3 (H) 42.7 - 76.0 %    Lymphocyte % 7.6  (L) 19.6 - 45.3 %    Monocyte % 4.1 (L) 5.0 - 12.0 %    Eosinophil % 0.1 (L) 0.3 - 6.2 %    Basophil % 0.4 0.0 - 1.5 %    Immature Grans % 0.5 0.0 - 0.5 %    Neutrophils, Absolute 6.79 1.70 - 7.00 10*3/mm3    Lymphocytes, Absolute 0.59 (L) 0.70 - 3.10 10*3/mm3    Monocytes, Absolute 0.32 0.10 - 0.90 10*3/mm3    Eosinophils, Absolute 0.01 0.00 - 0.40 10*3/mm3    Basophils, Absolute 0.03 0.00 - 0.20 10*3/mm3    Immature Grans, Absolute 0.04 0.00 - 0.05 10*3/mm3    nRBC 0.0 0.0 - 0.2 /100 WBC   aPTT    Collection Time: 02/21/24  9:08 AM    Specimen: Blood   Result Value Ref Range    PTT 24.4 22.7 - 35.4 seconds   Protime-INR    Collection Time: 02/21/24  9:08 AM    Specimen: Blood   Result Value Ref Range    Protime 13.3 11.7 - 14.2 Seconds    INR 1.00 0.90 - 1.10   D-dimer, Quantitative    Collection Time: 02/21/24  9:08 AM    Specimen: Blood   Result Value Ref Range    D-Dimer, Quantitative <0.27 0.00 - 0.50 MCGFEU/mL   ECG 12 Lead Dyspnea    Collection Time: 02/21/24  9:12 AM   Result Value Ref Range    QT Interval 416 ms    QTC Interval 453 ms   High Sensitivity Troponin T 2Hr    Collection Time: 02/21/24 10:54 AM    Specimen: Blood   Result Value Ref Range    HS Troponin T 8 <14 ng/L    Troponin T Delta           RADIOLOGY  XR Chest 1 View    Result Date: 2/21/2024  XR CHEST 1 VW-  Clinical: Dyspnea  COMPARISON examination 6/16/2023  FINDINGS: Cardiac size within normal limits. No mediastinal or hilar abnormality. Lungs clear.  CONCLUSION: No active disease of the chest  This report was finalized on 2/21/2024 10:09 AM by Dr. Andrea Cat M.D on Workstation: [id number]         MEDICATIONS GIVEN IN ER  Medications   sodium chloride 0.9 % flush 10 mL (has no administration in time range)   methylPREDNISolone sodium succinate (SOLU-Medrol) injection 125 mg (125 mg Intravenous Given 2/21/24 0903)   LORazepam (ATIVAN) injection 0.5 mg (0.5 mg Intravenous Given 2/21/24 0904)         ORDERS PLACED DURING THIS  VISIT:  Orders Placed This Encounter   Procedures    Respiratory Panel PCR w/COVID-19(SARS-CoV-2) [facility]/ANA LUISA/BLAIR/PAD/COR/MARIANGEL In-House, NP Swab in UTM/VTM, 2 HR TAT - Swab, Nasopharynx    XR Chest 1 View    Comprehensive Metabolic Panel    aPTT    Protime-INR    D-dimer, Quantitative    High Sensitivity Troponin T    Lactic Acid, Plasma    CBC Auto Differential    High Sensitivity Troponin T 2Hr    Monitor Blood Pressure    Pulse Oximetry, Continuous    ECG 12 Lead Dyspnea    Insert Peripheral IV    CBC & Differential         OUTPATIENT MEDICATION MANAGEMENT:  Current Facility-Administered Medications Ordered in Epic   Medication Dose Route Frequency Provider Last Rate Last Admin    ketorolac (TORADOL) tablet 10 mg  10 mg Oral Q6H Wade Graham PA        sodium chloride 0.9 % flush 10 mL  10 mL Intravenous PRN Tolu Pemberton MD         Current Outpatient Medications Ordered in Epic   Medication Sig Dispense Refill    albuterol (PROVENTIL) (2.5 MG/3ML) 0.083% nebulizer solution Take 2.5 mg by nebulization Every 4 (Four) Hours As Needed for Wheezing.      albuterol (PROVENTIL) 4 MG tablet Take 1 tablet by mouth Every Night. 90 tablet 3    albuterol sulfate  (90 Base) MCG/ACT inhaler USE 2 INHALATIONS EVERY 4 HOURS AS NEEDED FOR WHEEZING 34 g 0    buPROPion XL (Wellbutrin XL) 300 MG 24 hr tablet Take 1 tablet by mouth Every Morning. 90 tablet 1    clindamycin (CLEOCIN T) 1 % swab       diclofenac (VOLTAREN) 75 MG EC tablet Take 1 tablet by mouth 2 (Two) Times a Day As Needed (pain). 30 tablet 0    FLUoxetine (PROzac) 40 MG capsule Take 2 capsules by mouth Daily. 180 capsule 3    Fluticasone Furoate-Vilanterol (Breo Ellipta) 100-25 MCG/INH inhaler Inhale 1 puff Daily. 180 each 3    levothyroxine (SYNTHROID, LEVOTHROID) 50 MCG tablet Take 1 tablet by mouth Daily. 90 tablet 3    metaxalone (Skelaxin) 800 MG tablet Take 0.5-1 tablets by mouth 4 (Four) Times a Day As Needed for Muscle Spasms. 120 tablet 0     methylPREDNISolone (MEDROL) 4 MG dose pack Take as directed on package instructions. 21 tablet 0    montelukast (SINGULAIR) 10 MG tablet TAKE 1 TABLET DAILY 90 tablet 3    Multiple Vitamin (MULTI-VITAMIN DAILY PO) Take 1 tablet by mouth Every Night.      omeprazole (priLOSEC) 40 MG capsule Take 1 capsule by mouth Daily. 90 capsule 1    ondansetron (ZOFRAN) 4 MG tablet Take 1 tablet by mouth Every 8 (Eight) Hours As Needed for Nausea or Vomiting. 30 tablet 1    oseltamivir (Tamiflu) 75 MG capsule Take one tablet by mouth twice daily for 5 days 10 capsule 0    predniSONE (DELTASONE) 20 MG tablet 2 tablets by mouth daily for 5 days 10 tablet 0    tamoxifen (NOLVADEX) 20 MG chemo tablet Take  by mouth Daily.      theophylline (THEODUR) 300 MG 12 hr tablet Take 1 tablet by mouth Every Morning. 90 tablet 3    valACYclovir (VALTREX) 500 MG tablet TAKE 1 TABLET DAILY 90 tablet 3         PROCEDURES  Procedures        PROGRESS, DATA ANALYSIS, CONSULTS, AND MEDICAL DECISION MAKING  All labs have been independently interpreted by me.  All radiology studies have been reviewed by me. All EKG's have been independently viewed and interpreted by me.  Discussion below represents my analysis of pertinent findings related to patient's condition, differential diagnosis, treatment plan and final disposition.    Differential diagnosis:   My differential diagnosis for cough includes but is not limited to:  Upper respiratory infection, bronchitis, pneumonia, COPD exacerbation, cough variant asthma, cardiac asthma, coronary artery disease, congestive heart failure, bacterial, viral or lung infections, lung cancer, aspiration pneumonitis, aspiration of foreign body and Covid -19      Clinical Scores:                  ED Course as of 02/21/24 1235   Wed Feb 21, 2024   0917 EKG           EKG time: 0912  Rhythm/Rate: Sinus, 70  P waves and OR: EDWARD within normal limits  QRS, axis: Narrow complex  ST and T waves: Artifact limits interpretation but  no profound ST elevation appreciated    Interpreted Contemporaneously by me, independently viewed  Comparison: 5/25/2022-similar artifact   [RS]   1202 Influenza A H1 2009 PCR(!): Detected [RS]   1202 HS Troponin T: 8 [RS]   1202 D-Dimer, Quant: <0.27 [RS]   1202 WBC: 7.78 [RS]   1202 Hemoglobin: 12.8 [RS]   1202 Creatinine: 0.77 [RS]   1202 Sodium: 142 [RS]   1202 Potassium: 3.9 [RS]   1202 ALT (SGPT): 24 [RS]   1202 AST (SGOT): 20 [RS]   1202 Total Bilirubin: 0.2 [RS]   1202 Lactate: 1.9 [RS]   1202 RADIOLOGY      Study: Single view chest  Findings: No focal infiltrate or pneumothorax appreciated  I independently viewed and interpreted these images contemporaneously with treatment.    [RS]   1202 Patient feeling improved.  I have reviewed all findings with her.  Plan for discharge.  Patient agreeable. [RS]      ED Course User Index  [RS] Tolu Pemberton MD         Prescription drug monitoring program review: NA    AS OF 12:35 EST VITALS:    BP - 131/76  HR - 78  TEMP - 98.3 °F (36.8 °C)  O2 SATS - 97%    COMPLEXITY OF CARE  Admission was considered but after careful review of the patient's presentation, physical examination, diagnostic results, and response to treatment the patient may be safely discharged with outpatient follow-up.      DIAGNOSIS  Final diagnoses:   Influenza A   Wheezing   History of asthma         DISPOSITION  ED Disposition       ED Disposition   Discharge    Condition   Stable    Comment   --                  DISCHARGE    Patient discharged in stable condition.    Reviewed implications of results, diagnosis, meds, responsibility to follow up, warning signs and symptoms of possible worsening, potential complications and reasons to return to ER.    Patient/Family voiced understanding of above instructions.    Discussed plan for discharge, as there is no emergent indication for admission. Patient referred to primary care provider for regular health maintenance. Pt/family is agreeable and  understands need for follow up and possible repeat testing.  Pt is aware that discharge does not mean that nothing is wrong but it indicates no emergency is present that requires admission and they must continue care with follow-up as given below or physician of their choice.     FOLLOW-UP  Kehrer, Meredith Lea, MD  [address]  [address]  [facility] [address] [phone]    Schedule an appointment as soon as possible for a visit in 1 week  If symptoms fail to improve    [facility] EMERGENCY DEPARTMENT  [address] [facility] [address] [phone]  Go to   As needed, If symptoms worsen         Medication List        New Prescriptions      oseltamivir 75 MG capsule  Commonly known as: Tamiflu  Take one tablet by mouth twice daily for 5 days     predniSONE 20 MG tablet  Commonly known as: DELTASONE  2 tablets by mouth daily for 5 days               Where to Get Your Medications        These medications were sent to [id number] - [place], [place] - [facility] AT [address] - [phone]  - [phone]   [address] [facility], [facility] [address]      Phone: [phone]   oseltamivir 75 MG capsule  predniSONE 20 MG tablet           Please note that portions of this document were completed with a voice recognition program.    Note Disclaimer: At [facility], we believe that sharing information builds trust and better relationships. You are receiving this note because you recently visited [facility]. It is possible you will see health information before a provider has talked with you about it. This kind of information can be easy to misunderstand. To help you fully understand what it means for your health, we urge you to discuss this note with your provider.         Tolu Pemberton MD  02/21/24 2115

## 2024-02-28 ENCOUNTER — OFFICE VISIT (OUTPATIENT)
Dept: FAMILY MEDICINE CLINIC | Facility: CLINIC | Age: 49
End: 2024-02-28
Payer: COMMERCIAL

## 2024-02-28 VITALS
HEIGHT: 65 IN | DIASTOLIC BLOOD PRESSURE: 82 MMHG | BODY MASS INDEX: 26.82 KG/M2 | TEMPERATURE: 98 F | WEIGHT: 161 LBS | HEART RATE: 72 BPM | OXYGEN SATURATION: 96 % | SYSTOLIC BLOOD PRESSURE: 122 MMHG

## 2024-02-28 DIAGNOSIS — H65.191 ACUTE NON-SUPPURATIVE OTITIS MEDIA, RIGHT: ICD-10-CM

## 2024-02-28 DIAGNOSIS — J45.51 SEVERE PERSISTENT ASTHMA WITH EXACERBATION: Primary | ICD-10-CM

## 2024-02-28 DIAGNOSIS — J10.1 INFLUENZA A: ICD-10-CM

## 2024-02-28 PROCEDURE — 99214 OFFICE O/P EST MOD 30 MIN: CPT | Performed by: FAMILY MEDICINE

## 2024-02-28 RX ORDER — CEFDINIR 300 MG/1
300 CAPSULE ORAL 2 TIMES DAILY
Qty: 14 CAPSULE | Refills: 0 | Status: SHIPPED | OUTPATIENT
Start: 2024-02-28 | End: 2024-03-06

## 2024-02-28 RX ORDER — PREDNISONE 20 MG/1
TABLET ORAL
Qty: 13 TABLET | Refills: 0 | Status: SHIPPED | OUTPATIENT
Start: 2024-02-28 | End: 2024-03-06

## 2024-02-28 NOTE — PROGRESS NOTES
"Chief Complaint  Asthma (Following up with flu - pt tested positive last Wednesday 2/21/24)    Subjective        Nereida Myles presents to [facility]  History of Present Illness  Started feeling bad Monday 2/19/2024.  Went to the ER on 2/21 and diagnosed with flu A.  Was already on medrol so was given a steroid shot and tamiflu.  Congestion is now thick.  Hard to get a deep breath.  Using nebulizer since last night.  Was feeling back to baseline until last night.   Ears filled up and started feeling sick again.      Objective   Vital Signs:  /82   Pulse 72   Temp 98 °F (36.7 °C) (Oral)   Ht 165.1 cm (65\")   Wt 73 kg (161 lb)   SpO2 96%   BMI 26.79 kg/m²   Estimated body mass index is 26.79 kg/m² as calculated from the following:    Height as of this encounter: 165.1 cm (65\").    Weight as of this encounter: 73 kg (161 lb).               Physical Exam  Constitutional:       General: She is not in acute distress.     Appearance: Normal appearance. She is well-developed.   HENT:      Head: Normocephalic and atraumatic.      Right Ear: Ear canal and external ear normal.      Left Ear: Ear canal and external ear normal.      Ears:      Comments: Fluid left TM, right TM with purulent effusion and erythema and bulging     Mouth/Throat:      Mouth: Mucous membranes are moist.      Pharynx: Oropharynx is clear.   Eyes:      Conjunctiva/sclera: Conjunctivae normal.      Pupils: Pupils are equal, round, and reactive to light.   Neck:      Thyroid: No thyromegaly.   Cardiovascular:      Rate and Rhythm: Normal rate and regular rhythm.      Heart sounds: No murmur heard.  Pulmonary:      Effort: Pulmonary effort is normal.      Breath sounds: Wheezing and rhonchi present. No rales.      Comments: Few scattered end expiratory wheezes and rhonchi in the upper lobes  Abdominal:      General: Bowel sounds are normal.      Palpations: Abdomen is soft.      Tenderness: There is no " abdominal tenderness.   Musculoskeletal:         General: Normal range of motion.      Cervical back: Neck supple.   Lymphadenopathy:      Cervical: No cervical adenopathy.   Skin:     General: Skin is warm and dry.   Neurological:      Mental Status: She is alert and oriented to person, place, and time.   Psychiatric:         Mood and Affect: Mood normal.         Behavior: Behavior normal.        Result Review :  ED Provider Notes by Tolu Pemberton MD (02/21/2024 08:49)   ED Provider Notes by Tolu Pemberton MD (02/21/2024 08:49)         Respiratory Panel PCR w/COVID-19(SARS-CoV-2) [facility]/ANA LUISA/BLAIR/PAD/COR/MARIANGEL In-House, NP Swab in UTM/VTM, 2 HR TAT - Swab, Nasopharynx (02/21/2024 08:55)   High Sensitivity Troponin T 2Hr (02/21/2024 10:54)  D-dimer, Quantitative (02/21/2024 09:08)  Protime-INR (02/21/2024 09:08)  aPTT (02/21/2024 09:08)  Lactic Acid, Plasma (02/21/2024 08:55)  High Sensitivity Troponin T (02/21/2024 08:55)  Comprehensive Metabolic Panel (02/21/2024 08:55)  CBC & Differential (02/21/2024 08:55)         Assessment and Plan     Diagnoses and all orders for this visit:    1. Severe persistent asthma with exacerbation (Primary)  -     predniSONE (DELTASONE) 20 MG tablet; Take 3 tablets by mouth Daily for 2 days, THEN 2 tablets Daily for 2 days, THEN 1 tablet Daily for 3 days.  Dispense: 13 tablet; Refill: 0    2. Influenza A    3. Acute non-suppurative otitis media, right  -     cefdinir (OMNICEF) 300 MG capsule; Take 1 capsule by mouth 2 (Two) Times a Day for 7 days.  Dispense: 14 capsule; Refill: 0    Severe persistent asthma with exacerbation from influenza A-will do a prednisone taper for this week starting at 60 going to 20, use nebulizer every 4 hours while awake, go to ER for severe shortness of breath  Acute suppurative otitis media-we will treat with cefdinir         Follow Up     No follow-ups on file.  Patient was given instructions and counseling regarding her condition or for health maintenance  advice. Please see specific information pulled into the AVS if appropriate.

## 2024-02-29 ENCOUNTER — PATIENT MESSAGE (OUTPATIENT)
Dept: FAMILY MEDICINE CLINIC | Facility: CLINIC | Age: 49
End: 2024-02-29
Payer: COMMERCIAL

## 2024-02-29 NOTE — LETTER
March 1, 2024     Patient: Nereida Myles   YOB: 1975   Date of Visit: 2/29/2024       To Whom It May Concern:    It is my medical opinion that Nereida Myles may return to work on March 4, 2024.            Sincerely,        Meredith Lea Kehrer, MD    CC:   No Recipients

## 2024-03-01 DIAGNOSIS — F33.2 SEVERE EPISODE OF RECURRENT MAJOR DEPRESSIVE DISORDER, WITHOUT PSYCHOTIC FEATURES: ICD-10-CM

## 2024-03-01 RX ORDER — BUPROPION HYDROCHLORIDE 300 MG/1
300 TABLET ORAL EVERY MORNING
Qty: 90 TABLET | Refills: 3 | Status: SHIPPED | OUTPATIENT
Start: 2024-03-01

## 2024-03-01 NOTE — TELEPHONE ENCOUNTER
From: Nereida Myles  To: Meredith Kehrer  Sent: 2/29/2024 9:30 PM EST  Subject: Work Note    Can I please have a work note for Thursday 2/29 and Friday 3/2? I saw Dr Kehrer Wednesday.

## 2024-03-12 RX ORDER — SODIUM CHLORIDE 0.9 % (FLUSH) 0.9 %
10 SYRINGE (ML) INJECTION EVERY 12 HOURS SCHEDULED
Status: DISCONTINUED | OUTPATIENT
Start: 2024-03-13 | End: 2024-03-13 | Stop reason: HOSPADM

## 2024-03-12 RX ORDER — MIDAZOLAM HYDROCHLORIDE 2 MG/2ML
4 INJECTION, SOLUTION INTRAMUSCULAR; INTRAVENOUS ONCE
Status: DISCONTINUED | OUTPATIENT
Start: 2024-03-13 | End: 2024-03-13 | Stop reason: HOSPADM

## 2024-03-12 NOTE — SIGNIFICANT NOTE
Patient educated on the following :    - If you are receiving Sedation for your procedure Nothing to Eat 6 hours and only clear liquids for 2 hours prior to your procedure.     -You will need to have someone drive you home after your PROCEDURE and remain with you for 24 hours after the PROCEDURE  - The date of your procedure, your are welcome to have one visitor at bedside or remain within 10-15 minutes of [facility]  -You will need to arrive at 1430 on 3/13/24 for your PROCEDURE  -Please contact [facility] at: [phone] with any questions and/or concerns

## 2024-03-13 ENCOUNTER — HOSPITAL ENCOUNTER (OUTPATIENT)
Facility: SURGERY CENTER | Age: 49
Setting detail: HOSPITAL OUTPATIENT SURGERY
Discharge: HOME | End: 2024-03-13
Attending: ANESTHESIOLOGY | Admitting: ANESTHESIOLOGY
Payer: COMMERCIAL

## 2024-03-13 ENCOUNTER — HOSPITAL ENCOUNTER (OUTPATIENT)
Dept: GENERAL RADIOLOGY | Facility: SURGERY CENTER | Age: 49
Setting detail: HOSPITAL OUTPATIENT SURGERY
End: 2024-03-13
Payer: COMMERCIAL

## 2024-03-13 VITALS
RESPIRATION RATE: 16 BRPM | WEIGHT: 160 LBS | TEMPERATURE: 97.4 F | BODY MASS INDEX: 26.66 KG/M2 | HEART RATE: 61 BPM | DIASTOLIC BLOOD PRESSURE: 89 MMHG | SYSTOLIC BLOOD PRESSURE: 131 MMHG | HEIGHT: 65 IN | OXYGEN SATURATION: 99 %

## 2024-03-13 DIAGNOSIS — Z41.9 SURGERY, ELECTIVE: ICD-10-CM

## 2024-03-13 DIAGNOSIS — M47.816 LUMBAR FACET ARTHROPATHY: ICD-10-CM

## 2024-03-13 PROCEDURE — 76000 FLUOROSCOPY <1 HR PHYS/QHP: CPT

## 2024-03-13 PROCEDURE — 64494 INJ PARAVERT F JNT L/S 2 LEV: CPT | Performed by: ANESTHESIOLOGY

## 2024-03-13 PROCEDURE — 64493 INJ PARAVERT F JNT L/S 1 LEV: CPT | Performed by: ANESTHESIOLOGY

## 2024-03-13 PROCEDURE — 25510000001 IOPAMIDOL 61 % SOLUTION 30 ML VIAL: Performed by: ANESTHESIOLOGY

## 2024-03-13 PROCEDURE — 77002 NEEDLE LOCALIZATION BY XRAY: CPT

## 2024-03-13 PROCEDURE — 25010000002 BUPIVACAINE (PF) 0.5 % SOLUTION 10 ML VIAL: Performed by: ANESTHESIOLOGY

## 2024-03-13 RX ORDER — DIAZEPAM 5 MG/1
10 TABLET ORAL ONCE
Status: COMPLETED | OUTPATIENT
Start: 2024-03-13 | End: 2024-03-13

## 2024-03-13 RX ADMIN — DIAZEPAM 10 MG: 5 TABLET ORAL at 14:48

## 2024-03-13 NOTE — DISCHARGE INSTRUCTIONS
[facility] - Post-procedure Instructions          --  While there are no absolute restrictions, it is recommended that you do not perform strenuous activity today. In the morning, you may resume your level of activity as before your block.    --  If you have a band-aid at your injection site, please remove it later today. Observe the area for any redness, swelling, pus-like drainage, or a temperature over 101°. If any of these symptoms occur, please call your doctor at [phone]. If after office hours, leave a message and the on-call provider will return your call.    --  Ice may be applied to your injection site. It is recommended you avoid direct heat (heating pad; hot tub) for 1-2 days.    --  Call [facility] at [phone] if you experience persistent headache, persistent bleeding from the injection site, or severe pain not relieved by heat or oral medication.    --  Do not make important decisions today.    --  Due to the effects of the block and/or the I.V. Sedation, DO NOT drive or operate hazardous machinery for 12 hours.  Local anesthetics may cause numbness after procedure and precautions must be taken with regards to operating equipment as well as with walking, even if ambulating with assistance of another person or with an assistive device.    --  Do not drink alcohol for 12 hours.    -- You may return to work tomorrow, or as directed by your referring doctor.    --  Occasionally you may notice a slight increase in your pain after the procedure. This should start to improve within the next 24-48 hours. Radiofrequency ablation procedure pain may last 3-4 weeks.    --  It may take as long as 3-4 days before you notice a gradual improvement in your pain and/or other symptoms.    -- You may continue to take your prescribed pain medication as needed.    --  Some normal possible side effects of steroid use could include fluid retention, increased blood sugar, dull headache,  increased sweating, increased appetite, mood swings and flushing.    --  Diabetics are recommended to watch their blood glucose level closely for 24-48 hours after the injection.    --  Must stay in PACU for 20 min upon arrival and prove no leg weakness before being discharged.    --  IN THE EVENT OF A LIFE THREATENING EMERGENCY, (CHEST PAIN, BREATHING DIFFICULTIES, PARALYSIS…) YOU SHOULD GO TO YOUR NEAREST EMERGENCY ROOM.    --  You should be contacted by our office within 2-3 days to schedule follow up or next appointment date.  If not contacted within 7 days, please call the office at [phone]

## 2024-03-13 NOTE — OP NOTE
RIGHT L3-5 Lumbar Medial Branch Blockade  [facility]    PREOPERATIVE DIAGNOSIS:  Lumbar spondylosis without myelopathy    POSTOPERATIVE DIAGNOSIS:  Lumbar spondylosis without myelopathy    PROCEDURE:   Diagnostic Right Lumbar Medial Branch Nerve Blockades, with fluoroscopy:  L3, L4, and L5 nerves (at the L4, L5 transverse processes and the sacral alar groove) to block facet joints L4-5, and L5-S1  68192 -- Lumbar Facet block, 1st Level  21139 -- Lumbar Facet block, 2nd  Level      PRE-PROCEDURE DISCUSSION WITH PATIENT:    Risks and complications were discussed with the patient prior to starting the procedure and informed consent was obtained.      SURGEON:   Rei Pruitt MD    REASON FOR PROCEDURE:    Painful area identified on exam under fluoroscopy    SEDATION:  The patient had higher than average levels of procedural anxiety and the need to provide additional procedural sedation was needed to safely proceed. and she received 10mg po diazepam in preop area  ANESTHETIC:  Marcaine 0.5%  STEROID:  NONE  TOTAL VOLUME OF SOLUTION:  3 mL    DESCRIPTON OF PROCEDURE:  After obtaining informed consent, IV access was not obtained in the preoperative area.   The patient was taken to the operating room.  The patient was placed in the prone position with a pillow under the abdomen. All pressure points were well padded.  EKG, blood pressure, and pulse oximeter were monitored.  The patient was monitored and sedated by the RN under my direction. The lumbosacral area was prepped with Chloraprep and draped in a sterile fashion. Under fluoroscopic guidance the transverse processes of the L4 and L5 vertebrae at the junctions of the superior articular processes were identified on the affected side.  Also identified was the groove between the ala and the superior articular process of the sacrum on the ipsilateral side.  Skin and subcutaneous tissue were anesthetized with 1% lidocaine above each of these points.  A spinal needle was introduced under fluoroscopic guidance at the above junctions. Aspiration was negative for blood and CSF.  After confirming the position of the needle with fluoroscope in all views, 0.25 mL of Omnipaque was injected, and after seeing the proper spread a total of 1 mL of the anesthetic solution noted above was injected at each of these points.  Needles were removed intact from each of the areas.   Onset of analgesia was noted.  Vital signs remained stable throughout.      ESTIMATED BLOOD LOSS:  <5 mL  SPECIMENS:  none    COMPLICATIONS:   No complications were noted., There was no indication of vascular uptake on live injection of contrast dye., There was no indication of intrathecal uptake on live injection of contrast dye., There was not any evidence of dural puncture.  , and The patient did not have any signs of postprocedure numbness nor weakness.    TOLERANCE & DISCHARGE CONDITION:    The patient tolerated the procedure well.  The patient was transported to the recovery area without difficulties.  The patient was discharged to home under the care of family in stable and satisfactory condition.    PLAN OF CARE:  The patient was given our standard instruction sheet.  We discussed that Lumbar Medial Branch Blockade is a diagnostic procedure in consideration for radiofrequency ablation if two diagnostic procedures prove to be positive for significant benefit.  If sustained relief of 6 to eight weeks is obtained, then an alternative plan could be therapeutic lumbar branch blockades.  The patient is asked to keep a pain log each hour for 8 hours after the procedure today.  The patient will  Return to clinic 1 wk  The patient will resume all medications as per the medication reconciliation sheet.

## 2024-03-20 ENCOUNTER — OFFICE VISIT (OUTPATIENT)
Dept: PAIN MEDICINE | Facility: CLINIC | Age: 49
End: 2024-03-20
Payer: COMMERCIAL

## 2024-03-20 ENCOUNTER — PREP FOR SURGERY (OUTPATIENT)
Dept: SURGERY | Facility: SURGERY CENTER | Age: 49
End: 2024-03-20
Payer: COMMERCIAL

## 2024-03-20 VITALS
HEIGHT: 65 IN | OXYGEN SATURATION: 98 % | WEIGHT: 163.2 LBS | SYSTOLIC BLOOD PRESSURE: 119 MMHG | BODY MASS INDEX: 27.19 KG/M2 | HEART RATE: 78 BPM | TEMPERATURE: 96.8 F | RESPIRATION RATE: 18 BRPM | DIASTOLIC BLOOD PRESSURE: 82 MMHG

## 2024-03-20 DIAGNOSIS — M47.816 LUMBAR FACET ARTHROPATHY: Primary | ICD-10-CM

## 2024-03-20 DIAGNOSIS — M51.36 DDD (DEGENERATIVE DISC DISEASE), LUMBAR: ICD-10-CM

## 2024-03-20 PROCEDURE — 99214 OFFICE O/P EST MOD 30 MIN: CPT | Performed by: PHYSICIAN ASSISTANT

## 2024-03-20 RX ORDER — DIAZEPAM 5 MG/1
10 TABLET ORAL ONCE
OUTPATIENT
Start: 2024-03-20 | End: 2024-03-20

## 2024-03-20 NOTE — PROGRESS NOTES
CHIEF COMPLAINT  Back pain      Subjective   Nereida Myles is a 48 y.o. female  who presents to the office for follow-up of procedure.  She completed a LUMBAR MEDIAL BRANCH BLOCK RIGHT L3-L5 #1  on  3/13/24 performed by Dr. Pruitt for management of back pain. Patient reports 80% relief from the procedure x 6 hours.  The patient reports significant reduction of right hip and groin pain following the first medial branch block as well.  However during the period of time that she had significant improvement she is now unable to determine that she is having significant pain also affecting the left lumbar paraspinal region which is aggravated with lumbar facet loading maneuvers.    This patient has a history of breast cancer with bilateral mastectomy.  She is 7 years in remission.       Pain today 7/10 VAS in severity.        Back Pain  This is a chronic problem. The current episode started more than 1 year ago. The problem occurs constantly. The pain is present in the lumbar spine. The quality of the pain is described as aching. The pain radiates to the right thigh. The pain is at a severity of 7/10. The pain is moderate. The pain is Worse during the day. The symptoms are aggravated by position, twisting and bending (activity). Pertinent negatives include no abdominal pain, dysuria, headaches or weakness.        PEG Assessment   What number best describes your pain on average in the past week?6  What number best describes how, during the past week, pain has interfered with your enjoyment of life?6  What number best describes how, during the past week, pain has interfered with your general activity?  6    Review of Pertinent Medical Data ---  AP PELVIS AND AP/FROG LATERAL RIGHT HIP     HISTORY: Right hip and groin pain.     COMPARISON: CT and pelvis 01/07/2023     FINDINGS: There are mild arthritic changes both hips with mild marginal  spur formation. There is no evidence for hip fracture or acute  abnormality. There  are are changes of degenerative disc disease in the  lower lumbar spine where there is disc space narrowing. Small  phleboliths are present in the lower pelvis.     IMPRESSION:  Mild arthritic changes both hips with minor marginal  spurring. No evidence for fracture or acute abnormality of the pelvis or  the right hip.     This report was finalized on 2/9/2024 6:30 AM by Dr. Jose Moreno M.D on Workstation: [id number]          MRI OF THE LUMBAR SPINE TECHNIQUE:   Sagittal T1, proton density fast spin  echo T2 and repeat fat-suppressed T2 and postcontrast sagittal  fat-suppressed T1-weighted images were obtained of the lumbar spine. In  addition, thin cut axial T1-weighted images were obtained angled through  the interspaces from L3 to S1 and axial T2 and postcontrast axial  T1-weighted images were obtained from T12 to S2.     COMPARISON: There are no prior lumbar spine imaging studies from [facility] for comparison.     FINDINGS: The distal thoracic cord and the conus are normal in signal  intensity. The conus terminates in the posterior midline of the thecal  sac at the level of the inferior body of L2 which is probably lower  limits of normal.     The T12-L1, L1-2 and L2-3 disc space and facets are normal with no canal  or foraminal narrowing from T12 to L3.     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     At L3-4 there is minimal right and mild left facet overgrowth and a  small amount of fluid in the left facets. There is some mild disc space  narrowing and degenerative endplate changes most pronounced in the right  side of the endplates along the inner margin of the levoscoliotic curve.  There is a 3 mm retrolisthesis of L3 with respect to L4 with disc  material bulging along the posterior superior endplate of L4. There is  essentially no narrowing of the thecal sac. There is mild right and no  left foraminal narrowing.     At  L4-5 there is minimal facet overgrowth. There is 3 mm retrolisthesis  of L4 with respect to L5 and some bulging disc material extending along  the posterior superior endplate of L5. There is no canal narrowing.  There is minimal right and mild left foraminal narrowing.     At L5-S1 there is minimal facet overgrowth. There is disc space  narrowing, mild degenerative endplate changes, 3-4 mm retrolisthesis of  L5 with respect to S1, and minimal posterior spurring. There is no canal  or lateral recess narrowing. There is mild spurring into the inferior  aspect of the foramina and there is mild bilateral foraminal narrowing.     Other than the degenerative marrow edema in the right side of the  endplates at L3-4 and minimal degenerative marrow edema of the right  posterior lateral endplates at L4-5, marrow signal intensity in the  lumbar spine is within normal limits. I see no evidence of spinal  infection in the lumbar spine.     IMPRESSION:  1. No convincing acute abnormality is seen in the lumbar spine with no  direct evidence of spinal infection within the lumbar spine. 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  2. Disc space and facets are normal in appearance with no canal or  foraminal narrowing from T10 down to L3.  3. There is disc space narrowing and degenerative endplate changes at  L3-4 most pronounced in the right side of the endplates along the inner  margin of the levoscoliotic curve and there is a 3-4 mm retrolisthesis  of L3 with respect to L4 and minimal facet overgrowth but essentially no  canal narrowing and there is only mild right foraminal narrowing at  L3-4.  4. At L4-5 there is minimal facet overgrowth and a 3-4 mm retrolisthesis  of L4 with respect to L5. There is no canal narrowing. There is minimal  right and mild left foraminal narrowing at L4-5.   5. At L5-S1 there is disc space narrowing, degenerative endplate  changes, and  "3-4 mm retrolisthesis of L5 with respect to S1. There is no  canal or lateral recess narrowing. There is spurring into the foramina  and mild bilateral foraminal narrowing.  6. There is mild bone marrow edema in the right side of the endplates at  L3-4 and right posterior lateral endplates at L4-5 which is felt to  almost certainly be degenerative marrow edema. The remainder of the  lumbar spine is within normal limits     This report was finalized on 1/10/2023 7:44 AM by Dr. Isidro Tellez M.D.    The following portions of the patient's history were reviewed and updated as appropriate: allergies, current medications, past family history, past medical history, past social history, past surgical history, and problem list.    Review of Systems   Constitutional:  Positive for fatigue. Negative for activity change.   Gastrointestinal:  Negative for abdominal pain, constipation and diarrhea.   Genitourinary:  Negative for difficulty urinating and dysuria.   Musculoskeletal:  Positive for back pain.   Neurological:  Positive for light-headedness. Negative for dizziness, weakness and headaches.   Psychiatric/Behavioral:  Positive for sleep disturbance. Negative for agitation and suicidal ideas. The patient is not nervous/anxious.      I have reviewed and confirmed the accuracy of the ROS as documented by the MA/LPN/RN LINDA Villarreal   Vitals:    03/20/24 1536   BP: 119/82   BP Location: Right arm   Patient Position: Sitting   Cuff Size: Large Adult   Pulse: 78   Resp: 18   Temp: 96.8 °F (36 °C)   SpO2: 98%   Weight: 74 kg (163 lb 3.2 oz)   Height: 165.1 cm (65\")   PainSc:   7         Objective   Physical Exam  Vitals and nursing note reviewed.   Constitutional:       Appearance: Normal appearance. She is normal weight.      Comments: MILDLY ANXIOUS   HENT:      Head: Normocephalic.   Pulmonary:      Effort: Pulmonary effort is normal.   Musculoskeletal:      Lumbar back: Spasms and tenderness present. Decreased range of " motion.        Back:    Skin:     General: Skin is warm and dry.   Neurological:      General: No focal deficit present.      Mental Status: She is alert and oriented to person, place, and time.      Cranial Nerves: Cranial nerves 2-12 are intact.      Sensory: Sensation is intact.      Motor: Motor function is intact.      Gait: Gait is intact.      Deep Tendon Reflexes:      Reflex Scores:       Tricep reflexes are 1+ on the right side and 1+ on the left side.       Bicep reflexes are 1+ on the right side and 1+ on the left side.       Brachioradialis reflexes are 1+ on the right side and 1+ on the left side.       Patellar reflexes are 0 on the right side and 0 on the left side.       Achilles reflexes are 0 on the right side and 0 on the left side.  Psychiatric:         Mood and Affect: Mood normal.         Behavior: Behavior normal.         Thought Content: Thought content normal.         Judgment: Judgment normal.             Assessment & Plan   Diagnoses and all orders for this visit:    1. Lumbar facet arthropathy (Primary)    2. DDD (degenerative disc disease), lumbar        Nereida Myles reports a pain score of 7.  Given her pain assessment as noted, treatment options were discussed and the following options were decided upon as a follow-up plan to address the patient's pain: continuation of current treatment plan for pain and use of non-medical modalities (ice, heat, stretching and/or behavior modifications).      --- Due to the worsening of pain down affecting the left lumbar spine I will have her return for bilateral L3-5 lumbar medial branch block.  The patient will then have to return for the second confirmatory injection to be performed on the left side.  If she continues with favorable response we would then proceed to radiofrequency ablation.  --- Follow-up in 1 week after injective therapy as scheduled                      Dictated utilizing Dragon dictation.

## 2024-03-20 NOTE — H&P (VIEW-ONLY)
CHIEF COMPLAINT  Back pain      Subjective   Nereida Myles is a 48 y.o. female  who presents to the office for follow-up of procedure.  She completed a LUMBAR MEDIAL BRANCH BLOCK RIGHT L3-L5 #1  on  3/13/24 performed by Dr. Pruitt for management of back pain. Patient reports 80% relief from the procedure x 6 hours.  The patient reports significant reduction of right hip and groin pain following the first medial branch block as well.  However during the period of time that she had significant improvement she is now unable to determine that she is having significant pain also affecting the left lumbar paraspinal region which is aggravated with lumbar facet loading maneuvers.    This patient has a history of breast cancer with bilateral mastectomy.  She is 7 years in remission.       Pain today 7/10 VAS in severity.        Back Pain  This is a chronic problem. The current episode started more than 1 year ago. The problem occurs constantly. The pain is present in the lumbar spine. The quality of the pain is described as aching. The pain radiates to the right thigh. The pain is at a severity of 7/10. The pain is moderate. The pain is Worse during the day. The symptoms are aggravated by position, twisting and bending (activity). Pertinent negatives include no abdominal pain, dysuria, headaches or weakness.        PEG Assessment   What number best describes your pain on average in the past week?6  What number best describes how, during the past week, pain has interfered with your enjoyment of life?6  What number best describes how, during the past week, pain has interfered with your general activity?  6    Review of Pertinent Medical Data ---  AP PELVIS AND AP/FROG LATERAL RIGHT HIP     HISTORY: Right hip and groin pain.     COMPARISON: CT and pelvis 01/07/2023     FINDINGS: There are mild arthritic changes both hips with mild marginal  spur formation. There is no evidence for hip fracture or acute  abnormality. There  are are changes of degenerative disc disease in the  lower lumbar spine where there is disc space narrowing. Small  phleboliths are present in the lower pelvis.     IMPRESSION:  Mild arthritic changes both hips with minor marginal  spurring. No evidence for fracture or acute abnormality of the pelvis or  the right hip.     This report was finalized on 2/9/2024 6:30 AM by Dr. Jose Moreno M.D on Workstation: [id number]          MRI OF THE LUMBAR SPINE TECHNIQUE:   Sagittal T1, proton density fast spin  echo T2 and repeat fat-suppressed T2 and postcontrast sagittal  fat-suppressed T1-weighted images were obtained of the lumbar spine. In  addition, thin cut axial T1-weighted images were obtained angled through  the interspaces from L3 to S1 and axial T2 and postcontrast axial  T1-weighted images were obtained from T12 to S2.     COMPARISON: There are no prior lumbar spine imaging studies from [facility] for comparison.     FINDINGS: The distal thoracic cord and the conus are normal in signal  intensity. The conus terminates in the posterior midline of the thecal  sac at the level of the inferior body of L2 which is probably lower  limits of normal.     The T12-L1, L1-2 and L2-3 disc space and facets are normal with no canal  or foraminal narrowing from T12 to L3.     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     At L3-4 there is minimal right and mild left facet overgrowth and a  small amount of fluid in the left facets. There is some mild disc space  narrowing and degenerative endplate changes most pronounced in the right  side of the endplates along the inner margin of the levoscoliotic curve.  There is a 3 mm retrolisthesis of L3 with respect to L4 with disc  material bulging along the posterior superior endplate of L4. There is  essentially no narrowing of the thecal sac. There is mild right and no  left foraminal narrowing.     At  L4-5 there is minimal facet overgrowth. There is 3 mm retrolisthesis  of L4 with respect to L5 and some bulging disc material extending along  the posterior superior endplate of L5. There is no canal narrowing.  There is minimal right and mild left foraminal narrowing.     At L5-S1 there is minimal facet overgrowth. There is disc space  narrowing, mild degenerative endplate changes, 3-4 mm retrolisthesis of  L5 with respect to S1, and minimal posterior spurring. There is no canal  or lateral recess narrowing. There is mild spurring into the inferior  aspect of the foramina and there is mild bilateral foraminal narrowing.     Other than the degenerative marrow edema in the right side of the  endplates at L3-4 and minimal degenerative marrow edema of the right  posterior lateral endplates at L4-5, marrow signal intensity in the  lumbar spine is within normal limits. I see no evidence of spinal  infection in the lumbar spine.     IMPRESSION:  1. No convincing acute abnormality is seen in the lumbar spine with no  direct evidence of spinal infection within the lumbar spine. 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  2. Disc space and facets are normal in appearance with no canal or  foraminal narrowing from T10 down to L3.  3. There is disc space narrowing and degenerative endplate changes at  L3-4 most pronounced in the right side of the endplates along the inner  margin of the levoscoliotic curve and there is a 3-4 mm retrolisthesis  of L3 with respect to L4 and minimal facet overgrowth but essentially no  canal narrowing and there is only mild right foraminal narrowing at  L3-4.  4. At L4-5 there is minimal facet overgrowth and a 3-4 mm retrolisthesis  of L4 with respect to L5. There is no canal narrowing. There is minimal  right and mild left foraminal narrowing at L4-5.   5. At L5-S1 there is disc space narrowing, degenerative endplate  changes, and  "3-4 mm retrolisthesis of L5 with respect to S1. There is no  canal or lateral recess narrowing. There is spurring into the foramina  and mild bilateral foraminal narrowing.  6. There is mild bone marrow edema in the right side of the endplates at  L3-4 and right posterior lateral endplates at L4-5 which is felt to  almost certainly be degenerative marrow edema. The remainder of the  lumbar spine is within normal limits     This report was finalized on 1/10/2023 7:44 AM by Dr. Isidro Tellez M.D.    The following portions of the patient's history were reviewed and updated as appropriate: allergies, current medications, past family history, past medical history, past social history, past surgical history, and problem list.    Review of Systems   Constitutional:  Positive for fatigue. Negative for activity change.   Gastrointestinal:  Negative for abdominal pain, constipation and diarrhea.   Genitourinary:  Negative for difficulty urinating and dysuria.   Musculoskeletal:  Positive for back pain.   Neurological:  Positive for light-headedness. Negative for dizziness, weakness and headaches.   Psychiatric/Behavioral:  Positive for sleep disturbance. Negative for agitation and suicidal ideas. The patient is not nervous/anxious.      I have reviewed and confirmed the accuracy of the ROS as documented by the MA/LPN/RN LNIDA Villarreal   Vitals:    03/20/24 1536   BP: 119/82   BP Location: Right arm   Patient Position: Sitting   Cuff Size: Large Adult   Pulse: 78   Resp: 18   Temp: 96.8 °F (36 °C)   SpO2: 98%   Weight: 74 kg (163 lb 3.2 oz)   Height: 165.1 cm (65\")   PainSc:   7         Objective   Physical Exam  Vitals and nursing note reviewed.   Constitutional:       Appearance: Normal appearance. She is normal weight.      Comments: MILDLY ANXIOUS   HENT:      Head: Normocephalic.   Pulmonary:      Effort: Pulmonary effort is normal.   Musculoskeletal:      Lumbar back: Spasms and tenderness present. Decreased range of " motion.        Back:    Skin:     General: Skin is warm and dry.   Neurological:      General: No focal deficit present.      Mental Status: She is alert and oriented to person, place, and time.      Cranial Nerves: Cranial nerves 2-12 are intact.      Sensory: Sensation is intact.      Motor: Motor function is intact.      Gait: Gait is intact.      Deep Tendon Reflexes:      Reflex Scores:       Tricep reflexes are 1+ on the right side and 1+ on the left side.       Bicep reflexes are 1+ on the right side and 1+ on the left side.       Brachioradialis reflexes are 1+ on the right side and 1+ on the left side.       Patellar reflexes are 0 on the right side and 0 on the left side.       Achilles reflexes are 0 on the right side and 0 on the left side.  Psychiatric:         Mood and Affect: Mood normal.         Behavior: Behavior normal.         Thought Content: Thought content normal.         Judgment: Judgment normal.             Assessment & Plan   Diagnoses and all orders for this visit:    1. Lumbar facet arthropathy (Primary)    2. DDD (degenerative disc disease), lumbar        Nereida Myles reports a pain score of 7.  Given her pain assessment as noted, treatment options were discussed and the following options were decided upon as a follow-up plan to address the patient's pain: continuation of current treatment plan for pain and use of non-medical modalities (ice, heat, stretching and/or behavior modifications).      --- Due to the worsening of pain down affecting the left lumbar spine I will have her return for bilateral L3-5 lumbar medial branch block.  The patient will then have to return for the second confirmatory injection to be performed on the left side.  If she continues with favorable response we would then proceed to radiofrequency ablation.  --- Follow-up in 1 week after injective therapy as scheduled                      Dictated utilizing Dragon dictation.

## 2024-03-22 ENCOUNTER — TRANSCRIBE ORDERS (OUTPATIENT)
Dept: SURGERY | Facility: SURGERY CENTER | Age: 49
End: 2024-03-22
Payer: COMMERCIAL

## 2024-03-22 DIAGNOSIS — Z41.9 SURGERY, ELECTIVE: Primary | ICD-10-CM

## 2024-03-27 ENCOUNTER — HOSPITAL ENCOUNTER (OUTPATIENT)
Facility: SURGERY CENTER | Age: 49
Setting detail: HOSPITAL OUTPATIENT SURGERY
Discharge: HOME | End: 2024-03-27
Attending: ANESTHESIOLOGY | Admitting: ANESTHESIOLOGY
Payer: COMMERCIAL

## 2024-03-27 ENCOUNTER — HOSPITAL ENCOUNTER (OUTPATIENT)
Dept: GENERAL RADIOLOGY | Facility: SURGERY CENTER | Age: 49
Setting detail: HOSPITAL OUTPATIENT SURGERY
End: 2024-03-27
Payer: COMMERCIAL

## 2024-03-27 VITALS
RESPIRATION RATE: 18 BRPM | HEIGHT: 65 IN | OXYGEN SATURATION: 97 % | HEART RATE: 57 BPM | TEMPERATURE: 98.7 F | WEIGHT: 163 LBS | DIASTOLIC BLOOD PRESSURE: 89 MMHG | BODY MASS INDEX: 27.16 KG/M2 | SYSTOLIC BLOOD PRESSURE: 124 MMHG

## 2024-03-27 DIAGNOSIS — Z41.9 SURGERY, ELECTIVE: ICD-10-CM

## 2024-03-27 DIAGNOSIS — M47.816 LUMBAR FACET ARTHROPATHY: ICD-10-CM

## 2024-03-27 PROCEDURE — 25510000001 IOPAMIDOL 61 % SOLUTION 30 ML VIAL: Performed by: ANESTHESIOLOGY

## 2024-03-27 PROCEDURE — 64493 INJ PARAVERT F JNT L/S 1 LEV: CPT | Performed by: ANESTHESIOLOGY

## 2024-03-27 PROCEDURE — 77002 NEEDLE LOCALIZATION BY XRAY: CPT

## 2024-03-27 PROCEDURE — 25010000002 BUPIVACAINE (PF) 0.5 % SOLUTION 10 ML VIAL: Performed by: ANESTHESIOLOGY

## 2024-03-27 PROCEDURE — 64494 INJ PARAVERT F JNT L/S 2 LEV: CPT | Performed by: ANESTHESIOLOGY

## 2024-03-27 PROCEDURE — 76000 FLUOROSCOPY <1 HR PHYS/QHP: CPT

## 2024-03-27 RX ORDER — DIAZEPAM 5 MG/1
10 TABLET ORAL ONCE
Status: COMPLETED | OUTPATIENT
Start: 2024-03-27 | End: 2024-03-27

## 2024-03-27 RX ADMIN — DIAZEPAM 10 MG: 5 TABLET ORAL at 13:41

## 2024-03-27 NOTE — DISCHARGE INSTRUCTIONS
[facility] - Post-procedure Instructions          --  While there are no absolute restrictions, it is recommended that you do not perform strenuous activity today. In the morning, you may resume your level of activity as before your block.    --  If you have a band-aid at your injection site, please remove it later today. Observe the area for any redness, swelling, pus-like drainage, or a temperature over 101°. If any of these symptoms occur, please call your doctor at [phone]. If after office hours, leave a message and the on-call provider will return your call.    --  Ice may be applied to your injection site. It is recommended you avoid direct heat (heating pad; hot tub) for 1-2 days.    --  Call [facility] at [phone] if you experience persistent headache, persistent bleeding from the injection site, or severe pain not relieved by heat or oral medication.    --  Do not make important decisions today.    --  Due to the effects of the block and/or the I.V. Sedation, DO NOT drive or operate hazardous machinery for 12 hours.  Local anesthetics may cause numbness after procedure and precautions must be taken with regards to operating equipment as well as with walking, even if ambulating with assistance of another person or with an assistive device.    --  Do not drink alcohol for 12 hours.    -- You may return to work tomorrow, or as directed by your referring doctor.    --  Occasionally you may notice a slight increase in your pain after the procedure. This should start to improve within the next 24-48 hours. Radiofrequency ablation procedure pain may last 3-4 weeks.    --  It may take as long as 3-4 days before you notice a gradual improvement in your pain and/or other symptoms.    -- You may continue to take your prescribed pain medication as needed.    --  Some normal possible side effects of steroid use could include fluid retention, increased blood sugar, dull headache,  increased sweating, increased appetite, mood swings and flushing.    --  Diabetics are recommended to watch their blood glucose level closely for 24-48 hours after the injection.    --  Must stay in PACU for 20 min upon arrival and prove no leg weakness before being discharged.    --  IN THE EVENT OF A LIFE THREATENING EMERGENCY, (CHEST PAIN, BREATHING DIFFICULTIES, PARALYSIS…) YOU SHOULD GO TO YOUR NEAREST EMERGENCY ROOM.    --  You should be contacted by our office within 2-3 days to schedule follow up or next appointment date.  If not contacted within 7 days, please call the office at [phone]

## 2024-03-27 NOTE — OP NOTE
Bilateral L3-5 Lumbar Medial Branch Blockade  [facility]      PREOPERATIVE DIAGNOSIS:  Lumbar spondylosis without myelopathy    POSTOPERATIVE DIAGNOSIS:  Lumbar spondylosis without myelopathy    PROCEDURE:   Diagnostic Bilateral Lumbar Medial Branch Nerve Blockades, with fluoroscopy:  L3, L4, and L5 nerves (at the L4 & L5 transverse processes and the sacral alar groove) to block facet joints L4-5, and L5-S1  25660-09 -- Bilateral Lumbar Facet blocks, 1st Level  89698-95 -- Bilateral Lumbar Facet blocks, 2nd  Level    PRE-PROCEDURE DISCUSSION WITH PATIENT:    Risks and complications were discussed with the patient prior to starting the procedure and informed consent was obtained.      SURGEON:  Rei Pruitt MD    REASON FOR PROCEDURE:    The patient complains of pain that seems to have a significant axial component and Previous diagnostic positivity of a Lumbar Medial Branch Blockade at the same levels    SEDATION:  The patient had higher than average levels of procedural anxiety and the need to provide additional procedural sedation was needed to safely proceed. and she received diazepam 10 mg by mouth in the preop area  ANESTHETIC:  Marcaine 0.5%  STEROID:  NONE  TOTAL VOLUME OF SOLUTION:  6ml    DESCRIPTON OF PROCEDURE:  After obtaining informed consent, IV access was not obtained in the preoperative area.   The patient was taken to the operating room.  The patient was placed in the prone position with a pillow under the abdomen. All pressure points were well padded.  EKG, blood pressure, and pulse oximeter were monitored.  The patient was monitored and sedated by the RN under my direction. The lumbosacral area was prepped with Chloraprep and draped in a sterile fashion. Under fluoroscopic guidance the transverse processes of the L4 and L5 vertebrae at the junctions of the superior articular processes were identified on the right. Also identified was the groove between the ala and the  superior articular process of the sacrum on the ipsilateral side.  Skin and subcutaneous tissue were anesthetized with 1% lidocaine above each of these points. A 22-gauge spinal needle was introduced under fluoroscopic guidance at the above junctions. Aspiration was negative for blood and CSF.  After confirming the position of the needle with fluoroscope in all views, 0.25 mL of Omnipaque was injected, and after seeing the proper spread a total of 1 mL of the anesthetic solution noted above was injected at each of these points.  Needles were removed intact from each of the areas.  A similar procedure was repeated to block the L3, L4, and L5 nerves on the contralateral side.   Onset of analgesia was noted.  Vital signs remained stable throughout.      ESTIMATED BLOOD LOSS:  <5 mL  SPECIMENS:  none    COMPLICATIONS:   No complications were noted., There was no indication of vascular uptake on live injection of contrast dye., There was no indication of intrathecal uptake on live injection of contrast dye., There was not any evidence of dural puncture.  , and The patient did not have any signs of postprocedure numbness nor weakness.    TOLERANCE & DISCHARGE CONDITION:    The patient tolerated the procedure well.  The patient was transported to the recovery area without difficulties.  The patient was discharged to home under the care of family in stable and satisfactory condition.    PLAN OF CARE:  The patient was given our standard instruction sheet.  We discussed that Lumbar Medial Branch Blockade is a diagnostic procedure in consideration for radiofrequency ablation if two diagnostic procedures prove to be positive for significant benefit.  If sustained relief of 6 to eight weeks is obtained, then an alternative plan could be therapeutic lumbar branch blockades.  The patient is asked to keep a pain log each hour for 8 hours after the procedure today.  The patient will  Return to clinic 1 wk.  The patient will resume  all medications as per the medication reconciliation sheet.

## 2024-04-08 ENCOUNTER — OFFICE VISIT (OUTPATIENT)
Dept: FAMILY MEDICINE CLINIC | Facility: CLINIC | Age: 49
End: 2024-04-08
Payer: COMMERCIAL

## 2024-04-08 VITALS
HEIGHT: 65 IN | TEMPERATURE: 98.3 F | WEIGHT: 162.6 LBS | HEART RATE: 93 BPM | DIASTOLIC BLOOD PRESSURE: 70 MMHG | BODY MASS INDEX: 27.09 KG/M2 | SYSTOLIC BLOOD PRESSURE: 122 MMHG | OXYGEN SATURATION: 99 %

## 2024-04-08 DIAGNOSIS — J45.51 SEVERE PERSISTENT ASTHMA WITH EXACERBATION: Primary | ICD-10-CM

## 2024-04-08 PROCEDURE — 99214 OFFICE O/P EST MOD 30 MIN: CPT | Performed by: NURSE PRACTITIONER

## 2024-04-08 RX ORDER — PREDNISONE 20 MG/1
TABLET ORAL
Qty: 15 TABLET | Refills: 0 | Status: SHIPPED | OUTPATIENT
Start: 2024-04-08 | End: 2024-04-18

## 2024-04-08 RX ORDER — DOXYCYCLINE 100 MG/1
100 CAPSULE ORAL 2 TIMES DAILY
Qty: 20 CAPSULE | Refills: 0 | Status: SHIPPED | OUTPATIENT
Start: 2024-04-08

## 2024-04-08 RX ORDER — FLUTICASONE FUROATE, UMECLIDINIUM BROMIDE AND VILANTEROL TRIFENATATE 200; 62.5; 25 UG/1; UG/1; UG/1
1 POWDER RESPIRATORY (INHALATION)
Qty: 1 EACH | Refills: 0 | COMMUNITY
Start: 2024-04-08

## 2024-04-08 NOTE — PROGRESS NOTES
"Chief Complaint  Asthma    Subjective        Nereida Myles presents to [facility]  History of Present Illness    Patient is a patient of Dr. Meredith Kehrer.  She presents today with complaint of asthma exacerbation that started on Friday.  Had Influenza A a month ago and then asthma exacerbation    She is currently on albuterol tablet, albuterol nebulizer and inhaler, Breo, Singulair, theophylline    She started tussionex, mucinex.    Has a persistent cough and hurting in chest.    Denies any flu like symptoms.          Objective   Vital Signs:  /70   Pulse 93   Temp 98.3 °F (36.8 °C)   Ht 165.1 cm (65\")   Wt 73.8 kg (162 lb 9.6 oz)   SpO2 99%   BMI 27.06 kg/m²   Estimated body mass index is 27.06 kg/m² as calculated from the following:    Height as of this encounter: 165.1 cm (65\").    Weight as of this encounter: 73.8 kg (162 lb 9.6 oz).               Physical Exam  Constitutional:       Appearance: Normal appearance.   Cardiovascular:      Rate and Rhythm: Normal rate and regular rhythm.      Pulses: Normal pulses.   Pulmonary:      Effort: Pulmonary effort is normal.      Breath sounds: Wheezing present.   Skin:     General: Skin is warm and dry.   Neurological:      Mental Status: She is alert.   Psychiatric:         Mood and Affect: Mood normal.         Behavior: Behavior normal.         Thought Content: Thought content normal.         Judgment: Judgment normal.        Result Review :                     Assessment and Plan     Diagnoses and all orders for this visit:    1. Severe persistent asthma with exacerbation (Primary)    Other orders  -     doxycycline (MONODOX) 100 MG capsule; Take 1 capsule by mouth 2 (Two) Times a Day.  Dispense: 20 capsule; Refill: 0  -     predniSONE (DELTASONE) 20 MG tablet; Take 1 tablet by mouth 2 (Two) Times a Day for 5 days, THEN 1 tablet Daily for 5 days.  Dispense: 15 tablet; Refill: 0  -     Fluticasone-Umeclidin-Vilant " (Trelegy Ellipta) 200-62.5-25 MCG/ACT inhaler; Inhale 1 puff Daily.  Dispense: 1 each; Refill: 0    Treating for asthma exacerbation. Start antibiotic and steroid.  Patient has plenty of albuterol and is using as directed.  We are going to trial Trelegy in place of Breo to see if we can get better management of symptoms.  Sample given.  She is to let us know how that is working.  Follow-up if no resolution.         Follow Up     No follow-ups on file.  Patient was given instructions and counseling regarding her condition or for health maintenance advice. Please see specific information pulled into the AVS if appropriate.

## 2024-04-11 ENCOUNTER — OFFICE VISIT (OUTPATIENT)
Dept: PAIN MEDICINE | Facility: CLINIC | Age: 49
End: 2024-04-11
Payer: COMMERCIAL

## 2024-04-11 ENCOUNTER — PREP FOR SURGERY (OUTPATIENT)
Dept: SURGERY | Facility: SURGERY CENTER | Age: 49
End: 2024-04-11
Payer: COMMERCIAL

## 2024-04-11 VITALS
DIASTOLIC BLOOD PRESSURE: 88 MMHG | WEIGHT: 167.8 LBS | TEMPERATURE: 96.4 F | HEART RATE: 87 BPM | OXYGEN SATURATION: 97 % | SYSTOLIC BLOOD PRESSURE: 130 MMHG | HEIGHT: 65 IN | BODY MASS INDEX: 27.96 KG/M2

## 2024-04-11 DIAGNOSIS — M47.816 LUMBAR FACET ARTHROPATHY: Primary | ICD-10-CM

## 2024-04-11 DIAGNOSIS — M54.16 LUMBAR RADICULOPATHY: ICD-10-CM

## 2024-04-11 DIAGNOSIS — M51.36 DDD (DEGENERATIVE DISC DISEASE), LUMBAR: ICD-10-CM

## 2024-04-11 PROCEDURE — 99214 OFFICE O/P EST MOD 30 MIN: CPT | Performed by: PHYSICIAN ASSISTANT

## 2024-04-11 RX ORDER — SODIUM CHLORIDE 0.9 % (FLUSH) 0.9 %
10 SYRINGE (ML) INJECTION AS NEEDED
OUTPATIENT
Start: 2024-04-11

## 2024-04-11 RX ORDER — SODIUM CHLORIDE 0.9 % (FLUSH) 0.9 %
10 SYRINGE (ML) INJECTION EVERY 12 HOURS SCHEDULED
OUTPATIENT
Start: 2024-04-11

## 2024-04-11 NOTE — H&P (VIEW-ONLY)
CHIEF COMPLAINT    Back pain    Subjective   Nereida Myles is a 48 y.o. female  who presents to the office for follow-up of procedure.  She completed a LUMBAR MEDIAL BRANCH BLOCK BILATERAL L3-L5  on  3/27/24 performed by Dr. Pruitt for management of back pain. Patient reports 80% relief from the procedure x 6 hours.  She has since noted recrudescence of painful symptoms and did have some increased postprocedural spasm particularly noted on the right side of the last injection.  Denies any lower extremity radicular symptoms and continues to have pain which is aggravated with facet loading maneuvers.    This patient has a history of breast cancer with bilateral mastectomy.  She is 7 years in remission.       Pain today 4/10 VAS in severity.    Back Pain  This is a chronic problem. The current episode started more than 1 year ago. The problem occurs constantly. The pain is present in the lumbar spine. The quality of the pain is described as aching. The pain radiates to the right thigh. The pain is at a severity of 4/10. The pain is moderate. The pain is Worse during the day. The symptoms are aggravated by position, twisting and bending (activity). Pertinent negatives include no abdominal pain, dysuria, fever, headaches, numbness or weakness.        PEG Assessment   What number best describes your pain on average in the past week?6  What number best describes how, during the past week, pain has interfered with your enjoyment of life?6  What number best describes how, during the past week, pain has interfered with your general activity?  8    Review of Pertinent Medical Data ---  MRI OF THE THORACIC SPINE WITH AND WITHOUT CONTRAST AND MRI OF THE  LUMBAR SPINE WITH AND WITHOUT CONTRAST ON 01/09/2023     CLINICAL HISTORY: Mid and low back pain and flank pain where some  left-sided infection is suspected, history of breast cancer with  mastectomy.     MRI OF THE THORACIC SPINE TECHNIQUE: Sagittal T1, proton density  and  fat-suppressed T2 and postcontrast sagittal fat-suppressed T1-weighted  images were obtained of the thoracic spine. In addition axial T1 and T2  and postcontrast axial T1-weighted images were obtained from C7 to L1.     FINDINGS: The thoracic cord is normal in signal intensity. At T8-9 there  is a posterior central disc bulge or small disc protrusion that indents  the ventral aspect of the thecal sac and comes close to the ventral  aspect of the cord resulting in minimal if any canal narrowing. There is  no foraminal narrowing. Otherwise the thoracic disc spaces and facets  are within normal limits with no disc herniation, canal or foraminal  narrowing seen in the thoracic spine. Marrow signal intensity in the  thoracic spine is within normal limits. The paravertebral soft tissue  structures are normal in appearance.     IMPRESSION:  1. At T8-9 there is a posterior central disc bulge or small disc  protrusion that only minimally narrows the thecal sac. Otherwise, the  thoracic spine MRI is normal with no evidence of spinal infection in the  thoracic spine.     MRI OF THE LUMBAR SPINE TECHNIQUE: Sagittal T1, proton density fast spin  echo T2 and repeat fat-suppressed T2 and postcontrast sagittal  fat-suppressed T1-weighted images were obtained of the lumbar spine. In  addition, thin cut axial T1-weighted images were obtained angled through  the interspaces from L3 to S1 and axial T2 and postcontrast axial  T1-weighted images were obtained from T12 to S2.     COMPARISON: There are no prior lumbar spine imaging studies from [facility] for comparison.     FINDINGS: The distal thoracic cord and the conus are normal in signal  intensity. The conus terminates in the posterior midline of the thecal  sac at the level of the inferior body of L2 which is probably lower  limits of normal.     The T12-L1, L1-2 and L2-3 disc space and facets are normal with no canal  or foraminal narrowing from T12 to L3.      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     At L3-4 there is minimal right and mild left facet overgrowth and a  small amount of fluid in the left facets. There is some mild disc space  narrowing and degenerative endplate changes most pronounced in the right  side of the endplates along the inner margin of the levoscoliotic curve.  There is a 3 mm retrolisthesis of L3 with respect to L4 with disc  material bulging along the posterior superior endplate of L4. There is  essentially no narrowing of the thecal sac. There is mild right and no  left foraminal narrowing.     At L4-5 there is minimal facet overgrowth. There is 3 mm retrolisthesis  of L4 with respect to L5 and some bulging disc material extending along  the posterior superior endplate of L5. There is no canal narrowing.  There is minimal right and mild left foraminal narrowing.     At L5-S1 there is minimal facet overgrowth. There is disc space  narrowing, mild degenerative endplate changes, 3-4 mm retrolisthesis of  L5 with respect to S1, and minimal posterior spurring. There is no canal  or lateral recess narrowing. There is mild spurring into the inferior  aspect of the foramina and there is mild bilateral foraminal narrowing.     Other than the degenerative marrow edema in the right side of the  endplates at L3-4 and minimal degenerative marrow edema of the right  posterior lateral endplates at L4-5, marrow signal intensity in the  lumbar spine is within normal limits. I see no evidence of spinal  infection in the lumbar spine.     IMPRESSION:  1. No convincing acute abnormality is seen in the lumbar spine with no  direct evidence of spinal infection within the lumbar spine. 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  2. Disc space and facets are normal in appearance with no canal or  foraminal narrowing from T10 down to  L3.  3. There is disc space narrowing and degenerative endplate changes at  L3-4 most pronounced in the right side of the endplates along the inner  margin of the levoscoliotic curve and there is a 3-4 mm retrolisthesis  of L3 with respect to L4 and minimal facet overgrowth but essentially no  canal narrowing and there is only mild right foraminal narrowing at  L3-4.  4. At L4-5 there is minimal facet overgrowth and a 3-4 mm retrolisthesis  of L4 with respect to L5. There is no canal narrowing. There is minimal  right and mild left foraminal narrowing at L4-5.   5. At L5-S1 there is disc space narrowing, degenerative endplate  changes, and 3-4 mm retrolisthesis of L5 with respect to S1. There is no  canal or lateral recess narrowing. There is spurring into the foramina  and mild bilateral foraminal narrowing.  6. There is mild bone marrow edema in the right side of the endplates at  L3-4 and right posterior lateral endplates at L4-5 which is felt to  almost certainly be degenerative marrow edema. The remainder of the  lumbar spine is within normal limits     This report was finalized on 1/10/2023 7:44 AM by Dr. Isidro Tellez M.D.    The following portions of the patient's history were reviewed and updated as appropriate: allergies, current medications, past family history, past medical history, past social history, past surgical history, and problem list.    Review of Systems   Constitutional:  Negative for chills and fever.   Respiratory:  Positive for cough (asthma). Negative for shortness of breath.    Gastrointestinal:  Negative for abdominal pain.   Genitourinary:  Negative for difficulty urinating and dysuria.   Musculoskeletal:  Positive for back pain.   Neurological:  Negative for dizziness, weakness, light-headedness, numbness and headaches.   Psychiatric/Behavioral:  Negative for agitation.      I have reviewed and confirmed the accuracy of the ROS as documented by the MA/AKIRAN/RN LINDA Villarreal   Vitals:  "   04/11/24 1521   BP: 130/88   BP Location: Right arm   Patient Position: Sitting   Pulse: 87   Temp: 96.4 °F (35.8 °C)   TempSrc: Temporal   SpO2: 97%   Weight: 76.1 kg (167 lb 12.8 oz)   Height: 165.1 cm (65\")   PainSc:   4   PainLoc: Back         Objective   Physical Exam  Vitals and nursing note reviewed.   Constitutional:       Appearance: Normal appearance. She is normal weight.      Comments: MILDLY ANXIOUS   HENT:      Head: Normocephalic.   Pulmonary:      Effort: Pulmonary effort is normal.   Musculoskeletal:      Lumbar back: Spasms and tenderness present. Decreased range of motion.        Back:    Skin:     General: Skin is warm and dry.   Neurological:      General: No focal deficit present.      Mental Status: She is alert and oriented to person, place, and time.      Cranial Nerves: Cranial nerves 2-12 are intact.      Sensory: Sensation is intact.      Motor: Motor function is intact.      Gait: Gait is intact.   Psychiatric:         Mood and Affect: Mood normal.         Behavior: Behavior normal.         Thought Content: Thought content normal.         Judgment: Judgment normal.         Assessment & Plan   Diagnoses and all orders for this visit:    1. Lumbar facet arthropathy (Primary)    2. DDD (degenerative disc disease), lumbar    3. Lumbar radiculopathy        Nereida Myles reports a pain score of 4.  Given her pain assessment as noted, treatment options were discussed and the following options were decided upon as a follow-up plan to address the patient's pain: continuation of current treatment plan for pain and use of non-medical modalities (ice, heat, stretching and/or behavior modifications).      --- Follow-up as scheduled in order to complete the confirmatory number 2 injection for the left L3-5 MBB.  Pending favorable response we would then proceed to radiofrequency ablation of the bilateral L3-L5 nerves under fluoroscopy guidance.  --- Follow-up in 2 weeks for further " evaluation      Diagnostic Facet Joint Procedure:   CRUZ     The confirmatory diagnostic facet joint procedure is considered medically reasonable and necessary for the diagnosis and treatment of chronic pain for this patient due to the patient meeting all of the following criteria:    - 1. Moderate to severe chronic neck or low back pain, predominantly axial, that causes functional deficit measured on pain or disability scale.  - 2. Pain present for minimum of 3 months with documented failure to respond to noninvasive conservative management (as tolerated)  - 3. Absence of untreated radiculopathy or neurogenic claudication (except for radiculopathy caused by facet joint synovial cyst)  - 4. There is no non-facet pathology per clinical assessment or radiology studies that could explain the source of the patient’s pain, including but not limited to fracture, tumor, infection, or significant deformity.    The patient has also shown a consistent positive response of at least 80% relief of primary (index) pain (with the duration of relief being consistent with the agent used).                     Dictated utilizing Dragon dictation.

## 2024-04-11 NOTE — PROGRESS NOTES
CHIEF COMPLAINT    Back pain    Subjective   Nereida Myles is a 48 y.o. female  who presents to the office for follow-up of procedure.  She completed a LUMBAR MEDIAL BRANCH BLOCK BILATERAL L3-L5  on  3/27/24 performed by Dr. Pruitt for management of back pain. Patient reports 80% relief from the procedure x 6 hours.  She has since noted recrudescence of painful symptoms and did have some increased postprocedural spasm particularly noted on the right side of the last injection.  Denies any lower extremity radicular symptoms and continues to have pain which is aggravated with facet loading maneuvers.    This patient has a history of breast cancer with bilateral mastectomy.  She is 7 years in remission.       Pain today 4/10 VAS in severity.    Back Pain  This is a chronic problem. The current episode started more than 1 year ago. The problem occurs constantly. The pain is present in the lumbar spine. The quality of the pain is described as aching. The pain radiates to the right thigh. The pain is at a severity of 4/10. The pain is moderate. The pain is Worse during the day. The symptoms are aggravated by position, twisting and bending (activity). Pertinent negatives include no abdominal pain, dysuria, fever, headaches, numbness or weakness.        PEG Assessment   What number best describes your pain on average in the past week?6  What number best describes how, during the past week, pain has interfered with your enjoyment of life?6  What number best describes how, during the past week, pain has interfered with your general activity?  8    Review of Pertinent Medical Data ---  MRI OF THE THORACIC SPINE WITH AND WITHOUT CONTRAST AND MRI OF THE  LUMBAR SPINE WITH AND WITHOUT CONTRAST ON 01/09/2023     CLINICAL HISTORY: Mid and low back pain and flank pain where some  left-sided infection is suspected, history of breast cancer with  mastectomy.     MRI OF THE THORACIC SPINE TECHNIQUE: Sagittal T1, proton density  and  fat-suppressed T2 and postcontrast sagittal fat-suppressed T1-weighted  images were obtained of the thoracic spine. In addition axial T1 and T2  and postcontrast axial T1-weighted images were obtained from C7 to L1.     FINDINGS: The thoracic cord is normal in signal intensity. At T8-9 there  is a posterior central disc bulge or small disc protrusion that indents  the ventral aspect of the thecal sac and comes close to the ventral  aspect of the cord resulting in minimal if any canal narrowing. There is  no foraminal narrowing. Otherwise the thoracic disc spaces and facets  are within normal limits with no disc herniation, canal or foraminal  narrowing seen in the thoracic spine. Marrow signal intensity in the  thoracic spine is within normal limits. The paravertebral soft tissue  structures are normal in appearance.     IMPRESSION:  1. At T8-9 there is a posterior central disc bulge or small disc  protrusion that only minimally narrows the thecal sac. Otherwise, the  thoracic spine MRI is normal with no evidence of spinal infection in the  thoracic spine.     MRI OF THE LUMBAR SPINE TECHNIQUE: Sagittal T1, proton density fast spin  echo T2 and repeat fat-suppressed T2 and postcontrast sagittal  fat-suppressed T1-weighted images were obtained of the lumbar spine. In  addition, thin cut axial T1-weighted images were obtained angled through  the interspaces from L3 to S1 and axial T2 and postcontrast axial  T1-weighted images were obtained from T12 to S2.     COMPARISON: There are no prior lumbar spine imaging studies from [facility] for comparison.     FINDINGS: The distal thoracic cord and the conus are normal in signal  intensity. The conus terminates in the posterior midline of the thecal  sac at the level of the inferior body of L2 which is probably lower  limits of normal.     The T12-L1, L1-2 and L2-3 disc space and facets are normal with no canal  or foraminal narrowing from T12 to L3.      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     At L3-4 there is minimal right and mild left facet overgrowth and a  small amount of fluid in the left facets. There is some mild disc space  narrowing and degenerative endplate changes most pronounced in the right  side of the endplates along the inner margin of the levoscoliotic curve.  There is a 3 mm retrolisthesis of L3 with respect to L4 with disc  material bulging along the posterior superior endplate of L4. There is  essentially no narrowing of the thecal sac. There is mild right and no  left foraminal narrowing.     At L4-5 there is minimal facet overgrowth. There is 3 mm retrolisthesis  of L4 with respect to L5 and some bulging disc material extending along  the posterior superior endplate of L5. There is no canal narrowing.  There is minimal right and mild left foraminal narrowing.     At L5-S1 there is minimal facet overgrowth. There is disc space  narrowing, mild degenerative endplate changes, 3-4 mm retrolisthesis of  L5 with respect to S1, and minimal posterior spurring. There is no canal  or lateral recess narrowing. There is mild spurring into the inferior  aspect of the foramina and there is mild bilateral foraminal narrowing.     Other than the degenerative marrow edema in the right side of the  endplates at L3-4 and minimal degenerative marrow edema of the right  posterior lateral endplates at L4-5, marrow signal intensity in the  lumbar spine is within normal limits. I see no evidence of spinal  infection in the lumbar spine.     IMPRESSION:  1. No convincing acute abnormality is seen in the lumbar spine with no  direct evidence of spinal infection within the lumbar spine. 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  2. Disc space and facets are normal in appearance with no canal or  foraminal narrowing from T10 down to  L3.  3. There is disc space narrowing and degenerative endplate changes at  L3-4 most pronounced in the right side of the endplates along the inner  margin of the levoscoliotic curve and there is a 3-4 mm retrolisthesis  of L3 with respect to L4 and minimal facet overgrowth but essentially no  canal narrowing and there is only mild right foraminal narrowing at  L3-4.  4. At L4-5 there is minimal facet overgrowth and a 3-4 mm retrolisthesis  of L4 with respect to L5. There is no canal narrowing. There is minimal  right and mild left foraminal narrowing at L4-5.   5. At L5-S1 there is disc space narrowing, degenerative endplate  changes, and 3-4 mm retrolisthesis of L5 with respect to S1. There is no  canal or lateral recess narrowing. There is spurring into the foramina  and mild bilateral foraminal narrowing.  6. There is mild bone marrow edema in the right side of the endplates at  L3-4 and right posterior lateral endplates at L4-5 which is felt to  almost certainly be degenerative marrow edema. The remainder of the  lumbar spine is within normal limits     This report was finalized on 1/10/2023 7:44 AM by Dr. Isidro Tellez M.D.    The following portions of the patient's history were reviewed and updated as appropriate: allergies, current medications, past family history, past medical history, past social history, past surgical history, and problem list.    Review of Systems   Constitutional:  Negative for chills and fever.   Respiratory:  Positive for cough (asthma). Negative for shortness of breath.    Gastrointestinal:  Negative for abdominal pain.   Genitourinary:  Negative for difficulty urinating and dysuria.   Musculoskeletal:  Positive for back pain.   Neurological:  Negative for dizziness, weakness, light-headedness, numbness and headaches.   Psychiatric/Behavioral:  Negative for agitation.      I have reviewed and confirmed the accuracy of the ROS as documented by the MA/AKIRAN/RN LINDA Villarreal   Vitals:  "   04/11/24 1521   BP: 130/88   BP Location: Right arm   Patient Position: Sitting   Pulse: 87   Temp: 96.4 °F (35.8 °C)   TempSrc: Temporal   SpO2: 97%   Weight: 76.1 kg (167 lb 12.8 oz)   Height: 165.1 cm (65\")   PainSc:   4   PainLoc: Back         Objective   Physical Exam  Vitals and nursing note reviewed.   Constitutional:       Appearance: Normal appearance. She is normal weight.      Comments: MILDLY ANXIOUS   HENT:      Head: Normocephalic.   Pulmonary:      Effort: Pulmonary effort is normal.   Musculoskeletal:      Lumbar back: Spasms and tenderness present. Decreased range of motion.        Back:    Skin:     General: Skin is warm and dry.   Neurological:      General: No focal deficit present.      Mental Status: She is alert and oriented to person, place, and time.      Cranial Nerves: Cranial nerves 2-12 are intact.      Sensory: Sensation is intact.      Motor: Motor function is intact.      Gait: Gait is intact.   Psychiatric:         Mood and Affect: Mood normal.         Behavior: Behavior normal.         Thought Content: Thought content normal.         Judgment: Judgment normal.         Assessment & Plan   Diagnoses and all orders for this visit:    1. Lumbar facet arthropathy (Primary)    2. DDD (degenerative disc disease), lumbar    3. Lumbar radiculopathy        Nereida Myles reports a pain score of 4.  Given her pain assessment as noted, treatment options were discussed and the following options were decided upon as a follow-up plan to address the patient's pain: continuation of current treatment plan for pain and use of non-medical modalities (ice, heat, stretching and/or behavior modifications).      --- Follow-up as scheduled in order to complete the confirmatory number 2 injection for the left L3-5 MBB.  Pending favorable response we would then proceed to radiofrequency ablation of the bilateral L3-L5 nerves under fluoroscopy guidance.  --- Follow-up in 2 weeks for further " evaluation      Diagnostic Facet Joint Procedure:   CRUZ     The confirmatory diagnostic facet joint procedure is considered medically reasonable and necessary for the diagnosis and treatment of chronic pain for this patient due to the patient meeting all of the following criteria:    - 1. Moderate to severe chronic neck or low back pain, predominantly axial, that causes functional deficit measured on pain or disability scale.  - 2. Pain present for minimum of 3 months with documented failure to respond to noninvasive conservative management (as tolerated)  - 3. Absence of untreated radiculopathy or neurogenic claudication (except for radiculopathy caused by facet joint synovial cyst)  - 4. There is no non-facet pathology per clinical assessment or radiology studies that could explain the source of the patient’s pain, including but not limited to fracture, tumor, infection, or significant deformity.    The patient has also shown a consistent positive response of at least 80% relief of primary (index) pain (with the duration of relief being consistent with the agent used).                     Dictated utilizing Dragon dictation.

## 2024-04-12 ENCOUNTER — TRANSCRIBE ORDERS (OUTPATIENT)
Dept: SURGERY | Facility: SURGERY CENTER | Age: 49
End: 2024-04-12
Payer: COMMERCIAL

## 2024-04-12 DIAGNOSIS — Z41.9 SURGERY, ELECTIVE: Primary | ICD-10-CM

## 2024-04-22 DIAGNOSIS — K21.9 GASTROESOPHAGEAL REFLUX DISEASE WITHOUT ESOPHAGITIS: ICD-10-CM

## 2024-04-22 RX ORDER — OMEPRAZOLE 40 MG/1
40 CAPSULE, DELAYED RELEASE ORAL DAILY
Qty: 90 CAPSULE | Refills: 3 | Status: SHIPPED | OUTPATIENT
Start: 2024-04-22

## 2024-05-01 RX ORDER — BENZONATATE 200 MG/1
CAPSULE ORAL
COMMUNITY
Start: 2024-04-14

## 2024-05-02 ENCOUNTER — HOSPITAL ENCOUNTER (OUTPATIENT)
Dept: GENERAL RADIOLOGY | Facility: SURGERY CENTER | Age: 49
Setting detail: HOSPITAL OUTPATIENT SURGERY
End: 2024-05-02
Payer: COMMERCIAL

## 2024-05-02 ENCOUNTER — HOSPITAL ENCOUNTER (OUTPATIENT)
Facility: SURGERY CENTER | Age: 49
Setting detail: HOSPITAL OUTPATIENT SURGERY
Discharge: HOME | End: 2024-05-02
Attending: ANESTHESIOLOGY | Admitting: ANESTHESIOLOGY
Payer: COMMERCIAL

## 2024-05-02 VITALS
HEART RATE: 59 BPM | HEIGHT: 65 IN | SYSTOLIC BLOOD PRESSURE: 125 MMHG | WEIGHT: 167 LBS | TEMPERATURE: 98.6 F | DIASTOLIC BLOOD PRESSURE: 69 MMHG | BODY MASS INDEX: 27.82 KG/M2 | RESPIRATION RATE: 16 BRPM | OXYGEN SATURATION: 97 %

## 2024-05-02 DIAGNOSIS — Z41.9 SURGERY, ELECTIVE: ICD-10-CM

## 2024-05-02 DIAGNOSIS — M47.816 LUMBAR FACET ARTHROPATHY: ICD-10-CM

## 2024-05-02 PROCEDURE — 64494 INJ PARAVERT F JNT L/S 2 LEV: CPT | Performed by: ANESTHESIOLOGY

## 2024-05-02 PROCEDURE — 77002 NEEDLE LOCALIZATION BY XRAY: CPT

## 2024-05-02 PROCEDURE — 64493 INJ PARAVERT F JNT L/S 1 LEV: CPT | Performed by: ANESTHESIOLOGY

## 2024-05-02 PROCEDURE — 25010000002 BUPIVACAINE (PF) 0.5 % SOLUTION 10 ML VIAL: Performed by: ANESTHESIOLOGY

## 2024-05-02 PROCEDURE — 76000 FLUOROSCOPY <1 HR PHYS/QHP: CPT

## 2024-05-02 PROCEDURE — 25510000001 IOPAMIDOL 61 % SOLUTION 30 ML VIAL: Performed by: ANESTHESIOLOGY

## 2024-05-02 RX ORDER — DIAZEPAM 5 MG/1
10 TABLET ORAL ONCE
Status: COMPLETED | OUTPATIENT
Start: 2024-05-02 | End: 2024-05-02

## 2024-05-02 RX ADMIN — DIAZEPAM 10 MG: 5 TABLET ORAL at 10:12

## 2024-05-02 NOTE — OP NOTE
LEFT L3-5 Lumbar Medial Branch Blockade  [facility]    PREOPERATIVE DIAGNOSIS:  Lumbar spondylosis without myelopathy    POSTOPERATIVE DIAGNOSIS:  Lumbar spondylosis without myelopathy    PROCEDURE:   Diagnostic Left Lumbar Medial Branch Nerve Blockades, with fluoroscopy:  L3, L4, and L5 nerves (at the L4, L5 transverse processes and the sacral alar groove) to block facet joints L4-5, and L5-S1  19617 -- Lumbar Facet block, 1st Level  86212 -- Lumbar Facet block, 2nd  Level      PRE-PROCEDURE DISCUSSION WITH PATIENT:    Risks and complications were discussed with the patient prior to starting the procedure and informed consent was obtained.      SURGEON:   Rei Pruitt MD    REASON FOR PROCEDURE:    The patient complains of pain that seems to have a significant axial component and Previous diagnostic positivity of a Lumbar Medial Branch Blockade at the same levels    SEDATION:  The patient had higher than average levels of procedural anxiety and the need to provide additional procedural sedation was needed to safely proceed. and she had diazepam 10mg by mouth in preop area  ANESTHETIC:  Marcaine 0.5%  STEROID:  NONE  TOTAL VOLUME OF SOLUTION:  3 mL    DESCRIPTON OF PROCEDURE:  After obtaining informed consent, IV access was not obtained in the preoperative area.   The patient was taken to the operating room.  The patient was placed in the prone position with a pillow under the abdomen. All pressure points were well padded.  EKG, blood pressure, and pulse oximeter were monitored.  The patient was monitored and sedated by the RN under my direction. The lumbosacral area was prepped with Chloraprep and draped in a sterile fashion. Under fluoroscopic guidance the transverse processes of the L4 and L5 vertebrae at the junctions of the superior articular processes were identified on the affected side.  Also identified was the groove between the ala and the superior articular process of the sacrum on  the ipsilateral side.  Skin and subcutaneous tissue were anesthetized with 1% lidocaine above each of these points. A spinal needle was introduced under fluoroscopic guidance at the above junctions. Aspiration was negative for blood and CSF.  After confirming the position of the needle with fluoroscope in all views, 0.25 mL of Omnipaque was injected, and after seeing the proper spread a total of 1 mL of the anesthetic solution noted above was injected at each of these points.  Needles were removed intact from each of the areas.   Onset of analgesia was noted.  Vital signs remained stable throughout.      ESTIMATED BLOOD LOSS:  <5 mL  SPECIMENS:  none    COMPLICATIONS:   No complications were noted., There was no indication of vascular uptake on live injection of contrast dye., and There was no indication of intrathecal uptake on live injection of contrast dye.    TOLERANCE & DISCHARGE CONDITION:    The patient tolerated the procedure well.  The patient was transported to the recovery area without difficulties.  The patient was discharged to home under the care of family in stable and satisfactory condition.    PLAN OF CARE:  The patient was given our standard instruction sheet.  We discussed that Lumbar Medial Branch Blockade is a diagnostic procedure in consideration for radiofrequency ablation if two diagnostic procedures prove to be positive for significant benefit.  If sustained relief of 6 to eight weeks is obtained, then an alternative plan could be therapeutic lumbar branch blockades.  The patient is asked to keep a pain log each hour for 8 hours after the procedure today.  The patient will  Return to clinic 1 wk  The patient will resume all medications as per the medication reconciliation sheet.

## 2024-05-02 NOTE — DISCHARGE INSTRUCTIONS
[facility] - Post-procedure Instructions          --  While there are no absolute restrictions, it is recommended that you do not perform strenuous activity today. In the morning, you may resume your level of activity as before your block.    --  If you have a band-aid at your injection site, please remove it later today. Observe the area for any redness, swelling, pus-like drainage, or a temperature over 101°. If any of these symptoms occur, please call your doctor at [phone]. If after office hours, leave a message and the on-call provider will return your call.    --  Ice may be applied to your injection site. It is recommended you avoid direct heat (heating pad; hot tub) for 1-2 days.    --  Call [facility] at [phone] if you experience persistent headache, persistent bleeding from the injection site, or severe pain not relieved by heat or oral medication.    --  Do not make important decisions today.    --  Due to the effects of the block and/or the I.V. Sedation, DO NOT drive or operate hazardous machinery for 12 hours.  Local anesthetics may cause numbness after procedure and precautions must be taken with regards to operating equipment as well as with walking, even if ambulating with assistance of another person or with an assistive device.    --  Do not drink alcohol for 12 hours.    -- You may return to work tomorrow, or as directed by your referring doctor.    --  Occasionally you may notice a slight increase in your pain after the procedure. This should start to improve within the next 24-48 hours. Radiofrequency ablation procedure pain may last 3-4 weeks.    --  It may take as long as 3-4 days before you notice a gradual improvement in your pain and/or other symptoms.    -- You may continue to take your prescribed pain medication as needed.    --  Some normal possible side effects of steroid use could include fluid retention, increased blood sugar, dull headache,  increased sweating, increased appetite, mood swings and flushing.    --  Diabetics are recommended to watch their blood glucose level closely for 24-48 hours after the injection.    --  Must stay in PACU for 20 min upon arrival and prove no leg weakness before being discharged.    --  IN THE EVENT OF A LIFE THREATENING EMERGENCY, (CHEST PAIN, BREATHING DIFFICULTIES, PARALYSIS…) YOU SHOULD GO TO YOUR NEAREST EMERGENCY ROOM.    --  You should be contacted by our office within 2-3 days to schedule follow up or next appointment date.  If not contacted within 7 days, please call the office at [phone]

## 2024-05-13 DIAGNOSIS — E06.3 HYPOTHYROIDISM DUE TO HASHIMOTO'S THYROIDITIS: ICD-10-CM

## 2024-05-13 DIAGNOSIS — E03.8 HYPOTHYROIDISM DUE TO HASHIMOTO'S THYROIDITIS: ICD-10-CM

## 2024-05-13 RX ORDER — LEVOTHYROXINE SODIUM 0.05 MG/1
50 TABLET ORAL DAILY
Qty: 90 TABLET | Refills: 3 | Status: SHIPPED | OUTPATIENT
Start: 2024-05-13

## 2024-05-14 ENCOUNTER — OFFICE VISIT (OUTPATIENT)
Dept: PAIN MEDICINE | Facility: CLINIC | Age: 49
End: 2024-05-14
Payer: COMMERCIAL

## 2024-05-14 VITALS
HEIGHT: 65 IN | WEIGHT: 168.4 LBS | DIASTOLIC BLOOD PRESSURE: 94 MMHG | OXYGEN SATURATION: 99 % | BODY MASS INDEX: 28.06 KG/M2 | TEMPERATURE: 96.8 F | SYSTOLIC BLOOD PRESSURE: 133 MMHG | RESPIRATION RATE: 18 BRPM | HEART RATE: 78 BPM

## 2024-05-14 DIAGNOSIS — M54.16 LUMBAR RADICULOPATHY: ICD-10-CM

## 2024-05-14 DIAGNOSIS — M47.816 LUMBAR FACET ARTHROPATHY: Primary | ICD-10-CM

## 2024-05-14 PROCEDURE — 99214 OFFICE O/P EST MOD 30 MIN: CPT | Performed by: PHYSICIAN ASSISTANT

## 2024-05-14 NOTE — H&P (VIEW-ONLY)
CHIEF COMPLAINT  Follow-up for back pain.      Subjective   Nereida Myles is a 48 y.o. female  who presents to the office for follow-up of procedure.  She completed a LEFT L3-5 Lumbar Medial Branch Blockade   on  5/2/2024 performed by Dr. Pruitt for management of back pain. Patient reports 80% relief from the procedure x 6 hours.  This patient has noted recrudescence of pain in a transverse distribution across the lumbosacral spine referred into the bilateral paraspinal muscles without lower extremity radicular symptoms.  She continues to have pain that is aggravated with any type of facet loading maneuvers.    This patient has a history of breast cancer with bilateral mastectomy.  She is 7 years in remission.       Pain today 7/10 VAS in severity.    Back Pain  This is a chronic problem. The current episode started more than 1 year ago. The problem occurs constantly. The pain is present in the lumbar spine. The quality of the pain is described as aching. The pain radiates to the right thigh. The pain is at a severity of 7/10. The pain is moderate. The pain is Worse during the day. The symptoms are aggravated by position, twisting and bending (activity). Pertinent negatives include no abdominal pain, dysuria, fever, headaches, numbness or weakness.        PEG Assessment   What number best describes your pain on average in the past week?8  What number best describes how, during the past week, pain has interfered with your enjoyment of life?8  What number best describes how, during the past week, pain has interfered with your general activity?  8    Review of Pertinent Medical Data ---  MRI OF THE THORACIC SPINE WITH AND WITHOUT CONTRAST AND MRI OF THE  LUMBAR SPINE WITH AND WITHOUT CONTRAST ON 01/09/2023     CLINICAL HISTORY: Mid and low back pain and flank pain where some  left-sided infection is suspected, history of breast cancer with  mastectomy.     MRI OF THE THORACIC SPINE TECHNIQUE: Sagittal T1, proton  density and  fat-suppressed T2 and postcontrast sagittal fat-suppressed T1-weighted  images were obtained of the thoracic spine. In addition axial T1 and T2  and postcontrast axial T1-weighted images were obtained from C7 to L1.     FINDINGS: The thoracic cord is normal in signal intensity. At T8-9 there  is a posterior central disc bulge or small disc protrusion that indents  the ventral aspect of the thecal sac and comes close to the ventral  aspect of the cord resulting in minimal if any canal narrowing. There is  no foraminal narrowing. Otherwise the thoracic disc spaces and facets  are within normal limits with no disc herniation, canal or foraminal  narrowing seen in the thoracic spine. Marrow signal intensity in the  thoracic spine is within normal limits. The paravertebral soft tissue  structures are normal in appearance.     IMPRESSION:  1. At T8-9 there is a posterior central disc bulge or small disc  protrusion that only minimally narrows the thecal sac. Otherwise, the  thoracic spine MRI is normal with no evidence of spinal infection in the  thoracic spine.     MRI OF THE LUMBAR SPINE TECHNIQUE: Sagittal T1, proton density fast spin  echo T2 and repeat fat-suppressed T2 and postcontrast sagittal  fat-suppressed T1-weighted images were obtained of the lumbar spine. In  addition, thin cut axial T1-weighted images were obtained angled through  the interspaces from L3 to S1 and axial T2 and postcontrast axial  T1-weighted images were obtained from T12 to S2.     COMPARISON: There are no prior lumbar spine imaging studies from [facility] for comparison.     FINDINGS: The distal thoracic cord and the conus are normal in signal  intensity. The conus terminates in the posterior midline of the thecal  sac at the level of the inferior body of L2 which is probably lower  limits of normal.     The T12-L1, L1-2 and L2-3 disc space and facets are normal with no canal  or foraminal narrowing from T12 to  L3.     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     At L3-4 there is minimal right and mild left facet overgrowth and a  small amount of fluid in the left facets. There is some mild disc space  narrowing and degenerative endplate changes most pronounced in the right  side of the endplates along the inner margin of the levoscoliotic curve.  There is a 3 mm retrolisthesis of L3 with respect to L4 with disc  material bulging along the posterior superior endplate of L4. There is  essentially no narrowing of the thecal sac. There is mild right and no  left foraminal narrowing.     At L4-5 there is minimal facet overgrowth. There is 3 mm retrolisthesis  of L4 with respect to L5 and some bulging disc material extending along  the posterior superior endplate of L5. There is no canal narrowing.  There is minimal right and mild left foraminal narrowing.     At L5-S1 there is minimal facet overgrowth. There is disc space  narrowing, mild degenerative endplate changes, 3-4 mm retrolisthesis of  L5 with respect to S1, and minimal posterior spurring. There is no canal  or lateral recess narrowing. There is mild spurring into the inferior  aspect of the foramina and there is mild bilateral foraminal narrowing.     Other than the degenerative marrow edema in the right side of the  endplates at L3-4 and minimal degenerative marrow edema of the right  posterior lateral endplates at L4-5, marrow signal intensity in the  lumbar spine is within normal limits. I see no evidence of spinal  infection in the lumbar spine.     IMPRESSION:  1. No convincing acute abnormality is seen in the lumbar spine with no  direct evidence of spinal infection within the lumbar spine. 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  2. Disc space and facets are normal in appearance with no canal or  foraminal narrowing from T10 down  to L3.  3. There is disc space narrowing and degenerative endplate changes at  L3-4 most pronounced in the right side of the endplates along the inner  margin of the levoscoliotic curve and there is a 3-4 mm retrolisthesis  of L3 with respect to L4 and minimal facet overgrowth but essentially no  canal narrowing and there is only mild right foraminal narrowing at  L3-4.  4. At L4-5 there is minimal facet overgrowth and a 3-4 mm retrolisthesis  of L4 with respect to L5. There is no canal narrowing. There is minimal  right and mild left foraminal narrowing at L4-5.   5. At L5-S1 there is disc space narrowing, degenerative endplate  changes, and 3-4 mm retrolisthesis of L5 with respect to S1. There is no  canal or lateral recess narrowing. There is spurring into the foramina  and mild bilateral foraminal narrowing.  6. There is mild bone marrow edema in the right side of the endplates at  L3-4 and right posterior lateral endplates at L4-5 which is felt to  almost certainly be degenerative marrow edema. The remainder of the  lumbar spine is within normal limits     This report was finalized on 1/10/2023 7:44 AM by Dr. Isidro Tellez M.D.    The following portions of the patient's history were reviewed and updated as appropriate: allergies, current medications, past family history, past medical history, past social history, past surgical history, and problem list.    Review of Systems   Constitutional:  Negative for fever.   Gastrointestinal:  Positive for constipation. Negative for abdominal pain and diarrhea.   Genitourinary:  Negative for difficulty urinating and dysuria.   Musculoskeletal:  Positive for back pain.   Neurological:  Negative for weakness, numbness and headaches.   Psychiatric/Behavioral:  Positive for sleep disturbance. Negative for suicidal ideas. The patient is nervous/anxious.      I have reviewed and confirmed the accuracy of the ROS as documented by the MA/LPN/RN LINDA Villarreal   Vitals:     "05/14/24 1335   BP: 133/94   Pulse: 78   Resp: 18   Temp: 96.8 °F (36 °C)   SpO2: 99%   Weight: 76.4 kg (168 lb 6.4 oz)   Height: 165.1 cm (65\")   PainSc:   7   PainLoc: Back         Objective   Physical Exam  Vitals and nursing note reviewed. Exam conducted with a chaperone present ().   Constitutional:       Appearance: Normal appearance.      Comments: MILDLY ANXIOUS   HENT:      Head: Normocephalic.   Pulmonary:      Effort: Pulmonary effort is normal.   Musculoskeletal:      Lumbar back: Spasms and tenderness (Moderate tenderness to palpation within the overlying bilateral lumbar facet joint spaces) present. Decreased range of motion.        Back:    Skin:     General: Skin is warm and dry.   Neurological:      General: No focal deficit present.      Mental Status: She is alert and oriented to person, place, and time.      Cranial Nerves: Cranial nerves 2-12 are intact.      Sensory: Sensation is intact.      Motor: Motor function is intact.      Gait: Gait is intact.   Psychiatric:         Mood and Affect: Mood normal.         Behavior: Behavior normal.         Thought Content: Thought content normal.         Judgment: Judgment normal.             Assessment & Plan   Diagnoses and all orders for this visit:    1. Lumbar facet arthropathy (Primary)  -     Ambulatory Referral to Physical Therapy    2. Lumbar radiculopathy  -     Ambulatory Referral to Physical Therapy        Nereida Myles reports a pain score of 7.  Given her pain assessment as noted, treatment options were discussed and the following options were decided upon as a follow-up plan to address the patient's pain: continuation of current treatment plan for pain, patient not interested in pharmacological measures, and use of non-medical modalities (ice, heat, stretching and/or behavior modifications).      --- Due to favorable response the patient will return for lumbar radiofrequency ablation of the bilateral L3-5 nerves.  The risk and " benefits of the procedure been discussed with the patient and all questions have been addressed.  --- Referral has been placed to aquatic therapy at [facility] for increasing core strengthening and physical activity.  --- Follow-up 6 weeks after undergoing the ablation procedure       Thermal Radiofrequency Destruction    This initial thermal radiofrequency destruction (RFA) of cervical, thoracic, or lumbar paravertebral facet joint (medial branch) nerves is considered medically reasonable and necessary as this patient has met the criteria of having at least two (2) medically reasonable and necessary diagnostic MBBs, with each one providing a consistent minimum of 80% sustained relief of primary (index) pain (with the duration of relief being consistent with the agent used).    This repeat thermal radiofrequency destruction (RFA) of cervical, thoracic, or lumbar paravertebral facet joint (medial branch) nerves at the same anatomical site is considered medically reasonable and necessary as this patient has met the criteria of having a minimum of consistent 50% improvement in pain for at least six (6) months or at least 50% consistent improvement in the ability to perform previously painful movements and ADLs as compared to baseline measurement using the same scale.            Dictated utilizing Dragon dictation.

## 2024-05-14 NOTE — PROGRESS NOTES
CHIEF COMPLAINT  Follow-up for back pain.      Subjective   Nereida Myles is a 48 y.o. female  who presents to the office for follow-up of procedure.  She completed a LEFT L3-5 Lumbar Medial Branch Blockade   on  5/2/2024 performed by Dr. Pruitt for management of back pain. Patient reports 80% relief from the procedure x 6 hours.  This patient has noted recrudescence of pain in a transverse distribution across the lumbosacral spine referred into the bilateral paraspinal muscles without lower extremity radicular symptoms.  She continues to have pain that is aggravated with any type of facet loading maneuvers.    This patient has a history of breast cancer with bilateral mastectomy.  She is 7 years in remission.       Pain today 7/10 VAS in severity.    Back Pain  This is a chronic problem. The current episode started more than 1 year ago. The problem occurs constantly. The pain is present in the lumbar spine. The quality of the pain is described as aching. The pain radiates to the right thigh. The pain is at a severity of 7/10. The pain is moderate. The pain is Worse during the day. The symptoms are aggravated by position, twisting and bending (activity). Pertinent negatives include no abdominal pain, dysuria, fever, headaches, numbness or weakness.        PEG Assessment   What number best describes your pain on average in the past week?8  What number best describes how, during the past week, pain has interfered with your enjoyment of life?8  What number best describes how, during the past week, pain has interfered with your general activity?  8    Review of Pertinent Medical Data ---  MRI OF THE THORACIC SPINE WITH AND WITHOUT CONTRAST AND MRI OF THE  LUMBAR SPINE WITH AND WITHOUT CONTRAST ON 01/09/2023     CLINICAL HISTORY: Mid and low back pain and flank pain where some  left-sided infection is suspected, history of breast cancer with  mastectomy.     MRI OF THE THORACIC SPINE TECHNIQUE: Sagittal T1, proton  density and  fat-suppressed T2 and postcontrast sagittal fat-suppressed T1-weighted  images were obtained of the thoracic spine. In addition axial T1 and T2  and postcontrast axial T1-weighted images were obtained from C7 to L1.     FINDINGS: The thoracic cord is normal in signal intensity. At T8-9 there  is a posterior central disc bulge or small disc protrusion that indents  the ventral aspect of the thecal sac and comes close to the ventral  aspect of the cord resulting in minimal if any canal narrowing. There is  no foraminal narrowing. Otherwise the thoracic disc spaces and facets  are within normal limits with no disc herniation, canal or foraminal  narrowing seen in the thoracic spine. Marrow signal intensity in the  thoracic spine is within normal limits. The paravertebral soft tissue  structures are normal in appearance.     IMPRESSION:  1. At T8-9 there is a posterior central disc bulge or small disc  protrusion that only minimally narrows the thecal sac. Otherwise, the  thoracic spine MRI is normal with no evidence of spinal infection in the  thoracic spine.     MRI OF THE LUMBAR SPINE TECHNIQUE: Sagittal T1, proton density fast spin  echo T2 and repeat fat-suppressed T2 and postcontrast sagittal  fat-suppressed T1-weighted images were obtained of the lumbar spine. In  addition, thin cut axial T1-weighted images were obtained angled through  the interspaces from L3 to S1 and axial T2 and postcontrast axial  T1-weighted images were obtained from T12 to S2.     COMPARISON: There are no prior lumbar spine imaging studies from [facility] for comparison.     FINDINGS: The distal thoracic cord and the conus are normal in signal  intensity. The conus terminates in the posterior midline of the thecal  sac at the level of the inferior body of L2 which is probably lower  limits of normal.     The T12-L1, L1-2 and L2-3 disc space and facets are normal with no canal  or foraminal narrowing from T12 to  L3.     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     At L3-4 there is minimal right and mild left facet overgrowth and a  small amount of fluid in the left facets. There is some mild disc space  narrowing and degenerative endplate changes most pronounced in the right  side of the endplates along the inner margin of the levoscoliotic curve.  There is a 3 mm retrolisthesis of L3 with respect to L4 with disc  material bulging along the posterior superior endplate of L4. There is  essentially no narrowing of the thecal sac. There is mild right and no  left foraminal narrowing.     At L4-5 there is minimal facet overgrowth. There is 3 mm retrolisthesis  of L4 with respect to L5 and some bulging disc material extending along  the posterior superior endplate of L5. There is no canal narrowing.  There is minimal right and mild left foraminal narrowing.     At L5-S1 there is minimal facet overgrowth. There is disc space  narrowing, mild degenerative endplate changes, 3-4 mm retrolisthesis of  L5 with respect to S1, and minimal posterior spurring. There is no canal  or lateral recess narrowing. There is mild spurring into the inferior  aspect of the foramina and there is mild bilateral foraminal narrowing.     Other than the degenerative marrow edema in the right side of the  endplates at L3-4 and minimal degenerative marrow edema of the right  posterior lateral endplates at L4-5, marrow signal intensity in the  lumbar spine is within normal limits. I see no evidence of spinal  infection in the lumbar spine.     IMPRESSION:  1. No convincing acute abnormality is seen in the lumbar spine with no  direct evidence of spinal infection within the lumbar spine. 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  2. Disc space and facets are normal in appearance with no canal or  foraminal narrowing from T10 down  to L3.  3. There is disc space narrowing and degenerative endplate changes at  L3-4 most pronounced in the right side of the endplates along the inner  margin of the levoscoliotic curve and there is a 3-4 mm retrolisthesis  of L3 with respect to L4 and minimal facet overgrowth but essentially no  canal narrowing and there is only mild right foraminal narrowing at  L3-4.  4. At L4-5 there is minimal facet overgrowth and a 3-4 mm retrolisthesis  of L4 with respect to L5. There is no canal narrowing. There is minimal  right and mild left foraminal narrowing at L4-5.   5. At L5-S1 there is disc space narrowing, degenerative endplate  changes, and 3-4 mm retrolisthesis of L5 with respect to S1. There is no  canal or lateral recess narrowing. There is spurring into the foramina  and mild bilateral foraminal narrowing.  6. There is mild bone marrow edema in the right side of the endplates at  L3-4 and right posterior lateral endplates at L4-5 which is felt to  almost certainly be degenerative marrow edema. The remainder of the  lumbar spine is within normal limits     This report was finalized on 1/10/2023 7:44 AM by Dr. Isidro Tellez M.D.    The following portions of the patient's history were reviewed and updated as appropriate: allergies, current medications, past family history, past medical history, past social history, past surgical history, and problem list.    Review of Systems   Constitutional:  Negative for fever.   Gastrointestinal:  Positive for constipation. Negative for abdominal pain and diarrhea.   Genitourinary:  Negative for difficulty urinating and dysuria.   Musculoskeletal:  Positive for back pain.   Neurological:  Negative for weakness, numbness and headaches.   Psychiatric/Behavioral:  Positive for sleep disturbance. Negative for suicidal ideas. The patient is nervous/anxious.      I have reviewed and confirmed the accuracy of the ROS as documented by the MA/LPN/RN LINDA Villarreal   Vitals:     "05/14/24 1335   BP: 133/94   Pulse: 78   Resp: 18   Temp: 96.8 °F (36 °C)   SpO2: 99%   Weight: 76.4 kg (168 lb 6.4 oz)   Height: 165.1 cm (65\")   PainSc:   7   PainLoc: Back         Objective   Physical Exam  Vitals and nursing note reviewed. Exam conducted with a chaperone present ().   Constitutional:       Appearance: Normal appearance.      Comments: MILDLY ANXIOUS   HENT:      Head: Normocephalic.   Pulmonary:      Effort: Pulmonary effort is normal.   Musculoskeletal:      Lumbar back: Spasms and tenderness (Moderate tenderness to palpation within the overlying bilateral lumbar facet joint spaces) present. Decreased range of motion.        Back:    Skin:     General: Skin is warm and dry.   Neurological:      General: No focal deficit present.      Mental Status: She is alert and oriented to person, place, and time.      Cranial Nerves: Cranial nerves 2-12 are intact.      Sensory: Sensation is intact.      Motor: Motor function is intact.      Gait: Gait is intact.   Psychiatric:         Mood and Affect: Mood normal.         Behavior: Behavior normal.         Thought Content: Thought content normal.         Judgment: Judgment normal.             Assessment & Plan   Diagnoses and all orders for this visit:    1. Lumbar facet arthropathy (Primary)  -     Ambulatory Referral to Physical Therapy    2. Lumbar radiculopathy  -     Ambulatory Referral to Physical Therapy        Nereida Myles reports a pain score of 7.  Given her pain assessment as noted, treatment options were discussed and the following options were decided upon as a follow-up plan to address the patient's pain: continuation of current treatment plan for pain, patient not interested in pharmacological measures, and use of non-medical modalities (ice, heat, stretching and/or behavior modifications).      --- Due to favorable response the patient will return for lumbar radiofrequency ablation of the bilateral L3-5 nerves.  The risk and " benefits of the procedure been discussed with the patient and all questions have been addressed.  --- Referral has been placed to aquatic therapy at [facility] for increasing core strengthening and physical activity.  --- Follow-up 6 weeks after undergoing the ablation procedure       Thermal Radiofrequency Destruction    This initial thermal radiofrequency destruction (RFA) of cervical, thoracic, or lumbar paravertebral facet joint (medial branch) nerves is considered medically reasonable and necessary as this patient has met the criteria of having at least two (2) medically reasonable and necessary diagnostic MBBs, with each one providing a consistent minimum of 80% sustained relief of primary (index) pain (with the duration of relief being consistent with the agent used).    This repeat thermal radiofrequency destruction (RFA) of cervical, thoracic, or lumbar paravertebral facet joint (medial branch) nerves at the same anatomical site is considered medically reasonable and necessary as this patient has met the criteria of having a minimum of consistent 50% improvement in pain for at least six (6) months or at least 50% consistent improvement in the ability to perform previously painful movements and ADLs as compared to baseline measurement using the same scale.            Dictated utilizing Dragon dictation.

## 2024-05-31 RX ORDER — MIDAZOLAM HYDROCHLORIDE 2 MG/2ML
4 INJECTION, SOLUTION INTRAMUSCULAR; INTRAVENOUS ONCE
Status: COMPLETED | OUTPATIENT
Start: 2024-06-04 | End: 2024-06-04

## 2024-05-31 RX ORDER — ONDANSETRON 2 MG/ML
4 INJECTION INTRAMUSCULAR; INTRAVENOUS ONCE
Status: COMPLETED | OUTPATIENT
Start: 2024-06-04 | End: 2024-06-04

## 2024-05-31 RX ORDER — FENTANYL CITRATE 50 UG/ML
50 INJECTION, SOLUTION INTRAMUSCULAR; INTRAVENOUS ONCE
Status: COMPLETED | OUTPATIENT
Start: 2024-06-04 | End: 2024-06-04

## 2024-06-04 ENCOUNTER — HOSPITAL ENCOUNTER (OUTPATIENT)
Facility: SURGERY CENTER | Age: 49
Setting detail: HOSPITAL OUTPATIENT SURGERY
Discharge: HOME | End: 2024-06-04
Attending: ANESTHESIOLOGY | Admitting: ANESTHESIOLOGY
Payer: COMMERCIAL

## 2024-06-04 ENCOUNTER — HOSPITAL ENCOUNTER (OUTPATIENT)
Dept: GENERAL RADIOLOGY | Facility: SURGERY CENTER | Age: 49
Setting detail: HOSPITAL OUTPATIENT SURGERY
End: 2024-06-04
Payer: COMMERCIAL

## 2024-06-04 VITALS
HEIGHT: 65 IN | BODY MASS INDEX: 26.99 KG/M2 | TEMPERATURE: 98 F | DIASTOLIC BLOOD PRESSURE: 86 MMHG | OXYGEN SATURATION: 94 % | RESPIRATION RATE: 16 BRPM | SYSTOLIC BLOOD PRESSURE: 131 MMHG | WEIGHT: 162 LBS | HEART RATE: 55 BPM

## 2024-06-04 DIAGNOSIS — Z41.9 SURGERY, ELECTIVE: ICD-10-CM

## 2024-06-04 DIAGNOSIS — M47.816 LUMBAR FACET ARTHROPATHY: ICD-10-CM

## 2024-06-04 PROCEDURE — 64636 DESTROY L/S FACET JNT ADDL: CPT | Performed by: ANESTHESIOLOGY

## 2024-06-04 PROCEDURE — 76000 FLUOROSCOPY <1 HR PHYS/QHP: CPT

## 2024-06-04 PROCEDURE — 64635 DESTROY LUMB/SAC FACET JNT: CPT | Performed by: ANESTHESIOLOGY

## 2024-06-04 PROCEDURE — 25010000002 FENTANYL CITRATE (PF) 50 MCG/ML SOLUTION: Performed by: ANESTHESIOLOGY

## 2024-06-04 PROCEDURE — 25010000002 ONDANSETRON PER 1 MG: Performed by: ANESTHESIOLOGY

## 2024-06-04 PROCEDURE — 25010000002 MIDAZOLAM PER 1MG: Performed by: ANESTHESIOLOGY

## 2024-06-04 PROCEDURE — 25010000002 BUPIVACAINE (PF) 0.5 % SOLUTION 10 ML VIAL: Performed by: ANESTHESIOLOGY

## 2024-06-04 PROCEDURE — 99152 MOD SED SAME PHYS/QHP 5/>YRS: CPT | Performed by: ANESTHESIOLOGY

## 2024-06-04 RX ORDER — SODIUM CHLORIDE 0.9 % (FLUSH) 0.9 %
10 SYRINGE (ML) INJECTION EVERY 12 HOURS SCHEDULED
Status: DISCONTINUED | OUTPATIENT
Start: 2024-06-04 | End: 2024-06-04 | Stop reason: HOSPADM

## 2024-06-04 RX ORDER — SODIUM CHLORIDE 0.9 % (FLUSH) 0.9 %
10 SYRINGE (ML) INJECTION AS NEEDED
Status: DISCONTINUED | OUTPATIENT
Start: 2024-06-04 | End: 2024-06-04 | Stop reason: HOSPADM

## 2024-06-04 RX ADMIN — ONDANSETRON 4 MG: 2 INJECTION INTRAMUSCULAR; INTRAVENOUS at 09:39

## 2024-06-04 RX ADMIN — FENTANYL CITRATE 50 MCG: 50 INJECTION INTRAMUSCULAR; INTRAVENOUS at 09:38

## 2024-06-04 RX ADMIN — MIDAZOLAM HYDROCHLORIDE 4 MG: 2 INJECTION, SOLUTION INTRAMUSCULAR; INTRAVENOUS at 09:37

## 2024-06-04 NOTE — DISCHARGE INSTRUCTIONS
[facility] - Post-procedure Instructions          --  While there are no absolute restrictions, it is recommended that you do not perform strenuous activity today. In the morning, you may resume your level of activity as before your block.    --  If you have a band-aid at your injection site, please remove it later today. Observe the area for any redness, swelling, pus-like drainage, or a temperature over 101°. If any of these symptoms occur, please call your doctor at [phone]. If after office hours, leave a message and the on-call provider will return your call.    --  Ice may be applied to your injection site. It is recommended you avoid direct heat (heating pad; hot tub) for 1-2 days.    --  Call [facility] at [phone] if you experience persistent headache, persistent bleeding from the injection site, or severe pain not relieved by heat or oral medication.    --  Do not make important decisions today.    --  Due to the effects of the block and/or the I.V. Sedation, DO NOT drive or operate hazardous machinery for 12 hours.  Local anesthetics may cause numbness after procedure and precautions must be taken with regards to operating equipment as well as with walking, even if ambulating with assistance of another person or with an assistive device.    --  Do not drink alcohol for 12 hours.    -- You may return to work tomorrow, or as directed by your referring doctor.    --  Occasionally you may notice a slight increase in your pain after the procedure. This should start to improve within the next 24-48 hours. Radiofrequency ablation procedure pain may last 3-4 weeks.    --  It may take as long as 3-4 days before you notice a gradual improvement in your pain and/or other symptoms.    -- You may continue to take your prescribed pain medication as needed.    --  Some normal possible side effects of steroid use could include fluid retention, increased blood sugar, dull headache,  increased sweating, increased appetite, mood swings and flushing.    --  Diabetics are recommended to watch their blood glucose level closely for 24-48 hours after the injection.    --  Must stay in PACU for 20 min upon arrival and prove no leg weakness before being discharged.    --  IN THE EVENT OF A LIFE THREATENING EMERGENCY, (CHEST PAIN, BREATHING DIFFICULTIES, PARALYSIS…) YOU SHOULD GO TO YOUR NEAREST EMERGENCY ROOM.    --  You should be contacted by our office within 2-3 days to schedule follow up or next appointment date.  If not contacted within 7 days, please call the office at [phone]

## 2024-06-04 NOTE — OP NOTE
Bilateral L3-5 Lumbar Medial Branch RADIOFREQUENCY  [facility]      PREOPERATIVE DIAGNOSIS:  Lumbar spondylosis without myelopathy    POSTOPERATIVE DIAGNOSIS:  Lumbar spondylosis without myelopathy    PROCEDURE:   Diagnostic Bilateral Lumbar Medial Branch Nerve thermal radiofrequency lesioning, with fluoroscopy:  L3, L4, and L5 nerves (at the L4 and L5 transverse processes and the sacral alar groove) to thermally treat the innervation to facet joints L4-5 and L5-S1  25455-18 -- Bilateral L/S facet neuro destr., 1st Level  15068-73 -- Bilateral L/S facet neuro destr., 2nd  Level    PRE-PROCEDURE DISCUSSION WITH PATIENT:    Risks and complications were discussed with the patient prior to starting the procedure and informed consent was obtained.      SURGEON:  Rei Pruitt MD    REASON FOR PROCEDURE:    The patient complains of pain that seems to have a significant axial component and Previous diagnostic positivity of two Lumbar Medial Branch Blockades at the same levels    SEDATION:  Versed 4mg & Fentanyl 50 mcg IV, Zofran 4mg IV, and The patient had higher than average levels of procedural anxiety and the need to provide additional procedural sedation was needed to safely proceed.  TIME OF PROCEDURE:   The intraoperative procedure time after administration of the sedative was (9600-9288) 17 minutes.     ANESTHETIC:  Lidocaine 2%  STEROID:  NONE      DESCRIPTON OF PROCEDURE:  After obtaining informed consent, IV access  was obtained in the preoperative area.   The patient was taken to the operating room.  The patient was placed in the prone position with a pillow under the abdomen. All pressure points were well padded.  EKG, blood pressure, and pulse oximeter were monitored.  The patient was monitored and sedated by the RN under my direction. The lumbosacral area was prepped with Chloraprep and draped in a sterile fashion.     Under fluoroscopic guidance the transverse processes of the L4 and  L5 vertebrae at the junctions of the superior articular processes were identified on the right.  Also identified was the groove between the ala and the superior articular process of the sacrum on the ipsilateral side.  Skin and subcutaneous tissue were anesthetized with 1ml of 1% lidocaine above each of these points. Then, radiofrequency probe needles were advanced in this fluoro view to the above junctions.  Aspiration was negative for blood and CSF.  After confirming the position of the needle with fluoroscope in all views, testing was initiated.  First, sensory testing was started on each needle a 1V and 50Hz and slowly decreased until painful pressure stimulation diminished at 0.5V.  Next, motor testing was confirmed to be negative at 3V and 2Hz for any radicular stimulation.  Then 1mL of the local anesthetic was instilled in each needle.  Two minutes elapsed, and during this time a lateral fluoroscopic view was confirmed again to ensure the needles had not advanced nor retracted.  Then, Radiofrequency Lesioning was initiated for 3 minutes at 80 degrees Celsius.  Needles were removed intact from each of the areas.     A similar procedure was repeated to address the L3, L4, and L5 nerves on the contralateral side.   Onset of analgesia was noted.  Vital signs remained stable throughout.      ESTIMATED BLOOD LOSS:  <5 mL  SPECIMENS:  none    COMPLICATIONS:   No complications were noted., There was not any evidence of dural puncture.  , and The patient did not have any signs of postprocedure numbness nor weakness.    TOLERANCE & DISCHARGE CONDITION:    The patient tolerated the procedure well.  The patient was transported to the recovery area without difficulties.  The patient was discharged to home under the care of family in stable and satisfactory condition.    PLAN OF CARE:  The patient was given our standard instruction sheet.  The patient will  Return to clinic 6 wks.  The patient will resume all medications  as per the medication reconciliation sheet.

## 2024-06-05 ENCOUNTER — TELEPHONE (OUTPATIENT)
Dept: PAIN MEDICINE | Facility: CLINIC | Age: 49
End: 2024-06-05

## 2024-06-05 NOTE — TELEPHONE ENCOUNTER
Caller: Nereida Myles    Relationship: Self    Best call back number: [phone]    What form or medical record are you requesting: WORK NOTE FOR PROCEDURE ON 06/04/24    Who is requesting this form or medical record from you: EMPLOYER    How would you like to receive the form or medical records (pick-up, mail, fax): UPLOAD TO PT'S [employer]    Timeframe paperwork needed: ASAP    Additional notes: PT STATES THAT THE WORK NOTE MUST STATE SHE NEEDED TO TAKE OFF 06/04/24-06/06/24 DUE TO THE PROCEDURE ON 06/04/24.

## 2024-06-06 ENCOUNTER — TREATMENT (OUTPATIENT)
Dept: PHYSICAL THERAPY | Facility: CLINIC | Age: 49
End: 2024-06-06
Payer: COMMERCIAL

## 2024-06-06 DIAGNOSIS — M41.9 SCOLIOSIS OF THORACOLUMBAR SPINE, UNSPECIFIED SCOLIOSIS TYPE: ICD-10-CM

## 2024-06-06 DIAGNOSIS — M54.16 LUMBAR RADICULOPATHY: ICD-10-CM

## 2024-06-06 DIAGNOSIS — M54.41 CHRONIC BILATERAL LOW BACK PAIN WITH BILATERAL SCIATICA: Primary | ICD-10-CM

## 2024-06-06 DIAGNOSIS — M54.42 CHRONIC BILATERAL LOW BACK PAIN WITH BILATERAL SCIATICA: Primary | ICD-10-CM

## 2024-06-06 DIAGNOSIS — G89.29 CHRONIC BILATERAL LOW BACK PAIN WITH BILATERAL SCIATICA: Primary | ICD-10-CM

## 2024-06-06 DIAGNOSIS — M51.36 DDD (DEGENERATIVE DISC DISEASE), LUMBAR: ICD-10-CM

## 2024-06-06 DIAGNOSIS — Z74.09 IMPAIRED FUNCTIONAL MOBILITY AND ACTIVITY TOLERANCE: ICD-10-CM
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
## 2024-06-07 RX ORDER — DICLOFENAC SODIUM 75 MG/1
TABLET, DELAYED RELEASE ORAL
Qty: 30 TABLET | Refills: 0 | Status: SHIPPED | OUTPATIENT
Start: 2024-06-07

## 2024-06-11 ENCOUNTER — CLINICAL SUPPORT (OUTPATIENT)
Dept: FAMILY MEDICINE CLINIC | Facility: CLINIC | Age: 49
End: 2024-06-11
Payer: COMMERCIAL

## 2024-06-11 DIAGNOSIS — R31.9 HEMATURIA, UNSPECIFIED TYPE: Primary | ICD-10-CM

## 2024-06-11 LAB
BILIRUB BLD-MCNC: ABNORMAL MG/DL
CLARITY, POC: ABNORMAL
COLOR UR: ABNORMAL
EXPIRATION DATE: ABNORMAL
GLUCOSE UR STRIP-MCNC: NEGATIVE MG/DL
KETONES UR QL: NEGATIVE
LEUKOCYTE EST, POC: ABNORMAL
Lab: ABNORMAL
NITRITE UR-MCNC: POSITIVE MG/ML
PH UR: 6 [PH] (ref 5–8)
PROT UR STRIP-MCNC: ABNORMAL MG/DL
RBC # UR STRIP: ABNORMAL /UL
SP GR UR: 1.02 (ref 1–1.03)
UROBILINOGEN UR QL: NORMAL

## 2024-06-11 PROCEDURE — 81003 URINALYSIS AUTO W/O SCOPE: CPT | Performed by: FAMILY MEDICINE

## 2024-06-11 RX ORDER — NITROFURANTOIN 25; 75 MG/1; MG/1
100 CAPSULE ORAL 2 TIMES DAILY
Qty: 10 CAPSULE | Refills: 0 | Status: SHIPPED | OUTPATIENT
Start: 2024-06-11 | End: 2024-06-16

## 2024-06-13 ENCOUNTER — TREATMENT (OUTPATIENT)
Dept: PHYSICAL THERAPY | Facility: CLINIC | Age: 49
End: 2024-06-13
Payer: COMMERCIAL

## 2024-06-13 DIAGNOSIS — M54.41 CHRONIC BILATERAL LOW BACK PAIN WITH BILATERAL SCIATICA: Primary | ICD-10-CM

## 2024-06-13 DIAGNOSIS — Z74.09 IMPAIRED FUNCTIONAL MOBILITY AND ACTIVITY TOLERANCE: ICD-10-CM

## 2024-06-13 DIAGNOSIS — M41.9 SCOLIOSIS OF THORACOLUMBAR SPINE, UNSPECIFIED SCOLIOSIS TYPE: ICD-10-CM

## 2024-06-13 DIAGNOSIS — G89.29 CHRONIC BILATERAL LOW BACK PAIN WITH BILATERAL SCIATICA: Primary | ICD-10-CM

## 2024-06-13 DIAGNOSIS — M54.42 CHRONIC BILATERAL LOW BACK PAIN WITH BILATERAL SCIATICA: Primary | ICD-10-CM

## 2024-06-13 NOTE — PROGRESS NOTES
Physical Therapy Daily Treatment Note    [facility]  [address] [place] [address]  [phone] (phone)  [phone] (fax)    Patient: Nereida Myles   : 1975  Diagnosis/ICD-10 Code:  Chronic bilateral low back pain with bilateral sciatica [M54.42, M54.41, G89.29]  Referring practitioner: LINDA Garber  Date of Initial Visit: Type: THERAPY  Noted: 2024  Today's Date: 2024  Patient seen for 2 sessions             Subjective Evaluation    History of Present Illness    Subjective comment: Noticeable difference since I had procedure. Still have pain / difficulty with sleeping / turning over in bed.  Haven't been very active for a good while.       Objective     AQUATIC EX:    Water Walk   Forward, sideways x 2 laps ea  Stretch 1   HS 20 sec x 2 ea  Stretch 2   Piriformis 20 sec x 2 ea  Stretch 3   Wall 30 sec x 2  Stretch Other 1  -  Stretch Other 2  -  Vertical Traction  LN/rail x 3 min  Abdominals   SN x 12  Clams    15x, seated  Hip Abd/Add   10x ea  Hip Flex/Ext   10x ea  March in Place  15x  Mini Squat   10x  Toe/Heel Raises  -.  Uni-Squat   -  Uni-Clock   -  Step Ups   -  Bicycle   1 min, seated  Flutter/Scissor  15 / 15, seated  Exercise 1   shoulder abd/add x 10 w/ small foam dumbbells  Exercise 2   -  Exercise 3   -  Exercise 4   -  Exercise 5  -  Exercise 6  -        Assessment & Plan       Assessment  Assessment details: Patient seen today for initial aquatic therapy session including education and instruction in basic aquatic ex/activity for mobility, flexibility, and strength/stabilization.  She felt it a little more discomfort with sideways walking compared to forward walking.  She also felt it in her lower back during R piriformis stretch in standing so adjusted height of thigh and intensity of pull to reduce LB discomfort and feel more stretch in lateral hip/thigh/buttock.  Instructed her to stand tall, engage abdominals /  gluts, and keep ex performance in comfortable range to minimize compensation / strain.  PT provided demonstration and cuing throughout session for optimal posture, core/glut activation, and correct form/technique with ex/activity.    Plan:  Assess patient response to initial aquatic session and modify/progress as appropriate.                    Timed:  Aquatic Therapy    33     mins 56725;    Zayda Khan, PT  Physical Therapist    [place] License: [id number]

## 2024-06-15 LAB
BACTERIA UR CULT: ABNORMAL
BACTERIA UR CULT: ABNORMAL
OTHER ANTIBIOTIC SUSC ISLT: ABNORMAL

## 2024-06-21 ENCOUNTER — TREATMENT (OUTPATIENT)
Dept: PHYSICAL THERAPY | Facility: CLINIC | Age: 49
End: 2024-06-21
Payer: COMMERCIAL

## 2024-06-21 DIAGNOSIS — G89.29 CHRONIC BILATERAL LOW BACK PAIN WITH BILATERAL SCIATICA: Primary | ICD-10-CM

## 2024-06-21 DIAGNOSIS — M54.42 CHRONIC BILATERAL LOW BACK PAIN WITH BILATERAL SCIATICA: Primary | ICD-10-CM

## 2024-06-21 DIAGNOSIS — M54.41 CHRONIC BILATERAL LOW BACK PAIN WITH BILATERAL SCIATICA: Primary | ICD-10-CM

## 2024-06-21 DIAGNOSIS — Z74.09 IMPAIRED FUNCTIONAL MOBILITY AND ACTIVITY TOLERANCE: ICD-10-CM

## 2024-06-21 DIAGNOSIS — M41.9 SCOLIOSIS OF THORACOLUMBAR SPINE, UNSPECIFIED SCOLIOSIS TYPE: ICD-10-CM

## 2024-06-25 ENCOUNTER — TREATMENT (OUTPATIENT)
Dept: PHYSICAL THERAPY | Facility: CLINIC | Age: 49
End: 2024-06-25
Payer: COMMERCIAL

## 2024-06-25 DIAGNOSIS — M41.9 SCOLIOSIS OF THORACOLUMBAR SPINE, UNSPECIFIED SCOLIOSIS TYPE: ICD-10-CM

## 2024-06-25 DIAGNOSIS — M54.41 CHRONIC BILATERAL LOW BACK PAIN WITH BILATERAL SCIATICA: Primary | ICD-10-CM

## 2024-06-25 DIAGNOSIS — G89.29 CHRONIC BILATERAL LOW BACK PAIN WITH BILATERAL SCIATICA: Primary | ICD-10-CM

## 2024-06-25 DIAGNOSIS — M54.42 CHRONIC BILATERAL LOW BACK PAIN WITH BILATERAL SCIATICA: Primary | ICD-10-CM

## 2024-06-25 DIAGNOSIS — Z74.09 IMPAIRED FUNCTIONAL MOBILITY AND ACTIVITY TOLERANCE: ICD-10-CM

## 2024-06-25 NOTE — PROGRESS NOTES
Physical Therapy Daily Treatment Note    [facility]  [address] [place] [address]  [phone] (phone)  [phone] (fax)    Patient: Nereida Myles   : 1975  Diagnosis/ICD-10 Code:  Chronic bilateral low back pain with bilateral sciatica [M54.42, M54.41, G89.29]  Referring practitioner: LINDA Garber  Date of Initial Visit: Type: THERAPY  Noted: 2024  Today's Date: 2024  Patient seen for 4 sessions             Subjective   Did well after the last session.    Objective     AQUATIC EX:     Water Walk    Forward, sideways x 2 laps ea  Tandem Walk across pool w/ noodle support 25 ft X 4  March Walk 25 ft X 2  Stretch 1                      HS w/ large noodle under ankle 40 sec ea  Stretch 2                      Piriformis 20 sec x 2 ea  Stretch 3                      Wall Walk (BKTC) 30 sec x 2  Vertical Traction          LN/rail x 3 min  Abdominals                 Large Noodle Pushdowns x 12  Hip Abd/Add                15x ea  Hip Flex/Ext                 15x ea  Mini Squat                   15x  Toe/Heel Raises          15x  Uni-Clock                    -  Step Ups                     -  Bicycle                         1 min, seated  Flutter/Scissor             15 / 15, seated  Rows w/ closed paddles X 15  Paddle Stirs  10/10   Supervised use of spa X 5 min.    Assessment/Plan  Progressed repetitions from 10 to 15 on several exercises and added core stability training with paddle resisted exercises.  Discussed symptom management for night time pain when trying to sleep.  Recommended cold pack to the bacvk in the decompression position and use of her prescribed medication before bed time.          Timed:  Aquatic Therapy    38     mins 24683;    Ganga Weston, PT  Physical Therapist    [place] License:  [id number]

## 2024-07-02 ENCOUNTER — TREATMENT (OUTPATIENT)
Dept: PHYSICAL THERAPY | Facility: CLINIC | Age: 49
End: 2024-07-02
Payer: COMMERCIAL

## 2024-07-02 DIAGNOSIS — Z74.09 IMPAIRED FUNCTIONAL MOBILITY AND ACTIVITY TOLERANCE: ICD-10-CM

## 2024-07-02 DIAGNOSIS — G89.29 CHRONIC BILATERAL LOW BACK PAIN WITH BILATERAL SCIATICA: Primary | ICD-10-CM

## 2024-07-02 DIAGNOSIS — M41.9 SCOLIOSIS OF THORACOLUMBAR SPINE, UNSPECIFIED SCOLIOSIS TYPE: ICD-10-CM

## 2024-07-02 DIAGNOSIS — M54.42 CHRONIC BILATERAL LOW BACK PAIN WITH BILATERAL SCIATICA: Primary | ICD-10-CM

## 2024-07-02 DIAGNOSIS — M54.41 CHRONIC BILATERAL LOW BACK PAIN WITH BILATERAL SCIATICA: Primary | ICD-10-CM

## 2024-07-02 NOTE — PROGRESS NOTES
Physical Therapy Daily Treatment Note    [facility]  [address] [place] [address]  [phone] (phone)  [phone] (fax)    Patient: Nereida Myles   : 1975  Diagnosis/ICD-10 Code:  Chronic bilateral low back pain with bilateral sciatica [M54.42, M54.41, G89.29]  Referring practitioner: LINDA Garber  Date of Initial Visit: Type: THERAPY  Noted: 2024  Today's Date: 2024  Patient seen for 5 sessions             Subjective   My back is feeling better, but I still have pain.    Objective     AQUATIC EX:     Water Walk    Forward, sideways x 2 laps ea  Tandem Walk across pool w/ noodle support 25 ft X 4  Stretch 1                      HS w/ large noodle under ankle 40 sec ea  Stretch 2                      Piriformis 20 sec x 2 ea  Stretch 3                      Wall Walk (BKTC) 30 sec x 2  Vertical Traction          LN/rail x 3 min  Abdominals                 Large Noodle Pushdowns x 12  Hip Abd/Add                15x ea  Hip Flex/Ext                 15x ea  Mini Squat                   15x  Toe/Heel Raises          15x  Uni-Clock                    -  Step Ups                     -  Bicycle suspended on Noodle  X 2 min  Flutter/Scissor             15 / 15, seated  Rows w/ closed paddles X 15  Paddle Stirs  10/10     Assessment/Plan  Aleshia is making progress.  She is challenged with her balance during the tandem walk.  Minimal verbal cues needed for instruction in the prescribed exercises.          Timed:  Aquatic Therapy    40     mins 77683;    Ganga Weston, PT  Physical Therapist    [place] License:  [id number]

## 2024-07-05 ENCOUNTER — TREATMENT (OUTPATIENT)
Dept: PHYSICAL THERAPY | Facility: CLINIC | Age: 49
End: 2024-07-05
Payer: COMMERCIAL

## 2024-07-05 DIAGNOSIS — M54.42 CHRONIC BILATERAL LOW BACK PAIN WITH BILATERAL SCIATICA: Primary | ICD-10-CM

## 2024-07-05 DIAGNOSIS — Z74.09 IMPAIRED FUNCTIONAL MOBILITY AND ACTIVITY TOLERANCE: ICD-10-CM

## 2024-07-05 DIAGNOSIS — M54.41 CHRONIC BILATERAL LOW BACK PAIN WITH BILATERAL SCIATICA: Primary | ICD-10-CM

## 2024-07-05 DIAGNOSIS — M41.9 SCOLIOSIS OF THORACOLUMBAR SPINE, UNSPECIFIED SCOLIOSIS TYPE: ICD-10-CM

## 2024-07-05 DIAGNOSIS — G89.29 CHRONIC BILATERAL LOW BACK PAIN WITH BILATERAL SCIATICA: Primary | ICD-10-CM

## 2024-07-11 ENCOUNTER — TREATMENT (OUTPATIENT)
Dept: PHYSICAL THERAPY | Facility: CLINIC | Age: 49
End: 2024-07-11
Payer: COMMERCIAL

## 2024-07-11 DIAGNOSIS — M41.9 SCOLIOSIS OF THORACOLUMBAR SPINE, UNSPECIFIED SCOLIOSIS TYPE: ICD-10-CM

## 2024-07-11 DIAGNOSIS — M54.42 CHRONIC BILATERAL LOW BACK PAIN WITH BILATERAL SCIATICA: Primary | ICD-10-CM

## 2024-07-11 DIAGNOSIS — G89.29 CHRONIC BILATERAL LOW BACK PAIN WITH BILATERAL SCIATICA: Primary | ICD-10-CM

## 2024-07-11 DIAGNOSIS — Z74.09 IMPAIRED FUNCTIONAL MOBILITY AND ACTIVITY TOLERANCE: ICD-10-CM

## 2024-07-11 DIAGNOSIS — M54.41 CHRONIC BILATERAL LOW BACK PAIN WITH BILATERAL SCIATICA: Primary | ICD-10-CM

## 2024-07-11 NOTE — PROGRESS NOTES
Physical Therapy 30-Day Progress Note     [facility]  [address] [place] [address]  [phone] (phone)  [phone] (fax)    Patient: Nereida Myles   : 1975  Diagnosis/ICD-10 Code:  Chronic bilateral low back pain with bilateral sciatica [M54.42, M54.41, G89.29]  Referring practitioner: LINDA Garber  Date of Initial Visit: Type: THERAPY  Noted: 2024  Today's Date: 2024  Patient seen for 7 sessions      Subjective:     Clinical Progress: improved  Home Program Compliance: N/A, no pool access   Treatment has included:  aquatic therapy    Subjective Evaluation    History of Present Illness    Subjective comment: Overall I'm doing better, walking farther with less pain but still limited.  Transitional movements and lower body dressing are still moderately painful/difficult.       Objective     Posture:  noted scoliosis with L pelvis elevated and R lumbar convexity     Lumbar AROM:   Flexion= 65% (mid shin)  Extension= 25%, pain at L/S  with pain   R rotation= 50%  L rotation= 40% R sided pain  R lateral flexion=50% R sided pain  L lateral flexion= 65%     MMT:    Hip Flexion:  4+/5 B  Abduction (standing poolside with UE support):  L 4/5 to 4+/5, R 4/5  External rotation (sitting):  L 4/5 with mod/severe pain, R 4+/5 with mild pain  Knee Flexion: 4/5 to 4+/5 B  Extension:  4+/5 B, pain on R MMT  Core:  grossly 4/5      Functional Outcome Score:  Oswestry Back Index = 22/50 or 44% perceived disability (was 64% at eval)       AQUATIC EX:     Water Walk    Forward, sideways x 2 laps ea  Tandem Walk across pool w/ noodle support 25 ft x 2  Stretch 1                      HS w/ large noodle under ankle 30-45 sec ea  Stretch 2                      Piriformis 20 sec x 2 ea  Stretch 3                      Wall Walk (BKTC) 30 sec x 2  Vertical Traction          LN/rail x 3 min  Abdominals                 Large Solid Noodle Pushdowns x 15  Hip  Abd/Add                15x ea  Hip Flex/Ext                 15x ea  Mini Squat                   15x  Toe/Heel Raises          15x  Plank    30 sec x  2  Leg Press   10x L, 12x R w/ lg foam ring  Uni-Clock                    -  Step Ups                     -  Alt Rows   15x (closed paddles)  Paddle Stirs    10/10 (closed paddles)  Suspended on Noodle:   Bicycle, Flutter kick, Scissors Kick  1 min ea        Assessment & Plan       Assessment  Assessment details: Nereida Myles is a 48 y.o. F who has attended 5 aquatic therapy sessions since her evaluation on 2024 for treatment of LBP.  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 Her overall pain has decreased and she notes some improvement in her walking tolerance but continues to have pain/difficulty with ADL's / transitional movements.  She demonstrates some improvement in ROM / strength but still has pain / limitations in lumbar ROM, core/LE strength deficits with pain on MMT, and overall decreased functional activity tolerance.  She has partially met / met STGs and 1 of 5 LTGs with remaining LTGs ongoing / progressing.  Patient is appropriate for continuation of skilled therapy to help reduce pain and increase ease / tolerance with transitional movement, standing, and walking.    Goals  Plan Goals: ST wks  1. Patient will be independent with education for symptom management, joint protection and strategies to minimize stress on affected tissues.  Partially Met  2. Pt able to walk 10 min with no more than 5/10 pain for greater ease with negotiating the community.  Met, reports ability to walk about 15-20 min before pain gets up to 5/10.     LT wks  1. Pt to report no more than 3/10 pain in the LB with ADLs, including bed mobility and transfers In/out of the car.  Ongoing, pain variable from 4-6/10 with ADLs /  "transitional movements  2. Pt to improve trunk and LE strength by 1/2 to 1 MMT grade for greater ease with getting up from the floor.  Ongoing/Progressing, getting up from floor a challenge and \"not graceful\" per patient  3. Pt able to perform stairs using a reciprocal pattern for ease of doing her laundry.  Progressing, able to navigate stairs a couple of times a day with reciprocal pattern and minimal pain but going up/down stairs a lot doing laundry causes increased pain.  4. Pt to improve Oswestry Back index score from 64% to  44% for overall functional improvement with standing and walking.  Met, Oswestry Back Index = 22/50 or 44% perceived disability today - will monitor for consistency  5. Pt to be independent with progressive HEP for core and LE strength.  Ongoing           Recommendations: Continue as planned  Timeframe: 1 month  Prognosis to achieve goals: good    PT Signature: Zayda Khan PT  [place] License:  [id number]      Based upon review of the patient's progress and continued therapy plan, it is my medical opinion that Nereida Myles should continue physical therapy treatment at [facility]  [address] [place] [address] [address] [place] [address] [phone].    Signature: __________________________________  Wade Graham PA  NPI: [id number]                                          Timed:  Aquatic therapy  49645    38   mins       "

## 2024-07-15 ENCOUNTER — TREATMENT (OUTPATIENT)
Dept: PHYSICAL THERAPY | Facility: CLINIC | Age: 49
End: 2024-07-15
Payer: COMMERCIAL

## 2024-07-15 DIAGNOSIS — Z74.09 IMPAIRED FUNCTIONAL MOBILITY AND ACTIVITY TOLERANCE: ICD-10-CM

## 2024-07-15 DIAGNOSIS — M41.9 SCOLIOSIS OF THORACOLUMBAR SPINE, UNSPECIFIED SCOLIOSIS TYPE: ICD-10-CM

## 2024-07-15 DIAGNOSIS — G89.29 CHRONIC BILATERAL LOW BACK PAIN WITH BILATERAL SCIATICA: Primary | ICD-10-CM

## 2024-07-15 DIAGNOSIS — M54.41 CHRONIC BILATERAL LOW BACK PAIN WITH BILATERAL SCIATICA: Primary | ICD-10-CM

## 2024-07-15 DIAGNOSIS — M54.42 CHRONIC BILATERAL LOW BACK PAIN WITH BILATERAL SCIATICA: Primary | ICD-10-CM

## 2024-07-15 NOTE — PROGRESS NOTES
Physical Therapy Daily Treatment Note    [facility]  [address] [place] [address]  [phone] (phone)  [phone] (fax)    Patient: Nereida Myles   : 1975  Diagnosis/ICD-10 Code:  Chronic bilateral low back pain with bilateral sciatica [M54.42, M54.41, G89.29]  Referring practitioner: LINDA Garber  Date of Initial Visit: Type: THERAPY  Noted: 2024  Today's Date: 7/15/2024  Patient seen for 8 sessions             Subjective   Helped get her mother's room ready because she is having surgery.  This included moving things and some lifting.  Having more right hip pain today.    Objective     AQUATIC EX:     Water Walk    Forward, sideways x 2 laps ea  Tandem Walk  75 ft and March Walk  75 ft  Stretch 1                      HS w/ large noodle under ankle 45 sec ea  Stretch 2                      Piriformis 20 sec x 2 ea  Stretch 3                      Wall Walk (BKTC) 30 sec x 2  Vertical Traction          LN/rail x 3 min  Abdominals                 Large Solid Noodle Pushdowns x 15  Hip Abd/Add                15x ea  Hip Flex/Ext                 15x ea  Mini Squat                   15x  Toe/Heel Raises          15x  Plank  w/ Lg Noodle 30 sec x  2 and hands on bench (preferred) 30 sec  Leg Press w/ lg foam ring   15X ea  Step Ups                     -  Alt Rows                      15x (closed paddles)  Paddle Stirs                 10/10 (closed paddles)  Suspended on Noodle:   Bicycle, Flutter kick, Scissors Kick 1 min ea X 2 sets    Assessment/Plan  Increased right hip pain after doing some room rearranging for her mother who is having back surgery.  Aleshia was able to tolerate full session of aquatic therapy.          Timed:  Aquatic Therapy    40     mins 55099;    Ganga Weston, PT  Physical Therapist    [place] License:  [id number]

## 2024-07-16 ENCOUNTER — OFFICE VISIT (OUTPATIENT)
Dept: PAIN MEDICINE | Facility: CLINIC | Age: 49
End: 2024-07-16
Payer: COMMERCIAL

## 2024-07-16 VITALS
WEIGHT: 150.2 LBS | HEIGHT: 65 IN | BODY MASS INDEX: 25.02 KG/M2 | DIASTOLIC BLOOD PRESSURE: 84 MMHG | OXYGEN SATURATION: 97 % | TEMPERATURE: 96.5 F | SYSTOLIC BLOOD PRESSURE: 117 MMHG | HEART RATE: 80 BPM

## 2024-07-16 DIAGNOSIS — M47.816 LUMBAR FACET ARTHROPATHY: Primary | ICD-10-CM

## 2024-07-16 DIAGNOSIS — M51.36 DDD (DEGENERATIVE DISC DISEASE), LUMBAR: ICD-10-CM

## 2024-07-16 PROCEDURE — 99212 OFFICE O/P EST SF 10 MIN: CPT | Performed by: PHYSICIAN ASSISTANT

## 2024-07-16 RX ORDER — CLINDAMYCIN PHOSPHATE 10 MG/ML
SOLUTION TOPICAL
COMMUNITY
Start: 2024-05-31

## 2024-07-16 NOTE — PROGRESS NOTES
CHIEF COMPLAINT  Back pain    Subjective   Nereida Myles is a 48 y.o. female  who presents to the office for follow-up of procedure.  She completed a LUMBAR RADIOFREQUENCY ABLATION BILATERAL L3-L5  on  6/4/24 performed by Dr. Pruitt for management of back pain. Patient reports 85-95% ongoing relief from the procedure.  Is extremely pleased to report significant improvement of pain with radiofrequency ablation as well as now undergoing aquatic therapy 1-2 times weekly at [facility].  Patient states that the therapy is working on strengthening her core as well as improving her range of motion and she can tell that she is making significant improvement.    Pain today 2/10 VAS in severity.        Back Pain  This is a chronic problem. The current episode started more than 1 year ago. The problem occurs intermittently. The problem has been improved since onset. The pain is present in the lumbar spine. The quality of the pain is described as aching. The pain radiates to the right thigh. The pain is at a severity of 2/10. The pain is mild. The symptoms are aggravated by position, twisting and bending (activity). Pertinent negatives include no abdominal pain, dysuria, fever, headaches, numbness or weakness.        PEG Assessment   What number best describes your pain on average in the past week?4  What number best describes how, during the past week, pain has interfered with your enjoyment of life?3  What number best describes how, during the past week, pain has interfered with your general activity?  3    Review of Pertinent Medical Data ---  MRI OF THE THORACIC SPINE WITH AND WITHOUT CONTRAST AND MRI OF THE  LUMBAR SPINE WITH AND WITHOUT CONTRAST ON 01/09/2023     CLINICAL HISTORY: Mid and low back pain and flank pain where some  left-sided infection is suspected, history of breast cancer with  mastectomy.     MRI OF THE THORACIC SPINE TECHNIQUE: Sagittal T1, proton density and  fat-suppressed T2 and  postcontrast sagittal fat-suppressed T1-weighted  images were obtained of the thoracic spine. In addition axial T1 and T2  and postcontrast axial T1-weighted images were obtained from C7 to L1.     FINDINGS: The thoracic cord is normal in signal intensity. At T8-9 there  is a posterior central disc bulge or small disc protrusion that indents  the ventral aspect of the thecal sac and comes close to the ventral  aspect of the cord resulting in minimal if any canal narrowing. There is  no foraminal narrowing. Otherwise the thoracic disc spaces and facets  are within normal limits with no disc herniation, canal or foraminal  narrowing seen in the thoracic spine. Marrow signal intensity in the  thoracic spine is within normal limits. The paravertebral soft tissue  structures are normal in appearance.     IMPRESSION:  1. At T8-9 there is a posterior central disc bulge or small disc  protrusion that only minimally narrows the thecal sac. Otherwise, the  thoracic spine MRI is normal with no evidence of spinal infection in the  thoracic spine.     MRI OF THE LUMBAR SPINE TECHNIQUE: Sagittal T1, proton density fast spin  echo T2 and repeat fat-suppressed T2 and postcontrast sagittal  fat-suppressed T1-weighted images were obtained of the lumbar spine. In  addition, thin cut axial T1-weighted images were obtained angled through  the interspaces from L3 to S1 and axial T2 and postcontrast axial  T1-weighted images were obtained from T12 to S2.     COMPARISON: There are no prior lumbar spine imaging studies from [facility] for comparison.     FINDINGS: The distal thoracic cord and the conus are normal in signal  intensity. The conus terminates in the posterior midline of the thecal  sac at the level of the inferior body of L2 which is probably lower  limits of normal.     The T12-L1, L1-2 and L2-3 disc space and facets are normal with no canal  or foraminal narrowing from T12 to L3.     There is a very mild  levoscoliotic curvature of the lumbar spine with  its apex at the L3-4 lumbar level.     At L3-4 there is minimal right and mild left facet overgrowth and a  small amount of fluid in the left facets. There is some mild disc space  narrowing and degenerative endplate changes most pronounced in the right  side of the endplates along the inner margin of the levoscoliotic curve.  There is a 3 mm retrolisthesis of L3 with respect to L4 with disc  material bulging along the posterior superior endplate of L4. There is  essentially no narrowing of the thecal sac. There is mild right and no  left foraminal narrowing.     At L4-5 there is minimal facet overgrowth. There is 3 mm retrolisthesis  of L4 with respect to L5 and some bulging disc material extending along  the posterior superior endplate of L5. There is no canal narrowing.  There is minimal right and mild left foraminal narrowing.     At L5-S1 there is minimal facet overgrowth. There is disc space  narrowing, mild degenerative endplate changes, 3-4 mm retrolisthesis of  L5 with respect to S1, and minimal posterior spurring. There is no canal  or lateral recess narrowing. There is mild spurring into the inferior  aspect of the foramina and there is mild bilateral foraminal narrowing.     Other than the degenerative marrow edema in the right side of the  endplates at L3-4 and minimal degenerative marrow edema of the right  posterior lateral endplates at L4-5, marrow signal intensity in the  lumbar spine is within normal limits. I see no evidence of spinal  infection in the lumbar spine.     IMPRESSION:  1. No convincing acute abnormality is seen in the lumbar spine with no  direct evidence of spinal infection within the lumbar spine. 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  2. Disc space and facets are normal in appearance with no canal or  foraminal narrowing from T10 down to L3.  3. There is disc space  narrowing and degenerative endplate changes at  L3-4 most pronounced in the right side of the endplates along the inner  margin of the levoscoliotic curve and there is a 3-4 mm retrolisthesis  of L3 with respect to L4 and minimal facet overgrowth but essentially no  canal narrowing and there is only mild right foraminal narrowing at  L3-4.  4. At L4-5 there is minimal facet overgrowth and a 3-4 mm retrolisthesis  of L4 with respect to L5. There is no canal narrowing. There is minimal  right and mild left foraminal narrowing at L4-5.   5. At L5-S1 there is disc space narrowing, degenerative endplate  changes, and 3-4 mm retrolisthesis of L5 with respect to S1. There is no  canal or lateral recess narrowing. There is spurring into the foramina  and mild bilateral foraminal narrowing.  6. There is mild bone marrow edema in the right side of the endplates at  L3-4 and right posterior lateral endplates at L4-5 which is felt to  almost certainly be degenerative marrow edema. The remainder of the  lumbar spine is within normal limits     This report was finalized on 1/10/2023 7:44 AM by Dr. Isidro Tellez M.D.    The following portions of the patient's history were reviewed and updated as appropriate: allergies, current medications, past family history, past medical history, past social history, past surgical history, and problem list.    Review of Systems   Constitutional:  Negative for activity change, fatigue and fever.   Gastrointestinal:  Negative for abdominal pain, constipation and diarrhea.   Genitourinary:  Negative for difficulty urinating and dysuria.   Musculoskeletal:  Positive for back pain.   Neurological:  Negative for dizziness, weakness, light-headedness, numbness and headaches.   Psychiatric/Behavioral:  Negative for agitation, sleep disturbance and suicidal ideas. The patient is not nervous/anxious.      I have reviewed and confirmed the accuracy of the ROS as documented by the MA/LPN/RN Wade Graham,  "PA   Vitals:    07/16/24 1551   BP: 117/84   BP Location: Right arm   Patient Position: Sitting   Cuff Size: Adult   Pulse: 80   Temp: 96.5 °F (35.8 °C)   SpO2: 97%   Weight: 68.1 kg (150 lb 3.2 oz)   Height: 165.1 cm (65\")   PainSc:   2   PainLoc: Back         Objective   Physical Exam  Vitals and nursing note reviewed. Exam conducted with a chaperone present ().   Constitutional:       Appearance: Normal appearance.      Comments: MILDLY ANXIOUS   HENT:      Head: Normocephalic.   Pulmonary:      Effort: Pulmonary effort is normal.   Musculoskeletal:      Lumbar back: Tenderness present.        Back:    Skin:     General: Skin is warm and dry.   Neurological:      General: No focal deficit present.      Mental Status: She is alert and oriented to person, place, and time.      Cranial Nerves: Cranial nerves 2-12 are intact.      Sensory: Sensation is intact.      Motor: Motor function is intact.      Gait: Gait is intact.   Psychiatric:         Mood and Affect: Mood normal.         Behavior: Behavior normal.         Thought Content: Thought content normal.         Judgment: Judgment normal.         Assessment & Plan   Diagnoses and all orders for this visit:    1. Lumbar facet arthropathy (Primary)    2. DDD (degenerative disc disease), lumbar        Nereida Myles reports a pain score of 2.  Given her pain assessment as noted, treatment options were discussed and the following options were decided upon as a follow-up plan to address the patient's pain: continuation of current treatment plan for pain and use of non-medical modalities (ice, heat, stretching and/or behavior modifications).      --- Follow-up as needed.  The patient will contact the office when she feels the need to proceed with therapeutic lumbar radiofrequency ablation procedure.                  Dictated utilizing Dragon dictation.    "

## 2024-07-25 ENCOUNTER — TELEPHONE (OUTPATIENT)
Dept: PHYSICAL THERAPY | Facility: CLINIC | Age: 49
End: 2024-07-25

## 2024-08-01 ENCOUNTER — TREATMENT (OUTPATIENT)
Dept: PHYSICAL THERAPY | Facility: CLINIC | Age: 49
End: 2024-08-01
Payer: COMMERCIAL

## 2024-08-01 DIAGNOSIS — Z74.09 IMPAIRED FUNCTIONAL MOBILITY AND ACTIVITY TOLERANCE: ICD-10-CM

## 2024-08-01 DIAGNOSIS — G89.29 CHRONIC BILATERAL LOW BACK PAIN WITH BILATERAL SCIATICA: Primary | ICD-10-CM

## 2024-08-01 DIAGNOSIS — M41.9 SCOLIOSIS OF THORACOLUMBAR SPINE, UNSPECIFIED SCOLIOSIS TYPE: ICD-10-CM

## 2024-08-01 DIAGNOSIS — M54.41 CHRONIC BILATERAL LOW BACK PAIN WITH BILATERAL SCIATICA: Primary | ICD-10-CM

## 2024-08-01 DIAGNOSIS — M54.42 CHRONIC BILATERAL LOW BACK PAIN WITH BILATERAL SCIATICA: Primary | ICD-10-CM

## 2024-08-01 NOTE — PROGRESS NOTES
Physical Therapy Daily Treatment Note    [facility]  [address] [place] [address]  [phone] (phone)  [phone] (fax)    Patient: Nereida Myles   : 1975  Diagnosis/ICD-10 Code:  Chronic bilateral low back pain with bilateral sciatica [M54.42, M54.41, G89.29]  Referring practitioner: LINDA Garber  Date of Initial Visit: Type: THERAPY  Noted: 2024  Today's Date: 2024  Patient seen for 9 sessions             Subjective Evaluation    History of Present Illness    Subjective comment: Not doing too good, it's been a bit since I've been able to take care of me.  Been hurting more last couple of weeks with everything going on.  My son was in a car accident and my mom had back surgery, not to mention stress of work       Objective     AQUATIC EX:     Water Walk    Forward, sideways x 2 laps ea  Tandem Walk  75 ft and March Walk  75 ft  Stretch 1                      HS w/ large noodle under ankle 45 sec ea  Stretch 2                      Piriformis 20 sec x 2 ea  Stretch 3                      Wall Walk (BKTC) 30 sec x 2  Vertical Traction          LN/rail x 3 min  Abdominals                 Large Solid Noodle Pushdowns x 15  Hip Abd/Add                15x ea  Hip Flex/Ext                 15x ea  Mini Squat                   15x  Toe/Heel Raises          15x  Plank  w/ Lg Noodle 30 sec x  2 and hands on bench (preferred) 30 sec  Leg Press w/ lg foam ring   15x ea  Step Ups                     -  Alt Rows                      15x (closed paddles)  Paddle Stirs                 10/10 (closed paddles)  Suspended on Noodle:   Bicycle, Flutter kick, Scissors Kick 1 min ea x 2 sets       Assessment & Plan       Assessment  Assessment details: Patient reports hurting more due to nearly 3 week absence related to multiple things going on (son in car accident, mom had back sx, and work).  Continued with previous aquatic ex/activity for mobility,  flexibility, and strength / stabilization.  Instructed her to modify ex / activity or discontine if necessary to avoid increased pain.  She indicated the exercises seemed fine but at end of session, she said she could feel it in her back and would probably ice after she gets home.  Emphasis on standing tall, engaging abdominals / gluts, and performing ex/activity with controlled movement in comfortable range to reduce compensation / strain and optimize benefit.  Demonstration and cuing provided throughout session for optimal posture, core/glut activation, and proper form/technique with ex/activity.    Plan:  Assess patient response to return to aquatic therapy following nearly 3 week absence.  Will continue to modify/progress based on how she feels post treatment.                      Timed:  Aquatic Therapy    40     mins [id number];    Zayda Khan PT  Physical Therapist    [place] License: [id number]

## 2024-08-02 ENCOUNTER — OFFICE VISIT (OUTPATIENT)
Dept: FAMILY MEDICINE CLINIC | Facility: CLINIC | Age: 49
End: 2024-08-02
Payer: COMMERCIAL

## 2024-08-02 ENCOUNTER — TREATMENT (OUTPATIENT)
Dept: PHYSICAL THERAPY | Facility: CLINIC | Age: 49
End: 2024-08-02
Payer: COMMERCIAL

## 2024-08-02 VITALS
OXYGEN SATURATION: 98 % | HEART RATE: 68 BPM | DIASTOLIC BLOOD PRESSURE: 72 MMHG | BODY MASS INDEX: 24.12 KG/M2 | SYSTOLIC BLOOD PRESSURE: 108 MMHG | TEMPERATURE: 98.3 F | WEIGHT: 144.8 LBS | HEIGHT: 65 IN

## 2024-08-02 DIAGNOSIS — E06.3 HYPOTHYROIDISM DUE TO HASHIMOTO'S THYROIDITIS: ICD-10-CM

## 2024-08-02 DIAGNOSIS — M54.42 CHRONIC BILATERAL LOW BACK PAIN WITH BILATERAL SCIATICA: Primary | ICD-10-CM

## 2024-08-02 DIAGNOSIS — Z00.00 ADULT GENERAL MEDICAL EXAM: Primary | ICD-10-CM

## 2024-08-02 DIAGNOSIS — G89.29 CHRONIC BILATERAL LOW BACK PAIN WITH BILATERAL SCIATICA: Primary | ICD-10-CM

## 2024-08-02 DIAGNOSIS — Z74.09 IMPAIRED FUNCTIONAL MOBILITY AND ACTIVITY TOLERANCE: ICD-10-CM

## 2024-08-02 DIAGNOSIS — R53.83 OTHER FATIGUE: ICD-10-CM

## 2024-08-02 DIAGNOSIS — E03.8 HYPOTHYROIDISM DUE TO HASHIMOTO'S THYROIDITIS: ICD-10-CM

## 2024-08-02 DIAGNOSIS — K21.9 GASTROESOPHAGEAL REFLUX DISEASE WITHOUT ESOPHAGITIS: ICD-10-CM

## 2024-08-02 DIAGNOSIS — M41.9 SCOLIOSIS OF THORACOLUMBAR SPINE, UNSPECIFIED SCOLIOSIS TYPE: ICD-10-CM

## 2024-08-02 DIAGNOSIS — F33.0 MAJOR DEPRESSIVE DISORDER, RECURRENT, MILD: ICD-10-CM

## 2024-08-02 DIAGNOSIS — M54.41 CHRONIC BILATERAL LOW BACK PAIN WITH BILATERAL SCIATICA: Primary | ICD-10-CM

## 2024-08-02 DIAGNOSIS — E53.8 LOW SERUM VITAMIN B12: ICD-10-CM

## 2024-08-02 DIAGNOSIS — R00.2 PALPITATIONS: ICD-10-CM

## 2024-08-02 LAB
ALBUMIN SERPL-MCNC: 4.5 G/DL (ref 3.5–5.2)
ALBUMIN/GLOB SERPL: 2.6 G/DL
ALP SERPL-CCNC: 56 U/L (ref 39–117)
ALT SERPL-CCNC: 28 U/L (ref 1–33)
AST SERPL-CCNC: 26 U/L (ref 1–32)
BASOPHILS # BLD AUTO: 0.05 10*3/MM3 (ref 0–0.2)
BASOPHILS NFR BLD AUTO: 1.3 % (ref 0–1.5)
BILIRUB SERPL-MCNC: 0.4 MG/DL (ref 0–1.2)
BUN SERPL-MCNC: 15 MG/DL (ref 6–20)
BUN/CREAT SERPL: 18.8 (ref 7–25)
CALCIUM SERPL-MCNC: 9.2 MG/DL (ref 8.6–10.5)
CHLORIDE SERPL-SCNC: 105 MMOL/L (ref 98–107)
CHOLEST SERPL-MCNC: 159 MG/DL (ref 0–200)
CO2 SERPL-SCNC: 24.8 MMOL/L (ref 22–29)
CREAT SERPL-MCNC: 0.8 MG/DL (ref 0.57–1)
EGFRCR SERPLBLD CKD-EPI 2021: 89.9 ML/MIN/1.73
EOSINOPHIL # BLD AUTO: 0.35 10*3/MM3 (ref 0–0.4)
EOSINOPHIL NFR BLD AUTO: 9.1 % (ref 0.3–6.2)
ERYTHROCYTE [DISTWIDTH] IN BLOOD BY AUTOMATED COUNT: 13 % (ref 12.3–15.4)
GLOBULIN SER CALC-MCNC: 1.7 GM/DL
GLUCOSE SERPL-MCNC: 87 MG/DL (ref 65–99)
HCT VFR BLD AUTO: 41.7 % (ref 34–46.6)
HDLC SERPL-MCNC: 65 MG/DL (ref 40–60)
HGB BLD-MCNC: 13.7 G/DL (ref 12–15.9)
IMM GRANULOCYTES # BLD AUTO: 0.01 10*3/MM3 (ref 0–0.05)
IMM GRANULOCYTES NFR BLD AUTO: 0.3 % (ref 0–0.5)
LDLC SERPL CALC-MCNC: 74 MG/DL (ref 0–100)
LYMPHOCYTES # BLD AUTO: 0.84 10*3/MM3 (ref 0.7–3.1)
LYMPHOCYTES NFR BLD AUTO: 21.9 % (ref 19.6–45.3)
MAGNESIUM SERPL-MCNC: 2 MG/DL (ref 1.6–2.6)
MCH RBC QN AUTO: 30.2 PG (ref 26.6–33)
MCHC RBC AUTO-ENTMCNC: 32.9 G/DL (ref 31.5–35.7)
MCV RBC AUTO: 92.1 FL (ref 79–97)
MONOCYTES # BLD AUTO: 0.35 10*3/MM3 (ref 0.1–0.9)
MONOCYTES NFR BLD AUTO: 9.1 % (ref 5–12)
NEUTROPHILS # BLD AUTO: 2.24 10*3/MM3 (ref 1.7–7)
NEUTROPHILS NFR BLD AUTO: 58.3 % (ref 42.7–76)
NRBC BLD AUTO-RTO: 0 /100 WBC (ref 0–0.2)
PLATELET # BLD AUTO: 344 10*3/MM3 (ref 140–450)
POTASSIUM SERPL-SCNC: 3.6 MMOL/L (ref 3.5–5.2)
PROT SERPL-MCNC: 6.2 G/DL (ref 6–8.5)
RBC # BLD AUTO: 4.53 10*6/MM3 (ref 3.77–5.28)
SODIUM SERPL-SCNC: 142 MMOL/L (ref 136–145)
TRIGL SERPL-MCNC: 114 MG/DL (ref 0–150)
TSH SERPL DL<=0.005 MIU/L-ACNC: 1.2 UIU/ML (ref 0.27–4.2)
VIT B12 SERPL-MCNC: 357 PG/ML (ref 211–946)
VLDLC SERPL CALC-MCNC: 20 MG/DL (ref 5–40)
WBC # BLD AUTO: 3.84 10*3/MM3 (ref 3.4–10.8)

## 2024-08-02 RX ORDER — CYANOCOBALAMIN 1000 UG/ML
1000 INJECTION, SOLUTION INTRAMUSCULAR; SUBCUTANEOUS
Status: SHIPPED | OUTPATIENT
Start: 2024-08-02

## 2024-08-02 RX ADMIN — CYANOCOBALAMIN 1000 MCG: 1000 INJECTION, SOLUTION INTRAMUSCULAR; SUBCUTANEOUS at 11:02

## 2024-08-02 NOTE — PROGRESS NOTES
Physical Therapy Daily Treatment Note    [facility]  [address] [place] [address]  [phone] (phone)  [phone] (fax)    Patient: Nereida Myles   : 1975  Diagnosis/ICD-10 Code:  Chronic bilateral low back pain with bilateral sciatica [M54.42, M54.41, G89.29]  Referring practitioner: LINDA Garber  Date of Initial Visit: Type: THERAPY  Noted: 2024  Today's Date: 2024  Patient seen for 10 sessions             Subjective Evaluation    History of Present Illness    Subjective comment: after dinner last night, I took medicine and called it an evening but last night wasn't as bad as Wednesday night was.  Every now and then (a couple of times a week), I will get a pain in R lateral /hip that feels like sometbody stabbed me which lingers for a while.       Objective     AQUATIC EX:     Water Walk    Forward, sideways x 2 laps ea  Tandem Walk  75 ft and March Walk  75 ft  Stretch 1                      HS w/ large noodle under ankle 45 sec ea  Stretch 2                      Piriformis 20 sec x 2 ea  Stretch 3                      Wall Walk (BKTC) 30 sec x 2  Vertical Traction          LN/rail x 3 min  Abdominals                 Large Solid Noodle Pushdowns x 20  Hip Abd/Add                15x ea  Hip Flex/Ext                 15x ea  Mini Squat                   15x  Toe/Heel Raises          15x  Plank  w/ hands on bench 2 x 30 sec  Leg Press w/ lg foam ring   15x ea  Step Ups                     -  Alt Rows                      15x (closed paddles)  Paddle Stirs                  (closed paddles)  Suspended on Noodle:   Bicycle, Flutter kick, Scissors Kick 1 min ea x 2 sets      Assessment & Plan       Assessment  Assessment details: Patient reports she took medication and called it a night after dinner last night but didn't hurt as as she did the night before.  Continued with previous aquatic ex/activity for mobility, flexibility, and  strength / stabilization.  No new exercises secondary to recent increase in life stressors with associated increase in symptoms and today being 2 days of therapy back to back.  Emphasis on standing up tall, actively engaging abdominals / gluts, and performing ex/activity with good form / technique and control to minimize compensation / strain and improve quality of exercises.  Demonstration and cuing provided throughout session for optimal posture, core/glut activation, and proper form/technique with ex/activity.    Plan:  Continue with aquatic ex/activity therapy and modify/progress based on patient response.                        Timed:  Aquatic Therapy    40     mins [id number];    Zayda Khan PT  Physical Therapist    [place] License: [id number]

## 2024-08-02 NOTE — PROGRESS NOTES
Subjective   Nereida Myles is a 48 y.o. female who presents for annual female wellness exam.  Chief Complaint   Patient presents with    Annual Exam     No pap     Hypothyroidism    Depression        History of Present Illness  The patient is a 48-year-old female who presents for annual exam as well as follow-up on her hypothyroidism, depression, asthma, and other medical concerns.    She reports experiencing fatigue, which she attributes to both physical and mental stress. Her asthma is managed with daily allergy medication. She denies any head or neck symptoms, vision, hearing, or swallowing difficulties, chest pain, or shortness of breath. However, she does report occasional heart racing, which she believes is related to her asthma flare-ups. This sometimes disrupts her sleep, but she denies any acid reflux. She has been actively trying to lose weight. Her labs were checked a few months ago at [facility], and she was informed that her results were normal. She experiences palpitations at least once a week. She also reports a severe dry mouth, which she attributes to inadequate water intake. She denies any bowel or bladder issues.    Her depression is manageable, although she experienced significant episodes in 2024 due to family issues, work, and personal issues. She denies any thoughts of self-harm, hopelessness, or others. She has not sought therapy or counseling for her depression. She took Wellbutrin this morning.      Menstrual History: hysterectomy  Pregnancy History:   Sexual History: yes, with spouse   Contraception: NA  Hormone Replacement Therapy: NA  Diet: standard  Exercise: PT only, aquatic  Do you feel safe? yes  Have you ever been abused? no  Last dental exam: up to date  Last eye exam: up to date    Mammogram: na, seeing oncology  Pap Smear: na  Bone Density:  osteopenia  Colon Cancer Screening: colonoscopy 2019    Immunization History   Administered Date(s) Administered     COVID-19 (MODERNA) 1st,2nd,3rd Dose Monovalent 02/25/2021, 03/25/2021    Flu Vaccine Intradermal Quad 18-64YR 09/19/2017, 10/16/2019    Flu Vaccine Split Quad 10/19/2016, 09/19/2017, 09/28/2020    Fluzone (or Fluarix & Flulaval for VFC) >6mos 10/19/2016, 09/28/2020, 10/19/2022, 10/06/2023    Fluzone Quad >6mos (Multi-dose) 10/19/2016    Influenza Quad Vaccine (Inpatient) 10/19/2016    Influenza Split Preservative Free ID 10/06/2015    Influenza TIV (IM) 10/19/2016    Influenza, Unspecified 09/24/2018, 10/09/2019, 10/11/2021    Pneumococcal Conjugate 13-Valent (PCV13) 10/06/2015    Pneumococcal Conjugate 20-Valent (PCV20) 10/19/2022    Tdap 03/27/2015       The following portions of the patient's history were reviewed and updated as appropriate: allergies, current medications, past family history, past medical history, past social history, past surgical history and problem list.    Past Medical History:   Diagnosis Date    Allergic     Allergic rhinitis     Anxiety     Asthma     has inhaler and neb treatments    BMI 25.0-25.9,adult     Cancer     left breast    Community acquired pneumonia     COPD (chronic obstructive pulmonary disease)     Depression, major     Disease of thyroid gland     Diverticular disease     Foreign body in ear     Fractures 2012    Foot    GERD (gastroesophageal reflux disease)     Hashimoto's thyroiditis     History of abnormal mammogram     Left breast    History of acute sinusitis     History of adenocarcinoma of breast     History of COPD     History of dyspareunia in female     History of kidney stones     History of lump of left breast     History of malignant neoplasm of left breast     History of nausea and vomiting     History of seborrheic dermatitis     History of strep pharyngitis     History of suicidal ideation     Hypothyroidism     Hypoxemia     History of Hypoxemia    Joint pain 2021    Low back pain 2023    This is when it became debilitating    Lumbosacral disc disease ?     Obsessive compulsive disorder     Other screening mammogram     Personal history of asthma     PONV (postoperative nausea and vomiting)     Recurrent genital herpes simplex     History of     Symptomatic states associated with artificial menopause     History of     TMJ dysfunction 1999    Wear a mouth guard       Past Surgical History:   Procedure Laterality Date    APPENDECTOMY      BREAST AUGMENTATION Bilateral     Revised 2005, initial implants 1992    BREAST RECONSTRUCTION, BREAST TISSUE EXPANDER INSERTION Bilateral     CERVICAL CERCLAGE      x 2 during pregnancy, incompetent cervix    CHOLECYSTECTOMY      COLONOSCOPY      EPIDURAL Right 01/11/2024    Procedure: LUMBAR/SACRAL TRANSFORAMINAL EPIDURAL RIGHT L3-4 #1 47575;  Surgeon: Rei Pruitt MD;  Location: [facility];  Service: Pain Management;  Laterality: Right;    EPIDURAL BLOCK  1/2024    HAND SURGERY Right     may2022    HYSTERECTOMY      due to uterine prolapse    KIDNEY STONE SURGERY      LUNG SURGERY      bronchoscopy    MASTECTOMY Bilateral 01/2016    removed lymph nodes on left side    MEDIAL BRANCH BLOCK Right 3/13/2024    Procedure: LUMBAR MEDIAL BRANCH BLOCK RIGHT L3-L5 #1 81054, 79075;  Surgeon: Rei Pruitt MD;  Location: [facility];  Service: Pain Management;  Laterality: Right;    MEDIAL BRANCH BLOCK Bilateral 3/27/2024    Procedure: LUMBAR MEDIAL BRANCH BLOCK BILATERAL L3-L5 46423-22, 64494-50 X 2;  Surgeon: Rei Pruitt MD;  Location: [facility];  Service: Pain Management;  Laterality: Bilateral;    MEDIAL BRANCH BLOCK Left 5/2/2024    Procedure: LUMBAR MEDIAL BRANCH BLOCK LEFT L3-L5 #2 65480, 70457;  Surgeon: Rei Pruitt MD;  Location: [facility];  Service: Pain Management;  Laterality: Left;    NERVE SURGERY Bilateral 6/4/2024    Procedure: LUMBAR RADIOFREQUENCY ABLATION BILATERAL L3-L5 50868-84, 64636-50 X 2;  Surgeon: Rei Pruitt MD;  Location: [facility];  Service: Pain Management;   Laterality: Bilateral;    SCAR REVISION BREAST Bilateral 06/21/2022    Procedure: SCAR REVISION LEFT CHEST 27 CENTIMETERS AND RIGHT CHEST 25 CENTIMETERS FOR AESTHETIC FLAT CLOSURE FOLLOWING MASTECTOMY;  Surgeon: Waterhouse, Maurine, MD;  Location: [facility] OR;  Service: Plastics;  Laterality: Bilateral;    TISSUE EXPANDER PLACEMENT Bilateral     due to infection       Family History   Problem Relation Age of Onset    Thyroid disease Mother     Arthritis Mother     Uterine cancer Mother         in her 40's    Depression Mother     GI problems Mother     Hyperlipidemia Mother     Cancer Father     Bipolar disorder Father     Depression Father     Hyperlipidemia Father     Hypertension Father     Arthritis Father     Arthritis Maternal Grandmother     Bleeding Disorder Maternal Grandmother     Hyperlipidemia Maternal Grandmother     Hypertension Maternal Grandmother     Depression Maternal Grandmother     Hypertension Maternal Grandfather     Hypertension Paternal Grandfather     Hyperlipidemia Paternal Grandfather     Arthritis Paternal Grandfather     Coronary artery disease Other         Maternal GrGF    Skin cancer Other         Maternal GrGF    Diabetes Other         Paternal GrGF    Heart disease Other         FH in females before the age of 65    Arthritis Paternal Grandmother     Malig Hyperthermia Neg Hx        Social History     Socioeconomic History    Marital status:    Tobacco Use    Smoking status: Never    Smokeless tobacco: Never   Vaping Use    Vaping status: Never Used   Substance and Sexual Activity    Alcohol use: Yes     Alcohol/week: 5.0 standard drinks of alcohol     Types: 2 Glasses of wine, 3 Drinks containing 0.5 oz of alcohol per week     Comment: social    Drug use: Never    Sexual activity: Yes     Partners: Male     Birth control/protection: None, Vasectomy, Essure, Hysterectomy, Same-sex partner         Current Outpatient Medications:     albuterol (PROVENTIL) (2.5 MG/3ML)  0.083% nebulizer solution, Take 2.5 mg by nebulization Every 4 (Four) Hours As Needed for Wheezing., Disp: , Rfl:     albuterol (PROVENTIL) 4 MG tablet, Take 1 tablet by mouth Every Night., Disp: 90 tablet, Rfl: 3    albuterol sulfate  (90 Base) MCG/ACT inhaler, USE 2 INHALATIONS EVERY 4 HOURS AS NEEDED FOR WHEEZING, Disp: 34 g, Rfl: 0    buPROPion XL (WELLBUTRIN XL) 300 MG 24 hr tablet, TAKE 1 TABLET EVERY MORNING, Disp: 90 tablet, Rfl: 3    clindamycin (CLEOCIN T) 1 % swab, APPLY 1 SWAB TOPICALLY TO THE FACE EVERY MORNING, Disp: , Rfl:     diclofenac (VOLTAREN) 75 MG EC tablet, TAKE 1 TABLET TWICE A DAY AS NEEDED FOR PAIN, Disp: 30 tablet, Rfl: 0    FLUoxetine (PROzac) 40 MG capsule, Take 2 capsules by mouth Daily., Disp: 180 capsule, Rfl: 3    Fluticasone Furoate-Vilanterol (Breo Ellipta) 100-25 MCG/INH inhaler, Inhale 1 puff Daily., Disp: 180 each, Rfl: 3    levothyroxine (SYNTHROID, LEVOTHROID) 50 MCG tablet, TAKE 1 TABLET DAILY, Disp: 90 tablet, Rfl: 3    metaxalone (Skelaxin) 800 MG tablet, Take 0.5-1 tablets by mouth 4 (Four) Times a Day As Needed for Muscle Spasms., Disp: 120 tablet, Rfl: 0    montelukast (SINGULAIR) 10 MG tablet, TAKE 1 TABLET DAILY, Disp: 90 tablet, Rfl: 3    Multiple Vitamin (MULTI-VITAMIN DAILY PO), Take 1 tablet by mouth Every Night., Disp: , Rfl:     omeprazole (priLOSEC) 40 MG capsule, TAKE 1 CAPSULE DAILY, Disp: 90 capsule, Rfl: 3    ondansetron (ZOFRAN) 4 MG tablet, Take 1 tablet by mouth Every 8 (Eight) Hours As Needed for Nausea or Vomiting., Disp: 30 tablet, Rfl: 1    tamoxifen (NOLVADEX) 20 MG chemo tablet, Take  by mouth Daily., Disp: , Rfl:     theophylline (THEODUR) 300 MG 12 hr tablet, Take 1 tablet by mouth Every Morning., Disp: 90 tablet, Rfl: 3    valACYclovir (VALTREX) 500 MG tablet, TAKE 1 TABLET DAILY, Disp: 90 tablet, Rfl: 3    benzonatate (TESSALON) 200 MG capsule, , Disp: , Rfl:     Review of Systems    Objective   Vitals:    08/02/24 0950   BP: 108/72  "  Pulse: 68   Temp: 98.3 °F (36.8 °C)   SpO2: 98%   Weight: 65.7 kg (144 lb 12.8 oz)   Height: 165.1 cm (65\")     Body mass index is 24.1 kg/m².  Physical Exam  Vitals and nursing note reviewed.   Constitutional:       General: She is not in acute distress.     Appearance: Normal appearance. She is well-developed.   HENT:      Head: Normocephalic and atraumatic.      Right Ear: Tympanic membrane, ear canal and external ear normal.      Left Ear: Tympanic membrane, ear canal and external ear normal.      Nose: Nose normal.      Mouth/Throat:      Mouth: Mucous membranes are moist.      Pharynx: Oropharynx is clear. No oropharyngeal exudate or posterior oropharyngeal erythema.   Eyes:      Conjunctiva/sclera: Conjunctivae normal.      Pupils: Pupils are equal, round, and reactive to light.   Neck:      Thyroid: No thyromegaly.   Cardiovascular:      Rate and Rhythm: Normal rate and regular rhythm.      Heart sounds: No murmur heard.  Pulmonary:      Effort: Pulmonary effort is normal.      Breath sounds: Normal breath sounds. No wheezing.   Abdominal:      General: Abdomen is flat. Bowel sounds are normal. There is no distension.      Palpations: Abdomen is soft. There is no mass.      Tenderness: There is no abdominal tenderness.      Hernia: No hernia is present.   Musculoskeletal:         General: No swelling. Normal range of motion.      Cervical back: Normal range of motion and neck supple.      Right lower leg: No edema.      Left lower leg: No edema.   Lymphadenopathy:      Cervical: No cervical adenopathy.   Skin:     General: Skin is warm and dry.      Capillary Refill: Capillary refill takes less than 2 seconds.      Findings: No rash.   Neurological:      General: No focal deficit present.      Mental Status: She is alert and oriented to person, place, and time.      Cranial Nerves: No cranial nerve deficit.   Psychiatric:         Mood and Affect: Mood normal.         Behavior: Behavior normal. "       Physical Exam       ECG 12 Lead    Date/Time: 8/2/2024 11:36 AM  Performed by: Kehrer, Meredith Lea, MD    Authorized by: Kehrer, Meredith Lea, MD  Comparison: compared with previous ECG from 1/21/2024  Similar to previous ECG  Rhythm: sinus rhythm  BPM: 68  Other findings: non-specific ST-T wave changes    Clinical impression: abnormal EKG           Results  Laboratory Studies  B12 was 297.       Assessment & Plan   Diagnoses and all orders for this visit:    1. Adult general medical exam (Primary)  -     TSH  -     Vitamin B12  -     Lipid Panel  -     CBC & Differential  -     Comprehensive Metabolic Panel  -     Magnesium    2. Hypothyroidism due to Hashimoto's thyroiditis  -     TSH  -     Lipid Panel  -     CBC & Differential  -     Comprehensive Metabolic Panel    3. Gastroesophageal reflux disease without esophagitis    4. Low serum vitamin B12  -     Vitamin B12    5. Major depressive disorder, recurrent, mild    6. Palpitations  -     TSH  -     CBC & Differential  -     Comprehensive Metabolic Panel  -     Magnesium    7. Other fatigue  -     TSH  -     Vitamin B12  -     CBC & Differential  -     Comprehensive Metabolic Panel      Assessment & Plan  1. Annual exam.  Her heart rate is normal. She has lost 14 pounds since 05/2024, but her body mass index is within the normal range. Her B12 level was 297, which is above the normal range of 180. However, neurological consequences under 400 were discussed. A full panel of blood work will be conducted today to assess her thyroid, anemia, or organ dysfunction. A B12 injection will be administered today.    2. Depression.  She was advised to consider workbooks for depression from [facility] or getting a therapist.    3. Palpitations.  An EKG was nonacute.  May need further workup if labs are okay and symptoms persist.    Follow-up  She will return in 6 months for a routine thyroid check.            Discussed the importance of maintaining a healthy weight and  getting regular exercise.  Educated patient on the benefits of healthy diet.  Advise follow-up annually for wellness exams.    There are no Patient Instructions on file for this visit.    Patient or patient representative verbalized consent for the use of Ambient Listening during the visit with  Meredith Lea Kehrer, MD for chart documentation. 8/2/2024  10:20 EDT

## 2024-08-08 ENCOUNTER — TELEPHONE (OUTPATIENT)
Dept: PHYSICAL THERAPY | Facility: OTHER | Age: 49
End: 2024-08-08
Payer: COMMERCIAL

## 2024-08-08 NOTE — TELEPHONE ENCOUNTER
Caller: Nereida Myles    Relationship: Self    What was the call regarding: PATIENT CANCELLED TODAY DUE TO TRANSPORTATION ISSUE

## 2024-08-14 ENCOUNTER — CLINICAL SUPPORT (OUTPATIENT)
Dept: FAMILY MEDICINE CLINIC | Facility: CLINIC | Age: 49
End: 2024-08-14
Payer: COMMERCIAL

## 2024-08-14 DIAGNOSIS — R31.9 HEMATURIA, UNSPECIFIED TYPE: Primary | ICD-10-CM

## 2024-08-14 DIAGNOSIS — R30.0 DYSURIA: ICD-10-CM

## 2024-08-14 DIAGNOSIS — R30.0 DYSURIA: Primary | ICD-10-CM

## 2024-08-14 LAB
BILIRUB BLD-MCNC: ABNORMAL MG/DL
CLARITY, POC: ABNORMAL
COLOR UR: ABNORMAL
EXPIRATION DATE: ABNORMAL
GLUCOSE UR STRIP-MCNC: NEGATIVE MG/DL
KETONES UR QL: ABNORMAL
LEUKOCYTE EST, POC: ABNORMAL
Lab: ABNORMAL
NITRITE UR-MCNC: NEGATIVE MG/ML
PH UR: 5.5 [PH] (ref 5–8)
PROT UR STRIP-MCNC: ABNORMAL MG/DL
RBC # UR STRIP: ABNORMAL /UL
SP GR UR: 1.03 (ref 1–1.03)
UROBILINOGEN UR QL: ABNORMAL

## 2024-08-14 PROCEDURE — 81003 URINALYSIS AUTO W/O SCOPE: CPT | Performed by: FAMILY MEDICINE

## 2024-08-14 RX ORDER — NITROFURANTOIN 25; 75 MG/1; MG/1
100 CAPSULE ORAL 2 TIMES DAILY
Qty: 10 CAPSULE | Refills: 0 | Status: SHIPPED | OUTPATIENT
Start: 2024-08-14 | End: 2024-08-19

## 2024-08-14 RX ORDER — NITROFURANTOIN 25; 75 MG/1; MG/1
100 CAPSULE ORAL 2 TIMES DAILY
Qty: 10 CAPSULE | Refills: 0 | Status: SHIPPED | OUTPATIENT
Start: 2024-08-14 | End: 2024-08-14 | Stop reason: SDUPTHER

## 2024-08-19 LAB
BACTERIA UR CULT: ABNORMAL
BACTERIA UR CULT: ABNORMAL
OTHER ANTIBIOTIC SUSC ISLT: ABNORMAL

## 2024-09-03 RX ORDER — ONDANSETRON 4 MG/1
4 TABLET, FILM COATED ORAL EVERY 8 HOURS PRN
Qty: 27 TABLET | Refills: 39 | Status: SHIPPED | OUTPATIENT
Start: 2024-09-03

## 2024-09-03 NOTE — TELEPHONE ENCOUNTER
Rx Refill Note  Requested Prescriptions     Pending Prescriptions Disp Refills    ondansetron (ZOFRAN) 4 MG tablet [Pharmacy Med Name: ONDANSETRON TABS 4MG] 27 tablet 39     Sig: TAKE 1 TABLET EVERY 8 HOURS AS NEEDED FOR NAUSEA OR VOMITING      Last office visit with prescribing clinician: 8/2/2024   Last telemedicine visit with prescribing clinician: Visit date not found   Next office visit with prescribing clinician: 2/17/2025                         Would you like a call back once the refill request has been completed: [] Yes [] No    If the office needs to give you a call back, can they leave a voicemail: [] Yes [] No    Kathrin Kulkarni MA  09/03/24, 07:22 EDT

## 2024-09-10 ENCOUNTER — OFFICE VISIT (OUTPATIENT)
Dept: PAIN MEDICINE | Facility: CLINIC | Age: 49
End: 2024-09-10
Payer: COMMERCIAL

## 2024-09-10 ENCOUNTER — PREP FOR SURGERY (OUTPATIENT)
Dept: SURGERY | Facility: SURGERY CENTER | Age: 49
End: 2024-09-10
Payer: COMMERCIAL

## 2024-09-10 VITALS
HEART RATE: 76 BPM | OXYGEN SATURATION: 98 % | WEIGHT: 138.6 LBS | BODY MASS INDEX: 23.09 KG/M2 | TEMPERATURE: 96.2 F | DIASTOLIC BLOOD PRESSURE: 72 MMHG | SYSTOLIC BLOOD PRESSURE: 108 MMHG | HEIGHT: 65 IN

## 2024-09-10 DIAGNOSIS — M51.36 DDD (DEGENERATIVE DISC DISEASE), LUMBAR: ICD-10-CM

## 2024-09-10 DIAGNOSIS — M54.16 LUMBAR RADICULOPATHY: Primary | ICD-10-CM

## 2024-09-10 DIAGNOSIS — M47.816 LUMBAR FACET ARTHROPATHY: ICD-10-CM

## 2024-09-10 PROCEDURE — 99214 OFFICE O/P EST MOD 30 MIN: CPT | Performed by: PHYSICIAN ASSISTANT

## 2024-09-10 RX ORDER — SODIUM CHLORIDE 0.9 % (FLUSH) 0.9 %
10 SYRINGE (ML) INJECTION AS NEEDED
OUTPATIENT
Start: 2024-09-10

## 2024-09-10 RX ORDER — SODIUM CHLORIDE 0.9 % (FLUSH) 0.9 %
10 SYRINGE (ML) INJECTION EVERY 12 HOURS SCHEDULED
OUTPATIENT
Start: 2024-09-10

## 2024-09-10 RX ORDER — GABAPENTIN 300 MG/1
CAPSULE ORAL
Qty: 60 CAPSULE | Refills: 0 | Status: SHIPPED | OUTPATIENT
Start: 2024-09-10

## 2024-09-10 NOTE — PROGRESS NOTES
CHIEF COMPLAINT    Back pain. The patient states the back pain has worsened since last visit in July.    Subjective   Nereida Myles is a 48 y.o. female  who presents for follow-up.  She has a history of chronic low back and leg pain.  The patient has noted over the last 3 weeks that she has had progressive worsening of pain primarily affecting the right lumbar spine radiating into the lateral aspect of the hip/groin and into the anterior medial aspect of the upper right thigh.  She feels that the pain is not radiating as far down the leg as it had previously but is definitely beginning to progressively worsen.  She finds that she is extremely restless at night and she is unable to get comfortable and has noticed some increased numbness and tingling within the right lower extremity.  Patient is still obtaining over 50% reduction of axial low back pain following radiofrequency ablation.  The patient had also obtained over 80% reduction of right sided discogenic pain over the past 7.5 months following lumbar TFESI.    Pain today 6/10 VAS in severity.    This patient has a history of breast cancer with bilateral mastectomy.       Back Pain  This is a chronic problem. The current episode started more than 1 year ago. The problem occurs intermittently. The problem has been improved since onset. The pain is present in the lumbar spine. The quality of the pain is described as aching. The pain radiates to the right thigh. The pain is at a severity of 6/10. The pain is moderate. The pain is The same all the time. The symptoms are aggravated by position, twisting and bending (activity). Pertinent negatives include no abdominal pain, dysuria, fever, headaches, numbness or weakness.        PEG Assessment   What number best describes your pain on average in the past week?7  What number best describes how, during the past week, pain has interfered with your enjoyment of life?9  What number best describes how, during the past  week, pain has interfered with your general activity?  9    Review of Pertinent Medical Data ---  MRI OF THE THORACIC SPINE WITH AND WITHOUT CONTRAST AND MRI OF THE  LUMBAR SPINE WITH AND WITHOUT CONTRAST ON 01/09/2023     CLINICAL HISTORY: Mid and low back pain and flank pain where some  left-sided infection is suspected, history of breast cancer with  mastectomy.     MRI OF THE THORACIC SPINE TECHNIQUE: Sagittal T1, proton density and  fat-suppressed T2 and postcontrast sagittal fat-suppressed T1-weighted  images were obtained of the thoracic spine. In addition axial T1 and T2  and postcontrast axial T1-weighted images were obtained from C7 to L1.     FINDINGS: The thoracic cord is normal in signal intensity. At T8-9 there  is a posterior central disc bulge or small disc protrusion that indents  the ventral aspect of the thecal sac and comes close to the ventral  aspect of the cord resulting in minimal if any canal narrowing. There is  no foraminal narrowing. Otherwise the thoracic disc spaces and facets  are within normal limits with no disc herniation, canal or foraminal  narrowing seen in the thoracic spine. Marrow signal intensity in the  thoracic spine is within normal limits. The paravertebral soft tissue  structures are normal in appearance.     IMPRESSION:  1. At T8-9 there is a posterior central disc bulge or small disc  protrusion that only minimally narrows the thecal sac. Otherwise, the  thoracic spine MRI is normal with no evidence of spinal infection in the  thoracic spine.     MRI OF THE LUMBAR SPINE TECHNIQUE: Sagittal T1, proton density fast spin  echo T2 and repeat fat-suppressed T2 and postcontrast sagittal  fat-suppressed T1-weighted images were obtained of the lumbar spine. In  addition, thin cut axial T1-weighted images were obtained angled through  the interspaces from L3 to S1 and axial T2 and postcontrast axial  T1-weighted images were obtained from T12 to S2.     COMPARISON: There are no  prior lumbar spine imaging studies from [facility] for comparison.     FINDINGS: The distal thoracic cord and the conus are normal in signal  intensity. The conus terminates in the posterior midline of the thecal  sac at the level of the inferior body of L2 which is probably lower  limits of normal.     The T12-L1, L1-2 and L2-3 disc space and facets are normal with no canal  or foraminal narrowing from T12 to L3.     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     At L3-4 there is minimal right and mild left facet overgrowth and a  small amount of fluid in the left facets. There is some mild disc space  narrowing and degenerative endplate changes most pronounced in the right  side of the endplates along the inner margin of the levoscoliotic curve.  There is a 3 mm retrolisthesis of L3 with respect to L4 with disc  material bulging along the posterior superior endplate of L4. There is  essentially no narrowing of the thecal sac. There is mild right and no  left foraminal narrowing.     At L4-5 there is minimal facet overgrowth. There is 3 mm retrolisthesis  of L4 with respect to L5 and some bulging disc material extending along  the posterior superior endplate of L5. There is no canal narrowing.  There is minimal right and mild left foraminal narrowing.     At L5-S1 there is minimal facet overgrowth. There is disc space  narrowing, mild degenerative endplate changes, 3-4 mm retrolisthesis of  L5 with respect to S1, and minimal posterior spurring. There is no canal  or lateral recess narrowing. There is mild spurring into the inferior  aspect of the foramina and there is mild bilateral foraminal narrowing.     Other than the degenerative marrow edema in the right side of the  endplates at L3-4 and minimal degenerative marrow edema of the right  posterior lateral endplates at L4-5, marrow signal intensity in the  lumbar spine is within  normal limits. I see no evidence of spinal  infection in the lumbar spine.     IMPRESSION:  1. No convincing acute abnormality is seen in the lumbar spine with no  direct evidence of spinal infection within the lumbar spine. 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  2. Disc space and facets are normal in appearance with no canal or  foraminal narrowing from T10 down to L3.  3. There is disc space narrowing and degenerative endplate changes at  L3-4 most pronounced in the right side of the endplates along the inner  margin of the levoscoliotic curve and there is a 3-4 mm retrolisthesis  of L3 with respect to L4 and minimal facet overgrowth but essentially no  canal narrowing and there is only mild right foraminal narrowing at  L3-4.  4. At L4-5 there is minimal facet overgrowth and a 3-4 mm retrolisthesis  of L4 with respect to L5. There is no canal narrowing. There is minimal  right and mild left foraminal narrowing at L4-5.   5. At L5-S1 there is disc space narrowing, degenerative endplate  changes, and 3-4 mm retrolisthesis of L5 with respect to S1. There is no  canal or lateral recess narrowing. There is spurring into the foramina  and mild bilateral foraminal narrowing.  6. There is mild bone marrow edema in the right side of the endplates at  L3-4 and right posterior lateral endplates at L4-5 which is felt to  almost certainly be degenerative marrow edema. The remainder of the  lumbar spine is within normal limits     This report was finalized on 1/10/2023 7:44 AM by Dr. Isidro Tellez M.D.    The following portions of the patient's history were reviewed and updated as appropriate: allergies, current medications, past family history, past medical history, past social history, past surgical history, and problem list.    Review of Systems   Constitutional:  Negative for chills and fever.   Respiratory:  Negative for cough and shortness of breath.   "  Gastrointestinal:  Negative for abdominal pain, constipation and diarrhea.   Genitourinary:  Negative for difficulty urinating and dysuria.   Musculoskeletal:  Positive for back pain.   Neurological:  Positive for dizziness (mild). Negative for weakness, numbness and headaches.   Psychiatric/Behavioral:  Negative for agitation.      I have reviewed and confirmed the accuracy of the ROS as documented by the MA/LPN/RN LINDA Villarreal  Vitals:    09/10/24 1539   BP: 108/72   BP Location: Right arm   Patient Position: Sitting   Pulse: 76   Temp: 96.2 °F (35.7 °C)   TempSrc: Temporal   SpO2: 98%   Weight: 62.9 kg (138 lb 9.6 oz)   Height: 165.1 cm (65\")   PainSc:   6   PainLoc: Back         Objective   Physical Exam  Vitals and nursing note reviewed. Exam conducted with a chaperone present.   Constitutional:       Appearance: Normal appearance.      Comments: MILDLY ANXIOUS   HENT:      Head: Normocephalic.   Cardiovascular:      Pulses:           Dorsalis pedis pulses are 1+ on the right side and 1+ on the left side.   Pulmonary:      Effort: Pulmonary effort is normal.   Musculoskeletal:      Lumbar back: Tenderness present. Positive right straight leg raise test.        Back:    Skin:     General: Skin is warm and dry.   Neurological:      General: No focal deficit present.      Mental Status: She is alert and oriented to person, place, and time.      Cranial Nerves: Cranial nerves 2-12 are intact.      Sensory: Sensation is intact.      Motor: Motor function is intact.      Gait: Gait is intact.      Deep Tendon Reflexes:      Reflex Scores:       Patellar reflexes are 1+ on the right side and 1+ on the left side.  Psychiatric:         Mood and Affect: Mood normal.         Behavior: Behavior normal.         Thought Content: Thought content normal.         Judgment: Judgment normal.             Assessment & Plan   Diagnoses and all orders for this visit:    1. Lumbar radiculopathy (Primary)  -     gabapentin " (NEURONTIN) 300 MG capsule; Take 1 or 2 capsule PO QHS as tolerated  Dispense: 60 capsule; Refill: 0    2. DDD (degenerative disc disease), lumbar  -     gabapentin (NEURONTIN) 300 MG capsule; Take 1 or 2 capsule PO QHS as tolerated  Dispense: 60 capsule; Refill: 0    3. Lumbar facet arthropathy        Nereida Myles reports a pain score of 6.  Given her pain assessment as noted, treatment options were discussed and the following options were decided upon as a follow-up plan to address the patient's pain: continuation of current treatment plan for pain, prescription for non-opiod analgesics, steroid injections, and use of non-medical modalities (ice, heat, stretching and/or behavior modifications).      --- Based on the patient's physical exam findings with reemergence of discogenic low back pain I do feel she would benefit from therapeutic right L3-4 lumbar TFESI under fluoroscopy guidance.  --- I will also initiate a trial of gabapentin 300 mg 1 or 2 p.o. nightly if she is having increased paresthesias with difficulty sleeping at night due to discomfort.  --- Follow-up 4-6 weeks after undergoing injective therapy        ---  Indications for epidural injection:  Plan is to proceed with epidural at the appropriate level.  If the patient receives significant pain reduction and improvement in function and the plan will be to repeat the epidural when the pain worsens.  If a second epidural provides at least 6 weeks of sustained improvement that includes both pain reduction and improvement in function then an epidural injection could be repeated once again at the same level.  This is a mutual decision between the clinician and the patient that includes discussions including risks and benefits in detail as well as alternative therapies.  Patient's questions were answered to their satisfaction and to their understanding.  ---              KATELYN REPORT  As part of the patient's treatment plan, I am prescribing  controlled substances. The patient has been made aware of appropriate use of such medications, including potential risk of somnolence, limited ability to drive and/or work safely, and the potential for dependence or overdose. It has also been made clear that these medications are for use by this patient only, without concomitant use of alcohol or other substances unless prescribed.     Patient has completed prescribing agreement detailing terms of continued prescribing of controlled substances, including monitoring KATELYN reports, urine drug screening, and pill counts if necessary. The patient is aware that inappropriate use will results in cessation of prescribing such medications.    As the clinician, I personally reviewed the KATELYN from 9/10/24 while the patient was in the office today.    History and physical exam exhibit continued safe and appropriate use of controlled substances.     Dictated utilizing Dragon dictation.

## 2024-09-11 ENCOUNTER — TRANSCRIBE ORDERS (OUTPATIENT)
Dept: SURGERY | Facility: SURGERY CENTER | Age: 49
End: 2024-09-11
Payer: COMMERCIAL

## 2024-09-11 DIAGNOSIS — Z41.9 SURGERY, ELECTIVE: Primary | ICD-10-CM

## 2024-09-18 RX ORDER — MIDAZOLAM HYDROCHLORIDE 2 MG/2ML
2 INJECTION, SOLUTION INTRAMUSCULAR; INTRAVENOUS ONCE
Status: COMPLETED | OUTPATIENT
Start: 2024-09-19 | End: 2024-09-19

## 2024-09-18 RX ORDER — ONDANSETRON 2 MG/ML
4 INJECTION INTRAMUSCULAR; INTRAVENOUS ONCE
Status: COMPLETED | OUTPATIENT
Start: 2024-09-19 | End: 2024-09-19

## 2024-09-18 RX ORDER — FENTANYL CITRATE 50 UG/ML
50 INJECTION, SOLUTION INTRAMUSCULAR; INTRAVENOUS ONCE
Status: COMPLETED | OUTPATIENT
Start: 2024-09-19 | End: 2024-09-19

## 2024-09-18 RX ORDER — LANOLIN ALCOHOL/MO/W.PET/CERES
1000 CREAM (GRAM) TOPICAL DAILY
COMMUNITY

## 2024-09-19 ENCOUNTER — HOSPITAL ENCOUNTER (OUTPATIENT)
Facility: SURGERY CENTER | Age: 49
Setting detail: HOSPITAL OUTPATIENT SURGERY
Discharge: HOME | End: 2024-09-19
Attending: ANESTHESIOLOGY | Admitting: ANESTHESIOLOGY
Payer: COMMERCIAL

## 2024-09-19 ENCOUNTER — HOSPITAL ENCOUNTER (OUTPATIENT)
Dept: GENERAL RADIOLOGY | Facility: SURGERY CENTER | Age: 49
Setting detail: HOSPITAL OUTPATIENT SURGERY
End: 2024-09-19
Payer: COMMERCIAL

## 2024-09-19 VITALS
DIASTOLIC BLOOD PRESSURE: 68 MMHG | OXYGEN SATURATION: 95 % | HEART RATE: 57 BPM | TEMPERATURE: 97.8 F | RESPIRATION RATE: 16 BRPM | SYSTOLIC BLOOD PRESSURE: 122 MMHG

## 2024-09-19 DIAGNOSIS — M54.16 LUMBAR RADICULOPATHY: ICD-10-CM

## 2024-09-19 DIAGNOSIS — M51.36 DDD (DEGENERATIVE DISC DISEASE), LUMBAR: ICD-10-CM

## 2024-09-19 DIAGNOSIS — Z41.9 SURGERY, ELECTIVE: ICD-10-CM

## 2024-09-19 PROCEDURE — 25510000001 IOPAMIDOL 61 % SOLUTION 30 ML VIAL: Performed by: ANESTHESIOLOGY

## 2024-09-19 PROCEDURE — 25010000002 METHYLPREDNISOLONE PER 80 MG: Performed by: ANESTHESIOLOGY

## 2024-09-19 PROCEDURE — 25010000002 ONDANSETRON PER 1 MG: Performed by: ANESTHESIOLOGY

## 2024-09-19 PROCEDURE — 77002 NEEDLE LOCALIZATION BY XRAY: CPT

## 2024-09-19 PROCEDURE — 64483 NJX AA&/STRD TFRM EPI L/S 1: CPT | Performed by: ANESTHESIOLOGY

## 2024-09-19 PROCEDURE — 25010000002 FENTANYL CITRATE (PF) 50 MCG/ML SOLUTION: Performed by: ANESTHESIOLOGY

## 2024-09-19 PROCEDURE — 25010000002 MIDAZOLAM PER 1MG: Performed by: ANESTHESIOLOGY

## 2024-09-19 PROCEDURE — 76000 FLUOROSCOPY <1 HR PHYS/QHP: CPT

## 2024-09-19 PROCEDURE — 25010000002 BUPIVACAINE (PF) 0.5 % SOLUTION 10 ML VIAL: Performed by: ANESTHESIOLOGY

## 2024-09-19 RX ORDER — SODIUM CHLORIDE 0.9 % (FLUSH) 0.9 %
10 SYRINGE (ML) INJECTION EVERY 12 HOURS SCHEDULED
Status: DISCONTINUED | OUTPATIENT
Start: 2024-09-19 | End: 2024-09-19 | Stop reason: HOSPADM

## 2024-09-19 RX ORDER — SODIUM CHLORIDE 0.9 % (FLUSH) 0.9 %
10 SYRINGE (ML) INJECTION AS NEEDED
Status: DISCONTINUED | OUTPATIENT
Start: 2024-09-19 | End: 2024-09-19 | Stop reason: HOSPADM

## 2024-09-19 RX ADMIN — MIDAZOLAM HYDROCHLORIDE 2 MG: 2 INJECTION, SOLUTION INTRAMUSCULAR; INTRAVENOUS at 10:38

## 2024-09-19 RX ADMIN — ONDANSETRON 4 MG: 2 INJECTION INTRAMUSCULAR; INTRAVENOUS at 10:40

## 2024-09-19 RX ADMIN — FENTANYL CITRATE 50 MCG: 50 INJECTION INTRAMUSCULAR; INTRAVENOUS at 10:40

## 2024-09-20 RX ORDER — DICLOFENAC SODIUM 75 MG/1
TABLET, DELAYED RELEASE ORAL
Qty: 30 TABLET | Refills: 0 | Status: SHIPPED | OUTPATIENT
Start: 2024-09-20

## 2024-10-09 ENCOUNTER — FLU SHOT (OUTPATIENT)
Dept: FAMILY MEDICINE CLINIC | Facility: CLINIC | Age: 49
End: 2024-10-09
Payer: COMMERCIAL

## 2024-10-09 DIAGNOSIS — Z23 IMMUNIZATION DUE: Primary | ICD-10-CM

## 2024-10-09 PROCEDURE — 90471 IMMUNIZATION ADMIN: CPT | Performed by: FAMILY MEDICINE

## 2024-10-09 PROCEDURE — 90656 IIV3 VACC NO PRSV 0.5 ML IM: CPT | Performed by: FAMILY MEDICINE

## 2024-10-17 ENCOUNTER — OFFICE VISIT (OUTPATIENT)
Dept: PAIN MEDICINE | Facility: CLINIC | Age: 49
End: 2024-10-17
Payer: COMMERCIAL

## 2024-10-17 ENCOUNTER — PREP FOR SURGERY (OUTPATIENT)
Dept: SURGERY | Facility: SURGERY CENTER | Age: 49
End: 2024-10-17
Payer: COMMERCIAL

## 2024-10-17 VITALS
TEMPERATURE: 96 F | HEIGHT: 65 IN | DIASTOLIC BLOOD PRESSURE: 68 MMHG | BODY MASS INDEX: 21.99 KG/M2 | OXYGEN SATURATION: 98 % | SYSTOLIC BLOOD PRESSURE: 104 MMHG | WEIGHT: 132 LBS | HEART RATE: 75 BPM

## 2024-10-17 DIAGNOSIS — M51.362 DEGENERATION OF INTERVERTEBRAL DISC OF LUMBAR REGION WITH DISCOGENIC BACK PAIN AND LOWER EXTREMITY PAIN: ICD-10-CM

## 2024-10-17 DIAGNOSIS — M46.1 SACROILIITIS: Primary | ICD-10-CM

## 2024-10-17 DIAGNOSIS — M54.16 LUMBAR RADICULOPATHY: ICD-10-CM

## 2024-10-17 PROCEDURE — 99214 OFFICE O/P EST MOD 30 MIN: CPT | Performed by: PHYSICIAN ASSISTANT

## 2024-10-17 RX ORDER — PREGABALIN 25 MG/1
CAPSULE ORAL
Qty: 180 CAPSULE | Refills: 0 | Status: SHIPPED | OUTPATIENT
Start: 2024-10-17

## 2024-10-17 RX ORDER — SODIUM CHLORIDE 0.9 % (FLUSH) 0.9 %
10 SYRINGE (ML) INJECTION EVERY 12 HOURS SCHEDULED
OUTPATIENT
Start: 2024-10-17

## 2024-10-17 RX ORDER — SODIUM CHLORIDE 0.9 % (FLUSH) 0.9 %
10 SYRINGE (ML) INJECTION AS NEEDED
OUTPATIENT
Start: 2024-10-17

## 2024-10-17 NOTE — H&P (VIEW-ONLY)
CHIEF COMPLAINT    Back pain      Subjective   Nereida Myles is a 48 y.o. female  who presents to the office for follow-up of procedure.  She completed a LUMBAR/SACRAL TRANSFORAMINAL EPIDURAL RIGHT L3-4   on  9/19/24 performed by Dr. Pruitt for management of back pain.  Patient is pleased to report that the epidural provided over 80% reduction of pain for 2 weeks but then she began experiencing severe right-sided sciatica pain.  Her primary pain complaint at this time is right sided posterior pain that also affects the left side at a lesser intensity causing her to have severe pain with sitting for any length of time.  Overall she is still obtaining 50% reduction of axial low back pain following the radiofrequency ablation.    Her last office visit we instituted a trial of gabapentin 300 mg with a slow titration schedule however she cannot take it any later than 7 PM without feeling extremely hung over the next day.    This patient has a history of breast cancer with bilateral mastectomy.     Pain today 7/10 VAS in severity.    Back Pain  This is a chronic problem. The current episode started more than 1 year ago. The problem occurs intermittently. The problem has been improved since onset. The pain is present in the lumbar spine. The quality of the pain is described as aching. The pain radiates to the right thigh. The pain is at a severity of 7/10. The pain is moderate. The pain is The same all the time. The symptoms are aggravated by position, twisting and bending (activity). Pertinent negatives include no abdominal pain, dysuria, fever, headaches, numbness or weakness.        PEG Assessment   What number best describes your pain on average in the past week?8  What number best describes how, during the past week, pain has interfered with your enjoyment of life?10  What number best describes how, during the past week, pain has interfered with your general activity?  10    Review of Pertinent Medical Data  ---  MRI OF THE THORACIC SPINE WITH AND WITHOUT CONTRAST AND MRI OF THE  LUMBAR SPINE WITH AND WITHOUT CONTRAST ON 01/09/2023     CLINICAL HISTORY: Mid and low back pain and flank pain where some  left-sided infection is suspected, history of breast cancer with  mastectomy.     MRI OF THE THORACIC SPINE TECHNIQUE: Sagittal T1, proton density and  fat-suppressed T2 and postcontrast sagittal fat-suppressed T1-weighted  images were obtained of the thoracic spine. In addition axial T1 and T2  and postcontrast axial T1-weighted images were obtained from C7 to L1.     FINDINGS: The thoracic cord is normal in signal intensity. At T8-9 there  is a posterior central disc bulge or small disc protrusion that indents  the ventral aspect of the thecal sac and comes close to the ventral  aspect of the cord resulting in minimal if any canal narrowing. There is  no foraminal narrowing. Otherwise the thoracic disc spaces and facets  are within normal limits with no disc herniation, canal or foraminal  narrowing seen in the thoracic spine. Marrow signal intensity in the  thoracic spine is within normal limits. The paravertebral soft tissue  structures are normal in appearance.     IMPRESSION:  1. At T8-9 there is a posterior central disc bulge or small disc  protrusion that only minimally narrows the thecal sac. Otherwise, the  thoracic spine MRI is normal with no evidence of spinal infection in the  thoracic spine.     MRI OF THE LUMBAR SPINE TECHNIQUE: Sagittal T1, proton density fast spin  echo T2 and repeat fat-suppressed T2 and postcontrast sagittal  fat-suppressed T1-weighted images were obtained of the lumbar spine. In  addition, thin cut axial T1-weighted images were obtained angled through  the interspaces from L3 to S1 and axial T2 and postcontrast axial  T1-weighted images were obtained from T12 to S2.     COMPARISON: There are no prior lumbar spine imaging studies from [facility] for comparison.      FINDINGS: The distal thoracic cord and the conus are normal in signal  intensity. The conus terminates in the posterior midline of the thecal  sac at the level of the inferior body of L2 which is probably lower  limits of normal.     The T12-L1, L1-2 and L2-3 disc space and facets are normal with no canal  or foraminal narrowing from T12 to L3.     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     At L3-4 there is minimal right and mild left facet overgrowth and a  small amount of fluid in the left facets. There is some mild disc space  narrowing and degenerative endplate changes most pronounced in the right  side of the endplates along the inner margin of the levoscoliotic curve.  There is a 3 mm retrolisthesis of L3 with respect to L4 with disc  material bulging along the posterior superior endplate of L4. There is  essentially no narrowing of the thecal sac. There is mild right and no  left foraminal narrowing.     At L4-5 there is minimal facet overgrowth. There is 3 mm retrolisthesis  of L4 with respect to L5 and some bulging disc material extending along  the posterior superior endplate of L5. There is no canal narrowing.  There is minimal right and mild left foraminal narrowing.     At L5-S1 there is minimal facet overgrowth. There is disc space  narrowing, mild degenerative endplate changes, 3-4 mm retrolisthesis of  L5 with respect to S1, and minimal posterior spurring. There is no canal  or lateral recess narrowing. There is mild spurring into the inferior  aspect of the foramina and there is mild bilateral foraminal narrowing.     Other than the degenerative marrow edema in the right side of the  endplates at L3-4 and minimal degenerative marrow edema of the right  posterior lateral endplates at L4-5, marrow signal intensity in the  lumbar spine is within normal limits. I see no evidence of spinal  infection in the lumbar spine.      IMPRESSION:  1. No convincing acute abnormality is seen in the lumbar spine with no  direct evidence of spinal infection within the lumbar spine. 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  2. Disc space and facets are normal in appearance with no canal or  foraminal narrowing from T10 down to L3.  3. There is disc space narrowing and degenerative endplate changes at  L3-4 most pronounced in the right side of the endplates along the inner  margin of the levoscoliotic curve and there is a 3-4 mm retrolisthesis  of L3 with respect to L4 and minimal facet overgrowth but essentially no  canal narrowing and there is only mild right foraminal narrowing at  L3-4.  4. At L4-5 there is minimal facet overgrowth and a 3-4 mm retrolisthesis  of L4 with respect to L5. There is no canal narrowing. There is minimal  right and mild left foraminal narrowing at L4-5.   5. At L5-S1 there is disc space narrowing, degenerative endplate  changes, and 3-4 mm retrolisthesis of L5 with respect to S1. There is no  canal or lateral recess narrowing. There is spurring into the foramina  and mild bilateral foraminal narrowing.  6. There is mild bone marrow edema in the right side of the endplates at  L3-4 and right posterior lateral endplates at L4-5 which is felt to  almost certainly be degenerative marrow edema. The remainder of the  lumbar spine is within normal limits     This report was finalized on 1/10/2023 7:44 AM by Dr. Isidro Tellez M.D.    The following portions of the patient's history were reviewed and updated as appropriate: allergies, current medications, past family history, past medical history, past social history, past surgical history, and problem list.    Review of Systems   Constitutional:  Negative for chills and fever.   Respiratory:  Negative for cough and shortness of breath.    Gastrointestinal:  Negative for abdominal pain, constipation and diarrhea.  "  Genitourinary:  Negative for difficulty urinating and dysuria.   Musculoskeletal:  Positive for back pain.   Neurological:  Positive for light-headedness (intermittent when standing up). Negative for dizziness, weakness, numbness and headaches.   Psychiatric/Behavioral:  Negative for agitation.      I have reviewed and confirmed the accuracy of the ROS as documented by the MA/LPN/RN LINDA Villarreal   Vitals:    10/17/24 1552   BP: 104/68   BP Location: Right arm   Patient Position: Sitting   Pulse: 75   Temp: 96 °F (35.6 °C)   TempSrc: Temporal   SpO2: 98%   Weight: 59.9 kg (132 lb)   Height: 165.1 cm (65\")   PainSc:   7   PainLoc: Back         Objective   Physical Exam  Vitals and nursing note reviewed. Exam conducted with a chaperone present ().   Constitutional:       Appearance: Normal appearance. She is normal weight.      Comments: MILDLY ANXIOUS   HENT:      Head: Normocephalic.   Cardiovascular:      Pulses:           Dorsalis pedis pulses are 1+ on the right side and 1+ on the left side.   Pulmonary:      Effort: Pulmonary effort is normal.   Musculoskeletal:      Lumbar back: Tenderness (moderate pain to palpation over the bilateral SI joint spaces, R>L; +patricks/+SI compression/+SI thrust tests bilaterally) present. Positive right straight leg raise test.        Back:    Skin:     General: Skin is warm and dry.   Neurological:      General: No focal deficit present.      Mental Status: She is alert and oriented to person, place, and time.      Cranial Nerves: Cranial nerves 2-12 are intact.      Sensory: Sensation is intact.      Motor: Motor function is intact.      Gait: Gait is intact.   Psychiatric:         Mood and Affect: Mood normal.         Behavior: Behavior normal.         Thought Content: Thought content normal.         Judgment: Judgment normal.         Assessment & Plan   Diagnoses and all orders for this visit:    1. Sacroiliitis (Primary)  -     pregabalin (Lyrica) 25 MG " capsule; Take 1 capsule PO TID x 5 days; if tolerated may slowly increase to 1 or 2 capsules PO TID  Dispense: 180 capsule; Refill: 0    2. Lumbar radiculopathy  -     pregabalin (Lyrica) 25 MG capsule; Take 1 capsule PO TID x 5 days; if tolerated may slowly increase to 1 or 2 capsules PO TID  Dispense: 180 capsule; Refill: 0    3. Degeneration of intervertebral disc of lumbar region with discogenic back pain and lower extremity pain        Nereida Myles reports a pain score of 7.  Given her pain assessment as noted, treatment options were discussed and the following options were decided upon as a follow-up plan to address the patient's pain: continuation of current treatment plan for pain, prescription for non-opiod analgesics, steroid injections, and use of non-medical modalities (ice, heat, stretching and/or behavior modifications).      --- Based on the patient's physical exam findings with pain reproducible with SI provoking maneuvers I recommend proceeding with #1 diagnostic/therapeutic bilateral sacroiliac joint injection under fluoroscopy guidance.  The risk and benefits of the procedure been discussed with the patient and all questions been addressed.  --- She will follow-up 1 week after undergoing injective therapy  --- Due to the daytime somnolence and lethargy I will have the patient discontinue gabapentin and institute a trial of pregabalin 25 mg p.o. 3 times daily x 1 week and if she is able to tolerate it we will then have her slowly increase to 2 capsules p.o. 3 times daily.         KATELYN REPORT  As part of the patient's treatment plan, I am prescribing controlled substances. The patient has been made aware of appropriate use of such medications, including potential risk of somnolence, limited ability to drive and/or work safely, and the potential for dependence or overdose. It has also been made clear that these medications are for use by this patient only, without concomitant use of alcohol or  other substances unless prescribed.     Patient has completed prescribing agreement detailing terms of continued prescribing of controlled substances, including monitoring KATELYN reports, urine drug screening, and pill counts if necessary. The patient is aware that inappropriate use will results in cessation of prescribing such medications.    As the clinician, I personally reviewed the KATELYN from 10/17/2024 while the patient was in the office today.    History and physical exam exhibit continued safe and appropriate use of controlled substances.       Dictated utilizing Dragon dictation.

## 2024-10-17 NOTE — PROGRESS NOTES
CHIEF COMPLAINT    Back pain      Subjective   Nereida Myles is a 48 y.o. female  who presents to the office for follow-up of procedure.  She completed a LUMBAR/SACRAL TRANSFORAMINAL EPIDURAL RIGHT L3-4   on  9/19/24 performed by Dr. Pruitt for management of back pain.  Patient is pleased to report that the epidural provided over 80% reduction of pain for 2 weeks but then she began experiencing severe right-sided sciatica pain.  Her primary pain complaint at this time is right sided posterior pain that also affects the left side at a lesser intensity causing her to have severe pain with sitting for any length of time.  Overall she is still obtaining 50% reduction of axial low back pain following the radiofrequency ablation.    Her last office visit we instituted a trial of gabapentin 300 mg with a slow titration schedule however she cannot take it any later than 7 PM without feeling extremely hung over the next day.    This patient has a history of breast cancer with bilateral mastectomy.     Pain today 7/10 VAS in severity.    Back Pain  This is a chronic problem. The current episode started more than 1 year ago. The problem occurs intermittently. The problem has been improved since onset. The pain is present in the lumbar spine. The quality of the pain is described as aching. The pain radiates to the right thigh. The pain is at a severity of 7/10. The pain is moderate. The pain is The same all the time. The symptoms are aggravated by position, twisting and bending (activity). Pertinent negatives include no abdominal pain, dysuria, fever, headaches, numbness or weakness.        PEG Assessment   What number best describes your pain on average in the past week?8  What number best describes how, during the past week, pain has interfered with your enjoyment of life?10  What number best describes how, during the past week, pain has interfered with your general activity?  10    Review of Pertinent Medical Data  ---  MRI OF THE THORACIC SPINE WITH AND WITHOUT CONTRAST AND MRI OF THE  LUMBAR SPINE WITH AND WITHOUT CONTRAST ON 01/09/2023     CLINICAL HISTORY: Mid and low back pain and flank pain where some  left-sided infection is suspected, history of breast cancer with  mastectomy.     MRI OF THE THORACIC SPINE TECHNIQUE: Sagittal T1, proton density and  fat-suppressed T2 and postcontrast sagittal fat-suppressed T1-weighted  images were obtained of the thoracic spine. In addition axial T1 and T2  and postcontrast axial T1-weighted images were obtained from C7 to L1.     FINDINGS: The thoracic cord is normal in signal intensity. At T8-9 there  is a posterior central disc bulge or small disc protrusion that indents  the ventral aspect of the thecal sac and comes close to the ventral  aspect of the cord resulting in minimal if any canal narrowing. There is  no foraminal narrowing. Otherwise the thoracic disc spaces and facets  are within normal limits with no disc herniation, canal or foraminal  narrowing seen in the thoracic spine. Marrow signal intensity in the  thoracic spine is within normal limits. The paravertebral soft tissue  structures are normal in appearance.     IMPRESSION:  1. At T8-9 there is a posterior central disc bulge or small disc  protrusion that only minimally narrows the thecal sac. Otherwise, the  thoracic spine MRI is normal with no evidence of spinal infection in the  thoracic spine.     MRI OF THE LUMBAR SPINE TECHNIQUE: Sagittal T1, proton density fast spin  echo T2 and repeat fat-suppressed T2 and postcontrast sagittal  fat-suppressed T1-weighted images were obtained of the lumbar spine. In  addition, thin cut axial T1-weighted images were obtained angled through  the interspaces from L3 to S1 and axial T2 and postcontrast axial  T1-weighted images were obtained from T12 to S2.     COMPARISON: There are no prior lumbar spine imaging studies from [facility] for comparison.      FINDINGS: The distal thoracic cord and the conus are normal in signal  intensity. The conus terminates in the posterior midline of the thecal  sac at the level of the inferior body of L2 which is probably lower  limits of normal.     The T12-L1, L1-2 and L2-3 disc space and facets are normal with no canal  or foraminal narrowing from T12 to L3.     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     At L3-4 there is minimal right and mild left facet overgrowth and a  small amount of fluid in the left facets. There is some mild disc space  narrowing and degenerative endplate changes most pronounced in the right  side of the endplates along the inner margin of the levoscoliotic curve.  There is a 3 mm retrolisthesis of L3 with respect to L4 with disc  material bulging along the posterior superior endplate of L4. There is  essentially no narrowing of the thecal sac. There is mild right and no  left foraminal narrowing.     At L4-5 there is minimal facet overgrowth. There is 3 mm retrolisthesis  of L4 with respect to L5 and some bulging disc material extending along  the posterior superior endplate of L5. There is no canal narrowing.  There is minimal right and mild left foraminal narrowing.     At L5-S1 there is minimal facet overgrowth. There is disc space  narrowing, mild degenerative endplate changes, 3-4 mm retrolisthesis of  L5 with respect to S1, and minimal posterior spurring. There is no canal  or lateral recess narrowing. There is mild spurring into the inferior  aspect of the foramina and there is mild bilateral foraminal narrowing.     Other than the degenerative marrow edema in the right side of the  endplates at L3-4 and minimal degenerative marrow edema of the right  posterior lateral endplates at L4-5, marrow signal intensity in the  lumbar spine is within normal limits. I see no evidence of spinal  infection in the lumbar spine.      IMPRESSION:  1. No convincing acute abnormality is seen in the lumbar spine with no  direct evidence of spinal infection within the lumbar spine. 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  2. Disc space and facets are normal in appearance with no canal or  foraminal narrowing from T10 down to L3.  3. There is disc space narrowing and degenerative endplate changes at  L3-4 most pronounced in the right side of the endplates along the inner  margin of the levoscoliotic curve and there is a 3-4 mm retrolisthesis  of L3 with respect to L4 and minimal facet overgrowth but essentially no  canal narrowing and there is only mild right foraminal narrowing at  L3-4.  4. At L4-5 there is minimal facet overgrowth and a 3-4 mm retrolisthesis  of L4 with respect to L5. There is no canal narrowing. There is minimal  right and mild left foraminal narrowing at L4-5.   5. At L5-S1 there is disc space narrowing, degenerative endplate  changes, and 3-4 mm retrolisthesis of L5 with respect to S1. There is no  canal or lateral recess narrowing. There is spurring into the foramina  and mild bilateral foraminal narrowing.  6. There is mild bone marrow edema in the right side of the endplates at  L3-4 and right posterior lateral endplates at L4-5 which is felt to  almost certainly be degenerative marrow edema. The remainder of the  lumbar spine is within normal limits     This report was finalized on 1/10/2023 7:44 AM by Dr. Isidro Tellez M.D.    The following portions of the patient's history were reviewed and updated as appropriate: allergies, current medications, past family history, past medical history, past social history, past surgical history, and problem list.    Review of Systems   Constitutional:  Negative for chills and fever.   Respiratory:  Negative for cough and shortness of breath.    Gastrointestinal:  Negative for abdominal pain, constipation and diarrhea.  "  Genitourinary:  Negative for difficulty urinating and dysuria.   Musculoskeletal:  Positive for back pain.   Neurological:  Positive for light-headedness (intermittent when standing up). Negative for dizziness, weakness, numbness and headaches.   Psychiatric/Behavioral:  Negative for agitation.      I have reviewed and confirmed the accuracy of the ROS as documented by the MA/LPN/RN LINDA Villarreal   Vitals:    10/17/24 1552   BP: 104/68   BP Location: Right arm   Patient Position: Sitting   Pulse: 75   Temp: 96 °F (35.6 °C)   TempSrc: Temporal   SpO2: 98%   Weight: 59.9 kg (132 lb)   Height: 165.1 cm (65\")   PainSc:   7   PainLoc: Back         Objective   Physical Exam  Vitals and nursing note reviewed. Exam conducted with a chaperone present ().   Constitutional:       Appearance: Normal appearance. She is normal weight.      Comments: MILDLY ANXIOUS   HENT:      Head: Normocephalic.   Cardiovascular:      Pulses:           Dorsalis pedis pulses are 1+ on the right side and 1+ on the left side.   Pulmonary:      Effort: Pulmonary effort is normal.   Musculoskeletal:      Lumbar back: Tenderness (moderate pain to palpation over the bilateral SI joint spaces, R>L; +patricks/+SI compression/+SI thrust tests bilaterally) present. Positive right straight leg raise test.        Back:    Skin:     General: Skin is warm and dry.   Neurological:      General: No focal deficit present.      Mental Status: She is alert and oriented to person, place, and time.      Cranial Nerves: Cranial nerves 2-12 are intact.      Sensory: Sensation is intact.      Motor: Motor function is intact.      Gait: Gait is intact.   Psychiatric:         Mood and Affect: Mood normal.         Behavior: Behavior normal.         Thought Content: Thought content normal.         Judgment: Judgment normal.         Assessment & Plan   Diagnoses and all orders for this visit:    1. Sacroiliitis (Primary)  -     pregabalin (Lyrica) 25 MG " capsule; Take 1 capsule PO TID x 5 days; if tolerated may slowly increase to 1 or 2 capsules PO TID  Dispense: 180 capsule; Refill: 0    2. Lumbar radiculopathy  -     pregabalin (Lyrica) 25 MG capsule; Take 1 capsule PO TID x 5 days; if tolerated may slowly increase to 1 or 2 capsules PO TID  Dispense: 180 capsule; Refill: 0    3. Degeneration of intervertebral disc of lumbar region with discogenic back pain and lower extremity pain        Nereida Myles reports a pain score of 7.  Given her pain assessment as noted, treatment options were discussed and the following options were decided upon as a follow-up plan to address the patient's pain: continuation of current treatment plan for pain, prescription for non-opiod analgesics, steroid injections, and use of non-medical modalities (ice, heat, stretching and/or behavior modifications).      --- Based on the patient's physical exam findings with pain reproducible with SI provoking maneuvers I recommend proceeding with #1 diagnostic/therapeutic bilateral sacroiliac joint injection under fluoroscopy guidance.  The risk and benefits of the procedure been discussed with the patient and all questions been addressed.  --- She will follow-up 1 week after undergoing injective therapy  --- Due to the daytime somnolence and lethargy I will have the patient discontinue gabapentin and institute a trial of pregabalin 25 mg p.o. 3 times daily x 1 week and if she is able to tolerate it we will then have her slowly increase to 2 capsules p.o. 3 times daily.         KATELYN REPORT  As part of the patient's treatment plan, I am prescribing controlled substances. The patient has been made aware of appropriate use of such medications, including potential risk of somnolence, limited ability to drive and/or work safely, and the potential for dependence or overdose. It has also been made clear that these medications are for use by this patient only, without concomitant use of alcohol or  other substances unless prescribed.     Patient has completed prescribing agreement detailing terms of continued prescribing of controlled substances, including monitoring KATELYN reports, urine drug screening, and pill counts if necessary. The patient is aware that inappropriate use will results in cessation of prescribing such medications.    As the clinician, I personally reviewed the KATELYN from 10/17/2024 while the patient was in the office today.    History and physical exam exhibit continued safe and appropriate use of controlled substances.       Dictated utilizing Dragon dictation.

## 2024-10-18 ENCOUNTER — TRANSCRIBE ORDERS (OUTPATIENT)
Dept: SURGERY | Facility: SURGERY CENTER | Age: 49
End: 2024-10-18
Payer: COMMERCIAL

## 2024-10-18 DIAGNOSIS — Z41.9 SURGERY, ELECTIVE: Primary | ICD-10-CM

## 2024-10-22 ENCOUNTER — PREP FOR SURGERY (OUTPATIENT)
Dept: SURGERY | Facility: SURGERY CENTER | Age: 49
End: 2024-10-22
Payer: COMMERCIAL

## 2024-10-22 DIAGNOSIS — M46.1 SACROILIITIS: Primary | ICD-10-CM

## 2024-10-22 RX ORDER — SODIUM CHLORIDE 0.9 % (FLUSH) 0.9 %
10 SYRINGE (ML) INJECTION EVERY 12 HOURS SCHEDULED
OUTPATIENT
Start: 2024-10-22

## 2024-10-22 RX ORDER — SODIUM CHLORIDE 0.9 % (FLUSH) 0.9 %
10 SYRINGE (ML) INJECTION AS NEEDED
OUTPATIENT
Start: 2024-10-22

## 2024-11-06 RX ORDER — MIDAZOLAM HYDROCHLORIDE 2 MG/2ML
4 INJECTION, SOLUTION INTRAMUSCULAR; INTRAVENOUS ONCE
Status: COMPLETED | OUTPATIENT
Start: 2024-11-06 | End: 2024-11-07

## 2024-11-07 ENCOUNTER — HOSPITAL ENCOUNTER (OUTPATIENT)
Facility: SURGERY CENTER | Age: 49
Setting detail: HOSPITAL OUTPATIENT SURGERY
Discharge: HOME | End: 2024-11-07
Attending: ANESTHESIOLOGY | Admitting: ANESTHESIOLOGY
Payer: COMMERCIAL

## 2024-11-07 ENCOUNTER — HOSPITAL ENCOUNTER (OUTPATIENT)
Dept: GENERAL RADIOLOGY | Facility: SURGERY CENTER | Age: 49
End: 2024-11-07
Payer: COMMERCIAL

## 2024-11-07 VITALS
OXYGEN SATURATION: 96 % | WEIGHT: 130 LBS | BODY MASS INDEX: 21.66 KG/M2 | SYSTOLIC BLOOD PRESSURE: 106 MMHG | HEART RATE: 70 BPM | RESPIRATION RATE: 16 BRPM | TEMPERATURE: 98 F | HEIGHT: 65 IN | DIASTOLIC BLOOD PRESSURE: 70 MMHG

## 2024-11-07 DIAGNOSIS — Z41.9 SURGERY, ELECTIVE: ICD-10-CM

## 2024-11-07 DIAGNOSIS — M46.1 SACROILIITIS: ICD-10-CM

## 2024-11-07 PROCEDURE — 27096 INJECT SACROILIAC JOINT: CPT | Performed by: ANESTHESIOLOGY

## 2024-11-07 PROCEDURE — 25010000002 BUPIVACAINE (PF) 0.5 % SOLUTION 10 ML VIAL: Performed by: ANESTHESIOLOGY

## 2024-11-07 PROCEDURE — 25510000001 IOPAMIDOL 61 % SOLUTION 30 ML VIAL: Performed by: ANESTHESIOLOGY

## 2024-11-07 PROCEDURE — 76000 FLUOROSCOPY <1 HR PHYS/QHP: CPT

## 2024-11-07 PROCEDURE — 25010000002 MIDAZOLAM PER 1MG: Performed by: ANESTHESIOLOGY

## 2024-11-07 PROCEDURE — 25010000002 METHYLPREDNISOLONE PER 80 MG: Performed by: ANESTHESIOLOGY

## 2024-11-07 PROCEDURE — G0260 INJ FOR SACROILIAC JT ANESTH: HCPCS | Performed by: ANESTHESIOLOGY

## 2024-11-07 PROCEDURE — 77002 NEEDLE LOCALIZATION BY XRAY: CPT

## 2024-11-07 PROCEDURE — 25010000002 LIDOCAINE PF 1% 1 % SOLUTION 5 ML VIAL: Performed by: ANESTHESIOLOGY

## 2024-11-07 RX ORDER — SODIUM CHLORIDE 0.9 % (FLUSH) 0.9 %
10 SYRINGE (ML) INJECTION EVERY 12 HOURS SCHEDULED
Status: DISCONTINUED | OUTPATIENT
Start: 2024-11-07 | End: 2024-11-07 | Stop reason: HOSPADM

## 2024-11-07 RX ORDER — BENZOYL PEROXIDE 10 G/100G
1 SUSPENSION TOPICAL DAILY
COMMUNITY
Start: 2024-10-16

## 2024-11-07 RX ORDER — SODIUM CHLORIDE 0.9 % (FLUSH) 0.9 %
10 SYRINGE (ML) INJECTION AS NEEDED
Status: DISCONTINUED | OUTPATIENT
Start: 2024-11-07 | End: 2024-11-07 | Stop reason: HOSPADM

## 2024-11-07 RX ADMIN — MIDAZOLAM HYDROCHLORIDE 4 MG: 2 INJECTION, SOLUTION INTRAMUSCULAR; INTRAVENOUS at 08:44

## 2024-11-07 NOTE — OP NOTE
Bilateral Sacroiliac Joint Injection  [facility]      PREOPERATIVE DIAGNOSIS:   Sacroiliac joint dysfunction, bilateral    POSTOPERATIVE DIAGNOSIS:  Sacroiliac joint dysfunction, bilateral    PROCEDURE:  Sacroiliac Joint Injection, Bilaterally, with fluoroscopic guidance    PRE-PROCEDURE DISCUSSION WITH PATIENT:    Risks and complications were discussed with the patient prior to starting the procedure and informed consent was obtained.  We discussed various topics including but not limited to bleeding, infection, injury, postprocedural site soreness, painful flareup, worsening of clinical picture, paralysis, coma, and death.     SURGEON:  Rei Pruitt MD    REASON FOR PROCEDURE:    Patient has pain consistent with SI pathology on history and physical exam. Positive sacroiliac provocation maneuvers noted.      SEDATION:  Versed 4mg IV  ANESTHETIC AGENT:  Marcaine 0.5%  STEROID AGENT:  Methylprednisolone (DEPO MEDROL) 80mg/ml    DESCRIPTON OF PROCEDURE:  After obtaining informed consent, IV access was obtained in the preoperative area.  The patient was transported to the operative suite and placed in the prone position with a pillow under the pelvic area. EKG, blood pressure, and pulse oximeter were monitored. The lumbosacral area was prepped with Chloraprep and draped in a sterile fashion. Under fluoroscopic guidance the inferior most portion of the right SI joint was identified. The overlying skin and subcutaneous tissue was anesthetized with 1% lidocaine. A 22-gauge spinal needle was introduced from the inferior most portion of the joint into the RIGHT SI joint under fluoroscopic guidance in the AP dimension with slight oblique rotation to the contralateral side.  Aspiration was negative.  After confirming the position of the needle with fluoroscopy, 1 mL of Omnipaque was injected and after seeing appropriate spread into the joint a total of 2mL Marcaine, with the steroid, was injected very  slowly.  Needle was removed intact.  A similar procedure was performed on the LEFT side.   Vital signs remained stable.  The onset of analgesia was noted.      ESTIMATED BLOOD LOSS:  minimal  SPECIMENS:  None  COMPLICATIONS:  No complications were noted., There was no indication of vascular uptake on live injection of contrast dye., and The patient did not have any signs of postprocedure numbness nor weakness.    TOLERANCE & DISCHARGE CONDITION:    The patient tolerated the procedure well.  The patient was transported to the recovery area without difficulties.  The patient was discharged to home under the care of family in stable and satisfactory condition.    PLAN OF CARE:  The patient was given our standard instruction sheet and will resume all medications as per the medication reconciliation sheet.  The patient will Return to clinic 1 wk.  The patient is instructed to keep a pain log hourly for 8 hours after the procedure.

## 2024-11-07 NOTE — DISCHARGE INSTRUCTIONS
[facility] - Post-procedure Instructions          --  While there are no absolute restrictions, it is recommended that you do not perform strenuous activity today. In the morning, you may resume your level of activity as before your block.    --  If you have a band-aid at your injection site, please remove it later today. Observe the area for any redness, swelling, pus-like drainage, or a temperature over 101°. If any of these symptoms occur, please call your doctor at [phone]. If after office hours, leave a message and the on-call provider will return your call.    --  Ice may be applied to your injection site. It is recommended you avoid direct heat (heating pad; hot tub) for 1-2 days.    --  Call [facility] at [phone] if you experience persistent headache, persistent bleeding from the injection site, or severe pain not relieved by heat or oral medication.    --  Do not make important decisions today.    --  Due to the effects of the block and/or the I.V. Sedation, DO NOT drive or operate hazardous machinery for 12 hours.  Local anesthetics may cause numbness after procedure and precautions must be taken with regards to operating equipment as well as with walking, even if ambulating with assistance of another person or with an assistive device.    --  Do not drink alcohol for 12 hours.    -- You may return to work tomorrow, or as directed by your referring doctor.    --  Occasionally you may notice a slight increase in your pain after the procedure. This should start to improve within the next 24-48 hours. Radiofrequency ablation procedure pain may last 3-4 weeks.    --  It may take as long as 3-4 days before you notice a gradual improvement in your pain and/or other symptoms.    -- You may continue to take your prescribed pain medication as needed.    --  Some normal possible side effects of steroid use could include fluid retention, increased blood sugar, dull headache,  increased sweating, increased appetite, mood swings and flushing.    --  Diabetics are recommended to watch their blood glucose level closely for 24-48 hours after the injection.    --  Must stay in PACU for 20 min upon arrival and prove no leg weakness before being discharged.    --  IN THE EVENT OF A LIFE THREATENING EMERGENCY, (CHEST PAIN, BREATHING DIFFICULTIES, PARALYSIS…) YOU SHOULD GO TO YOUR NEAREST EMERGENCY ROOM.    --  You should be contacted by our office within 2-3 days to schedule follow up or next appointment date.  If not contacted within 7 days, please call the office at [phone]

## 2024-11-10 DIAGNOSIS — M51.369 DDD (DEGENERATIVE DISC DISEASE), LUMBAR: ICD-10-CM

## 2024-11-10 DIAGNOSIS — M54.16 LUMBAR RADICULOPATHY: ICD-10-CM

## 2024-11-11 RX ORDER — DICLOFENAC SODIUM 75 MG/1
TABLET, DELAYED RELEASE ORAL
Qty: 30 TABLET | Refills: 23 | Status: SHIPPED | OUTPATIENT
Start: 2024-11-11

## 2024-11-11 NOTE — TELEPHONE ENCOUNTER
Rx Refill Note  Requested Prescriptions     Pending Prescriptions Disp Refills    diclofenac (VOLTAREN) 75 MG EC tablet [Pharmacy Med Name: DICLOFENAC SODIUM DR TABS 75MG] 30 tablet 23     Sig: TAKE 1 TABLET TWICE A DAY AS NEEDED FOR PAIN      Last office visit with prescribing clinician: 8/2/2024   Last telemedicine visit with prescribing clinician: Visit date not found   Next office visit with prescribing clinician: 2/17/2025                        Would you like a call back once the refill request has been completed: [] Yes [] No    If the office needs to give you a call back, can they leave a voicemail: [] Yes [] No    Rema Carlin MA  11/11/24, 08:12 EST

## 2024-11-12 DIAGNOSIS — M51.369 DDD (DEGENERATIVE DISC DISEASE), LUMBAR: ICD-10-CM

## 2024-11-12 DIAGNOSIS — M54.16 LUMBAR RADICULOPATHY: ICD-10-CM

## 2024-11-13 RX ORDER — METAXALONE 800 MG/1
TABLET ORAL
Qty: 120 TABLET | Refills: 0 | Status: SHIPPED | OUTPATIENT
Start: 2024-11-13

## 2024-11-13 NOTE — TELEPHONE ENCOUNTER
Rx Refill Note  Requested Prescriptions     Pending Prescriptions Disp Refills    metaxalone (SKELAXIN) 800 MG tablet [Pharmacy Med Name: METAXALONE 800MG TABLETS] 120 tablet 0     Sig: TAKE 1/2 TO 1 TABLET BY MOUTH FOUR TIMES DAILY AS NEEDED FOR MUSCLE SPASMS      Last office visit with prescribing clinician: 8/2/2024   Last telemedicine visit with prescribing clinician: Visit date not found   Next office visit with prescribing clinician: 2/17/2025                         Would you like a call back once the refill request has been completed: [] Yes [] No    If the office needs to give you a call back, can they leave a voicemail: [] Yes [] No    Rema Carlin MA  11/13/24, 07:53 EST

## 2024-11-19 ENCOUNTER — OFFICE VISIT (OUTPATIENT)
Dept: PAIN MEDICINE | Facility: CLINIC | Age: 49
End: 2024-11-19
Payer: COMMERCIAL

## 2024-11-19 ENCOUNTER — PREP FOR SURGERY (OUTPATIENT)
Dept: SURGERY | Facility: SURGERY CENTER | Age: 49
End: 2024-11-19
Payer: COMMERCIAL

## 2024-11-19 VITALS
OXYGEN SATURATION: 97 % | DIASTOLIC BLOOD PRESSURE: 82 MMHG | WEIGHT: 131.4 LBS | HEART RATE: 78 BPM | TEMPERATURE: 96 F | SYSTOLIC BLOOD PRESSURE: 128 MMHG | BODY MASS INDEX: 21.89 KG/M2 | HEIGHT: 65 IN

## 2024-11-19 DIAGNOSIS — M54.16 LUMBAR RADICULOPATHY: Primary | ICD-10-CM

## 2024-11-19 DIAGNOSIS — M51.362 DEGENERATION OF INTERVERTEBRAL DISC OF LUMBAR REGION WITH DISCOGENIC BACK PAIN AND LOWER EXTREMITY PAIN: ICD-10-CM

## 2024-11-19 DIAGNOSIS — M46.1 SACROILIITIS: ICD-10-CM

## 2024-11-19 PROCEDURE — 99214 OFFICE O/P EST MOD 30 MIN: CPT | Performed by: PHYSICIAN ASSISTANT

## 2024-11-19 RX ORDER — SODIUM CHLORIDE 0.9 % (FLUSH) 0.9 %
10 SYRINGE (ML) INJECTION AS NEEDED
OUTPATIENT
Start: 2024-11-19

## 2024-11-19 RX ORDER — SODIUM CHLORIDE 0.9 % (FLUSH) 0.9 %
10 SYRINGE (ML) INJECTION EVERY 12 HOURS SCHEDULED
OUTPATIENT
Start: 2024-11-19

## 2024-11-19 NOTE — PROGRESS NOTES
CHIEF COMPLAINT    Back pain      Subjective   Nereida Myles is a 49 y.o. female  who presents to the office for follow-up of procedure.  She completed a SACROILIAC INJECTION BILATERAL  on  11/7/24 performed by Dr. Pruitt for management of back pain. Patient reports 75% ongoing relief from the procedure of the sciatica pain.  Over the last several weeks however she has noted recrudescence of pain primarily within the right lumbar spine rating to the lateral aspect of the right hip and groin and radiating into the anterior portion of the thigh terminating at the knee.  She continues to feel that numbness and tingling within the lower extremity is significantly reduced with ongoing use of pregabalin which she takes 25 mg 2 p.o. nightly.    Current medication regimen consists of pregabalin 25 mg 1 or 2 p.o. nightly which she is tolerating without adverse effects.  Failed trial of gabapentin as it caused sedation.    Medical history complicated with history of breast cancer with bilateral mastectomy.    Pain today 7/10 VAS in severity.        Back Pain  This is a chronic problem. The current episode started more than 1 year ago. The problem occurs constantly. The problem has been worse since onset. The pain is present in the lumbar spine. The quality of the pain is described as aching and burning. The pain radiates to the right thigh. The pain is at a severity of 7/10. The pain is moderate. The pain is The same all the time. The symptoms are aggravated by position, twisting and bending (activity). Pertinent negatives include no abdominal pain, dysuria, fever, headaches, numbness or weakness.        PEG Assessment   What number best describes your pain on average in the past week?8  What number best describes how, during the past week, pain has interfered with your enjoyment of life?6  What number best describes how, during the past week, pain has interfered with your general activity?  8    Review of Pertinent  Medical Data ---  MRI OF THE THORACIC SPINE WITH AND WITHOUT CONTRAST AND MRI OF THE  LUMBAR SPINE WITH AND WITHOUT CONTRAST ON 01/09/2023     CLINICAL HISTORY: Mid and low back pain and flank pain where some  left-sided infection is suspected, history of breast cancer with  mastectomy.     MRI OF THE THORACIC SPINE TECHNIQUE: Sagittal T1, proton density and  fat-suppressed T2 and postcontrast sagittal fat-suppressed T1-weighted  images were obtained of the thoracic spine. In addition axial T1 and T2  and postcontrast axial T1-weighted images were obtained from C7 to L1.     FINDINGS: The thoracic cord is normal in signal intensity. At T8-9 there  is a posterior central disc bulge or small disc protrusion that indents  the ventral aspect of the thecal sac and comes close to the ventral  aspect of the cord resulting in minimal if any canal narrowing. There is  no foraminal narrowing. Otherwise the thoracic disc spaces and facets  are within normal limits with no disc herniation, canal or foraminal  narrowing seen in the thoracic spine. Marrow signal intensity in the  thoracic spine is within normal limits. The paravertebral soft tissue  structures are normal in appearance.     IMPRESSION:  1. At T8-9 there is a posterior central disc bulge or small disc  protrusion that only minimally narrows the thecal sac. Otherwise, the  thoracic spine MRI is normal with no evidence of spinal infection in the  thoracic spine.     MRI OF THE LUMBAR SPINE TECHNIQUE: Sagittal T1, proton density fast spin  echo T2 and repeat fat-suppressed T2 and postcontrast sagittal  fat-suppressed T1-weighted images were obtained of the lumbar spine. In  addition, thin cut axial T1-weighted images were obtained angled through  the interspaces from L3 to S1 and axial T2 and postcontrast axial  T1-weighted images were obtained from T12 to S2.     COMPARISON: There are no prior lumbar spine imaging studies from [facility] for  comparison.     FINDINGS: The distal thoracic cord and the conus are normal in signal  intensity. The conus terminates in the posterior midline of the thecal  sac at the level of the inferior body of L2 which is probably lower  limits of normal.     The T12-L1, L1-2 and L2-3 disc space and facets are normal with no canal  or foraminal narrowing from T12 to L3.     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     At L3-4 there is minimal right and mild left facet overgrowth and a  small amount of fluid in the left facets. There is some mild disc space  narrowing and degenerative endplate changes most pronounced in the right  side of the endplates along the inner margin of the levoscoliotic curve.  There is a 3 mm retrolisthesis of L3 with respect to L4 with disc  material bulging along the posterior superior endplate of L4. There is  essentially no narrowing of the thecal sac. There is mild right and no  left foraminal narrowing.     At L4-5 there is minimal facet overgrowth. There is 3 mm retrolisthesis  of L4 with respect to L5 and some bulging disc material extending along  the posterior superior endplate of L5. There is no canal narrowing.  There is minimal right and mild left foraminal narrowing.     At L5-S1 there is minimal facet overgrowth. There is disc space  narrowing, mild degenerative endplate changes, 3-4 mm retrolisthesis of  L5 with respect to S1, and minimal posterior spurring. There is no canal  or lateral recess narrowing. There is mild spurring into the inferior  aspect of the foramina and there is mild bilateral foraminal narrowing.     Other than the degenerative marrow edema in the right side of the  endplates at L3-4 and minimal degenerative marrow edema of the right  posterior lateral endplates at L4-5, marrow signal intensity in the  lumbar spine is within normal limits. I see no evidence of spinal  infection in the lumbar spine.      IMPRESSION:  1. No convincing acute abnormality is seen in the lumbar spine with no  direct evidence of spinal infection within the lumbar spine. 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  2. Disc space and facets are normal in appearance with no canal or  foraminal narrowing from T10 down to L3.  3. There is disc space narrowing and degenerative endplate changes at  L3-4 most pronounced in the right side of the endplates along the inner  margin of the levoscoliotic curve and there is a 3-4 mm retrolisthesis  of L3 with respect to L4 and minimal facet overgrowth but essentially no  canal narrowing and there is only mild right foraminal narrowing at  L3-4.  4. At L4-5 there is minimal facet overgrowth and a 3-4 mm retrolisthesis  of L4 with respect to L5. There is no canal narrowing. There is minimal  right and mild left foraminal narrowing at L4-5.   5. At L5-S1 there is disc space narrowing, degenerative endplate  changes, and 3-4 mm retrolisthesis of L5 with respect to S1. There is no  canal or lateral recess narrowing. There is spurring into the foramina  and mild bilateral foraminal narrowing.  6. There is mild bone marrow edema in the right side of the endplates at  L3-4 and right posterior lateral endplates at L4-5 which is felt to  almost certainly be degenerative marrow edema. The remainder of the  lumbar spine is within normal limits     This report was finalized on 1/10/2023 7:44 AM by Dr. Isidro Tellez M.D.    The following portions of the patient's history were reviewed and updated as appropriate: allergies, current medications, past family history, past medical history, past social history, past surgical history, and problem list.    Review of Systems   Constitutional:  Negative for chills and fever.   Respiratory:  Negative for cough and shortness of breath.    Gastrointestinal:  Positive for constipation. Negative for abdominal pain and  "diarrhea.   Genitourinary:  Negative for difficulty urinating and dysuria.   Musculoskeletal:  Positive for back pain.   Neurological:  Positive for dizziness (new onset about 3 weeks ago). Negative for weakness, light-headedness, numbness and headaches.   Psychiatric/Behavioral:  Negative for agitation.      I have reviewed and confirmed the accuracy of the ROS as documented by the MA/LPN/RN LINDA Villarreal   Vitals:    11/19/24 1523   BP: 128/82   BP Location: Right arm   Patient Position: Sitting   Pulse: 78   Temp: 96 °F (35.6 °C)   TempSrc: Temporal   SpO2: 97%   Weight: 59.6 kg (131 lb 6.4 oz)   Height: 165.1 cm (65\")   PainSc:   7   PainLoc: Back         Objective   Physical Exam  Vitals and nursing note reviewed. Exam conducted with a chaperone present.   Constitutional:       Appearance: Normal appearance.      Comments: MILDLY ANXIOUS   HENT:      Head: Normocephalic.   Cardiovascular:      Pulses:           Dorsalis pedis pulses are 1+ on the right side and 1+ on the left side.   Pulmonary:      Effort: Pulmonary effort is normal.   Musculoskeletal:      Lumbar back: Tenderness present. Positive right straight leg raise test.        Back:    Skin:     General: Skin is warm and dry.   Neurological:      General: No focal deficit present.      Mental Status: She is alert and oriented to person, place, and time.      Cranial Nerves: Cranial nerves 2-12 are intact.      Sensory: Sensation is intact.      Motor: Motor function is intact.      Gait: Gait is intact.      Deep Tendon Reflexes:      Reflex Scores:       Patellar reflexes are 1+ on the right side and 1+ on the left side.  Psychiatric:         Mood and Affect: Mood normal.         Behavior: Behavior normal.         Thought Content: Thought content normal.         Judgment: Judgment normal.         Assessment & Plan   Diagnoses and all orders for this visit:    1. Lumbar radiculopathy (Primary)    2. Degeneration of intervertebral disc of lumbar " region with discogenic back pain and lower extremity pain    3. Sacroiliitis        Nereida Myles reports a pain score of 7.  Given her pain assessment as noted, treatment options were discussed and the following options were decided upon as a follow-up plan to address the patient's pain: continuation of current treatment plan for pain, steroid injections, and use of non-medical modalities (ice, heat, stretching and/or behavior modifications).      --- Due to recrudescence of lumbar discogenic low back pain with right lower extremity radicular findings I recommend proceeding with therapeutic right L3-4 lumbar TFESI under fluoroscopy guidance.  --- I have also discussed with the patient consideration of neuromodulation in the future for better control before level of analgesia as well as to improve her function on a more consistent basis.  Patient education materials have been provided to the patient for her perusal.  --- Follow-up in 6 weeks for further evaluation and treat recommendations to be made  --- Continue pregabalin 25 mg.          ---  Indications for epidural injection:  Plan is to proceed with epidural at the appropriate level.  If the patient receives significant pain reduction and improvement in function and the plan will be to repeat the epidural when the pain worsens.  If a second epidural provides at least 6 weeks of sustained improvement that includes both pain reduction and improvement in function then an epidural injection could be repeated once again at the same level.  This is a mutual decision between the clinician and the patient that includes discussions including risks and benefits in detail as well as alternative therapies.  Patient's questions were answered to their satisfaction and to their understanding.  ---               KATELYN REPORT  As part of the patient's treatment plan, I am prescribing controlled substances. The patient has been made aware of appropriate use of such  medications, including potential risk of somnolence, limited ability to drive and/or work safely, and the potential for dependence or overdose. It has also been made clear that these medications are for use by this patient only, without concomitant use of alcohol or other substances unless prescribed.     Patient has completed prescribing agreement detailing terms of continued prescribing of controlled substances, including monitoring KATELYN reports, urine drug screening, and pill counts if necessary. The patient is aware that inappropriate use will results in cessation of prescribing such medications.    As the clinician, I personally reviewed the KATELYN from [place] [facility] while the patient was in the office today.    History and physical exam exhibit continued safe and appropriate use of controlled substances.       Dictated utilizing Dragon dictation.

## 2024-11-21 DIAGNOSIS — M46.1 SACROILIITIS: Primary | ICD-10-CM

## 2024-11-21 RX ORDER — METHYLPREDNISOLONE 4 MG/1
TABLET ORAL
Qty: 21 TABLET | Refills: 0 | Status: SHIPPED | OUTPATIENT
Start: 2024-11-21

## 2024-11-22 ENCOUNTER — TRANSCRIBE ORDERS (OUTPATIENT)
Dept: SURGERY | Facility: SURGERY CENTER | Age: 49
End: 2024-11-22
Payer: COMMERCIAL

## 2024-11-22 DIAGNOSIS — Z41.9 SURGERY, ELECTIVE: Primary | ICD-10-CM

## 2024-12-04 ENCOUNTER — OFFICE VISIT (OUTPATIENT)
Dept: FAMILY MEDICINE CLINIC | Facility: CLINIC | Age: 49
End: 2024-12-04
Payer: COMMERCIAL

## 2024-12-04 VITALS
WEIGHT: 130 LBS | DIASTOLIC BLOOD PRESSURE: 76 MMHG | HEART RATE: 76 BPM | OXYGEN SATURATION: 97 % | TEMPERATURE: 98.3 F | HEIGHT: 65 IN | BODY MASS INDEX: 21.66 KG/M2 | SYSTOLIC BLOOD PRESSURE: 108 MMHG

## 2024-12-04 DIAGNOSIS — J45.51 SEVERE PERSISTENT ASTHMA WITH ACUTE EXACERBATION: Primary | ICD-10-CM

## 2024-12-04 DIAGNOSIS — J06.9 ACUTE URI: ICD-10-CM

## 2024-12-04 PROCEDURE — 99214 OFFICE O/P EST MOD 30 MIN: CPT | Performed by: FAMILY MEDICINE

## 2024-12-04 PROCEDURE — 96372 THER/PROPH/DIAG INJ SC/IM: CPT | Performed by: FAMILY MEDICINE

## 2024-12-04 RX ORDER — TRIAMCINOLONE ACETONIDE 40 MG/ML
40 INJECTION, SUSPENSION INTRA-ARTICULAR; INTRAMUSCULAR ONCE
Status: COMPLETED | OUTPATIENT
Start: 2024-12-04 | End: 2024-12-04

## 2024-12-04 RX ORDER — PREDNISONE 20 MG/1
20 TABLET ORAL 2 TIMES DAILY
Qty: 10 TABLET | Refills: 0 | Status: ON HOLD | OUTPATIENT
Start: 2024-12-04 | End: 2024-12-09

## 2024-12-04 RX ADMIN — TRIAMCINOLONE ACETONIDE 40 MG: 40 INJECTION, SUSPENSION INTRA-ARTICULAR; INTRAMUSCULAR at 16:26

## 2024-12-04 NOTE — PROGRESS NOTES
"Chief Complaint  Asthma    Subjective        Nereida Myles presents to [facility]  History of Present Illness  History of Present Illness  The patient is a 49-year-old female who presents for acute concerns.    She began experiencing symptoms of a common cold, including congestion, last Wednesday. Her asthma exacerbated on Sunday, leading to a persistent feeling of unwellness. She reported a pulse oximetry reading of 90 throughout the previous night, although she is not currently exhibiting wheezing. She administered a treatment approximately 30 minutes prior to the consultation. She has been compliant with her preventative inhaler regimen. She has all the necessary equipment for nebulizer treatments at home. She had a consultation with her pulmonologist in November 2023, during which she discussed her shortness of breath. The pulmonologist suggested resuming Dupixent, but she declined due to perceived lack of efficacy.    Supplemental Information  She was prescribed a Medrol pack for her back pain about a week ago but expressed apprehension about taking steroids.    MEDICATIONS  Current: Medrol pack       Objective   Vital Signs:  /76   Pulse 76   Temp 98.3 °F (36.8 °C)   Ht 165.1 cm (65\")   Wt 59 kg (130 lb)   SpO2 97%   BMI 21.63 kg/m²   Estimated body mass index is 21.63 kg/m² as calculated from the following:    Height as of this encounter: 165.1 cm (65\").    Weight as of this encounter: 59 kg (130 lb).       BMI is within normal parameters. No other follow-up for BMI required.      Physical Exam   Physical Exam  The patient is alert and in mild discomfort.  Pupils are equal. Oral mucosa is moist. No postnasal drainage observed. Tympanic membranes (TMs) appear normal.  Neck is supple. No adenopathy detected.  Diffuse audible wheezing present in the lungs.  Good AE  Heart rhythm is regular.    Vital Signs  Oxygen saturation is 97.       Result Review " :          Results                Assessment and Plan     There are no diagnoses linked to this encounter.  Assessment & Plan  1. Asthma exacerbation.  Her oxygen saturation levels are within normal limits, and she is not exhibiting signs of tachypnea. There is no requirement for antibiotic therapy as she is afebrile. A steroid injection will be administered today. She is advised to commence oral prednisone 20 mg twice daily starting tomorrow. Additionally, she is instructed to utilize her nebulizer every 4 hours while awake for the subsequent 2 days. If her oxygen saturation levels fall below 90, she should seek immediate medical attention at the emergency room. A work note will be provided if she is unable to attend work tomorrow. To ER if any distress.            Follow Up     No follow-ups on file.  Patient was given instructions and counseling regarding her condition or for health maintenance advice. Please see specific information pulled into the AVS if appropriate.    Patient or patient representative verbalized consent for the use of Ambient Listening during the visit with  Meredith Lea Kehrer, MD for chart documentation. 12/4/2024  16:24 EST

## 2024-12-07 ENCOUNTER — APPOINTMENT (OUTPATIENT)
Dept: GENERAL RADIOLOGY | Facility: HOSPITAL | Age: 49
DRG: 202 | End: 2024-12-07
Payer: COMMERCIAL

## 2024-12-07 ENCOUNTER — HOSPITAL ENCOUNTER (INPATIENT)
Facility: HOSPITAL | Age: 49
LOS: 4 days | Discharge: HOME | DRG: 202 | End: 2024-12-12
Attending: EMERGENCY MEDICINE | Admitting: HOSPITALIST
Payer: COMMERCIAL

## 2024-12-07 DIAGNOSIS — J45.901 ASTHMA WITH ACUTE EXACERBATION, UNSPECIFIED ASTHMA SEVERITY, UNSPECIFIED WHETHER PERSISTENT: ICD-10-CM

## 2024-12-07 DIAGNOSIS — J96.01 ACUTE HYPOXIC RESPIRATORY FAILURE: Primary | ICD-10-CM

## 2024-12-07 DIAGNOSIS — J20.6 ACUTE BRONCHITIS DUE TO RHINOVIRUS: ICD-10-CM

## 2024-12-07 PROBLEM — Z85.3 HISTORY OF BREAST CANCER: Status: ACTIVE | Noted: 2024-12-07

## 2024-12-07 LAB
ALBUMIN SERPL-MCNC: 4.2 G/DL (ref 3.5–5.2)
ALBUMIN/GLOB SERPL: 1.4 G/DL
ALP SERPL-CCNC: 62 U/L (ref 39–117)
ALT SERPL W P-5'-P-CCNC: 34 U/L (ref 1–33)
ANION GAP SERPL CALCULATED.3IONS-SCNC: 10.3 MMOL/L (ref 5–15)
AST SERPL-CCNC: 23 U/L (ref 1–32)
B PARAPERT DNA SPEC QL NAA+PROBE: NOT DETECTED
B PERT DNA SPEC QL NAA+PROBE: NOT DETECTED
BASOPHILS # BLD AUTO: 0.03 10*3/MM3 (ref 0–0.2)
BASOPHILS NFR BLD AUTO: 0.2 % (ref 0–1.5)
BILIRUB SERPL-MCNC: 0.4 MG/DL (ref 0–1.2)
BUN SERPL-MCNC: 13 MG/DL (ref 6–20)
BUN/CREAT SERPL: 16.7 (ref 7–25)
C PNEUM DNA NPH QL NAA+NON-PROBE: NOT DETECTED
CALCIUM SPEC-SCNC: 9 MG/DL (ref 8.6–10.5)
CHLORIDE SERPL-SCNC: 105 MMOL/L (ref 98–107)
CO2 SERPL-SCNC: 22.7 MMOL/L (ref 22–29)
CREAT SERPL-MCNC: 0.78 MG/DL (ref 0.57–1)
DEPRECATED RDW RBC AUTO: 41.2 FL (ref 37–54)
EGFRCR SERPLBLD CKD-EPI 2021: 92.1 ML/MIN/1.73
EOSINOPHIL # BLD AUTO: 0.02 10*3/MM3 (ref 0–0.4)
EOSINOPHIL NFR BLD AUTO: 0.2 % (ref 0.3–6.2)
ERYTHROCYTE [DISTWIDTH] IN BLOOD BY AUTOMATED COUNT: 12.4 % (ref 12.3–15.4)
FLUAV SUBTYP SPEC NAA+PROBE: NOT DETECTED
FLUBV RNA ISLT QL NAA+PROBE: NOT DETECTED
GLOBULIN UR ELPH-MCNC: 2.9 GM/DL
GLUCOSE SERPL-MCNC: 108 MG/DL (ref 65–99)
HADV DNA SPEC NAA+PROBE: NOT DETECTED
HCOV 229E RNA SPEC QL NAA+PROBE: NOT DETECTED
HCOV HKU1 RNA SPEC QL NAA+PROBE: NOT DETECTED
HCOV NL63 RNA SPEC QL NAA+PROBE: NOT DETECTED
HCOV OC43 RNA SPEC QL NAA+PROBE: NOT DETECTED
HCT VFR BLD AUTO: 39.2 % (ref 34–46.6)
HGB BLD-MCNC: 13.1 G/DL (ref 12–15.9)
HMPV RNA NPH QL NAA+NON-PROBE: NOT DETECTED
HOLD SPECIMEN: NORMAL
HOLD SPECIMEN: NORMAL
HPIV1 RNA ISLT QL NAA+PROBE: NOT DETECTED
HPIV2 RNA SPEC QL NAA+PROBE: NOT DETECTED
HPIV3 RNA NPH QL NAA+PROBE: NOT DETECTED
HPIV4 P GENE NPH QL NAA+PROBE: NOT DETECTED
IMM GRANULOCYTES # BLD AUTO: 0.1 10*3/MM3 (ref 0–0.05)
IMM GRANULOCYTES NFR BLD AUTO: 0.8 % (ref 0–0.5)
LYMPHOCYTES # BLD AUTO: 1.42 10*3/MM3 (ref 0.7–3.1)
LYMPHOCYTES NFR BLD AUTO: 11.7 % (ref 19.6–45.3)
M PNEUMO IGG SER IA-ACNC: NOT DETECTED
MCH RBC QN AUTO: 30.5 PG (ref 26.6–33)
MCHC RBC AUTO-ENTMCNC: 33.4 G/DL (ref 31.5–35.7)
MCV RBC AUTO: 91.2 FL (ref 79–97)
MONOCYTES # BLD AUTO: 0.78 10*3/MM3 (ref 0.1–0.9)
MONOCYTES NFR BLD AUTO: 6.4 % (ref 5–12)
NEUTROPHILS NFR BLD AUTO: 80.7 % (ref 42.7–76)
NEUTROPHILS NFR BLD AUTO: 9.8 10*3/MM3 (ref 1.7–7)
NRBC BLD AUTO-RTO: 0 /100 WBC (ref 0–0.2)
NT-PROBNP SERPL-MCNC: 119 PG/ML (ref 0–900)
PLATELET # BLD AUTO: 400 10*3/MM3 (ref 140–450)
PMV BLD AUTO: 9.2 FL (ref 6–12)
POTASSIUM SERPL-SCNC: 3.6 MMOL/L (ref 3.5–5.2)
PROCALCITONIN SERPL-MCNC: 0.04 NG/ML (ref 0–0.25)
PROT SERPL-MCNC: 7.1 G/DL (ref 6–8.5)
QT INTERVAL: 399 MS
QTC INTERVAL: 458 MS
RBC # BLD AUTO: 4.3 10*6/MM3 (ref 3.77–5.28)
RHINOVIRUS RNA SPEC NAA+PROBE: DETECTED
RSV RNA NPH QL NAA+NON-PROBE: NOT DETECTED
SARS-COV-2 RNA NPH QL NAA+NON-PROBE: NOT DETECTED
SODIUM SERPL-SCNC: 138 MMOL/L (ref 136–145)
TROPONIN T SERPL HS-MCNC: 9 NG/L
WBC NRBC COR # BLD AUTO: 12.15 10*3/MM3 (ref 3.4–10.8)
WHOLE BLOOD HOLD COAG: NORMAL
WHOLE BLOOD HOLD SPECIMEN: NORMAL

## 2024-12-07 PROCEDURE — 71045 X-RAY EXAM CHEST 1 VIEW: CPT

## 2024-12-07 PROCEDURE — 25010000002 ONDANSETRON PER 1 MG: Performed by: PHYSICIAN ASSISTANT

## 2024-12-07 PROCEDURE — 84484 ASSAY OF TROPONIN QUANT: CPT

## 2024-12-07 PROCEDURE — 83880 ASSAY OF NATRIURETIC PEPTIDE: CPT

## 2024-12-07 PROCEDURE — 93005 ELECTROCARDIOGRAM TRACING: CPT

## 2024-12-07 PROCEDURE — 94799 UNLISTED PULMONARY SVC/PX: CPT

## 2024-12-07 PROCEDURE — 93005 ELECTROCARDIOGRAM TRACING: CPT | Performed by: EMERGENCY MEDICINE

## 2024-12-07 PROCEDURE — 25010000002 ONDANSETRON PER 1 MG: Performed by: HOSPITALIST

## 2024-12-07 PROCEDURE — 84145 PROCALCITONIN (PCT): CPT | Performed by: EMERGENCY MEDICINE

## 2024-12-07 PROCEDURE — G0378 HOSPITAL OBSERVATION PER HR: HCPCS

## 2024-12-07 PROCEDURE — 0202U NFCT DS 22 TRGT SARS-COV-2: CPT | Performed by: PHYSICIAN ASSISTANT

## 2024-12-07 PROCEDURE — 93010 ELECTROCARDIOGRAM REPORT: CPT | Performed by: INTERNAL MEDICINE

## 2024-12-07 PROCEDURE — 25010000002 METHYLPREDNISOLONE PER 125 MG: Performed by: HOSPITALIST

## 2024-12-07 PROCEDURE — 85025 COMPLETE CBC W/AUTO DIFF WBC: CPT

## 2024-12-07 PROCEDURE — 94640 AIRWAY INHALATION TREATMENT: CPT

## 2024-12-07 PROCEDURE — 25010000002 DIPHENHYDRAMINE PER 50 MG: Performed by: PHYSICIAN ASSISTANT

## 2024-12-07 PROCEDURE — 25010000002 METHYLPREDNISOLONE PER 125 MG: Performed by: PHYSICIAN ASSISTANT

## 2024-12-07 PROCEDURE — 25010000002 ENOXAPARIN PER 10 MG: Performed by: HOSPITALIST

## 2024-12-07 PROCEDURE — 99285 EMERGENCY DEPT VISIT HI MDM: CPT

## 2024-12-07 PROCEDURE — 94761 N-INVAS EAR/PLS OXIMETRY MLT: CPT

## 2024-12-07 PROCEDURE — 80053 COMPREHEN METABOLIC PANEL: CPT

## 2024-12-07 PROCEDURE — 36415 COLL VENOUS BLD VENIPUNCTURE: CPT

## 2024-12-07 PROCEDURE — 25010000002 METOCLOPRAMIDE PER 10 MG: Performed by: PHYSICIAN ASSISTANT

## 2024-12-07 PROCEDURE — 25010000002 METHYLPREDNISOLONE PER 40 MG: Performed by: INTERNAL MEDICINE

## 2024-12-07 PROCEDURE — 94760 N-INVAS EAR/PLS OXIMETRY 1: CPT

## 2024-12-07 PROCEDURE — 94664 DEMO&/EVAL PT USE INHALER: CPT

## 2024-12-07 RX ORDER — SODIUM CHLORIDE 0.9 % (FLUSH) 0.9 %
10 SYRINGE (ML) INJECTION AS NEEDED
Status: DISCONTINUED | OUTPATIENT
Start: 2024-12-07 | End: 2024-12-12 | Stop reason: HOSPADM

## 2024-12-07 RX ORDER — METHYLPREDNISOLONE SODIUM SUCCINATE 40 MG/ML
40 INJECTION, POWDER, LYOPHILIZED, FOR SOLUTION INTRAMUSCULAR; INTRAVENOUS EVERY 12 HOURS
Status: COMPLETED | OUTPATIENT
Start: 2024-12-07 | End: 2024-12-08

## 2024-12-07 RX ORDER — SODIUM CHLORIDE 9 MG/ML
40 INJECTION, SOLUTION INTRAVENOUS AS NEEDED
Status: DISCONTINUED | OUTPATIENT
Start: 2024-12-07 | End: 2024-12-12 | Stop reason: HOSPADM

## 2024-12-07 RX ORDER — ENOXAPARIN SODIUM 100 MG/ML
40 INJECTION SUBCUTANEOUS NIGHTLY
Status: DISCONTINUED | OUTPATIENT
Start: 2024-12-07 | End: 2024-12-12

## 2024-12-07 RX ORDER — PANTOPRAZOLE SODIUM 40 MG/1
40 TABLET, DELAYED RELEASE ORAL
Status: DISCONTINUED | OUTPATIENT
Start: 2024-12-07 | End: 2024-12-12 | Stop reason: HOSPADM

## 2024-12-07 RX ORDER — PREGABALIN 25 MG/1
25 CAPSULE ORAL EVERY 8 HOURS SCHEDULED
Status: DISCONTINUED | OUTPATIENT
Start: 2024-12-07 | End: 2024-12-12 | Stop reason: HOSPADM

## 2024-12-07 RX ORDER — KETOROLAC TROMETHAMINE 15 MG/ML
15 INJECTION, SOLUTION INTRAMUSCULAR; INTRAVENOUS ONCE
Status: DISCONTINUED | OUTPATIENT
Start: 2024-12-07 | End: 2024-12-07

## 2024-12-07 RX ORDER — ALBUTEROL SULFATE 0.83 MG/ML
2.5 SOLUTION RESPIRATORY (INHALATION)
Status: COMPLETED | OUTPATIENT
Start: 2024-12-07 | End: 2024-12-07

## 2024-12-07 RX ORDER — THEOPHYLLINE 300 MG/1
300 TABLET, EXTENDED RELEASE ORAL EVERY MORNING
Status: DISCONTINUED | OUTPATIENT
Start: 2024-12-07 | End: 2024-12-12 | Stop reason: HOSPADM

## 2024-12-07 RX ORDER — SODIUM CHLORIDE 0.9 % (FLUSH) 0.9 %
10 SYRINGE (ML) INJECTION EVERY 12 HOURS SCHEDULED
Status: DISCONTINUED | OUTPATIENT
Start: 2024-12-07 | End: 2024-12-12 | Stop reason: HOSPADM

## 2024-12-07 RX ORDER — BISACODYL 5 MG/1
5 TABLET, DELAYED RELEASE ORAL DAILY PRN
Status: DISCONTINUED | OUTPATIENT
Start: 2024-12-07 | End: 2024-12-12 | Stop reason: HOSPADM

## 2024-12-07 RX ORDER — AMOXICILLIN 250 MG
2 CAPSULE ORAL 2 TIMES DAILY PRN
Status: DISCONTINUED | OUTPATIENT
Start: 2024-12-07 | End: 2024-12-12 | Stop reason: HOSPADM

## 2024-12-07 RX ORDER — ONDANSETRON 2 MG/ML
8 INJECTION INTRAMUSCULAR; INTRAVENOUS ONCE
Status: COMPLETED | OUTPATIENT
Start: 2024-12-07 | End: 2024-12-07

## 2024-12-07 RX ORDER — IPRATROPIUM BROMIDE AND ALBUTEROL SULFATE 2.5; .5 MG/3ML; MG/3ML
3 SOLUTION RESPIRATORY (INHALATION)
Status: DISCONTINUED | OUTPATIENT
Start: 2024-12-07 | End: 2024-12-12 | Stop reason: HOSPADM

## 2024-12-07 RX ORDER — IPRATROPIUM BROMIDE AND ALBUTEROL SULFATE 2.5; .5 MG/3ML; MG/3ML
3 SOLUTION RESPIRATORY (INHALATION) ONCE
Status: COMPLETED | OUTPATIENT
Start: 2024-12-07 | End: 2024-12-07

## 2024-12-07 RX ORDER — HYDROCODONE BITARTRATE AND ACETAMINOPHEN 5; 325 MG/1; MG/1
1 TABLET ORAL EVERY 6 HOURS PRN
Status: DISPENSED | OUTPATIENT
Start: 2024-12-07 | End: 2024-12-12

## 2024-12-07 RX ORDER — ACETAMINOPHEN 325 MG/1
650 TABLET ORAL EVERY 4 HOURS PRN
Status: DISCONTINUED | OUTPATIENT
Start: 2024-12-07 | End: 2024-12-12 | Stop reason: HOSPADM

## 2024-12-07 RX ORDER — ACETAMINOPHEN 650 MG/1
650 SUPPOSITORY RECTAL EVERY 4 HOURS PRN
Status: DISCONTINUED | OUTPATIENT
Start: 2024-12-07 | End: 2024-12-12 | Stop reason: HOSPADM

## 2024-12-07 RX ORDER — METHYLPREDNISOLONE SODIUM SUCCINATE 125 MG/2ML
60 INJECTION, POWDER, LYOPHILIZED, FOR SOLUTION INTRAMUSCULAR; INTRAVENOUS EVERY 12 HOURS
Status: DISCONTINUED | OUTPATIENT
Start: 2024-12-07 | End: 2024-12-07

## 2024-12-07 RX ORDER — LEVOTHYROXINE SODIUM 50 UG/1
50 TABLET ORAL DAILY
Status: DISCONTINUED | OUTPATIENT
Start: 2024-12-07 | End: 2024-12-12 | Stop reason: HOSPADM

## 2024-12-07 RX ORDER — POLYETHYLENE GLYCOL 3350 17 G/17G
17 POWDER, FOR SOLUTION ORAL DAILY PRN
Status: DISCONTINUED | OUTPATIENT
Start: 2024-12-07 | End: 2024-12-12 | Stop reason: HOSPADM

## 2024-12-07 RX ORDER — ONDANSETRON 4 MG/1
4 TABLET, ORALLY DISINTEGRATING ORAL EVERY 6 HOURS PRN
Status: DISCONTINUED | OUTPATIENT
Start: 2024-12-07 | End: 2024-12-12 | Stop reason: HOSPADM

## 2024-12-07 RX ORDER — MONTELUKAST SODIUM 10 MG/1
10 TABLET ORAL DAILY
Status: DISCONTINUED | OUTPATIENT
Start: 2024-12-07 | End: 2024-12-12 | Stop reason: HOSPADM

## 2024-12-07 RX ORDER — TAMOXIFEN CITRATE 10 MG/1
20 TABLET ORAL DAILY
Status: DISCONTINUED | OUTPATIENT
Start: 2024-12-07 | End: 2024-12-12 | Stop reason: HOSPADM

## 2024-12-07 RX ORDER — BUPROPION HYDROCHLORIDE 300 MG/1
300 TABLET ORAL EVERY MORNING
Status: DISCONTINUED | OUTPATIENT
Start: 2024-12-07 | End: 2024-12-12 | Stop reason: HOSPADM

## 2024-12-07 RX ORDER — ALBUTEROL SULFATE 0.83 MG/ML
2.5 SOLUTION RESPIRATORY (INHALATION) EVERY 4 HOURS PRN
Status: DISCONTINUED | OUTPATIENT
Start: 2024-12-07 | End: 2024-12-12 | Stop reason: HOSPADM

## 2024-12-07 RX ORDER — IBUPROFEN 800 MG/1
800 TABLET, FILM COATED ORAL ONCE
Status: COMPLETED | OUTPATIENT
Start: 2024-12-07 | End: 2024-12-07

## 2024-12-07 RX ORDER — ONDANSETRON 2 MG/ML
4 INJECTION INTRAMUSCULAR; INTRAVENOUS EVERY 6 HOURS PRN
Status: DISCONTINUED | OUTPATIENT
Start: 2024-12-07 | End: 2024-12-12 | Stop reason: HOSPADM

## 2024-12-07 RX ORDER — ACETAMINOPHEN 160 MG/5ML
650 SOLUTION ORAL EVERY 4 HOURS PRN
Status: DISCONTINUED | OUTPATIENT
Start: 2024-12-07 | End: 2024-12-12 | Stop reason: HOSPADM

## 2024-12-07 RX ORDER — METAXALONE 800 MG/1
400 TABLET ORAL 3 TIMES DAILY PRN
Status: DISCONTINUED | OUTPATIENT
Start: 2024-12-07 | End: 2024-12-12 | Stop reason: HOSPADM

## 2024-12-07 RX ORDER — BISACODYL 10 MG
10 SUPPOSITORY, RECTAL RECTAL DAILY PRN
Status: DISCONTINUED | OUTPATIENT
Start: 2024-12-07 | End: 2024-12-12 | Stop reason: HOSPADM

## 2024-12-07 RX ORDER — DIPHENHYDRAMINE HYDROCHLORIDE 50 MG/ML
12.5 INJECTION INTRAMUSCULAR; INTRAVENOUS ONCE
Status: COMPLETED | OUTPATIENT
Start: 2024-12-07 | End: 2024-12-07

## 2024-12-07 RX ORDER — METHYLPREDNISOLONE SODIUM SUCCINATE 125 MG/2ML
125 INJECTION, POWDER, LYOPHILIZED, FOR SOLUTION INTRAMUSCULAR; INTRAVENOUS ONCE
Status: COMPLETED | OUTPATIENT
Start: 2024-12-07 | End: 2024-12-07

## 2024-12-07 RX ORDER — METOCLOPRAMIDE HYDROCHLORIDE 5 MG/ML
10 INJECTION INTRAMUSCULAR; INTRAVENOUS ONCE
Status: COMPLETED | OUTPATIENT
Start: 2024-12-07 | End: 2024-12-07

## 2024-12-07 RX ORDER — BENZONATATE 100 MG/1
200 CAPSULE ORAL 3 TIMES DAILY PRN
Status: DISCONTINUED | OUTPATIENT
Start: 2024-12-07 | End: 2024-12-12 | Stop reason: HOSPADM

## 2024-12-07 RX ORDER — BUDESONIDE AND FORMOTEROL FUMARATE DIHYDRATE 160; 4.5 UG/1; UG/1
2 AEROSOL RESPIRATORY (INHALATION)
Status: DISCONTINUED | OUTPATIENT
Start: 2024-12-07 | End: 2024-12-12 | Stop reason: HOSPADM

## 2024-12-07 RX ADMIN — BUPROPION HYDROCHLORIDE 300 MG: 300 TABLET, EXTENDED RELEASE ORAL at 11:49

## 2024-12-07 RX ADMIN — TAMOXIFEN CITRATE 20 MG: 10 TABLET ORAL at 21:09

## 2024-12-07 RX ADMIN — ENOXAPARIN SODIUM 40 MG: 100 INJECTION SUBCUTANEOUS at 21:09

## 2024-12-07 RX ADMIN — ONDANSETRON 8 MG: 2 INJECTION, SOLUTION INTRAMUSCULAR; INTRAVENOUS at 07:09

## 2024-12-07 RX ADMIN — FLUOXETINE HYDROCHLORIDE 80 MG: 20 CAPSULE ORAL at 11:48

## 2024-12-07 RX ADMIN — PANTOPRAZOLE SODIUM 40 MG: 40 TABLET, DELAYED RELEASE ORAL at 11:47

## 2024-12-07 RX ADMIN — Medication 5 MG: at 21:09

## 2024-12-07 RX ADMIN — BUDESONIDE AND FORMOTEROL FUMARATE DIHYDRATE 2 PUFF: 160; 4.5 AEROSOL RESPIRATORY (INHALATION) at 20:50

## 2024-12-07 RX ADMIN — IPRATROPIUM BROMIDE AND ALBUTEROL SULFATE 3 ML: .5; 3 SOLUTION RESPIRATORY (INHALATION) at 07:02

## 2024-12-07 RX ADMIN — ALBUTEROL SULFATE 2.5 MG: 2.5 SOLUTION RESPIRATORY (INHALATION) at 07:07

## 2024-12-07 RX ADMIN — METOCLOPRAMIDE 10 MG: 5 INJECTION, SOLUTION INTRAMUSCULAR; INTRAVENOUS at 09:41

## 2024-12-07 RX ADMIN — IPRATROPIUM BROMIDE AND ALBUTEROL SULFATE 3 ML: 2.5; .5 SOLUTION RESPIRATORY (INHALATION) at 12:15

## 2024-12-07 RX ADMIN — METHYLPREDNISOLONE SODIUM SUCCINATE 60 MG: 125 INJECTION INTRAMUSCULAR; INTRAVENOUS at 11:56

## 2024-12-07 RX ADMIN — METHYLPREDNISOLONE SODIUM SUCCINATE 125 MG: 125 INJECTION INTRAMUSCULAR; INTRAVENOUS at 07:09

## 2024-12-07 RX ADMIN — ALBUTEROL SULFATE 2.5 MG: 2.5 SOLUTION RESPIRATORY (INHALATION) at 07:03

## 2024-12-07 RX ADMIN — ONDANSETRON 4 MG: 2 INJECTION INTRAMUSCULAR; INTRAVENOUS at 19:58

## 2024-12-07 RX ADMIN — THEOPHYLLINE 300 MG: 300 TABLET, EXTENDED RELEASE ORAL at 11:47

## 2024-12-07 RX ADMIN — BUDESONIDE AND FORMOTEROL FUMARATE DIHYDRATE 2 PUFF: 160; 4.5 AEROSOL RESPIRATORY (INHALATION) at 12:00

## 2024-12-07 RX ADMIN — METHYLPREDNISOLONE SODIUM SUCCINATE 40 MG: 40 INJECTION, POWDER, FOR SOLUTION INTRAMUSCULAR; INTRAVENOUS at 21:13

## 2024-12-07 RX ADMIN — MONTELUKAST 10 MG: 10 TABLET, FILM COATED ORAL at 21:09

## 2024-12-07 RX ADMIN — DIPHENHYDRAMINE HYDROCHLORIDE 12.5 MG: 50 INJECTION, SOLUTION INTRAMUSCULAR; INTRAVENOUS at 09:41

## 2024-12-07 RX ADMIN — Medication 10 ML: at 12:00

## 2024-12-07 RX ADMIN — Medication 10 ML: at 21:12

## 2024-12-07 RX ADMIN — IPRATROPIUM BROMIDE AND ALBUTEROL SULFATE 3 ML: 2.5; .5 SOLUTION RESPIRATORY (INHALATION) at 20:43

## 2024-12-07 RX ADMIN — IBUPROFEN 800 MG: 800 TABLET, FILM COATED ORAL at 09:01

## 2024-12-07 RX ADMIN — LEVOTHYROXINE SODIUM 50 MCG: 50 TABLET ORAL at 11:49

## 2024-12-07 RX ADMIN — IPRATROPIUM BROMIDE AND ALBUTEROL SULFATE 3 ML: 2.5; .5 SOLUTION RESPIRATORY (INHALATION) at 14:58

## 2024-12-07 RX ADMIN — PREGABALIN 25 MG: 25 CAPSULE ORAL at 21:09

## 2024-12-07 NOTE — ED PROVIDER NOTES
EMERGENCY DEPARTMENT ENCOUNTER  Room Number:  04/04  PCP: Kehrer, Meredith Lea, MD  Independent Historians: Patient      HPI:  Chief Complaint: had concerns including Shortness of Breath, Asthma, and Vomiting.     A complete HPI/ROS/PMH/PSH/SH/FH are unobtainable due to: None    Chronic or social conditions impacting patient care (Social Determinants of Health): None      Context: The patient is a 49 y.o. female with a medical history of severe persistent asthma, COPD, GERD, Hashimoto's thyroiditis who presents to the ED c/o acute cough congestion shortness of breath and fatigue.  Symptoms started 5 days ago, 3 days ago was seen at PCP given a steroid injection and prescribed prednisone 40 mg daily.  Yesterday she was only able to take 1 dose of the prednisone because she started becoming nauseated, had a couple episodes of vomiting, unable to tolerate any food or meds.  She had some mild nasal congestion rhinorrhea, has had cold chills body aches but unsure if she has had any fever at home.  she denies any abdominal pain or diarrhea, states she commonly gets nausea and vomiting during an asthma exacerbation, also states she has had many admissions in the past for asthma.  She is using her nebulizer and inhalers at home without adequate relief.  Pulmonologist is Dr. Baker.      Review of prior external notes (non-ED) -and- Review of prior external test results outside of this encounter:  Office visit with [facility] on 10/2/2024 for persistent asthma, Dupixent had been discontinued in March of this year. Spirometry showed mild obstruction with statistically significant improvement postbronchodilator and she was continued on Allegra and Singulair        PAST MEDICAL HISTORY  Active Ambulatory Problems     Diagnosis Date Noted    Hashimoto's thyroiditis 11/18/2019    Hypothyroidism 11/18/2019    Depression, major 11/18/2019    Anxiety 11/18/2019    Asthma 11/18/2019    Pneumonia due to COVID-19 virus  01/20/2021    Hypoxia 01/21/2021    Shortness of breath 01/21/2021    Pyelonephritis 01/07/2023    COPD (chronic obstructive pulmonary disease) 01/07/2023    Breast CA 02/28/2023    Finger clubbing 02/28/2023    Severe episode of recurrent major depressive disorder, without psychotic features 05/18/2023    Severe persistent asthma 05/18/2023    Lumbar radiculopathy 12/12/2023    DDD (degenerative disc disease), lumbar 12/12/2023    Lumbar facet arthropathy 12/12/2023    Pain of right hip 02/08/2024    Gastroesophageal reflux disease without esophagitis 08/02/2024    Low serum vitamin B12 08/02/2024    Major depressive disorder, recurrent, mild 08/02/2024    Sacroiliitis 10/17/2024     Resolved Ambulatory Problems     Diagnosis Date Noted    No Resolved Ambulatory Problems     Past Medical History:   Diagnosis Date    Allergic     Allergic rhinitis     BMI 25.0-25.9,adult     Cancer     Community acquired pneumonia     Disease of thyroid gland     Diverticular disease     Foreign body in ear     Fractures 2012    GERD (gastroesophageal reflux disease)     History of abnormal mammogram     History of acute sinusitis     History of adenocarcinoma of breast     History of COPD     History of dyspareunia in female     History of kidney stones     History of lump of left breast     History of malignant neoplasm of left breast     History of nausea and vomiting     History of seborrheic dermatitis     History of strep pharyngitis     History of suicidal ideation     Hypoxemia     Joint pain 2021    Low back pain 2023    Lumbosacral disc disease ?    Obsessive compulsive disorder     Other screening mammogram     Personal history of asthma     PONV (postoperative nausea and vomiting)     Recurrent genital herpes simplex     Symptomatic states associated with artificial menopause     TMJ dysfunction 1999         PAST SURGICAL HISTORY  Past Surgical History:   Procedure Laterality Date    APPENDECTOMY      BREAST AUGMENTATION  Bilateral     Revised 2005, initial implants 1992    BREAST RECONSTRUCTION, BREAST TISSUE EXPANDER INSERTION Bilateral     CERVICAL CERCLAGE      x 2 during pregnancy, incompetent cervix    CHOLECYSTECTOMY      COLONOSCOPY      EPIDURAL Right 01/11/2024    Procedure: LUMBAR/SACRAL TRANSFORAMINAL EPIDURAL RIGHT L3-4 #1 20567;  Surgeon: Rei Pruitt MD;  Location: [facility];  Service: Pain Management;  Laterality: Right;    EPIDURAL Right 9/19/2024    Procedure: LUMBAR/SACRAL TRANSFORAMINAL EPIDURAL RIGHT L3-4 45961;  Surgeon: Rei Pruitt MD;  Location: [facility];  Service: Pain Management;  Laterality: Right;    EPIDURAL BLOCK  1/2024    HAND SURGERY Right     may2022    HYSTERECTOMY      due to uterine prolapse    KIDNEY STONE SURGERY      LUNG SURGERY      bronchoscopy    MASTECTOMY Bilateral 01/2016    removed lymph nodes on left side    MEDIAL BRANCH BLOCK Right 3/13/2024    Procedure: LUMBAR MEDIAL BRANCH BLOCK RIGHT L3-L5 #1 98553, 80716;  Surgeon: Rei Pruitt MD;  Location: [facility];  Service: Pain Management;  Laterality: Right;    MEDIAL BRANCH BLOCK Bilateral 3/27/2024    Procedure: LUMBAR MEDIAL BRANCH BLOCK BILATERAL L3-L5 17418-82, 64494-50 X 2;  Surgeon: Rei Pruitt MD;  Location: [facility];  Service: Pain Management;  Laterality: Bilateral;    MEDIAL BRANCH BLOCK Left 5/2/2024    Procedure: LUMBAR MEDIAL BRANCH BLOCK LEFT L3-L5 #2 23465, 26215;  Surgeon: Rei Pruitt MD;  Location: [facility];  Service: Pain Management;  Laterality: Left;    NERVE SURGERY Bilateral 6/4/2024    Procedure: LUMBAR RADIOFREQUENCY ABLATION BILATERAL L3-L5 23266-66, 64636-50 X 2;  Surgeon: Rei Pruitt MD;  Location: [facility];  Service: Pain Management;  Laterality: Bilateral;    SACROILIAC JOINT INJECTION Bilateral 11/7/2024    Procedure: SACROILIAC INJECTION BILATERAL 99675-28;  Surgeon: Rei Pruitt MD;  Location: [facility];  Service: Pain  Management;  Laterality: Bilateral;    SCAR REVISION BREAST Bilateral 06/21/2022    Procedure: SCAR REVISION LEFT CHEST 27 CENTIMETERS AND RIGHT CHEST 25 CENTIMETERS FOR AESTHETIC FLAT CLOSURE FOLLOWING MASTECTOMY;  Surgeon: Waterhouse, Maurine, MD;  Location: [facility];  Service: Plastics;  Laterality: Bilateral;    TISSUE EXPANDER PLACEMENT Bilateral     due to infection         FAMILY HISTORY  Family History   Problem Relation Age of Onset    Thyroid disease Mother     Arthritis Mother     Uterine cancer Mother         in her 40's    Depression Mother     GI problems Mother     Hyperlipidemia Mother     Cancer Father     Bipolar disorder Father     Depression Father     Hyperlipidemia Father     Hypertension Father     Arthritis Father     Arthritis Maternal Grandmother     Bleeding Disorder Maternal Grandmother     Hyperlipidemia Maternal Grandmother     Hypertension Maternal Grandmother     Depression Maternal Grandmother     Hypertension Maternal Grandfather     Hypertension Paternal Grandfather     Hyperlipidemia Paternal Grandfather     Arthritis Paternal Grandfather     Coronary artery disease Other         Maternal GrGF    Skin cancer Other         Maternal GrGF    Diabetes Other         Paternal GrGF    Heart disease Other         FH in females before the age of 65    Arthritis Paternal Grandmother     Malig Hyperthermia Neg Hx          SOCIAL HISTORY  Social History     Socioeconomic History    Marital status:    Tobacco Use    Smoking status: Never    Smokeless tobacco: Never   Vaping Use    Vaping status: Never Used   Substance and Sexual Activity    Alcohol use: Yes     Alcohol/week: 5.0 standard drinks of alcohol     Types: 2 Glasses of wine, 3 Drinks containing 0.5 oz of alcohol per week     Comment: social    Drug use: Never    Sexual activity: Yes     Partners: Male     Birth control/protection: None, Vasectomy, Essure, Hysterectomy         ALLERGIES  Patient has no known  allergies.      REVIEW OF SYSTEMS  Review of Systems  Included in HPI  All systems reviewed and negative except for those discussed in HPI.      PHYSICAL EXAM    I have reviewed the triage vital signs and nursing notes.    ED Triage Vitals   Temp Heart Rate Resp BP SpO2   12/07/24 0128 12/07/24 0128 12/07/24 0128 12/07/24 0130 12/07/24 0128   99.9 °F (37.7 °C) 116 18 123/83 93 %      Temp src Heart Rate Source Patient Position BP Location FiO2 (%)   12/07/24 0128 12/07/24 0128 12/07/24 0130 12/07/24 0130 --   Tympanic Monitor Sitting Right arm        Physical Exam  GENERAL: alert, no acute distress  SKIN: Warm, dry  HENT: Normocephalic, atraumatic  EYES: no scleral icterus  CV: regular rhythm, regular rate, no lower extremity edema  RESPIRATORY: normal effort, oxygen is 93% on room air, she has diffuse inspiratory and expiratory wheezing bilaterally  ABDOMEN: nondistended soft nontender  MUSCULOSKELETAL: no deformity  NEURO: alert, moves all extremities, follows commands            LAB RESULTS  Recent Results (from the past 24 hours)   Comprehensive Metabolic Panel    Collection Time: 12/07/24  1:36 AM    Specimen: Blood   Result Value Ref Range    Glucose 108 (H) 65 - 99 mg/dL    BUN 13 6 - 20 mg/dL    Creatinine 0.78 0.57 - 1.00 mg/dL    Sodium 138 136 - 145 mmol/L    Potassium 3.6 3.5 - 5.2 mmol/L    Chloride 105 98 - 107 mmol/L    CO2 22.7 22.0 - 29.0 mmol/L    Calcium 9.0 8.6 - 10.5 mg/dL    Total Protein 7.1 6.0 - 8.5 g/dL    Albumin 4.2 3.5 - 5.2 g/dL    ALT (SGPT) 34 (H) 1 - 33 U/L    AST (SGOT) 23 1 - 32 U/L    Alkaline Phosphatase 62 39 - 117 U/L    Total Bilirubin 0.4 0.0 - 1.2 mg/dL    Globulin 2.9 gm/dL    A/G Ratio 1.4 g/dL    BUN/Creatinine Ratio 16.7 7.0 - 25.0    Anion Gap 10.3 5.0 - 15.0 mmol/L    eGFR 92.1 >60.0 mL/min/1.73   BNP    Collection Time: 12/07/24  1:36 AM    Specimen: Blood   Result Value Ref Range    proBNP 119.0 0.0 - 900.0 pg/mL   Single High Sensitivity Troponin T    Collection  Time: 12/07/24  1:36 AM    Specimen: Blood   Result Value Ref Range    HS Troponin T 9 <14 ng/L   Green Top (Gel)    Collection Time: 12/07/24  1:36 AM   Result Value Ref Range    Extra Tube Hold for add-ons.    Lavender Top    Collection Time: 12/07/24  1:36 AM   Result Value Ref Range    Extra Tube hold for add-on    Gold Top - SST    Collection Time: 12/07/24  1:36 AM   Result Value Ref Range    Extra Tube Hold for add-ons.    Light Blue Top    Collection Time: 12/07/24  1:36 AM   Result Value Ref Range    Extra Tube Hold for add-ons.    CBC Auto Differential    Collection Time: 12/07/24  1:36 AM    Specimen: Blood   Result Value Ref Range    WBC 12.15 (H) 3.40 - 10.80 10*3/mm3    RBC 4.30 3.77 - 5.28 10*6/mm3    Hemoglobin 13.1 12.0 - 15.9 g/dL    Hematocrit 39.2 34.0 - 46.6 %    MCV 91.2 79.0 - 97.0 fL    MCH 30.5 26.6 - 33.0 pg    MCHC 33.4 31.5 - 35.7 g/dL    RDW 12.4 12.3 - 15.4 %    RDW-SD 41.2 37.0 - 54.0 fl    MPV 9.2 6.0 - 12.0 fL    Platelets 400 140 - 450 10*3/mm3    Neutrophil % 80.7 (H) 42.7 - 76.0 %    Lymphocyte % 11.7 (L) 19.6 - 45.3 %    Monocyte % 6.4 5.0 - 12.0 %    Eosinophil % 0.2 (L) 0.3 - 6.2 %    Basophil % 0.2 0.0 - 1.5 %    Immature Grans % 0.8 (H) 0.0 - 0.5 %    Neutrophils, Absolute 9.80 (H) 1.70 - 7.00 10*3/mm3    Lymphocytes, Absolute 1.42 0.70 - 3.10 10*3/mm3    Monocytes, Absolute 0.78 0.10 - 0.90 10*3/mm3    Eosinophils, Absolute 0.02 0.00 - 0.40 10*3/mm3    Basophils, Absolute 0.03 0.00 - 0.20 10*3/mm3    Immature Grans, Absolute 0.10 (H) 0.00 - 0.05 10*3/mm3    nRBC 0.0 0.0 - 0.2 /100 WBC   Procalcitonin    Collection Time: 12/07/24  1:36 AM    Specimen: Blood   Result Value Ref Range    Procalcitonin 0.04 0.00 - 0.25 ng/mL   ECG 12 Lead ED Triage Standing Order; SOA    Collection Time: 12/07/24  1:40 AM   Result Value Ref Range    QT Interval 399 ms    QTC Interval 458 ms   Respiratory Panel PCR w/COVID-19(SARS-CoV-2) [facility]/ANA LUISA/BLAIR/PAD/[facility]/MARIANGEL In-House, NP Swab in UTM/VTM, 2 HR  TAT - Swab, Nasopharynx    Collection Time: 12/07/24  7:12 AM    Specimen: Nasopharynx; Swab   Result Value Ref Range    ADENOVIRUS, PCR Not Detected Not Detected    Coronavirus 229E Not Detected Not Detected    Coronavirus HKU1 Not Detected Not Detected    Coronavirus NL63 Not Detected Not Detected    Coronavirus OC43 Not Detected Not Detected    COVID19 Not Detected Not Detected - Ref. Range    Human Metapneumovirus Not Detected Not Detected    Human Rhinovirus/Enterovirus Detected (A) Not Detected    Influenza A PCR Not Detected Not Detected    Influenza B PCR Not Detected Not Detected    Parainfluenza Virus 1 Not Detected Not Detected    Parainfluenza Virus 2 Not Detected Not Detected    Parainfluenza Virus 3 Not Detected Not Detected    Parainfluenza Virus 4 Not Detected Not Detected    RSV, PCR Not Detected Not Detected    Bordetella pertussis pcr Not Detected Not Detected    Bordetella parapertussis PCR Not Detected Not Detected    Chlamydophila pneumoniae PCR Not Detected Not Detected    Mycoplasma pneumo by PCR Not Detected Not Detected         RADIOLOGY  XR Chest 1 View    Result Date: 12/7/2024  SINGLE VIEW OF THE CHEST  HISTORY: Shortness of air  COMPARISON: February 21, 2024  FINDINGS: Heart size is within normal limits. No pneumothorax, pleural effusion, or acute infiltrate is seen. There is eventration of the right hemidiaphragm. No pneumothorax or pleural effusion is seen. 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 There does appear to be some consolidation within the left infrahilar region. There are background changes of COPD.      There is some consolidation within the left infrahilar region. This may represent scarring or atelectasis, although correlation with any evidence of pneumonia is recommended.  This report was finalized on 12/7/2024 2:39 AM by Dr. Josette Bhardwaj M.D on Workstation: [id number]         MEDICATIONS GIVEN IN ER  Medications   sodium chloride 0.9 % flush 10 mL (has no  administration in time range)   ibuprofen (ADVIL,MOTRIN) tablet 800 mg (has no administration in time range)   methylPREDNISolone sodium succinate (SOLU-Medrol) injection 125 mg (125 mg Intravenous Given 12/7/24 0709)   ipratropium-albuterol (DUO-NEB) nebulizer solution 3 mL (3 mL Nebulization Given 12/7/24 0702)   albuterol (PROVENTIL) nebulizer solution 0.083% 2.5 mg/3mL (2.5 mg Nebulization Given 12/7/24 0707)   ondansetron (ZOFRAN) injection 8 mg (8 mg Intravenous Given 12/7/24 0709)         ORDERS PLACED DURING THIS VISIT:  Orders Placed This Encounter   Procedures    Respiratory Panel PCR w/COVID-19(SARS-CoV-2) [facility]/ANA LUISA/BLAIR/PAD/[facility]IANGEL In-House, NP Swab in UTM/VTM, 2 HR TAT - Swab, Nasopharynx    XR Chest 1 View    [place] Draw    Comprehensive Metabolic Panel    BNP    Single High Sensitivity Troponin T    CBC Auto Differential    Procalcitonin    NPO Diet NPO Type: Strict NPO    Undress & Gown    Continuous Pulse Oximetry    Vital Signs    LHA (on-call MD unless specified) Details    Oxygen Therapy- Nasal Cannula; Titrate 1-6 LPM Per SpO2; 90 - 95%    ECG 12 Lead ED Triage Standing Order; SOA    Insert Peripheral IV    Initiate Observation Status    CBC & Differential    Green Top (Gel)    Lavender Top    Gold Top - SST    Light Blue Top         OUTPATIENT MEDICATION MANAGEMENT:  Current Facility-Administered Medications Ordered in Epic   Medication Dose Route Frequency Provider Last Rate Last Admin    ibuprofen (ADVIL,MOTRIN) tablet 800 mg  800 mg Oral Once Kelsey Ying PA-C        sodium chloride 0.9 % flush 10 mL  10 mL Intravenous PRN Blanca Parr MD         Current Outpatient Medications Ordered in Epic   Medication Sig Dispense Refill    albuterol (PROVENTIL) (2.5 MG/3ML) 0.083% nebulizer solution Take 2.5 mg by nebulization Every 4 (Four) Hours As Needed for Wheezing.      albuterol (PROVENTIL) 4 MG tablet Take 1 tablet by mouth Every Night. 90 tablet 3    albuterol sulfate  (90  Base) MCG/ACT inhaler USE 2 INHALATIONS EVERY 4 HOURS AS NEEDED FOR WHEEZING 34 g 0    benzonatate (TESSALON) 200 MG capsule       buPROPion XL (WELLBUTRIN XL) 300 MG 24 hr tablet TAKE 1 TABLET EVERY MORNING 90 tablet 3    clindamycin (CLEOCIN T) 1 % swab APPLY 1 SWAB TOPICALLY TO THE FACE EVERY MORNING      diclofenac (VOLTAREN) 75 MG EC tablet TAKE 1 TABLET TWICE A DAY AS NEEDED FOR PAIN 30 tablet 23    FLUoxetine (PROzac) 40 MG capsule Take 2 capsules by mouth Daily. 180 capsule 3    Fluticasone Furoate-Vilanterol (Breo Ellipta) 100-25 MCG/INH inhaler Inhale 1 puff Daily. 180 each 3    levothyroxine (SYNTHROID, LEVOTHROID) 50 MCG tablet TAKE 1 TABLET DAILY 90 tablet 3    metaxalone (SKELAXIN) 800 MG tablet TAKE 1/2 TO 1 TABLET BY MOUTH FOUR TIMES DAILY AS NEEDED FOR MUSCLE SPASMS 120 tablet 0    montelukast (SINGULAIR) 10 MG tablet TAKE 1 TABLET DAILY 90 tablet 3    Multiple Vitamin (MULTI-VITAMIN DAILY PO) Take 1 tablet by mouth Every Night.      omeprazole (priLOSEC) 40 MG capsule TAKE 1 CAPSULE DAILY 90 capsule 3    ondansetron (ZOFRAN) 4 MG tablet TAKE 1 TABLET EVERY 8 HOURS AS NEEDED FOR NAUSEA OR VOMITING 27 tablet 39    predniSONE (DELTASONE) 20 MG tablet Take 1 tablet by mouth 2 (Two) Times a Day for 5 days. 10 tablet 0    pregabalin (Lyrica) 25 MG capsule Take 1 capsule PO TID x 5 days; if tolerated may slowly increase to 1 or 2 capsules PO  capsule 0    tamoxifen (NOLVADEX) 20 MG chemo tablet Take  by mouth Daily.      theophylline (THEODUR) 300 MG 12 hr tablet Take 1 tablet by mouth Every Morning. 90 tablet 3    valACYclovir (VALTREX) 500 MG tablet TAKE 1 TABLET DAILY 90 tablet 3    vitamin B-12 (CYANOCOBALAMIN) 1000 MCG tablet Take 1 tablet by mouth Daily.      Benzoyl Peroxide 10 % liquid 1 Application by Other route Daily.           PROCEDURES  Procedures      20 minutes of critical care provided. This time excludes other billable procedures. Time does include preparation of documents,  medical consultations, review of old records, and direct bedside care. Patient is at high risk for life-threatening deterioration due to acute hypoxic respiratory failure/asthma exacerbation.         PROGRESS, DATA ANALYSIS, CONSULTS, AND MEDICAL DECISION MAKING  All labs have been independently interpreted by me.  All radiology studies have been reviewed by me. All EKG's have been independently viewed and interpreted by me.  Discussion below represents my analysis of pertinent findings related to patient's condition, differential diagnosis, treatment plan and final disposition.    DIFFERENTIAL    Differential diagnosis includes but is not limited to CHF, acute coronary syndrome, pulmonary embolism, pneumothorax, pneumonia, asthma/COPD, deconditioning, anemia, anxiety.       Clinical Scores:                  ED Course as of 12/07/24 0845   Sat Dec 07, 2024   0614 WBC(!): 12.15 [KA]   0614 Hemoglobin: 13.1 [KA]   0614 proBNP: 119.0 [KA]   0614 HS Troponin T: 9 [KA]   0614 Procalcitonin: 0.04 [KA]   0614 Glucose(!): 108 [KA]   0614 Creatinine: 0.78 [KA]   0616 My independent interpretation of the EKG performed at 1:40 AM is sinus rhythm rate 79 normal axis normal intervals baseline artifact mild ST depression in the lateral leads, no ST elevation.  In comparison to prior on August 2, 2024, there is no significant change. [KA]   0617 My Independent interpretation of the chest x-ray is no cardiomegaly, no acute infiltrate. [KA]   0818 Human Rhinovirus/Enterovirus(!): Detected [KA]   0821 I reassessed the patient, despite 3 nebs and having been on steroids for the last couple days she still quite wheezy.  Oxygen dropped to 89% she is now on 2 L.  Will plan for admission and she is agreeable.  She did test positive for rhinovirus. [KA]      ED Course User Index  [KA] Kelsey Ying PA-C             AS OF 08:45 EST VITALS:    BP - 90/84  HR - 70  TEMP - 99.9 °F (37.7 °C) (Tympanic)  O2 SATS - 96%    COMPLEXITY OF  CARE  The patient requires admission.      DIAGNOSIS  Final diagnoses:   Acute hypoxic respiratory failure   Asthma with acute exacerbation, unspecified asthma severity, unspecified whether persistent   Acute bronchitis due to Rhinovirus         DISPOSITION  ED Disposition       ED Disposition   Decision to Admit    Condition   --    Comment   Level of Care: Telemetry [5]   Diagnosis: Acute hypoxic respiratory failure [[id number]]   Admitting Physician: FATMATA MARINA [[id number]]   Attending Physician: FATMATA MARINA [[id number]]   Is patient appropriate for Inpatient Observation Unit?: Yes [1]                          Please note that portions of this document were completed with a voice recognition program.    Note Disclaimer: At [facility], we believe that sharing information builds trust and better relationships. You are receiving this note because you recently visited [facility]. It is possible you will see health information before a provider has talked with you about it. This kind of information can be easy to misunderstand. To help you fully understand what it means for your health, we urge you to discuss this note with your provider.         Kelsey Ying PA-C  12/07/24 0843       Kelsey Ying PA-C  12/07/24 0848

## 2024-12-07 NOTE — H&P
Patient Name:  Nereida Myles  YOB: 1975  MRN:  [id number]  Admit Date:  12/7/2024  Patient Care Team:  Kehrer, Meredith Lea, MD as PCP - General (Family Medicine)  Demarcus Baker MD as Consulting Physician (Pulmonary Disease)  Trevin Rosen MD as Consulting Physician (Hematology and Oncology)  Miguel A Cyr MD as Surgeon (Hand Surgery)      Subjective   History Present Illness     Chief Complaint   Patient presents with    Shortness of Breath    Asthma    Vomiting       Ms. Myles is a 49 y.o. female with a history of asthma, hypothyroidism, breast cancer on tamoxifen that presents to [facility] complaining of shortness of breath, cough, asthma flare for about 1 week.  She was started on steroids on Wednesday with very little improvement.  She started to get nauseated and threw up several times.  Vomiting is not uncommon for her when she has an asthma flare.  Couple of weeks ago had mild cold symptoms and started Mucinex.  No subjective fever, chills, sweats.  She was started on oxygen for O2 sats 89% on room air.    RVP is positive for rhinovirus.  WBC 12.  Afebrile.  Chest x-ray suspicious for scarring in the left infrahilar region.  Patient is tremulous and obviously uncomfortable but in no acute respiratory distress.    Review of Systems   Constitutional:  Positive for fatigue. Negative for fever.   HENT:  Negative for congestion.    Respiratory:  Positive for cough, shortness of breath and wheezing.    Cardiovascular:  Negative for chest pain, palpitations and leg swelling.   Gastrointestinal:  Positive for nausea and vomiting. Negative for abdominal pain, constipation and diarrhea.   Genitourinary:  Negative for difficulty urinating and dysuria.   Musculoskeletal:  Negative for arthralgias.   Neurological:  Negative for dizziness and light-headedness.        Personal History     Past Medical History:   Diagnosis Date    Allergic     Allergic rhinitis      Anxiety     Asthma     has inhaler and neb treatments    BMI 25.0-25.9,adult     Cancer     left breast    Community acquired pneumonia     COPD (chronic obstructive pulmonary disease)     Depression, major     Disease of thyroid gland     Diverticular disease     Foreign body in ear     Fractures 2012    Foot    GERD (gastroesophageal reflux disease)     Hashimoto's thyroiditis     History of abnormal mammogram     Left breast    History of acute sinusitis     History of adenocarcinoma of breast     History of COPD     History of dyspareunia in female     History of kidney stones     History of lump of left breast     History of malignant neoplasm of left breast     History of nausea and vomiting     History of seborrheic dermatitis     History of strep pharyngitis     History of suicidal ideation     Hypothyroidism     Hypoxemia     History of Hypoxemia    Joint pain 2021    Low back pain 2023    This is when it became debilitating    Lumbosacral disc disease ?    Obsessive compulsive disorder     Other screening mammogram     Personal history of asthma     PONV (postoperative nausea and vomiting)     Recurrent genital herpes simplex     History of     Symptomatic states associated with artificial menopause     History of     TMJ dysfunction 1999    Wear a mouth guard     Past Surgical History:   Procedure Laterality Date    APPENDECTOMY      BREAST AUGMENTATION Bilateral     Revised 2005, initial implants 1992    BREAST RECONSTRUCTION, BREAST TISSUE EXPANDER INSERTION Bilateral     CERVICAL CERCLAGE      x 2 during pregnancy, incompetent cervix    CHOLECYSTECTOMY      COLONOSCOPY      EPIDURAL Right 01/11/2024    Procedure: LUMBAR/SACRAL TRANSFORAMINAL EPIDURAL RIGHT L3-4 #1 33138;  Surgeon: Rei Pruitt MD;  [place] [facility] [place]  Service: Pain Management;  Laterality: Right;    EPIDURAL Right 9/19/2024    Procedure: LUMBAR/SACRAL TRANSFORAMINAL EPIDURAL RIGHT L3-4 86761;  Surgeon: Rei Pruitt,  MD;  [place] [facility] [place]  Service: Pain Management;  Laterality: Right;    EPIDURAL BLOCK  1/2024    HAND SURGERY Right     may2022    HYSTERECTOMY      due to uterine prolapse    KIDNEY STONE SURGERY      LUNG SURGERY      bronchoscopy    MASTECTOMY Bilateral 01/2016    removed lymph nodes on left side    MEDIAL BRANCH BLOCK Right 3/13/2024    Procedure: LUMBAR MEDIAL BRANCH BLOCK RIGHT L3-L5 #1 16099, 55653;  Surgeon: Rei Pruitt MD;  [place] [facility] [place];  Service: Pain Management;  Laterality: Right;    MEDIAL BRANCH BLOCK Bilateral 3/27/2024    Procedure: LUMBAR MEDIAL BRANCH BLOCK BILATERAL L3-L5 66845-00, 64494-50 X 2;  Surgeon: Rei Pruitt MD;  [place] [facility] [place];  Service: Pain Management;  Laterality: Bilateral;    MEDIAL BRANCH BLOCK Left 5/2/2024    Procedure: LUMBAR MEDIAL BRANCH BLOCK LEFT L3-L5 #2 04710, 29516;  Surgeon: Rei Pruitt MD;  [place] [facility] [place];  Service: Pain Management;  Laterality: Left;    NERVE SURGERY Bilateral 6/4/2024    Procedure: LUMBAR RADIOFREQUENCY ABLATION BILATERAL L3-L5 61398-01, 64636-50 X 2;  Surgeon: Rei Pruitt MD;  [place] [facility] [place];  Service: Pain Management;  Laterality: Bilateral;    SACROILIAC JOINT INJECTION Bilateral 11/7/2024    Procedure: SACROILIAC INJECTION BILATERAL 00411-12;  Surgeon: Rei Pruitt MD;  [place] [facility] [place];  Service: Pain Management;  Laterality: Bilateral;    SCAR REVISION BREAST Bilateral 06/21/2022    Procedure: SCAR REVISION LEFT CHEST 27 CENTIMETERS AND RIGHT CHEST 25 CENTIMETERS FOR AESTHETIC FLAT CLOSURE FOLLOWING MASTECTOMY;  Surgeon: Waterhouse, Maurine, MD;  Location: [facility] MAIN OR;  Service: Plastics;  Laterality: Bilateral;    TISSUE EXPANDER PLACEMENT Bilateral     due to infection     Family History   Problem Relation Age of Onset    Thyroid disease Mother     Arthritis Mother     Uterine cancer Mother         in her 40's    Depression Mother     GI problems Mother      Hyperlipidemia Mother     Cancer Father     Bipolar disorder Father     Depression Father     Hyperlipidemia Father     Hypertension Father     Arthritis Father     Arthritis Maternal Grandmother     Bleeding Disorder Maternal Grandmother     Hyperlipidemia Maternal Grandmother     Hypertension Maternal Grandmother     Depression Maternal Grandmother     Hypertension Maternal Grandfather     Hypertension Paternal Grandfather     Hyperlipidemia Paternal Grandfather     Arthritis Paternal Grandfather     Coronary artery disease Other         Maternal GrGF    Skin cancer Other         Maternal GrGF    Diabetes Other         Paternal GrGF    Heart disease Other         FH in females before the age of 65    Arthritis Paternal Grandmother     Malig Hyperthermia Neg Hx      Social History     Tobacco Use    Smoking status: Never    Smokeless tobacco: Never   Vaping Use    Vaping status: Never Used   Substance Use Topics    Alcohol use: Yes     Alcohol/week: 5.0 standard drinks of alcohol     Types: 2 Glasses of wine, 3 Drinks containing 0.5 oz of alcohol per week     Comment: social    Drug use: Never     No current facility-administered medications on file prior to encounter.     Current Outpatient Medications on File Prior to Encounter   Medication Sig Dispense Refill    albuterol (PROVENTIL) (2.5 MG/3ML) 0.083% nebulizer solution Take 2.5 mg by nebulization Every 4 (Four) Hours As Needed for Wheezing.      albuterol (PROVENTIL) 4 MG tablet Take 1 tablet by mouth Every Night. 90 tablet 3    albuterol sulfate  (90 Base) MCG/ACT inhaler USE 2 INHALATIONS EVERY 4 HOURS AS NEEDED FOR WHEEZING 34 g 0    benzonatate (TESSALON) 200 MG capsule       buPROPion XL (WELLBUTRIN XL) 300 MG 24 hr tablet TAKE 1 TABLET EVERY MORNING 90 tablet 3    clindamycin (CLEOCIN T) 1 % swab APPLY 1 SWAB TOPICALLY TO THE FACE EVERY MORNING      diclofenac (VOLTAREN) 75 MG EC tablet TAKE 1 TABLET TWICE A DAY AS NEEDED FOR PAIN 30 tablet 23     FLUoxetine (PROzac) 40 MG capsule Take 2 capsules by mouth Daily. 180 capsule 3    Fluticasone Furoate-Vilanterol (Breo Ellipta) 100-25 MCG/INH inhaler Inhale 1 puff Daily. 180 each 3    levothyroxine (SYNTHROID, LEVOTHROID) 50 MCG tablet TAKE 1 TABLET DAILY 90 tablet 3    metaxalone (SKELAXIN) 800 MG tablet TAKE 1/2 TO 1 TABLET BY MOUTH FOUR TIMES DAILY AS NEEDED FOR MUSCLE SPASMS 120 tablet 0    montelukast (SINGULAIR) 10 MG tablet TAKE 1 TABLET DAILY 90 tablet 3    Multiple Vitamin (MULTI-VITAMIN DAILY PO) Take 1 tablet by mouth Every Night.      omeprazole (priLOSEC) 40 MG capsule TAKE 1 CAPSULE DAILY 90 capsule 3    ondansetron (ZOFRAN) 4 MG tablet TAKE 1 TABLET EVERY 8 HOURS AS NEEDED FOR NAUSEA OR VOMITING 27 tablet 39    predniSONE (DELTASONE) 20 MG tablet Take 1 tablet by mouth 2 (Two) Times a Day for 5 days. 10 tablet 0    pregabalin (Lyrica) 25 MG capsule Take 1 capsule PO TID x 5 days; if tolerated may slowly increase to 1 or 2 capsules PO  capsule 0    tamoxifen (NOLVADEX) 20 MG chemo tablet Take  by mouth Daily.      theophylline (THEODUR) 300 MG 12 hr tablet Take 1 tablet by mouth Every Morning. 90 tablet 3    valACYclovir (VALTREX) 500 MG tablet TAKE 1 TABLET DAILY 90 tablet 3    vitamin B-12 (CYANOCOBALAMIN) 1000 MCG tablet Take 1 tablet by mouth Daily.      Benzoyl Peroxide 10 % liquid 1 Application by Other route Daily.       No Known Allergies    Objective    Objective     Vital Signs  Temp:  [98.1 °F (36.7 °C)-99.9 °F (37.7 °C)] 98.1 °F (36.7 °C)  Heart Rate:  [] 68  Resp:  [16-18] 18  BP: ()/(64-84) 111/64  SpO2:  [89 %-97 %] 93 %  on  Flow (L/min) (Oxygen Therapy):  [2] 2;   Device (Oxygen Therapy): nasal cannula  Body mass index is 20.39 kg/m².    Physical Exam  Constitutional:       Appearance: She is ill-appearing.   HENT:      Head: Normocephalic and atraumatic.      Nose: Nose normal.      Mouth/Throat:      Mouth: Mucous membranes are moist.   Eyes:      Extraocular  Movements: Extraocular movements intact.      Pupils: Pupils are equal, round, and reactive to light.   Cardiovascular:      Rate and Rhythm: Regular rhythm. Tachycardia present.   Pulmonary:      Comments: Tachypnea, harsh barky cough, wheezing throughout.  Tremulous upper body  Abdominal:      General: Bowel sounds are normal.   Musculoskeletal:         General: No swelling or tenderness. Normal range of motion.      Cervical back: Normal range of motion.   Skin:     General: Skin is warm and dry.   Neurological:      General: No focal deficit present.      Mental Status: She is alert and oriented to person, place, and time.   Psychiatric:      Comments: Pleasant, appropriate.         Results Review:  I reviewed the patient's new clinical results.      Lab Results (last 24 hours)       Procedure Component Value Units Date/Time    CBC & Differential [[id number]]  (Abnormal) Collected: 12/07/24 0136    Specimen: Blood Updated: 12/07/24 0153    Narrative:      The following orders were created for panel order CBC & Differential.  Procedure                               Abnormality         Status                     ---------                               -----------         ------                     CBC Auto Differential[[id number]]        Abnormal            Final result                 Please view results for these tests on the individual orders.    Comprehensive Metabolic Panel [[id number]]  (Abnormal) Collected: 12/07/24 0136    Specimen: Blood Updated: 12/07/24 0211     Glucose 108 mg/dL      BUN 13 mg/dL      Creatinine 0.78 mg/dL      Sodium 138 mmol/L      Potassium 3.6 mmol/L      Chloride 105 mmol/L      CO2 22.7 mmol/L      Calcium 9.0 mg/dL      Total Protein 7.1 g/dL      Albumin 4.2 g/dL      ALT (SGPT) 34 U/L      AST (SGOT) 23 U/L      Alkaline Phosphatase 62 U/L      Total Bilirubin 0.4 mg/dL      Globulin 2.9 gm/dL      A/G Ratio 1.4 g/dL      BUN/Creatinine Ratio 16.7     Anion Gap 10.3 mmol/L       eGFR 92.1 mL/min/1.73     Narrative:      GFR Normal >60  Chronic Kidney Disease <60  Kidney Failure <15      BNP [[id number]]  (Normal) Collected: 12/07/24 0136    Specimen: Blood Updated: 12/07/24 0211     proBNP 119.0 pg/mL     Narrative:      This assay is used as an aid in the diagnosis of individuals suspected of having heart failure. It can be used as an aid in the diagnosis of acute decompensated heart failure (ADHF) in patients presenting with signs and symptoms of ADHF to the emergency department (ED). In addition, NT-proBNP of <300 pg/mL indicates ADHF is not likely.    Age Range Result Interpretation  NT-proBNP Concentration (pg/mL:      <50             Positive            >450                   Gray                 300-450                    Negative             <300    50-75           Positive            >900                  Gray                300-900                  Negative            <300      >75             Positive            >1800                  Gray                300-1800                  Negative            <300    Single High Sensitivity Troponin T [[id number]]  (Normal) Collected: 12/07/24 0136    Specimen: Blood Updated: 12/07/24 0211     HS Troponin T 9 ng/L     Narrative:      High Sensitive Troponin T Reference Range:  <14.0 ng/L- Negative Female for AMI  <22.0 ng/L- Negative Male for AMI  >=14 - Abnormal Female indicating possible myocardial injury.  >=22 - Abnormal Male indicating possible myocardial injury.   Clinicians would have to utilize clinical acumen, EKG, Troponin, and serial changes to determine if it is an Acute Myocardial Infarction or myocardial injury due to an underlying chronic condition.         CBC Auto Differential [[id number]]  (Abnormal) Collected: 12/07/24 0136    Specimen: Blood Updated: 12/07/24 0153     WBC 12.15 10*3/mm3      RBC 4.30 10*6/mm3      Hemoglobin 13.1 g/dL      Hematocrit 39.2 %      MCV 91.2 fL      MCH 30.5 pg      MCHC 33.4 g/dL      RDW  "12.4 %      RDW-SD 41.2 fl      MPV 9.2 fL      Platelets 400 10*3/mm3      Neutrophil % 80.7 %      Lymphocyte % 11.7 %      Monocyte % 6.4 %      Eosinophil % 0.2 %      Basophil % 0.2 %      Immature Grans % 0.8 %      Neutrophils, Absolute 9.80 10*3/mm3      Lymphocytes, Absolute 1.42 10*3/mm3      Monocytes, Absolute 0.78 10*3/mm3      Eosinophils, Absolute 0.02 10*3/mm3      Basophils, Absolute 0.03 10*3/mm3      Immature Grans, Absolute 0.10 10*3/mm3      nRBC 0.0 /100 WBC     Procalcitonin [[id number]]  (Normal) Collected: 12/07/24 0136    Specimen: Blood Updated: 12/07/24 0408     Procalcitonin 0.04 ng/mL     Narrative:      As a Marker for Sepsis (Non-Neonates):    1. <0.5 ng/mL represents a low risk of severe sepsis and/or septic shock.  2. >2 ng/mL represents a high risk of severe sepsis and/or septic shock.    As a Marker for Lower Respiratory Tract Infections that require antibiotic therapy:    PCT on Admission    Antibiotic Therapy       6-12 Hrs later    >0.5                Strongly Recommended  >0.25 - <0.5        Recommended   0.1 - 0.25          Discouraged              Remeasure/reassess PCT  <0.1                Strongly Discouraged     Remeasure/reassess PCT    As 28 day mortality risk marker: \"Change in Procalcitonin Result\" (>80% or <=80%) if Day 0 (or Day 1) and Day 4 values are available. Refer to http://www.[employer]-pct-calculator.com    Change in PCT <=80%  A decrease of PCT levels below or equal to 80% defines a positive change in PCT test result representing a higher risk for 28-day all-cause mortality of patients diagnosed with severe sepsis for septic shock.    Change in PCT >80%  A decrease of PCT levels of more than 80% defines a negative change in PCT result representing a lower risk for 28-day all-cause mortality of patients diagnosed with severe sepsis or septic shock.       Respiratory Panel PCR w/COVID-19(SARS-CoV-2) SATISH/ANA LUISA/BLAIR/PAD/COR/MARIANGEL In-House, NP Swab in [place], 2 HR TAT - " Swab, Nasopharynx [[id number]]  (Abnormal) Collected: 12/07/24 0712    Specimen: Swab from Nasopharynx Updated: 12/07/24 0816     ADENOVIRUS, PCR Not Detected     Coronavirus 229E Not Detected     Coronavirus HKU1 Not Detected     Coronavirus NL63 Not Detected     Coronavirus OC43 Not Detected     COVID19 Not Detected     Human Metapneumovirus Not Detected     Human Rhinovirus/Enterovirus Detected     Influenza A PCR Not Detected     Influenza B PCR Not Detected     Parainfluenza Virus 1 Not Detected     Parainfluenza Virus 2 Not Detected     Parainfluenza Virus 3 Not Detected     Parainfluenza Virus 4 Not Detected     RSV, PCR Not Detected     Bordetella pertussis pcr Not Detected     Bordetella parapertussis PCR Not Detected     Chlamydophila pneumoniae PCR Not Detected     Mycoplasma pneumo by PCR Not Detected    Narrative:      In the setting of a positive respiratory panel with a viral infection PLUS a negative procalcitonin without other underlying concern for bacterial infection, consider observing off antibiotics or discontinuation of antibiotics and continue supportive care. If the respiratory panel is positive for atypical bacterial infection (Bordetella pertussis, Chlamydophila pneumoniae, or Mycoplasma pneumoniae), consider antibiotic de-escalation to target atypical bacterial infection.            Imaging Results (Last 24 Hours)       Procedure Component Value Units Date/Time    XR Chest 1 View [[id number]] Collected: 12/07/24 0237     Updated: 12/07/24 0242    Narrative:      SINGLE VIEW OF THE CHEST     HISTORY: Shortness of air     COMPARISON: February 21, 2024     FINDINGS:  Heart size is within normal limits. No pneumothorax, pleural effusion,  or acute infiltrate is seen. There is eventration of the right  hemidiaphragm. No pneumothorax or pleural effusion is seen. There are  changes of bilateral mastectomies. There does appear to be some  consolidation within the left infrahilar region. There are  background  changes of COPD.       Impression:      There is some consolidation within the left infrahilar region. This may  represent scarring or atelectasis, although correlation with any  evidence of pneumonia is recommended.     This report was finalized on 12/7/2024 2:39 AM by Dr. Josette Bhardwaj M.D on Workstation: [id number]                   ECG 12 Lead ED Triage Standing Order; SOA   Preliminary Result   HEART RATE=79  bpm   RR Ciqinpqs=519  ms   FL Xyewimvl=843  ms   P Horizontal Axis=-3  deg   P Front Axis=52  deg   QRSD Interval=96  ms   QT Uanotcxv=098  ms   XGdX=545  ms   QRS Axis=5  deg   T Wave Axis=32  deg   - ABNORMAL ECG -   Sinus rhythm   LVH with secondary repolarization abnormality   Artifact in lead(s) I,II,III,aVR,aVL,aVF,V6   Date and Time of Study:2024-12-07 01:40:34      Telemetry Scan   Final Result           Assessment/Plan     Active Hospital Problems    Diagnosis  POA    **Acute hypoxic respiratory failure [J96.01]  Yes    Asthma exacerbation [J45.901]  Unknown    History of breast cancer [Z85.3]  Not Applicable    Hypothyroidism [E03.9]  Yes      Resolved Hospital Problems   No resolved problems to display.     This is a very pleasant 49-year-old female with history of breast cancer, asthma and hypothyroidism who presents with acute hypoxic respiratory failure and asthma exacerbation secondary to rhinovirus.  She had a low-grade fever 99 9 and a white count of 12.  Chest x-ray was a 1 view and looks more like a scarring as opposed to pneumonia.  Procalcitonin was negative.  Will continue Solu-Medrol, Symbicort, DuoNebs, Singulair, theophylline.  No indication for antibiotics at this time.    Resume her tamoxifen and levothyroxine.      Melatonin to help her rest at bedtime.    I discussed the patient's findings and my recommendations with patient.    VTE Prophylaxis - SCDs.  Code Status - Full code.       RISHI Cobb  [facility]  12/07/24  15:55 EST

## 2024-12-07 NOTE — ED PROVIDER NOTES
MD ATTESTATION NOTE    SHARED VISIT: This visit was performed by BOTH a physician and an APC. The substantive portion of the medical decision making was performed by this attesting physician who made or approved the management plan and takes responsibility for patient management. All studies in the APC note (if performed) were independently interpreted by me.     The PAVITHRA and I have discussed this patient's history, physical exam, and treatment plan.  I have reviewed the documentation and affirm the documentation and agree with the treatment and plan.  The attached note describes my personal findings.      Independent Historians: Patient    A complete HPI/ROS/PMH/PSH/SH/FH are unobtainable due to: None    Chronic or social conditions impacting patient care (social determinants of health): None    Nereida Myles is a 49 y.o. female who presents to the ED c/o acute cough, congestion, sob, wheezing, and n/v.  Pt sick X 5 days and saw pmd a few days  ago. Pt had steroid injection and started on prednisone orally. Pt now with vomiting and refractory symptoms despite asthma treatemtn at home. Pt with h/o severe asthma and followed by Dr. Baker with pulm.          On exam:  GENERAL: cooperative and conversant ill appearing f, alert  SKIN: Warm, dry  HENT: Normocephalic, atraumatic  EYES: no scleral icterus  CV: regular rhythm, regular rate  RESPIRATORY: normal effort, diffuse insp and exp wheezing throughout  MUSCULOSKELETAL: no deformity  NEURO: alert, moves all extremities, follows commands                                                             Labs  Recent Results (from the past 24 hours)   Comprehensive Metabolic Panel    Collection Time: 12/07/24  1:36 AM    Specimen: Blood   Result Value Ref Range    Glucose 108 (H) 65 - 99 mg/dL    BUN 13 6 - 20 mg/dL    Creatinine 0.78 0.57 - 1.00 mg/dL    Sodium 138 136 - 145 mmol/L    Potassium 3.6 3.5 - 5.2 mmol/L    Chloride 105 98 - 107 mmol/L    CO2 22.7 22.0 - 29.0  mmol/L    Calcium 9.0 8.6 - 10.5 mg/dL    Total Protein 7.1 6.0 - 8.5 g/dL    Albumin 4.2 3.5 - 5.2 g/dL    ALT (SGPT) 34 (H) 1 - 33 U/L    AST (SGOT) 23 1 - 32 U/L    Alkaline Phosphatase 62 39 - 117 U/L    Total Bilirubin 0.4 0.0 - 1.2 mg/dL    Globulin 2.9 gm/dL    A/G Ratio 1.4 g/dL    BUN/Creatinine Ratio 16.7 7.0 - 25.0    Anion Gap 10.3 5.0 - 15.0 mmol/L    eGFR 92.1 >60.0 mL/min/1.73   BNP    Collection Time: 12/07/24  1:36 AM    Specimen: Blood   Result Value Ref Range    proBNP 119.0 0.0 - 900.0 pg/mL   Single High Sensitivity Troponin T    Collection Time: 12/07/24  1:36 AM    Specimen: Blood   Result Value Ref Range    HS Troponin T 9 <14 ng/L   Green Top (Gel)    Collection Time: 12/07/24  1:36 AM   Result Value Ref Range    Extra Tube Hold for add-ons.    Lavender Top    Collection Time: 12/07/24  1:36 AM   Result Value Ref Range    Extra Tube hold for add-on    Gold Top - SST    Collection Time: 12/07/24  1:36 AM   Result Value Ref Range    Extra Tube Hold for add-ons.    Light Blue Top    Collection Time: 12/07/24  1:36 AM   Result Value Ref Range    Extra Tube Hold for add-ons.    CBC Auto Differential    Collection Time: 12/07/24  1:36 AM    Specimen: Blood   Result Value Ref Range    WBC 12.15 (H) 3.40 - 10.80 10*3/mm3    RBC 4.30 3.77 - 5.28 10*6/mm3    Hemoglobin 13.1 12.0 - 15.9 g/dL    Hematocrit 39.2 34.0 - 46.6 %    MCV 91.2 79.0 - 97.0 fL    MCH 30.5 26.6 - 33.0 pg    MCHC 33.4 31.5 - 35.7 g/dL    RDW 12.4 12.3 - 15.4 %    RDW-SD 41.2 37.0 - 54.0 fl    MPV 9.2 6.0 - 12.0 fL    Platelets 400 140 - 450 10*3/mm3    Neutrophil % 80.7 (H) 42.7 - 76.0 %    Lymphocyte % 11.7 (L) 19.6 - 45.3 %    Monocyte % 6.4 5.0 - 12.0 %    Eosinophil % 0.2 (L) 0.3 - 6.2 %    Basophil % 0.2 0.0 - 1.5 %    Immature Grans % 0.8 (H) 0.0 - 0.5 %    Neutrophils, Absolute 9.80 (H) 1.70 - 7.00 10*3/mm3    Lymphocytes, Absolute 1.42 0.70 - 3.10 10*3/mm3    Monocytes, Absolute 0.78 0.10 - 0.90 10*3/mm3    Eosinophils,  Absolute 0.02 0.00 - 0.40 10*3/mm3    Basophils, Absolute 0.03 0.00 - 0.20 10*3/mm3    Immature Grans, Absolute 0.10 (H) 0.00 - 0.05 10*3/mm3    nRBC 0.0 0.0 - 0.2 /100 WBC   Procalcitonin    Collection Time: 12/07/24  1:36 AM    Specimen: Blood   Result Value Ref Range    Procalcitonin 0.04 0.00 - 0.25 ng/mL   ECG 12 Lead ED Triage Standing Order; SOA    Collection Time: 12/07/24  1:40 AM   Result Value Ref Range    QT Interval 399 ms    QTC Interval 458 ms   Respiratory Panel PCR w/COVID-19(SARS-CoV-2) SATISH/ANA LUISA/BLAIR/PAD/COR/MARIANGEL In-House, NP Swab in UTM/VTM, 2 HR TAT - Swab, Nasopharynx    Collection Time: 12/07/24  7:12 AM    Specimen: Nasopharynx; Swab   Result Value Ref Range    ADENOVIRUS, PCR Not Detected Not Detected    Coronavirus 229E Not Detected Not Detected    Coronavirus HKU1 Not Detected Not Detected    Coronavirus NL63 Not Detected Not Detected    Coronavirus OC43 Not Detected Not Detected    COVID19 Not Detected Not Detected - Ref. Range    Human Metapneumovirus Not Detected Not Detected    Human Rhinovirus/Enterovirus Detected (A) Not Detected    Influenza A PCR Not Detected Not Detected    Influenza B PCR Not Detected Not Detected    Parainfluenza Virus 1 Not Detected Not Detected    Parainfluenza Virus 2 Not Detected Not Detected    Parainfluenza Virus 3 Not Detected Not Detected    Parainfluenza Virus 4 Not Detected Not Detected    RSV, PCR Not Detected Not Detected    Bordetella pertussis pcr Not Detected Not Detected    Bordetella parapertussis PCR Not Detected Not Detected    Chlamydophila pneumoniae PCR Not Detected Not Detected    Mycoplasma pneumo by PCR Not Detected Not Detected       Radiology  XR Chest 1 View    Result Date: 12/7/2024  SINGLE VIEW OF THE CHEST  HISTORY: Shortness of air  COMPARISON: February 21, 2024  FINDINGS: Heart size is within normal limits. No pneumothorax, pleural effusion, or acute infiltrate is seen. There is eventration of the right hemidiaphragm. No pneumothorax  or pleural effusion is seen. 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 There does appear to be some consolidation within the left infrahilar region. There are background changes of COPD.      There is some consolidation within the left infrahilar region. This may represent scarring or atelectasis, although correlation with any evidence of pneumonia is recommended.  This report was finalized on 12/7/2024 2:39 AM by Dr. Josette Bhardwaj M.D on Workstation: [id number]       Medical Decision Making:  ED Course as of 12/07/24 1743   Sat Dec 07, 2024   0614 WBC(!): 12.15 [KA]   0614 Hemoglobin: 13.1 [KA]   0614 proBNP: 119.0 [KA]   0614 HS Troponin T: 9 [KA]   0614 Procalcitonin: 0.04 [KA]   0614 Glucose(!): 108 [KA]   0614 Creatinine: 0.78 [KA]   0616 My independent interpretation of the EKG performed at 1:40 AM is sinus rhythm rate 79 normal axis normal intervals baseline artifact mild ST depression in the lateral leads, no ST elevation.  In comparison to prior on August 2, 2024, there is no significant change. [KA]   0617 My Independent interpretation of the chest x-ray is no cardiomegaly, no acute infiltrate. [KA]   0818 Human Rhinovirus/Enterovirus(!): Detected [KA]   0821 I reassessed the patient, despite 3 nebs and having been on steroids for the last couple days she still quite wheezy.  Oxygen dropped to 89% she is now on 2 L.  Will plan for admission and she is agreeable.  She did test positive for rhinovirus. [KA]      ED Course User Index  [KA] Kelsey Ying PA-C       MDM: This patient presents with dyspnea, most likely secondary to viral illness, pna, and/or asthma exacerbation.  The differential diagnosis list includes but is not limited to:   - acute cardiac etiologies like ACS, CHF, pericardial effusion or even tamponade  - acute respiratory etiologies like acute PE, pneumothorax , asthma, COPD exacerbation, allergic etiologies, or infectious etiologies such as PNA   - non-cardiopulmonary causes  like toxidromes, metabolic etiologies such as acidemia or electrolyte derangements or even DKA, sepsis, neurologic causes including GBS and myasthenia gravis  Plan EKG, CXR, Labs incl bnp and trop and +/- viral swab    Procedures:  Procedures        PPE: I followed hospital protocols for proper PPE based on patient presentation including use of N95 mask for suspected infectious respiratory conditions.  Proper hand hygiene was performed both before and after the patient encounter.          Diagnosis  Final diagnoses:   Acute hypoxic respiratory failure   Asthma with acute exacerbation, unspecified asthma severity, unspecified whether persistent   Acute bronchitis due to Rhinovirus       Note Disclaimer: At [facility], we believe that sharing information builds trust and better relationships. You are receiving this note because you recently visited [facility]. It is possible you will see health information before a provider has talked with you about it. This kind of information can be easy to misunderstand. To help you fully understand what it means for your health, we urge you to discuss this note with your provider.       Blanca Parr MD  12/09/24 0811     Imaging Studies

## 2024-12-07 NOTE — CONSULTS
Group: [facility]         CONSULT NOTE    Patient Identification:  Nereida Myles  49 y.o.  female  1975  [id number]            Requesting physician: Nohemy BLACKWELL    Reason for Consultation: Acute viral bronchitis    CC: Cough    History of Present Illness:  49-year-old who presents with cough, shortness of breath and wheezing for the past week or so.  She does have asthma.  She has tried albuterol without any improvement.  She presented with oxygen saturation of 89% on room air.  Her symptoms have been gradually worsening.  Sees Dr. Baker usually in our office.  Her respiratory viral panel is positive for rhinovirus  Medical records reviewed      Review of Systems   Constitutional:  Positive for fatigue. Negative for diaphoresis and fever.   HENT:  Negative for ear discharge and sore throat.    Eyes:  Negative for pain and visual disturbance.   Respiratory:  Positive for cough, shortness of breath and wheezing.    Cardiovascular:  Negative for chest pain and leg swelling.   Gastrointestinal:  Negative for abdominal pain and diarrhea.   Endocrine: Negative for cold intolerance and polyuria.   Genitourinary:  Negative for dysuria and hematuria.   Musculoskeletal:  Negative for joint swelling and myalgias.   Skin:  Negative for rash and wound.   Neurological:  Negative for speech difficulty and numbness.   Hematological:  Negative for adenopathy. Does not bruise/bleed easily.   Psychiatric/Behavioral:  Negative for agitation and confusion.        Past Medical History:  Past Medical History:   Diagnosis Date    Allergic     Allergic rhinitis     Anxiety     Asthma     has inhaler and neb treatments    BMI 25.0-25.9,adult     Cancer     left breast    Community acquired pneumonia     COPD (chronic obstructive pulmonary disease)     Depression, major     Disease of thyroid gland     Diverticular disease     Foreign body in ear     Fractures 2012    Foot    GERD (gastroesophageal reflux disease)      Hashimoto's thyroiditis     History of abnormal mammogram     Left breast    History of acute sinusitis     History of adenocarcinoma of breast     History of COPD     History of dyspareunia in female     History of kidney stones     History of lump of left breast     History of malignant neoplasm of left breast     History of nausea and vomiting     History of seborrheic dermatitis     History of strep pharyngitis     History of suicidal ideation     Hypothyroidism     Hypoxemia     History of Hypoxemia    Joint pain 2021    Low back pain 2023    This is when it became debilitating    Lumbosacral disc disease ?    Obsessive compulsive disorder     Other screening mammogram     Personal history of asthma     PONV (postoperative nausea and vomiting)     Recurrent genital herpes simplex     History of     Symptomatic states associated with artificial menopause     History of     TMJ dysfunction 1999    Wear a mouth guard       Past Surgical History:  Past Surgical History:   Procedure Laterality Date    APPENDECTOMY      BREAST AUGMENTATION Bilateral     Revised 2005, initial implants 1992    BREAST RECONSTRUCTION, BREAST TISSUE EXPANDER INSERTION Bilateral     CERVICAL CERCLAGE      x 2 during pregnancy, incompetent cervix    CHOLECYSTECTOMY      COLONOSCOPY      EPIDURAL Right 01/11/2024    Procedure: LUMBAR/SACRAL TRANSFORAMINAL EPIDURAL RIGHT L3-4 #1 79411;  Surgeon: Rei Pruitt MD;  Location: [facility];  Service: Pain Management;  Laterality: Right;    EPIDURAL Right 9/19/2024    Procedure: LUMBAR/SACRAL TRANSFORAMINAL EPIDURAL RIGHT L3-4 85946;  Surgeon: Rei Pruitt MD;  Location: [facility];  Service: Pain Management;  Laterality: Right;    EPIDURAL BLOCK  1/2024    HAND SURGERY Right     may2022    HYSTERECTOMY      due to uterine prolapse    KIDNEY STONE SURGERY      LUNG SURGERY      bronchoscopy    MASTECTOMY Bilateral 01/2016    removed lymph nodes on left side    MEDIAL BRANCH  BLOCK Right 3/13/2024    Procedure: LUMBAR MEDIAL BRANCH BLOCK RIGHT L3-L5 #1 15826, 64151;  Surgeon: Rei Pruitt MD;  Location: [facility];  Service: Pain Management;  Laterality: Right;    MEDIAL BRANCH BLOCK Bilateral 3/27/2024    Procedure: LUMBAR MEDIAL BRANCH BLOCK BILATERAL L3-L5 19414-16, 64494-50 X 2;  Surgeon: Rei Pruitt MD;  Location: [facility];  Service: Pain Management;  Laterality: Bilateral;    MEDIAL BRANCH BLOCK Left 5/2/2024    Procedure: LUMBAR MEDIAL BRANCH BLOCK LEFT L3-L5 #2 39530, 77971;  Surgeon: Rei Pruitt MD;  Location: [facility];  Service: Pain Management;  Laterality: Left;    NERVE SURGERY Bilateral 6/4/2024    Procedure: LUMBAR RADIOFREQUENCY ABLATION BILATERAL L3-L5 14107-98, 64636-50 X 2;  Surgeon: Rei Pruitt MD;  Location: [facility] OR;  Service: Pain Management;  Laterality: Bilateral;    SACROILIAC JOINT INJECTION Bilateral 11/7/2024    Procedure: SACROILIAC INJECTION BILATERAL 16229-37;  Surgeon: Rei Pruitt MD;  Location: [facility];  Service: Pain Management;  Laterality: Bilateral;    SCAR REVISION BREAST Bilateral 06/21/2022    Procedure: SCAR REVISION LEFT CHEST 27 CENTIMETERS AND RIGHT CHEST 25 CENTIMETERS FOR AESTHETIC FLAT CLOSURE FOLLOWING MASTECTOMY;  Surgeon: Waterhouse, Maurine, MD;  Location: [facility] MA[facility] OR;  Service: Plastics;  Laterality: Bilateral;    TISSUE EXPANDER PLACEMENT Bilateral     due to infection        Home Meds:  Medications Prior to Admission   Medication Sig Dispense Refill Last Dose/Taking    albuterol (PROVENTIL) (2.5 MG/3ML) 0.083% nebulizer solution Take 2.5 mg by nebulization Every 4 (Four) Hours As Needed for Wheezing.   12/6/2024    albuterol (PROVENTIL) 4 MG tablet Take 1 tablet by mouth Every Night. 90 tablet 3 12/6/2024    albuterol sulfate  (90 Base) MCG/ACT inhaler USE 2 INHALATIONS EVERY 4 HOURS AS NEEDED FOR WHEEZING 34 g 0 12/6/2024    benzonatate (TESSALON) 200 MG capsule     12/6/2024    buPROPion XL (WELLBUTRIN XL) 300 MG 24 hr tablet TAKE 1 TABLET EVERY MORNING 90 tablet 3 12/6/2024    clindamycin (CLEOCIN T) 1 % swab APPLY 1 SWAB TOPICALLY TO THE FACE EVERY MORNING   12/6/2024    diclofenac (VOLTAREN) 75 MG EC tablet TAKE 1 TABLET TWICE A DAY AS NEEDED FOR PAIN 30 tablet 23 12/6/2024    FLUoxetine (PROzac) 40 MG capsule Take 2 capsules by mouth Daily. 180 capsule 3 12/6/2024    Fluticasone Furoate-Vilanterol (Breo Ellipta) 100-25 MCG/INH inhaler Inhale 1 puff Daily. 180 each 3 12/6/2024    levothyroxine (SYNTHROID, LEVOTHROID) 50 MCG tablet TAKE 1 TABLET DAILY 90 tablet 3 12/6/2024    metaxalone (SKELAXIN) 800 MG tablet TAKE 1/2 TO 1 TABLET BY MOUTH FOUR TIMES DAILY AS NEEDED FOR MUSCLE SPASMS 120 tablet 0 12/6/2024    montelukast (SINGULAIR) 10 MG tablet TAKE 1 TABLET DAILY 90 tablet 3 12/6/2024    Multiple Vitamin (MULTI-VITAMIN DAILY PO) Take 1 tablet by mouth Every Night.   12/6/2024    omeprazole (priLOSEC) 40 MG capsule TAKE 1 CAPSULE DAILY 90 capsule 3 12/6/2024    ondansetron (ZOFRAN) 4 MG tablet TAKE 1 TABLET EVERY 8 HOURS AS NEEDED FOR NAUSEA OR VOMITING 27 tablet 39 12/6/2024    predniSONE (DELTASONE) 20 MG tablet Take 1 tablet by mouth 2 (Two) Times a Day for 5 days. 10 tablet 0 12/6/2024    pregabalin (Lyrica) 25 MG capsule Take 1 capsule PO TID x 5 days; if tolerated may slowly increase to 1 or 2 capsules PO  capsule 0 12/6/2024    tamoxifen (NOLVADEX) 20 MG chemo tablet Take  by mouth Daily.   12/6/2024    theophylline (THEODUR) 300 MG 12 hr tablet Take 1 tablet by mouth Every Morning. 90 tablet 3 12/6/2024    valACYclovir (VALTREX) 500 MG tablet TAKE 1 TABLET DAILY 90 tablet 3 12/6/2024    vitamin B-12 (CYANOCOBALAMIN) 1000 MCG tablet Take 1 tablet by mouth Daily.   12/6/2024    Benzoyl Peroxide 10 % liquid 1 Application by Other route Daily.          Allergies:  No Known Allergies    Social History:   Social History     Socioeconomic History    Marital status:  "   Tobacco Use    Smoking status: Never    Smokeless tobacco: Never   Vaping Use    Vaping status: Never Used   Substance and Sexual Activity    Alcohol use: Yes     Alcohol/week: 5.0 standard drinks of alcohol     Types: 2 Glasses of wine, 3 Drinks containing 0.5 oz of alcohol per week     Comment: social    Drug use: Never    Sexual activity: Yes     Partners: Male     Birth control/protection: None, Vasectomy, Essure, Hysterectomy       Family History:  Family History   Problem Relation Age of Onset    Thyroid disease Mother     Arthritis Mother     Uterine cancer Mother         in her 40's    Depression Mother     GI problems Mother     Hyperlipidemia Mother     Cancer Father     Bipolar disorder Father     Depression Father     Hyperlipidemia Father     Hypertension Father     Arthritis Father     Arthritis Maternal Grandmother     Bleeding Disorder Maternal Grandmother     Hyperlipidemia Maternal Grandmother     Hypertension Maternal Grandmother     Depression Maternal Grandmother     Hypertension Maternal Grandfather     Hypertension Paternal Grandfather     Hyperlipidemia Paternal Grandfather     Arthritis Paternal Grandfather     Coronary artery disease Other         Maternal GrGF    Skin cancer Other         Maternal GrGF    Diabetes Other         Paternal GrGF    Heart disease Other         FH in females before the age of 65    Arthritis Paternal Grandmother     Malig Hyperthermia Neg Hx        Physical Exam:  /64 (BP Location: Right arm, Patient Position: Lying)   Pulse 68   Temp 98.1 °F (36.7 °C) (Oral)   Resp 18   Ht 165.1 cm (65\")   Wt 55.6 kg (122 lb 8 oz)   LMP  (LMP Unknown)   SpO2 93%   BMI 20.39 kg/m²  Body mass index is 20.39 kg/m². 93% 55.6 kg (122 lb 8 oz)  Physical Exam  HENT:      Right Ear: External ear normal.      Left Ear: External ear normal.      Nose: Nose normal.   Eyes:      Conjunctiva/sclera: Conjunctivae normal.      Pupils: Pupils are equal, round, and " reactive to light.   Neck:      Thyroid: No thyromegaly.      Vascular: No JVD.      Trachea: No tracheal deviation.   Cardiovascular:      Rate and Rhythm: Normal rate and regular rhythm.      Heart sounds: Normal heart sounds. No murmur heard.  Pulmonary:      Comments: Slow expiratory time, expiratory wheezes bilaterally, short of breath  Abdominal:      Palpations: Abdomen is soft.      Tenderness: There is no abdominal tenderness. There is no rebound.      Comments: Cannot palpate liver or spleen enlargement   Musculoskeletal:         General: No deformity. Normal range of motion.      Cervical back: Neck supple. No rigidity.   Skin:     General: Skin is warm.      Findings: No rash.      Comments: No palpable nodules   Neurological:      Mental Status: She is alert.      Cranial Nerves: No cranial nerve deficit.      Sensory: No sensory deficit.   Psychiatric:         Thought Content: Thought content normal.         LABS:  COVID19   Date Value Ref Range Status   12/07/2024 Not Detected Not Detected - Ref. Range Final       Lab Results   Component Value Date    CALCIUM 9.0 12/07/2024     Results from last 7 days   Lab Units 12/07/24  0136   SODIUM mmol/L 138   POTASSIUM mmol/L 3.6   CHLORIDE mmol/L 105   CO2 mmol/L 22.7   BUN mg/dL 13   CREATININE mg/dL 0.78   GLUCOSE mg/dL 108*   CALCIUM mg/dL 9.0   WBC 10*3/mm3 12.15*   HEMOGLOBIN g/dL 13.1   PLATELETS 10*3/mm3 400   ALT (SGPT) U/L 34*   AST (SGOT) U/L 23   PROBNP pg/mL 119.0   PROCALCITONIN ng/mL 0.04     Lab Results   Component Value Date    CKTOTAL 20 01/25/2021    TROPONINT 9 12/07/2024     Results from last 7 days   Lab Units 12/07/24  0136   HSTROP T ng/L 9         Results from last 7 days   Lab Units 12/07/24  0136   PROCALCITONIN ng/mL 0.04         Results from last 7 days   Lab Units 12/07/24  0712   ADENOVIRUS DETECTION BY PCR  Not Detected   CORONAVIRUS 229E  Not Detected   CORONAVIRUS HKU1  Not Detected   CORONAVIRUS NL63  Not Detected    CORONAVIRUS OC43  Not Detected   HUMAN METAPNEUMOVIRUS  Not Detected   HUMAN RHINOVIRUS/ENTEROVIRUS  Detected*   INFLUENZA B PCR  Not Detected   PARAINFLUENZA 1  Not Detected   PARAINFLUENZA VIRUS 2  Not Detected   PARAINFLUENZA VIRUS 3  Not Detected   PARAINFLUENZA VIRUS 4  Not Detected   BORDETELLA PERTUSSIS PCR  Not Detected   BORDETELLA PARAPERTUSSIS PCR  Not Detected   CHLAMYDOPHILA PNEUMONIAE PCR  Not Detected   MYCOPLAMA PNEUMO PCR  Not Detected   RSV, PCR  Not Detected             Lab Results   Component Value Date    TSH 1.200 08/02/2024     Estimated Creatinine Clearance: 74.9 mL/min (by C-G formula based on SCr of 0.78 mg/dL).         Imaging: I personally visualized the images of chest x-ray showing left perihilar opacity, perhaps retrocardiac opacity.      Assessment:  Acute hypoxic respiratory failure  Acute asthma exacerbation  Left pulmonary infiltrate  Rhinovirus bronchitis      Recommendations:  Patient has rhinovirus bronchitis causing asthma exacerbation.  Chest x-ray images show probable left infrahilar infiltrate.  Repeat procalcitonin tomorrow.  If she develops elevated procalcitonin or sputum turns purulent, we will add doxycycline.  Treated with IV Solu-Medrol.  Give nebulized DuoNeb 4 times a day.  Add flutter valve.  Frequent ambulation.  Proper oral hydration to help with mucus fluid vacation.  Continue home theophylline per patient's request.  Check theophylline level.  Wean oxygen as tolerated.         Jitendra Davies MD  12/7/2024  16:46 EST      Much of this encounter note is an electronic transcription/translation of spoken language to printed text using Dragon Software.

## 2024-12-07 NOTE — ED NOTES
Nursing report ED to floor  Nereida Myles  49 y.o.  female    HPI :  HPI  Stated Reason for Visit: Pt to ED via PV for reports of asthma since last Sunday and vomiting that starting tonight. Pt denies any abdominal pain.  History Obtained From: patient    Chief Complaint  Chief Complaint   Patient presents with    Shortness of Breath    Asthma    Vomiting       Admitting doctor:   Prabhjot Giang MD    Admitting diagnosis:   The primary encounter diagnosis was Acute hypoxic respiratory failure. Diagnoses of Asthma with acute exacerbation, unspecified asthma severity, unspecified whether persistent and Acute bronchitis due to Rhinovirus were also pertinent to this visit.    Code status:   Current Code Status       Date Active Code Status Order ID Comments User Context       Prior            Allergies:   Patient has no known allergies.    Isolation:   Droplet    Intake and Output  No intake or output data in the 24 hours ending 12/07/24 1225    Weight:       12/07/24  0128   Weight: 59 kg (130 lb)       Most recent vitals:   Vitals:    12/07/24 0931 12/07/24 0932 12/07/24 1149 12/07/24 1216   BP: 115/75  111/72    BP Location:       Patient Position:       Pulse: 66 62 77    Resp:    16   Temp:       TempSrc:       SpO2:  91%     Weight:       Height:           Active LDAs/IV Access:   Lines, Drains & Airways       Active LDAs       Name Placement date Placement time Site Days    Peripheral IV 12/07/24 0705 Anterior;Right Forearm 12/07/24  0705  Forearm  less than 1                    Labs (abnormal labs have a star):   Labs Reviewed   RESPIRATORY PANEL PCR W/ COVID-19 (SARS-COV-2), NP SWAB IN UTM/VTP, 2 HR TAT - Abnormal; Notable for the following components:       Result Value    Human Rhinovirus/Enterovirus Detected (*)     All other components within normal limits    Narrative:     In the setting of a positive respiratory panel with a viral infection PLUS a negative procalcitonin without other underlying  concern for bacterial infection, consider observing off antibiotics or discontinuation of antibiotics and continue supportive care. If the respiratory panel is positive for atypical bacterial infection (Bordetella pertussis, Chlamydophila pneumoniae, or Mycoplasma pneumoniae), consider antibiotic de-escalation to target atypical bacterial infection.   COMPREHENSIVE METABOLIC PANEL - Abnormal; Notable for the following components:    Glucose 108 (*)     ALT (SGPT) 34 (*)     All other components within normal limits    Narrative:     GFR Normal >60  Chronic Kidney Disease <60  Kidney Failure <15     CBC WITH AUTO DIFFERENTIAL - Abnormal; Notable for the following components:    WBC 12.15 (*)     Neutrophil % 80.7 (*)     Lymphocyte % 11.7 (*)     Eosinophil % 0.2 (*)     Immature Grans % 0.8 (*)     Neutrophils, Absolute 9.80 (*)     Immature Grans, Absolute 0.10 (*)     All other components within normal limits   BNP (IN-HOUSE) - Normal    Narrative:     This assay is used as an aid in the diagnosis of individuals suspected of having heart failure. It can be used as an aid in the diagnosis of acute decompensated heart failure (ADHF) in patients presenting with signs and symptoms of ADHF to the emergency department (ED). In addition, NT-proBNP of <300 pg/mL indicates ADHF is not likely.    Age Range Result Interpretation  NT-proBNP Concentration (pg/mL:      <50             Positive            >450                   Gray                 300-450                    Negative             <300    50-75           Positive            >900                  Gray                300-900                  Negative            <300      >75             Positive            >1800                  Gray                300-1800                  Negative            <300   SINGLE HS TROPONIN T - Normal    Narrative:     High Sensitive Troponin T Reference Range:  <14.0 ng/L- Negative Female for AMI  <22.0 ng/L- Negative Male for AMI  >=14  "- Abnormal Female indicating possible myocardial injury.  >=22 - Abnormal Male indicating possible myocardial injury.   Clinicians would have to utilize clinical acumen, EKG, Troponin, and serial changes to determine if it is an Acute Myocardial Infarction or myocardial injury due to an underlying chronic condition.        PROCALCITONIN - Normal    Narrative:     As a Marker for Sepsis (Non-Neonates):    1. <0.5 ng/mL represents a low risk of severe sepsis and/or septic shock.  2. >2 ng/mL represents a high risk of severe sepsis and/or septic shock.    As a Marker for Lower Respiratory Tract Infections that require antibiotic therapy:    PCT on Admission    Antibiotic Therapy       6-12 Hrs later    >0.5                Strongly Recommended  >0.25 - <0.5        Recommended   0.1 - 0.25          Discouraged              Remeasure/reassess PCT  <0.1                Strongly Discouraged     Remeasure/reassess PCT    As 28 day mortality risk marker: \"Change in Procalcitonin Result\" (>80% or <=80%) if Day 0 (or Day 1) and Day 4 values are available. Refer to http://www[place] [place]    Change in PCT <=80%  A decrease of PCT levels below or equal to 80% defines a positive change in PCT test result representing a higher risk for 28-day all-cause mortality of patients diagnosed with severe sepsis for septic shock.    Change in PCT >80%  A decrease of PCT levels of more than 80% defines a negative change in PCT result representing a lower risk for 28-day all-cause mortality of patients diagnosed with severe sepsis or septic shock.      RAINBOW DRAW    Narrative:     The following orders were created for panel order [place] Draw.  Procedure                               Abnormality         Status                     ---------                               -----------         ------                     Green Top (Gel)[[id number]]                                  Final result               Lavender Top[[id number]]       "                               Final result               Gold Top - [facility][[id number]]                                   Final result               Light Blue Top[[id number]]                                   Final result                 Please view results for these tests on the individual orders.   CBC AND DIFFERENTIAL    Narrative:     The following orders were created for panel order CBC & Differential.  Procedure                               Abnormality         Status                     ---------                               -----------         ------                     CBC Auto Differential[[id number]]        Abnormal            Final result                 Please view results for these tests on the individual orders.   GREEN TOP   LAVENDER TOP   GOLD TOP - SST   LIGHT BLUE TOP       EKG:   ECG 12 Lead ED Triage Standing Order; SOA   Preliminary Result   HEART RATE=79  bpm   RR Ixcajasn=662  ms   WV Kkhcpmjw=590  ms   P Horizontal Axis=-3  deg   P Front Axis=52  deg   QRSD Interval=96  ms   QT Iupdmhye=080  ms   LTlU=365  ms   QRS Axis=5  deg   T Wave Axis=32  deg   - ABNORMAL ECG -   Sinus rhythm   LVH with secondary repolarization abnormality   Artifact in lead(s) I,II,III,aVR,aVL,aVF,V6   Date and Time of Study:2024-12-07 01:40:34          Meds given in ED:   Medications   sodium chloride 0.9 % flush 10 mL (has no administration in time range)   albuterol (PROVENTIL) nebulizer solution 0.083% 2.5 mg/3mL (has no administration in time range)   benzonatate (TESSALON) capsule 200 mg (has no administration in time range)   buPROPion XL (WELLBUTRIN XL) 24 hr tablet 300 mg (300 mg Oral Given 12/7/24 1149)   FLUoxetine (PROzac) capsule 80 mg (80 mg Oral Given 12/7/24 1148)   budesonide-formoterol (SYMBICORT) 160-4.5 MCG/ACT inhaler 2 puff (2 puffs Inhalation Given 12/7/24 1200)   levothyroxine (SYNTHROID, LEVOTHROID) tablet 50 mcg (50 mcg Oral Given 12/7/24 1149)   metaxalone (SKELAXIN) tablet 400 mg (has no  administration in time range)   montelukast (SINGULAIR) tablet 10 mg (has no administration in time range)   methylPREDNISolone sodium succinate (SOLU-Medrol) injection 60 mg (60 mg Intravenous Given 12/7/24 1156)   pantoprazole (PROTONIX) EC tablet 40 mg (40 mg Oral Given 12/7/24 1147)   pregabalin (LYRICA) capsule 25 mg (has no administration in time range)   tamoxifen (NOLVADEX) tablet 20 mg (has no administration in time range)   theophylline (THEODUR) 12 hr tablet 300 mg (300 mg Oral Given 12/7/24 1147)   ipratropium-albuterol (DUO-NEB) nebulizer solution 3 mL (3 mL Nebulization Given 12/7/24 1215)   sodium chloride 0.9 % flush 10 mL (10 mL Intravenous Given 12/7/24 1200)   sodium chloride 0.9 % flush 10 mL (has no administration in time range)   sodium chloride 0.9 % infusion 40 mL (has no administration in time range)   Enoxaparin Sodium (LOVENOX) syringe 40 mg (has no administration in time range)   Potassium Replacement - Follow Nurse / BPA Driven Protocol (has no administration in time range)   Magnesium Standard Dose Replacement - Follow Nurse / BPA Driven Protocol (has no administration in time range)   Phosphorus Replacement - Follow Nurse / BPA Driven Protocol (has no administration in time range)   Calcium Replacement - Follow Nurse / BPA Driven Protocol (has no administration in time range)   acetaminophen (TYLENOL) tablet 650 mg (has no administration in time range)     Or   acetaminophen (TYLENOL) 160 MG/5ML oral solution 650 mg (has no administration in time range)     Or   acetaminophen (TYLENOL) suppository 650 mg (has no administration in time range)   sennosides-docusate (PERICOLACE) 8.6-50 MG per tablet 2 tablet (has no administration in time range)     And   polyethylene glycol (MIRALAX) packet 17 g (has no administration in time range)     And   bisacodyl (DULCOLAX) EC tablet 5 mg (has no administration in time range)     And   bisacodyl (DULCOLAX) suppository 10 mg (has no administration  in time range)   ondansetron ODT (ZOFRAN-ODT) disintegrating tablet 4 mg (has no administration in time range)     Or   ondansetron (ZOFRAN) injection 4 mg (has no administration in time range)   HYDROcodone-acetaminophen (NORCO) 5-325 MG per tablet 1 tablet (has no administration in time range)   methylPREDNISolone sodium succinate (SOLU-Medrol) injection 125 mg (125 mg Intravenous Given 12/7/24 0709)   ipratropium-albuterol (DUO-NEB) nebulizer solution 3 mL (3 mL Nebulization Given 12/7/24 0702)   albuterol (PROVENTIL) nebulizer solution 0.083% 2.5 mg/3mL (2.5 mg Nebulization Given 12/7/24 0707)   ondansetron (ZOFRAN) injection 8 mg (8 mg Intravenous Given 12/7/24 0709)   ibuprofen (ADVIL,MOTRIN) tablet 800 mg (800 mg Oral Given 12/7/24 0901)   metoclopramide (REGLAN) injection 10 mg (10 mg Intravenous Given 12/7/24 0941)   diphenhydrAMINE (BENADRYL) injection 12.5 mg (12.5 mg Intravenous Given 12/7/24 0941)       Imaging results:  XR Chest 1 View    Result Date: 12/7/2024  There is some consolidation within the left infrahilar region. This may represent scarring or atelectasis, although correlation with any evidence of pneumonia is recommended.  This report was finalized on 12/7/2024 2:39 AM by Dr. Josette Bhardwaj M.D on Workstation: [id number]       Ambulatory status:   - assistx1    Social issues:   Social History     Socioeconomic History    Marital status:    Tobacco Use    Smoking status: Never    Smokeless tobacco: Never   Vaping Use    Vaping status: Never Used   Substance and Sexual Activity    Alcohol use: Yes     Alcohol/week: 5.0 standard drinks of alcohol     Types: 2 Glasses of wine, 3 Drinks containing 0.5 oz of alcohol per week     Comment: social    Drug use: Never    Sexual activity: Yes     Partners: Male     Birth control/protection: None, Vasectomy, Essure, Hysterectomy       Peripheral Neurovascular  Peripheral Neurovascular (Adult)  Peripheral Neurovascular WDL: WDL  Pulse  Assessment: dorsalis pedis    Neuro Cognitive  Neuro Cognitive (Adult)  Cognitive/Neuro/Behavioral WDL: WDL  Hand /Ankle Strength  Hand , Left: strong  Hand , Right: strong  Dorsiflexion, Left: strong  Dorsiflexion, Right: strong  Plantarflexion, Left: strong  Plantarflexion, Right: strong    Learning  Learning Assessment  Learning Readiness and Ability: no barriers identified  Education Provided  Person Taught: patient  Teaching Method: verbal instruction  Teaching Focus: symptom/problem overview, diagnostic test  Education Outcome Evaluation: eager to learn, verbalizes understanding    Respiratory  Respiratory WDL  Respiratory WDL: cough (sob since sunday, saw pcp put on steroid dose pack, tonight nausea, unable to keep steroids down, continues to be sob.)  Cough Type: congested, productive  Breath Sounds  All Lung Fields Breath Sounds: All Fields  All Lung Fields Breath Sounds: wheezes, expiratory, wheezes, inspiratory, crackles  Breath Sounds Post-Respiratory Treatment  Breath Sounds Posttreatment All Fields: All Fields  Breath Sounds Posttreatment All Fields: Anterior:, wheezes, expiratory    Abdominal Pain       Pain Assessments  Pain (Adult)  (0-10) Pain Rating: Rest: 7    NIH Stroke Scale       Yamilet Murguia RN  12/07/24 12:25 EST

## 2024-12-08 LAB
ANION GAP SERPL CALCULATED.3IONS-SCNC: 11.2 MMOL/L (ref 5–15)
BASOPHILS # BLD AUTO: 0 10*3/MM3 (ref 0–0.2)
BASOPHILS NFR BLD AUTO: 0 % (ref 0–1.5)
BUN SERPL-MCNC: 10 MG/DL (ref 6–20)
BUN/CREAT SERPL: 14.7 (ref 7–25)
CALCIUM SPEC-SCNC: 9 MG/DL (ref 8.6–10.5)
CHLORIDE SERPL-SCNC: 102 MMOL/L (ref 98–107)
CO2 SERPL-SCNC: 23.8 MMOL/L (ref 22–29)
CREAT SERPL-MCNC: 0.68 MG/DL (ref 0.57–1)
DEPRECATED RDW RBC AUTO: 41 FL (ref 37–54)
EGFRCR SERPLBLD CKD-EPI 2021: 105.6 ML/MIN/1.73
EOSINOPHIL # BLD AUTO: 0 10*3/MM3 (ref 0–0.4)
EOSINOPHIL NFR BLD AUTO: 0 % (ref 0.3–6.2)
ERYTHROCYTE [DISTWIDTH] IN BLOOD BY AUTOMATED COUNT: 12.3 % (ref 12.3–15.4)
GLUCOSE SERPL-MCNC: 155 MG/DL (ref 65–99)
HCT VFR BLD AUTO: 35 % (ref 34–46.6)
HGB BLD-MCNC: 11.8 G/DL (ref 12–15.9)
IMM GRANULOCYTES # BLD AUTO: 0.04 10*3/MM3 (ref 0–0.05)
IMM GRANULOCYTES NFR BLD AUTO: 0.4 % (ref 0–0.5)
LYMPHOCYTES # BLD AUTO: 0.35 10*3/MM3 (ref 0.7–3.1)
LYMPHOCYTES NFR BLD AUTO: 3.3 % (ref 19.6–45.3)
MAGNESIUM SERPL-MCNC: 2.3 MG/DL (ref 1.6–2.6)
MCH RBC QN AUTO: 31 PG (ref 26.6–33)
MCHC RBC AUTO-ENTMCNC: 33.7 G/DL (ref 31.5–35.7)
MCV RBC AUTO: 91.9 FL (ref 79–97)
MONOCYTES # BLD AUTO: 0.41 10*3/MM3 (ref 0.1–0.9)
MONOCYTES NFR BLD AUTO: 3.9 % (ref 5–12)
NEUTROPHILS NFR BLD AUTO: 9.75 10*3/MM3 (ref 1.7–7)
NEUTROPHILS NFR BLD AUTO: 92.4 % (ref 42.7–76)
NRBC BLD AUTO-RTO: 0 /100 WBC (ref 0–0.2)
PHOSPHATE SERPL-MCNC: 3.6 MG/DL (ref 2.5–4.5)
PLATELET # BLD AUTO: 317 10*3/MM3 (ref 140–450)
PMV BLD AUTO: 9.3 FL (ref 6–12)
POTASSIUM SERPL-SCNC: 3.8 MMOL/L (ref 3.5–5.2)
PROCALCITONIN SERPL-MCNC: 0.03 NG/ML (ref 0–0.25)
RBC # BLD AUTO: 3.81 10*6/MM3 (ref 3.77–5.28)
SODIUM SERPL-SCNC: 137 MMOL/L (ref 136–145)
THEOPHYLLINE SERPL-MCNC: 5.3 MCG/ML (ref 10–20)
WBC NRBC COR # BLD AUTO: 10.55 10*3/MM3 (ref 3.4–10.8)

## 2024-12-08 PROCEDURE — 94761 N-INVAS EAR/PLS OXIMETRY MLT: CPT

## 2024-12-08 PROCEDURE — 94799 UNLISTED PULMONARY SVC/PX: CPT

## 2024-12-08 PROCEDURE — 84145 PROCALCITONIN (PCT): CPT | Performed by: HOSPITALIST

## 2024-12-08 PROCEDURE — 84100 ASSAY OF PHOSPHORUS: CPT | Performed by: HOSPITALIST

## 2024-12-08 PROCEDURE — 94664 DEMO&/EVAL PT USE INHALER: CPT

## 2024-12-08 PROCEDURE — 80048 BASIC METABOLIC PNL TOTAL CA: CPT | Performed by: HOSPITALIST

## 2024-12-08 PROCEDURE — 25010000002 ENOXAPARIN PER 10 MG: Performed by: HOSPITALIST

## 2024-12-08 PROCEDURE — 85025 COMPLETE CBC W/AUTO DIFF WBC: CPT | Performed by: NURSE PRACTITIONER

## 2024-12-08 PROCEDURE — 80198 ASSAY OF THEOPHYLLINE: CPT | Performed by: HOSPITALIST

## 2024-12-08 PROCEDURE — 83735 ASSAY OF MAGNESIUM: CPT | Performed by: HOSPITALIST

## 2024-12-08 PROCEDURE — 25010000002 METHYLPREDNISOLONE PER 40 MG: Performed by: INTERNAL MEDICINE

## 2024-12-08 RX ADMIN — THEOPHYLLINE 300 MG: 300 TABLET, EXTENDED RELEASE ORAL at 09:03

## 2024-12-08 RX ADMIN — Medication 10 ML: at 09:04

## 2024-12-08 RX ADMIN — HYDROCODONE BITARTRATE AND ACETAMINOPHEN 1 TABLET: 5; 325 TABLET ORAL at 20:39

## 2024-12-08 RX ADMIN — TAMOXIFEN CITRATE 20 MG: 10 TABLET ORAL at 09:04

## 2024-12-08 RX ADMIN — HYDROCODONE BITARTRATE AND ACETAMINOPHEN 1 TABLET: 5; 325 TABLET ORAL at 13:41

## 2024-12-08 RX ADMIN — IPRATROPIUM BROMIDE AND ALBUTEROL SULFATE 3 ML: 2.5; .5 SOLUTION RESPIRATORY (INHALATION) at 06:47

## 2024-12-08 RX ADMIN — FLUOXETINE HYDROCHLORIDE 80 MG: 20 CAPSULE ORAL at 09:04

## 2024-12-08 RX ADMIN — IPRATROPIUM BROMIDE AND ALBUTEROL SULFATE 3 ML: 2.5; .5 SOLUTION RESPIRATORY (INHALATION) at 15:24

## 2024-12-08 RX ADMIN — METHYLPREDNISOLONE SODIUM SUCCINATE 40 MG: 40 INJECTION, POWDER, FOR SOLUTION INTRAMUSCULAR; INTRAVENOUS at 23:15

## 2024-12-08 RX ADMIN — IPRATROPIUM BROMIDE AND ALBUTEROL SULFATE 3 ML: 2.5; .5 SOLUTION RESPIRATORY (INHALATION) at 21:09

## 2024-12-08 RX ADMIN — METHYLPREDNISOLONE SODIUM SUCCINATE 40 MG: 40 INJECTION, POWDER, FOR SOLUTION INTRAMUSCULAR; INTRAVENOUS at 11:57

## 2024-12-08 RX ADMIN — LEVOTHYROXINE SODIUM 50 MCG: 50 TABLET ORAL at 11:57

## 2024-12-08 RX ADMIN — IPRATROPIUM BROMIDE AND ALBUTEROL SULFATE 3 ML: 2.5; .5 SOLUTION RESPIRATORY (INHALATION) at 11:02

## 2024-12-08 RX ADMIN — Medication 5 MG: at 20:40

## 2024-12-08 RX ADMIN — Medication 10 ML: at 20:40

## 2024-12-08 RX ADMIN — PANTOPRAZOLE SODIUM 40 MG: 40 TABLET, DELAYED RELEASE ORAL at 06:24

## 2024-12-08 RX ADMIN — ENOXAPARIN SODIUM 40 MG: 100 INJECTION SUBCUTANEOUS at 20:39

## 2024-12-08 RX ADMIN — BUDESONIDE AND FORMOTEROL FUMARATE DIHYDRATE 2 PUFF: 160; 4.5 AEROSOL RESPIRATORY (INHALATION) at 06:48

## 2024-12-08 RX ADMIN — MONTELUKAST 10 MG: 10 TABLET, FILM COATED ORAL at 09:04

## 2024-12-08 RX ADMIN — PREGABALIN 25 MG: 25 CAPSULE ORAL at 20:41

## 2024-12-08 RX ADMIN — BUPROPION HYDROCHLORIDE 300 MG: 300 TABLET, EXTENDED RELEASE ORAL at 06:24

## 2024-12-08 RX ADMIN — ACETAMINOPHEN 650 MG: 325 TABLET ORAL at 11:57

## 2024-12-08 RX ADMIN — BUDESONIDE AND FORMOTEROL FUMARATE DIHYDRATE 2 PUFF: 160; 4.5 AEROSOL RESPIRATORY (INHALATION) at 21:21

## 2024-12-08 RX ADMIN — LEVOTHYROXINE SODIUM 50 MCG: 50 TABLET ORAL at 06:24

## 2024-12-08 NOTE — PROGRESS NOTES
[facility]    [phone]   LOS: 0 days     Name: Nereida Myles  Age/Sex: 49 y.o. female  :  1975        PCP: Kehrer, Meredith Lea, MD  Chief Complaint   Patient presents with    Shortness of Breath    Asthma    Vomiting      Subjective   Rough niorght aout 3 am got tight and started cough had troule getting things back under controll  Still on o2  Still working hard to breath  General: No Fever or Chills, Cardiac: No Chest Pain or Palpitations, Resp: No Cough or SOA, GI: No Nausea, Vomiting, or Diarrhea, and Other: No bleeding    budesonide-formoterol, 2 puff, Inhalation, BID - RT  buPROPion XL, 300 mg, Oral, QAM  enoxaparin, 40 mg, Subcutaneous, Nightly  FLUoxetine, 80 mg, Oral, Daily  ipratropium-albuterol, 3 mL, Nebulization, 4x Daily - RT  levothyroxine, 50 mcg, Oral, Daily  melatonin, 5 mg, Oral, Nightly  methylPREDNISolone sodium succinate, 40 mg, Intravenous, Q12H  montelukast, 10 mg, Oral, Daily  pantoprazole, 40 mg, Oral, Q AM  pregabalin, 25 mg, Oral, Q8H  sodium chloride, 10 mL, Intravenous, Q12H  tamoxifen, 20 mg, Oral, Daily  theophylline, 300 mg, Oral, QAM           Objective   Vital Signs  Temp:  [98.1 °F (36.7 °C)-98.2 °F (36.8 °C)] 98.1 °F (36.7 °C)  Heart Rate:  [51-81] 61  Resp:  [16-18] 18  BP: (101-124)/(61-75) 115/75  Body mass index is 20.39 kg/m².    Intake/Output Summary (Last 24 hours) at 2024 1006  Last data filed at 2024 2200  Gross per 24 hour   Intake 450 ml   Output --   Net 450 ml       Physical Exam  Vitals and nursing note reviewed.   Constitutional:       General: She is not in acute distress.     Appearance: She is ill-appearing.   Cardiovascular:      Rate and Rhythm: Normal rate and regular rhythm.   Pulmonary:      Effort: Pulmonary effort is normal. Respiratory distress present.      Breath sounds: Wheezing present.   Neurological:      Mental Status: She is alert and oriented to person, place, and time. Mental status is at baseline.            Results Review:       I reviewed the patient's new clinical results.  Results from last 7 days   Lab Units 12/08/24  0431 12/07/24  0136   WBC 10*3/mm3 10.55 12.15*   HEMOGLOBIN g/dL 11.8* 13.1   PLATELETS 10*3/mm3 317 400     Results from last 7 days   Lab Units 12/08/24  0431 12/07/24  0136   SODIUM mmol/L 137 138   POTASSIUM mmol/L 3.8 3.6   CHLORIDE mmol/L 102 105   CO2 mmol/L 23.8 22.7   BUN mg/dL 10 13   CREATININE mg/dL 0.68 0.78   CALCIUM mg/dL 9.0 9.0   MAGNESIUM mg/dL 2.3  --    PHOSPHORUS mg/dL 3.6  --    Estimated Creatinine Clearance: 85.9 mL/min (by C-G formula based on SCr of 0.68 mg/dL).      Assessment & Plan   Active Hospital Problems    Diagnosis  POA    **Acute hypoxic respiratory failure [J96.01]  Yes    Asthma exacerbation [J45.901]  Unknown    History of breast cancer [Z85.3]  Not Applicable    Hypothyroidism [E03.9]  Yes      Resolved Hospital Problems   No resolved problems to display.       PLAN  This is a 49-year-old lady with a history of breast cancer asthma hypothyroidism who presents to the hospital with hypoxia and asthma exacerbation secondary to rhinovirus.  -Continue steroids and breathing treatments.  -Overall moving better air but still tight and still short of breath  -Continue home medications and encourage out of bed activity  -Hopefully home tomorrow or Tuesday depending on progress  -Mechanical DVT prophylaxis  -Full code      Disposition  Expected discharge date/ time has not been documented.       Prabhjot Giang MD  [facility]  12/08/24  10:06 EST

## 2024-12-08 NOTE — PROGRESS NOTES
Dr. ERNST Davies    [address]        Patient ID:  Name:  Nereida Myles  MRN:  [id number]  1975  49 y.o.  female            CC/Reason for visit: Acute viral bronchitis with asthma exacerbation    Interval hx: Still coughing and wheezing.  Still short of breath with minimal exertion  Still requiring oxygen    ROS: No hemoptysis, no chest pain, no abdominal pain    Vitals:  Vitals:    12/08/24 0652 12/08/24 0731 12/08/24 1102 12/08/24 1148   BP:  115/75  112/75   BP Location:  Right arm  Right arm   Patient Position:  Lying  Lying   Pulse:  61  64   Resp: 18 18 18 18   Temp:  98.1 °F (36.7 °C)  98.2 °F (36.8 °C)   TempSrc:  Oral  Oral   SpO2:  96%  94%   Weight:       Height:               Body mass index is 20.39 kg/m².    Intake/Output Summary (Last 24 hours) at 12/8/2024 1327  Last data filed at 12/8/2024 0850  Gross per 24 hour   Intake 690 ml   Output --   Net 690 ml       Exam:  GEN:  No distress  Alert, oriented x 3.   LUNGS: Scattered rhonchi and wheezes bilat, no use of accessory muscles  CV:  Normal S1S2, without murmur, no edema  ABD:  Non tender, no enlarged liver or masses      Scheduled meds:  budesonide-formoterol, 2 puff, Inhalation, BID - RT  buPROPion XL, 300 mg, Oral, QAM  enoxaparin, 40 mg, Subcutaneous, Nightly  FLUoxetine, 80 mg, Oral, Daily  ipratropium-albuterol, 3 mL, Nebulization, 4x Daily - RT  levothyroxine, 50 mcg, Oral, Daily  melatonin, 5 mg, Oral, Nightly  methylPREDNISolone sodium succinate, 40 mg, Intravenous, Q12H  montelukast, 10 mg, Oral, Daily  pantoprazole, 40 mg, Oral, Q AM  pregabalin, 25 mg, Oral, Q8H  sodium chloride, 10 mL, Intravenous, Q12H  tamoxifen, 20 mg, Oral, Daily  theophylline, 300 mg, Oral, QAM      IV meds:                           Data Review:   I reviewed the patient's medications and new clinical results.            Results from last 7 days   Lab Units 12/08/24  0431 12/07/24  0136   SODIUM mmol/L 137 138   POTASSIUM mmol/L 3.8 3.6    CHLORIDE mmol/L 102 105   CO2 mmol/L 23.8 22.7   BUN mg/dL 10 13   CREATININE mg/dL 0.68 0.78   CALCIUM mg/dL 9.0 9.0   BILIRUBIN mg/dL  --  0.4   ALK PHOS U/L  --  62   ALT (SGPT) U/L  --  34*   AST (SGOT) U/L  --  23   GLUCOSE mg/dL 155* 108*   WBC 10*3/mm3 10.55 12.15*   HEMOGLOBIN g/dL 11.8* 13.1   PLATELETS 10*3/mm3 317 400   PROBNP pg/mL  --  119.0   PROCALCITONIN ng/mL 0.03 0.04                ASSESSMENT:     Acute hypoxic respiratory failure    Hypothyroidism    Asthma exacerbation    History of breast cancer  Acute viral bronchitis      PLAN:  Continue supportive care for acute rhinovirus bronchitis.  Continue nebulized bronchodilators and steroids for asthma exacerbation.  Continue with oxygen but try to wean oxygen.  Hopefully home tomorrow or Tuesday.          Jitendra Davies MD  12/8/2024

## 2024-12-08 NOTE — PLAN OF CARE
Problem: Infection  Goal: Absence of Infection Signs and Symptoms  Outcome: Progressing  Intervention: Prevent or Manage Infection  Recent Flowsheet Documentation  Taken 12/8/2024 0000 by Yashira Sierra RN  Isolation Precautions:   droplet   precautions maintained   Goal Outcome Evaluation:

## 2024-12-09 ENCOUNTER — APPOINTMENT (OUTPATIENT)
Dept: CT IMAGING | Facility: HOSPITAL | Age: 49
DRG: 202 | End: 2024-12-09
Payer: COMMERCIAL

## 2024-12-09 PROCEDURE — 25010000002 PROCHLORPERAZINE 10 MG/2ML SOLUTION: Performed by: HOSPITALIST

## 2024-12-09 PROCEDURE — 94761 N-INVAS EAR/PLS OXIMETRY MLT: CPT

## 2024-12-09 PROCEDURE — 25010000002 ONDANSETRON PER 1 MG: Performed by: HOSPITALIST

## 2024-12-09 PROCEDURE — 25010000002 METHYLPREDNISOLONE PER 40 MG: Performed by: HOSPITALIST

## 2024-12-09 PROCEDURE — 94799 UNLISTED PULMONARY SVC/PX: CPT

## 2024-12-09 PROCEDURE — 94664 DEMO&/EVAL PT USE INHALER: CPT

## 2024-12-09 PROCEDURE — 87040 BLOOD CULTURE FOR BACTERIA: CPT | Performed by: HOSPITALIST

## 2024-12-09 PROCEDURE — 94760 N-INVAS EAR/PLS OXIMETRY 1: CPT

## 2024-12-09 PROCEDURE — 25010000002 CEFTRIAXONE PER 250 MG: Performed by: HOSPITALIST

## 2024-12-09 PROCEDURE — 25010000002 ENOXAPARIN PER 10 MG: Performed by: HOSPITALIST

## 2024-12-09 PROCEDURE — 71250 CT THORAX DX C-: CPT

## 2024-12-09 RX ORDER — METHYLPREDNISOLONE SODIUM SUCCINATE 40 MG/ML
40 INJECTION, POWDER, LYOPHILIZED, FOR SOLUTION INTRAMUSCULAR; INTRAVENOUS EVERY 12 HOURS
Status: COMPLETED | OUTPATIENT
Start: 2024-12-09 | End: 2024-12-12

## 2024-12-09 RX ORDER — PROCHLORPERAZINE EDISYLATE 5 MG/ML
5 INJECTION INTRAMUSCULAR; INTRAVENOUS EVERY 6 HOURS PRN
Status: DISCONTINUED | OUTPATIENT
Start: 2024-12-09 | End: 2024-12-12 | Stop reason: HOSPADM

## 2024-12-09 RX ORDER — PROCHLORPERAZINE 25 MG
25 SUPPOSITORY, RECTAL RECTAL EVERY 12 HOURS PRN
Status: DISCONTINUED | OUTPATIENT
Start: 2024-12-09 | End: 2024-12-12 | Stop reason: HOSPADM

## 2024-12-09 RX ORDER — PROCHLORPERAZINE MALEATE 10 MG
5 TABLET ORAL EVERY 6 HOURS PRN
Status: DISCONTINUED | OUTPATIENT
Start: 2024-12-09 | End: 2024-12-12 | Stop reason: HOSPADM

## 2024-12-09 RX ADMIN — IPRATROPIUM BROMIDE AND ALBUTEROL SULFATE 3 ML: 2.5; .5 SOLUTION RESPIRATORY (INHALATION) at 06:52

## 2024-12-09 RX ADMIN — FLUOXETINE HYDROCHLORIDE 80 MG: 20 CAPSULE ORAL at 08:26

## 2024-12-09 RX ADMIN — ONDANSETRON 4 MG: 2 INJECTION INTRAMUSCULAR; INTRAVENOUS at 10:01

## 2024-12-09 RX ADMIN — PANTOPRAZOLE SODIUM 40 MG: 40 TABLET, DELAYED RELEASE ORAL at 06:17

## 2024-12-09 RX ADMIN — BUDESONIDE AND FORMOTEROL FUMARATE DIHYDRATE 2 PUFF: 160; 4.5 AEROSOL RESPIRATORY (INHALATION) at 19:32

## 2024-12-09 RX ADMIN — Medication 10 ML: at 08:28

## 2024-12-09 RX ADMIN — ENOXAPARIN SODIUM 40 MG: 100 INJECTION SUBCUTANEOUS at 21:11

## 2024-12-09 RX ADMIN — PREGABALIN 25 MG: 25 CAPSULE ORAL at 21:11

## 2024-12-09 RX ADMIN — BENZONATATE 200 MG: 100 CAPSULE ORAL at 11:13

## 2024-12-09 RX ADMIN — IPRATROPIUM BROMIDE AND ALBUTEROL SULFATE 3 ML: 2.5; .5 SOLUTION RESPIRATORY (INHALATION) at 19:31

## 2024-12-09 RX ADMIN — LEVOTHYROXINE SODIUM 50 MCG: 50 TABLET ORAL at 06:17

## 2024-12-09 RX ADMIN — MONTELUKAST 10 MG: 10 TABLET, FILM COATED ORAL at 08:27

## 2024-12-09 RX ADMIN — IPRATROPIUM BROMIDE AND ALBUTEROL SULFATE 3 ML: 2.5; .5 SOLUTION RESPIRATORY (INHALATION) at 15:36

## 2024-12-09 RX ADMIN — BUPROPION HYDROCHLORIDE 300 MG: 300 TABLET, EXTENDED RELEASE ORAL at 06:17

## 2024-12-09 RX ADMIN — CEFTRIAXONE SODIUM 1000 MG: 1 INJECTION, POWDER, FOR SOLUTION INTRAMUSCULAR; INTRAVENOUS at 17:23

## 2024-12-09 RX ADMIN — BUDESONIDE AND FORMOTEROL FUMARATE DIHYDRATE 2 PUFF: 160; 4.5 AEROSOL RESPIRATORY (INHALATION) at 06:52

## 2024-12-09 RX ADMIN — THEOPHYLLINE 300 MG: 300 TABLET, EXTENDED RELEASE ORAL at 06:16

## 2024-12-09 RX ADMIN — TAMOXIFEN CITRATE 20 MG: 10 TABLET ORAL at 08:26

## 2024-12-09 RX ADMIN — Medication 10 ML: at 21:12

## 2024-12-09 RX ADMIN — Medication 5 MG: at 21:11

## 2024-12-09 RX ADMIN — METHYLPREDNISOLONE SODIUM SUCCINATE 40 MG: 40 INJECTION, POWDER, LYOPHILIZED, FOR SOLUTION INTRAMUSCULAR; INTRAVENOUS at 11:59

## 2024-12-09 RX ADMIN — PROCHLORPERAZINE EDISYLATE 5 MG: 5 INJECTION INTRAMUSCULAR; INTRAVENOUS at 14:07

## 2024-12-09 RX ADMIN — IPRATROPIUM BROMIDE AND ALBUTEROL SULFATE 3 ML: 2.5; .5 SOLUTION RESPIRATORY (INHALATION) at 11:18

## 2024-12-09 NOTE — PROGRESS NOTES
[facility]    [phone]   LOS: 1 day     Name: Nereida Myles  Age/Sex: 49 y.o. female  :  1975        PCP: Kehrer, Meredith Lea, MD  Chief Complaint   Patient presents with    Shortness of Breath    Asthma    Vomiting      Subjective   Still short of breath and still with increased work of breathing.  Denies other new issues today.  She has been able to titrate off of oxygen likely.  General: No Fever or Chills, Cardiac: No Chest Pain or Palpitations, Resp: No Cough or SOA, GI: No Nausea, Vomiting, or Diarrhea, and Other: No bleeding    budesonide-formoterol, 2 puff, Inhalation, BID - RT  buPROPion XL, 300 mg, Oral, QAM  enoxaparin, 40 mg, Subcutaneous, Nightly  FLUoxetine, 80 mg, Oral, Daily  ipratropium-albuterol, 3 mL, Nebulization, 4x Daily - RT  levothyroxine, 50 mcg, Oral, Daily  melatonin, 5 mg, Oral, Nightly  methylPREDNISolone sodium succinate, 40 mg, Intravenous, Q12H  montelukast, 10 mg, Oral, Daily  pantoprazole, 40 mg, Oral, Q AM  pregabalin, 25 mg, Oral, Q8H  sodium chloride, 10 mL, Intravenous, Q12H  tamoxifen, 20 mg, Oral, Daily  theophylline, 300 mg, Oral, QAM           Objective   Vital Signs  Temp:  [97.5 °F (36.4 °C)-98.4 °F (36.9 °C)] 98.4 °F (36.9 °C)  Heart Rate:  [52-76] 67  Resp:  [1-20] 20  BP: (103-130)/(61-83) 117/77  Body mass index is 20.4 kg/m².    Intake/Output Summary (Last 24 hours) at 2024 1354  Last data filed at 2024 2100  Gross per 24 hour   Intake 240 ml   Output --   Net 240 ml       Physical Exam  Vitals and nursing note reviewed.   Constitutional:       General: She is not in acute distress.     Appearance: She is ill-appearing.   Cardiovascular:      Rate and Rhythm: Normal rate and regular rhythm.   Pulmonary:      Effort: Pulmonary effort is normal. Respiratory distress present.      Breath sounds: Wheezing present.   Neurological:      Mental Status: She is alert and oriented to person, place, and time. Mental status is at  baseline.           Results Review:       I reviewed the patient's new clinical results.  Results from last 7 days   Lab Units 12/08/24  0431 12/07/24  0136   WBC 10*3/mm3 10.55 12.15*   HEMOGLOBIN g/dL 11.8* 13.1   PLATELETS 10*3/mm3 317 400     Results from last 7 days   Lab Units 12/08/24  0431 12/07/24  0136   SODIUM mmol/L 137 138   POTASSIUM mmol/L 3.8 3.6   CHLORIDE mmol/L 102 105   CO2 mmol/L 23.8 22.7   BUN mg/dL 10 13   CREATININE mg/dL 0.68 0.78   CALCIUM mg/dL 9.0 9.0   MAGNESIUM mg/dL 2.3  --    PHOSPHORUS mg/dL 3.6  --    Estimated Creatinine Clearance: 85.9 mL/min (by C-G formula based on SCr of 0.68 mg/dL).      Assessment & Plan   Active Hospital Problems    Diagnosis  POA    **Acute hypoxic respiratory failure [J96.01]  Yes    Asthma exacerbation [J45.901]  Unknown    History of breast cancer [Z85.3]  Not Applicable    Hypothyroidism [E03.9]  Yes      Resolved Hospital Problems   No resolved problems to display.       PLAN  This is a 49-year-old lady with a history of breast cancer asthma hypothyroidism who presents to the hospital with hypoxia and asthma exacerbation secondary to rhinovirus.  -Continue steroids and breathing treatments.  -Overall moving better air but still tight and still short of breath  -Continue home medications and encourage out of bed activity  -Hopefully home tomorrow or Tuesday depending on progress  -Mechanical DVT prophylaxis  -Full code      Disposition  Expected discharge date/ time has not been documented.       Prabhjot Giang MD  [facility]  12/09/24  13:54 EST

## 2024-12-09 NOTE — DISCHARGE PLACEMENT REQUEST
"Nereida Lozano (49 y.o. Female)       Date of Birth   1975    Social Security Number       Address   [address] [place] [address] [place] [address]    Home Phone   [phone]    MRN   [id number]       Anabaptism   None    Marital Status                               Admission Date   12/7/24    Admission Type   Emergency    Admitting Provider   Prabhjot Giang MD    Attending Provider   Prabhjot Giang MD    Department, Room/Bed   [address], S513/1       Discharge Date       Discharge Disposition       Discharge Destination                                 Attending Provider: Prabhjot Giang MD    Allergies: No Known Allergies    Isolation: Droplet   Infection: Rhinovirus  (12/07/24)   Code Status: CPR    Ht: 165.1 cm (65\")   Wt: 55.6 kg (122 lb 9.2 oz)    Admission Cmt: None   Principal Problem: Acute hypoxic respiratory failure [J96.01]                   Active Insurance as of 12/7/2024       Primary Coverage       Payor Plan Insurance Group Employer/Plan Group    [facility] [employer] [facility] [employer] [facility] PPO [id number]       Payor Plan Address Payor Plan Phone Number Payor Plan Fax Number Effective Dates    [address] [phone]  4/3/2021 - None Entered    [facility] [id number]         Subscriber Name Subscriber Birth Date Member ID       NUNO LOZANO NIIDA 6[birth year]2/1965 [id number]                     Emergency Contacts        (Rel.) Home Phone Work Phone Mobile Phone    BLAKENUNO (Spouse) -- -- [phone]    KEVIN ROSA (Mother) -- -- [phone]    Elaine Joe (Daughter) [phone] -- [phone]                "

## 2024-12-09 NOTE — PROGRESS NOTES
Dr. ERNST Davies    [address]        Patient ID:  Name:  Nereida Myles  MRN:  [id number]  1975  49 y.o.  female            CC/Reason for visit: Acute viral bronchitis with asthma exacerbation     Interval hx: Says she feels worse today.  Coughing and wheezing more.  On 2 L of oxygen saturating 92%    ROS: No hemoptysis, no chest pain, no abdominal dorina    Vitals:  Vitals:    12/09/24 0746 12/09/24 1109 12/09/24 1118 12/09/24 1155   BP: 116/83   117/77   BP Location: Right arm   Right arm   Patient Position: Lying   Lying   Pulse: 67  76 67   Resp: 18   20   Temp: 98.1 °F (36.7 °C)   98.4 °F (36.9 °C)   TempSrc: Oral   Oral   SpO2: 90% 92% 96% 92%   Weight:       Height:               Body mass index is 20.4 kg/m².    Intake/Output Summary (Last 24 hours) at 12/9/2024 1258  Last data filed at 12/8/2024 2100  Gross per 24 hour   Intake 240 ml   Output --   Net 240 ml       Exam:  GEN:  No distress  Alert, oriented x 3.   LUNGS: Scattered rhonchi and wheezing bilat, no use of accessory muscles  CV:  Normal S1S2, without murmur, no edema  ABD:  Non tender, no enlarged liver or masses      Scheduled meds:  budesonide-formoterol, 2 puff, Inhalation, BID - RT  buPROPion XL, 300 mg, Oral, QAM  enoxaparin, 40 mg, Subcutaneous, Nightly  FLUoxetine, 80 mg, Oral, Daily  ipratropium-albuterol, 3 mL, Nebulization, 4x Daily - RT  levothyroxine, 50 mcg, Oral, Daily  melatonin, 5 mg, Oral, Nightly  methylPREDNISolone sodium succinate, 40 mg, Intravenous, Q12H  montelukast, 10 mg, Oral, Daily  pantoprazole, 40 mg, Oral, Q AM  pregabalin, 25 mg, Oral, Q8H  sodium chloride, 10 mL, Intravenous, Q12H  tamoxifen, 20 mg, Oral, Daily  theophylline, 300 mg, Oral, QAM      IV meds:                           Data Review:   I reviewed the patient's medications and new clinical results.            Results from last 7 days   Lab Units 12/08/24  0431 12/07/24  0136   SODIUM mmol/L 137 138   POTASSIUM mmol/L 3.8 3.6    CHLORIDE mmol/L 102 105   CO2 mmol/L 23.8 22.7   BUN mg/dL 10 13   CREATININE mg/dL 0.68 0.78   CALCIUM mg/dL 9.0 9.0   BILIRUBIN mg/dL  --  0.4   ALK PHOS U/L  --  62   ALT (SGPT) U/L  --  34*   AST (SGOT) U/L  --  23   GLUCOSE mg/dL 155* 108*   WBC 10*3/mm3 10.55 12.15*   HEMOGLOBIN g/dL 11.8* 13.1   PLATELETS 10*3/mm3 317 400   PROBNP pg/mL  --  119.0   PROCALCITONIN ng/mL 0.03 0.04                  ASSESSMENT:     Acute hypoxic respiratory failure    Hypothyroidism    Asthma exacerbation    History of breast cancer  Acute viral bronchitis      PLAN:  She is still requiring oxygen.  Significant wheezing and rhonchi on exam.  Continue with DuoNeb 4 times a day and Symbicort.  On theophylline as outpatient and continued here per her request.  Continue with systemic steroids, currently on IV Solu-Medrol for bronchospasm and wheezing.  Still requiring 2 L of oxygen.  Continue with bronchial hygiene protocol, frequent ambulation as well as use of flutter valve.  Will probably take several more days before she starts improving        Jitendra Davies MD  12/9/2024

## 2024-12-09 NOTE — PLAN OF CARE
Problem: ARDS (Acute Respiratory Distress Syndrome)  Goal: Effective Oxygenation  12/9/2024 1710 by Adele Mcpherson RN  Outcome: Progressing  12/9/2024 1709 by Adele Mcpherson RN  Outcome: Progressing     Problem: Infection  Goal: Absence of Infection Signs and Symptoms  12/9/2024 1710 by Adele Mcpherson RN  Outcome: Progressing  12/9/2024 1709 by Adele Mcpherson RN  Outcome: Progressing   Goal Outcome Evaluation:   Blood cultures drawn and abx added to medication regimen. Pt aware of POC.

## 2024-12-09 NOTE — PAYOR COMM NOTE
"Nereida Lozano (49 y.o. Female)     PLEASE SEE ATTACHED FOR INPT AUTH     REF #  PT ID      [id number]       PLEASE CALL DARCIE BABIN RN/ DEPT @ [phone]  OR FAX  DEPARTMENT @  [phone]    THANK YOU   DARCIE BABIN RN  [facility]          Date of Birth   1975    Social Security Number       Address   [address] [place] [address]    Home Phone   [phone]    MRN   [id number]       Muslim   None    Marital Status                               Admission Date   12/7/24    Admission Type   Emergency    Admitting Provider   Prabhjot Giang MD    Attending Provider   Prabhjot Giang MD    Department, Room/Bed   [address], S513/1       Discharge Date       Discharge Disposition       Discharge Destination                                 Attending Provider: Prabhjot Giang MD    Allergies: No Known Allergies    Isolation: Droplet   Infection: Rhinovirus  (12/07/24)   Code Status: CPR    Ht: 165.1 cm (65\")   Wt: 55.6 kg (122 lb 9.2 oz)    Admission Cmt: None   Principal Problem: Acute hypoxic respiratory failure [J96.01]                   Active Insurance as of 12/7/2024       Primary Coverage       Payor Plan Insurance Group Employer/Plan Group    [facility] [employer] [facility] [employer] BL[employer] [facility] PPO [id number]       Payor Plan Address Payor Plan Phone Number Payor Plan Fax Number Effective Dates    [address] [phone]  4/3/2021 - None Entered    [place] [address]         Subscriber Name Subscriber Birth Date Member ID       NUNO LOZANO NIDIA 6[birth year]2/1965 [id number]                     Emergency Contacts        (Rel.) Home Phone Work Phone Mobile Phone    BLAKENUNO (Spouse) -- -- [phone]    LESLIKEVIN BAILEY (Mother) -- -- [phone]    [place] Lakota[place]aine (Daughter) [phone] -- [phone]              [place]: NPI [id number]  Tax ID [id number]     History & Physical        [place], " Morenita Steve, RISHI at 12/07/24 1300       Attestation signed by Prabhjot Giang MD at 12/09/24 0801    12/7 1700  50 yo lady with history of asthma and prior hospitalization presents with increasing SOA x 1 weeks with cough and increased WOB and found hypoxic in the ER with AE asthma and superimposed rhinovirus.  -steroids  -treatments  -pulm consult   -no evidence of bacteruial infection  -Full code    Moving air but wheezing on exam  Appears unconfortable   Chest with equal rise and fall                         Patient Name:  Nereida Myles  YOB: 1975  MRN:  [id number]  Admit Date:  12/7/2024  Patient Care Team:  Kehrer, Meredith Lea, MD as PCP - General (Family Medicine)  Demarcus Baker MD as Consulting Physician (Pulmonary Disease)  Trevin Rosen MD as Consulting Physician (Hematology and Oncology)  Miguel A Cyr MD as Surgeon (Hand Surgery)      Subjective  History Present Illness     Chief Complaint   Patient presents with    Shortness of Breath    Asthma    Vomiting       Ms. Myles is a 49 y.o. female with a history of asthma, hypothyroidism, breast cancer on tamoxifen that presents to [facility] complaining of shortness of breath, cough, asthma flare for about 1 week.  She was started on steroids on Wednesday with very little improvement.  She started to get nauseated and threw up several times.  Vomiting is not uncommon for her when she has an asthma flare.  Couple of weeks ago had mild cold symptoms and started Mucinex.  No subjective fever, chills, sweats.  She was started on oxygen for O2 sats 89% on room air.    RVP is positive for rhinovirus.  WBC 12.  Afebrile.  Chest x-ray suspicious for scarring in the left infrahilar region.  Patient is tremulous and obviously uncomfortable but in no acute respiratory distress.    Review of Systems   Constitutional:  Positive for fatigue. Negative for fever.   HENT:  Negative for congestion.     Respiratory:  Positive for cough, shortness of breath and wheezing.    Cardiovascular:  Negative for chest pain, palpitations and leg swelling.   Gastrointestinal:  Positive for nausea and vomiting. Negative for abdominal pain, constipation and diarrhea.   Genitourinary:  Negative for difficulty urinating and dysuria.   Musculoskeletal:  Negative for arthralgias.   Neurological:  Negative for dizziness and light-headedness.        Personal History     Past Medical History:   Diagnosis Date    Allergic     Allergic rhinitis     Anxiety     Asthma     has inhaler and neb treatments    BMI 25.0-25.9,adult     Cancer     left breast    Community acquired pneumonia     COPD (chronic obstructive pulmonary disease)     Depression, major     Disease of thyroid gland     Diverticular disease     Foreign body in ear     Fractures 2012    Foot    GERD (gastroesophageal reflux disease)     Hashimoto's thyroiditis     History of abnormal mammogram     Left breast    History of acute sinusitis     History of adenocarcinoma of breast     History of COPD     History of dyspareunia in female     History of kidney stones     History of lump of left breast     History of malignant neoplasm of left breast     History of nausea and vomiting     History of seborrheic dermatitis     History of strep pharyngitis     History of suicidal ideation     Hypothyroidism     Hypoxemia     History of Hypoxemia    Joint pain 2021    Low back pain 2023    This is when it became debilitating    Lumbosacral disc disease ?    Obsessive compulsive disorder     Other screening mammogram     Personal history of asthma     PONV (postoperative nausea and vomiting)     Recurrent genital herpes simplex     History of     Symptomatic states associated with artificial menopause     History of     TMJ dysfunction 1999    Wear a mouth guard     Past Surgical History:   Procedure Laterality Date    APPENDECTOMY      BREAST AUGMENTATION Bilateral     Revised 2005,  initial implants 1992    BREAST RECONSTRUCTION, BREAST TISSUE EXPANDER INSERTION Bilateral     CERVICAL CERCLAGE      x 2 during pregnancy, incompetent cervix    CHOLECYSTECTOMY      COLONOSCOPY      EPIDURAL Right 01/11/2024    Procedure: LUMBAR/SACRAL TRANSFORAMINAL EPIDURAL RIGHT L3-4 #1 59238;  Surgeon: Rei Pruitt MD;  [facility];  Service: Pain Management;  Laterality: Right;    EPIDURAL Right 9/19/2024    Procedure: LUMBAR/SACRAL TRANSFORAMINAL EPIDURAL RIGHT L3-4 64472;  Surgeon: Rei Pruitt MD;  Location[facility];  Service: Pain Management;  Laterality: Right;    EPIDURAL BLOCK  1/2024    HAND SURGERY Right     may2022    HYSTERECTOMY      due to uterine prolapse    KIDNEY STONE SURGERY      LUNG SURGERY      bronchoscopy    MASTECTOMY Bilateral 01/2016    removed lymph nodes on left side    MEDIAL BRANCH BLOCK Right 3/13/2024    Procedure: LUMBAR MEDIAL BRANCH BLOCK RIGHT L3-L5 #1 62243, 32401;  Surgeon: Rei Pruitt MD;  [facility];  Service: Pain Management;  Laterality: Right;    MEDIAL BRANCH BLOCK Bilateral 3/27/2024    Procedure: LUMBAR MEDIAL BRANCH BLOCK BILATERAL L3-L5 20881-73, 64494-50 X 2;  Surgeon: Rei Pruitt MD;  [facility];  Service: Pain Management;  Laterality: Bilateral;    MEDIAL BRANCH BLOCK Left 5/2/2024    Procedure: LUMBAR MEDIAL BRANCH BLOCK LEFT L3-L5 #2 00517, 01935;  Surgeon: Rei Pruitt MD;  [facility];  Service: Pain Management;  Laterality: Left;    NERVE SURGERY Bilateral 6/4/2024    Procedure: LUMBAR RADIOFREQUENCY ABLATION BILATERAL L3-L5 71992-04, 64636-50 X 2;  Surgeon: Rei Pruitt MD;  [facility];  Service: Pain Management;  Laterality: Bilateral;    SACROILIAC JOINT INJECTION Bilateral 11/7/2024    Procedure: SACROILIAC INJECTION BILATERAL 94753-79;  Surgeon: Rei Pruitt MD;  [facility];  Service: Pain Management;  Laterality: Bilateral;     SCAR REVISION BREAST Bilateral 06/21/2022    Procedure: SCAR REVISION LEFT CHEST 27 CENTIMETERS AND RIGHT CHEST 25 CENTIMETERS FOR AESTHETIC FLAT CLOSURE FOLLOWING MASTECTOMY;  Surgeon: Waterhouse, Maurine, MD;  Location: [facility];  Service: Plastics;  Laterality: Bilateral;    TISSUE EXPANDER PLACEMENT Bilateral     due to infection     Family History   Problem Relation Age of Onset    Thyroid disease Mother     Arthritis Mother     Uterine cancer Mother         in her 40's    Depression Mother     GI problems Mother     Hyperlipidemia Mother     Cancer Father     Bipolar disorder Father     Depression Father     Hyperlipidemia Father     Hypertension Father     Arthritis Father     Arthritis Maternal Grandmother     Bleeding Disorder Maternal Grandmother     Hyperlipidemia Maternal Grandmother     Hypertension Maternal Grandmother     Depression Maternal Grandmother     Hypertension Maternal Grandfather     Hypertension Paternal Grandfather     Hyperlipidemia Paternal Grandfather     Arthritis Paternal Grandfather     Coronary artery disease Other         Maternal GrGF    Skin cancer Other         Maternal GrGF    Diabetes Other         Paternal GrGF    Heart disease Other         FH in females before the age of 65    Arthritis Paternal Grandmother     Malig Hyperthermia Neg Hx      Social History     Tobacco Use    Smoking status: Never    Smokeless tobacco: Never   Vaping Use    Vaping status: Never Used   Substance Use Topics    Alcohol use: Yes     Alcohol/week: 5.0 standard drinks of alcohol     Types: 2 Glasses of wine, 3 Drinks containing 0.5 oz of alcohol per week     Comment: social    Drug use: Never     No current facility-administered medications on file prior to encounter.     Current Outpatient Medications on File Prior to Encounter   Medication Sig Dispense Refill    albuterol (PROVENTIL) (2.5 MG/3ML) 0.083% nebulizer solution Take 2.5 mg by nebulization Every 4 (Four) Hours As Needed for  Wheezing.      albuterol (PROVENTIL) 4 MG tablet Take 1 tablet by mouth Every Night. 90 tablet 3    albuterol sulfate  (90 Base) MCG/ACT inhaler USE 2 INHALATIONS EVERY 4 HOURS AS NEEDED FOR WHEEZING 34 g 0    benzonatate (TESSALON) 200 MG capsule       buPROPion XL (WELLBUTRIN XL) 300 MG 24 hr tablet TAKE 1 TABLET EVERY MORNING 90 tablet 3    clindamycin (CLEOCIN T) 1 % swab APPLY 1 SWAB TOPICALLY TO THE FACE EVERY MORNING      diclofenac (VOLTAREN) 75 MG EC tablet TAKE 1 TABLET TWICE A DAY AS NEEDED FOR PAIN 30 tablet 23    FLUoxetine (PROzac) 40 MG capsule Take 2 capsules by mouth Daily. 180 capsule 3    Fluticasone Furoate-Vilanterol (Breo Ellipta) 100-25 MCG/INH inhaler Inhale 1 puff Daily. 180 each 3    levothyroxine (SYNTHROID, LEVOTHROID) 50 MCG tablet TAKE 1 TABLET DAILY 90 tablet 3    metaxalone (SKELAXIN) 800 MG tablet TAKE 1/2 TO 1 TABLET BY MOUTH FOUR TIMES DAILY AS NEEDED FOR MUSCLE SPASMS 120 tablet 0    montelukast (SINGULAIR) 10 MG tablet TAKE 1 TABLET DAILY 90 tablet 3    Multiple Vitamin (MULTI-VITAMIN DAILY PO) Take 1 tablet by mouth Every Night.      omeprazole (priLOSEC) 40 MG capsule TAKE 1 CAPSULE DAILY 90 capsule 3    ondansetron (ZOFRAN) 4 MG tablet TAKE 1 TABLET EVERY 8 HOURS AS NEEDED FOR NAUSEA OR VOMITING 27 tablet 39    predniSONE (DELTASONE) 20 MG tablet Take 1 tablet by mouth 2 (Two) Times a Day for 5 days. 10 tablet 0    pregabalin (Lyrica) 25 MG capsule Take 1 capsule PO TID x 5 days; if tolerated may slowly increase to 1 or 2 capsules PO  capsule 0    tamoxifen (NOLVADEX) 20 MG chemo tablet Take  by mouth Daily.      theophylline (THEODUR) 300 MG 12 hr tablet Take 1 tablet by mouth Every Morning. 90 tablet 3    valACYclovir (VALTREX) 500 MG tablet TAKE 1 TABLET DAILY 90 tablet 3    vitamin B-12 (CYANOCOBALAMIN) 1000 MCG tablet Take 1 tablet by mouth Daily.      Benzoyl Peroxide 10 % liquid 1 Application by Other route Daily.       No Known Allergies    Objective    Objective     Vital Signs  Temp:  [98.1 °F (36.7 °C)-99.9 °F (37.7 °C)] 98.1 °F (36.7 °C)  Heart Rate:  [] 68  Resp:  [16-18] 18  BP: ()/(64-84) 111/64  SpO2:  [89 %-97 %] 93 %  on  Flow (L/min) (Oxygen Therapy):  [2] 2;   Device (Oxygen Therapy): nasal cannula  Body mass index is 20.39 kg/m².    Physical Exam  Constitutional:       Appearance: She is ill-appearing.   HENT:      Head: Normocephalic and atraumatic.      Nose: Nose normal.      Mouth/Throat:      Mouth: Mucous membranes are moist.   Eyes:      Extraocular Movements: Extraocular movements intact.      Pupils: Pupils are equal, round, and reactive to light.   Cardiovascular:      Rate and Rhythm: Regular rhythm. Tachycardia present.   Pulmonary:      Comments: Tachypnea, harsh barky cough, wheezing throughout.  Tremulous upper body  Abdominal:      General: Bowel sounds are normal.   Musculoskeletal:         General: No swelling or tenderness. Normal range of motion.      Cervical back: Normal range of motion.   Skin:     General: Skin is warm and dry.   Neurological:      General: No focal deficit present.      Mental Status: She is alert and oriented to person, place, and time.   Psychiatric:      Comments: Pleasant, appropriate.         Results Review:  I reviewed the patient's new clinical results.      Lab Results (last 24 hours)       Procedure Component Value Units Date/Time    CBC & Differential [[id number]]  (Abnormal) Collected: 12/07/24 0136    Specimen: Blood Updated: 12/07/24 0153    Narrative:      The following orders were created for panel order CBC & Differential.  Procedure                               Abnormality         Status                     ---------                               -----------         ------                     CBC Auto Differential[[id number]]        Abnormal            Final result                 Please view results for these tests on the individual orders.    Comprehensive Metabolic Panel  [[id number]]  (Abnormal) Collected: 12/07/24 0136    Specimen: Blood Updated: 12/07/24 0211     Glucose 108 mg/dL      BUN 13 mg/dL      Creatinine 0.78 mg/dL      Sodium 138 mmol/L      Potassium 3.6 mmol/L      Chloride 105 mmol/L      CO2 22.7 mmol/L      Calcium 9.0 mg/dL      Total Protein 7.1 g/dL      Albumin 4.2 g/dL      ALT (SGPT) 34 U/L      AST (SGOT) 23 U/L      Alkaline Phosphatase 62 U/L      Total Bilirubin 0.4 mg/dL      Globulin 2.9 gm/dL      A/G Ratio 1.4 g/dL      BUN/Creatinine Ratio 16.7     Anion Gap 10.3 mmol/L      eGFR 92.1 mL/min/1.73     Narrative:      GFR Normal >60  Chronic Kidney Disease <60  Kidney Failure <15      BNP [[id number]]  (Normal) Collected: 12/07/24 0136    Specimen: Blood Updated: 12/07/24 0211     proBNP 119.0 pg/mL     Narrative:      This assay is used as an aid in the diagnosis of individuals suspected of having heart failure. It can be used as an aid in the diagnosis of acute decompensated heart failure (ADHF) in patients presenting with signs and symptoms of ADHF to the emergency department (ED). In addition, NT-proBNP of <300 pg/mL indicates ADHF is not likely.    Age Range Result Interpretation  NT-proBNP Concentration (pg/mL:      <50             Positive            >450                   Gray                 300-450                    Negative             <300    50-75           Positive            >900                  Gray                300-900                  Negative            <300      >75             Positive            >1800                  Gray                300-1800                  Negative            <300    Single High Sensitivity Troponin T [[id number]]  (Normal) Collected: 12/07/24 0136    Specimen: Blood Updated: 12/07/24 0211     HS Troponin T 9 ng/L     Narrative:      High Sensitive Troponin T Reference Range:  <14.0 ng/L- Negative Female for AMI  <22.0 ng/L- Negative Male for AMI  >=14 - Abnormal Female indicating possible myocardial  "injury.  >=22 - Abnormal Male indicating possible myocardial injury.   Clinicians would have to utilize clinical acumen, EKG, Troponin, and serial changes to determine if it is an Acute Myocardial Infarction or myocardial injury due to an underlying chronic condition.         CBC Auto Differential [[id number]]  (Abnormal) Collected: 12/07/24 0136    Specimen: Blood Updated: 12/07/24 0153     WBC 12.15 10*3/mm3      RBC 4.30 10*6/mm3      Hemoglobin 13.1 g/dL      Hematocrit 39.2 %      MCV 91.2 fL      MCH 30.5 pg      MCHC 33.4 g/dL      RDW 12.4 %      RDW-SD 41.2 fl      MPV 9.2 fL      Platelets 400 10*3/mm3      Neutrophil % 80.7 %      Lymphocyte % 11.7 %      Monocyte % 6.4 %      Eosinophil % 0.2 %      Basophil % 0.2 %      Immature Grans % 0.8 %      Neutrophils, Absolute 9.80 10*3/mm3      Lymphocytes, Absolute 1.42 10*3/mm3      Monocytes, Absolute 0.78 10*3/mm3      Eosinophils, Absolute 0.02 10*3/mm3      Basophils, Absolute 0.03 10*3/mm3      Immature Grans, Absolute 0.10 10*3/mm3      nRBC 0.0 /100 WBC     Procalcitonin [[id number]]  (Normal) Collected: 12/07/24 0136    Specimen: Blood Updated: 12/07/24 0408     Procalcitonin 0.04 ng/mL     Narrative:      As a Marker for Sepsis (Non-Neonates):    1. <0.5 ng/mL represents a low risk of severe sepsis and/or septic shock.  2. >2 ng/mL represents a high risk of severe sepsis and/or septic shock.    As a Marker for Lower Respiratory Tract Infections that require antibiotic therapy:    PCT on Admission    Antibiotic Therapy       6-12 Hrs later    >0.5                Strongly Recommended  >0.25 - <0.5        Recommended   0.1 - 0.25          Discouraged              Remeasure/reassess PCT  <0.1                Strongly Discouraged     Remeasure/reassess PCT    As 28 day mortality risk marker: \"Change in Procalcitonin Result\" (>80% or <=80%) if Day 0 (or Day 1) and Day 4 values are available. Refer to http://www.i[employer]pl[employer]-pct-calculator.com    Change in PCT " <=80%  A decrease of PCT levels below or equal to 80% defines a positive change in PCT test result representing a higher risk for 28-day all-cause mortality of patients diagnosed with severe sepsis for septic shock.    Change in PCT >80%  A decrease of PCT levels of more than 80% defines a negative change in PCT result representing a lower risk for 28-day all-cause mortality of patients diagnosed with severe sepsis or septic shock.       Respiratory Panel PCR w/COVID-19(SARS-CoV-2) SATISH/ANA LUISA/BLAIR/PAD/COR/MARIANGEL In-House, NP Swab in UTM/VTM, 2 HR TAT - Swab, Nasopharynx [[id number]]  (Abnormal) Collected: 12/07/24 0712    Specimen: Swab from Nasopharynx Updated: 12/07/24 0816     ADENOVIRUS, PCR Not Detected     Coronavirus 229E Not Detected     Coronavirus HKU1 Not Detected     Coronavirus NL63 Not Detected     Coronavirus OC43 Not Detected     COVID19 Not Detected     Human Metapneumovirus Not Detected     Human Rhinovirus/Enterovirus Detected     Influenza A PCR Not Detected     Influenza B PCR Not Detected     Parainfluenza Virus 1 Not Detected     Parainfluenza Virus 2 Not Detected     Parainfluenza Virus 3 Not Detected     Parainfluenza Virus 4 Not Detected     RSV, PCR Not Detected     Bordetella pertussis pcr Not Detected     Bordetella parapertussis PCR Not Detected     Chlamydophila pneumoniae PCR Not Detected     Mycoplasma pneumo by PCR Not Detected    Narrative:      In the setting of a positive respiratory panel with a viral infection PLUS a negative procalcitonin without other underlying concern for bacterial infection, consider observing off antibiotics or discontinuation of antibiotics and continue supportive care. If the respiratory panel is positive for atypical bacterial infection (Bordetella pertussis, Chlamydophila pneumoniae, or Mycoplasma pneumoniae), consider antibiotic de-escalation to target atypical bacterial infection.            Imaging Results (Last 24 Hours)       Procedure Component Value  Units Date/Time    XR Chest 1 View [[id number]] Collected: 12/07/24 0237     Updated: 12/07/24 0242    Narrative:      SINGLE VIEW OF THE CHEST     HISTORY: Shortness of air     COMPARISON: February 21, 2024     FINDINGS:  Heart size is within normal limits. No pneumothorax, pleural effusion,  or acute infiltrate is seen. There is eventration of the right  hemidiaphragm. No pneumothorax or pleural effusion is seen. There are  changes of bilateral mastectomies. There does appear to be some  consolidation within the left infrahilar region. There are background  changes of COPD.       Impression:      There is some consolidation within the left infrahilar region. This may  represent scarring or atelectasis, although correlation with any  evidence of pneumonia is recommended.     This report was finalized on 12/7/2024 2:39 AM by Dr. Josette Bhardwaj M.D on Workstation: [id number]                   ECG 12 Lead ED Triage Standing Order; SOA   Preliminary Result   HEART RATE=79  bpm   RR Hqcurluw=415  ms   AL Qjebvssr=930  ms   P Horizontal Axis=-3  deg   P Front Axis=52  deg   QRSD Interval=96  ms   QT Ydlcrmpb=821  ms   IZyK=531  ms   QRS Axis=5  deg   T Wave Axis=32  deg   - ABNORMAL ECG -   Sinus rhythm   LVH with secondary repolarization abnormality   Artifact in lead(s) I,II,III,aVR,aVL,aVF,V6   Date and Time of Study:2024-12-07 01:40:34      Telemetry Scan   Final Result           Assessment/Plan     Active Hospital Problems    Diagnosis  POA    **Acute hypoxic respiratory failure [J96.01]  Yes    Asthma exacerbation [J45.901]  Unknown    History of breast cancer [Z85.3]  Not Applicable    Hypothyroidism [E03.9]  Yes      Resolved Hospital Problems   No resolved problems to display.     This is a very pleasant 49-year-old female with history of breast cancer, asthma and hypothyroidism who presents with acute hypoxic respiratory failure and asthma exacerbation secondary to rhinovirus.  She had a low-grade fever 99  9 and a white count of 12.  Chest x-ray was a 1 view and looks more like a scarring as opposed to pneumonia.  Procalcitonin was negative.  Will continue Solu-Medrol, Symbicort, DuoNebs, Singulair, theophylline.  No indication for antibiotics at this time.    Resume her tamoxifen and levothyroxine.      Melatonin to help her rest at bedtime.    I discussed the patient's findings and my recommendations with patient.    VTE Prophylaxis - SCDs.  Code Status - Full code.       MorenitaRISHI Villa  [facility]  12/07/24  15:55 EST    Electronically signed by Prabhjot Giagn MD at 12/09/24 0801          Emergency Department Notes        Yamilet Murguia RN at 12/07/24 1225          Nursing report ED to floor  Nereida Myles  49 y.o.  female    HPI :  HPI  Stated Reason for Visit: Pt to ED via PV for reports of asthma since last Sunday and vomiting that starting tonight. Pt denies any abdominal pain.  History Obtained From: patient    Chief Complaint  Chief Complaint   Patient presents with    Shortness of Breath    Asthma    Vomiting       Admitting doctor:   Prabhjot Giang MD    Admitting diagnosis:   The primary encounter diagnosis was Acute hypoxic respiratory failure. Diagnoses of Asthma with acute exacerbation, unspecified asthma severity, unspecified whether persistent and Acute bronchitis due to Rhinovirus were also pertinent to this visit.    Code status:   Current Code Status       Date Active Code Status Order ID Comments User Context       Prior            Allergies:   Patient has no known allergies.    Isolation:   Droplet    Intake and Output  No intake or output data in the 24 hours ending 12/07/24 1225    Weight:       12/07/24  0128   Weight: 59 kg (130 lb)       Most recent vitals:   Vitals:    12/07/24 0931 12/07/24 0932 12/07/24 1149 12/07/24 1216   BP: 115/75  111/72    BP Location:       Patient Position:       Pulse: 66 62 77    Resp:    16   Temp:        TempSrc:       SpO2:  91%     Weight:       Height:           Active LDAs/IV Access:   Lines, Drains & Airways       Active LDAs       Name Placement date Placement time Site Days    Peripheral IV 12/07/24 0705 Anterior;Right Forearm 12/07/24 0705  Forearm  less than 1                    Labs (abnormal labs have a star):   Labs Reviewed   RESPIRATORY PANEL PCR W/ COVID-19 (SARS-COV-2), NP SWAB IN UTM/VTP, 2 HR TAT - Abnormal; Notable for the following components:       Result Value    Human Rhinovirus/Enterovirus Detected (*)     All other components within normal limits    Narrative:     In the setting of a positive respiratory panel with a viral infection PLUS a negative procalcitonin without other underlying concern for bacterial infection, consider observing off antibiotics or discontinuation of antibiotics and continue supportive care. If the respiratory panel is positive for atypical bacterial infection (Bordetella pertussis, Chlamydophila pneumoniae, or Mycoplasma pneumoniae), consider antibiotic de-escalation to target atypical bacterial infection.   COMPREHENSIVE METABOLIC PANEL - Abnormal; Notable for the following components:    Glucose 108 (*)     ALT (SGPT) 34 (*)     All other components within normal limits    Narrative:     GFR Normal >60  Chronic Kidney Disease <60  Kidney Failure <15     CBC WITH AUTO DIFFERENTIAL - Abnormal; Notable for the following components:    WBC 12.15 (*)     Neutrophil % 80.7 (*)     Lymphocyte % 11.7 (*)     Eosinophil % 0.2 (*)     Immature Grans % 0.8 (*)     Neutrophils, Absolute 9.80 (*)     Immature Grans, Absolute 0.10 (*)     All other components within normal limits   BNP (IN-HOUSE) - Normal    Narrative:     This assay is used as an aid in the diagnosis of individuals suspected of having heart failure. It can be used as an aid in the diagnosis of acute decompensated heart failure (ADHF) in patients presenting with signs and symptoms of ADHF to the emergency  "department (ED). In addition, NT-proBNP of <300 pg/mL indicates ADHF is not likely.    Age Range Result Interpretation  NT-proBNP Concentration (pg/mL:      <50             Positive            >450                   Gray                 300-450                    Negative             <300    50-75           Positive            >900                  Gray                300-900                  Negative            <300      >75             Positive            >1800                  Gray                300-1800                  Negative            <300   SINGLE HS TROPONIN T - Normal    Narrative:     High Sensitive Troponin T Reference Range:  <14.0 ng/L- Negative Female for AMI  <22.0 ng/L- Negative Male for AMI  >=14 - Abnormal Female indicating possible myocardial injury.  >=22 - Abnormal Male indicating possible myocardial injury.   Clinicians would have to utilize clinical acumen, EKG, Troponin, and serial changes to determine if it is an Acute Myocardial Infarction or myocardial injury due to an underlying chronic condition.        PROCALCITONIN - Normal    Narrative:     As a Marker for Sepsis (Non-Neonates):    1. <0.5 ng/mL represents a low risk of severe sepsis and/or septic shock.  2. >2 ng/mL represents a high risk of severe sepsis and/or septic shock.    As a Marker for Lower Respiratory Tract Infections that require antibiotic therapy:    PCT on Admission    Antibiotic Therapy       6-12 Hrs later    >0.5                Strongly Recommended  >0.25 - <0.5        Recommended   0.1 - 0.25          Discouraged              Remeasure/reassess PCT  <0.1                Strongly Discouraged     Remeasure/reassess PCT    As 28 day mortality risk marker: \"Change in Procalcitonin Result\" (>80% or <=80%) if Day 0 (or Day 1) and Day 4 values are available. Refer to http://www.[facility]-pct-calculator.com    Change in PCT <=80%  A decrease of PCT levels below or equal to 80% defines a positive change in PCT test result " representing a higher risk for 28-day all-cause mortality of patients diagnosed with severe sepsis for septic shock.    Change in PCT >80%  A decrease of PCT levels of more than 80% defines a negative change in PCT result representing a lower risk for 28-day all-cause mortality of patients diagnosed with severe sepsis or septic shock.      RAINBOW DRAW    Narrative:     The following orders were created for panel order [place] Draw.  Procedure                               Abnormality         Status                     ---------                               -----------         ------                     Green Top (Gel)[[id number]]                                  Final result               Lavender Top[[id number]]                                     Final result               Gold Top - SST[[id number]]                                   Final result               Light Blue Top[[id number]]                                   Final result                 Please view results for these tests on the individual orders.   CBC AND DIFFERENTIAL    Narrative:     The following orders were created for panel order CBC & Differential.  Procedure                               Abnormality         Status                     ---------                               -----------         ------                     CBC Auto Differential[[id number]]        Abnormal            Final result                 Please view results for these tests on the individual orders.   GREEN TOP   LAVENDER TOP   GOLD TOP - SST   LIGHT BLUE TOP       EKG:   ECG 12 Lead ED Triage Standing Order; SOA   Preliminary Result   HEART RATE=79  bpm   RR Ehpaonbh=041  ms   PA Dlaqfarv=284  ms   P Horizontal Axis=-3  deg   P Front Axis=52  deg   QRSD Interval=96  ms   QT Hdqshpxn=638  ms   JMmE=868  ms   QRS Axis=5  deg   T Wave Axis=32  deg   - ABNORMAL ECG -   Sinus rhythm   LVH with secondary repolarization abnormality   Artifact in lead(s) I,II,III,aVR,aVL,aVF,V6   Date  and Time of Study:2024-12-07 01:40:34          Meds given in ED:   Medications   sodium chloride 0.9 % flush 10 mL (has no administration in time range)   albuterol (PROVENTIL) nebulizer solution 0.083% 2.5 mg/3mL (has no administration in time range)   benzonatate (TESSALON) capsule 200 mg (has no administration in time range)   buPROPion XL (WELLBUTRIN XL) 24 hr tablet 300 mg (300 mg Oral Given 12/7/24 1149)   FLUoxetine (PROzac) capsule 80 mg (80 mg Oral Given 12/7/24 1148)   budesonide-formoterol (SYMBICORT) 160-4.5 MCG/ACT inhaler 2 puff (2 puffs Inhalation Given 12/7/24 1200)   levothyroxine (SYNTHROID, LEVOTHROID) tablet 50 mcg (50 mcg Oral Given 12/7/24 1149)   metaxalone (SKELAXIN) tablet 400 mg (has no administration in time range)   montelukast (SINGULAIR) tablet 10 mg (has no administration in time range)   methylPREDNISolone sodium succinate (SOLU-Medrol) injection 60 mg (60 mg Intravenous Given 12/7/24 1156)   pantoprazole (PROTONIX) EC tablet 40 mg (40 mg Oral Given 12/7/24 1147)   pregabalin (LYRICA) capsule 25 mg (has no administration in time range)   tamoxifen (NOLVADEX) tablet 20 mg (has no administration in time range)   theophylline (THEODUR) 12 hr tablet 300 mg (300 mg Oral Given 12/7/24 1147)   ipratropium-albuterol (DUO-NEB) nebulizer solution 3 mL (3 mL Nebulization Given 12/7/24 1215)   sodium chloride 0.9 % flush 10 mL (10 mL Intravenous Given 12/7/24 1200)   sodium chloride 0.9 % flush 10 mL (has no administration in time range)   sodium chloride 0.9 % infusion 40 mL (has no administration in time range)   Enoxaparin Sodium (LOVENOX) syringe 40 mg (has no administration in time range)   Potassium Replacement - Follow Nurse / BPA Driven Protocol (has no administration in time range)   Magnesium Standard Dose Replacement - Follow Nurse / BPA Driven Protocol (has no administration in time range)   Phosphorus Replacement - Follow Nurse / BPA Driven Protocol (has no administration in time  range)   Calcium Replacement - Follow Nurse / BPA Driven Protocol (has no administration in time range)   acetaminophen (TYLENOL) tablet 650 mg (has no administration in time range)     Or   acetaminophen (TYLENOL) 160 MG/5ML oral solution 650 mg (has no administration in time range)     Or   acetaminophen (TYLENOL) suppository 650 mg (has no administration in time range)   sennosides-docusate (PERICOLACE) 8.6-50 MG per tablet 2 tablet (has no administration in time range)     And   polyethylene glycol (MIRALAX) packet 17 g (has no administration in time range)     And   bisacodyl (DULCOLAX) EC tablet 5 mg (has no administration in time range)     And   bisacodyl (DULCOLAX) suppository 10 mg (has no administration in time range)   ondansetron ODT (ZOFRAN-ODT) disintegrating tablet 4 mg (has no administration in time range)     Or   ondansetron (ZOFRAN) injection 4 mg (has no administration in time range)   HYDROcodone-acetaminophen (NORCO) 5-325 MG per tablet 1 tablet (has no administration in time range)   methylPREDNISolone sodium succinate (SOLU-Medrol) injection 125 mg (125 mg Intravenous Given 12/7/24 0709)   ipratropium-albuterol (DUO-NEB) nebulizer solution 3 mL (3 mL Nebulization Given 12/7/24 0702)   albuterol (PROVENTIL) nebulizer solution 0.083% 2.5 mg/3mL (2.5 mg Nebulization Given 12/7/24 0707)   ondansetron (ZOFRAN) injection 8 mg (8 mg Intravenous Given 12/7/24 0709)   ibuprofen (ADVIL,MOTRIN) tablet 800 mg (800 mg Oral Given 12/7/24 0901)   metoclopramide (REGLAN) injection 10 mg (10 mg Intravenous Given 12/7/24 0941)   diphenhydrAMINE (BENADRYL) injection 12.5 mg (12.5 mg Intravenous Given 12/7/24 0941)       Imaging results:  XR Chest 1 View    Result Date: 12/7/2024  There is some consolidation within the left infrahilar region. This may represent scarring or atelectasis, although correlation with any evidence of pneumonia is recommended.  This report was finalized on 12/7/2024 2:39 AM by Dr. Finch  RYLAND Bhardwaj on Workstation: [id number]       Ambulatory status:   - assistx1    Social issues:   Social History     Socioeconomic History    Marital status:    Tobacco Use    Smoking status: Never    Smokeless tobacco: Never   Vaping Use    Vaping status: Never Used   Substance and Sexual Activity    Alcohol use: Yes     Alcohol/week: 5.0 standard drinks of alcohol     Types: 2 Glasses of wine, 3 Drinks containing 0.5 oz of alcohol per week     Comment: social    Drug use: Never    Sexual activity: Yes     Partners: Male     Birth control/protection: None, Vasectomy, Essure, Hysterectomy       Peripheral Neurovascular  Peripheral Neurovascular (Adult)  Peripheral Neurovascular WDL: WDL  Pulse Assessment: dorsalis pedis    Neuro Cognitive  Neuro Cognitive (Adult)  Cognitive/Neuro/Behavioral WDL: WDL  Hand /Ankle Strength  Hand , Left: strong  Hand , Right: strong  Dorsiflexion, Left: strong  Dorsiflexion, Right: strong  Plantarflexion, Left: strong  Plantarflexion, Right: strong    Learning  Learning Assessment  Learning Readiness and Ability: no barriers identified  Education Provided  Person Taught: patient  Teaching Method: verbal instruction  Teaching Focus: symptom/problem overview, diagnostic test  Education Outcome Evaluation: eager to learn, verbalizes understanding    Respiratory  Respiratory WDL  Respiratory WDL: cough (sob since sunday, saw pcp put on steroid dose pack, tonight nausea, unable to keep steroids down, continues to be sob.)  Cough Type: congested, productive  Breath Sounds  All Lung Fields Breath Sounds: All Fields  All Lung Fields Breath Sounds: wheezes, expiratory, wheezes, inspiratory, crackles  Breath Sounds Post-Respiratory Treatment  Breath Sounds Posttreatment All Fields: All Fields  Breath Sounds Posttreatment All Fields: Anterior:, wheezes, expiratory    Abdominal Pain       Pain Assessments  Pain (Adult)  (0-10) Pain Rating: Rest: 7    NIH Stroke Scale        Yamilet Murguia RN  12/07/24 12:25 EST     Electronically signed by Yamilet Murguia RN at 12/07/24 8890       Blanca Parr MD at 12/07/24 4231          MD ATTESTATION NOTE    SHARED VISIT: This visit was performed by BOTH a physician and an APC. The substantive portion of the medical decision making was performed by this attesting physician who made or approved the management plan and takes responsibility for patient management. All studies in the APC note (if performed) were independently interpreted by me.     The PAVITHRA and I have discussed this patient's history, physical exam, and treatment plan.  I have reviewed the documentation and affirm the documentation and agree with the treatment and plan.  The attached note describes my personal findings.      Independent Historians: Patient    A complete HPI/ROS/PMH/PSH/SH/FH are unobtainable due to: None    Chronic or social conditions impacting patient care (social determinants of health): None    Nereida Myles is a 49 y.o. female who presents to the ED c/o acute cough, congestion, sob, wheezing, and n/v.  Pt sick X 5 days and saw pmd a few days  ago. Pt had steroid injection and started on prednisone orally. Pt now with vomiting and refractory symptoms despite asthma treatemtn at home. Pt with h/o severe asthma and followed by Dr. Baker with pulm.          On exam:  GENERAL: cooperative and conversant ill appearing f, alert  SKIN: Warm, dry  HENT: Normocephalic, atraumatic  EYES: no scleral icterus  CV: regular rhythm, regular rate  RESPIRATORY: normal effort, diffuse insp and exp wheezing throughout  MUSCULOSKELETAL: no deformity  NEURO: alert, moves all extremities, follows commands                                                             Labs  Recent Results (from the past 24 hours)   Comprehensive Metabolic Panel    Collection Time: 12/07/24  1:36 AM    Specimen: Blood   Result Value Ref Range    Glucose 108 (H) 65 - 99 mg/dL    BUN 13 6 -  20 mg/dL    Creatinine 0.78 0.57 - 1.00 mg/dL    Sodium 138 136 - 145 mmol/L    Potassium 3.6 3.5 - 5.2 mmol/L    Chloride 105 98 - 107 mmol/L    CO2 22.7 22.0 - 29.0 mmol/L    Calcium 9.0 8.6 - 10.5 mg/dL    Total Protein 7.1 6.0 - 8.5 g/dL    Albumin 4.2 3.5 - 5.2 g/dL    ALT (SGPT) 34 (H) 1 - 33 U/L    AST (SGOT) 23 1 - 32 U/L    Alkaline Phosphatase 62 39 - 117 U/L    Total Bilirubin 0.4 0.0 - 1.2 mg/dL    Globulin 2.9 gm/dL    A/G Ratio 1.4 g/dL    BUN/Creatinine Ratio 16.7 7.0 - 25.0    Anion Gap 10.3 5.0 - 15.0 mmol/L    eGFR 92.1 >60.0 mL/min/1.73   BNP    Collection Time: 12/07/24  1:36 AM    Specimen: Blood   Result Value Ref Range    proBNP 119.0 0.0 - 900.0 pg/mL   Single High Sensitivity Troponin T    Collection Time: 12/07/24  1:36 AM    Specimen: Blood   Result Value Ref Range    HS Troponin T 9 <14 ng/L   Green Top (Gel)    Collection Time: 12/07/24  1:36 AM   Result Value Ref Range    Extra Tube Hold for add-ons.    Lavender Top    Collection Time: 12/07/24  1:36 AM   Result Value Ref Range    Extra Tube hold for add-on    Gold Top - SST    Collection Time: 12/07/24  1:36 AM   Result Value Ref Range    Extra Tube Hold for add-ons.    Light Blue Top    Collection Time: 12/07/24  1:36 AM   Result Value Ref Range    Extra Tube Hold for add-ons.    CBC Auto Differential    Collection Time: 12/07/24  1:36 AM    Specimen: Blood   Result Value Ref Range    WBC 12.15 (H) 3.40 - 10.80 10*3/mm3    RBC 4.30 3.77 - 5.28 10*6/mm3    Hemoglobin 13.1 12.0 - 15.9 g/dL    Hematocrit 39.2 34.0 - 46.6 %    MCV 91.2 79.0 - 97.0 fL    MCH 30.5 26.6 - 33.0 pg    MCHC 33.4 31.5 - 35.7 g/dL    RDW 12.4 12.3 - 15.4 %    RDW-SD 41.2 37.0 - 54.0 fl    MPV 9.2 6.0 - 12.0 fL    Platelets 400 140 - 450 10*3/mm3    Neutrophil % 80.7 (H) 42.7 - 76.0 %    Lymphocyte % 11.7 (L) 19.6 - 45.3 %    Monocyte % 6.4 5.0 - 12.0 %    Eosinophil % 0.2 (L) 0.3 - 6.2 %    Basophil % 0.2 0.0 - 1.5 %    Immature Grans % 0.8 (H) 0.0 - 0.5 %     Neutrophils, Absolute 9.80 (H) 1.70 - 7.00 10*3/mm3    Lymphocytes, Absolute 1.42 0.70 - 3.10 10*3/mm3    Monocytes, Absolute 0.78 0.10 - 0.90 10*3/mm3    Eosinophils, Absolute 0.02 0.00 - 0.40 10*3/mm3    Basophils, Absolute 0.03 0.00 - 0.20 10*3/mm3    Immature Grans, Absolute 0.10 (H) 0.00 - 0.05 10*3/mm3    nRBC 0.0 0.0 - 0.2 /100 WBC   Procalcitonin    Collection Time: 12/07/24  1:36 AM    Specimen: Blood   Result Value Ref Range    Procalcitonin 0.04 0.00 - 0.25 ng/mL   ECG 12 Lead ED Triage Standing Order; SOA    Collection Time: 12/07/24  1:40 AM   Result Value Ref Range    QT Interval 399 ms    QTC Interval 458 ms   Respiratory Panel PCR w/COVID-19(SARS-CoV-2) SATISH/ANA LUISA/BLAIR/PAD/COR/MARIANGEL In-House, NP Swab in UTM/VTM, 2 HR TAT - Swab, Nasopharynx    Collection Time: 12/07/24  7:12 AM    Specimen: Nasopharynx; Swab   Result Value Ref Range    ADENOVIRUS, PCR Not Detected Not Detected    Coronavirus 229E Not Detected Not Detected    Coronavirus HKU1 Not Detected Not Detected    Coronavirus NL63 Not Detected Not Detected    Coronavirus OC43 Not Detected Not Detected    COVID19 Not Detected Not Detected - Ref. Range    Human Metapneumovirus Not Detected Not Detected    Human Rhinovirus/Enterovirus Detected (A) Not Detected    Influenza A PCR Not Detected Not Detected    Influenza B PCR Not Detected Not Detected    Parainfluenza Virus 1 Not Detected Not Detected    Parainfluenza Virus 2 Not Detected Not Detected    Parainfluenza Virus 3 Not Detected Not Detected    Parainfluenza Virus 4 Not Detected Not Detected    RSV, PCR Not Detected Not Detected    Bordetella pertussis pcr Not Detected Not Detected    Bordetella parapertussis PCR Not Detected Not Detected    Chlamydophila pneumoniae PCR Not Detected Not Detected    Mycoplasma pneumo by PCR Not Detected Not Detected       Radiology  XR Chest 1 View    Result Date: 12/7/2024  SINGLE VIEW OF THE CHEST  HISTORY: Shortness of air  COMPARISON: February 21, 2024   FINDINGS: Heart size is within normal limits. No pneumothorax, pleural effusion, or acute infiltrate is seen. There is eventration of the right hemidiaphragm. No pneumothorax or pleural effusion is seen. 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 There does appear to be some consolidation within the left infrahilar region. There are background changes of COPD.      There is some consolidation within the left infrahilar region. This may represent scarring or atelectasis, although correlation with any evidence of pneumonia is recommended.  This report was finalized on 12/7/2024 2:39 AM by Dr. Josette Bhardwaj M.D on Workstation: [id number]       Medical Decision Making:  ED Course as of 12/07/24 1743   Sat Dec 07, 2024   0614 WBC(!): 12.15 [KA]   0614 Hemoglobin: 13.1 [KA]   0614 proBNP: 119.0 [KA]   0614 HS Troponin T: 9 [KA]   0614 Procalcitonin: 0.04 [KA]   0614 Glucose(!): 108 [KA]   0614 Creatinine: 0.78 [KA]   0616 My independent interpretation of the EKG performed at 1:40 AM is sinus rhythm rate 79 normal axis normal intervals baseline artifact mild ST depression in the lateral leads, no ST elevation.  In comparison to prior on August 2, 2024, there is no significant change. [KA]   0617 My Independent interpretation of the chest x-ray is no cardiomegaly, no acute infiltrate. [KA]   0818 Human Rhinovirus/Enterovirus(!): Detected [KA]   0821 I reassessed the patient, despite 3 nebs and having been on steroids for the last couple days she still quite wheezy.  Oxygen dropped to 89% she is now on 2 L.  Will plan for admission and she is agreeable.  She did test positive for rhinovirus. [KA]      ED Course User Index  [KA] Kelsey Ying PA-C       MDM: This patient presents with dyspnea, most likely secondary to viral illness, pna, and/or asthma exacerbation.  The differential diagnosis list includes but is not limited to:   - acute cardiac etiologies like ACS, CHF, pericardial effusion or even tamponade  -  acute respiratory etiologies like acute PE, pneumothorax , asthma, COPD exacerbation, allergic etiologies, or infectious etiologies such as PNA   - non-cardiopulmonary causes like toxidromes, metabolic etiologies such as acidemia or electrolyte derangements or even DKA, sepsis, neurologic causes including GBS and myasthenia gravis  Plan EKG, CXR, Labs incl bnp and trop and +/- viral swab    Procedures:  Procedures        PPE: I followed hospital protocols for proper PPE based on patient presentation including use of N95 mask for suspected infectious respiratory conditions.  Proper hand hygiene was performed both before and after the patient encounter.          Diagnosis  Final diagnoses:   Acute hypoxic respiratory failure   Asthma with acute exacerbation, unspecified asthma severity, unspecified whether persistent   Acute bronchitis due to Rhinovirus       Note Disclaimer: At [facility], we believe that sharing information builds trust and better relationships. You are receiving this note because you recently visited [facility]. It is possible you will see health information before a provider has talked with you about it. This kind of information can be easy to misunderstand. To help you fully understand what it means for your health, we urge you to discuss this note with your provider.       Blanca Parr MD  12/09/24 0846      Electronically signed by Blanca Parr MD at 12/09/24 0846       Kelsey Ying PA-C at 12/07/24 0633       Attestation signed by Blanca Parr MD at 12/07/24 0931        SHARED APC FACE TO FACE: I performed a substantive part of the MDM during the patient's E/M visit. I personally evaluated and examined the patient. I personally made or approved the documented management plan and acknowledge its risk of complications.   Blanca Parr MD 12/7/2024 09:31 EST                          EMERGENCY DEPARTMENT ENCOUNTER  Room Number:  04/04  PCP: Kehrer,  Kelly Ortega MD  Independent Historians: Patient      HPI:  Chief Complaint: had concerns including Shortness of Breath, Asthma, and Vomiting.     A complete HPI/ROS/PMH/PSH/SH/FH are unobtainable due to: None    Chronic or social conditions impacting patient care (Social Determinants of Health): None      Context: The patient is a 49 y.o. female with a medical history of severe persistent asthma, COPD, GERD, Hashimoto's thyroiditis who presents to the ED c/o acute cough congestion shortness of breath and fatigue.  Symptoms started 5 days ago, 3 days ago was seen at PCP given a steroid injection and prescribed prednisone 40 mg daily.  Yesterday she was only able to take 1 dose of the prednisone because she started becoming nauseated, had a couple episodes of vomiting, unable to tolerate any food or meds.  She had some mild nasal congestion rhinorrhea, has had cold chills body aches but unsure if she has had any fever at home.  she denies any abdominal pain or diarrhea, states she commonly gets nausea and vomiting during an asthma exacerbation, also states she has had many admissions in the past for asthma.  She is using her nebulizer and inhalers at home without adequate relief.  Pulmonologist is Dr. Baker.      Review of prior external notes (non-ED) -and- Review of prior external test results outside of this encounter:  Office visit with [facility] on 10/2/2024 for persistent asthma, Dupixent had been discontinued in March of this year. Spirometry showed mild obstruction with statistically significant improvement postbronchodilator and she was continued on Allegra and Singulair        PAST MEDICAL HISTORY  Active Ambulatory Problems     Diagnosis Date Noted    Hashimoto's thyroiditis 11/18/2019    Hypothyroidism 11/18/2019    Depression, major 11/18/2019    Anxiety 11/18/2019    Asthma 11/18/2019    Pneumonia due to COVID-19 virus 01/20/2021    Hypoxia 01/21/2021    Shortness of breath 01/21/2021     Pyelonephritis 01/07/2023    COPD (chronic obstructive pulmonary disease) 01/07/2023    Breast CA 02/28/2023    Finger clubbing 02/28/2023    Severe episode of recurrent major depressive disorder, without psychotic features 05/18/2023    Severe persistent asthma 05/18/2023    Lumbar radiculopathy 12/12/2023    DDD (degenerative disc disease), lumbar 12/12/2023    Lumbar facet arthropathy 12/12/2023    Pain of right hip 02/08/2024    Gastroesophageal reflux disease without esophagitis 08/02/2024    Low serum vitamin B12 08/02/2024    Major depressive disorder, recurrent, mild 08/02/2024    Sacroiliitis 10/17/2024     Resolved Ambulatory Problems     Diagnosis Date Noted    No Resolved Ambulatory Problems     Past Medical History:   Diagnosis Date    Allergic     Allergic rhinitis     BMI 25.0-25.9,adult     Cancer     Community acquired pneumonia     Disease of thyroid gland     Diverticular disease     Foreign body in ear     Fractures 2012    GERD (gastroesophageal reflux disease)     History of abnormal mammogram     History of acute sinusitis     History of adenocarcinoma of breast     History of COPD     History of dyspareunia in female     History of kidney stones     History of lump of left breast     History of malignant neoplasm of left breast     History of nausea and vomiting     History of seborrheic dermatitis     History of strep pharyngitis     History of suicidal ideation     Hypoxemia     Joint pain 2021    Low back pain 2023    Lumbosacral disc disease ?    Obsessive compulsive disorder     Other screening mammogram     Personal history of asthma     PONV (postoperative nausea and vomiting)     Recurrent genital herpes simplex     Symptomatic states associated with artificial menopause     TMJ dysfunction 1999         PAST SURGICAL HISTORY  Past Surgical History:   Procedure Laterality Date    APPENDECTOMY      BREAST AUGMENTATION Bilateral     Revised 2005, initial implants 1992    BREAST  RECONSTRUCTION, BREAST TISSUE EXPANDER INSERTION Bilateral     CERVICAL CERCLAGE      x 2 during pregnancy, incompetent cervix    CHOLECYSTECTOMY      COLONOSCOPY      EPIDURAL Right 01/11/2024    Procedure: LUMBAR/SACRAL TRANSFORAMINAL EPIDURAL RIGHT L3-4 #1 32606;  Surgeon: Rei Pruitt MD;  [facility];  Service: Pain Management;  Laterality: Right;    EPIDURAL Right 9/19/2024    Procedure: LUMBAR/SACRAL TRANSFORAMINAL EPIDURAL RIGHT L3-4 32502;  Surgeon: Rei Pruitt MD;  Location: [facility];  Service: Pain Management;  Laterality: Right;    EPIDURAL BLOCK  1/2024    HAND SURGERY Right     may2022    HYSTERECTOMY      due to uterine prolapse    KIDNEY STONE SURGERY      LUNG SURGERY      bronchoscopy    MASTECTOMY Bilateral 01/2016    removed lymph nodes on left side    MEDIAL BRANCH BLOCK Right 3/13/2024    Procedure: LUMBAR MEDIAL BRANCH BLOCK RIGHT L3-L5 #1 89760, 37052;  Surgeon: Rei Pruitt MD;  [facility];  Service: Pain Management;  Laterality: Right;    MEDIAL BRANCH BLOCK Bilateral 3/27/2024    Procedure: LUMBAR MEDIAL BRANCH BLOCK BILATERAL L3-L5 69795-46, 64494-50 X 2;  Surgeon: Rei Pruitt MD;  [facility];  Service: Pain Management;  Laterality: Bilateral;    MEDIAL BRANCH BLOCK Left 5/2/2024    Procedure: LUMBAR MEDIAL BRANCH BLOCK LEFT L3-L5 #2 78136, 25046;  Surgeon: Rei Pruitt MD;  [facility];  Service: Pain Management;  Laterality: Left;    NERVE SURGERY Bilateral 6/4/2024    Procedure: LUMBAR RADIOFREQUENCY ABLATION BILATERAL L3-L5 69595-69, 64636-50 X 2;  Surgeon: Rei Pruitt MD;  [facility];  Service: Pain Management;  Laterality: Bilateral;    SACROILIAC JOINT INJECTION Bilateral 11/7/2024    Procedure: SACROILIAC INJECTION BILATERAL 94357-03;  Surgeon: Rei Pruitt MD;  [facility];  Service: Pain Management;  Laterality: Bilateral;    SCAR REVISION BREAST Bilateral  06/21/2022    Procedure: SCAR REVISION LEFT CHEST 27 CENTIMETERS AND RIGHT CHEST 25 CENTIMETERS FOR AESTHETIC FLAT CLOSURE FOLLOWING MASTECTOMY;  Surgeon: Waterhouse, Maurine, MD;  Location: [facility];  Service: Plastics;  Laterality: Bilateral;    TISSUE EXPANDER PLACEMENT Bilateral     due to infection         FAMILY HISTORY  Family History   Problem Relation Age of Onset    Thyroid disease Mother     Arthritis Mother     Uterine cancer Mother         in her 40's    Depression Mother     GI problems Mother     Hyperlipidemia Mother     Cancer Father     Bipolar disorder Father     Depression Father     Hyperlipidemia Father     Hypertension Father     Arthritis Father     Arthritis Maternal Grandmother     Bleeding Disorder Maternal Grandmother     Hyperlipidemia Maternal Grandmother     Hypertension Maternal Grandmother     Depression Maternal Grandmother     Hypertension Maternal Grandfather     Hypertension Paternal Grandfather     Hyperlipidemia Paternal Grandfather     Arthritis Paternal Grandfather     Coronary artery disease Other         Maternal GrGF    Skin cancer Other         Maternal GrGF    Diabetes Other         Paternal GrGF    Heart disease Other         FH in females before the age of 65    Arthritis Paternal Grandmother     Malig Hyperthermia Neg Hx          SOCIAL HISTORY  Social History     Socioeconomic History    Marital status:    Tobacco Use    Smoking status: Never    Smokeless tobacco: Never   Vaping Use    Vaping status: Never Used   Substance and Sexual Activity    Alcohol use: Yes     Alcohol/week: 5.0 standard drinks of alcohol     Types: 2 Glasses of wine, 3 Drinks containing 0.5 oz of alcohol per week     Comment: social    Drug use: Never    Sexual activity: Yes     Partners: Male     Birth control/protection: None, Vasectomy, Essure, Hysterectomy         ALLERGIES  Patient has no known allergies.      REVIEW OF SYSTEMS  Review of Systems  Included in HPI  All systems  reviewed and negative except for those discussed in HPI.      PHYSICAL EXAM    I have reviewed the triage vital signs and nursing notes.    ED Triage Vitals   Temp Heart Rate Resp BP SpO2   12/07/24 0128 12/07/24 0128 12/07/24 0128 12/07/24 0130 12/07/24 0128   99.9 °F (37.7 °C) 116 18 123/83 93 %      Temp src Heart Rate Source Patient Position BP Location FiO2 (%)   12/07/24 0128 12/07/24 0128 12/07/24 0130 12/07/24 0130 --   Tympanic Monitor Sitting Right arm        Physical Exam  GENERAL: alert, no acute distress  SKIN: Warm, dry  HENT: Normocephalic, atraumatic  EYES: no scleral icterus  CV: regular rhythm, regular rate, no lower extremity edema  RESPIRATORY: normal effort, oxygen is 93% on room air, she has diffuse inspiratory and expiratory wheezing bilaterally  ABDOMEN: nondistended soft nontender  MUSCULOSKELETAL: no deformity  NEURO: alert, moves all extremities, follows commands            LAB RESULTS  Recent Results (from the past 24 hours)   Comprehensive Metabolic Panel    Collection Time: 12/07/24  1:36 AM    Specimen: Blood   Result Value Ref Range    Glucose 108 (H) 65 - 99 mg/dL    BUN 13 6 - 20 mg/dL    Creatinine 0.78 0.57 - 1.00 mg/dL    Sodium 138 136 - 145 mmol/L    Potassium 3.6 3.5 - 5.2 mmol/L    Chloride 105 98 - 107 mmol/L    CO2 22.7 22.0 - 29.0 mmol/L    Calcium 9.0 8.6 - 10.5 mg/dL    Total Protein 7.1 6.0 - 8.5 g/dL    Albumin 4.2 3.5 - 5.2 g/dL    ALT (SGPT) 34 (H) 1 - 33 U/L    AST (SGOT) 23 1 - 32 U/L    Alkaline Phosphatase 62 39 - 117 U/L    Total Bilirubin 0.4 0.0 - 1.2 mg/dL    Globulin 2.9 gm/dL    A/G Ratio 1.4 g/dL    BUN/Creatinine Ratio 16.7 7.0 - 25.0    Anion Gap 10.3 5.0 - 15.0 mmol/L    eGFR 92.1 >60.0 mL/min/1.73   BNP    Collection Time: 12/07/24  1:36 AM    Specimen: Blood   Result Value Ref Range    proBNP 119.0 0.0 - 900.0 pg/mL   Single High Sensitivity Troponin T    Collection Time: 12/07/24  1:36 AM    Specimen: Blood   Result Value Ref Range    HS Troponin T 9  <14 ng/L   Green Top (Gel)    Collection Time: 12/07/24  1:36 AM   Result Value Ref Range    Extra Tube Hold for add-ons.    Lavender Top    Collection Time: 12/07/24  1:36 AM   Result Value Ref Range    Extra Tube hold for add-on    Gold Top - SST    Collection Time: 12/07/24  1:36 AM   Result Value Ref Range    Extra Tube Hold for add-ons.    Light Blue Top    Collection Time: 12/07/24  1:36 AM   Result Value Ref Range    Extra Tube Hold for add-ons.    CBC Auto Differential    Collection Time: 12/07/24  1:36 AM    Specimen: Blood   Result Value Ref Range    WBC 12.15 (H) 3.40 - 10.80 10*3/mm3    RBC 4.30 3.77 - 5.28 10*6/mm3    Hemoglobin 13.1 12.0 - 15.9 g/dL    Hematocrit 39.2 34.0 - 46.6 %    MCV 91.2 79.0 - 97.0 fL    MCH 30.5 26.6 - 33.0 pg    MCHC 33.4 31.5 - 35.7 g/dL    RDW 12.4 12.3 - 15.4 %    RDW-SD 41.2 37.0 - 54.0 fl    MPV 9.2 6.0 - 12.0 fL    Platelets 400 140 - 450 10*3/mm3    Neutrophil % 80.7 (H) 42.7 - 76.0 %    Lymphocyte % 11.7 (L) 19.6 - 45.3 %    Monocyte % 6.4 5.0 - 12.0 %    Eosinophil % 0.2 (L) 0.3 - 6.2 %    Basophil % 0.2 0.0 - 1.5 %    Immature Grans % 0.8 (H) 0.0 - 0.5 %    Neutrophils, Absolute 9.80 (H) 1.70 - 7.00 10*3/mm3    Lymphocytes, Absolute 1.42 0.70 - 3.10 10*3/mm3    Monocytes, Absolute 0.78 0.10 - 0.90 10*3/mm3    Eosinophils, Absolute 0.02 0.00 - 0.40 10*3/mm3    Basophils, Absolute 0.03 0.00 - 0.20 10*3/mm3    Immature Grans, Absolute 0.10 (H) 0.00 - 0.05 10*3/mm3    nRBC 0.0 0.0 - 0.2 /100 WBC   Procalcitonin    Collection Time: 12/07/24  1:36 AM    Specimen: Blood   Result Value Ref Range    Procalcitonin 0.04 0.00 - 0.25 ng/mL   ECG 12 Lead ED Triage Standing Order; SOA    Collection Time: 12/07/24  1:40 AM   Result Value Ref Range    QT Interval 399 ms    QTC Interval 458 ms   Respiratory Panel PCR w/COVID-19(SARS-CoV-2) [facility]/ANA LUISA/BLAIR/PAD/COR/MARIANGEL In-House, NP Swab in UTM/VTM, 2 HR TAT - Swab, Nasopharynx    Collection Time: 12/07/24  7:12 AM    Specimen: Nasopharynx;  Swab   Result Value Ref Range    ADENOVIRUS, PCR Not Detected Not Detected    Coronavirus 229E Not Detected Not Detected    Coronavirus HKU1 Not Detected Not Detected    Coronavirus NL63 Not Detected Not Detected    Coronavirus OC43 Not Detected Not Detected    COVID19 Not Detected Not Detected - Ref. Range    Human Metapneumovirus Not Detected Not Detected    Human Rhinovirus/Enterovirus Detected (A) Not Detected    Influenza A PCR Not Detected Not Detected    Influenza B PCR Not Detected Not Detected    Parainfluenza Virus 1 Not Detected Not Detected    Parainfluenza Virus 2 Not Detected Not Detected    Parainfluenza Virus 3 Not Detected Not Detected    Parainfluenza Virus 4 Not Detected Not Detected    RSV, PCR Not Detected Not Detected    Bordetella pertussis pcr Not Detected Not Detected    Bordetella parapertussis PCR Not Detected Not Detected    Chlamydophila pneumoniae PCR Not Detected Not Detected    Mycoplasma pneumo by PCR Not Detected Not Detected         RADIOLOGY  XR Chest 1 View    Result Date: 12/7/2024  SINGLE VIEW OF THE CHEST  HISTORY: Shortness of air  COMPARISON: February 21, 2024  FINDINGS: Heart size is within normal limits. No pneumothorax, pleural effusion, or acute infiltrate is seen. There is eventration of the right hemidiaphragm. No pneumothorax or pleural effusion is seen. 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 There does appear to be some consolidation within the left infrahilar region. There are background changes of COPD.      There is some consolidation within the left infrahilar region. This may represent scarring or atelectasis, although correlation with any evidence of pneumonia is recommended.  This report was finalized on 12/7/2024 2:39 AM by Dr. Josette Bhardwaj M.D on Workstation: [id number]         MEDICATIONS GIVEN IN ER  Medications   sodium chloride 0.9 % flush 10 mL (has no administration in time range)   ibuprofen (ADVIL,MOTRIN) tablet 800 mg (has no  administration in time range)   methylPREDNISolone sodium succinate (SOLU-Medrol) injection 125 mg (125 mg Intravenous Given 12/7/24 0709)   ipratropium-albuterol (DUO-NEB) nebulizer solution 3 mL (3 mL Nebulization Given 12/7/24 0702)   albuterol (PROVENTIL) nebulizer solution 0.083% 2.5 mg/3mL (2.5 mg Nebulization Given 12/7/24 0707)   ondansetron (ZOFRAN) injection 8 mg (8 mg Intravenous Given 12/7/24 0709)         ORDERS PLACED DURING THIS VISIT:  Orders Placed This Encounter   Procedures    Respiratory Panel PCR w/COVID-19(SARS-CoV-2) [facility]/ANA LUISA/BLAIR/PAD/COR/MARIANGEL In-House, NP Swab in UTM/VTM, 2 HR TAT - Swab, Nasopharynx    XR Chest 1 View    [place] Draw    Comprehensive Metabolic Panel    BNP    Single High Sensitivity Troponin T    CBC Auto Differential    Procalcitonin    NPO Diet NPO Type: Strict NPO    Undress & Gown    Continuous Pulse Oximetry    Vital Signs    LHA (on-call MD unless specified) Details    Oxygen Therapy- Nasal Cannula; Titrate 1-6 LPM Per SpO2; 90 - 95%    ECG 12 Lead ED Triage Standing Order; SOA    Insert Peripheral IV    Initiate Observation Status    CBC & Differential    Green Top (Gel)    Lavender Top    Gold Top - SST    Light Blue Top         OUTPATIENT MEDICATION MANAGEMENT:  Current Facility-Administered Medications Ordered in Epic   Medication Dose Route Frequency Provider Last Rate Last Admin    ibuprofen (ADVIL,MOTRIN) tablet 800 mg  800 mg Oral Once Kelsey Ying PA-C        sodium chloride 0.9 % flush 10 mL  10 mL Intravenous PRN Blanca Parr MD         Current Outpatient Medications Ordered in Epic   Medication Sig Dispense Refill    albuterol (PROVENTIL) (2.5 MG/3ML) 0.083% nebulizer solution Take 2.5 mg by nebulization Every 4 (Four) Hours As Needed for Wheezing.      albuterol (PROVENTIL) 4 MG tablet Take 1 tablet by mouth Every Night. 90 tablet 3    albuterol sulfate  (90 Base) MCG/ACT inhaler USE 2 INHALATIONS EVERY 4 HOURS AS NEEDED FOR WHEEZING 34 g  0    benzonatate (TESSALON) 200 MG capsule       buPROPion XL (WELLBUTRIN XL) 300 MG 24 hr tablet TAKE 1 TABLET EVERY MORNING 90 tablet 3    clindamycin (CLEOCIN T) 1 % swab APPLY 1 SWAB TOPICALLY TO THE FACE EVERY MORNING      diclofenac (VOLTAREN) 75 MG EC tablet TAKE 1 TABLET TWICE A DAY AS NEEDED FOR PAIN 30 tablet 23    FLUoxetine (PROzac) 40 MG capsule Take 2 capsules by mouth Daily. 180 capsule 3    Fluticasone Furoate-Vilanterol (Breo Ellipta) 100-25 MCG/INH inhaler Inhale 1 puff Daily. 180 each 3    levothyroxine (SYNTHROID, LEVOTHROID) 50 MCG tablet TAKE 1 TABLET DAILY 90 tablet 3    metaxalone (SKELAXIN) 800 MG tablet TAKE 1/2 TO 1 TABLET BY MOUTH FOUR TIMES DAILY AS NEEDED FOR MUSCLE SPASMS 120 tablet 0    montelukast (SINGULAIR) 10 MG tablet TAKE 1 TABLET DAILY 90 tablet 3    Multiple Vitamin (MULTI-VITAMIN DAILY PO) Take 1 tablet by mouth Every Night.      omeprazole (priLOSEC) 40 MG capsule TAKE 1 CAPSULE DAILY 90 capsule 3    ondansetron (ZOFRAN) 4 MG tablet TAKE 1 TABLET EVERY 8 HOURS AS NEEDED FOR NAUSEA OR VOMITING 27 tablet 39    predniSONE (DELTASONE) 20 MG tablet Take 1 tablet by mouth 2 (Two) Times a Day for 5 days. 10 tablet 0    pregabalin (Lyrica) 25 MG capsule Take 1 capsule PO TID x 5 days; if tolerated may slowly increase to 1 or 2 capsules PO  capsule 0    tamoxifen (NOLVADEX) 20 MG chemo tablet Take  by mouth Daily.      theophylline (THEODUR) 300 MG 12 hr tablet Take 1 tablet by mouth Every Morning. 90 tablet 3    valACYclovir (VALTREX) 500 MG tablet TAKE 1 TABLET DAILY 90 tablet 3    vitamin B-12 (CYANOCOBALAMIN) 1000 MCG tablet Take 1 tablet by mouth Daily.      Benzoyl Peroxide 10 % liquid 1 Application by Other route Daily.           PROCEDURES  Procedures      20 minutes of critical care provided. This time excludes other billable procedures. Time does include preparation of documents, medical consultations, review of old records, and direct bedside care. Patient is at  high risk for life-threatening deterioration due to acute hypoxic respiratory failure/asthma exacerbation.         PROGRESS, DATA ANALYSIS, CONSULTS, AND MEDICAL DECISION MAKING  All labs have been independently interpreted by me.  All radiology studies have been reviewed by me. All EKG's have been independently viewed and interpreted by me.  Discussion below represents my analysis of pertinent findings related to patient's condition, differential diagnosis, treatment plan and final disposition.    DIFFERENTIAL    Differential diagnosis includes but is not limited to CHF, acute coronary syndrome, pulmonary embolism, pneumothorax, pneumonia, asthma/COPD, deconditioning, anemia, anxiety.       Clinical Scores:                  ED Course as of 12/07/24 0845   Sat Dec 07, 2024   0614 WBC(!): 12.15 [KA]   0614 Hemoglobin: 13.1 [KA]   0614 proBNP: 119.0 [KA]   0614 HS Troponin T: 9 [KA]   0614 Procalcitonin: 0.04 [KA]   0614 Glucose(!): 108 [KA]   0614 Creatinine: 0.78 [KA]   0616 My independent interpretation of the EKG performed at 1:40 AM is sinus rhythm rate 79 normal axis normal intervals baseline artifact mild ST depression in the lateral leads, no ST elevation.  In comparison to prior on August 2, 2024, there is no significant change. [KA]   0617 My Independent interpretation of the chest x-ray is no cardiomegaly, no acute infiltrate. [KA]   0818 Human Rhinovirus/Enterovirus(!): Detected [KA]   0821 I reassessed the patient, despite 3 nebs and having been on steroids for the last couple days she still quite wheezy.  Oxygen dropped to 89% she is now on 2 L.  Will plan for admission and she is agreeable.  She did test positive for rhinovirus. [KA]      ED Course User Index  [KA] Kelsey Ying PA-C             AS OF 08:45 EST VITALS:    BP - 90/84  HR - 70  TEMP - 99.9 °F (37.7 °C) (Tympanic)  O2 SATS - 96%    COMPLEXITY OF CARE  The patient requires admission.      DIAGNOSIS  Final diagnoses:   Acute hypoxic  respiratory failure   Asthma with acute exacerbation, unspecified asthma severity, unspecified whether persistent   Acute bronchitis due to Rhinovirus         DISPOSITION  ED Disposition       ED Disposition   Decision to Admit    Condition   --    Comment   Level of Care: Telemetry [5]   Diagnosis: Acute hypoxic respiratory failure [5595060]   Admitting Physician: FATMATA MARINA [[id number]]   Attending Physician: FATMATA MARINA [[id number]]   Is patient appropriate for Inpatient Observation Unit?: Yes [1]                          Please note that portions of this document were completed with a voice recognition program.    Note Disclaimer: At [facility], we believe that sharing information builds trust and better relationships. You are receiving this note because you recently visited [facility]. It is possible you will see health information before a provider has talked with you about it. This kind of information can be easy to misunderstand. To help you fully understand what it means for your health, we urge you to discuss this note with your provider.         Kelsey Ying PA-C  12/07/24 0843       Kelsey Ying PA-C  12/07/24 0848      Electronically signed by Blanca Parr MD at 12/07/24 0931       Oxygen Therapy (last day)       Date/Time SpO2 Device (Oxygen Therapy) Flow (L/min) (Oxygen Therapy) Oxygen Concentration (%) ETCO2 (mmHg)    12/09/24 1155 92 nasal cannula 2 -- --    12/09/24 1118 96 nasal cannula 2 -- --    12/09/24 1109 92 nasal cannula 2 -- --    12/09/24 0746 90 -- -- -- --    12/09/24 0652 95 nasal cannula 1 -- --    12/09/24 0230 -- nasal cannula 1.5 -- --    12/08/24 2333 91 nasal cannula 1.5 -- --    12/08/24 2121 95 -- 0.5 -- --    12/08/24 2109 94 nasal cannula 0.5 -- --    12/08/24 1931 91 nasal cannula 105 -- --    12/08/24 1524 92 nasal cannula 1 -- --    12/08/24 1506 91 -- -- -- --    12/08/24 1148 94 -- -- -- --    12/08/24 1106 93 nasal cannula 1 -- --     24 1102 -- nasal cannula 1 -- --    24 0850 -- nasal cannula 2 -- --    24 0731 96 -- -- -- --    24 0652 -- nasal cannula 2 -- --    24 0648 94 -- -- -- --    24 0647 95 nasal cannula 2 -- --             Physician Progress Notes (last 72 hours)        Prabhjot Giang MD at 24 9961            Dedicated to Hospital Care    [phone]   LOS: 1 day     Name: Nereida Myles  Age/Sex: 49 y.o. female  :  1975        PCP: Kehrer, Meredith Lea, MD  Chief Complaint   Patient presents with    Shortness of Breath    Asthma    Vomiting      Subjective   Still short of breath and still with increased work of breathing.  Denies other new issues today.  She has been able to titrate off of oxygen likely.  General: No Fever or Chills, Cardiac: No Chest Pain or Palpitations, Resp: No Cough or SOA, GI: No Nausea, Vomiting, or Diarrhea, and Other: No bleeding    budesonide-formoterol, 2 puff, Inhalation, BID - RT  buPROPion XL, 300 mg, Oral, QAM  enoxaparin, 40 mg, Subcutaneous, Nightly  FLUoxetine, 80 mg, Oral, Daily  ipratropium-albuterol, 3 mL, Nebulization, 4x Daily - RT  levothyroxine, 50 mcg, Oral, Daily  melatonin, 5 mg, Oral, Nightly  methylPREDNISolone sodium succinate, 40 mg, Intravenous, Q12H  montelukast, 10 mg, Oral, Daily  pantoprazole, 40 mg, Oral, Q AM  pregabalin, 25 mg, Oral, Q8H  sodium chloride, 10 mL, Intravenous, Q12H  tamoxifen, 20 mg, Oral, Daily  theophylline, 300 mg, Oral, QAM           Objective   Vital Signs  Temp:  [97.5 °F (36.4 °C)-98.4 °F (36.9 °C)] 98.4 °F (36.9 °C)  Heart Rate:  [52-76] 67  Resp:  [1-20] 20  BP: (103-130)/(61-83) 117/77  Body mass index is 20.4 kg/m².    Intake/Output Summary (Last 24 hours) at 2024 1354  Last data filed at 2024 2100  Gross per 24 hour   Intake 240 ml   Output --   Net 240 ml       Physical Exam  Vitals and nursing note reviewed.   Constitutional:       General: She is not in acute distress.      Appearance: She is ill-appearing.   Cardiovascular:      Rate and Rhythm: Normal rate and regular rhythm.   Pulmonary:      Effort: Pulmonary effort is normal. Respiratory distress present.      Breath sounds: Wheezing present.   Neurological:      Mental Status: She is alert and oriented to person, place, and time. Mental status is at baseline.           Results Review:       I reviewed the patient's new clinical results.  Results from last 7 days   Lab Units 12/08/24  0431 12/07/24  0136   WBC 10*3/mm3 10.55 12.15*   HEMOGLOBIN g/dL 11.8* 13.1   PLATELETS 10*3/mm3 317 400     Results from last 7 days   Lab Units 12/08/24  0431 12/07/24  0136   SODIUM mmol/L 137 138   POTASSIUM mmol/L 3.8 3.6   CHLORIDE mmol/L 102 105   CO2 mmol/L 23.8 22.7   BUN mg/dL 10 13   CREATININE mg/dL 0.68 0.78   CALCIUM mg/dL 9.0 9.0   MAGNESIUM mg/dL 2.3  --    PHOSPHORUS mg/dL 3.6  --    Estimated Creatinine Clearance: 85.9 mL/min (by C-G formula based on SCr of 0.68 mg/dL).      Assessment & Plan   Active Hospital Problems    Diagnosis  POA    **Acute hypoxic respiratory failure [J96.01]  Yes    Asthma exacerbation [J45.901]  Unknown    History of breast cancer [Z85.3]  Not Applicable    Hypothyroidism [E03.9]  Yes      Resolved Hospital Problems   No resolved problems to display.       PLAN  This is a 49-year-old lady with a history of breast cancer asthma hypothyroidism who presents to the hospital with hypoxia and asthma exacerbation secondary to rhinovirus.  -Continue steroids and breathing treatments.  -Overall moving better air but still tight and still short of breath  -Continue home medications and encourage out of bed activity  -Hopefully home tomorrow or Tuesday depending on progress  -Mechanical DVT prophylaxis  -Full code      Disposition  Expected discharge date/ time has not been documented.       Prabhjot Giang MD  [facility]  12/09/24  13:54 EST            Electronically signed by Rahat  Prabhjot ALLRED MD at 12/09/24 1355       Jitendra Davies MD at 12/09/24 1258          Dr. ERNST Davies    [address]        Patient ID:  Name:  Nereida Myles  MRN:  [id number]  1975  49 y.o.  female            CC/Reason for visit: Acute viral bronchitis with asthma exacerbation     Interval hx: Says she feels worse today.  Coughing and wheezing more.  On 2 L of oxygen saturating 92%    ROS: No hemoptysis, no chest pain, no abdominal dorina    Vitals:  Vitals:    12/09/24 0746 12/09/24 1109 12/09/24 1118 12/09/24 1155   BP: 116/83   117/77   BP Location: Right arm   Right arm   Patient Position: Lying   Lying   Pulse: 67  76 67   Resp: 18   20   Temp: 98.1 °F (36.7 °C)   98.4 °F (36.9 °C)   TempSrc: Oral   Oral   SpO2: 90% 92% 96% 92%   Weight:       Height:               Body mass index is 20.4 kg/m².    Intake/Output Summary (Last 24 hours) at 12/9/2024 1258  Last data filed at 12/8/2024 2100  Gross per 24 hour   Intake 240 ml   Output --   Net 240 ml       Exam:  GEN:  No distress  Alert, oriented x 3.   LUNGS: Scattered rhonchi and wheezing bilat, no use of accessory muscles  CV:  Normal S1S2, without murmur, no edema  ABD:  Non tender, no enlarged liver or masses      Scheduled meds:  budesonide-formoterol, 2 puff, Inhalation, BID - RT  buPROPion XL, 300 mg, Oral, QAM  enoxaparin, 40 mg, Subcutaneous, Nightly  FLUoxetine, 80 mg, Oral, Daily  ipratropium-albuterol, 3 mL, Nebulization, 4x Daily - RT  levothyroxine, 50 mcg, Oral, Daily  melatonin, 5 mg, Oral, Nightly  methylPREDNISolone sodium succinate, 40 mg, Intravenous, Q12H  montelukast, 10 mg, Oral, Daily  pantoprazole, 40 mg, Oral, Q AM  pregabalin, 25 mg, Oral, Q8H  sodium chloride, 10 mL, Intravenous, Q12H  tamoxifen, 20 mg, Oral, Daily  theophylline, 300 mg, Oral, QAM      IV meds:                           Data Review:   I reviewed the patient's medications and new clinical results.            Results from last 7 days   Lab Units  12/08/24  0431 12/07/24  0136   SODIUM mmol/L 137 138   POTASSIUM mmol/L 3.8 3.6   CHLORIDE mmol/L 102 105   CO2 mmol/L 23.8 22.7   BUN mg/dL 10 13   CREATININE mg/dL 0.68 0.78   CALCIUM mg/dL 9.0 9.0   BILIRUBIN mg/dL  --  0.4   ALK PHOS U/L  --  62   ALT (SGPT) U/L  --  34*   AST (SGOT) U/L  --  23   GLUCOSE mg/dL 155* 108*   WBC 10*3/mm3 10.55 12.15*   HEMOGLOBIN g/dL 11.8* 13.1   PLATELETS 10*3/mm3 317 400   PROBNP pg/mL  --  119.0   PROCALCITONIN ng/mL 0.03 0.04                  ASSESSMENT:     Acute hypoxic respiratory failure    Hypothyroidism    Asthma exacerbation    History of breast cancer  Acute viral bronchitis      PLAN:  She is still requiring oxygen.  Significant wheezing and rhonchi on exam.  Continue with DuoNeb 4 times a day and Symbicort.  On theophylline as outpatient and continued here per her request.  Continue with systemic steroids, currently on IV Solu-Medrol for bronchospasm and wheezing.  Still requiring 2 L of oxygen.  Continue with bronchial hygiene protocol, frequent ambulation as well as use of flutter valve.  Will probably take several more days before she starts improving        Jitendra Davies MD  12/9/2024    Electronically signed by Jitendra Davies MD at 12/09/24 1300       Jitendra Davies MD at 12/08/24 1327          Dr. ERNST Davies    [address]        Patient ID:  Name:  Nereida Myles  MRN:  [id number]  1975  49 y.o.  female            CC/Reason for visit: Acute viral bronchitis with asthma exacerbation    Interval hx: Still coughing and wheezing.  Still short of breath with minimal exertion  Still requiring oxygen    ROS: No hemoptysis, no chest pain, no abdominal pain    Vitals:  Vitals:    12/08/24 0652 12/08/24 0731 12/08/24 1102 12/08/24 1148   BP:  115/75  112/75   BP Location:  Right arm  Right arm   Patient Position:  Lying  Lying   Pulse:  61  64   Resp: 18 18 18 18   Temp:  98.1 °F (36.7 °C)  98.2 °F (36.8 °C)   TempSrc:  Oral  Oral    SpO2:  96%  94%   Weight:       Height:               Body mass index is 20.39 kg/m².    Intake/Output Summary (Last 24 hours) at 12/8/2024 1327  Last data filed at 12/8/2024 0850  Gross per 24 hour   Intake 690 ml   Output --   Net 690 ml       Exam:  GEN:  No distress  Alert, oriented x 3.   LUNGS: Scattered rhonchi and wheezes bilat, no use of accessory muscles  CV:  Normal S1S2, without murmur, no edema  ABD:  Non tender, no enlarged liver or masses      Scheduled meds:  budesonide-formoterol, 2 puff, Inhalation, BID - RT  buPROPion XL, 300 mg, Oral, QAM  enoxaparin, 40 mg, Subcutaneous, Nightly  FLUoxetine, 80 mg, Oral, Daily  ipratropium-albuterol, 3 mL, Nebulization, 4x Daily - RT  levothyroxine, 50 mcg, Oral, Daily  melatonin, 5 mg, Oral, Nightly  methylPREDNISolone sodium succinate, 40 mg, Intravenous, Q12H  montelukast, 10 mg, Oral, Daily  pantoprazole, 40 mg, Oral, Q AM  pregabalin, 25 mg, Oral, Q8H  sodium chloride, 10 mL, Intravenous, Q12H  tamoxifen, 20 mg, Oral, Daily  theophylline, 300 mg, Oral, QAM      IV meds:                           Data Review:   I reviewed the patient's medications and new clinical results.            Results from last 7 days   Lab Units 12/08/24  0431 12/07/24  0136   SODIUM mmol/L 137 138   POTASSIUM mmol/L 3.8 3.6   CHLORIDE mmol/L 102 105   CO2 mmol/L 23.8 22.7   BUN mg/dL 10 13   CREATININE mg/dL 0.68 0.78   CALCIUM mg/dL 9.0 9.0   BILIRUBIN mg/dL  --  0.4   ALK PHOS U/L  --  62   ALT (SGPT) U/L  --  34*   AST (SGOT) U/L  --  23   GLUCOSE mg/dL 155* 108*   WBC 10*3/mm3 10.55 12.15*   HEMOGLOBIN g/dL 11.8* 13.1   PLATELETS 10*3/mm3 317 400   PROBNP pg/mL  --  119.0   PROCALCITONIN ng/mL 0.03 0.04                ASSESSMENT:     Acute hypoxic respiratory failure    Hypothyroidism    Asthma exacerbation    History of breast cancer  Acute viral bronchitis      PLAN:  Continue supportive care for acute rhinovirus bronchitis.  Continue nebulized bronchodilators and steroids  for asthma exacerbation.  Continue with oxygen but try to wean oxygen.  Hopefully home tomorrow or Tuesday.          Jitendra aDvies MD  2024    Electronically signed by Jitendra Davies MD at 24 1329       Prabhjot Giang MD at 24 1006            [facility]    [phone]   LOS: 0 days     Name: Nereida Myles  Age/Sex: 49 y.o. female  :  1975        PCP: Kehrer, Meredith Lea, MD  Chief Complaint   Patient presents with    Shortness of Breath    Asthma    Vomiting      Subjective   Rough niorght aout 3 am got tight and started cough had troule getting things back under controll  Still on o2  Still working hard to breath  General: No Fever or Chills, Cardiac: No Chest Pain or Palpitations, Resp: No Cough or SOA, GI: No Nausea, Vomiting, or Diarrhea, and Other: No bleeding    budesonide-formoterol, 2 puff, Inhalation, BID - RT  buPROPion XL, 300 mg, Oral, QAM  enoxaparin, 40 mg, Subcutaneous, Nightly  FLUoxetine, 80 mg, Oral, Daily  ipratropium-albuterol, 3 mL, Nebulization, 4x Daily - RT  levothyroxine, 50 mcg, Oral, Daily  melatonin, 5 mg, Oral, Nightly  methylPREDNISolone sodium succinate, 40 mg, Intravenous, Q12H  montelukast, 10 mg, Oral, Daily  pantoprazole, 40 mg, Oral, Q AM  pregabalin, 25 mg, Oral, Q8H  sodium chloride, 10 mL, Intravenous, Q12H  tamoxifen, 20 mg, Oral, Daily  theophylline, 300 mg, Oral, QAM           Objective   Vital Signs  Temp:  [98.1 °F (36.7 °C)-98.2 °F (36.8 °C)] 98.1 °F (36.7 °C)  Heart Rate:  [51-81] 61  Resp:  [16-18] 18  BP: (101-124)/(61-75) 115/75  Body mass index is 20.39 kg/m².    Intake/Output Summary (Last 24 hours) at 2024 1006  Last data filed at 2024 2200  Gross per 24 hour   Intake 450 ml   Output --   Net 450 ml       Physical Exam  Vitals and nursing note reviewed.   Constitutional:       General: She is not in acute distress.     Appearance: She is ill-appearing.   Cardiovascular:      Rate and Rhythm: Normal  rate and regular rhythm.   Pulmonary:      Effort: Pulmonary effort is normal. Respiratory distress present.      Breath sounds: Wheezing present.   Neurological:      Mental Status: She is alert and oriented to person, place, and time. Mental status is at baseline.           Results Review:       I reviewed the patient's new clinical results.  Results from last 7 days   Lab Units 12/08/24  0431 12/07/24  0136   WBC 10*3/mm3 10.55 12.15*   HEMOGLOBIN g/dL 11.8* 13.1   PLATELETS 10*3/mm3 317 400     Results from last 7 days   Lab Units 12/08/24  0431 12/07/24  0136   SODIUM mmol/L 137 138   POTASSIUM mmol/L 3.8 3.6   CHLORIDE mmol/L 102 105   CO2 mmol/L 23.8 22.7   BUN mg/dL 10 13   CREATININE mg/dL 0.68 0.78   CALCIUM mg/dL 9.0 9.0   MAGNESIUM mg/dL 2.3  --    PHOSPHORUS mg/dL 3.6  --    Estimated Creatinine Clearance: 85.9 mL/min (by C-G formula based on SCr of 0.68 mg/dL).      Assessment & Plan   Active Hospital Problems    Diagnosis  POA    **Acute hypoxic respiratory failure [J96.01]  Yes    Asthma exacerbation [J45.901]  Unknown    History of breast cancer [Z85.3]  Not Applicable    Hypothyroidism [E03.9]  Yes      Resolved Hospital Problems   No resolved problems to display.       PLAN  This is a 49-year-old lady with a history of breast cancer asthma hypothyroidism who presents to the hospital with hypoxia and asthma exacerbation secondary to rhinovirus.  -Continue steroids and breathing treatments.  -Overall moving better air but still tight and still short of breath  -Continue home medications and encourage out of bed activity  -Hopefully home tomorrow or Tuesday depending on progress  -Mechanical DVT prophylaxis  -Full code      Disposition  Expected discharge date/ time has not been documented.       Prabhjot Giang MD  [facility]  12/08/24  10:06 EST            Electronically signed by Prabhjot Giang MD at 12/08/24 9961          Consult Notes (last 72 hours)        Keke  Jitendra MOODY MD at 12/07/24 1646        Consult Orders    1. Inpatient Pulmonology Consult [[id number]] ordered by Prabhjot Giang MD at 12/07/24 0852                 Group: [facility]         CONSULT NOTE    Patient Identification:  Nereida Myles  49 y.o.  female  1975  [id number]            Requesting physician: Nohemy BLACKWELL    Reason for Consultation: Acute viral bronchitis    CC: Cough    History of Present Illness:  49-year-old who presents with cough, shortness of breath and wheezing for the past week or so.  She does have asthma.  She has tried albuterol without any improvement.  She presented with oxygen saturation of 89% on room air.  Her symptoms have been gradually worsening.  Sees Dr. Baker usually in our office.  Her respiratory viral panel is positive for rhinovirus  Medical records reviewed      Review of Systems   Constitutional:  Positive for fatigue. Negative for diaphoresis and fever.   HENT:  Negative for ear discharge and sore throat.    Eyes:  Negative for pain and visual disturbance.   Respiratory:  Positive for cough, shortness of breath and wheezing.    Cardiovascular:  Negative for chest pain and leg swelling.   Gastrointestinal:  Negative for abdominal pain and diarrhea.   Endocrine: Negative for cold intolerance and polyuria.   Genitourinary:  Negative for dysuria and hematuria.   Musculoskeletal:  Negative for joint swelling and myalgias.   Skin:  Negative for rash and wound.   Neurological:  Negative for speech difficulty and numbness.   Hematological:  Negative for adenopathy. Does not bruise/bleed easily.   Psychiatric/Behavioral:  Negative for agitation and confusion.        Past Medical History:  Past Medical History:   Diagnosis Date    Allergic     Allergic rhinitis     Anxiety     Asthma     has inhaler and neb treatments    BMI 25.0-25.9,adult     Cancer     left breast    Community acquired pneumonia     COPD (chronic obstructive pulmonary disease)      Depression, major     Disease of thyroid gland     Diverticular disease     Foreign body in ear     Fractures 2012    Foot    GERD (gastroesophageal reflux disease)     Hashimoto's thyroiditis     History of abnormal mammogram     Left breast    History of acute sinusitis     History of adenocarcinoma of breast     History of COPD     History of dyspareunia in female     History of kidney stones     History of lump of left breast     History of malignant neoplasm of left breast     History of nausea and vomiting     History of seborrheic dermatitis     History of strep pharyngitis     History of suicidal ideation     Hypothyroidism     Hypoxemia     History of Hypoxemia    Joint pain 2021    Low back pain 2023    This is when it became debilitating    Lumbosacral disc disease ?    Obsessive compulsive disorder     Other screening mammogram     Personal history of asthma     PONV (postoperative nausea and vomiting)     Recurrent genital herpes simplex     History of     Symptomatic states associated with artificial menopause     History of     TMJ dysfunction 1999    Wear a mouth guard       Past Surgical History:  Past Surgical History:   Procedure Laterality Date    APPENDECTOMY      BREAST AUGMENTATION Bilateral     Revised 2005, initial implants 1992    BREAST RECONSTRUCTION, BREAST TISSUE EXPANDER INSERTION Bilateral     CERVICAL CERCLAGE      x 2 during pregnancy, incompetent cervix    CHOLECYSTECTOMY      COLONOSCOPY      EPIDURAL Right 01/11/2024    Procedure: LUMBAR/SACRAL TRANSFORAMINAL EPIDURAL RIGHT L3-4 #1 98972;  Surgeon: Rei Pruitt MD;  Location: [facility] MAIN OR;  Service: Pain Management;  Laterality: Right;    EPIDURAL Right 9/19/2024    Procedure: LUMBAR/SACRAL TRANSFORAMINAL EPIDURAL RIGHT L3-4 23945;  Surgeon: Rei Pruitt MD;  Location: [facility] MAIN OR;  Service: Pain Management;  Laterality: Right;    EPIDURAL BLOCK  1/2024    HAND SURGERY Right     may2022    HYSTERECTOMY      due  to uterine prolapse    KIDNEY STONE SURGERY      LUNG SURGERY      bronchoscopy    MASTECTOMY Bilateral 01/2016    removed lymph nodes on left side    MEDIAL BRANCH BLOCK Right 3/13/2024    Procedure: LUMBAR MEDIAL BRANCH BLOCK RIGHT L3-L5 #1 03032, 07429;  Surgeon: Rei Pruitt MD;  [place] [facility] [place];  Service: Pain Management;  Laterality: Right;    MEDIAL BRANCH BLOCK Bilateral 3/27/2024    Procedure: LUMBAR MEDIAL BRANCH BLOCK BILATERAL L3-L5 05715-04, 64494-50 X 2;  Surgeon: Rei Pruitt MD;  [place] [facility] [place];  Service: Pain Management;  Laterality: Bilateral;    MEDIAL BRANCH BLOCK Left 5/2/2024    Procedure: LUMBAR MEDIAL BRANCH BLOCK LEFT L3-L5 #2 67225, 57996;  Surgeon: Rei Pruitt MD;  [place] [facility] [place];  Service: Pain Management;  Laterality: Left;    NERVE SURGERY Bilateral 6/4/2024    Procedure: LUMBAR RADIOFREQUENCY ABLATION BILATERAL L3-L5 78268-41, 64636-50 X 2;  Surgeon: Rei Pruitt MD;  [place] [facility] [place];  Service: Pain Management;  Laterality: Bilateral;    SACROILIAC JOINT INJECTION Bilateral 11/7/2024    Procedure: SACROILIAC INJECTION BILATERAL 16756-68;  Surgeon: Rei Pruitt MD;  [place] [facility] [place];  Service: Pain Management;  Laterality: Bilateral;    SCAR REVISION BREAST Bilateral 06/21/2022    Procedure: SCAR REVISION LEFT CHEST 27 CENTIMETERS AND RIGHT CHEST 25 CENTIMETERS FOR AESTHETIC FLAT CLOSURE FOLLOWING MASTECTOMY;  Surgeon: Waterhouse, Maurine, MD;  Location: [facility] MAIN OR;  Service: Plastics;  Laterality: Bilateral;    TISSUE EXPANDER PLACEMENT Bilateral     due to infection        Home Meds:  Medications Prior to Admission   Medication Sig Dispense Refill Last Dose/Taking    albuterol (PROVENTIL) (2.5 MG/3ML) 0.083% nebulizer solution Take 2.5 mg by nebulization Every 4 (Four) Hours As Needed for Wheezing.   12/6/2024    albuterol (PROVENTIL) 4 MG tablet Take 1 tablet by mouth Every Night. 90 tablet 3 12/6/2024     albuterol sulfate  (90 Base) MCG/ACT inhaler USE 2 INHALATIONS EVERY 4 HOURS AS NEEDED FOR WHEEZING 34 g 0 12/6/2024    benzonatate (TESSALON) 200 MG capsule    12/6/2024    buPROPion XL (WELLBUTRIN XL) 300 MG 24 hr tablet TAKE 1 TABLET EVERY MORNING 90 tablet 3 12/6/2024    clindamycin (CLEOCIN T) 1 % swab APPLY 1 SWAB TOPICALLY TO THE FACE EVERY MORNING   12/6/2024    diclofenac (VOLTAREN) 75 MG EC tablet TAKE 1 TABLET TWICE A DAY AS NEEDED FOR PAIN 30 tablet 23 12/6/2024    FLUoxetine (PROzac) 40 MG capsule Take 2 capsules by mouth Daily. 180 capsule 3 12/6/2024    Fluticasone Furoate-Vilanterol (Breo Ellipta) 100-25 MCG/INH inhaler Inhale 1 puff Daily. 180 each 3 12/6/2024    levothyroxine (SYNTHROID, LEVOTHROID) 50 MCG tablet TAKE 1 TABLET DAILY 90 tablet 3 12/6/2024    metaxalone (SKELAXIN) 800 MG tablet TAKE 1/2 TO 1 TABLET BY MOUTH FOUR TIMES DAILY AS NEEDED FOR MUSCLE SPASMS 120 tablet 0 12/6/2024    montelukast (SINGULAIR) 10 MG tablet TAKE 1 TABLET DAILY 90 tablet 3 12/6/2024    Multiple Vitamin (MULTI-VITAMIN DAILY PO) Take 1 tablet by mouth Every Night.   12/6/2024    omeprazole (priLOSEC) 40 MG capsule TAKE 1 CAPSULE DAILY 90 capsule 3 12/6/2024    ondansetron (ZOFRAN) 4 MG tablet TAKE 1 TABLET EVERY 8 HOURS AS NEEDED FOR NAUSEA OR VOMITING 27 tablet 39 12/6/2024    predniSONE (DELTASONE) 20 MG tablet Take 1 tablet by mouth 2 (Two) Times a Day for 5 days. 10 tablet 0 12/6/2024    pregabalin (Lyrica) 25 MG capsule Take 1 capsule PO TID x 5 days; if tolerated may slowly increase to 1 or 2 capsules PO  capsule 0 12/6/2024    tamoxifen (NOLVADEX) 20 MG chemo tablet Take  by mouth Daily.   12/6/2024    theophylline (THEODUR) 300 MG 12 hr tablet Take 1 tablet by mouth Every Morning. 90 tablet 3 12/6/2024    valACYclovir (VALTREX) 500 MG tablet TAKE 1 TABLET DAILY 90 tablet 3 12/6/2024    vitamin B-12 (CYANOCOBALAMIN) 1000 MCG tablet Take 1 tablet by mouth Daily.   12/6/2024    Benzoyl Peroxide  "10 % liquid 1 Application by Other route Daily.          Allergies:  No Known Allergies    Social History:   Social History     Socioeconomic History    Marital status:    Tobacco Use    Smoking status: Never    Smokeless tobacco: Never   Vaping Use    Vaping status: Never Used   Substance and Sexual Activity    Alcohol use: Yes     Alcohol/week: 5.0 standard drinks of alcohol     Types: 2 Glasses of wine, 3 Drinks containing 0.5 oz of alcohol per week     Comment: social    Drug use: Never    Sexual activity: Yes     Partners: Male     Birth control/protection: None, Vasectomy, Essure, Hysterectomy       Family History:  Family History   Problem Relation Age of Onset    Thyroid disease Mother     Arthritis Mother     Uterine cancer Mother         in her 40's    Depression Mother     GI problems Mother     Hyperlipidemia Mother     Cancer Father     Bipolar disorder Father     Depression Father     Hyperlipidemia Father     Hypertension Father     Arthritis Father     Arthritis Maternal Grandmother     Bleeding Disorder Maternal Grandmother     Hyperlipidemia Maternal Grandmother     Hypertension Maternal Grandmother     Depression Maternal Grandmother     Hypertension Maternal Grandfather     Hypertension Paternal Grandfather     Hyperlipidemia Paternal Grandfather     Arthritis Paternal Grandfather     Coronary artery disease Other         Maternal GrGF    Skin cancer Other         Maternal GrGF    Diabetes Other         Paternal GrGF    Heart disease Other         FH in females before the age of 65    Arthritis Paternal Grandmother     Malig Hyperthermia Neg Hx        Physical Exam:  /64 (BP Location: Right arm, Patient Position: Lying)   Pulse 68   Temp 98.1 °F (36.7 °C) (Oral)   Resp 18   Ht 165.1 cm (65\")   Wt 55.6 kg (122 lb 8 oz)   LMP  (LMP Unknown)   SpO2 93%   BMI 20.39 kg/m²  Body mass index is 20.39 kg/m². 93% 55.6 kg (122 lb 8 oz)  Physical Exam  HENT:      Right Ear: External ear " normal.      Left Ear: External ear normal.      Nose: Nose normal.   Eyes:      Conjunctiva/sclera: Conjunctivae normal.      Pupils: Pupils are equal, round, and reactive to light.   Neck:      Thyroid: No thyromegaly.      Vascular: No JVD.      Trachea: No tracheal deviation.   Cardiovascular:      Rate and Rhythm: Normal rate and regular rhythm.      Heart sounds: Normal heart sounds. No murmur heard.  Pulmonary:      Comments: Slow expiratory time, expiratory wheezes bilaterally, short of breath  Abdominal:      Palpations: Abdomen is soft.      Tenderness: There is no abdominal tenderness. There is no rebound.      Comments: Cannot palpate liver or spleen enlargement   Musculoskeletal:         General: No deformity. Normal range of motion.      Cervical back: Neck supple. No rigidity.   Skin:     General: Skin is warm.      Findings: No rash.      Comments: No palpable nodules   Neurological:      Mental Status: She is alert.      Cranial Nerves: No cranial nerve deficit.      Sensory: No sensory deficit.   Psychiatric:         Thought Content: Thought content normal.         LABS:  COVID19   Date Value Ref Range Status   12/07/2024 Not Detected Not Detected - Ref. Range Final       Lab Results   Component Value Date    CALCIUM 9.0 12/07/2024     Results from last 7 days   Lab Units 12/07/24  0136   SODIUM mmol/L 138   POTASSIUM mmol/L 3.6   CHLORIDE mmol/L 105   CO2 mmol/L 22.7   BUN mg/dL 13   CREATININE mg/dL 0.78   GLUCOSE mg/dL 108*   CALCIUM mg/dL 9.0   WBC 10*3/mm3 12.15*   HEMOGLOBIN g/dL 13.1   PLATELETS 10*3/mm3 400   ALT (SGPT) U/L 34*   AST (SGOT) U/L 23   PROBNP pg/mL 119.0   PROCALCITONIN ng/mL 0.04     Lab Results   Component Value Date    CKTOTAL 20 01/25/2021    TROPONINT 9 12/07/2024     Results from last 7 days   Lab Units 12/07/24  0136   HSTROP T ng/L 9         Results from last 7 days   Lab Units 12/07/24  0136   PROCALCITONIN ng/mL 0.04         Results from last 7 days   Lab Units  12/07/24  0712   ADENOVIRUS DETECTION BY PCR  Not Detected   CORONAVIRUS 229E  Not Detected   CORONAVIRUS HKU1  Not Detected   CORONAVIRUS NL63  Not Detected   CORONAVIRUS OC43  Not Detected   HUMAN METAPNEUMOVIRUS  Not Detected   HUMAN RHINOVIRUS/ENTEROVIRUS  Detected*   INFLUENZA B PCR  Not Detected   PARAINFLUENZA 1  Not Detected   PARAINFLUENZA VIRUS 2  Not Detected   PARAINFLUENZA VIRUS 3  Not Detected   PARAINFLUENZA VIRUS 4  Not Detected   BORDETELLA PERTUSSIS PCR  Not Detected   BORDETELLA PARAPERTUSSIS PCR  Not Detected   CHLAMYDOPHILA PNEUMONIAE PCR  Not Detected   MYCOPLAMA PNEUMO PCR  Not Detected   RSV, PCR  Not Detected             Lab Results   Component Value Date    TSH 1.200 08/02/2024     Estimated Creatinine Clearance: 74.9 mL/min (by C-G formula based on SCr of 0.78 mg/dL).         Imaging: I personally visualized the images of chest x-ray showing left perihilar opacity, perhaps retrocardiac opacity.      Assessment:  Acute hypoxic respiratory failure  Acute asthma exacerbation  Left pulmonary infiltrate  Rhinovirus bronchitis      Recommendations:  Patient has rhinovirus bronchitis causing asthma exacerbation.  Chest x-ray images show probable left infrahilar infiltrate.  Repeat procalcitonin tomorrow.  If she develops elevated procalcitonin or sputum turns purulent, we will add doxycycline.  Treated with IV Solu-Medrol.  Give nebulized DuoNeb 4 times a day.  Add flutter valve.  Frequent ambulation.  Proper oral hydration to help with mucus fluid vacation.  Continue home theophylline per patient's request.  Check theophylline level.  Wean oxygen as tolerated.         Jitendra Davies MD  12/7/2024  16:46 EST      Much of this encounter note is an electronic transcription/translation of spoken language to printed text using Dragon Software.    Electronically signed by Jitendra Davies MD at 12/07/24 6674

## 2024-12-09 NOTE — PLAN OF CARE
Goal Outcome Evaluation:  Problem: Adult Inpatient Plan of Care  Goal: Optimal Comfort and Wellbeing  Intervention: Provide Person-Centered Care  Recent Flowsheet Documentation  Taken 12/8/2024 2150 by Yashira Sierra, RN  Trust Relationship/Rapport:   care explained   choices provided

## 2024-12-09 NOTE — CASE MANAGEMENT/SOCIAL WORK
Discharge Planning Assessment  [facility]     Patient Name: Nereida Myles  MRN: [id number]  Today's Date: 12/9/2024    Admit Date: 12/7/2024    Plan: Home   Discharge Needs Assessment       Row Name 12/09/24 1437       Living Environment    People in Home spouse;child(pelon), adult    Current Living Arrangements home    Potentially Unsafe Housing Conditions none    Primary Care Provided by self    Provides Primary Care For no one    Family Caregiver if Needed spouse    Quality of Family Relationships supportive    Able to Return to Prior Arrangements yes       Resource/Environmental Concerns    Resource/Environmental Concerns none    Transportation Concerns none       Transportation Needs    In the past 12 months, has lack of transportation kept you from medical appointments or from getting medications? no    In the past 12 months, has lack of transportation kept you from meetings, work, or from getting things needed for daily living? No       Transition Planning    Patient/Family Anticipates Transition to home    Transportation Anticipated family or friend will provide;car, drives self       Discharge Needs Assessment    Readmission Within the Last 30 Days no previous admission in last 30 days    Equipment Currently Used at Home nebulizer    Concerns to be Addressed discharge planning    Do you want help finding or keeping work or a job? I do not need or want help    Do you want help with school or training? For example, starting or completing job training or getting a high school diploma, GED or equivalent No    Provided Post Acute Provider List? N/A    Provided Post Acute Provider Quality & Resource List? N/A                   Discharge Plan       Row Name 12/09/24 1564       Plan    Plan Home    Plan Comments S/W pt at bedside.  Facesheet info confirmed.  Pt lives in a house w/ he spouse Jimmy and adult son, is IADLs and can drive.  Her only home DME is a nebulizer.  Pt states she has used HH multiple  times and has never been to a SNF.  Pt is unsure of DC needs at this time.  CCP will continue to follow and assist if needed. ............Venus VERONICA/ JACEK                  Continued Care and Services - Admitted Since 12/7/2024       Durable Medical Equipment       Service Provider Request Status Services Address Phone Fax Patient Preferred    [facility] Accepted -- [address], [place] [id number] [phone] --                     Demographic Summary       Row Name 12/09/24 1437       General Information    Admission Type inpatient    Arrived From home    Referral Source admission list    Reason for Consult discharge planning    Preferred Language English                   Functional Status       Row Name 12/09/24 1437       Functional Status    Usual Activity Tolerance moderate       Functional Status, IADL    Medications independent    Meal Preparation independent;assistive person    Housekeeping independent;assistive person    Laundry independent;assistive person    Shopping independent;assistive person       Mental Status    General Appearance WDL WDL       Mental Status Summary    Recent Changes in Mental Status/Cognitive Functioning no changes       Employment/    Employment Status employed full-time                               Venus Chapin RN

## 2024-12-10 LAB
ALBUMIN SERPL-MCNC: 3.6 G/DL (ref 3.5–5.2)
ANION GAP SERPL CALCULATED.3IONS-SCNC: 10.1 MMOL/L (ref 5–15)
BASOPHILS # BLD AUTO: 0 10*3/MM3 (ref 0–0.2)
BASOPHILS NFR BLD AUTO: 0 % (ref 0–1.5)
BUN SERPL-MCNC: 15 MG/DL (ref 6–20)
BUN/CREAT SERPL: 28.8 (ref 7–25)
CALCIUM SPEC-SCNC: 9 MG/DL (ref 8.6–10.5)
CHLORIDE SERPL-SCNC: 104 MMOL/L (ref 98–107)
CO2 SERPL-SCNC: 26.9 MMOL/L (ref 22–29)
CREAT SERPL-MCNC: 0.52 MG/DL (ref 0.57–1)
DEPRECATED RDW RBC AUTO: 40.3 FL (ref 37–54)
EGFRCR SERPLBLD CKD-EPI 2021: 112.6 ML/MIN/1.73
EOSINOPHIL # BLD AUTO: 0 10*3/MM3 (ref 0–0.4)
EOSINOPHIL NFR BLD AUTO: 0 % (ref 0.3–6.2)
ERYTHROCYTE [DISTWIDTH] IN BLOOD BY AUTOMATED COUNT: 12 % (ref 12.3–15.4)
GLUCOSE SERPL-MCNC: 123 MG/DL (ref 65–99)
HCT VFR BLD AUTO: 38.2 % (ref 34–46.6)
HGB BLD-MCNC: 12.7 G/DL (ref 12–15.9)
IMM GRANULOCYTES # BLD AUTO: 0.13 10*3/MM3 (ref 0–0.05)
IMM GRANULOCYTES NFR BLD AUTO: 1.4 % (ref 0–0.5)
LYMPHOCYTES # BLD AUTO: 0.44 10*3/MM3 (ref 0.7–3.1)
LYMPHOCYTES NFR BLD AUTO: 4.7 % (ref 19.6–45.3)
MAGNESIUM SERPL-MCNC: 2.2 MG/DL (ref 1.6–2.6)
MCH RBC QN AUTO: 30.8 PG (ref 26.6–33)
MCHC RBC AUTO-ENTMCNC: 33.2 G/DL (ref 31.5–35.7)
MCV RBC AUTO: 92.7 FL (ref 79–97)
MONOCYTES # BLD AUTO: 0.19 10*3/MM3 (ref 0.1–0.9)
MONOCYTES NFR BLD AUTO: 2 % (ref 5–12)
NEUTROPHILS NFR BLD AUTO: 8.61 10*3/MM3 (ref 1.7–7)
NEUTROPHILS NFR BLD AUTO: 91.9 % (ref 42.7–76)
NRBC BLD AUTO-RTO: 0 /100 WBC (ref 0–0.2)
PHOSPHATE SERPL-MCNC: 3.8 MG/DL (ref 2.5–4.5)
PLATELET # BLD AUTO: 395 10*3/MM3 (ref 140–450)
PMV BLD AUTO: 9.8 FL (ref 6–12)
POTASSIUM SERPL-SCNC: 4.5 MMOL/L (ref 3.5–5.2)
RBC # BLD AUTO: 4.12 10*6/MM3 (ref 3.77–5.28)
SODIUM SERPL-SCNC: 141 MMOL/L (ref 136–145)
WBC NRBC COR # BLD AUTO: 9.37 10*3/MM3 (ref 3.4–10.8)

## 2024-12-10 PROCEDURE — 94761 N-INVAS EAR/PLS OXIMETRY MLT: CPT

## 2024-12-10 PROCEDURE — 94799 UNLISTED PULMONARY SVC/PX: CPT

## 2024-12-10 PROCEDURE — 25010000002 METHYLPREDNISOLONE PER 40 MG: Performed by: HOSPITALIST

## 2024-12-10 PROCEDURE — 94664 DEMO&/EVAL PT USE INHALER: CPT

## 2024-12-10 PROCEDURE — 94760 N-INVAS EAR/PLS OXIMETRY 1: CPT

## 2024-12-10 PROCEDURE — 85025 COMPLETE CBC W/AUTO DIFF WBC: CPT | Performed by: HOSPITALIST

## 2024-12-10 PROCEDURE — 80069 RENAL FUNCTION PANEL: CPT | Performed by: HOSPITALIST

## 2024-12-10 PROCEDURE — 25010000002 CEFTRIAXONE PER 250 MG: Performed by: HOSPITALIST

## 2024-12-10 PROCEDURE — 25010000002 ENOXAPARIN PER 10 MG: Performed by: HOSPITALIST

## 2024-12-10 PROCEDURE — 63710000001 ONDANSETRON ODT 4 MG TABLET DISPERSIBLE: Performed by: HOSPITALIST

## 2024-12-10 PROCEDURE — 83735 ASSAY OF MAGNESIUM: CPT | Performed by: HOSPITALIST

## 2024-12-10 RX ADMIN — IPRATROPIUM BROMIDE AND ALBUTEROL SULFATE 3 ML: 2.5; .5 SOLUTION RESPIRATORY (INHALATION) at 08:16

## 2024-12-10 RX ADMIN — METHYLPREDNISOLONE SODIUM SUCCINATE 40 MG: 40 INJECTION, POWDER, LYOPHILIZED, FOR SOLUTION INTRAMUSCULAR; INTRAVENOUS at 12:30

## 2024-12-10 RX ADMIN — PREGABALIN 25 MG: 25 CAPSULE ORAL at 21:02

## 2024-12-10 RX ADMIN — Medication 10 ML: at 08:26

## 2024-12-10 RX ADMIN — IPRATROPIUM BROMIDE AND ALBUTEROL SULFATE 3 ML: 2.5; .5 SOLUTION RESPIRATORY (INHALATION) at 20:32

## 2024-12-10 RX ADMIN — HYDROCODONE BITARTRATE AND ACETAMINOPHEN 1 TABLET: 5; 325 TABLET ORAL at 13:04

## 2024-12-10 RX ADMIN — THEOPHYLLINE 300 MG: 300 TABLET, EXTENDED RELEASE ORAL at 08:25

## 2024-12-10 RX ADMIN — MONTELUKAST 10 MG: 10 TABLET, FILM COATED ORAL at 08:25

## 2024-12-10 RX ADMIN — METHYLPREDNISOLONE SODIUM SUCCINATE 40 MG: 40 INJECTION, POWDER, LYOPHILIZED, FOR SOLUTION INTRAMUSCULAR; INTRAVENOUS at 00:39

## 2024-12-10 RX ADMIN — Medication 5 MG: at 21:02

## 2024-12-10 RX ADMIN — LEVOTHYROXINE SODIUM 50 MCG: 50 TABLET ORAL at 08:25

## 2024-12-10 RX ADMIN — ONDANSETRON 4 MG: 4 TABLET, ORALLY DISINTEGRATING ORAL at 17:32

## 2024-12-10 RX ADMIN — CEFTRIAXONE SODIUM 1000 MG: 1 INJECTION, POWDER, FOR SOLUTION INTRAMUSCULAR; INTRAVENOUS at 16:22

## 2024-12-10 RX ADMIN — Medication 10 ML: at 21:42

## 2024-12-10 RX ADMIN — BUPROPION HYDROCHLORIDE 300 MG: 300 TABLET, EXTENDED RELEASE ORAL at 08:25

## 2024-12-10 RX ADMIN — FLUOXETINE HYDROCHLORIDE 80 MG: 20 CAPSULE ORAL at 08:25

## 2024-12-10 RX ADMIN — ENOXAPARIN SODIUM 40 MG: 100 INJECTION SUBCUTANEOUS at 21:02

## 2024-12-10 RX ADMIN — TAMOXIFEN CITRATE 20 MG: 10 TABLET ORAL at 08:25

## 2024-12-10 RX ADMIN — PANTOPRAZOLE SODIUM 40 MG: 40 TABLET, DELAYED RELEASE ORAL at 05:39

## 2024-12-10 RX ADMIN — BUDESONIDE AND FORMOTEROL FUMARATE DIHYDRATE 2 PUFF: 160; 4.5 AEROSOL RESPIRATORY (INHALATION) at 08:20

## 2024-12-10 RX ADMIN — IPRATROPIUM BROMIDE AND ALBUTEROL SULFATE 3 ML: 2.5; .5 SOLUTION RESPIRATORY (INHALATION) at 12:28

## 2024-12-10 RX ADMIN — BUDESONIDE AND FORMOTEROL FUMARATE DIHYDRATE 2 PUFF: 160; 4.5 AEROSOL RESPIRATORY (INHALATION) at 20:38

## 2024-12-10 RX ADMIN — IPRATROPIUM BROMIDE AND ALBUTEROL SULFATE 3 ML: 2.5; .5 SOLUTION RESPIRATORY (INHALATION) at 16:51

## 2024-12-10 NOTE — PROGRESS NOTES
Dr. ERNST Davies    [address]        Patient ID:  Name:  Nereida Myles  MRN:  [id number]  1975  49 y.o.  female            CC/Reason for visit: Acute viral bronchitis causing asthma exacerbation    Interval hx: Still coughing and wheezing.  Still tired and short of breath with minimal exertion  Still requiring 2 L of oxygen to saturate 95%    ROS: No hemoptysis, no fever, no abdominal pain    Vitals:  Vitals:    12/10/24 1052 12/10/24 1228 12/10/24 1234 12/10/24 1503   BP: 107/70   122/83   BP Location: Right arm   Right arm   Patient Position: Lying   Lying   Pulse: 69 68 60 70   Resp: 20 20  20   Temp: 98.1 °F (36.7 °C)   98.1 °F (36.7 °C)   TempSrc: Oral   Oral   SpO2: 94% 94% 98% 95%   Weight:       Height:               Body mass index is 20.4 kg/m².  No intake or output data in the 24 hours ending 12/10/24 1606    Exam:  GEN:  No distress  Alert, oriented x 3.   LUNGS: Scattered rhonchi and wheezing bilat, no use of accessory muscles  CV:  Normal S1S2, without murmur, no edema  ABD:  Non tender, no enlarged liver or masses      Scheduled meds:  budesonide-formoterol, 2 puff, Inhalation, BID - RT  buPROPion XL, 300 mg, Oral, QAM  cefTRIAXone, 1,000 mg, Intravenous, Q24H  enoxaparin, 40 mg, Subcutaneous, Nightly  FLUoxetine, 80 mg, Oral, Daily  ipratropium-albuterol, 3 mL, Nebulization, 4x Daily - RT  levothyroxine, 50 mcg, Oral, Daily  melatonin, 5 mg, Oral, Nightly  methylPREDNISolone sodium succinate, 40 mg, Intravenous, Q12H  montelukast, 10 mg, Oral, Daily  pantoprazole, 40 mg, Oral, Q AM  pregabalin, 25 mg, Oral, Q8H  sodium chloride, 10 mL, Intravenous, Q12H  tamoxifen, 20 mg, Oral, Daily  theophylline, 300 mg, Oral, QAM      IV meds:                           Data Review:   I reviewed the patient's medications and new clinical results.            Results from last 7 days   Lab Units 12/10/24  0553 12/08/24  0431 12/07/24  0136   SODIUM mmol/L 141 137 138   POTASSIUM mmol/L  4.5 3.8 3.6   CHLORIDE mmol/L 104 102 105   CO2 mmol/L 26.9 23.8 22.7   BUN mg/dL 15 10 13   CREATININE mg/dL 0.52* 0.68 0.78   CALCIUM mg/dL 9.0 9.0 9.0   BILIRUBIN mg/dL  --   --  0.4   ALK PHOS U/L  --   --  62   ALT (SGPT) U/L  --   --  34*   AST (SGOT) U/L  --   --  23   GLUCOSE mg/dL 123* 155* 108*   WBC 10*3/mm3 9.37 10.55 12.15*   HEMOGLOBIN g/dL 12.7 11.8* 13.1   PLATELETS 10*3/mm3 395 317 400   PROBNP pg/mL  --   --  119.0   PROCALCITONIN ng/mL  --  0.03 0.04                ASSESSMENT:     Acute hypoxic respiratory failure    Hypothyroidism    Asthma exacerbation possible acute viral bronchitis    History of breast cancer        PLAN:  Continue with nebulized DuoNeb 4 times a day.  Continue with glucocorticoids for asthma exacerbation.  Still requiring oxygen.  Not ready for discharge today.  She needs to ambulate frequently, twice a day to help pulmonary hygiene  Supportive care/symptomatic care for viral bronchitis        Jitendra Davies MD  12/10/2024

## 2024-12-10 NOTE — PROGRESS NOTES
[facility] to Hospital Care    [phone]   LOS: 2 days     Name: Nereida Myles  Age/Sex: 49 y.o. female  :  1975        PCP: Kehrer, Meredith Lea, MD  Chief Complaint   Patient presents with    Shortness of Breath    Asthma    Vomiting      Subjective   Still short of breath and still with increased work of breathing.  Denies other new issues today.  She has been able to titrate off of oxygen likely.  General: No Fever or Chills, Cardiac: No Chest Pain or Palpitations, Resp: No Cough or SOA, GI: No Nausea, Vomiting, or Diarrhea, and Other: No bleeding    budesonide-formoterol, 2 puff, Inhalation, BID - RT  buPROPion XL, 300 mg, Oral, QAM  cefTRIAXone, 1,000 mg, Intravenous, Q24H  enoxaparin, 40 mg, Subcutaneous, Nightly  FLUoxetine, 80 mg, Oral, Daily  ipratropium-albuterol, 3 mL, Nebulization, 4x Daily - RT  levothyroxine, 50 mcg, Oral, Daily  melatonin, 5 mg, Oral, Nightly  methylPREDNISolone sodium succinate, 40 mg, Intravenous, Q12H  montelukast, 10 mg, Oral, Daily  pantoprazole, 40 mg, Oral, Q AM  pregabalin, 25 mg, Oral, Q8H  sodium chloride, 10 mL, Intravenous, Q12H  tamoxifen, 20 mg, Oral, Daily  theophylline, 300 mg, Oral, QAM           Objective   Vital Signs  Temp:  [97.7 °F (36.5 °C)-98.1 °F (36.7 °C)] 98.1 °F (36.7 °C)  Heart Rate:  [51-69] 60  Resp:  [18-20] 20  BP: (107-139)/(70-83) 107/70  Body mass index is 20.4 kg/m².  No intake or output data in the 24 hours ending 12/10/24 1418      Physical Exam  Vitals and nursing note reviewed.   Constitutional:       General: She is not in acute distress.     Appearance: She is ill-appearing.   Cardiovascular:      Rate and Rhythm: Normal rate and regular rhythm.   Pulmonary:      Effort: Pulmonary effort is normal. Respiratory distress present.      Breath sounds: Wheezing present.   Neurological:      Mental Status: She is alert and oriented to person, place, and time. Mental status is at baseline.           Results Review:       I reviewed  the patient's new clinical results.  Results from last 7 days   Lab Units 12/10/24  0553 12/08/24  0431 12/07/24  0136   WBC 10*3/mm3 9.37 10.55 12.15*   HEMOGLOBIN g/dL 12.7 11.8* 13.1   PLATELETS 10*3/mm3 395 317 400     Results from last 7 days   Lab Units 12/10/24  0553 12/08/24  0431 12/07/24  0136   SODIUM mmol/L 141 137 138   POTASSIUM mmol/L 4.5 3.8 3.6   CHLORIDE mmol/L 104 102 105   CO2 mmol/L 26.9 23.8 22.7   BUN mg/dL 15 10 13   CREATININE mg/dL 0.52* 0.68 0.78   CALCIUM mg/dL 9.0 9.0 9.0   MAGNESIUM mg/dL 2.2 2.3  --    PHOSPHORUS mg/dL 3.8 3.6  --    Estimated Creatinine Clearance: 112.3 mL/min (A) (by C-G formula based on SCr of 0.52 mg/dL (L)).      Assessment & Plan   Active Hospital Problems    Diagnosis  POA    **Acute hypoxic respiratory failure [J96.01]  Yes    Asthma exacerbation [J45.901]  Unknown    History of breast cancer [Z85.3]  Not Applicable    Hypothyroidism [E03.9]  Yes      Resolved Hospital Problems   No resolved problems to display.       PLAN  This is a 49-year-old lady with a history of breast cancer asthma hypothyroidism who presents to the hospital with hypoxia and asthma exacerbation secondary to rhinovirus.  -Continue steroids and breathing treatments.  -Overall moving better air but still tight and still short of breath  -Continue home medications and encourage out of bed activity  -Hopefully begibs to improve soon  -Mechanical DVT prophylaxis  -Full code      Disposition  Expected Discharge Date: 12/12/2024; Expected Discharge Time:        Prabhjot Giang MD  [facility]  12/10/24  14:18 EST

## 2024-12-11 PROCEDURE — 25010000002 CEFTRIAXONE PER 250 MG: Performed by: HOSPITALIST

## 2024-12-11 PROCEDURE — 94799 UNLISTED PULMONARY SVC/PX: CPT

## 2024-12-11 PROCEDURE — 25010000002 ONDANSETRON PER 1 MG: Performed by: HOSPITALIST

## 2024-12-11 PROCEDURE — 94760 N-INVAS EAR/PLS OXIMETRY 1: CPT

## 2024-12-11 PROCEDURE — 94664 DEMO&/EVAL PT USE INHALER: CPT

## 2024-12-11 PROCEDURE — 94761 N-INVAS EAR/PLS OXIMETRY MLT: CPT

## 2024-12-11 PROCEDURE — 25010000002 METHYLPREDNISOLONE PER 40 MG: Performed by: HOSPITALIST

## 2024-12-11 PROCEDURE — 25010000002 ENOXAPARIN PER 10 MG: Performed by: HOSPITALIST

## 2024-12-11 RX ADMIN — IPRATROPIUM BROMIDE AND ALBUTEROL SULFATE 3 ML: 2.5; .5 SOLUTION RESPIRATORY (INHALATION) at 19:53

## 2024-12-11 RX ADMIN — BUDESONIDE AND FORMOTEROL FUMARATE DIHYDRATE 2 PUFF: 160; 4.5 AEROSOL RESPIRATORY (INHALATION) at 19:59

## 2024-12-11 RX ADMIN — FLUOXETINE HYDROCHLORIDE 80 MG: 20 CAPSULE ORAL at 08:44

## 2024-12-11 RX ADMIN — METHYLPREDNISOLONE SODIUM SUCCINATE 40 MG: 40 INJECTION, POWDER, LYOPHILIZED, FOR SOLUTION INTRAMUSCULAR; INTRAVENOUS at 11:28

## 2024-12-11 RX ADMIN — BUDESONIDE AND FORMOTEROL FUMARATE DIHYDRATE 2 PUFF: 160; 4.5 AEROSOL RESPIRATORY (INHALATION) at 07:28

## 2024-12-11 RX ADMIN — IPRATROPIUM BROMIDE AND ALBUTEROL SULFATE 3 ML: 2.5; .5 SOLUTION RESPIRATORY (INHALATION) at 07:28

## 2024-12-11 RX ADMIN — PREGABALIN 25 MG: 25 CAPSULE ORAL at 20:12

## 2024-12-11 RX ADMIN — CEFTRIAXONE SODIUM 1000 MG: 1 INJECTION, POWDER, FOR SOLUTION INTRAMUSCULAR; INTRAVENOUS at 16:32

## 2024-12-11 RX ADMIN — ONDANSETRON 4 MG: 2 INJECTION INTRAMUSCULAR; INTRAVENOUS at 11:39

## 2024-12-11 RX ADMIN — HYDROCODONE BITARTRATE AND ACETAMINOPHEN 1 TABLET: 5; 325 TABLET ORAL at 16:32

## 2024-12-11 RX ADMIN — IPRATROPIUM BROMIDE AND ALBUTEROL SULFATE 3 ML: 2.5; .5 SOLUTION RESPIRATORY (INHALATION) at 11:12

## 2024-12-11 RX ADMIN — TAMOXIFEN CITRATE 20 MG: 10 TABLET ORAL at 08:43

## 2024-12-11 RX ADMIN — HYDROCODONE BITARTRATE AND ACETAMINOPHEN 1 TABLET: 5; 325 TABLET ORAL at 21:59

## 2024-12-11 RX ADMIN — Medication 5 MG: at 20:11

## 2024-12-11 RX ADMIN — LEVOTHYROXINE SODIUM 50 MCG: 50 TABLET ORAL at 08:43

## 2024-12-11 RX ADMIN — IPRATROPIUM BROMIDE AND ALBUTEROL SULFATE 3 ML: 2.5; .5 SOLUTION RESPIRATORY (INHALATION) at 14:58

## 2024-12-11 RX ADMIN — HYDROCODONE BITARTRATE AND ACETAMINOPHEN 1 TABLET: 5; 325 TABLET ORAL at 06:16

## 2024-12-11 RX ADMIN — ENOXAPARIN SODIUM 40 MG: 100 INJECTION SUBCUTANEOUS at 20:12

## 2024-12-11 RX ADMIN — Medication 10 ML: at 08:44

## 2024-12-11 RX ADMIN — METHYLPREDNISOLONE SODIUM SUCCINATE 40 MG: 40 INJECTION, POWDER, LYOPHILIZED, FOR SOLUTION INTRAMUSCULAR; INTRAVENOUS at 00:31

## 2024-12-11 RX ADMIN — BUPROPION HYDROCHLORIDE 300 MG: 300 TABLET, EXTENDED RELEASE ORAL at 06:11

## 2024-12-11 RX ADMIN — MONTELUKAST 10 MG: 10 TABLET, FILM COATED ORAL at 08:44

## 2024-12-11 RX ADMIN — THEOPHYLLINE 300 MG: 300 TABLET, EXTENDED RELEASE ORAL at 06:11

## 2024-12-11 RX ADMIN — PANTOPRAZOLE SODIUM 40 MG: 40 TABLET, DELAYED RELEASE ORAL at 06:11

## 2024-12-11 NOTE — PROGRESS NOTES
Dr. ERNST Davies    [address]        Patient ID:  Name:  Nereida Myles  MRN:  [id number]  1975  49 y.o.  female            CC/Reason for visit: Acute viral bronchitis causing asthma exacerbation     Interval hx: Still coughing and wheezing but has been weaned off of oxygen.  Feels better     ROS: No hemoptysis, no fever, no abdominal pain    Vitals:  Vitals:    12/11/24 1112 12/11/24 1154 12/11/24 1458 12/11/24 1547   BP:  122/75  125/81   BP Location:  Right arm  Right arm   Patient Position:  Lying  Lying   Pulse:  80 77 70   Resp: 18 18 18 18   Temp:  98.2 °F (36.8 °C)  98.2 °F (36.8 °C)   TempSrc:  Oral  Oral   SpO2:  93% 93% 91%   Weight:       Height:               Body mass index is 20.4 kg/m².  No intake or output data in the 24 hours ending 12/11/24 1632    Exam:  GEN:  No distress  Alert, oriented x 3.   LUNGS: Still rhonchi and wheezes bilat, no use of accessory muscles  CV:  Normal S1S2, without murmur, no edema  ABD:  Non tender, no enlarged liver or masses      Scheduled meds:  budesonide-formoterol, 2 puff, Inhalation, BID - RT  buPROPion XL, 300 mg, Oral, QAM  cefTRIAXone, 1,000 mg, Intravenous, Q24H  enoxaparin, 40 mg, Subcutaneous, Nightly  FLUoxetine, 80 mg, Oral, Daily  ipratropium-albuterol, 3 mL, Nebulization, 4x Daily - RT  levothyroxine, 50 mcg, Oral, Daily  melatonin, 5 mg, Oral, Nightly  methylPREDNISolone sodium succinate, 40 mg, Intravenous, Q12H  montelukast, 10 mg, Oral, Daily  pantoprazole, 40 mg, Oral, Q AM  pregabalin, 25 mg, Oral, Q8H  sodium chloride, 10 mL, Intravenous, Q12H  tamoxifen, 20 mg, Oral, Daily  theophylline, 300 mg, Oral, QAM      IV meds:                           Data Review:   I reviewed the patient's medications and new clinical results.    COVID19   Date Value Ref Range Status   12/07/2024 Not Detected Not Detected - Ref. Range Final         Lab Results   Component Value Date    CALCIUM 9.0 12/10/2024    PHOS 3.8 12/10/2024    MG 2.2  12/10/2024    MG 2.3 12/08/2024    MG 2.0 08/02/2024     Results from last 7 days   Lab Units 12/10/24  0553 12/08/24  0431 12/07/24  0136   SODIUM mmol/L 141 137 138   POTASSIUM mmol/L 4.5 3.8 3.6   CHLORIDE mmol/L 104 102 105   CO2 mmol/L 26.9 23.8 22.7   BUN mg/dL 15 10 13   CREATININE mg/dL 0.52* 0.68 0.78   CALCIUM mg/dL 9.0 9.0 9.0   BILIRUBIN mg/dL  --   --  0.4   ALK PHOS U/L  --   --  62   ALT (SGPT) U/L  --   --  34*   AST (SGOT) U/L  --   --  23   GLUCOSE mg/dL 123* 155* 108*   WBC 10*3/mm3 9.37 10.55 12.15*   HEMOGLOBIN g/dL 12.7 11.8* 13.1   PLATELETS 10*3/mm3 395 317 400   PROBNP pg/mL  --   --  119.0   PROCALCITONIN ng/mL  --  0.03 0.04     Results from last 7 days   Lab Units 12/09/24  1718 12/09/24  1629   BLOODCX  No growth at 24 hours No growth at 24 hours            ASSESSMENT:     Acute hypoxic respiratory failure    Hypothyroidism    Asthma exacerbation    History of breast cancer        PLAN:  Has been weaned off of oxygen.  Continues to improve  Still some rhonchi and wheezes.  Continue with Rocephin and IV Solu-Medrol till tomorrow.  Tomorrow will be 5 days of antibiotics.  Procalcitonin has been negative twice.  We can stop antibiotics tomorrow.  She can go on a Medrol Dosepak taper tomorrow.  Anticipate home tomorrow and follow-up in the office with my colleagues in 2 to 3 weeks        Jitendra Davies MD  12/11/2024

## 2024-12-11 NOTE — PAYOR COMM NOTE
"Nereida Lozano (49 y.o. Female)     PLEASE SEE ATTACHED FOR CONTINUED INPT STAY DAYS    REF #  [id number]    PLEASE CALL DARCIE BABIN RN/ DEPT @ [phone]   OR [phone]    THANK YOU  DARCIE BABIN RN  [facility]        Date of Birth   1975    Social Security Number       Address   [address] [place] [address]    Home Phone   [phone]    MRN   [id number]       Catholic   None    Marital Status                               Admission Date   12/7/24    Admission Type   Emergency    Admitting Provider   Prabhjot Giang MD    Attending Provider   Prabhjot Giang MD    Department, Room/Bed   [address], S513/1       Discharge Date       Discharge Disposition       Discharge Destination                                 Attending Provider: Prabhjot Giang MD    Allergies: No Known Allergies    Isolation: Droplet   Infection: Rhinovirus  (12/07/24)   Code Status: CPR    Ht: 165.1 cm (65\")   Wt: 55.6 kg (122 lb 9.2 oz)    Admission Cmt: None   Principal Problem: Acute hypoxic respiratory failure [J96.01]                   Active Insurance as of 12/7/2024       Primary Coverage       Payor Plan Insurance Group Employer/Plan Group    ANTHEM BLUE CROSS ANTHEM BLUE CROSS BLUE SHIELD PPO [id number]       Payor Plan Address Payor Plan Phone Number Payor Plan Fax Number Effective Dates    [address] [phone]  4/3/2021 - None Entered    [place] [address]         Subscriber Name Subscriber Birth Date Member ID       NUNO LOZANO NIDIA 6/2/1965 [id number]                     Emergency Contacts        (Rel.) Home Phone Work Phone Mobile Phone    NUNO LOZANO (Spouse) -- -- [phone]    LESLIKEVIN BAILEY (Mother) -- -- [phone]    TremontElaine (Daughter) [phone] -- [phone]              [place]: NPI [id number]  Tax ID [id number]     Physician Progress Notes (last 24 hours)        Jitendra Davies " MD FRANSISCO at 12/10/24 1606          Dr. ERNST Davies    [address]        Patient ID:  Name:  Nereida Myles  MRN:  [id number]  1975  49 y.o.  female            CC/Reason for visit: Acute viral bronchitis causing asthma exacerbation    Interval hx: Still coughing and wheezing.  Still tired and short of breath with minimal exertion  Still requiring 2 L of oxygen to saturate 95%    ROS: No hemoptysis, no fever, no abdominal pain    Vitals:  Vitals:    12/10/24 1052 12/10/24 1228 12/10/24 1234 12/10/24 1503   BP: 107/70   122/83   BP Location: Right arm   Right arm   Patient Position: Lying   Lying   Pulse: 69 68 60 70   Resp: 20 20  20   Temp: 98.1 °F (36.7 °C)   98.1 °F (36.7 °C)   TempSrc: Oral   Oral   SpO2: 94% 94% 98% 95%   Weight:       Height:               Body mass index is 20.4 kg/m².  No intake or output data in the 24 hours ending 12/10/24 1606    Exam:  GEN:  No distress  Alert, oriented x 3.   LUNGS: Scattered rhonchi and wheezing bilat, no use of accessory muscles  CV:  Normal S1S2, without murmur, no edema  ABD:  Non tender, no enlarged liver or masses      Scheduled meds:  budesonide-formoterol, 2 puff, Inhalation, BID - RT  buPROPion XL, 300 mg, Oral, QAM  cefTRIAXone, 1,000 mg, Intravenous, Q24H  enoxaparin, 40 mg, Subcutaneous, Nightly  FLUoxetine, 80 mg, Oral, Daily  ipratropium-albuterol, 3 mL, Nebulization, 4x Daily - RT  levothyroxine, 50 mcg, Oral, Daily  melatonin, 5 mg, Oral, Nightly  methylPREDNISolone sodium succinate, 40 mg, Intravenous, Q12H  montelukast, 10 mg, Oral, Daily  pantoprazole, 40 mg, Oral, Q AM  pregabalin, 25 mg, Oral, Q8H  sodium chloride, 10 mL, Intravenous, Q12H  tamoxifen, 20 mg, Oral, Daily  theophylline, 300 mg, Oral, QAM      IV meds:                           Data Review:   I reviewed the patient's medications and new clinical results.            Results from last 7 days   Lab Units 12/10/24  0553 12/08/24  0431 12/07/24  0136   SODIUM mmol/L  141 137 138   POTASSIUM mmol/L 4.5 3.8 3.6   CHLORIDE mmol/L 104 102 105   CO2 mmol/L 26.9 23.8 22.7   BUN mg/dL 15 10 13   CREATININE mg/dL 0.52* 0.68 0.78   CALCIUM mg/dL 9.0 9.0 9.0   BILIRUBIN mg/dL  --   --  0.4   ALK PHOS U/L  --   --  62   ALT (SGPT) U/L  --   --  34*   AST (SGOT) U/L  --   --  23   GLUCOSE mg/dL 123* 155* 108*   WBC 10*3/mm3 9.37 10.55 12.15*   HEMOGLOBIN g/dL 12.7 11.8* 13.1   PLATELETS 10*3/mm3 395 317 400   PROBNP pg/mL  --   --  119.0   PROCALCITONIN ng/mL  --  0.03 0.04                ASSESSMENT:     Acute hypoxic respiratory failure    Hypothyroidism    Asthma exacerbation possible acute viral bronchitis    History of breast cancer        PLAN:  Continue with nebulized DuoNeb 4 times a day.  Continue with glucocorticoids for asthma exacerbation.  Still requiring oxygen.  Not ready for discharge today.  She needs to ambulate frequently, twice a day to help pulmonary hygiene  Supportive care/symptomatic care for viral bronchitis        Jitendra Davies MD  12/10/2024    Electronically signed by Jitendra Davies MD at 12/10/24 4337       Prabhjot Giang MD at 12/10/24 2544            [facility] to Hospital Care    [phone]   LOS: 2 days     Name: Nereida Myles  Age/Sex: 49 y.o. female  :  1975        PCP: Kehrer, Meredith Lea, MD  Chief Complaint   Patient presents with    Shortness of Breath    Asthma    Vomiting      Subjective   Still short of breath and still with increased work of breathing.  Denies other new issues today.  She has been able to titrate off of oxygen likely.  General: No Fever or Chills, Cardiac: No Chest Pain or Palpitations, Resp: No Cough or SOA, GI: No Nausea, Vomiting, or Diarrhea, and Other: No bleeding    budesonide-formoterol, 2 puff, Inhalation, BID - RT  buPROPion XL, 300 mg, Oral, QAM  cefTRIAXone, 1,000 mg, Intravenous, Q24H  enoxaparin, 40 mg, Subcutaneous, Nightly  FLUoxetine, 80 mg, Oral, Daily  ipratropium-albuterol, 3 mL,  Nebulization, 4x Daily - RT  levothyroxine, 50 mcg, Oral, Daily  melatonin, 5 mg, Oral, Nightly  methylPREDNISolone sodium succinate, 40 mg, Intravenous, Q12H  montelukast, 10 mg, Oral, Daily  pantoprazole, 40 mg, Oral, Q AM  pregabalin, 25 mg, Oral, Q8H  sodium chloride, 10 mL, Intravenous, Q12H  tamoxifen, 20 mg, Oral, Daily  theophylline, 300 mg, Oral, QAM           Objective   Vital Signs  Temp:  [97.7 °F (36.5 °C)-98.1 °F (36.7 °C)] 98.1 °F (36.7 °C)  Heart Rate:  [51-69] 60  Resp:  [18-20] 20  BP: (107-139)/(70-83) 107/70  Body mass index is 20.4 kg/m².  No intake or output data in the 24 hours ending 12/10/24 1418      Physical Exam  Vitals and nursing note reviewed.   Constitutional:       General: She is not in acute distress.     Appearance: She is ill-appearing.   Cardiovascular:      Rate and Rhythm: Normal rate and regular rhythm.   Pulmonary:      Effort: Pulmonary effort is normal. Respiratory distress present.      Breath sounds: Wheezing present.   Neurological:      Mental Status: She is alert and oriented to person, place, and time. Mental status is at baseline.           Results Review:       I reviewed the patient's new clinical results.  Results from last 7 days   Lab Units 12/10/24  0553 12/08/24  0431 12/07/24  0136   WBC 10*3/mm3 9.37 10.55 12.15*   HEMOGLOBIN g/dL 12.7 11.8* 13.1   PLATELETS 10*3/mm3 395 317 400     Results from last 7 days   Lab Units 12/10/24  0553 12/08/24  0431 12/07/24  0136   SODIUM mmol/L 141 137 138   POTASSIUM mmol/L 4.5 3.8 3.6   CHLORIDE mmol/L 104 102 105   CO2 mmol/L 26.9 23.8 22.7   BUN mg/dL 15 10 13   CREATININE mg/dL 0.52* 0.68 0.78   CALCIUM mg/dL 9.0 9.0 9.0   MAGNESIUM mg/dL 2.2 2.3  --    PHOSPHORUS mg/dL 3.8 3.6  --    Estimated Creatinine Clearance: 112.3 mL/min (A) (by C-G formula based on SCr of 0.52 mg/dL (L)).      Assessment & Plan   Active Hospital Problems    Diagnosis  POA    **Acute hypoxic respiratory failure [J96.01]  Yes    Asthma  exacerbation [J45.901]  Unknown    History of breast cancer [Z85.3]  Not Applicable    Hypothyroidism [E03.9]  Yes      Resolved Hospital Problems   No resolved problems to display.       PLAN  This is a 49-year-old lady with a history of breast cancer asthma hypothyroidism who presents to the hospital with hypoxia and asthma exacerbation secondary to rhinovirus.  -Continue steroids and breathing treatments.  -Overall moving better air but still tight and still short of breath  -Continue home medications and encourage out of bed activity  -Hopefully begibs to improve soon  -Mechanical DVT prophylaxis  -Full code      Disposition  Expected Discharge Date: 12/12/2024; Expected Discharge Time:        Prabhjot Giang MD  [facility]  12/10/24  14:18 EST            Electronically signed by Prabhjot Giang MD at 12/10/24 3439

## 2024-12-11 NOTE — PROGRESS NOTES
[facility]    [phone]   LOS: 3 days     Name: Nereida Myles  Age/Sex: 49 y.o. female  :  1975        PCP: Kehrer, Meredith Lea, MD  Chief Complaint   Patient presents with    Shortness of Breath    Asthma    Vomiting      Subjective   Still short of breath and still with increased work of breathing.  Denies other new issues today.  She has been able to titrate off of oxygen likely.  General: No Fever or Chills, Cardiac: No Chest Pain or Palpitations, Resp: No Cough or SOA, GI: No Nausea, Vomiting, or Diarrhea, and Other: No bleeding    budesonide-formoterol, 2 puff, Inhalation, BID - RT  buPROPion XL, 300 mg, Oral, QAM  cefTRIAXone, 1,000 mg, Intravenous, Q24H  enoxaparin, 40 mg, Subcutaneous, Nightly  FLUoxetine, 80 mg, Oral, Daily  ipratropium-albuterol, 3 mL, Nebulization, 4x Daily - RT  levothyroxine, 50 mcg, Oral, Daily  melatonin, 5 mg, Oral, Nightly  methylPREDNISolone sodium succinate, 40 mg, Intravenous, Q12H  montelukast, 10 mg, Oral, Daily  pantoprazole, 40 mg, Oral, Q AM  pregabalin, 25 mg, Oral, Q8H  sodium chloride, 10 mL, Intravenous, Q12H  tamoxifen, 20 mg, Oral, Daily  theophylline, 300 mg, Oral, QAM           Objective   Vital Signs  Temp:  [97.7 °F (36.5 °C)-98.1 °F (36.7 °C)] 98.1 °F (36.7 °C)  Heart Rate:  [55-85] 60  Resp:  [18-20] 18  BP: (107-122)/(70-95) 114/80  Body mass index is 20.4 kg/m².  No intake or output data in the 24 hours ending 24 1024      Physical Exam  Vitals and nursing note reviewed.   Constitutional:       General: She is not in acute distress.     Appearance: She is ill-appearing.   Cardiovascular:      Rate and Rhythm: Normal rate and regular rhythm.   Pulmonary:      Effort: Pulmonary effort is normal. No respiratory distress.      Breath sounds: Wheezing present.      Comments: She is no longer tachypneic today and breath sounds are improved still with wheezing and some rhonchi but significantly improved compared to the prior 3  days.  Neurological:      Mental Status: She is alert and oriented to person, place, and time. Mental status is at baseline.           Results Review:       I reviewed the patient's new clinical results.  Results from last 7 days   Lab Units 12/10/24  0553 12/08/24  0431 12/07/24  0136   WBC 10*3/mm3 9.37 10.55 12.15*   HEMOGLOBIN g/dL 12.7 11.8* 13.1   PLATELETS 10*3/mm3 395 317 400     Results from last 7 days   Lab Units 12/10/24  0553 12/08/24  0431 12/07/24  0136   SODIUM mmol/L 141 137 138   POTASSIUM mmol/L 4.5 3.8 3.6   CHLORIDE mmol/L 104 102 105   CO2 mmol/L 26.9 23.8 22.7   BUN mg/dL 15 10 13   CREATININE mg/dL 0.52* 0.68 0.78   CALCIUM mg/dL 9.0 9.0 9.0   MAGNESIUM mg/dL 2.2 2.3  --    PHOSPHORUS mg/dL 3.8 3.6  --    Estimated Creatinine Clearance: 112.3 mL/min (A) (by C-G formula based on SCr of 0.52 mg/dL (L)).      Assessment & Plan   Active Hospital Problems    Diagnosis  POA    **Acute hypoxic respiratory failure [J96.01]  Yes    Asthma exacerbation [J45.901]  Unknown    History of breast cancer [Z85.3]  Not Applicable    Hypothyroidism [E03.9]  Yes      Resolved Hospital Problems   No resolved problems to display.       PLAN  This is a 49-year-old lady with a history of breast cancer asthma hypothyroidism who presents to the hospital with hypoxia and asthma exacerbation secondary to rhinovirus.  -Continue steroids and breathing treatments.  Will continue IV steroids today with plans to transition to oral steroids in the morning.  -Overall moving better air and breath sounds are improving.  -Still with exercise intolerance.  Will try to road test today and see how she does.  Discussed with the nursing staff and will allow her to shower and encouraged to ambulate 3 times during the day today out into the hallway.  -Continue home medications and encourage out of bed activity  -Lovenox for DVT prophylaxis  -Full code      Disposition  Expected Discharge Date: 12/12/2024; Expected Discharge Time:     Potential discharge tomorrow but I would have a low threshold for even 1 more day in the hospital if there is any concern.    Prabhjot Giang MD  [facility]  12/11/24  10:24 EST

## 2024-12-12 ENCOUNTER — READMISSION MANAGEMENT (OUTPATIENT)
Dept: CALL CENTER | Facility: HOSPITAL | Age: 49
End: 2024-12-12
Payer: COMMERCIAL

## 2024-12-12 VITALS
BODY MASS INDEX: 15.39 KG/M2 | HEIGHT: 65 IN | TEMPERATURE: 94 F | DIASTOLIC BLOOD PRESSURE: 84 MMHG | SYSTOLIC BLOOD PRESSURE: 119 MMHG | OXYGEN SATURATION: 98 % | HEART RATE: 66 BPM | WEIGHT: 92.37 LBS | RESPIRATION RATE: 18 BRPM

## 2024-12-12 PROBLEM — B34.8 RHINOVIRUS: Status: ACTIVE | Noted: 2024-12-12

## 2024-12-12 PROCEDURE — 63710000001 METHYLPREDNISOLONE 4 MG TABLET THERAPY PACK 21 EACH DISP PACK: Performed by: STUDENT IN AN ORGANIZED HEALTH CARE EDUCATION/TRAINING PROGRAM

## 2024-12-12 PROCEDURE — 94799 UNLISTED PULMONARY SVC/PX: CPT

## 2024-12-12 PROCEDURE — 94664 DEMO&/EVAL PT USE INHALER: CPT

## 2024-12-12 PROCEDURE — 25010000002 METHYLPREDNISOLONE PER 40 MG: Performed by: HOSPITALIST

## 2024-12-12 RX ORDER — METHYLPREDNISOLONE 4 MG/1
TABLET ORAL
Qty: 21 TABLET | Refills: 0 | Status: SHIPPED | OUTPATIENT
Start: 2024-12-12 | End: 2024-12-17

## 2024-12-12 RX ORDER — CEFDINIR 300 MG/1
300 CAPSULE ORAL 2 TIMES DAILY
Qty: 4 CAPSULE | Refills: 0 | Status: SHIPPED | OUTPATIENT
Start: 2024-12-12 | End: 2024-12-14

## 2024-12-12 RX ORDER — METHYLPREDNISOLONE 4 MG/1
8 TABLET ORAL
Status: DISCONTINUED | OUTPATIENT
Start: 2024-12-12 | End: 2024-12-12 | Stop reason: HOSPADM

## 2024-12-12 RX ORDER — PREDNISONE 20 MG/1
40 TABLET ORAL ONCE
Status: DISCONTINUED | OUTPATIENT
Start: 2024-12-12 | End: 2024-12-12

## 2024-12-12 RX ORDER — METHYLPREDNISOLONE 4 MG/1
4 TABLET ORAL
Status: DISCONTINUED | OUTPATIENT
Start: 2024-12-13 | End: 2024-12-12 | Stop reason: HOSPADM

## 2024-12-12 RX ORDER — METHYLPREDNISOLONE 4 MG/1
4 TABLET ORAL
Status: DISCONTINUED | OUTPATIENT
Start: 2024-12-15 | End: 2024-12-12 | Stop reason: HOSPADM

## 2024-12-12 RX ORDER — METHYLPREDNISOLONE 4 MG/1
8 TABLET ORAL
Status: DISCONTINUED | OUTPATIENT
Start: 2024-12-13 | End: 2024-12-12 | Stop reason: HOSPADM

## 2024-12-12 RX ORDER — METHYLPREDNISOLONE 4 MG/1
4 TABLET ORAL
Status: DISCONTINUED | OUTPATIENT
Start: 2024-12-16 | End: 2024-12-12 | Stop reason: HOSPADM

## 2024-12-12 RX ORDER — METHYLPREDNISOLONE 4 MG/1
4 TABLET ORAL
Status: DISCONTINUED | OUTPATIENT
Start: 2024-12-14 | End: 2024-12-12 | Stop reason: HOSPADM

## 2024-12-12 RX ORDER — METHYLPREDNISOLONE 4 MG/1
4 TABLET ORAL
Status: COMPLETED | OUTPATIENT
Start: 2024-12-12 | End: 2024-12-12

## 2024-12-12 RX ORDER — ENOXAPARIN SODIUM 100 MG/ML
30 INJECTION SUBCUTANEOUS NIGHTLY
Status: DISCONTINUED | OUTPATIENT
Start: 2024-12-12 | End: 2024-12-12 | Stop reason: HOSPADM

## 2024-12-12 RX ORDER — METHYLPREDNISOLONE 4 MG/1
4 TABLET ORAL
Status: DISCONTINUED | OUTPATIENT
Start: 2024-12-12 | End: 2024-12-12 | Stop reason: HOSPADM

## 2024-12-12 RX ORDER — METHYLPREDNISOLONE 4 MG/1
4 TABLET ORAL
Status: DISCONTINUED | OUTPATIENT
Start: 2024-12-17 | End: 2024-12-12 | Stop reason: HOSPADM

## 2024-12-12 RX ADMIN — TAMOXIFEN CITRATE 20 MG: 10 TABLET ORAL at 09:56

## 2024-12-12 RX ADMIN — BUPROPION HYDROCHLORIDE 300 MG: 300 TABLET, EXTENDED RELEASE ORAL at 06:22

## 2024-12-12 RX ADMIN — METHYLPREDNISOLONE SODIUM SUCCINATE 40 MG: 40 INJECTION, POWDER, LYOPHILIZED, FOR SOLUTION INTRAMUSCULAR; INTRAVENOUS at 01:00

## 2024-12-12 RX ADMIN — BUDESONIDE AND FORMOTEROL FUMARATE DIHYDRATE 2 PUFF: 160; 4.5 AEROSOL RESPIRATORY (INHALATION) at 08:34

## 2024-12-12 RX ADMIN — FLUOXETINE HYDROCHLORIDE 80 MG: 20 CAPSULE ORAL at 09:56

## 2024-12-12 RX ADMIN — IPRATROPIUM BROMIDE AND ALBUTEROL SULFATE 3 ML: 2.5; .5 SOLUTION RESPIRATORY (INHALATION) at 08:29

## 2024-12-12 RX ADMIN — PREGABALIN 25 MG: 25 CAPSULE ORAL at 13:46

## 2024-12-12 RX ADMIN — THEOPHYLLINE 300 MG: 300 TABLET, EXTENDED RELEASE ORAL at 06:20

## 2024-12-12 RX ADMIN — IPRATROPIUM BROMIDE AND ALBUTEROL SULFATE 3 ML: 2.5; .5 SOLUTION RESPIRATORY (INHALATION) at 11:40

## 2024-12-12 RX ADMIN — METHYLPREDNISOLONE 4 MG: 4 TABLET ORAL at 13:46

## 2024-12-12 RX ADMIN — PANTOPRAZOLE SODIUM 40 MG: 40 TABLET, DELAYED RELEASE ORAL at 06:19

## 2024-12-12 RX ADMIN — MONTELUKAST 10 MG: 10 TABLET, FILM COATED ORAL at 09:56

## 2024-12-12 RX ADMIN — Medication 10 ML: at 09:59

## 2024-12-12 RX ADMIN — LEVOTHYROXINE SODIUM 50 MCG: 50 TABLET ORAL at 06:21

## 2024-12-12 NOTE — PROGRESS NOTES
[facility]: Renal Dose Adjustment    Lovenox has been appropriately renally dose adjusted based on our System P&T approved policy. Pharmacy will continue to monitor patient renal function while in-house.     Peter Shi PharmD  Clinical Pharmacist

## 2024-12-12 NOTE — PROGRESS NOTES
Dr. ERNST Davies    [address]        Patient ID:  Name:  Nereida Myles  MRN:  [id number]  1975  49 y.o.  female            CC/Reason for visit: Acute viral bronchitis with asthma exacerbation    Interval hx: Still coughing but feels better.  Not requiring oxygen during the day.      ROS: No hemoptysis.  Still coughing and some wheezing.  No abdominal pain    Vitals:  Vitals:    12/12/24 0836 12/12/24 1114 12/12/24 1140 12/12/24 1147   BP:  119/84     BP Location:  Right arm     Patient Position:  Lying     Pulse: 65 69 66 66   Resp:  18 18 18   Temp:  94 °F (34.4 °C)     TempSrc:  Oral     SpO2: 98%  92% 98%   Weight:       Height:               Body mass index is 15.37 kg/m².  No intake or output data in the 24 hours ending 12/12/24 1301    Exam:  GEN:  No distress  Alert, oriented x 3.   LUNGS: Scattered rhonchi and some squeaks bilat, no use of accessory muscles  CV:  Normal S1S2, without murmur, no edema  ABD:  Non tender, no enlarged liver or masses      Scheduled meds:  budesonide-formoterol, 2 puff, Inhalation, BID - RT  buPROPion XL, 300 mg, Oral, QAM  cefTRIAXone, 1,000 mg, Intravenous, Q24H  enoxaparin, 40 mg, Subcutaneous, Nightly  FLUoxetine, 80 mg, Oral, Daily  ipratropium-albuterol, 3 mL, Nebulization, 4x Daily - RT  levothyroxine, 50 mcg, Oral, Daily  melatonin, 5 mg, Oral, Nightly  methylPREDNISolone, 4 mg, Oral, After Lunch  methylPREDNISolone, 4 mg, Oral, After Dinner  [START ON 12/13/2024] methylPREDNISolone, 4 mg, Oral, TID Around Food  [START ON 12/14/2024] methylPREDNISolone, 4 mg, Oral, 4x Daily Taper  [START ON 12/15/2024] methylPREDNISolone, 4 mg, Oral, TID Around Food  [START ON 12/16/2024] methylPREDNISolone, 4 mg, Oral, Before Breakfast  [START ON 12/16/2024] methylPREDNISolone, 4 mg, Oral, Tonight  [START ON 12/17/2024] methylPREDNISolone, 4 mg, Oral, Before Breakfast  methylPREDNISolone, 8 mg, Oral, Before Breakfast  methylPREDNISolone, 8 mg, Oral,  Tonight  [START ON 12/13/2024] methylPREDNISolone, 8 mg, Oral, Tonight  montelukast, 10 mg, Oral, Daily  pantoprazole, 40 mg, Oral, Q AM  predniSONE, 40 mg, Oral, Once  pregabalin, 25 mg, Oral, Q8H  sodium chloride, 10 mL, Intravenous, Q12H  tamoxifen, 20 mg, Oral, Daily  theophylline, 300 mg, Oral, QAM      IV meds:                           Data Review:   I reviewed the patient's medications and new clinical results.            Results from last 7 days   Lab Units 12/10/24  0553 12/08/24  0431 12/07/24  0136   SODIUM mmol/L 141 137 138   POTASSIUM mmol/L 4.5 3.8 3.6   CHLORIDE mmol/L 104 102 105   CO2 mmol/L 26.9 23.8 22.7   BUN mg/dL 15 10 13   CREATININE mg/dL 0.52* 0.68 0.78   CALCIUM mg/dL 9.0 9.0 9.0   BILIRUBIN mg/dL  --   --  0.4   ALK PHOS U/L  --   --  62   ALT (SGPT) U/L  --   --  34*   AST (SGOT) U/L  --   --  23   GLUCOSE mg/dL 123* 155* 108*   WBC 10*3/mm3 9.37 10.55 12.15*   HEMOGLOBIN g/dL 12.7 11.8* 13.1   PLATELETS 10*3/mm3 395 317 400   PROBNP pg/mL  --   --  119.0   PROCALCITONIN ng/mL  --  0.03 0.04     Results from last 7 days   Lab Units 12/09/24  1718 12/09/24  1629   BLOODCX  No growth at 2 days No growth at 2 days        Results from last 7 days   Lab Units 12/07/24  0712   ADENOVIRUS DETECTION BY PCR  Not Detected   CORONAVIRUS 229E  Not Detected   CORONAVIRUS HKU1  Not Detected   CORONAVIRUS NL63  Not Detected   CORONAVIRUS OC43  Not Detected   HUMAN METAPNEUMOVIRUS  Not Detected   HUMAN RHINOVIRUS/ENTEROVIRUS  Detected*   INFLUENZA B PCR  Not Detected   PARAINFLUENZA 1  Not Detected   PARAINFLUENZA VIRUS 2  Not Detected   PARAINFLUENZA VIRUS 3  Not Detected   PARAINFLUENZA VIRUS 4  Not Detected   BORDETELLA PERTUSSIS PCR  Not Detected   CHLAMYDOPHILA PNEUMONIAE PCR  Not Detected   MYCOPLAMA PNEUMO PCR  Not Detected   INFLUENZA A PCR  Not Detected   RSV, PCR  Not Detected           ASSESSMENT:     Acute hypoxic respiratory failure    Hypothyroidism    Asthma exacerbation    History of  breast cancer        PLAN:  Patient slowly improving.  Here for rhinovirus acute bronchitis causing asthma exacerbation with hypoxemia.  Received empiric Rocephin for 5 days.  No need for additional antibiotics.  She has been weaned off of oxygen.  She can go home today with what ever is left of her Medrol Dosepak taper.  She received intravenous steroids to the first 48 hours.  She needs to resume her home Breo inhaler for asthma maintenance.  Follow-up with Dr. Baker in the office within 2 to 3 weeks        Jitendra Davies MD  12/12/2024

## 2024-12-12 NOTE — OUTREACH NOTE
Prep Survey      Flowsheet Row Responses   [facility] patient discharged from? [place]   Is LACE score < 7 ? No   Eligibility [facility]   Date of Admission 12/07/24   Date of Discharge 12/12/24   Discharge Disposition Home or Self Care   Discharge diagnosis Acute hypoxic respiratory failure, Asthma with acute exacerbation,   Does the patient have one of the following disease processes/diagnoses(primary or secondary)? Other   Does the patient have Home health ordered? No   Is there a DME ordered? No   Prep survey completed? Yes            Izzy Grewal Registered Nurse

## 2024-12-12 NOTE — CONSULTS
Assessment Date:  12/12/24    Summary: BMI     RD visited pt at bedside, pt reported good intake at home, as well as increased intake since admission. Pt reported weight loss of 30-40# x 6 months. Pt reported inaccurate weight taken, possibly due to inaccurate bed scale and reports previous weight of 122# closer to actual weight. Pt does not drink Boost at home and does not wish to drink them here.     NFPE completed and not consistent with nutrition diagnosis of malnutrition at this time using AND/ASPEN criteria.     CLINICAL NUTRITION ASSESSMENT      Reason for Assessment BMI     Diagnosis/Problem   Acute hypoxic respiratory failure   Medical/Surgical History Past Medical History:   Diagnosis Date    Allergic     Allergic rhinitis     Anxiety     Asthma     has inhaler and neb treatments    BMI 25.0-25.9,adult     Cancer     left breast    Community acquired pneumonia     COPD (chronic obstructive pulmonary disease)     Depression, major     Disease of thyroid gland     Diverticular disease     Foreign body in ear     Fractures 2012    Foot    GERD (gastroesophageal reflux disease)     Hashimoto's thyroiditis     History of abnormal mammogram     Left breast    History of acute sinusitis     History of adenocarcinoma of breast     History of COPD     History of dyspareunia in female     History of kidney stones     History of lump of left breast     History of malignant neoplasm of left breast     History of nausea and vomiting     History of seborrheic dermatitis     History of strep pharyngitis     History of suicidal ideation     Hypothyroidism     Hypoxemia     History of Hypoxemia    Joint pain 2021    Low back pain 2023    This is when it became debilitating    Lumbosacral disc disease ?    Obsessive compulsive disorder     Other screening mammogram     Personal history of asthma     PONV (postoperative nausea and vomiting)     Recurrent genital herpes simplex     History of     Symptomatic states associated  with artificial menopause     History of     TMJ dysfunction 1999    Wear a mouth guard       Past Surgical History:   Procedure Laterality Date    APPENDECTOMY      BREAST AUGMENTATION Bilateral     Revised 2005, initial implants 1992    BREAST RECONSTRUCTION, BREAST TISSUE EXPANDER INSERTION Bilateral     CERVICAL CERCLAGE      x 2 during pregnancy, incompetent cervix    CHOLECYSTECTOMY      COLONOSCOPY      EPIDURAL Right 01/11/2024    Procedure: LUMBAR/SACRAL TRANSFORAMINAL EPIDURAL RIGHT L3-4 #1 32072;  Surgeon: Rei Pruitt MD;  Location: [facility];  Service: Pain Management;  Laterality: Right;    EPIDURAL Right 9/19/2024    Procedure: LUMBAR/SACRAL TRANSFORAMINAL EPIDURAL RIGHT L3-4 82986;  Surgeon: Rei Pruitt MD;  Location: [facility];  Service: Pain Management;  Laterality: Right;    EPIDURAL BLOCK  1/2024    HAND SURGERY Right     may2022    HYSTERECTOMY      due to uterine prolapse    KIDNEY STONE SURGERY      LUNG SURGERY      bronchoscopy    MASTECTOMY Bilateral 01/2016    removed lymph nodes on left side    MEDIAL BRANCH BLOCK Right 3/13/2024    Procedure: LUMBAR MEDIAL BRANCH BLOCK RIGHT L3-L5 #1 76241, 96191;  Surgeon: Rei Pruitt MD;  Location: [facility];  Service: Pain Management;  Laterality: Right;    MEDIAL BRANCH BLOCK Bilateral 3/27/2024    Procedure: LUMBAR MEDIAL BRANCH BLOCK BILATERAL L3-L5 43850-56, 64494-50 X 2;  Surgeon: Rei Pruitt MD;  Location: [facility];  Service: Pain Management;  Laterality: Bilateral;    MEDIAL BRANCH BLOCK Left 5/2/2024    Procedure: LUMBAR MEDIAL BRANCH BLOCK LEFT L3-L5 #2 23996, 56636;  Surgeon: Rei Pruitt MD;  Location: [facility];  Service: Pain Management;  Laterality: Left;    NERVE SURGERY Bilateral 6/4/2024    Procedure: LUMBAR RADIOFREQUENCY ABLATION BILATERAL L3-L5 82647-89, 64636-50 X 2;  Surgeon: Rei Pruitt MD;  Location: [facility];  Service: Pain Management;  Laterality: Bilateral;     "SACROILIAC JOINT INJECTION Bilateral 11/7/2024    Procedure: SACROILIAC INJECTION BILATERAL 00161-20;  Surgeon: Rei Pruitt MD;  Location: [facility];  Service: Pain Management;  Laterality: Bilateral;    SCAR REVISION BREAST Bilateral 06/21/2022    Procedure: SCAR REVISION LEFT CHEST 27 CENTIMETERS AND RIGHT CHEST 25 CENTIMETERS FOR AESTHETIC FLAT CLOSURE FOLLOWING MASTECTOMY;  Surgeon: Waterhouse, Maurine, MD;  Location: [facility] MAIN OR;  Service: Plastics;  Laterality: Bilateral;    TISSUE EXPANDER PLACEMENT Bilateral     due to infection        Anthropometrics        Current Height  Current Weight  BMI kg/m2 Height: 165.1 cm (65\")  Weight: 41.9 kg (92 lb 6 oz) (12/12/24 0635)  Body mass index is 15.37 kg/m².   Adjusted BMI (if applicable)    BMI Category Underweight (18.4 or below)   Ideal Body Weight (IBW) 125#   Usual Body Weight (UBW) 168#   Weight Trend Loss, Amount/Timeframe: 26% loss x 6 months   Weight History Wt Readings from Last 30 Encounters:   12/12/24 0635 41.9 kg (92 lb 6 oz)   12/10/24 0514 55.6 kg (122 lb 9.2 oz)   12/09/24 0431 55.6 kg (122 lb 9.2 oz)   12/08/24 0528 55.6 kg (122 lb 8 oz)   12/07/24 1351 55.6 kg (122 lb 8 oz)   12/07/24 0128 59 kg (130 lb)   12/04/24 1607 59 kg (130 lb)   11/19/24 1523 59.6 kg (131 lb 6.4 oz)   11/07/24 0809 59 kg (130 lb)   10/17/24 1552 59.9 kg (132 lb)   09/10/24 1539 62.9 kg (138 lb 9.6 oz)   08/02/24 0950 65.7 kg (144 lb 12.8 oz)   07/16/24 1551 68.1 kg (150 lb 3.2 oz)   06/04/24 0848 73.5 kg (162 lb)   05/14/24 1335 76.4 kg (168 lb 6.4 oz)   05/02/24 1015 75.8 kg (167 lb)   04/11/24 1521 76.1 kg (167 lb 12.8 oz)   04/08/24 1426 73.8 kg (162 lb 9.6 oz)   03/26/24 1415 73.9 kg (163 lb)   03/20/24 1536 74 kg (163 lb 3.2 oz)   03/13/24 1450 72.6 kg (160 lb)   02/28/24 1057 73 kg (161 lb)   02/21/24 0830 72.6 kg (160 lb)   02/19/24 0932 74.1 kg (163 lb 6.4 oz)   02/08/24 1433 73.8 kg (162 lb 12.8 oz)   01/25/24 1526 73.8 kg (162 lb 12.8 oz)   01/11/24 " "1433 70.3 kg (155 lb)   12/12/23 1106 71.7 kg (158 lb)   12/05/23 1554 71.6 kg (157 lb 12.8 oz)   09/11/23 1026 72.8 kg (160 lb 6.4 oz)   07/20/23 1452 74.7 kg (164 lb 9.6 oz)   06/16/23 1028 76.3 kg (168 lb 3.2 oz)   05/18/23 1341 77.4 kg (170 lb 9.6 oz)   03/28/23 1514 80.9 kg (178 lb 6.4 oz)   02/28/23 0826 80.1 kg (176 lb 9.6 oz)      --  Labs       Pertinent Labs    Results from last 7 days   Lab Units 12/10/24  0553 12/08/24  0431 12/07/24  0136   SODIUM mmol/L 141 137 138   POTASSIUM mmol/L 4.5 3.8 3.6   CHLORIDE mmol/L 104 102 105   CO2 mmol/L 26.9 23.8 22.7   BUN mg/dL 15 10 13   CREATININE mg/dL 0.52* 0.68 0.78   CALCIUM mg/dL 9.0 9.0 9.0   BILIRUBIN mg/dL  --   --  0.4   ALK PHOS U/L  --   --  62   ALT (SGPT) U/L  --   --  34*   AST (SGOT) U/L  --   --  23   GLUCOSE mg/dL 123* 155* 108*     Results from last 7 days   Lab Units 12/10/24  0553 12/08/24  0431   MAGNESIUM mg/dL 2.2 2.3   PHOSPHORUS mg/dL 3.8 3.6   HEMOGLOBIN g/dL 12.7 11.8*   HEMATOCRIT % 38.2 35.0   WBC 10*3/mm3 9.37 10.55   ALBUMIN g/dL 3.6  --      Results from last 7 days   Lab Units 12/10/24  0553 12/08/24  0431 12/07/24  0136   PLATELETS 10*3/mm3 395 317 400     COVID19   Date Value Ref Range Status   12/07/2024 Not Detected Not Detected - Ref. Range Final     No results found for: \"HGBA1C\"       Medications           Scheduled Medications budesonide-formoterol, 2 puff, Inhalation, BID - RT  buPROPion XL, 300 mg, Oral, QAM  cefTRIAXone, 1,000 mg, Intravenous, Q24H  enoxaparin, 40 mg, Subcutaneous, Nightly  FLUoxetine, 80 mg, Oral, Daily  ipratropium-albuterol, 3 mL, Nebulization, 4x Daily - RT  levothyroxine, 50 mcg, Oral, Daily  melatonin, 5 mg, Oral, Nightly  montelukast, 10 mg, Oral, Daily  pantoprazole, 40 mg, Oral, Q AM  pregabalin, 25 mg, Oral, Q8H  sodium chloride, 10 mL, Intravenous, Q12H  tamoxifen, 20 mg, Oral, Daily  theophylline, 300 mg, Oral, QAM       Infusions     PRN Medications   acetaminophen **OR** acetaminophen " **OR** acetaminophen    albuterol    benzonatate    senna-docusate sodium **AND** polyethylene glycol **AND** bisacodyl **AND** bisacodyl    Calcium Replacement - Follow Nurse / BPA Driven Protocol    HYDROcodone-acetaminophen    Magnesium Standard Dose Replacement - Follow Nurse / BPA Driven Protocol    metaxalone    ondansetron ODT **OR** ondansetron    Phosphorus Replacement - Follow Nurse / BPA Driven Protocol    Potassium Replacement - Follow Nurse / BPA Driven Protocol    prochlorperazine **OR** prochlorperazine **OR** prochlorperazine    sodium chloride    sodium chloride    sodium chloride     Physical Findings          General Findings alert   Oral/Mouth Cavity WDL   Edema  1+ (trace)  R arm    Gastrointestinal last bowel movement: 12/3 - prn regimen in place  Constipation documented 12/11   Skin  skin intact   Tubes/Drains/Lines none   NFPE No clinical signs of muscle wasting or fat loss, Date Completed: 12/12   --  Current Nutrition Orders & Evaluation of Intake       Oral Nutrition     Food Allergies NKFA   Current PO Diet Diet: Regular/House; Fluid Consistency: Thin (IDDSI 0)   Supplement n/a   PO Evaluation     % PO Intake No intake recorded    Factors Affecting Intake: No factors at this time   --  PES STATEMENT / NUTRITION DIAGNOSIS      Nutrition Dx Problem  Problem: Unintentional Weight Loss  Etiology: Other: Previous poor PO intake    Signs/Symptoms: Unintended Weight Change -27% wt loss x 6 months     NUTRITION INTERVENTION / PLAN OF CARE      Intervention Goal(s) Increase intake and Maintain weight         RD Intervention/Action Encourage intake and Continue to monitor   --      Prescription/Orders:       PO Diet       Supplements       Enteral Nutrition       Parenteral Nutrition    New Prescription Ordered? No changes at this time   --      Monitor/Evaluation Per protocol, PO intake, Pertinent labs, Weight   Discharge Plan/Needs No discharge needs identified at this time   --    RD to follow  per protocol.      Electronically signed by:   Alma Melgar  12/12/24 08:11 EST

## 2024-12-12 NOTE — PLAN OF CARE
Goal Outcome Evaluation:   Patient will remain free from falls while on this unit.

## 2024-12-13 ENCOUNTER — TRANSITIONAL CARE MANAGEMENT TELEPHONE ENCOUNTER (OUTPATIENT)
Dept: CALL CENTER | Facility: HOSPITAL | Age: 49
End: 2024-12-13
Payer: COMMERCIAL

## 2024-12-13 NOTE — DISCHARGE SUMMARY
Patient Name: Nereida Myles  : 1975  MRN: [id number]    Date of Admission: 2024  Date of Discharge:  2024  Primary Care Physician: Kehrer, Meredith Lea, MD      Chief Complaint:   Shortness of Breath, Asthma, and Vomiting      Discharge Diagnoses     Active Hospital Problems    Diagnosis  POA    **Acute hypoxic respiratory failure [J96.01]  Yes    Rhinovirus [B34.8]  Unknown    Asthma exacerbation [J45.901]  Unknown    History of breast cancer [Z85.3]  Not Applicable    Hypothyroidism [E03.9]  Yes      Resolved Hospital Problems   No resolved problems to display.        Hospital Course     Patient is a 49 y.o. female with a history of asthma, hypothyroidism, breast cancer on tamoxifen that presents to [facility] complaining of shortness of breath, cough, wheezing for 1 week.  Was diagnosed with asthma exacerbation secondary to rhinovirus. CT  scan showed  multifocal scattered and patchy regions of small tree-in-bud ground glass opacities in both lungs, likely infectious/inflammatory. 2. Bandlike regions of consolidation and volume loss in the suprahilar right upper lobe, infrahilar left lower lobe, and dependent base of each  lower lobe with multifocal bilateral lower lobe bronchial plugging, likely pneumonia with superimposed atelectasis and/or scarring.  Patient was initiated on IV steroids, breathing treatments, supportive care for rhinovirus.  Pulmonary was consulted.  Empiric IV ceftriaxone for possible pneumonia.  Symptoms gradually improved, was weaned down to room air.  Was discharged home on Medrol Dosepak.  Patient was supposed to complete IV ceftriaxone on day of discharge per pulmonology commendations, I had placed discharge orders placed with instructions to discharge after IV ceftriaxone administered however patient was discharged without final dose of IV ceftriaxone given.  Although it was  empiric coverage, and I have low suspicion for superimposed bacterial  pneumonia, I did call patient and explained that she did not receive his final dose IV ceftriaxone and discussed with patient and prescribed 2 more days of cefdinir to [facility] pharmacy.  Will need to follow-up with pulmonology in couple weeks.    At the time of discharge patient was told to take all medications as prescribed, keep all follow-up appointments, and call their doctor or return to the hospital with any worsening or concerning symptoms.     .          Day of Discharge     Subjective:  Reports she is feeling significantly better.  Still coughing but improving.  Denies current Shortness of breath, chest pain, fevers or chills.    Physical Exam:  Temp:  [94 °F (34.4 °C)-97.9 °F (36.6 °C)] 94 °F (34.4 °C)  Heart Rate:  [56-69] 66  Resp:  [18-20] 18  BP: (116-119)/(78-84) 119/84  Body mass index is 15.37 kg/m².  Physical Exam    General: Alert, laying in bed, not in distress,  HEENT: Normocephalic, atraumatic  CV: Regular rate and rhythm, no murmurs  Lungs: Faint rhonchi, no wheezing, nonlabored breathing, on room air  Abdomen: Soft, nontender, nondistended  Extremities: No significant peripheral edema , no cyanosis     Consultants     Consult Orders (all) (From admission, onward)       Start     Ordered    12/07/24 1033  Inpatient Pulmonology Consult  Once        Specialty:  Pulmonary Disease  Provider:  Demarcus Baker MD    12/07/24 1032    12/07/24 0819  [facility] (on-call MD unless specified) Details  Once,   Status:  Canceled        Specialty:  Hospitalist  Provider:  Prabhjot Giang MD    12/07/24 0818                  Procedures     * Surgery not found *      Imaging Results (All)       Procedure Component Value Units Date/Time    CT Chest Without Contrast Diagnostic [[id number]] Collected: 12/09/24 1032     Updated: 12/09/24 1047    Narrative:      CT CHEST WO CONTRAST DIAGNOSTIC-     DATE OF EXAM: 12/9/2024 9:40 AM     INDICATION: Evaluate for pneumonia. Acute hypoxic respiratory  failure.  Shortness of breath. Vomiting.     COMPARISON: Chest radiographs 12/7/2024 and 2/21/2024. MRI thoracic  spine 1/9/2023. CT abdomen and pelvis 1/7/2023. Chest CT 1/20/2021.     TECHNIQUE: Multiple contiguous axial images were acquired through the  chest without the intravenous administration of contrast. Reformatted  coronal and sagittal sequences were also reviewed. Radiation dose  reduction techniques were utilized, including automated exposure control  and exposure modulation based on body size.     FINDINGS:  Multifocal scattered and patchy regions of small tree-in-bud groundglass  opacities in both lungs, likely infectious/inflammatory. Bandlike  regions of consolidation and volume loss in the suprahilar right upper  lobe, infrahilar left lower lobe, and dependent base of each lower lobe  with multifocal bilateral lower lobe bronchial plugging, likely  pneumonia with superimposed atelectasis/or scarring. No pneumothorax or  significant pleural effusion.     The heart and great vessels of the chest are normal in size. No evidence  of significant calcified coronary artery disease. Trace pericardial  fluid. No pathologically enlarged intrathoracic lymph nodes are  identified. Tiny hiatal hernia.     Limited noncontrast CT imaging of the upper abdomen demonstrates  postoperative changes from cholecystectomy.     Bilateral mastectomy. No acute osseous abnormality or concerning osseous  lesion.       Impression:         1. Multifocal scattered and patchy regions of small tree-in-bud  groundglass opacities in both lungs, likely infectious/inflammatory.  2. Bandlike regions of consolidation and volume loss in the suprahilar  right upper lobe, infrahilar left lower lobe, and dependent base of each  lower lobe with multifocal bilateral lower lobe bronchial plugging,  likely pneumonia with superimposed atelectasis and/or scarring.     This report was finalized on 12/9/2024 10:44 AM by Vasu Bermudez MD  on  Workstation: [id number]       XR Chest 1 View [[id number]] Collected: 12/07/24 0237     Updated: 12/07/24 0242    Narrative:      SINGLE VIEW OF THE CHEST     HISTORY: Shortness of air     COMPARISON: February 21, 2024     FINDINGS:  Heart size is within normal limits. No pneumothorax, pleural effusion,  or acute infiltrate is seen. There is eventration of the right  hemidiaphragm. No pneumothorax or pleural effusion is seen. There are  changes of bilateral mastectomies. There does appear to be some  consolidation within the left infrahilar region. There are background  changes of COPD.       Impression:      There is some consolidation within the left infrahilar region. This may  represent scarring or atelectasis, although correlation with any  evidence of pneumonia is recommended.     This report was finalized on 12/7/2024 2:39 AM by Dr. Josette Bhardwaj M.D on Workstation: [id number]                 Pertinent Labs     Results from last 7 days   Lab Units 12/10/24  0553 12/08/24  0431 12/07/24  0136   WBC 10*3/mm3 9.37 10.55 12.15*   HEMOGLOBIN g/dL 12.7 11.8* 13.1   PLATELETS 10*3/mm3 395 317 400     Results from last 7 days   Lab Units 12/10/24  0553 12/08/24  0431 12/07/24  0136   SODIUM mmol/L 141 137 138   POTASSIUM mmol/L 4.5 3.8 3.6   CHLORIDE mmol/L 104 102 105   CO2 mmol/L 26.9 23.8 22.7   BUN mg/dL 15 10 13   CREATININE mg/dL 0.52* 0.68 0.78   GLUCOSE mg/dL 123* 155* 108*   Estimated Creatinine Clearance: 84.7 mL/min (A) (by C-G formula based on SCr of 0.52 mg/dL (L)).  Results from last 7 days   Lab Units 12/10/24  0553 12/07/24  0136   ALBUMIN g/dL 3.6 4.2   BILIRUBIN mg/dL  --  0.4   ALK PHOS U/L  --  62   AST (SGOT) U/L  --  23   ALT (SGPT) U/L  --  34*     Results from last 7 days   Lab Units 12/10/24  0553 12/08/24  0431 12/07/24  0136   CALCIUM mg/dL 9.0 9.0 9.0   ALBUMIN g/dL 3.6  --  4.2   MAGNESIUM mg/dL 2.2 2.3  --    PHOSPHORUS mg/dL 3.8 3.6  --        Results from last 7 days   Lab  "Units 12/07/24  0136   HSTROP T ng/L 9   PROBNP pg/mL 119.0           Invalid input(s): \"LDLCALC\"  Results from last 7 days   Lab Units 12/09/24  1718 12/09/24  1629   BLOODCX  No growth at 3 days No growth at 3 days     Results from last 7 days   Lab Units 12/07/24  0712   COVID19  Not Detected       Test Results Pending at Discharge     Pending Labs       Order Current Status    Blood Culture - Blood, Arm, Right Preliminary result    Blood Culture - Blood, Arm, Right Preliminary result            Discharge Details        Discharge Medications        New Medications        Instructions Start Date   cefdinir 300 MG capsule  Commonly known as: OMNICEF   300 mg, Oral, 2 Times Daily      methylPREDNISolone 4 MG dose pack  Commonly known as: MEDROL   Take as directed on package instructions.             Continue These Medications        Instructions Start Date   albuterol (2.5 MG/3ML) 0.083% nebulizer solution  Commonly known as: PROVENTIL   2.5 mg, Every 4 Hours PRN      albuterol sulfate  (90 Base) MCG/ACT inhaler  Commonly known as: PROVENTIL HFA;VENTOLIN HFA;PROAIR HFA   USE 2 INHALATIONS EVERY 4 HOURS AS NEEDED FOR WHEEZING      albuterol 4 MG tablet  Commonly known as: PROVENTIL   4 mg, Oral, Nightly      benzonatate 200 MG capsule  Commonly known as: TESSALON       Benzoyl Peroxide 10 % liquid   1 Application, Daily      Breo Ellipta 100-25 MCG/INH aerosol powder   Generic drug: Fluticasone Furoate-Vilanterol   1 puff, Inhalation, Daily      buPROPion  MG 24 hr tablet  Commonly known as: WELLBUTRIN XL   300 mg, Oral, Every Morning      clindamycin 1 % swab  Commonly known as: CLEOCIN T   APPLY 1 SWAB TOPICALLY TO THE FACE EVERY MORNING      diclofenac 75 MG EC tablet  Commonly known as: VOLTAREN   TAKE 1 TABLET TWICE A DAY AS NEEDED FOR PAIN      FLUoxetine 40 MG capsule  Commonly known as: PROzac   80 mg, Oral, Daily      levothyroxine 50 MCG tablet  Commonly known as: SYNTHROID, LEVOTHROID   50 " mcg, Oral, Daily      metaxalone 800 MG tablet  Commonly known as: SKELAXIN   TAKE 1/2 TO 1 TABLET BY MOUTH FOUR TIMES DAILY AS NEEDED FOR MUSCLE SPASMS      montelukast 10 MG tablet  Commonly known as: SINGULAIR   10 mg, Oral, Daily      multivitamin tablet tablet  Commonly known as: THERAGRAN   1 tablet, Nightly      omeprazole 40 MG capsule  Commonly known as: priLOSEC   40 mg, Oral, Daily      ondansetron 4 MG tablet  Commonly known as: ZOFRAN   4 mg, Oral, Every 8 Hours PRN      pregabalin 25 MG capsule  Commonly known as: Lyrica   Take 1 capsule PO TID x 5 days; if tolerated may slowly increase to 1 or 2 capsules PO TID      tamoxifen 20 MG chemo tablet  Commonly known as: NOLVADEX   Daily      theophylline 300 MG 12 hr tablet  Commonly known as: THEODUR   300 mg, Oral, Every Morning      valACYclovir 500 MG tablet  Commonly known as: VALTREX   TAKE 1 TABLET DAILY      vitamin B-12 1000 MCG tablet  Commonly known as: CYANOCOBALAMIN   1,000 mcg, Daily             Stop These Medications      predniSONE 20 MG tablet  Commonly known as: DELTASONE              No Known Allergies    Discharge Disposition:  Home or Self Care      Discharge Diet:  No active diet order      Discharge Activity:   Activity Instructions    Return to your pre-admission activity level.           CODE STATUS:    Code Status and Medical Interventions: CPR (Attempt to Resuscitate); Full Support   Ordered at: 12/08/24 1009     Level Of Support Discussed With:    Patient     Code Status (Patient has no pulse and is not breathing):    CPR (Attempt to Resuscitate)     Medical Interventions (Patient has pulse or is breathing):    Full Support       Future Appointments   Date Time Provider Department Center   12/17/2024  8:40 AM Wade Graham PA [facility] PM E[facility]PT [facility]   1/2/2025  9:45 AM [facility] MAIN PAIN OR [facility] EP XR OR None   2/17/2025  9:45 AM Kehrer, Meredith Lea, MD [facility]   2/17/2025 12:40 PM Wade Graham PA [facility] PM EASPT  [facility]      Follow-up Information       Kehrer, Meredith Lea, MD. Schedule an appointment as soon as possible for a visit in 1 week(s).    Specialty: Family Medicine  Contact information:  [address]  [address]  [facility] [id number] [phone]               Demarcus Baker MD. Schedule an appointment as soon as possible for a visit in 2 week(s).    Specialties: Pulmonary Disease, Intensive Care  Contact information:  [address] [facility] [address]  [facility] [id number] [phone]                             Time Spent on Discharge:  Greater than 30 minutes      Nicho Contreras MD  [facility]  12/12/24  19:31 EST

## 2024-12-13 NOTE — OUTREACH NOTE
Call Center TCM Note      Flowsheet Row Responses   [facility] patient discharged from? [place]   Does the patient have one of the following disease processes/diagnoses(primary or secondary)? Other   TCM attempt successful? Yes   Call start time 0827   Call end time 0829   Discharge diagnosis Acute hypoxic respiratory failure, Asthma with acute exacerbation,   Person spoke with today (if not patient) and relationship patient   Meds reviewed with patient/caregiver? Yes   Is the patient having any side effects they believe may be caused by any medication additions or changes? No   Does the patient have all medications ordered at discharge? Yes   Is the patient taking all medications as directed (includes completed medication regime)? Yes   Comments Patient refers to schedule her own followup appts. She reports she will call to schedule appts today.   Does the patient have an appointment with their PCP within 7-14 days of discharge? No   Nursing Interventions Patient declined scheduling/rescheduling appointment at this time   Psychosocial issues? No   Did the patient receive a copy of their discharge instructions? Yes   Nursing interventions Reviewed instructions with patient   What is the patient's perception of their health status since discharge? Improving   Is the patient/caregiver able to teach back signs and symptoms related to disease process for when to call PCP? Yes   Is the patient/caregiver able to teach back signs and symptoms related to disease process for when to call 911? Yes   Is the patient/caregiver able to teach back the hierarchy of who to call/visit for symptoms/problems? PCP, Specialist, Home health nurse, Urgent Care, ED, 911 Yes   If the patient is a current smoker, are they able to teach back resources for cessation? Not a smoker   TCM call completed? Yes   Wrap up additional comments Quick call. Patient reports she is doing well.   Call end time 0829   Would this patient benefit from  a Referral to [facility]? No   Is the patient interested in additional calls from an ambulatory ? No            Oli Clark RN    12/13/2024, 08:29 EST

## 2024-12-14 LAB
BACTERIA SPEC AEROBE CULT: NORMAL
BACTERIA SPEC AEROBE CULT: NORMAL

## 2024-12-17 ENCOUNTER — OFFICE VISIT (OUTPATIENT)
Dept: PAIN MEDICINE | Facility: CLINIC | Age: 49
End: 2024-12-17
Payer: COMMERCIAL

## 2024-12-17 VITALS
HEIGHT: 65 IN | SYSTOLIC BLOOD PRESSURE: 118 MMHG | DIASTOLIC BLOOD PRESSURE: 88 MMHG | WEIGHT: 127.8 LBS | HEART RATE: 70 BPM | BODY MASS INDEX: 21.29 KG/M2 | TEMPERATURE: 96.2 F | OXYGEN SATURATION: 98 %

## 2024-12-17 DIAGNOSIS — M47.816 LUMBAR FACET ARTHROPATHY: ICD-10-CM

## 2024-12-17 DIAGNOSIS — M54.16 LUMBAR RADICULOPATHY: Primary | ICD-10-CM

## 2024-12-17 DIAGNOSIS — M51.362 DEGENERATION OF INTERVERTEBRAL DISC OF LUMBAR REGION WITH DISCOGENIC BACK PAIN AND LOWER EXTREMITY PAIN: ICD-10-CM

## 2024-12-17 DIAGNOSIS — M46.1 SACROILIITIS: ICD-10-CM

## 2024-12-17 PROCEDURE — 99214 OFFICE O/P EST MOD 30 MIN: CPT | Performed by: PHYSICIAN ASSISTANT

## 2024-12-17 NOTE — PROGRESS NOTES
CHIEF COMPLAINT    Back pain.     Subjective   Nereida Myles is a 49 y.o. female  who presents for follow-up.  She has a history of low back and right thigh pain.  The patient was having significant worsening of pain and actually contacted the office prior to her hospitalization due to the severity of pain and I prescribed a Medrol Dosepak.  The pain has calmed down somewhat as she has now been on steroids for the past 2 weeks but she continues to have pain primarily within the right lumbar spine radiating into the lateral aspect of the right hip and groin and into the anterior portion of the thigh terminating at the knee.  She continues to have intermittent numbness and tingling within the right lower extremity which is managed well with ongoing use of pregabalin.    Current medication consists of pregabalin 25 mg 1 or 2 p.o. nightly which she tolerates without adverse effects.  Failed previous trial of gabapentin due to sedation.    Medical history complicated with history of breast cancer with bilateral mastectomy.    Pain today 2/10 VAS in severity however will increase to 6-7/10 with activity.      Back Pain  This is a chronic problem. The current episode started more than 1 year ago. The problem occurs constantly. The problem has been worse since onset. The pain is present in the lumbar spine. The quality of the pain is described as aching and burning. The pain radiates to the right thigh. The pain is at a severity of 2/10. The pain is mild (Increases to 6/7/10 with activity). The pain is The same all the time. The symptoms are aggravated by position, twisting and bending (activity). Pertinent negatives include no abdominal pain, dysuria, fever, headaches, numbness or weakness. Risk factors include history of cancer.        PEG Assessment   What number best describes your pain on average in the past week?3  What number best describes how, during the past week, pain has interfered with your enjoyment of  life?7  What number best describes how, during the past week, pain has interfered with your general activity?  7    Review of Pertinent Medical Data ---  Review of [facility] discharge summary.  Patient was admitted from 12/7/2024 until 12/12/2024 where she was treated for COPD/asthma exacerbation due to rhinovirus.  Subsequently diagnosed with double pneumonia of the bilateral lower lobes.  Patient was initiated on IV steroids, breathing treatments, supportive care for rhinovirus and empiric IV ceftriaxone for possible pneumonia.  Symptoms were gradually improved and she was weaned down to room air.  Patient was discharged home on Medrol Dosepak for 1 week and was prescribed 2 more days of cefdinir as an outpatient.    I have reviewed the patient's laboratory work taken in the hospital on 12/10/2024.    MRI OF THE THORACIC SPINE WITH AND WITHOUT CONTRAST AND MRI OF THE  LUMBAR SPINE WITH AND WITHOUT CONTRAST ON 01/09/2023     CLINICAL HISTORY: Mid and low back pain and flank pain where some  left-sided infection is suspected, history of breast cancer with  mastectomy.     MRI OF THE THORACIC SPINE TECHNIQUE: Sagittal T1, proton density and  fat-suppressed T2 and postcontrast sagittal fat-suppressed T1-weighted  images were obtained of the thoracic spine. In addition axial T1 and T2  and postcontrast axial T1-weighted images were obtained from C7 to L1.     FINDINGS: The thoracic cord is normal in signal intensity. At T8-9 there  is a posterior central disc bulge or small disc protrusion that indents  the ventral aspect of the thecal sac and comes close to the ventral  aspect of the cord resulting in minimal if any canal narrowing. There is  no foraminal narrowing. Otherwise the thoracic disc spaces and facets  are within normal limits with no disc herniation, canal or foraminal  narrowing seen in the thoracic spine. Marrow signal intensity in the  thoracic spine is within normal limits. The paravertebral soft  tissue  structures are normal in appearance.     IMPRESSION:  1. At T8-9 there is a posterior central disc bulge or small disc  protrusion that only minimally narrows the thecal sac. Otherwise, the  thoracic spine MRI is normal with no evidence of spinal infection in the  thoracic spine.     MRI OF THE LUMBAR SPINE TECHNIQUE: Sagittal T1, proton density fast spin  echo T2 and repeat fat-suppressed T2 and postcontrast sagittal  fat-suppressed T1-weighted images were obtained of the lumbar spine. In  addition, thin cut axial T1-weighted images were obtained angled through  the interspaces from L3 to S1 and axial T2 and postcontrast axial  T1-weighted images were obtained from T12 to S2.     COMPARISON: There are no prior lumbar spine imaging studies from [facility] for comparison.     FINDINGS: The distal thoracic cord and the conus are normal in signal  intensity. The conus terminates in the posterior midline of the thecal  sac at the level of the inferior body of L2 which is probably lower  limits of normal.     The T12-L1, L1-2 and L2-3 disc space and facets are normal with no canal  or foraminal narrowing from T12 to L3.     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     At L3-4 there is minimal right and mild left facet overgrowth and a  small amount of fluid in the left facets. There is some mild disc space  narrowing and degenerative endplate changes most pronounced in the right  side of the endplates along the inner margin of the levoscoliotic curve.  There is a 3 mm retrolisthesis of L3 with respect to L4 with disc  material bulging along the posterior superior endplate of L4. There is  essentially no narrowing of the thecal sac. There is mild right and no  left foraminal narrowing.     At L4-5 there is minimal facet overgrowth. There is 3 mm retrolisthesis  of L4 with respect to L5 and some bulging disc material extending  along  the posterior superior endplate of L5. There is no canal narrowing.  There is minimal right and mild left foraminal narrowing.     At L5-S1 there is minimal facet overgrowth. There is disc space  narrowing, mild degenerative endplate changes, 3-4 mm retrolisthesis of  L5 with respect to S1, and minimal posterior spurring. There is no canal  or lateral recess narrowing. There is mild spurring into the inferior  aspect of the foramina and there is mild bilateral foraminal narrowing.     Other than the degenerative marrow edema in the right side of the  endplates at L3-4 and minimal degenerative marrow edema of the right  posterior lateral endplates at L4-5, marrow signal intensity in the  lumbar spine is within normal limits. I see no evidence of spinal  infection in the lumbar spine.     IMPRESSION:  1. No convincing acute abnormality is seen in the lumbar spine with no  direct evidence of spinal infection within the lumbar spine. 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  2. Disc space and facets are normal in appearance with no canal or  foraminal narrowing from T10 down to L3.  3. There is disc space narrowing and degenerative endplate changes at  L3-4 most pronounced in the right side of the endplates along the inner  margin of the levoscoliotic curve and there is a 3-4 mm retrolisthesis  of L3 with respect to L4 and minimal facet overgrowth but essentially no  canal narrowing and there is only mild right foraminal narrowing at  L3-4.  4. At L4-5 there is minimal facet overgrowth and a 3-4 mm retrolisthesis  of L4 with respect to L5. There is no canal narrowing. There is minimal  right and mild left foraminal narrowing at L4-5.   5. At L5-S1 there is disc space narrowing, degenerative endplate  changes, and 3-4 mm retrolisthesis of L5 with respect to S1. There is no  canal or lateral recess narrowing. There is spurring into the foramina  and  "mild bilateral foraminal narrowing.  6. There is mild bone marrow edema in the right side of the endplates at  L3-4 and right posterior lateral endplates at L4-5 which is felt to  almost certainly be degenerative marrow edema. The remainder of the  lumbar spine is within normal limits     This report was finalized on 1/10/2023 7:44 AM by Dr. Isidro Tellez M.D.    The following portions of the patient's history were reviewed and updated as appropriate: allergies, current medications, past family history, past medical history, past social history, past surgical history, and problem list.    Review of Systems   Constitutional:  Negative for chills and fever.   Respiratory:  Positive for cough (COPD) and shortness of breath (COPD).    Gastrointestinal:  Negative for abdominal pain, constipation and diarrhea.   Genitourinary:  Negative for difficulty urinating and dysuria.   Musculoskeletal:  Positive for back pain.   Neurological:  Positive for dizziness and light-headedness. Negative for weakness, numbness and headaches.   Psychiatric/Behavioral:  Negative for agitation.      I have reviewed and confirmed the accuracy of the ROS as documented by the MA/LPN/RN LINDA Villarreal  Vitals:    12/17/24 0857   BP: 118/88   BP Location: Right arm   Patient Position: Sitting   Pulse: 70   Temp: 96.2 °F (35.7 °C)   TempSrc: Temporal   SpO2: 98%   Weight: 58 kg (127 lb 12.8 oz)   Height: 165.1 cm (65\")   PainSc:   2   PainLoc: Back         Objective   Physical Exam  Vitals and nursing note reviewed.   Constitutional:       Appearance: Normal appearance. She is normal weight.      Comments: MILDLY ANXIOUS   HENT:      Head: Normocephalic.   Cardiovascular:      Pulses:           Dorsalis pedis pulses are 1+ on the right side and 1+ on the left side.   Pulmonary:      Effort: Pulmonary effort is normal.   Musculoskeletal:      Lumbar back: Tenderness present. Positive right straight leg raise test.        Back:    Skin:     " General: Skin is warm and dry.   Neurological:      General: No focal deficit present.      Mental Status: She is alert and oriented to person, place, and time.      Cranial Nerves: Cranial nerves 2-12 are intact.      Sensory: Sensation is intact.      Motor: Motor function is intact.      Gait: Gait is intact.      Deep Tendon Reflexes:      Reflex Scores:       Patellar reflexes are 1+ on the right side and 1+ on the left side.  Psychiatric:         Mood and Affect: Mood normal.         Behavior: Behavior normal.         Thought Content: Thought content normal.         Judgment: Judgment normal.         Assessment & Plan   Diagnoses and all orders for this visit:    1. Lumbar radiculopathy (Primary)    2. Degeneration of intervertebral disc of lumbar region with discogenic back pain and lower extremity pain    3. Lumbar facet arthropathy    4. Sacroiliitis        Nereida Myles reports a pain score of 2.  Given her pain assessment as noted, treatment options were discussed and the following options were decided upon as a follow-up plan to address the patient's pain: continuation of current treatment plan for pain, prescription for non-opiod analgesics, steroid injections, and use of non-medical modalities (ice, heat, stretching and/or behavior modifications).      --- Due to patient's discogenic and right lower extremity pain she will return for right L3-4 lumbar TFESI under fluoroscopy guidance.  The risk and benefits of the procedure have been reviewed with the patient and she does not have any questions to address.  --- Continue pregabalin 25 mg.  She does not require refill on today.  --- Future consideration of neuromodulation.  We will discuss this further at her next office visit.  --- Follow-up in 6 weeks after undergoing injective therapy        ---  Indications for epidural injection:  Plan is to proceed with epidural at the appropriate level.  If the patient receives significant pain reduction and  improvement in function and the plan will be to repeat the epidural when the pain worsens.  If a second epidural provides at least 6 weeks of sustained improvement that includes both pain reduction and improvement in function then an epidural injection could be repeated once again at the same level.  This is a mutual decision between the clinician and the patient that includes discussions including risks and benefits in detail as well as alternative therapies.  Patient's questions were answered to their satisfaction and to their understanding.  ---          KATELYN REPORT  As part of the patient's treatment plan, I am prescribing controlled substances. The patient has been made aware of appropriate use of such medications, including potential risk of somnolence, limited ability to drive and/or work safely, and the potential for dependence or overdose. It has also been made clear that these medications are for use by this patient only, without concomitant use of alcohol or other substances unless prescribed.     Patient has completed prescribing agreement detailing terms of continued prescribing of controlled substances, including monitoring KATELYN reports, urine drug screening, and pill counts if necessary. The patient is aware that inappropriate use will results in cessation of prescribing such medications.    As the clinician, I personally reviewed the KATELYN from 12/17/2024 while the patient was in the office today.    History and physical exam exhibit continued safe and appropriate use of controlled substances.     Dictated utilizing Dragon dictation.

## 2024-12-18 ENCOUNTER — OFFICE VISIT (OUTPATIENT)
Dept: FAMILY MEDICINE CLINIC | Facility: CLINIC | Age: 49
End: 2024-12-18
Payer: COMMERCIAL

## 2024-12-18 VITALS
BODY MASS INDEX: 21.56 KG/M2 | TEMPERATURE: 98.4 F | HEIGHT: 65 IN | HEART RATE: 102 BPM | WEIGHT: 129.4 LBS | DIASTOLIC BLOOD PRESSURE: 64 MMHG | OXYGEN SATURATION: 99 % | SYSTOLIC BLOOD PRESSURE: 122 MMHG

## 2024-12-18 DIAGNOSIS — B34.8 RHINOVIRUS: ICD-10-CM

## 2024-12-18 DIAGNOSIS — J45.51 SEVERE PERSISTENT ASTHMA WITH ACUTE EXACERBATION: ICD-10-CM

## 2024-12-18 DIAGNOSIS — Z09 HOSPITAL DISCHARGE FOLLOW-UP: Primary | ICD-10-CM

## 2024-12-18 DIAGNOSIS — J18.9 COMMUNITY ACQUIRED BILATERAL LOWER LOBE PNEUMONIA: ICD-10-CM

## 2024-12-18 DIAGNOSIS — J96.01 ACUTE HYPOXIC RESPIRATORY FAILURE: ICD-10-CM

## 2024-12-18 PROCEDURE — 99495 TRANSJ CARE MGMT MOD F2F 14D: CPT | Performed by: FAMILY MEDICINE

## 2024-12-18 RX ORDER — PREDNISONE 10 MG/1
TABLET ORAL
Qty: 27 TABLET | Refills: 0 | Status: SHIPPED | OUTPATIENT
Start: 2024-12-18 | End: 2025-01-01

## 2024-12-18 RX ORDER — AZITHROMYCIN 250 MG/1
TABLET, FILM COATED ORAL
Qty: 6 TABLET | Refills: 0 | Status: SHIPPED | OUTPATIENT
Start: 2024-12-18 | End: 2024-12-23

## 2024-12-18 NOTE — PAYOR COMM NOTE
"Missy Lozanozajam ALLRED (49 y.o. Female)     PLEASE SEE ATTACHED FOR DC NOTICE    REF #  [id number]     THANK YOU  DARCIE BABIN RN/ DEPT  [facility]   [phone]  [phone]        Date of Birth   1975    Social Security Number       Address   [address] [place], [place] [address]    Home Phone       MRN   [id number]       Sikh   None    Marital Status                               Admission Date   24    Admission Type   Emergency    Admitting Provider   Prabhjot Giang MD    Attending Provider       Department, Room/Bed   [facility] 5 [facility], S513/1       Discharge Date   2024    Discharge Disposition   Home or Self Care    Discharge Destination                                 Attending Provider: (none)   Allergies: No Known Allergies    Isolation: None   Infection: Rhinovirus  (24)   Code Status: Prior    Ht: 165.1 cm (65\")   Wt: 41.9 kg (92 lb 6 oz)    Admission Cmt: None   Principal Problem: Acute hypoxic respiratory failure [J96.01]                   Active Insurance as of 2024       Primary Coverage       Payor Plan Insurance Group Employer/Plan Group    ANTHEM BLUE CROSS ANTHEM BLUE CROSS BLUE SHIELD PPO [id number]       Payor Plan Address Payor Plan Phone Number Payor Plan Fax Number Effective Dates    [address] [phone]  4/3/2021 - None Entered    [place] [address]         Subscriber Name Subscriber Birth Date Member ID       NUNO LOZANO NIDIA 1967 [id number]                     Emergency Contacts        (Rel.) Home Phone Work Phone Mobile Phone    NUNO LOZANO (Spouse) -- -- [phone]    KEVIN ROSA (Mother) -- -- [phone]    Tatyana,Elaine (Daughter) [phone] -- [phone]              [place]: [facility] [id number]  Tax ID [id number]     Discharge Summary        Nicho Contreras MD at 24 1451              Patient Name: Nereida Lozano  : 1975  MRN: " [id number]    Date of Admission: 12/7/2024  Date of Discharge:  12/12/2024  Primary Care Physician: Kehrer, Meredith Lea, MD      Chief Complaint:   Shortness of Breath, Asthma, and Vomiting      Discharge Diagnoses     Active Hospital Problems    Diagnosis  POA    **Acute hypoxic respiratory failure [J96.01]  Yes    Rhinovirus [B34.8]  Unknown    Asthma exacerbation [J45.901]  Unknown    History of breast cancer [Z85.3]  Not Applicable    Hypothyroidism [E03.9]  Yes      Resolved Hospital Problems   No resolved problems to display.        Hospital Course     Patient is a 49 y.o. female with a history of asthma, hypothyroidism, breast cancer on tamoxifen that presents to [facility] complaining of shortness of breath, cough, wheezing for 1 week.  Was diagnosed with asthma exacerbation secondary to rhinovirus. CT  scan showed  multifocal scattered and patchy regions of small tree-in-bud ground glass opacities in both lungs, likely infectious/inflammatory. 2. Bandlike regions of consolidation and volume loss in the suprahilar right upper lobe, infrahilar left lower lobe, and dependent base of each  lower lobe with multifocal bilateral lower lobe bronchial plugging, likely pneumonia with superimposed atelectasis and/or scarring.  Patient was initiated on IV steroids, breathing treatments, supportive care for rhinovirus.  Pulmonary was consulted.  Empiric IV ceftriaxone for possible pneumonia.  Symptoms gradually improved, was weaned down to room air.  Was discharged home on Medrol Dosepak.  Patient was supposed to complete IV ceftriaxone on day of discharge per pulmonology commendations, I had placed discharge orders placed with instructions to discharge after IV ceftriaxone administered however patient was discharged without final dose of IV ceftriaxone given.  Although it was  empiric coverage, and I have low suspicion for superimposed bacterial pneumonia, I did call patient and explained that she did  not receive his final dose IV ceftriaxone and discussed with patient and prescribed 2 more days of cefdinir to [facility] pharmacy.  Will need to follow-up with pulmonology in couple weeks.    At the time of discharge patient was told to take all medications as prescribed, keep all follow-up appointments, and call their doctor or return to the hospital with any worsening or concerning symptoms.     .          Day of Discharge     Subjective:  Reports she is feeling significantly better.  Still coughing but improving.  Denies current Shortness of breath, chest pain, fevers or chills.    Physical Exam:  Temp:  [94 °F (34.4 °C)-97.9 °F (36.6 °C)] 94 °F (34.4 °C)  Heart Rate:  [56-69] 66  Resp:  [18-20] 18  BP: (116-119)/(78-84) 119/84  Body mass index is 15.37 kg/m².  Physical Exam    General: Alert, laying in bed, not in distress,  HEENT: Normocephalic, atraumatic  CV: Regular rate and rhythm, no murmurs  Lungs: Faint rhonchi, no wheezing, nonlabored breathing, on room air  Abdomen: Soft, nontender, nondistended  Extremities: No significant peripheral edema , no cyanosis     Consultants     Consult Orders (all) (From admission, onward)       Start     Ordered    12/07/24 1033  Inpatient Pulmonology Consult  Once        Specialty:  Pulmonary Disease  Provider:  Demarcus Baker MD    12/07/24 1032    12/07/24 0819  [facility] (on-call MD unless specified) Details  Once,   Status:  Canceled        Specialty:  Hospitalist  Provider:  Prabhjot Giang MD    12/07/24 0818                  Procedures     * Surgery not found *      Imaging Results (All)       Procedure Component Value Units Date/Time    CT Chest Without Contrast Diagnostic [[id number]] Collected: 12/09/24 1032     Updated: 12/09/24 1047    Narrative:      CT CHEST WO CONTRAST DIAGNOSTIC-     DATE OF EXAM: 12/9/2024 9:40 AM     INDICATION: Evaluate for pneumonia. Acute hypoxic respiratory failure.  Shortness of breath. Vomiting.     COMPARISON: Chest  radiographs 12/7/2024 and 2/21/2024. MRI thoracic  spine 1/9/2023. CT abdomen and pelvis 1/7/2023. Chest CT 1/20/2021.     TECHNIQUE: Multiple contiguous axial images were acquired through the  chest without the intravenous administration of contrast. Reformatted  coronal and sagittal sequences were also reviewed. Radiation dose  reduction techniques were utilized, including automated exposure control  and exposure modulation based on body size.     FINDINGS:  Multifocal scattered and patchy regions of small tree-in-bud groundglass  opacities in both lungs, likely infectious/inflammatory. Bandlike  regions of consolidation and volume loss in the suprahilar right upper  lobe, infrahilar left lower lobe, and dependent base of each lower lobe  with multifocal bilateral lower lobe bronchial plugging, likely  pneumonia with superimposed atelectasis/or scarring. No pneumothorax or  significant pleural effusion.     The heart and great vessels of the chest are normal in size. No evidence  of significant calcified coronary artery disease. Trace pericardial  fluid. No pathologically enlarged intrathoracic lymph nodes are  identified. Tiny hiatal hernia.     Limited noncontrast CT imaging of the upper abdomen demonstrates  postoperative changes from cholecystectomy.     Bilateral mastectomy. No acute osseous abnormality or concerning osseous  lesion.       Impression:         1. Multifocal scattered and patchy regions of small tree-in-bud  groundglass opacities in both lungs, likely infectious/inflammatory.  2. Bandlike regions of consolidation and volume loss in the suprahilar  right upper lobe, infrahilar left lower lobe, and dependent base of each  lower lobe with multifocal bilateral lower lobe bronchial plugging,  likely pneumonia with superimposed atelectasis and/or scarring.     This report was finalized on 12/9/2024 10:44 AM by Vasu Bermudez MD on  Workstation: [id number]       XR Chest 1 View [[id number]] Collected:  12/07/24 0237     Updated: 12/07/24 0242    Narrative:      SINGLE VIEW OF THE CHEST     HISTORY: Shortness of air     COMPARISON: February 21, 2024     FINDINGS:  Heart size is within normal limits. No pneumothorax, pleural effusion,  or acute infiltrate is seen. There is eventration of the right  hemidiaphragm. No pneumothorax or pleural effusion is seen. There are  changes of bilateral mastectomies. There does appear to be some  consolidation within the left infrahilar region. There are background  changes of COPD.       Impression:      There is some consolidation within the left infrahilar region. This may  represent scarring or atelectasis, although correlation with any  evidence of pneumonia is recommended.     This report was finalized on 12/7/2024 2:39 AM by Dr. Josette Bhardwaj M.D on Workstation: [id number]                 Pertinent Labs     Results from last 7 days   Lab Units 12/10/24  0553 12/08/24  0431 12/07/24  0136   WBC 10*3/mm3 9.37 10.55 12.15*   HEMOGLOBIN g/dL 12.7 11.8* 13.1   PLATELETS 10*3/mm3 395 317 400     Results from last 7 days   Lab Units 12/10/24  0553 12/08/24  0431 12/07/24  0136   SODIUM mmol/L 141 137 138   POTASSIUM mmol/L 4.5 3.8 3.6   CHLORIDE mmol/L 104 102 105   CO2 mmol/L 26.9 23.8 22.7   BUN mg/dL 15 10 13   CREATININE mg/dL 0.52* 0.68 0.78   GLUCOSE mg/dL 123* 155* 108*   Estimated Creatinine Clearance: 84.7 mL/min (A) (by C-G formula based on SCr of 0.52 mg/dL (L)).  Results from last 7 days   Lab Units 12/10/24  0553 12/07/24  0136   ALBUMIN g/dL 3.6 4.2   BILIRUBIN mg/dL  --  0.4   ALK PHOS U/L  --  62   AST (SGOT) U/L  --  23   ALT (SGPT) U/L  --  34*     Results from last 7 days   Lab Units 12/10/24  0553 12/08/24  0431 12/07/24  0136   CALCIUM mg/dL 9.0 9.0 9.0   ALBUMIN g/dL 3.6  --  4.2   MAGNESIUM mg/dL 2.2 2.3  --    PHOSPHORUS mg/dL 3.8 3.6  --        Results from last 7 days   Lab Units 12/07/24  0136   HSTROP T ng/L 9   PROBNP pg/mL 119.0        "    Invalid input(s): \"LDLCALC\"  Results from last 7 days   Lab Units 12/09/24  1718 12/09/24  1629   BLOODCX  No growth at 3 days No growth at 3 days     Results from last 7 days   Lab Units 12/07/24  0712   COVID19  Not Detected       Test Results Pending at Discharge     Pending Labs       Order Current Status    Blood Culture - Blood, Arm, Right Preliminary result    Blood Culture - Blood, Arm, Right Preliminary result            Discharge Details        Discharge Medications        New Medications        Instructions Start Date   cefdinir 300 MG capsule  Commonly known as: OMNICEF   300 mg, Oral, 2 Times Daily      methylPREDNISolone 4 MG dose pack  Commonly known as: MEDROL   Take as directed on package instructions.             Continue These Medications        Instructions Start Date   albuterol (2.5 MG/3ML) 0.083% nebulizer solution  Commonly known as: PROVENTIL   2.5 mg, Every 4 Hours PRN      albuterol sulfate  (90 Base) MCG/ACT inhaler  Commonly known as: PROVENTIL HFA;VENTOLIN HFA;PROAIR HFA   USE 2 INHALATIONS EVERY 4 HOURS AS NEEDED FOR WHEEZING      albuterol 4 MG tablet  Commonly known as: PROVENTIL   4 mg, Oral, Nightly      benzonatate 200 MG capsule  Commonly known as: TESSALON       Benzoyl Peroxide 10 % liquid   1 Application, Daily      Breo Ellipta 100-25 MCG/INH aerosol powder   Generic drug: Fluticasone Furoate-Vilanterol   1 puff, Inhalation, Daily      buPROPion  MG 24 hr tablet  Commonly known as: WELLBUTRIN XL   300 mg, Oral, Every Morning      clindamycin 1 % swab  Commonly known as: CLEOCIN T   APPLY 1 SWAB TOPICALLY TO THE FACE EVERY MORNING      diclofenac 75 MG EC tablet  Commonly known as: VOLTAREN   TAKE 1 TABLET TWICE A DAY AS NEEDED FOR PAIN      FLUoxetine 40 MG capsule  Commonly known as: PROzac   80 mg, Oral, Daily      levothyroxine 50 MCG tablet  Commonly known as: SYNTHROID, LEVOTHROID   50 mcg, Oral, Daily      metaxalone 800 MG tablet  Commonly known as: " SKELAXIN   TAKE 1/2 TO 1 TABLET BY MOUTH FOUR TIMES DAILY AS NEEDED FOR MUSCLE SPASMS      montelukast 10 MG tablet  Commonly known as: SINGULAIR   10 mg, Oral, Daily      multivitamin tablet tablet  Commonly known as: THERAGRAN   1 tablet, Nightly      omeprazole 40 MG capsule  Commonly known as: priLOSEC   40 mg, Oral, Daily      ondansetron 4 MG tablet  Commonly known as: ZOFRAN   4 mg, Oral, Every 8 Hours PRN      pregabalin 25 MG capsule  Commonly known as: Lyrica   Take 1 capsule PO TID x 5 days; if tolerated may slowly increase to 1 or 2 capsules PO TID      tamoxifen 20 MG chemo tablet  Commonly known as: NOLVADEX   Daily      theophylline 300 MG 12 hr tablet  Commonly known as: THEODUR   300 mg, Oral, Every Morning      valACYclovir 500 MG tablet  Commonly known as: VALTREX   TAKE 1 TABLET DAILY      vitamin B-12 1000 MCG tablet  Commonly known as: CYANOCOBALAMIN   1,000 mcg, Daily             Stop These Medications      predniSONE 20 MG tablet  Commonly known as: DELTASONE              No Known Allergies    Discharge Disposition:  Home or Self Care      Discharge Diet:  No active diet order      Discharge Activity:   Activity Instructions    Return to your pre-admission activity level.           CODE STATUS:    Code Status and Medical Interventions: CPR (Attempt to Resuscitate); Full Support   Ordered at: 12/08/24 1009     Level Of Support Discussed With:    Patient     Code Status (Patient has no pulse and is not breathing):    CPR (Attempt to Resuscitate)     Medical Interventions (Patient has pulse or is breathing):    Full Support       Future Appointments   Date Time Provider Department Center   12/17/2024  8:40 AM Wade Graham PA [facility] PM E[facility]PT [facility]   1/2/2025  9:45 AM [facility] MAIN PAIN OR [facility] EP XR OR None   2/17/2025  9:45 AM Kehrer, Meredith Lea, MD [facility]   2/17/2025 12:40 PM Wade Graham PA [facility] PM [facility]      Follow-up Information       Kehrer, Meredith Lea, MD.  Schedule an appointment as soon as possible for a visit in 1 week(s).    Specialty: Family Medicine  Contact information:  [address]  [address]  [facility] [id number] [phone]               Demarcus Baker MD. Schedule an appointment as soon as possible for a visit in 2 week(s).    Specialties: Pulmonary Disease, Intensive Care  Contact information:  [address] [facility] [address]  [facility] [id number] [phone]                             Time Spent on Discharge:  Greater than 30 minutes      Nicho Contreras MD  [facility]  12/12/24  19:31 EST                Electronically signed by Nicho Contreras MD at 12/12/24 8607

## 2024-12-18 NOTE — PROGRESS NOTES
Transitional Care Follow Up Visit  Subjective     Nereida Myles is a 49 y.o. female who presents for a transitional care management visit.    Within 48 business hours after discharge our office contacted her via telephone to coordinate her care and needs.      I reviewed and discussed the details of that call along with the discharge summary, hospital problems, inpatient lab results, inpatient diagnostic studies, and consultation reports with Nereida.     Current outpatient and discharge medications have been reconciled for the patient.  Reviewed by: Meredith Lea Kehrer, MD  ED to Hosp-Admission (Discharged) with Nicho Contreras MD; Blanca Parr MD; Prabhjot Giang MD (12/07/2024)  Transitional Care Management Telephone Encounter with Oli Clark RN (12/13/2024)  Discharge Summary by Nicho Contreras MD (12/12/2024 14:51)         12/12/2024     5:31 PM   Date of TCM Phone Call   [facility]   Date of Admission 12/7/2024   Date of Discharge 12/12/2024   Discharge Disposition Home or Self Care     Risk for Readmission (LACE) Score: 11 (12/12/2024  6:00 AM)      History of Present Illness   Course During Hospital Stay:  see below  History of Present Illness  The patient is a 49-year-old female who presents for a hospital discharge follow-up.    She was previously seen on 12/04/2024 for a cold, during which she was prescribed a Dosepak due to her asthma and wheezing. However, her condition deteriorated, necessitating an emergency room visit on the night of 12/06/2024. Upon arrival, her oxygen saturation was 98 percent, but it subsequently dropped to 88 percent, prompting her admission to the hospital on 12/07/2024. She was treated with intravenous steroids and antibiotics, including Rocephin, for suspected bacterial pneumonia, even though it was not evident on the x-ray. A CT scan revealed bilateral basilar pneumonias and scarring. After a 3-day course of  Rocephin, treatments, and IV steroids, she was discharged on Thursday with a 2-day supply of antibiotics and a Medrol pack, which she completed yesterday. She reports feeling significantly better since yesterday, although she experienced some difficulty at work on Monday. She describes a sensation of heaviness in her chest, akin to a rock. She has an upcoming appointment with her pulmonologist, Dr. Whitmore, on 01/17/2025. She was discharged with Symbicort and is uncertain whether to resume Breo. She continues to experience soreness from coughing and breathing difficulties, but her oxygen saturation levels are satisfactory. She has not been prescribed Zithromax. She has requested paperwork for FMLA for COPD and asthma for her workplace. She reports that her neutrophil count is consistently high and her lymphocyte count is always low.    MEDICATIONS  Current: Symbicort, Medrol pack (completed)  Past: Rocephin        The following portions of the patient's history were reviewed and updated as appropriate: allergies, current medications, past family history, past medical history, past social history, past surgical history, and problem list.    Review of Systems    Objective   Physical Exam  Physical Exam  The patient is alert and shows no signs of acute distress.  Pupils are equal.  Wheezing is present throughout the lungs, more pronounced on the left side.  Heart sounds are normal and regular.  Bruising is noted on both forearms.    Vital Signs  Oxygen saturation was 97 percent when last seen and 98 percent upon arrival at the ER. Oxygen saturation dropped to 88 percent on Sunday night.       Results  Imaging  CT scan showed bilateral basilar pneumonias and scarring.   Renal Function Panel (12/10/2024 05:53)  CBC & Differential (12/10/2024 05:53)  Magnesium (12/10/2024 05:53)  Respiratory Panel PCR w/COVID-19(SARS-CoV-2) [facility] In-House, NP Swab in UTM/VTM, 2 HR TAT - Swab, Nasopharynx (12/07/2024  07:12)  Assessment & Plan   Diagnoses and all orders for this visit:    1. Hospital discharge follow-up (Primary)    2. Acute hypoxic respiratory failure  -     predniSONE (DELTASONE) 10 MG tablet; Take 2 tablets by mouth 2 (Two) Times a Day for 3 days, THEN 2 tablets Daily for 4 days, THEN 1 tablet Daily for 7 days.  Dispense: 27 tablet; Refill: 0    3. Severe persistent asthma with acute exacerbation  -     predniSONE (DELTASONE) 10 MG tablet; Take 2 tablets by mouth 2 (Two) Times a Day for 3 days, THEN 2 tablets Daily for 4 days, THEN 1 tablet Daily for 7 days.  Dispense: 27 tablet; Refill: 0    4. Rhinovirus    5. Community acquired bilateral lower lobe pneumonia  -     azithromycin (Zithromax) 250 MG tablet; Take 2 tablets by mouth Daily for 1 day, THEN 1 tablet Daily for 4 days.  Dispense: 6 tablet; Refill: 0             Assessment & Plan  1. Post-hospitalization follow-up.  Her heart rate is slightly elevated, and she exhibits wheezing, more pronounced on the left side. She is not yet fully recovered. She will continue using Symbicort until it is depleted, after which she will resume Breo. A prescription for Zithromax has been provided. She is advised to use her nebulizer several times daily. A slower tapering of steroids is recommended. She will take 20 mg of steroids twice daily for 4 days, then reduce to 20 mg once daily for 4 days, and finally decrease to 10 mg daily until her next visit. If her condition worsens, she should seek immediate medical attention at the emergency room.    Follow-up  The patient will follow up in 2 weeks.      Patient or patient representative verbalized consent for the use of Ambient Listening during the visit with  Meredith Lea Kehrer, MD for chart documentation. 12/18/2024  14:09 EST

## 2024-12-27 ENCOUNTER — HOSPITAL ENCOUNTER (INPATIENT)
Facility: HOSPITAL | Age: 49
LOS: 3 days | Discharge: HOME | End: 2025-01-02
Attending: EMERGENCY MEDICINE | Admitting: HOSPITALIST
Payer: COMMERCIAL

## 2024-12-27 ENCOUNTER — APPOINTMENT (OUTPATIENT)
Dept: GENERAL RADIOLOGY | Facility: HOSPITAL | Age: 49
End: 2024-12-27
Payer: COMMERCIAL

## 2024-12-27 DIAGNOSIS — J45.51 SEVERE PERSISTENT ASTHMA WITH ACUTE EXACERBATION: Primary | ICD-10-CM

## 2024-12-27 PROBLEM — J45.901 SEVERE ASTHMA WITH ACUTE EXACERBATION: Status: ACTIVE | Noted: 2024-12-27

## 2024-12-27 LAB
ALBUMIN SERPL-MCNC: 4 G/DL (ref 3.5–5.2)
ALBUMIN/GLOB SERPL: 1.3 G/DL
ALP SERPL-CCNC: 78 U/L (ref 39–117)
ALT SERPL W P-5'-P-CCNC: 26 U/L (ref 1–33)
ANION GAP SERPL CALCULATED.3IONS-SCNC: 13 MMOL/L (ref 5–15)
AST SERPL-CCNC: 18 U/L (ref 1–32)
B PARAPERT DNA SPEC QL NAA+PROBE: NOT DETECTED
B PERT DNA SPEC QL NAA+PROBE: NOT DETECTED
BASOPHILS # BLD AUTO: 0.03 10*3/MM3 (ref 0–0.2)
BASOPHILS NFR BLD AUTO: 0.4 % (ref 0–1.5)
BILIRUB SERPL-MCNC: 0.3 MG/DL (ref 0–1.2)
BUN SERPL-MCNC: 19 MG/DL (ref 6–20)
BUN/CREAT SERPL: 20 (ref 7–25)
C PNEUM DNA NPH QL NAA+NON-PROBE: NOT DETECTED
CALCIUM SPEC-SCNC: 9.6 MG/DL (ref 8.6–10.5)
CHLORIDE SERPL-SCNC: 100 MMOL/L (ref 98–107)
CO2 SERPL-SCNC: 25 MMOL/L (ref 22–29)
CREAT SERPL-MCNC: 0.95 MG/DL (ref 0.57–1)
D DIMER PPP FEU-MCNC: 0.28 MCGFEU/ML (ref 0–0.51)
D-LACTATE SERPL-SCNC: 1.8 MMOL/L (ref 0.5–2)
DEPRECATED RDW RBC AUTO: 42.3 FL (ref 37–54)
EGFRCR SERPLBLD CKD-EPI 2021: 72.7 ML/MIN/1.73
EOSINOPHIL # BLD AUTO: 0.27 10*3/MM3 (ref 0–0.4)
EOSINOPHIL NFR BLD AUTO: 3.3 % (ref 0.3–6.2)
ERYTHROCYTE [DISTWIDTH] IN BLOOD BY AUTOMATED COUNT: 12.5 % (ref 12.3–15.4)
FLUAV SUBTYP SPEC NAA+PROBE: NOT DETECTED
FLUBV RNA ISLT QL NAA+PROBE: NOT DETECTED
GEN 5 1HR TROPONIN T REFLEX: 9 NG/L
GLOBULIN UR ELPH-MCNC: 3 GM/DL
GLUCOSE SERPL-MCNC: 84 MG/DL (ref 65–99)
HADV DNA SPEC NAA+PROBE: NOT DETECTED
HCOV 229E RNA SPEC QL NAA+PROBE: NOT DETECTED
HCOV HKU1 RNA SPEC QL NAA+PROBE: NOT DETECTED
HCOV NL63 RNA SPEC QL NAA+PROBE: NOT DETECTED
HCOV OC43 RNA SPEC QL NAA+PROBE: NOT DETECTED
HCT VFR BLD AUTO: 43.2 % (ref 34–46.6)
HGB BLD-MCNC: 14.6 G/DL (ref 12–15.9)
HMPV RNA NPH QL NAA+NON-PROBE: NOT DETECTED
HOLD SPECIMEN: NORMAL
HOLD SPECIMEN: NORMAL
HPIV1 RNA ISLT QL NAA+PROBE: NOT DETECTED
HPIV2 RNA SPEC QL NAA+PROBE: NOT DETECTED
HPIV3 RNA NPH QL NAA+PROBE: NOT DETECTED
HPIV4 P GENE NPH QL NAA+PROBE: NOT DETECTED
IMM GRANULOCYTES # BLD AUTO: 0.02 10*3/MM3 (ref 0–0.05)
IMM GRANULOCYTES NFR BLD AUTO: 0.2 % (ref 0–0.5)
LYMPHOCYTES # BLD AUTO: 1.34 10*3/MM3 (ref 0.7–3.1)
LYMPHOCYTES NFR BLD AUTO: 16.4 % (ref 19.6–45.3)
M PNEUMO IGG SER IA-ACNC: NOT DETECTED
MCH RBC QN AUTO: 31.2 PG (ref 26.6–33)
MCHC RBC AUTO-ENTMCNC: 33.8 G/DL (ref 31.5–35.7)
MCV RBC AUTO: 92.3 FL (ref 79–97)
MONOCYTES # BLD AUTO: 0.37 10*3/MM3 (ref 0.1–0.9)
MONOCYTES NFR BLD AUTO: 4.5 % (ref 5–12)
NEUTROPHILS NFR BLD AUTO: 6.15 10*3/MM3 (ref 1.7–7)
NEUTROPHILS NFR BLD AUTO: 75.2 % (ref 42.7–76)
NRBC BLD AUTO-RTO: 0 /100 WBC (ref 0–0.2)
NT-PROBNP SERPL-MCNC: 108 PG/ML (ref 0–900)
PLATELET # BLD AUTO: 394 10*3/MM3 (ref 140–450)
PMV BLD AUTO: 9.3 FL (ref 6–12)
POTASSIUM SERPL-SCNC: 3.1 MMOL/L (ref 3.5–5.2)
PROT SERPL-MCNC: 7 G/DL (ref 6–8.5)
QT INTERVAL: 369 MS
QTC INTERVAL: 455 MS
RBC # BLD AUTO: 4.68 10*6/MM3 (ref 3.77–5.28)
RHINOVIRUS RNA SPEC NAA+PROBE: NOT DETECTED
RSV RNA NPH QL NAA+NON-PROBE: NOT DETECTED
SARS-COV-2 RNA NPH QL NAA+NON-PROBE: NOT DETECTED
SODIUM SERPL-SCNC: 138 MMOL/L (ref 136–145)
THEOPHYLLINE SERPL-MCNC: 9.3 MCG/ML (ref 10–20)
TROPONIN T NUMERIC DELTA: 0 NG/L
TROPONIN T SERPL HS-MCNC: 9 NG/L
WBC NRBC COR # BLD AUTO: 8.18 10*3/MM3 (ref 3.4–10.8)
WHOLE BLOOD HOLD COAG: NORMAL
WHOLE BLOOD HOLD SPECIMEN: NORMAL

## 2024-12-27 PROCEDURE — 80053 COMPREHEN METABOLIC PANEL: CPT

## 2024-12-27 PROCEDURE — 85379 FIBRIN DEGRADATION QUANT: CPT | Performed by: EMERGENCY MEDICINE

## 2024-12-27 PROCEDURE — 80198 ASSAY OF THEOPHYLLINE: CPT | Performed by: EMERGENCY MEDICINE

## 2024-12-27 PROCEDURE — 94799 UNLISTED PULMONARY SVC/PX: CPT

## 2024-12-27 PROCEDURE — G0378 HOSPITAL OBSERVATION PER HR: HCPCS

## 2024-12-27 PROCEDURE — 83605 ASSAY OF LACTIC ACID: CPT | Performed by: EMERGENCY MEDICINE

## 2024-12-27 PROCEDURE — 99285 EMERGENCY DEPT VISIT HI MDM: CPT

## 2024-12-27 PROCEDURE — 36415 COLL VENOUS BLD VENIPUNCTURE: CPT

## 2024-12-27 PROCEDURE — 84484 ASSAY OF TROPONIN QUANT: CPT | Performed by: EMERGENCY MEDICINE

## 2024-12-27 PROCEDURE — 94760 N-INVAS EAR/PLS OXIMETRY 1: CPT

## 2024-12-27 PROCEDURE — 84484 ASSAY OF TROPONIN QUANT: CPT

## 2024-12-27 PROCEDURE — 94761 N-INVAS EAR/PLS OXIMETRY MLT: CPT

## 2024-12-27 PROCEDURE — 25010000002 METHYLPREDNISOLONE PER 125 MG: Performed by: EMERGENCY MEDICINE

## 2024-12-27 PROCEDURE — 71045 X-RAY EXAM CHEST 1 VIEW: CPT

## 2024-12-27 PROCEDURE — 93005 ELECTROCARDIOGRAM TRACING: CPT | Performed by: EMERGENCY MEDICINE

## 2024-12-27 PROCEDURE — 85025 COMPLETE CBC W/AUTO DIFF WBC: CPT

## 2024-12-27 PROCEDURE — 25010000002 ONDANSETRON PER 1 MG: Performed by: NURSE PRACTITIONER

## 2024-12-27 PROCEDURE — 0202U NFCT DS 22 TRGT SARS-COV-2: CPT | Performed by: EMERGENCY MEDICINE

## 2024-12-27 PROCEDURE — 83880 ASSAY OF NATRIURETIC PEPTIDE: CPT

## 2024-12-27 PROCEDURE — 93010 ELECTROCARDIOGRAM REPORT: CPT | Performed by: INTERNAL MEDICINE

## 2024-12-27 PROCEDURE — 93005 ELECTROCARDIOGRAM TRACING: CPT

## 2024-12-27 PROCEDURE — 87040 BLOOD CULTURE FOR BACTERIA: CPT

## 2024-12-27 PROCEDURE — 94640 AIRWAY INHALATION TREATMENT: CPT

## 2024-12-27 RX ORDER — PREGABALIN 25 MG/1
25 CAPSULE ORAL NIGHTLY
Status: DISCONTINUED | OUTPATIENT
Start: 2024-12-28 | End: 2025-01-02 | Stop reason: HOSPADM

## 2024-12-27 RX ORDER — SODIUM CHLORIDE 9 MG/ML
40 INJECTION, SOLUTION INTRAVENOUS AS NEEDED
Status: DISCONTINUED | OUTPATIENT
Start: 2024-12-27 | End: 2025-01-02 | Stop reason: HOSPADM

## 2024-12-27 RX ORDER — TAMOXIFEN CITRATE 10 MG/1
20 TABLET ORAL DAILY
Status: DISCONTINUED | OUTPATIENT
Start: 2024-12-28 | End: 2025-01-02 | Stop reason: HOSPADM

## 2024-12-27 RX ORDER — IPRATROPIUM BROMIDE AND ALBUTEROL SULFATE 2.5; .5 MG/3ML; MG/3ML
3 SOLUTION RESPIRATORY (INHALATION) ONCE
Status: COMPLETED | OUTPATIENT
Start: 2024-12-27 | End: 2024-12-27

## 2024-12-27 RX ORDER — DIPHENOXYLATE HYDROCHLORIDE AND ATROPINE SULFATE 2.5; .025 MG/1; MG/1
1 TABLET ORAL NIGHTLY
Status: DISCONTINUED | OUTPATIENT
Start: 2024-12-28 | End: 2025-01-02 | Stop reason: HOSPADM

## 2024-12-27 RX ORDER — ONDANSETRON 2 MG/ML
4 INJECTION INTRAMUSCULAR; INTRAVENOUS EVERY 6 HOURS PRN
Status: DISCONTINUED | OUTPATIENT
Start: 2024-12-27 | End: 2025-01-02 | Stop reason: HOSPADM

## 2024-12-27 RX ORDER — ALBUTEROL SULFATE 4 MG/1
4 TABLET ORAL NIGHTLY
Status: DISCONTINUED | OUTPATIENT
Start: 2024-12-28 | End: 2024-12-27 | Stop reason: RX

## 2024-12-27 RX ORDER — NITROGLYCERIN 0.4 MG/1
0.4 TABLET SUBLINGUAL
Status: DISCONTINUED | OUTPATIENT
Start: 2024-12-27 | End: 2025-01-02 | Stop reason: HOSPADM

## 2024-12-27 RX ORDER — BENZONATATE 100 MG/1
200 CAPSULE ORAL 3 TIMES DAILY PRN
Status: DISCONTINUED | OUTPATIENT
Start: 2024-12-27 | End: 2025-01-02 | Stop reason: HOSPADM

## 2024-12-27 RX ORDER — METAXALONE 800 MG/1
400 TABLET ORAL EVERY 6 HOURS PRN
Status: DISCONTINUED | OUTPATIENT
Start: 2024-12-27 | End: 2025-01-02 | Stop reason: HOSPADM

## 2024-12-27 RX ORDER — METHYLPREDNISOLONE SODIUM SUCCINATE 125 MG/2ML
60 INJECTION, POWDER, LYOPHILIZED, FOR SOLUTION INTRAMUSCULAR; INTRAVENOUS EVERY 12 HOURS
Status: DISCONTINUED | OUTPATIENT
Start: 2024-12-28 | End: 2025-01-01

## 2024-12-27 RX ORDER — FAMOTIDINE 20 MG/1
20 TABLET, FILM COATED ORAL 2 TIMES DAILY PRN
Status: DISCONTINUED | OUTPATIENT
Start: 2024-12-27 | End: 2025-01-02 | Stop reason: HOSPADM

## 2024-12-27 RX ORDER — ALBUTEROL SULFATE 0.83 MG/ML
2.5 SOLUTION RESPIRATORY (INHALATION) EVERY 6 HOURS PRN
Status: DISCONTINUED | OUTPATIENT
Start: 2024-12-27 | End: 2025-01-02 | Stop reason: HOSPADM

## 2024-12-27 RX ORDER — SODIUM CHLORIDE 0.9 % (FLUSH) 0.9 %
10 SYRINGE (ML) INJECTION AS NEEDED
Status: DISCONTINUED | OUTPATIENT
Start: 2024-12-27 | End: 2025-01-02 | Stop reason: HOSPADM

## 2024-12-27 RX ORDER — LEVOTHYROXINE SODIUM 50 UG/1
50 TABLET ORAL
Status: DISCONTINUED | OUTPATIENT
Start: 2024-12-28 | End: 2025-01-02 | Stop reason: HOSPADM

## 2024-12-27 RX ORDER — POTASSIUM CHLORIDE 750 MG/1
40 TABLET, FILM COATED, EXTENDED RELEASE ORAL EVERY 4 HOURS
Status: DISPENSED | OUTPATIENT
Start: 2024-12-28 | End: 2024-12-28

## 2024-12-27 RX ORDER — SODIUM CHLORIDE 0.9 % (FLUSH) 0.9 %
10 SYRINGE (ML) INJECTION EVERY 12 HOURS SCHEDULED
Status: DISCONTINUED | OUTPATIENT
Start: 2024-12-27 | End: 2025-01-02 | Stop reason: HOSPADM

## 2024-12-27 RX ORDER — IPRATROPIUM BROMIDE AND ALBUTEROL SULFATE 2.5; .5 MG/3ML; MG/3ML
3 SOLUTION RESPIRATORY (INHALATION)
Status: DISCONTINUED | OUTPATIENT
Start: 2024-12-27 | End: 2024-12-29

## 2024-12-27 RX ORDER — BISACODYL 10 MG
10 SUPPOSITORY, RECTAL RECTAL DAILY PRN
Status: DISCONTINUED | OUTPATIENT
Start: 2024-12-27 | End: 2025-01-02 | Stop reason: HOSPADM

## 2024-12-27 RX ORDER — MULTIVITAMIN WITH IRON
1000 TABLET ORAL DAILY
Status: DISCONTINUED | OUTPATIENT
Start: 2024-12-28 | End: 2025-01-02 | Stop reason: HOSPADM

## 2024-12-27 RX ORDER — BUDESONIDE AND FORMOTEROL FUMARATE DIHYDRATE 160; 4.5 UG/1; UG/1
1 AEROSOL RESPIRATORY (INHALATION)
Status: DISCONTINUED | OUTPATIENT
Start: 2024-12-28 | End: 2024-12-29

## 2024-12-27 RX ORDER — POLYETHYLENE GLYCOL 3350 17 G/17G
17 POWDER, FOR SOLUTION ORAL DAILY PRN
Status: DISCONTINUED | OUTPATIENT
Start: 2024-12-27 | End: 2025-01-02 | Stop reason: HOSPADM

## 2024-12-27 RX ORDER — AMOXICILLIN 250 MG
2 CAPSULE ORAL 2 TIMES DAILY PRN
Status: DISCONTINUED | OUTPATIENT
Start: 2024-12-27 | End: 2025-01-02 | Stop reason: HOSPADM

## 2024-12-27 RX ORDER — BUPROPION HYDROCHLORIDE 300 MG/1
300 TABLET ORAL EVERY MORNING
Status: DISCONTINUED | OUTPATIENT
Start: 2024-12-28 | End: 2025-01-02 | Stop reason: HOSPADM

## 2024-12-27 RX ORDER — METHYLPREDNISOLONE SODIUM SUCCINATE 125 MG/2ML
80 INJECTION, POWDER, LYOPHILIZED, FOR SOLUTION INTRAMUSCULAR; INTRAVENOUS ONCE
Status: COMPLETED | OUTPATIENT
Start: 2024-12-27 | End: 2024-12-27

## 2024-12-27 RX ORDER — ACETAMINOPHEN 650 MG/1
650 SUPPOSITORY RECTAL EVERY 4 HOURS PRN
Status: DISCONTINUED | OUTPATIENT
Start: 2024-12-27 | End: 2025-01-02 | Stop reason: HOSPADM

## 2024-12-27 RX ORDER — ACETAMINOPHEN 325 MG/1
650 TABLET ORAL EVERY 4 HOURS PRN
Status: DISCONTINUED | OUTPATIENT
Start: 2024-12-27 | End: 2025-01-02 | Stop reason: HOSPADM

## 2024-12-27 RX ORDER — PANTOPRAZOLE SODIUM 40 MG/1
40 TABLET, DELAYED RELEASE ORAL DAILY PRN
Status: DISCONTINUED | OUTPATIENT
Start: 2024-12-27 | End: 2025-01-02 | Stop reason: HOSPADM

## 2024-12-27 RX ORDER — ACETAMINOPHEN 160 MG/5ML
650 SOLUTION ORAL EVERY 4 HOURS PRN
Status: DISCONTINUED | OUTPATIENT
Start: 2024-12-27 | End: 2025-01-02 | Stop reason: HOSPADM

## 2024-12-27 RX ORDER — THEOPHYLLINE 300 MG/1
300 TABLET, EXTENDED RELEASE ORAL EVERY MORNING
Status: DISCONTINUED | OUTPATIENT
Start: 2024-12-28 | End: 2025-01-02 | Stop reason: HOSPADM

## 2024-12-27 RX ORDER — BISACODYL 5 MG/1
5 TABLET, DELAYED RELEASE ORAL DAILY PRN
Status: DISCONTINUED | OUTPATIENT
Start: 2024-12-27 | End: 2025-01-02 | Stop reason: HOSPADM

## 2024-12-27 RX ORDER — MONTELUKAST SODIUM 10 MG/1
10 TABLET ORAL DAILY
Status: DISCONTINUED | OUTPATIENT
Start: 2024-12-28 | End: 2025-01-02 | Stop reason: HOSPADM

## 2024-12-27 RX ADMIN — IPRATROPIUM BROMIDE AND ALBUTEROL SULFATE 3 ML: 2.5; .5 SOLUTION RESPIRATORY (INHALATION) at 23:32

## 2024-12-27 RX ADMIN — ACETAMINOPHEN 650 MG: 325 TABLET ORAL at 22:45

## 2024-12-27 RX ADMIN — Medication 10 ML: at 22:43

## 2024-12-27 RX ADMIN — IPRATROPIUM BROMIDE AND ALBUTEROL SULFATE 3 ML: 2.5; .5 SOLUTION RESPIRATORY (INHALATION) at 20:28

## 2024-12-27 RX ADMIN — METHYLPREDNISOLONE SODIUM SUCCINATE 80 MG: 125 INJECTION INTRAMUSCULAR; INTRAVENOUS at 21:21

## 2024-12-27 RX ADMIN — ONDANSETRON 4 MG: 2 INJECTION, SOLUTION INTRAMUSCULAR; INTRAVENOUS at 22:43

## 2024-12-28 PROBLEM — E87.6 HYPOKALEMIA: Status: ACTIVE | Noted: 2024-12-28

## 2024-12-28 LAB
ALBUMIN SERPL-MCNC: 3.5 G/DL (ref 3.5–5.2)
ALBUMIN/GLOB SERPL: 1.1 G/DL
ALP SERPL-CCNC: 70 U/L (ref 39–117)
ALT SERPL W P-5'-P-CCNC: 26 U/L (ref 1–33)
ANION GAP SERPL CALCULATED.3IONS-SCNC: 8.7 MMOL/L (ref 5–15)
AST SERPL-CCNC: 16 U/L (ref 1–32)
BILIRUB SERPL-MCNC: 0.2 MG/DL (ref 0–1.2)
BUN SERPL-MCNC: 11 MG/DL (ref 6–20)
BUN/CREAT SERPL: 17.2 (ref 7–25)
CALCIUM SPEC-SCNC: 9.1 MG/DL (ref 8.6–10.5)
CHLORIDE SERPL-SCNC: 103 MMOL/L (ref 98–107)
CO2 SERPL-SCNC: 24.3 MMOL/L (ref 22–29)
CREAT SERPL-MCNC: 0.64 MG/DL (ref 0.57–1)
DEPRECATED RDW RBC AUTO: 42.3 FL (ref 37–54)
EGFRCR SERPLBLD CKD-EPI 2021: 107.1 ML/MIN/1.73
ERYTHROCYTE [DISTWIDTH] IN BLOOD BY AUTOMATED COUNT: 12.2 % (ref 12.3–15.4)
GLOBULIN UR ELPH-MCNC: 3.1 GM/DL
GLUCOSE SERPL-MCNC: 188 MG/DL (ref 65–99)
HCT VFR BLD AUTO: 40.5 % (ref 34–46.6)
HGB BLD-MCNC: 13.2 G/DL (ref 12–15.9)
MAGNESIUM SERPL-MCNC: 2.3 MG/DL (ref 1.6–2.6)
MCH RBC QN AUTO: 30.3 PG (ref 26.6–33)
MCHC RBC AUTO-ENTMCNC: 32.6 G/DL (ref 31.5–35.7)
MCV RBC AUTO: 92.9 FL (ref 79–97)
PLATELET # BLD AUTO: 337 10*3/MM3 (ref 140–450)
PMV BLD AUTO: 9.4 FL (ref 6–12)
POTASSIUM SERPL-SCNC: 4.1 MMOL/L (ref 3.5–5.2)
POTASSIUM SERPL-SCNC: 4.6 MMOL/L (ref 3.5–5.2)
PROT SERPL-MCNC: 6.6 G/DL (ref 6–8.5)
RBC # BLD AUTO: 4.36 10*6/MM3 (ref 3.77–5.28)
SODIUM SERPL-SCNC: 136 MMOL/L (ref 136–145)
WBC NRBC COR # BLD AUTO: 4.91 10*3/MM3 (ref 3.4–10.8)

## 2024-12-28 PROCEDURE — 25010000002 PROCHLORPERAZINE 10 MG/2ML SOLUTION: Performed by: HOSPITALIST

## 2024-12-28 PROCEDURE — G0378 HOSPITAL OBSERVATION PER HR: HCPCS

## 2024-12-28 PROCEDURE — 94799 UNLISTED PULMONARY SVC/PX: CPT

## 2024-12-28 PROCEDURE — 83735 ASSAY OF MAGNESIUM: CPT | Performed by: HOSPITALIST

## 2024-12-28 PROCEDURE — 94664 DEMO&/EVAL PT USE INHALER: CPT

## 2024-12-28 PROCEDURE — 85027 COMPLETE CBC AUTOMATED: CPT | Performed by: NURSE PRACTITIONER

## 2024-12-28 PROCEDURE — 25010000002 METHYLPREDNISOLONE PER 125 MG: Performed by: NURSE PRACTITIONER

## 2024-12-28 PROCEDURE — 94761 N-INVAS EAR/PLS OXIMETRY MLT: CPT

## 2024-12-28 PROCEDURE — 84132 ASSAY OF SERUM POTASSIUM: CPT | Performed by: HOSPITALIST

## 2024-12-28 PROCEDURE — 25010000002 ONDANSETRON PER 1 MG: Performed by: NURSE PRACTITIONER

## 2024-12-28 PROCEDURE — 80053 COMPREHEN METABOLIC PANEL: CPT | Performed by: NURSE PRACTITIONER

## 2024-12-28 RX ORDER — HYDROCODONE BITARTRATE AND ACETAMINOPHEN 5; 325 MG/1; MG/1
1 TABLET ORAL EVERY 6 HOURS PRN
Status: DISCONTINUED | OUTPATIENT
Start: 2024-12-28 | End: 2025-01-02 | Stop reason: HOSPADM

## 2024-12-28 RX ORDER — PROCHLORPERAZINE EDISYLATE 5 MG/ML
5 INJECTION INTRAMUSCULAR; INTRAVENOUS EVERY 6 HOURS PRN
Status: DISCONTINUED | OUTPATIENT
Start: 2024-12-28 | End: 2025-01-02 | Stop reason: HOSPADM

## 2024-12-28 RX ADMIN — POTASSIUM CHLORIDE 40 MEQ: 750 TABLET, EXTENDED RELEASE ORAL at 00:37

## 2024-12-28 RX ADMIN — METHYLPREDNISOLONE SODIUM SUCCINATE 60 MG: 125 INJECTION INTRAMUSCULAR; INTRAVENOUS at 08:53

## 2024-12-28 RX ADMIN — ONDANSETRON 4 MG: 2 INJECTION, SOLUTION INTRAMUSCULAR; INTRAVENOUS at 15:39

## 2024-12-28 RX ADMIN — Medication 10 ML: at 20:30

## 2024-12-28 RX ADMIN — IPRATROPIUM BROMIDE AND ALBUTEROL SULFATE 3 ML: 2.5; .5 SOLUTION RESPIRATORY (INHALATION) at 07:06

## 2024-12-28 RX ADMIN — METAXALONE 400 MG: 800 TABLET ORAL at 00:43

## 2024-12-28 RX ADMIN — FLUOXETINE HYDROCHLORIDE 80 MG: 20 CAPSULE ORAL at 08:45

## 2024-12-28 RX ADMIN — PREGABALIN 25 MG: 25 CAPSULE ORAL at 20:28

## 2024-12-28 RX ADMIN — THEOPHYLLINE 300 MG: 300 TABLET, EXTENDED RELEASE ORAL at 06:13

## 2024-12-28 RX ADMIN — BUPROPION HYDROCHLORIDE 300 MG: 300 TABLET, EXTENDED RELEASE ORAL at 06:12

## 2024-12-28 RX ADMIN — ONDANSETRON 4 MG: 2 INJECTION, SOLUTION INTRAMUSCULAR; INTRAVENOUS at 08:53

## 2024-12-28 RX ADMIN — Medication 1000 MCG: at 08:45

## 2024-12-28 RX ADMIN — IPRATROPIUM BROMIDE AND ALBUTEROL SULFATE 3 ML: 2.5; .5 SOLUTION RESPIRATORY (INHALATION) at 20:15

## 2024-12-28 RX ADMIN — Medication 1 TABLET: at 00:35

## 2024-12-28 RX ADMIN — PROCHLORPERAZINE EDISYLATE 5 MG: 5 INJECTION, SOLUTION INTRAMUSCULAR; INTRAVENOUS at 17:51

## 2024-12-28 RX ADMIN — IPRATROPIUM BROMIDE AND ALBUTEROL SULFATE 3 ML: 2.5; .5 SOLUTION RESPIRATORY (INHALATION) at 11:12

## 2024-12-28 RX ADMIN — BUDESONIDE AND FORMOTEROL FUMARATE DIHYDRATE 1 PUFF: 160; 4.5 AEROSOL RESPIRATORY (INHALATION) at 20:15

## 2024-12-28 RX ADMIN — PREGABALIN 25 MG: 25 CAPSULE ORAL at 01:41

## 2024-12-28 RX ADMIN — IPRATROPIUM BROMIDE AND ALBUTEROL SULFATE 3 ML: 2.5; .5 SOLUTION RESPIRATORY (INHALATION) at 14:44

## 2024-12-28 RX ADMIN — Medication 5 MG: at 00:36

## 2024-12-28 RX ADMIN — MONTELUKAST SODIUM 10 MG: 10 TABLET, FILM COATED ORAL at 08:44

## 2024-12-28 RX ADMIN — Medication 5 MG: at 20:34

## 2024-12-28 RX ADMIN — METHYLPREDNISOLONE SODIUM SUCCINATE 60 MG: 125 INJECTION INTRAMUSCULAR; INTRAVENOUS at 20:29

## 2024-12-28 RX ADMIN — TAMOXIFEN CITRATE 20 MG: 10 TABLET, FILM COATED ORAL at 08:53

## 2024-12-28 RX ADMIN — Medication 10 ML: at 08:45

## 2024-12-28 RX ADMIN — HYDROCODONE BITARTRATE AND ACETAMINOPHEN 1 TABLET: 5; 325 TABLET ORAL at 13:51

## 2024-12-28 RX ADMIN — BUDESONIDE AND FORMOTEROL FUMARATE DIHYDRATE 1 PUFF: 160; 4.5 AEROSOL RESPIRATORY (INHALATION) at 11:12

## 2024-12-28 RX ADMIN — LEVOTHYROXINE SODIUM 50 MCG: 50 TABLET ORAL at 06:12

## 2024-12-28 RX ADMIN — Medication 1 TABLET: at 20:28

## 2024-12-28 RX ADMIN — POTASSIUM CHLORIDE 40 MEQ: 750 TABLET, EXTENDED RELEASE ORAL at 04:12

## 2024-12-28 NOTE — CONSULTS
Patient Identification:  Nereida Myles  49 y.o.  female  1975  [id number]          LOS 0    Requesting physician:   Santos Tamez MD    Reason for Consultation:    Asthma exacerbation    History of Present Illness:   49-year-old female with a history of asthma, hypothyroidism, depression, gastroesophageal reflux disease, breast cancer who presents with wheezing.    Patient recently was diagnosed with asthma exacerbation and rhinovirus infection earlier in December for which she received steroids with improvement in her symptoms.  Also received a course of antibiotics.  Symptoms initially improved and subsequently worsened for which she was placed on a Z-Rafat and further oral steroids however did not demonstrate significant improvement this time.  Was seen in pulmonary clinic by Lorraine Pena yesterday and found to have significant wheezing and worsening of her respiratory symptoms so was sent to the emergency department for admission.  On evaluation of patient today, states that her breathing is slowly improved however still remains significantly short of breath.  Does have a cough with some sputum production, no chest pain or palpitations, feels weak and fatigued.  Does have some nausea without any evidence of emesis.  No sick contacts that she complains of.  Overall her asthma has been well-controlled for many years and she has not been hospitalized in the past decade for respiratory symptoms.  Does have Symbicort and albuterol which she uses daily at home.    Past Medical History:  Past Medical History:   Diagnosis Date    Allergic     Allergic rhinitis     Anxiety     Asthma     has inhaler and neb treatments    BMI 25.0-25.9,adult     Cancer     left breast    Community acquired pneumonia     COPD (chronic obstructive pulmonary disease)     Depression, major     Disease of thyroid gland     Diverticular disease     Foreign body in ear     Fractures 2012    Foot    GERD (gastroesophageal  reflux disease)     Hashimoto's thyroiditis     History of abnormal mammogram     Left breast    History of acute sinusitis     History of adenocarcinoma of breast     History of COPD     History of dyspareunia in female     History of kidney stones     History of lump of left breast     History of malignant neoplasm of left breast     History of nausea and vomiting     History of seborrheic dermatitis     History of strep pharyngitis     History of suicidal ideation     Hypothyroidism     Hypoxemia     History of Hypoxemia    Joint pain 2021    Low back pain 2023    This is when it became debilitating    Lumbosacral disc disease ?    Obsessive compulsive disorder     Other screening mammogram     Personal history of asthma     PONV (postoperative nausea and vomiting)     Recurrent genital herpes simplex     History of     Symptomatic states associated with artificial menopause     History of     TMJ dysfunction 1999    Wear a mouth guard       Past Surgical History:  Past Surgical History:   Procedure Laterality Date    APPENDECTOMY      BREAST AUGMENTATION Bilateral     Revised 2005, initial implants 1992    BREAST RECONSTRUCTION, BREAST TISSUE EXPANDER INSERTION Bilateral     CERVICAL CERCLAGE      x 2 during pregnancy, incompetent cervix    CHOLECYSTECTOMY      COLONOSCOPY      EPIDURAL Right 01/11/2024    Procedure: LUMBAR/SACRAL TRANSFORAMINAL EPIDURAL RIGHT L3-4 #1 71107;  Surgeon: Rei Pruitt MD;  Location: [facility];  Service: Pain Management;  Laterality: Right;    EPIDURAL Right 9/19/2024    Procedure: LUMBAR/SACRAL TRANSFORAMINAL EPIDURAL RIGHT L3-4 36073;  Surgeon: Rei Pruitt MD;  Location: [facility];  Service: Pain Management;  Laterality: Right;    EPIDURAL BLOCK  1/2024    HAND SURGERY Right     may2022    HYSTERECTOMY      due to uterine prolapse    KIDNEY STONE SURGERY      LUNG SURGERY      bronchoscopy    MASTECTOMY Bilateral 01/2016    removed lymph nodes on left side     MEDIAL BRANCH BLOCK Right 3/13/2024    Procedure: LUMBAR MEDIAL BRANCH BLOCK RIGHT L3-L5 #1 90916, 42511;  Surgeon: Rei Pruitt MD;  [place] [facility] [facility] [place];  Service: Pain Management;  Laterality: Right;    MEDIAL BRANCH BLOCK Bilateral 3/27/2024    Procedure: LUMBAR MEDIAL BRANCH BLOCK BILATERAL L3-L5 88814-98, 64494-50 X 2;  Surgeon: Rei Pruitt MD;  [place] [facility] [facility];  Service: Pain Management;  Laterality: Bilateral;    MEDIAL BRANCH BLOCK Left 5/2/2024    Procedure: LUMBAR MEDIAL BRANCH BLOCK LEFT L3-L5 #2 07631, 93004;  Surgeon: Rei Pruitt MD;  [place] [facility] [facility] [place];  Service: Pain Management;  Laterality: Left;    NERVE SURGERY Bilateral 6/4/2024    Procedure: LUMBAR RADIOFREQUENCY ABLATION BILATERAL L3-L5 48562-28, 64636-50 X 2;  Surgeon: Rei Pruitt MD;  [place] [facility] [place] [facility];  Service: Pain Management;  Laterality: Bilateral;    SACROILIAC JOINT INJECTION Bilateral 11/7/2024    Procedure: SACROILIAC INJECTION BILATERAL 18948-28;  Surgeon: Rei Pruitt MD;  [place] [facility] [facility] [place];  Service: Pain Management;  Laterality: Bilateral;    SCAR REVISION BREAST Bilateral 06/21/2022    Procedure: SCAR REVISION LEFT CHEST 27 CENTIMETERS AND RIGHT CHEST 25 CENTIMETERS FOR AESTHETIC FLAT CLOSURE FOLLOWING MASTECTOMY;  Surgeon: Waterhouse, Maurine, MD;  Location: [facility];  Service: Plastics;  Laterality: Bilateral;    TISSUE EXPANDER PLACEMENT Bilateral     due to infection        Home Meds:  Medications Prior to Admission   Medication Sig Dispense Refill Last Dose/Taking    albuterol (PROVENTIL) (2.5 MG/3ML) 0.083% nebulizer solution Take 2.5 mg by nebulization Every 4 (Four) Hours As Needed for Wheezing.   Taking As Needed    albuterol (PROVENTIL) 4 MG tablet Take 1 tablet by mouth Every Night. 90 tablet 3 Taking    albuterol sulfate  (90 Base) MCG/ACT inhaler USE 2 INHALATIONS EVERY 4 HOURS AS NEEDED FOR WHEEZING 34 g 0 Taking    benzonatate (TESSALON) 200  MG capsule Take 1 capsule by mouth Every 6 (Six) Hours As Needed for Cough.   Taking As Needed    Benzoyl Peroxide 10 % liquid 1 Application by Other route Daily.   Taking    buPROPion XL (WELLBUTRIN XL) 300 MG 24 hr tablet TAKE 1 TABLET EVERY MORNING 90 tablet 3 Taking    clindamycin (CLEOCIN T) 1 % swab APPLY 1 SWAB TOPICALLY TO THE FACE EVERY MORNING   Taking    diclofenac (VOLTAREN) 75 MG EC tablet TAKE 1 TABLET TWICE A DAY AS NEEDED FOR PAIN 30 tablet 23 Taking    FLUoxetine (PROzac) 40 MG capsule Take 2 capsules by mouth Daily. 180 capsule 3 Taking    Fluticasone Furoate-Vilanterol (Breo Ellipta) 100-25 MCG/INH inhaler Inhale 1 puff Daily. 180 each 3 Taking    levothyroxine (SYNTHROID, LEVOTHROID) 50 MCG tablet TAKE 1 TABLET DAILY 90 tablet 3 Taking    melatonin 5 MG tablet tablet Take 1 tablet by mouth Every Night.   Taking    metaxalone (SKELAXIN) 800 MG tablet TAKE 1/2 TO 1 TABLET BY MOUTH FOUR TIMES DAILY AS NEEDED FOR MUSCLE SPASMS 120 tablet 0 Taking    montelukast (SINGULAIR) 10 MG tablet TAKE 1 TABLET DAILY 90 tablet 3 Taking    Multiple Vitamin (MULTI-VITAMIN DAILY PO) Take 1 tablet by mouth Every Night.   Taking    omeprazole (priLOSEC) 40 MG capsule TAKE 1 CAPSULE DAILY (Patient taking differently: Take 1 capsule by mouth Daily As Needed (heartburn, reflux).) 90 capsule 3 Taking Differently    ondansetron (ZOFRAN) 4 MG tablet TAKE 1 TABLET EVERY 8 HOURS AS NEEDED FOR NAUSEA OR VOMITING 27 tablet 39 Taking As Needed    predniSONE (DELTASONE) 10 MG tablet Take 2 tablets by mouth 2 (Two) Times a Day for 3 days, THEN 2 tablets Daily for 4 days, THEN 1 tablet Daily for 7 days. 27 tablet 0 Taking    pregabalin (Lyrica) 25 MG capsule Take 1 capsule PO TID x 5 days; if tolerated may slowly increase to 1 or 2 capsules PO TID (Patient taking differently: Take 1 capsule PO TID x 5 days; if tolerated may slowly increase to 1 or 2 capsules PO TID.  The patient stated she can take the med up to 3 times a day, but  that she only  takes it at night.) 180 capsule 0 Taking Differently    tamoxifen (NOLVADEX) 20 MG chemo tablet Take 1 tablet by mouth Daily.   Taking    theophylline (THEODUR) 300 MG 12 hr tablet Take 1 tablet by mouth Every Morning. 90 tablet 3 Taking    valACYclovir (VALTREX) 500 MG tablet TAKE 1 TABLET DAILY (Patient taking differently: Take 1 tablet by mouth Daily As Needed (herpes outbreak).) 90 tablet 3 Taking Differently    vitamin B-12 (CYANOCOBALAMIN) 1000 MCG tablet Take 1 tablet by mouth Daily.   Taking         Allergies:  No Known Allergies    Social History:   Social History     Socioeconomic History    Marital status:    Tobacco Use    Smoking status: Never    Smokeless tobacco: Never   Vaping Use    Vaping status: Never Used   Substance and Sexual Activity    Alcohol use: Yes     Alcohol/week: 5.0 standard drinks of alcohol     Types: 2 Glasses of wine, 3 Drinks containing 0.5 oz of alcohol per week     Comment: social    Drug use: Never    Sexual activity: Yes     Partners: Male     Birth control/protection: None, Vasectomy, Essure, Hysterectomy       Family History:  Family History   Problem Relation Age of Onset    Thyroid disease Mother     Arthritis Mother     Uterine cancer Mother         in her 40's    Depression Mother     GI problems Mother     Hyperlipidemia Mother     Cancer Father     Bipolar disorder Father     Depression Father     Hyperlipidemia Father     Hypertension Father     Arthritis Father     Arthritis Maternal Grandmother     Bleeding Disorder Maternal Grandmother     Hyperlipidemia Maternal Grandmother     Hypertension Maternal Grandmother     Depression Maternal Grandmother     Hypertension Maternal Grandfather     Hypertension Paternal Grandfather     Hyperlipidemia Paternal Grandfather     Arthritis Paternal Grandfather     Coronary artery disease Other         Maternal GrGF    Skin cancer Other         Maternal GrGF    Diabetes Other         Paternal GrGF    Heart  "disease Other         FH in females before the age of 65    Arthritis Paternal Grandmother     Malig Hyperthermia Neg Hx        Review of Systems:  12 point review of systems performed and all else negative except as per HPI above.    Objective:    PHYSICAL EXAM:    /79 (BP Location: Left arm, Patient Position: Lying)   Pulse 67   Temp 99.1 °F (37.3 °C) (Oral)   Resp 16   Ht 162.6 cm (64\")   Wt 57.9 kg (127 lb 10.3 oz)   LMP  (LMP Unknown)   SpO2 98%   BMI 21.91 kg/m²  Body mass index is 21.91 kg/m². 98% 57.9 kg (127 lb 10.3 oz)    General: Alert, nontoxic, NAD  HEENT: NC/AT, EOMI, MMM  Neck: Supple, trachea midline  Cardiac: RRR, no murmur, gallops, rubs  Pulmonary: Diffuse expiratory wheezing bilaterally  GI: Soft, non-tender, non-distended, normal bowel sounds  Extremities: Warm, well perfused, no LE edema  Skin: no visible rash  Neuro: CN II - XII grossly intact  Psychiatry: Normal mood and affect    Lab Review:   Results from last 7 days   Lab Units 12/28/24  0429 12/27/24  1714   WBC 10*3/mm3 4.91 8.18   HEMOGLOBIN g/dL 13.2 14.6   PLATELETS 10*3/mm3 337 394     Results from last 7 days   Lab Units 12/28/24  1439 12/28/24  0429 12/27/24  1714   SODIUM mmol/L  --  136 138   POTASSIUM mmol/L 4.1 4.6 3.1*   CHLORIDE mmol/L  --  103 100   CO2 mmol/L  --  24.3 25.0   BUN mg/dL  --  11 19   CREATININE mg/dL  --  0.64 0.95   GLUCOSE mg/dL  --  188* 84   CALCIUM mg/dL  --  9.1 9.6   MAGNESIUM mg/dL  --  2.3  --    Estimated Creatinine Clearance: 95.1 mL/min (by C-G formula based on SCr of 0.64 mg/dL).    Results from last 7 days   Lab Units 12/28/24  0429 12/27/24  2122 12/27/24  1714   AST (SGOT) U/L 16  --  18   ALT (SGPT) U/L 26  --  26   LACTATE mmol/L  --  1.8  --    D DIMER QUANT MCGFEU/mL  --   --  0.28   PLATELETS 10*3/mm3 337  --  394              Imaging reviewed  Chest imaging from this hospitalization reviewed.       Assessment / Recommendations:    Acute asthma exacerbation  Acute hypoxic " respiratory failure  Hypothyroidism  Depression  Gastroesophageal reflux disease  Lumbar degenerative disc disease  History of breast cancer on tamoxifen    -Patient with ongoing respiratory symptoms for the past several weeks.  Did have some mild improvement after steroids however again worsened.  -Significant wheezing on evaluation, continue IV steroids at this time.  Will transition to oral once she is demonstrating clinical improvement  -Patient has received multiple courses of antibiotics so far, at this time she is 80 febrile, no leukocytosis, procalcitonin negative.  Chest x-ray without any evidence of pneumonia.  I will hold off on antibiotics at this time.  -Did have significant eosinophilia once back in August of this year however repeat differentials do not demonstrate.  If she continues to have significant respiratory symptoms may need to consider a biologic as an outpatient.  -CT chest from December 9 did demonstrate some patchy tree-in-bud and groundglass opacities, if she does not demonstrate any improvement in the next 24 to 48 hours, will consider repeat CT chest to evaluate.  No indication for bronchoscopy at this time.  -Continue supplemental oxygen with SpO2 goal greater than 90%.    Thank you for allowing me to participate in the care of this patient.  I will continue to follow along with you.    Abdullahi Patel MD  [facility], [facility]  Pulmonary and Critical Care Medicine, Interventional Pulmonology    12/28/2024  17:37 EST    Parts of this note may be an electronic transcription/translation of spoken language to printed text using the Dragon dictation system.

## 2024-12-28 NOTE — PLAN OF CARE
Goal Outcome Evaluation:           Problem: Adult Inpatient Plan of Care  Goal: Optimal Comfort and Wellbeing  Outcome: Progressing  Intervention: Provide Person-Centered Care  Recent Flowsheet Documentation  Taken 12/28/2024 1358 by Nereida Anderson RN  Trust Relationship/Rapport:   care explained   choices provided  Taken 12/28/2024 0845 by Nereida Anderson RN  Trust Relationship/Rapport:   care explained   choices provided   thoughts/feelings acknowledged     Problem: Fall Injury Risk  Goal: Absence of Fall and Fall-Related Injury  Outcome: Progressing  Intervention: Identify and Manage Contributors  Recent Flowsheet Documentation  Taken 12/28/2024 1358 by Nereida Anderson RN  Medication Review/Management: medications reviewed  Taken 12/28/2024 1200 by Nereida Anedrson RN  Medication Review/Management: medications reviewed  Taken 12/28/2024 1000 by Nereida Anderson RN  Medication Review/Management: medications reviewed  Taken 12/28/2024 0845 by Nereida Anderson RN  Medication Review/Management: medications reviewed  Intervention: Promote Injury-Free Environment  Recent Flowsheet Documentation  Taken 12/28/2024 1358 by Nereida Anderson RN  Safety Promotion/Fall Prevention:   clutter free environment maintained   fall prevention program maintained   nonskid shoes/slippers when out of bed   room organization consistent   safety round/check completed  Taken 12/28/2024 1200 by Nereida Anderson RN  Safety Promotion/Fall Prevention:   clutter free environment maintained   fall prevention program maintained   nonskid shoes/slippers when out of bed   room organization consistent   safety round/check completed  Taken 12/28/2024 1000 by Nereida Anderson RN  Safety Promotion/Fall Prevention:   clutter free environment maintained   fall prevention program maintained   nonskid shoes/slippers when out of bed   room organization consistent   safety round/check completed  Taken  12/28/2024 0845 by Nereida Anderson RN  Safety Promotion/Fall Prevention:   clutter free environment maintained   fall prevention program maintained   nonskid shoes/slippers when out of bed   room organization consistent   safety round/check completed     Problem: Sepsis/Septic Shock  Goal: Optimal Coping  Intervention: Support Patient and Family Response  Recent Flowsheet Documentation  Taken 12/28/2024 1358 by Nereida Anderson RN  Family/Support System Care:   self-care encouraged   support provided  Taken 12/28/2024 0845 by Nereida Anderson RN  Supportive Measures: active listening utilized  Family/Support System Care:   self-care encouraged   support provided  Goal: Absence of Infection Signs and Symptoms  Intervention: Initiate Sepsis Management  Recent Flowsheet Documentation  Taken 12/28/2024 1358 by Nereida Anderson RN  Infection Prevention:   hand hygiene promoted   rest/sleep promoted  Isolation Precautions:   precautions maintained   droplet  Taken 12/28/2024 1200 by Nereida Anderson RN  Infection Prevention:   hand hygiene promoted   rest/sleep promoted  Isolation Precautions:   precautions maintained   droplet  Taken 12/28/2024 1000 by Nereida Anderson RN  Infection Prevention:   hand hygiene promoted   rest/sleep promoted  Isolation Precautions:   precautions maintained   droplet  Taken 12/28/2024 0845 by Nereida Anderson RN  Infection Prevention:   hand hygiene promoted   rest/sleep promoted  Isolation Precautions:   precautions maintained   droplet  Intervention: Promote Recovery  Recent Flowsheet Documentation  Taken 12/28/2024 1358 by Nereida Anderson RN  Activity Management: up ad sanjiv  Taken 12/28/2024 1200 by Nereida Anderson RN  Activity Management: up ad sanjiv  Taken 12/28/2024 1000 by Nereida Anderson RN  Activity Management: up ad sanjiv  Taken 12/28/2024 0845 by Nereida Anderson RN  Activity Management: up ad sanjiv     Problem: Adult Inpatient  Plan of Care  Goal: Absence of Hospital-Acquired Illness or Injury  Intervention: Identify and Manage Fall Risk  Recent Flowsheet Documentation  Taken 12/28/2024 1358 by Nereida Anderson RN  Safety Promotion/Fall Prevention:   clutter free environment maintained   fall prevention program maintained   nonskid shoes/slippers when out of bed   room organization consistent   safety round/check completed  Taken 12/28/2024 1200 by Nereida Anderson RN  Safety Promotion/Fall Prevention:   clutter free environment maintained   fall prevention program maintained   nonskid shoes/slippers when out of bed   room organization consistent   safety round/check completed  Taken 12/28/2024 1000 by Nereida Anderson RN  Safety Promotion/Fall Prevention:   clutter free environment maintained   fall prevention program maintained   nonskid shoes/slippers when out of bed   room organization consistent   safety round/check completed  Taken 12/28/2024 0845 by Nereida Anderson RN  Safety Promotion/Fall Prevention:   clutter free environment maintained   fall prevention program maintained   nonskid shoes/slippers when out of bed   room organization consistent   safety round/check completed  Intervention: Prevent Skin Injury  Recent Flowsheet Documentation  Taken 12/28/2024 1358 by Nereida Anderson RN  Body Position: position changed independently  Taken 12/28/2024 1200 by Nereida Anderson RN  Body Position: position changed independently  Taken 12/28/2024 1000 by Nereida Anderson RN  Body Position: position changed independently  Taken 12/28/2024 0845 by Nereida Anderson RN  Body Position: position changed independently  Intervention: Prevent and Manage VTE (Venous Thromboembolism) Risk  Recent Flowsheet Documentation  Taken 12/28/2024 1358 by Nereida Anderson RN  VTE Prevention/Management: patient refused intervention  Taken 12/28/2024 0845 by Nereida Anderson RN  VTE Prevention/Management:  patient refused intervention  Intervention: Prevent Infection  Recent Flowsheet Documentation  Taken 12/28/2024 1358 by Nereida Anderson RN  Infection Prevention:   hand hygiene promoted   rest/sleep promoted  Taken 12/28/2024 1200 by Nereida Anderson RN  Infection Prevention:   hand hygiene promoted   rest/sleep promoted  Taken 12/28/2024 1000 by Nereida Anderson RN  Infection Prevention:   hand hygiene promoted   rest/sleep promoted  Taken 12/28/2024 0845 by Nereida Anderson RN  Infection Prevention:   hand hygiene promoted   rest/sleep promoted     Problem: Pain Acute  Goal: Optimal Pain Control and Function  Intervention: Optimize Psychosocial Wellbeing  Recent Flowsheet Documentation  Taken 12/28/2024 1358 by Nereida Anderson RN  Diversional Activities:   television   smartphone  Taken 12/28/2024 0845 by Nereida Anderson RN  Supportive Measures: active listening utilized  Diversional Activities:   television   smartphone  Intervention: Prevent or Manage Pain  Recent Flowsheet Documentation  Taken 12/28/2024 1358 by Nereida Anderson RN  Medication Review/Management: medications reviewed  Taken 12/28/2024 1200 by Nereida Anderson RN  Medication Review/Management: medications reviewed  Taken 12/28/2024 1000 by Nereida Anderson RN  Medication Review/Management: medications reviewed  Taken 12/28/2024 0845 by Nereida Anderson RN  Medication Review/Management: medications reviewed     Problem: Nausea and Vomiting  Goal: Nausea and Vomiting Relief  Intervention: Prevent and Manage Nausea and Vomiting  Recent Flowsheet Documentation  Taken 12/28/2024 0845 by Nereida Anderson RN  Nausea/Vomiting Interventions: medication given     Problem: Comorbidity Management  Goal: Maintenance of Asthma Control  Intervention: Maintain Asthma Symptom Control  Recent Flowsheet Documentation  Taken 12/28/2024 1358 by Nereida Anderson RN  Medication Review/Management:  medications reviewed  Taken 12/28/2024 1200 by Nereida Anderson RN  Medication Review/Management: medications reviewed  Taken 12/28/2024 1000 by Nereida Anderson RN  Medication Review/Management: medications reviewed  Taken 12/28/2024 0845 by Nereida Anderson RN  Medication Review/Management: medications reviewed  Goal: Maintenance of Behavioral Health Symptom Control  Intervention: Maintain Behavioral Health Symptom Control  Recent Flowsheet Documentation  Taken 12/28/2024 1358 by Nereida Anderson RN  Medication Review/Management: medications reviewed  Taken 12/28/2024 1200 by Nereida Anderson RN  Medication Review/Management: medications reviewed  Taken 12/28/2024 1000 by Nereida Anderson RN  Medication Review/Management: medications reviewed  Taken 12/28/2024 0845 by Nereida Anderson RN  Medication Review/Management: medications reviewed  Goal: Maintenance of COPD Symptom Control  Intervention: Maintain COPD (Chronic Obstructive Pulmonary Disease) Symptom Control  Recent Flowsheet Documentation  Taken 12/28/2024 1358 by Nereida Anderson RN  Medication Review/Management: medications reviewed  Taken 12/28/2024 1200 by Nereida Anderson RN  Medication Review/Management: medications reviewed  Taken 12/28/2024 1000 by Nereida Anderson RN  Medication Review/Management: medications reviewed  Taken 12/28/2024 0845 by Nereida Anderson RN  Medication Review/Management: medications reviewed  Goal: Maintenance of Osteoarthritis Symptom Control  Intervention: Maintain Osteoarthritis Symptom Control  Recent Flowsheet Documentation  Taken 12/28/2024 1358 by Nereida Anderson RN  Activity Management: up ad sanjiv  Medication Review/Management: medications reviewed  Taken 12/28/2024 1200 by Nereida Anderson RN  Activity Management: up ad sanjiv  Medication Review/Management: medications reviewed  Taken 12/28/2024 1000 by Nereida Anderson RN  Activity Management: up ad  sanjiv  Medication Review/Management: medications reviewed  Taken 12/28/2024 0845 by Nereida Anderson, RN  Activity Management: up ad sanjiv  Medication Review/Management: medications reviewed

## 2024-12-28 NOTE — PLAN OF CARE
Goal Outcome Evaluation:  Plan of Care Reviewed With: patient        Progress: no change  Outcome Evaluation: vitals stable.  pt expressed pain, shortness of breath, nausea, and numbness and tingling in her fingers.  meds given per orders.  nasal cannula placed.  isolation precautions maintained.  potassium replaced per orders.  pulmonology consulted.  will continue to monitor.

## 2024-12-28 NOTE — SIGNIFICANT NOTE
12/28/24 9827   OTHER   Discipline physical therapist   Therapy Assessment/Plan (PT)   Criteria for Skilled Interventions Met (PT) no problems identified which require skilled intervention  (Pt admit for asthma exacerbation. Spoke w/ pt, she denies any acute mobility concerns and is at her baseline functional status. No acute PT needs, will s/o. Re-consult if status changes.)

## 2024-12-28 NOTE — NURSING NOTE
Pt resting in bed with no signs of distress noted at this time. Pt remains on 2L/NC. Bed in lowest position. Call light within reach.

## 2024-12-28 NOTE — ED NOTES
.Nursing report ED to floor  Nereida Myles  49 y.o.  female    HPI :  HPI  Stated Reason for Visit: SOA X1 month, was recently admitted to hospital for 1 week for same. patient was sent here today by  because she has not improved. audible wheezing  History Obtained From: patient    Chief Complaint  Chief Complaint   Patient presents with    Shortness of Breath       Admitting doctor:   Prabhjot Giang MD    Admitting diagnosis:   The encounter diagnosis was Severe persistent asthma with acute exacerbation.    Code status:   Current Code Status       Date Active Code Status Order ID Comments User Context       Prior            Allergies:   Patient has no known allergies.    Isolation:   Droplet    Intake and Output  No intake or output data in the 24 hours ending 12/27/24 2120    Weight:   There were no vitals filed for this visit.    Most recent vitals:   Vitals:    12/27/24 1654 12/27/24 1703 12/27/24 2028 12/27/24 2032   BP:  120/72     BP Location:  Right arm     Patient Position:  Sitting     Pulse: 110  76    Resp: 22 24 24   Temp: 96.6 °F (35.9 °C)      TempSrc: Tympanic      SpO2: 96%  99%        Active LDAs/IV Access:   Lines, Drains & Airways       Active LDAs       None                    Labs (abnormal labs have a star):   Labs Reviewed   COMPREHENSIVE METABOLIC PANEL - Abnormal; Notable for the following components:       Result Value    Potassium 3.1 (*)     All other components within normal limits    Narrative:     GFR Categories in Chronic Kidney Disease (CKD)      GFR Category          GFR (mL/min/1.73)    Interpretation  G1                     90 or greater         Normal or high (1)  G2                      60-89                Mild decrease (1)  G3a                   45-59                Mild to moderate decrease  G3b                   30-44                Moderate to severe decrease  G4                    15-29                Severe decrease  G5                    14 or less            Kidney failure          (1)In the absence of evidence of kidney disease, neither GFR category G1 or G2 fulfill the criteria for CKD.    eGFR calculation 2021 CKD-EPI creatinine equation, which does not include race as a factor   CBC WITH AUTO DIFFERENTIAL - Abnormal; Notable for the following components:    Lymphocyte % 16.4 (*)     Monocyte % 4.5 (*)     All other components within normal limits   THEOPHYLLINE LEVEL - Abnormal; Notable for the following components:    Theophylline Level 9.3 (*)     All other components within normal limits   BNP (IN-HOUSE) - Normal    Narrative:     This assay is used as an aid in the diagnosis of individuals suspected of having heart failure. It can be used as an aid in the diagnosis of acute decompensated heart failure (ADHF) in patients presenting with signs and symptoms of ADHF to the emergency department (ED). In addition, NT-proBNP of <300 pg/mL indicates ADHF is not likely.    Age Range Result Interpretation  NT-proBNP Concentration (pg/mL:      <50             Positive            >450                   Gray                 300-450                    Negative             <300    50-75           Positive            >900                  Gray                300-900                  Negative            <300      >75             Positive            >1800                  Gray                300-1800                  Negative            <300   TROPONIN - Normal    Narrative:     High Sensitive Troponin T Reference Range:  <14.0 ng/L- Negative Female for AMI  <22.0 ng/L- Negative Male for AMI  >=14 - Abnormal Female indicating possible myocardial injury.  >=22 - Abnormal Male indicating possible myocardial injury.   Clinicians would have to utilize clinical acumen, EKG, Troponin, and serial changes to determine if it is an Acute Myocardial Infarction or myocardial injury due to an underlying chronic condition.        D-DIMER, QUANTITATIVE - Normal    Narrative:     According to  "the assay 's published package insert, a normal (<0.50 MCGFEU/mL) D-dimer result in conjunction with a non-high clinical probability assessment, excludes deep vein thrombosis (DVT) and pulmonary embolism (PE) with high sensitivity.    D-dimer values increase with age and this can make VTE exclusion of an older population difficult. To address this, the [employer], based on best available evidence and recent guidelines, recommends that clinicians use age-adjusted D-dimer thresholds in patients greater than 50 years of age with: a) a low probability of PE who do not meet all Pulmonary Embolism Rule Out Criteria, or b) in those with intermediate probability of PE.   The formula for an age-adjusted D-dimer cut-off is \"age/100\".  For example, a 60 year old patient would have an age-adjusted cut-off of 0.60 MCGFEU/mL and an 80 year old 0.80 MCGFEU/mL.   BLOOD CULTURE   RESPIRATORY PANEL PCR W/ COVID-19 (SARS-COV-2), NP SWAB IN UTM/VTP, 2 HR TAT   RAINBOW DRAW    Narrative:     The following orders were created for panel order [facility] Draw.  Procedure                               Abnormality         Status                     ---------                               -----------         ------                     Green Top (Gel)[[id number]]                                  Final result               Lavender Top[[id number]]                                     Final result               Gold Top - SST[[id number]]                                   Final result               Light Blue Top[[id number]]                                   Final result                 Please view results for these tests on the individual orders.   HIGH SENSITIVITIY TROPONIN T 1HR   LACTIC ACID, PLASMA   CBC AND DIFFERENTIAL    Narrative:     The following orders were created for panel order CBC & Differential.  Procedure                               Abnormality         Status                     ---------                "                -----------         ------                     CBC Auto Differential[[id number]]        Abnormal            Final result                 Please view results for these tests on the individual orders.   GREEN TOP   LAVENDER TOP   GOLD TOP - SST   LIGHT BLUE TOP       EKG:   ECG 12 Lead ED Triage Standing Order; SOA   Final Result   HEART RATE=91  bpm   RR Izgevxkx=088  ms   IL Ilfkivup=913  ms   P Horizontal Axis=-22  deg   P Front Axis=38  deg   QRSD Interval=88  ms   QT Teogbizq=935  ms   YPcV=158  ms   QRS Axis=-8  deg   T Wave Axis=47  deg   - ABNORMAL ECG -   Sinus rhythm   Abnormal R-wave progression, late transition   Left ventricular hypertrophy   Non-Specific STT wave changes   No change from previous tracing   Electronically Signed By: Austin SalehLOGAN) ([facility]) 2024-12-27 19:15:31   Date and Time of Study:2024-12-27 17:00:06          Meds given in ED:   Medications   sodium chloride 0.9 % flush 10 mL (has no administration in time range)   methylPREDNISolone sodium succinate (SOLU-Medrol) injection 80 mg (has no administration in time range)   ipratropium-albuterol (DUO-NEB) nebulizer solution 3 mL (3 mL Nebulization Given 12/27/24 2028)       Imaging results:  No radiology results for the last day      Social issues:   Social History     Socioeconomic History    Marital status:    Tobacco Use    Smoking status: Never    Smokeless tobacco: Never   Vaping Use    Vaping status: Never Used   Substance and Sexual Activity    Alcohol use: Yes     Alcohol/week: 5.0 standard drinks of alcohol     Types: 2 Glasses of wine, 3 Drinks containing 0.5 oz of alcohol per week     Comment: social    Drug use: Never    Sexual activity: Yes     Partners: Male     Birth control/protection: None, Vasectomy, Essure, Hysterectomy       Peripheral Neurovascular       Neuro Cognitive       Learning       Respiratory  Breath Sounds  All Lung Fields Breath Sounds: All Fields  All Lung Fields Breath Sounds:  Anterior:, Lateral:, wheezes, expiratory, diminished  Breath Sounds Post-Respiratory Treatment  Breath Sounds Posttreatment All Fields: All Fields  Breath Sounds Posttreatment All Fields: Anterior:, Lateral:, no change    Abdominal Pain       Pain Assessments  Pain (Adult)  (0-10) Pain Rating: Rest: 6  (0-10) Pain Rating: Activity: 6    NIH Stroke Scale       Nisha Schaefer RN  12/27/24 21:20 EST

## 2024-12-28 NOTE — ED PROVIDER NOTES
EMERGENCY DEPARTMENT ENCOUNTER    Room Number:  24/24  PCP: Kehrer, Meredith Lea, MD  Independent Historians: Patient    HPI:  Chief Complaint: had concerns including Shortness of Breath.      A complete HPI/ROS/PMH/PSH/SH/FH are unobtainable due to: None    Chronic or social conditions impacting patient care (Social Determinants of Health): None      Context: Nereida Myles is a 49 y.o. female with a medical history of asthma, hypothyroidism, lumbar degenerative disc disease who presents to the ED c/o acute shortness of breath sent in by pulmonology for admission.  Patient with 3 weeks of cough and shortness of breath with wheezing.  Patient has been on several rounds of antibiotics and steroids.  Patient currently on prednisone 20 mg.  Patient using her neb treatments and other inhalers per pulmonology recommendations.  However, symptoms persist.  Patient seen by pulmonology and follow-up for her most recent admission.  Patient recommended to come in for CT scan, admission, and probable bronchoscopy.        Review of prior external notes (non-ED) -and- Review of prior external test results outside of this encounter: I reviewed patient's discharge summary from December 12.  Patient was admitted at that time for her asthma with acute hypoxic respiratory failure requiring 2 L of oxygen.  Patient did have multifocal bilateral lower lobe pneumonia and was positive for rhinovirus.  Patient empirically treated with IV Rocephin.    Prescription drug monitoring program review:     N/A    PAST MEDICAL HISTORY  Active Ambulatory Problems     Diagnosis Date Noted    Hashimoto's thyroiditis 11/18/2019    Hypothyroidism 11/18/2019    Depression, major 11/18/2019    Anxiety 11/18/2019    Asthma 11/18/2019    Pneumonia due to COVID-19 virus 01/20/2021    Hypoxia 01/21/2021    Shortness of breath 01/21/2021    Pyelonephritis 01/07/2023    COPD (chronic obstructive pulmonary disease) 01/07/2023    Breast CA 02/28/2023    Finger  clubbing 02/28/2023    Severe episode of recurrent major depressive disorder, without psychotic features 05/18/2023    Severe persistent asthma 05/18/2023    Lumbar radiculopathy 12/12/2023    DDD (degenerative disc disease), lumbar 12/12/2023    Lumbar facet arthropathy 12/12/2023    Pain of right hip 02/08/2024    Gastroesophageal reflux disease without esophagitis 08/02/2024    Low serum vitamin B12 08/02/2024    Major depressive disorder, recurrent, mild 08/02/2024    Sacroiliitis 10/17/2024    Acute hypoxic respiratory failure 12/07/2024    Asthma exacerbation 12/07/2024    History of breast cancer 12/07/2024    Rhinovirus 12/12/2024     Resolved Ambulatory Problems     Diagnosis Date Noted    No Resolved Ambulatory Problems     Past Medical History:   Diagnosis Date    Allergic     Allergic rhinitis     BMI 25.0-25.9,adult     Cancer     Community acquired pneumonia     Disease of thyroid gland     Diverticular disease     Foreign body in ear     Fractures 2012    GERD (gastroesophageal reflux disease)     History of abnormal mammogram     History of acute sinusitis     History of adenocarcinoma of breast     History of COPD     History of dyspareunia in female     History of kidney stones     History of lump of left breast     History of malignant neoplasm of left breast     History of nausea and vomiting     History of seborrheic dermatitis     History of strep pharyngitis     History of suicidal ideation     Hypoxemia     Joint pain 2021    Low back pain 2023    Lumbosacral disc disease ?    Obsessive compulsive disorder     Other screening mammogram     Personal history of asthma     PONV (postoperative nausea and vomiting)     Recurrent genital herpes simplex     Symptomatic states associated with artificial menopause     TMJ dysfunction 1999         PAST SURGICAL HISTORY  Past Surgical History:   Procedure Laterality Date    APPENDECTOMY      BREAST AUGMENTATION Bilateral     Revised 2005, initial  implants 1992    BREAST RECONSTRUCTION, BREAST TISSUE EXPANDER INSERTION Bilateral     CERVICAL CERCLAGE      x 2 during pregnancy, incompetent cervix    CHOLECYSTECTOMY      COLONOSCOPY      EPIDURAL Right 01/11/2024    Procedure: LUMBAR/SACRAL TRANSFORAMINAL EPIDURAL RIGHT L3-4 #1 57068;  Surgeon: Rei Pruitt MD;  Location: [facility];  Service: Pain Management;  Laterality: Right;    EPIDURAL Right 9/19/2024    Procedure: LUMBAR/SACRAL TRANSFORAMINAL EPIDURAL RIGHT L3-4 54485;  Surgeon: Rei Pruitt MD;  Location: [facility];  Service: Pain Management;  Laterality: Right;    EPIDURAL BLOCK  1/2024    HAND SURGERY Right     may2022    HYSTERECTOMY      due to uterine prolapse    KIDNEY STONE SURGERY      LUNG SURGERY      bronchoscopy    MASTECTOMY Bilateral 01/2016    removed lymph nodes on left side    MEDIAL BRANCH BLOCK Right 3/13/2024    Procedure: LUMBAR MEDIAL BRANCH BLOCK RIGHT L3-L5 #1 73233, 63810;  Surgeon: Rei Pruitt MD;  Location: [facility];  Service: Pain Management;  Laterality: Right;    MEDIAL BRANCH BLOCK Bilateral 3/27/2024    Procedure: LUMBAR MEDIAL BRANCH BLOCK BILATERAL L3-L5 08095-07, 64494-50 X 2;  Surgeon: Rei Pruitt MD;  Location: [facility];  Service: Pain Management;  Laterality: Bilateral;    MEDIAL BRANCH BLOCK Left 5/2/2024    Procedure: LUMBAR MEDIAL BRANCH BLOCK LEFT L3-L5 #2 17062, 37374;  Surgeon: Rei Pruitt MD;  Location: [facility];  Service: Pain Management;  Laterality: Left;    NERVE SURGERY Bilateral 6/4/2024    Procedure: LUMBAR RADIOFREQUENCY ABLATION BILATERAL L3-L5 49653-65, 64636-50 X 2;  Surgeon: Rei Pruitt MD;  Location: [facility];  Service: Pain Management;  Laterality: Bilateral;    SACROILIAC JOINT INJECTION Bilateral 11/7/2024    Procedure: SACROILIAC INJECTION BILATERAL 32010-99;  Surgeon: Rei Pruitt MD;  Location: [facility];  Service: Pain Management;  Laterality: Bilateral;    SCAR  REVISION BREAST Bilateral 06/21/2022    Procedure: SCAR REVISION LEFT CHEST 27 CENTIMETERS AND RIGHT CHEST 25 CENTIMETERS FOR AESTHETIC FLAT CLOSURE FOLLOWING MASTECTOMY;  Surgeon: Waterhouse, Maurine, MD;  Location: [facility];  Service: Plastics;  Laterality: Bilateral;    TISSUE EXPANDER PLACEMENT Bilateral     due to infection         FAMILY HISTORY  Family History   Problem Relation Age of Onset    Thyroid disease Mother     Arthritis Mother     Uterine cancer Mother         in her 40's    Depression Mother     GI problems Mother     Hyperlipidemia Mother     Cancer Father     Bipolar disorder Father     Depression Father     Hyperlipidemia Father     Hypertension Father     Arthritis Father     Arthritis Maternal Grandmother     Bleeding Disorder Maternal Grandmother     Hyperlipidemia Maternal Grandmother     Hypertension Maternal Grandmother     Depression Maternal Grandmother     Hypertension Maternal Grandfather     Hypertension Paternal Grandfather     Hyperlipidemia Paternal Grandfather     Arthritis Paternal Grandfather     Coronary artery disease Other         Maternal GrGF    Skin cancer Other         Maternal GrGF    Diabetes Other         Paternal GrGF    Heart disease Other         FH in females before the age of 65    Arthritis Paternal Grandmother     Malig Hyperthermia Neg Hx          SOCIAL HISTORY  Social History     Socioeconomic History    Marital status:    Tobacco Use    Smoking status: Never    Smokeless tobacco: Never   Vaping Use    Vaping status: Never Used   Substance and Sexual Activity    Alcohol use: Yes     Alcohol/week: 5.0 standard drinks of alcohol     Types: 2 Glasses of wine, 3 Drinks containing 0.5 oz of alcohol per week     Comment: social    Drug use: Never    Sexual activity: Yes     Partners: Male     Birth control/protection: None, Vasectomy, Essure, Hysterectomy         ALLERGIES  Patient has no known allergies.        REVIEW OF SYSTEMS  Review of  Systems  Included in HPI  All systems reviewed and negative except for those discussed in HPI.      PHYSICAL EXAM    I have reviewed the triage vital signs and nursing notes.    ED Triage Vitals   Temp Heart Rate Resp BP SpO2   12/27/24 1654 12/27/24 1654 12/27/24 1654 12/27/24 1703 12/27/24 1654   96.6 °F (35.9 °C) 110 22 120/72 96 %      Temp src Heart Rate Source Patient Position BP Location FiO2 (%)   12/27/24 1654 -- 12/27/24 1703 12/27/24 1703 --   Tympanic  Sitting Right arm        Physical Exam  GENERAL: Cooperative and conversant although audibly wheezing female, alert, no acute distress  SKIN: Warm, dry  HENT: Normocephalic, atraumatic  EYES: no scleral icterus  CV: regular rhythm, accelerated rate  RESPIRATORY: normal effort, inspiratory and expiratory wheezing throughout  ABDOMEN: soft, nontender, nondistended  MUSCULOSKELETAL: no deformity  NEURO: alert, moves all extremities, follows commands                                                              LAB RESULTS  Recent Results (from the past 24 hours)   ECG 12 Lead ED Triage Standing Order; SOA    Collection Time: 12/27/24  5:00 PM   Result Value Ref Range    QT Interval 369 ms    QTC Interval 455 ms   Comprehensive Metabolic Panel    Collection Time: 12/27/24  5:14 PM    Specimen: Arm, Right; Blood   Result Value Ref Range    Glucose 84 65 - 99 mg/dL    BUN 19 6 - 20 mg/dL    Creatinine 0.95 0.57 - 1.00 mg/dL    Sodium 138 136 - 145 mmol/L    Potassium 3.1 (L) 3.5 - 5.2 mmol/L    Chloride 100 98 - 107 mmol/L    CO2 25.0 22.0 - 29.0 mmol/L    Calcium 9.6 8.6 - 10.5 mg/dL    Total Protein 7.0 6.0 - 8.5 g/dL    Albumin 4.0 3.5 - 5.2 g/dL    ALT (SGPT) 26 1 - 33 U/L    AST (SGOT) 18 1 - 32 U/L    Alkaline Phosphatase 78 39 - 117 U/L    Total Bilirubin 0.3 0.0 - 1.2 mg/dL    Globulin 3.0 gm/dL    A/G Ratio 1.3 g/dL    BUN/Creatinine Ratio 20.0 7.0 - 25.0    Anion Gap 13.0 5.0 - 15.0 mmol/L    eGFR 72.7 >60.0 mL/min/1.73   BNP    Collection Time:  12/27/24  5:14 PM    Specimen: Arm, Right; Blood   Result Value Ref Range    proBNP 108.0 0.0 - 900.0 pg/mL   High Sensitivity Troponin T    Collection Time: 12/27/24  5:14 PM    Specimen: Arm, Right; Blood   Result Value Ref Range    HS Troponin T 9 <14 ng/L   Green Top (Gel)    Collection Time: 12/27/24  5:14 PM   Result Value Ref Range    Extra Tube Hold for add-ons.    Lavender Top    Collection Time: 12/27/24  5:14 PM   Result Value Ref Range    Extra Tube hold for add-on    Gold Top - SST    Collection Time: 12/27/24  5:14 PM   Result Value Ref Range    Extra Tube Hold for add-ons.    Light Blue Top    Collection Time: 12/27/24  5:14 PM   Result Value Ref Range    Extra Tube Hold for add-ons.    CBC Auto Differential    Collection Time: 12/27/24  5:14 PM    Specimen: Arm, Right; Blood   Result Value Ref Range    WBC 8.18 3.40 - 10.80 10*3/mm3    RBC 4.68 3.77 - 5.28 10*6/mm3    Hemoglobin 14.6 12.0 - 15.9 g/dL    Hematocrit 43.2 34.0 - 46.6 %    MCV 92.3 79.0 - 97.0 fL    MCH 31.2 26.6 - 33.0 pg    MCHC 33.8 31.5 - 35.7 g/dL    RDW 12.5 12.3 - 15.4 %    RDW-SD 42.3 37.0 - 54.0 fl    MPV 9.3 6.0 - 12.0 fL    Platelets 394 140 - 450 10*3/mm3    Neutrophil % 75.2 42.7 - 76.0 %    Lymphocyte % 16.4 (L) 19.6 - 45.3 %    Monocyte % 4.5 (L) 5.0 - 12.0 %    Eosinophil % 3.3 0.3 - 6.2 %    Basophil % 0.4 0.0 - 1.5 %    Immature Grans % 0.2 0.0 - 0.5 %    Neutrophils, Absolute 6.15 1.70 - 7.00 10*3/mm3    Lymphocytes, Absolute 1.34 0.70 - 3.10 10*3/mm3    Monocytes, Absolute 0.37 0.10 - 0.90 10*3/mm3    Eosinophils, Absolute 0.27 0.00 - 0.40 10*3/mm3    Basophils, Absolute 0.03 0.00 - 0.20 10*3/mm3    Immature Grans, Absolute 0.02 0.00 - 0.05 10*3/mm3    nRBC 0.0 0.0 - 0.2 /100 WBC   Theophylline Level    Collection Time: 12/27/24  5:14 PM    Specimen: Arm, Right; Blood   Result Value Ref Range    Theophylline Level 9.3 (L) 10.0 - 20.0 mcg/mL   D-dimer, Quantitative    Collection Time: 12/27/24  5:14 PM    Specimen:  Arm, Right; Blood   Result Value Ref Range    D-Dimer, Quantitative 0.28 0.00 - 0.51 MCGFEU/mL         RADIOLOGY  XR Chest 1 View    Result Date: 12/27/2024  XR CHEST 1 VW-  HISTORY: 49-year-old female with shortness of breath.  FINDINGS: There is no convincing evidence for pneumonia or CHF. Follow-up with 2 views of the chest is recommended.  This report was finalized on 12/27/2024 6:40 PM by Dr. Monserrat Monroy M.D on Workstation: [id number]         MEDICATIONS GIVEN IN ER  Medications   sodium chloride 0.9 % flush 10 mL (has no administration in time range)   ipratropium-albuterol (DUO-NEB) nebulizer solution 3 mL (3 mL Nebulization Given 12/27/24 2028)   methylPREDNISolone sodium succinate (SOLU-Medrol) injection 80 mg (80 mg Intravenous Given 12/27/24 2121)         ORDERS PLACED DURING THIS VISIT:  Orders Placed This Encounter   Procedures    Blood Culture - Blood,    Respiratory Panel PCR w/COVID-19(SARS-CoV-2) [facility]/ANA LUISA/BLAIR/PAD/COR/MARIANGEL In-House, NP Swab in UTM/VTM, 2 HR TAT - Swab, Nasopharynx    XR Chest 1 View    [place] Draw    Comprehensive Metabolic Panel    BNP    High Sensitivity Troponin T    CBC Auto Differential    High Sensitivity Troponin T 1Hr    Theophylline Level    Lactic Acid, Plasma    D-dimer, Quantitative    NPO Diet NPO Type: Strict NPO    Undress & Gown    Continuous Pulse Oximetry    Vital Signs    [facility] (on-call MD unless specified) Details    Oxygen Therapy- Nasal Cannula; Titrate 1-6 LPM Per SpO2; 90 - 95%    ECG 12 Lead ED Triage Standing Order; SOA    Insert Peripheral IV    Initiate Observation Status    CBC & Differential    Green Top (Gel)    Lavender Top    Gold Top - SST    Light Blue Top         OUTPATIENT MEDICATION MANAGEMENT:  Current Facility-Administered Medications Ordered in Epic   Medication Dose Route Frequency Provider Last Rate Last Admin    sodium chloride 0.9 % flush 10 mL  10 mL Intravenous Blanca Valenzuela MD         Current Outpatient Medications Ordered  in Epic   Medication Sig Dispense Refill    albuterol (PROVENTIL) (2.5 MG/3ML) 0.083% nebulizer solution Take 2.5 mg by nebulization Every 4 (Four) Hours As Needed for Wheezing.      albuterol (PROVENTIL) 4 MG tablet Take 1 tablet by mouth Every Night. 90 tablet 3    albuterol sulfate  (90 Base) MCG/ACT inhaler USE 2 INHALATIONS EVERY 4 HOURS AS NEEDED FOR WHEEZING 34 g 0    benzonatate (TESSALON) 200 MG capsule       Benzoyl Peroxide 10 % liquid 1 Application by Other route Daily.      buPROPion XL (WELLBUTRIN XL) 300 MG 24 hr tablet TAKE 1 TABLET EVERY MORNING 90 tablet 3    clindamycin (CLEOCIN T) 1 % swab APPLY 1 SWAB TOPICALLY TO THE FACE EVERY MORNING      diclofenac (VOLTAREN) 75 MG EC tablet TAKE 1 TABLET TWICE A DAY AS NEEDED FOR PAIN 30 tablet 23    FLUoxetine (PROzac) 40 MG capsule Take 2 capsules by mouth Daily. 180 capsule 3    Fluticasone Furoate-Vilanterol (Breo Ellipta) 100-25 MCG/INH inhaler Inhale 1 puff Daily. 180 each 3    levothyroxine (SYNTHROID, LEVOTHROID) 50 MCG tablet TAKE 1 TABLET DAILY 90 tablet 3    metaxalone (SKELAXIN) 800 MG tablet TAKE 1/2 TO 1 TABLET BY MOUTH FOUR TIMES DAILY AS NEEDED FOR MUSCLE SPASMS 120 tablet 0    montelukast (SINGULAIR) 10 MG tablet TAKE 1 TABLET DAILY 90 tablet 3    Multiple Vitamin (MULTI-VITAMIN DAILY PO) Take 1 tablet by mouth Every Night.      omeprazole (priLOSEC) 40 MG capsule TAKE 1 CAPSULE DAILY 90 capsule 3    ondansetron (ZOFRAN) 4 MG tablet TAKE 1 TABLET EVERY 8 HOURS AS NEEDED FOR NAUSEA OR VOMITING 27 tablet 39    predniSONE (DELTASONE) 10 MG tablet Take 2 tablets by mouth 2 (Two) Times a Day for 3 days, THEN 2 tablets Daily for 4 days, THEN 1 tablet Daily for 7 days. 27 tablet 0    pregabalin (Lyrica) 25 MG capsule Take 1 capsule PO TID x 5 days; if tolerated may slowly increase to 1 or 2 capsules PO  capsule 0    tamoxifen (NOLVADEX) 20 MG chemo tablet Take  by mouth Daily.      theophylline (THEODUR) 300 MG 12 hr tablet Take 1  tablet by mouth Every Morning. 90 tablet 3    valACYclovir (VALTREX) 500 MG tablet TAKE 1 TABLET DAILY 90 tablet 3    vitamin B-12 (CYANOCOBALAMIN) 1000 MCG tablet Take 1 tablet by mouth Daily.           PROCEDURES  Procedures            PROGRESS, DATA ANALYSIS, CONSULTS, AND MEDICAL DECISION MAKING  All labs have been independently interpreted by me.  All radiology studies have been reviewed by me. All EKG's have been independently viewed and interpreted by me.  Discussion below represents my analysis of pertinent findings related to patient's condition, differential diagnosis, treatment plan and final disposition.    This patient presents with dyspnea, most likely secondary to asthma exacerbation.  The differential diagnosis list includes but is not limited to:   - acute cardiac etiologies like ACS, CHF, pericardial effusion or even tamponade  - acute respiratory etiologies like acute PE, pneumothorax , asthma, COPD exacerbation, allergic etiologies, or infectious etiologies such as PNA   - non-cardiopulmonary causes like toxidromes, metabolic etiologies such as acidemia or electrolyte derangements or even DKA, sepsis, neurologic causes including GBS and myasthenia gravis  Plan EKG, CXR, Labs incl bnp D-dimer and trop and +/- viral swab          ED Course as of 12/27/24 2122   Fri Dec 27, 2024   2100 I viewed patient's chest x-ray and on my interpretation patient has clear lungs and normal heart size [AR]   2118 I discussed patient's case with Lila arana for [facility] was amenable to admitting the patient for further care [AR]      ED Course User Index  [AR] Blanca Parr MD             AS OF 21:22 EST VITALS:    BP - 120/72  HR - 76  TEMP - 96.6 °F (35.9 °C) (Tympanic)  O2 SATS - 99%      COMPLEXITY OF CARE  The patient requires admission.    DIAGNOSIS  Final diagnoses:   Severe persistent asthma with acute exacerbation         DISPOSITION  ED Disposition       ED Disposition   Decision to Admit    Condition    --    Comment   Level of Care: Telemetry [5]   Diagnosis: Severe asthma with acute exacerbation [[id number]]   Admitting Physician: FATMATA MARINA [[id number]]   Attending Physician: FATMATA MARINA [[id number]]   Is patient appropriate for Inpatient Observation Unit?: No [0]                  Please note that portions of this document were completed with a voice recognition program.    Note Disclaimer: At [facility], we believe that sharing information builds trust and better relationships. You are receiving this note because you recently visited [facility]. It is possible you will see health information before a provider has talked with you about it. This kind of information can be easy to misunderstand. To help you fully understand what it means for your health, we urge you to discuss this note with your provider.         Blanca Parr MD  12/28/24 0006

## 2024-12-28 NOTE — H&P
[facility]   HISTORY AND PHYSICAL    Patient Name: Nereida Myles  : 1975  MRN: [id number]  Primary Care Physician:  Kehrer, Meredith Lea, MD  Date of admission: 2024    Subjective   Subjective     Chief Complaint: wheezing    History of Present Illness  50yo woman with asthma, chronic low back pain due to lumbar DDD, hypoT4, depression/anxiety, GERD, and breast CA, admitted here earlier this month with acute hypoxic respiratory failure due to rhinovirus and bacterial PNA, who was sent to ER from Pul ([facility]) office for admission due to persistent cough, SOA, wheezing, and hypoxia. Bronchoscopy planned. She was seen by PCP on  and prescribed Prednisone taper and Zithromax for persistent wheezing but did not experience any significant improvement. She is feeling okay this AM. Still wheezing. Coughing less. Had some nausea overnight but responded well to Zofran.      Review of Systems   Constitutional:  Positive for fatigue. Negative for appetite change, chills, diaphoresis and fever.   HENT:  Negative for congestion, nosebleeds, rhinorrhea, sore throat, trouble swallowing and voice change.    Eyes:  Negative for pain and visual disturbance.   Respiratory:  Positive for cough, shortness of breath and wheezing. Negative for choking, chest tightness and stridor.    Cardiovascular:  Negative for chest pain, palpitations and leg swelling.   Gastrointestinal:  Positive for nausea. Negative for abdominal pain, diarrhea and vomiting.   Endocrine: Negative for cold intolerance and heat intolerance.   Genitourinary:  Negative for decreased urine volume, difficulty urinating, dysuria and hematuria.   Musculoskeletal:  Negative for back pain and neck pain.   Skin:  Negative for color change and rash.   Allergic/Immunologic: Negative for environmental allergies and food allergies.   Neurological:  Negative for seizures, syncope, facial asymmetry, speech difficulty and headaches.   Hematological:   Negative for adenopathy. Does not bruise/bleed easily.   Psychiatric/Behavioral:  Negative for behavioral problems, confusion and hallucinations.         Personal History     Past Medical History:   Diagnosis Date    Allergic     Allergic rhinitis     Anxiety     Asthma     has inhaler and neb treatments    BMI 25.0-25.9,adult     Cancer     left breast    Community acquired pneumonia     COPD (chronic obstructive pulmonary disease)     Depression, major     Disease of thyroid gland     Diverticular disease     Foreign body in ear     Fractures 2012    Foot    GERD (gastroesophageal reflux disease)     Hashimoto's thyroiditis     History of abnormal mammogram     Left breast    History of acute sinusitis     History of adenocarcinoma of breast     History of COPD     History of dyspareunia in female     History of kidney stones     History of lump of left breast     History of malignant neoplasm of left breast     History of nausea and vomiting     History of seborrheic dermatitis     History of strep pharyngitis     History of suicidal ideation     Hypothyroidism     Hypoxemia     History of Hypoxemia    Joint pain 2021    Low back pain 2023    This is when it became debilitating    Lumbosacral disc disease ?    Obsessive compulsive disorder     Other screening mammogram     Personal history of asthma     PONV (postoperative nausea and vomiting)     Recurrent genital herpes simplex     History of     Symptomatic states associated with artificial menopause     History of     TMJ dysfunction 1999    Wear a mouth guard       Past Surgical History:   Procedure Laterality Date    APPENDECTOMY      BREAST AUGMENTATION Bilateral     Revised 2005, initial implants 1992    BREAST RECONSTRUCTION, BREAST TISSUE EXPANDER INSERTION Bilateral     CERVICAL CERCLAGE      x 2 during pregnancy, incompetent cervix    CHOLECYSTECTOMY      COLONOSCOPY      EPIDURAL Right 01/11/2024    Procedure: LUMBAR/SACRAL TRANSFORAMINAL EPIDURAL  RIGHT L3-4 #1 98777;  Surgeon: Rei Pruitt MD;  Location: [facility];  Service: Pain Management;  Laterality: Right;    EPIDURAL Right 9/19/2024    Procedure: LUMBAR/SACRAL TRANSFORAMINAL EPIDURAL RIGHT L3-4 90216;  Surgeon: Rei Pruitt MD;  Location: [facility];  Service: Pain Management;  Laterality: Right;    EPIDURAL BLOCK  1/2024    HAND SURGERY Right     may2022    HYSTERECTOMY      due to uterine prolapse    KIDNEY STONE SURGERY      LUNG SURGERY      bronchoscopy    MASTECTOMY Bilateral 01/2016    removed lymph nodes on left side    MEDIAL BRANCH BLOCK Right 3/13/2024    Procedure: LUMBAR MEDIAL BRANCH BLOCK RIGHT L3-L5 #1 18961, 28990;  Surgeon: Rei Pruitt MD;  Location: [facility];  Service: Pain Management;  Laterality: Right;    MEDIAL BRANCH BLOCK Bilateral 3/27/2024    Procedure: LUMBAR MEDIAL BRANCH BLOCK BILATERAL L3-L5 49319-86, 64494-50 X 2;  Surgeon: Rei Pruitt MD;  Location: [facility];  Service: Pain Management;  Laterality: Bilateral;    MEDIAL BRANCH BLOCK Left 5/2/2024    Procedure: LUMBAR MEDIAL BRANCH BLOCK LEFT L3-L5 #2 91916, 56876;  Surgeon: Rei Pruitt MD;  Location: [facility];  Service: Pain Management;  Laterality: Left;    NERVE SURGERY Bilateral 6/4/2024    Procedure: LUMBAR RADIOFREQUENCY ABLATION BILATERAL L3-L5 59297-50, 64636-50 X 2;  Surgeon: Rei Pruitt MD;  Location: [facility];  Service: Pain Management;  Laterality: Bilateral;    SACROILIAC JOINT INJECTION Bilateral 11/7/2024    Procedure: SACROILIAC INJECTION BILATERAL 64241-95;  Surgeon: Rei Pruitt MD;  Location: [facility];  Service: Pain Management;  Laterality: Bilateral;    SCAR REVISION BREAST Bilateral 06/21/2022    Procedure: SCAR REVISION LEFT CHEST 27 CENTIMETERS AND RIGHT CHEST 25 CENTIMETERS FOR AESTHETIC FLAT CLOSURE FOLLOWING MASTECTOMY;  Surgeon: Waterhouse, Maurine, MD;  Location: [facility] MAIN OR;  Service: Plastics;  Laterality:  Bilateral;    TISSUE EXPANDER PLACEMENT Bilateral     due to infection       Family History: family history includes Arthritis in her father, maternal grandmother, mother, paternal grandfather, and paternal grandmother; Bipolar disorder in her father; Bleeding Disorder in her maternal grandmother; Cancer in her father; Coronary artery disease in an other family member; Depression in her father, maternal grandmother, and mother; Diabetes in an other family member; GI problems in her mother; Heart disease in an other family member; Hyperlipidemia in her father, maternal grandmother, mother, and paternal grandfather; Hypertension in her father, maternal grandfather, maternal grandmother, and paternal grandfather; Skin cancer in an other family member; Thyroid disease in her mother; Uterine cancer in her mother. Otherwise pertinent FHx was reviewed and not pertinent to current issue.    Social History:  reports that she has never smoked. She has never used smokeless tobacco. She reports current alcohol use of about 5.0 standard drinks of alcohol per week. She reports that she does not use drugs.    Home Medications:  Benzoyl Peroxide, FLUoxetine, Fluticasone Furoate-Vilanterol, albuterol, albuterol sulfate HFA, benzonatate, buPROPion XL, clindamycin, diclofenac, levothyroxine, melatonin, metaxalone, montelukast, multivitamin, omeprazole, ondansetron, predniSONE, pregabalin, tamoxifen, theophylline, valACYclovir, and vitamin B-12    Allergies:  No Known Allergies    Objective    Objective     Vitals:   Temp:  [96.6 °F (35.9 °C)-98.2 °F (36.8 °C)] 97.9 °F (36.6 °C)  Heart Rate:  [] 63  Resp:  [18-24] 18  BP: (103-120)/(59-72) 103/59  Flow (L/min) (Oxygen Therapy):  [2] 2    Physical Exam  Vitals and nursing note reviewed.   Constitutional:       General: She is not in acute distress.     Appearance: She is ill-appearing. She is not toxic-appearing or diaphoretic.   HENT:      Head: Normocephalic.      Nose: Nose  normal.      Mouth/Throat:      Mouth: Mucous membranes are moist.      Pharynx: Oropharynx is clear.   Eyes:      General: No scleral icterus.        Right eye: No discharge.         Left eye: No discharge.      Extraocular Movements: Extraocular movements intact.      Conjunctiva/sclera: Conjunctivae normal.   Cardiovascular:      Rate and Rhythm: Normal rate and regular rhythm.      Pulses: Normal pulses.   Pulmonary:      Effort: Pulmonary effort is normal. No respiratory distress.      Breath sounds: No stridor. Wheezing (global) present. No rhonchi or rales.   Abdominal:      General: Bowel sounds are normal. There is no distension.      Palpations: Abdomen is soft.      Tenderness: There is no abdominal tenderness.   Musculoskeletal:         General: No swelling.      Cervical back: Neck supple.   Skin:     General: Skin is warm and dry.      Capillary Refill: Capillary refill takes less than 2 seconds.      Coloration: Skin is not jaundiced.   Neurological:      General: No focal deficit present.      Mental Status: She is alert and oriented to person, place, and time. Mental status is at baseline.      Cranial Nerves: No cranial nerve deficit.      Coordination: Coordination normal.   Psychiatric:         Mood and Affect: Mood normal.         Behavior: Behavior normal.         Thought Content: Thought content normal.         Result Review    Result Review:  I have personally reviewed the results from the time of this admission to 12/28/2024 07:41 EST and agree with these findings:  [x]  Laboratory list / accordion  [x]  Microbiology  [x]  Radiology  []  EKG/Telemetry   []  Cardiology/Vascular   []  Pathology  [x]  Old records  []  Other:  Most notable findings include: CXR NAD  RVP neg, blood culture in progress  BNP and Trop wnl  LA wnl, DDimer wnl  CBC fine  K+ 3.1->4.6, CMP o/w fine      Assessment & Plan   Assessment / Plan     Brief Patient Summary:  Nereida Myles is a 49 y.o. female who presents  with persistent exacerbation of chronic asthma.    Active Hospital Problems:  Active Hospital Problems    Diagnosis     **Severe asthma with acute exacerbation     Hypokalemia     History of breast cancer     Gastroesophageal reflux disease without esophagitis     DDD (degenerative disc disease), lumbar     Hypoxia     Hypothyroidism     Depression, major     Anxiety      Plan:     Asthma exacerbation  Pulm to see, ?bronch  RVP neg here, afebrile, WBC wnl, CXR unremarkable  Continue IV SoluMedrol, Symbicort, DuoNebs, Singulair, and Theophylline  Continue supplemental O2    Hypokalemia  Replaced with protocol  Check Mg++ level    GERD  Continue PPI    HypoT4  Check TSH  Continue L-T4    Depression/Anxiety  Stable, continue Prozac and Wellbutrin    Lumbar DDD  Stable at baseline  Continue Lyrica at HS    H/o Breast CA  Continue Tamoxifen    Further orders to follow as suggested by evolving hospital course     D/w pt      VTE Prophylaxis:  Mechanical VTE prophylaxis orders are present.    CODE STATUS:    Code Status (Patient has no pulse and is not breathing): CPR (Attempt to Resuscitate)  Medical Interventions (Patient has pulse or is breathing): Full Support    Admission Status:  I believe this patient meets observation status.    Santos Tamez MD

## 2024-12-29 LAB
ALBUMIN SERPL-MCNC: 3.6 G/DL (ref 3.5–5.2)
ALBUMIN/GLOB SERPL: 1.4 G/DL
ALP SERPL-CCNC: 68 U/L (ref 39–117)
ALT SERPL W P-5'-P-CCNC: 29 U/L (ref 1–33)
ANION GAP SERPL CALCULATED.3IONS-SCNC: 10 MMOL/L (ref 5–15)
AST SERPL-CCNC: 14 U/L (ref 1–32)
BASOPHILS # BLD AUTO: 0.01 10*3/MM3 (ref 0–0.2)
BASOPHILS NFR BLD AUTO: 0.1 % (ref 0–1.5)
BILIRUB SERPL-MCNC: 0.2 MG/DL (ref 0–1.2)
BUN SERPL-MCNC: 11 MG/DL (ref 6–20)
BUN/CREAT SERPL: 21.6 (ref 7–25)
CALCIUM SPEC-SCNC: 9 MG/DL (ref 8.6–10.5)
CHLORIDE SERPL-SCNC: 105 MMOL/L (ref 98–107)
CO2 SERPL-SCNC: 25 MMOL/L (ref 22–29)
CREAT SERPL-MCNC: 0.51 MG/DL (ref 0.57–1)
DEPRECATED RDW RBC AUTO: 43.2 FL (ref 37–54)
EGFRCR SERPLBLD CKD-EPI 2021: 113.2 ML/MIN/1.73
EOSINOPHIL # BLD AUTO: 0 10*3/MM3 (ref 0–0.4)
EOSINOPHIL NFR BLD AUTO: 0 % (ref 0.3–6.2)
ERYTHROCYTE [DISTWIDTH] IN BLOOD BY AUTOMATED COUNT: 12.5 % (ref 12.3–15.4)
GLOBULIN UR ELPH-MCNC: 2.5 GM/DL
GLUCOSE SERPL-MCNC: 148 MG/DL (ref 65–99)
HCT VFR BLD AUTO: 39.4 % (ref 34–46.6)
HGB BLD-MCNC: 12.3 G/DL (ref 12–15.9)
IMM GRANULOCYTES # BLD AUTO: 0.06 10*3/MM3 (ref 0–0.05)
IMM GRANULOCYTES NFR BLD AUTO: 0.6 % (ref 0–0.5)
LYMPHOCYTES # BLD AUTO: 0.37 10*3/MM3 (ref 0.7–3.1)
LYMPHOCYTES NFR BLD AUTO: 3.6 % (ref 19.6–45.3)
MAGNESIUM SERPL-MCNC: 2.1 MG/DL (ref 1.6–2.6)
MCH RBC QN AUTO: 29 PG (ref 26.6–33)
MCHC RBC AUTO-ENTMCNC: 31.2 G/DL (ref 31.5–35.7)
MCV RBC AUTO: 92.9 FL (ref 79–97)
MONOCYTES # BLD AUTO: 0.23 10*3/MM3 (ref 0.1–0.9)
MONOCYTES NFR BLD AUTO: 2.3 % (ref 5–12)
NEUTROPHILS NFR BLD AUTO: 9.53 10*3/MM3 (ref 1.7–7)
NEUTROPHILS NFR BLD AUTO: 93.4 % (ref 42.7–76)
NRBC BLD AUTO-RTO: 0 /100 WBC (ref 0–0.2)
PHOSPHATE SERPL-MCNC: 2.8 MG/DL (ref 2.5–4.5)
PLATELET # BLD AUTO: 326 10*3/MM3 (ref 140–450)
PMV BLD AUTO: 9.4 FL (ref 6–12)
POTASSIUM SERPL-SCNC: 4.5 MMOL/L (ref 3.5–5.2)
PROCALCITONIN SERPL-MCNC: 0.03 NG/ML (ref 0–0.25)
PROT SERPL-MCNC: 6.1 G/DL (ref 6–8.5)
RBC # BLD AUTO: 4.24 10*6/MM3 (ref 3.77–5.28)
SODIUM SERPL-SCNC: 140 MMOL/L (ref 136–145)
TSH SERPL DL<=0.05 MIU/L-ACNC: 0.4 UIU/ML (ref 0.27–4.2)
WBC NRBC COR # BLD AUTO: 10.2 10*3/MM3 (ref 3.4–10.8)

## 2024-12-29 PROCEDURE — 94799 UNLISTED PULMONARY SVC/PX: CPT

## 2024-12-29 PROCEDURE — 25010000002 PROCHLORPERAZINE 10 MG/2ML SOLUTION: Performed by: HOSPITALIST

## 2024-12-29 PROCEDURE — G0378 HOSPITAL OBSERVATION PER HR: HCPCS

## 2024-12-29 PROCEDURE — 83735 ASSAY OF MAGNESIUM: CPT | Performed by: HOSPITALIST

## 2024-12-29 PROCEDURE — 25010000002 ONDANSETRON PER 1 MG: Performed by: NURSE PRACTITIONER

## 2024-12-29 PROCEDURE — 84100 ASSAY OF PHOSPHORUS: CPT | Performed by: HOSPITALIST

## 2024-12-29 PROCEDURE — 25010000002 METHYLPREDNISOLONE PER 125 MG: Performed by: NURSE PRACTITIONER

## 2024-12-29 PROCEDURE — 80050 GENERAL HEALTH PANEL: CPT | Performed by: HOSPITALIST

## 2024-12-29 PROCEDURE — 94664 DEMO&/EVAL PT USE INHALER: CPT

## 2024-12-29 PROCEDURE — 84145 PROCALCITONIN (PCT): CPT | Performed by: HOSPITALIST

## 2024-12-29 RX ORDER — BUDESONIDE AND FORMOTEROL FUMARATE DIHYDRATE 160; 4.5 UG/1; UG/1
2 AEROSOL RESPIRATORY (INHALATION)
Status: DISCONTINUED | OUTPATIENT
Start: 2024-12-29 | End: 2025-01-02 | Stop reason: HOSPADM

## 2024-12-29 RX ORDER — IPRATROPIUM BROMIDE AND ALBUTEROL SULFATE 2.5; .5 MG/3ML; MG/3ML
3 SOLUTION RESPIRATORY (INHALATION)
Status: DISCONTINUED | OUTPATIENT
Start: 2024-12-29 | End: 2025-01-02 | Stop reason: HOSPADM

## 2024-12-29 RX ORDER — AZITHROMYCIN 250 MG/1
500 TABLET, FILM COATED ORAL
Status: COMPLETED | OUTPATIENT
Start: 2024-12-29 | End: 2024-12-31

## 2024-12-29 RX ADMIN — BUDESONIDE AND FORMOTEROL FUMARATE DIHYDRATE 2 PUFF: 160; 4.5 AEROSOL RESPIRATORY (INHALATION) at 19:55

## 2024-12-29 RX ADMIN — IPRATROPIUM BROMIDE AND ALBUTEROL SULFATE 3 ML: 2.5; .5 SOLUTION RESPIRATORY (INHALATION) at 11:22

## 2024-12-29 RX ADMIN — PROCHLORPERAZINE EDISYLATE 5 MG: 5 INJECTION, SOLUTION INTRAMUSCULAR; INTRAVENOUS at 10:16

## 2024-12-29 RX ADMIN — IPRATROPIUM BROMIDE AND ALBUTEROL SULFATE 3 ML: 2.5; .5 SOLUTION RESPIRATORY (INHALATION) at 15:59

## 2024-12-29 RX ADMIN — AZITHROMYCIN 500 MG: 250 TABLET, FILM COATED ORAL at 17:18

## 2024-12-29 RX ADMIN — LEVOTHYROXINE SODIUM 50 MCG: 50 TABLET ORAL at 06:12

## 2024-12-29 RX ADMIN — PROCHLORPERAZINE EDISYLATE 5 MG: 5 INJECTION, SOLUTION INTRAMUSCULAR; INTRAVENOUS at 20:08

## 2024-12-29 RX ADMIN — HYDROCODONE BITARTRATE AND ACETAMINOPHEN 1 TABLET: 5; 325 TABLET ORAL at 20:09

## 2024-12-29 RX ADMIN — BUPROPION HYDROCHLORIDE 300 MG: 300 TABLET, EXTENDED RELEASE ORAL at 06:12

## 2024-12-29 RX ADMIN — BUDESONIDE AND FORMOTEROL FUMARATE DIHYDRATE 1 PUFF: 160; 4.5 AEROSOL RESPIRATORY (INHALATION) at 07:45

## 2024-12-29 RX ADMIN — THEOPHYLLINE 300 MG: 300 TABLET, EXTENDED RELEASE ORAL at 06:13

## 2024-12-29 RX ADMIN — Medication 10 ML: at 09:21

## 2024-12-29 RX ADMIN — Medication 1000 MCG: at 08:41

## 2024-12-29 RX ADMIN — ONDANSETRON 4 MG: 2 INJECTION, SOLUTION INTRAMUSCULAR; INTRAVENOUS at 08:41

## 2024-12-29 RX ADMIN — Medication 1 TABLET: at 20:08

## 2024-12-29 RX ADMIN — Medication 10 ML: at 20:09

## 2024-12-29 RX ADMIN — IPRATROPIUM BROMIDE AND ALBUTEROL SULFATE 3 ML: 2.5; .5 SOLUTION RESPIRATORY (INHALATION) at 19:55

## 2024-12-29 RX ADMIN — TAMOXIFEN CITRATE 20 MG: 10 TABLET, FILM COATED ORAL at 09:21

## 2024-12-29 RX ADMIN — ONDANSETRON 4 MG: 2 INJECTION, SOLUTION INTRAMUSCULAR; INTRAVENOUS at 17:18

## 2024-12-29 RX ADMIN — MONTELUKAST SODIUM 10 MG: 10 TABLET, FILM COATED ORAL at 08:41

## 2024-12-29 RX ADMIN — FLUOXETINE HYDROCHLORIDE 80 MG: 20 CAPSULE ORAL at 08:41

## 2024-12-29 RX ADMIN — PREGABALIN 25 MG: 25 CAPSULE ORAL at 20:08

## 2024-12-29 RX ADMIN — METHYLPREDNISOLONE SODIUM SUCCINATE 60 MG: 125 INJECTION INTRAMUSCULAR; INTRAVENOUS at 09:20

## 2024-12-29 RX ADMIN — METHYLPREDNISOLONE SODIUM SUCCINATE 60 MG: 125 INJECTION INTRAMUSCULAR; INTRAVENOUS at 20:08

## 2024-12-29 RX ADMIN — IPRATROPIUM BROMIDE AND ALBUTEROL SULFATE 3 ML: 2.5; .5 SOLUTION RESPIRATORY (INHALATION) at 07:40

## 2024-12-29 NOTE — PROGRESS NOTES
Name: Nereida Myles ADMIT: 2024   : 1975  PCP: Kehrer, Meredith Lea, MD    MRN: [id number] LOS: 0 days   AGE/SEX: 49 y.o. female  ROOM: [facility]     Subjective   Subjective   Feeling about the same today. Maybe a little more SOA. No CP. Cough unchanged. No N/V/D/abd pain. Tolerating po. Voiding well. No F/C/NS.       Objective   Objective   Vital Signs  Temp:  [97.3 °F (36.3 °C)-99.1 °F (37.3 °C)] 97.3 °F (36.3 °C)  Heart Rate:  [52-86] 66  Resp:  [12-18] 16  BP: (106-124)/(66-81) 111/66  SpO2:  [94 %-98 %] 94 %  on  Flow (L/min) (Oxygen Therapy):  [2-2.5] 2;   Device (Oxygen Therapy): nasal cannula  Body mass index is 21.91 kg/m².  Physical Exam  Vitals and nursing note reviewed.   Constitutional:       General: She is not in acute distress.     Appearance: She is ill-appearing. She is not toxic-appearing or diaphoretic.   HENT:      Head: Normocephalic.      Nose: Nose normal.      Mouth/Throat:      Mouth: Mucous membranes are moist.      Pharynx: Oropharynx is clear.   Eyes:      General: No scleral icterus.        Right eye: No discharge.         Left eye: No discharge.      Extraocular Movements: Extraocular movements intact.      Conjunctiva/sclera: Conjunctivae normal.   Cardiovascular:      Rate and Rhythm: Normal rate and regular rhythm.      Pulses: Normal pulses.   Pulmonary:      Comments: Increased WOB  Global exp wheeze with prolonged exp phase  No rales  Abdominal:      General: Bowel sounds are normal. There is no distension.      Palpations: Abdomen is soft.      Tenderness: There is no abdominal tenderness.   Musculoskeletal:         General: No swelling.      Cervical back: Neck supple.   Skin:     General: Skin is warm and dry.      Capillary Refill: Capillary refill takes less than 2 seconds.      Coloration: Skin is not jaundiced.   Neurological:      General: No focal deficit present.      Mental Status: She is alert. Mental status is at baseline.   Psychiatric:          "Mood and Affect: Mood normal.         Behavior: Behavior normal.         Thought Content: Thought content normal.       Results Review     I reviewed the patient's new clinical results.  Results from last 7 days   Lab Units 12/29/24 0423 12/28/24 0429 12/27/24  1714   WBC 10*3/mm3 10.20 4.91 8.18   HEMOGLOBIN g/dL 12.3 13.2 14.6   PLATELETS 10*3/mm3 326 337 394     Results from last 7 days   Lab Units 12/29/24 0423 12/28/24  1439 12/28/24  0429 12/27/24  1714   SODIUM mmol/L 140  --  136 138   POTASSIUM mmol/L 4.5 4.1 4.6 3.1*   CHLORIDE mmol/L 105  --  103 100   CO2 mmol/L 25.0  --  24.3 25.0   BUN mg/dL 11  --  11 19   CREATININE mg/dL 0.51*  --  0.64 0.95   GLUCOSE mg/dL 148*  --  188* 84   EGFR mL/min/1.73 113.2  --  107.1 72.7     Results from last 7 days   Lab Units 12/29/24  0423 12/28/24  0429 12/27/24  1714   ALBUMIN g/dL 3.6 3.5 4.0   BILIRUBIN mg/dL 0.2 0.2 0.3   ALK PHOS U/L 68 70 78   AST (SGOT) U/L 14 16 18   ALT (SGPT) U/L 29 26 26     Results from last 7 days   Lab Units 12/29/24  0423 12/28/24  0429 12/27/24  1714   CALCIUM mg/dL 9.0 9.1 9.6   ALBUMIN g/dL 3.6 3.5 4.0   MAGNESIUM mg/dL 2.1 2.3  --    PHOSPHORUS mg/dL 2.8  --   --      Results from last 7 days   Lab Units 12/27/24  2122   LACTATE mmol/L 1.8     No results found for: \"HGBA1C\", \"POCGLU\"    No radiology results for the last day    I have personally reviewed all medications:  Scheduled Medications  budesonide-formoterol, 1 puff, Inhalation, BID - RT  buPROPion XL, 300 mg, Oral, QAM  FLUoxetine, 80 mg, Oral, Daily  ipratropium-albuterol, 3 mL, Nebulization, 4x Daily - RT  levothyroxine, 50 mcg, Oral, Q AM  methylPREDNISolone sodium succinate, 60 mg, Intravenous, Q12H  montelukast, 10 mg, Oral, Daily  multivitamin, 1 tablet, Oral, Nightly  pregabalin, 25 mg, Oral, Nightly  sodium chloride, 10 mL, Intravenous, Q12H  tamoxifen, 20 mg, Oral, Daily  theophylline, 300 mg, Oral, QAM  vitamin B-12, 1,000 mcg, Oral, Daily    Infusions   " Diet  Diet: Cardiac; Healthy Heart (2-3 Na+); Fluid Consistency: Thin (IDDSI 0)    I have personally reviewed:  [x]  Laboratory   [x]  Microbiology   []  Radiology   []  EKG/Telemetry  []  Cardiology/Vascular   []  Pathology    [x]  Records       Assessment/Plan     Active Hospital Problems    Diagnosis  POA    **Severe asthma with acute exacerbation [J45.901]  Yes    Hypokalemia [E87.6]  Yes    History of breast cancer [Z85.3]  Not Applicable    Gastroesophageal reflux disease without esophagitis [K21.9]  Yes    DDD (degenerative disc disease), lumbar [M51.369]  Yes    Hypoxia [R09.02]  Yes    Hypothyroidism [E03.9]  Yes    Depression, major [F32.9]  Yes    Anxiety [F41.9]  Yes      Resolved Hospital Problems   No resolved problems to display.       50yo woman with asthma, chronic low back pain due to lumbar DDD, hypoT4, depression/anxiety, GERD, and breast CA, admitted here earlier this month with acute hypoxic respiratory failure due to rhinovirus and bacterial PNA, who was sent to ER at the direction of Pulm office for admission due to persistent cough, SOA, wheezing, and hypoxia.     Asthma exacerbation  Acute hypoxic resp failure  Appreciate Pulm attention to pt  RVP neg here, afebrile, DDimer wnl, WBC wnl, CXR unremarkable  Continue IV SoluMedrol, Symbicort, DuoNebs, Singulair, and Theophylline  Continue supplemental O2     Hypokalemia  Replaced with protocol  Mg++ level fine     GERD  Continue PPI     HypoT4  TSH wnl  Continue L-T4     Depression/Anxiety  Stable, continue Prozac and Wellbutrin     Lumbar DDD  Stable at baseline  Continue Lyrica at HS     H/o Breast CA  Continue Tamoxifen      SCDs for DVT prophylaxis.  Full code.  Discussed with patient.  Anticipate discharge home with family when cleared by consultants.  Expected discharge date/ time has not been documented.      Santos Tamez MD  [facility]  12/29/24  09:25 EST

## 2024-12-29 NOTE — CONSULTS
Nutrition Services    Patient Name:  Nereida Myles  YOB: 1975  MRN: [id number]  Admit Date:  12/27/2024    Assessment Date:  12/29/24    Summary: Consult for RN admit screen   Pt is a 48 y/o female who presented with SOB. Pt has a hx of Hashimoto's disease, COPD, DDD, CERD.  Pt laying in bed when I visited. Pt reports she normally eats 2 meals per day and does not feel like she has been eating less than normal recently. Pt reported she had oatmeal for breakfast and mashed potatoes for lunch. Pt reported her usual weight is about 135 lbs but she believes she has lost weight in the past month and is now 119 lbs. Per EMR, Pt has had a 15.4% weight loss x 6 months (significant). Pt reported she has trouble swallowing due to feeling short of air when eating. Pt endorsed nausea while admitted, compazine given today. RD performed NFPE which did not reveal any significant muscle wasting or fat loss. Per EMR, last BM 12/25. RD offered supplements and pt was agreeable.     RECS:  Magic Cups BID  Recommend initiating bowel regimen     CLINICAL NUTRITION ASSESSMENT      Reason for Assessment Nurse or Nurse Practitioner Consult     Diagnosis/Problem   SOB. Pt has a hx of Hashimoto's disease, COPD, DDD, CERD.   Medical/Surgical History Past Medical History:   Diagnosis Date    Allergic     Allergic rhinitis     Anxiety     Asthma     has inhaler and neb treatments    BMI 25.0-25.9,adult     Cancer     left breast    Community acquired pneumonia     COPD (chronic obstructive pulmonary disease)     Depression, major     Disease of thyroid gland     Diverticular disease     Foreign body in ear     Fractures 2012    Foot    GERD (gastroesophageal reflux disease)     Hashimoto's thyroiditis     History of abnormal mammogram     Left breast    History of acute sinusitis     History of adenocarcinoma of breast     History of COPD     History of dyspareunia in female     History of kidney stones     History of lump  of left breast     History of malignant neoplasm of left breast     History of nausea and vomiting     History of seborrheic dermatitis     History of strep pharyngitis     History of suicidal ideation     Hypothyroidism     Hypoxemia     History of Hypoxemia    Joint pain 2021    Low back pain 2023    This is when it became debilitating    Lumbosacral disc disease ?    Obsessive compulsive disorder     Other screening mammogram     Personal history of asthma     PONV (postoperative nausea and vomiting)     Recurrent genital herpes simplex     History of     Symptomatic states associated with artificial menopause     History of     TMJ dysfunction 1999    Wear a mouth guard       Past Surgical History:   Procedure Laterality Date    APPENDECTOMY      BREAST AUGMENTATION Bilateral     Revised 2005, initial implants 1992    BREAST RECONSTRUCTION, BREAST TISSUE EXPANDER INSERTION Bilateral     CERVICAL CERCLAGE      x 2 during pregnancy, incompetent cervix    CHOLECYSTECTOMY      COLONOSCOPY      EPIDURAL Right 01/11/2024    Procedure: LUMBAR/SACRAL TRANSFORAMINAL EPIDURAL RIGHT L3-4 #1 78183;  Surgeon: Rei Pruitt MD;  Location: [facility];  Service: Pain Management;  Laterality: Right;    EPIDURAL Right 9/19/2024    Procedure: LUMBAR/SACRAL TRANSFORAMINAL EPIDURAL RIGHT L3-4 71018;  Surgeon: Rei Pruitt MD;  Location: [facility];  Service: Pain Management;  Laterality: Right;    EPIDURAL BLOCK  1/2024    HAND SURGERY Right     may2022    HYSTERECTOMY      due to uterine prolapse    KIDNEY STONE SURGERY      LUNG SURGERY      bronchoscopy    MASTECTOMY Bilateral 01/2016    removed lymph nodes on left side    MEDIAL BRANCH BLOCK Right 3/13/2024    Procedure: LUMBAR MEDIAL BRANCH BLOCK RIGHT L3-L5 #1 31117, 67846;  Surgeon: Rei Pruitt MD;  Location: [facility];  Service: Pain Management;  Laterality: Right;    MEDIAL BRANCH BLOCK Bilateral 3/27/2024    Procedure: LUMBAR MEDIAL BRANCH BLOCK  "BILATERAL L3-L5 80284-99, 64494-50 X 2;  Surgeon: Rei Pruitt MD;  Location: [facility];  Service: Pain Management;  Laterality: Bilateral;    MEDIAL BRANCH BLOCK Left 5/2/2024    Procedure: LUMBAR MEDIAL BRANCH BLOCK LEFT L3-L5 #2 98808, 70108;  Surgeon: Rei Pruitt MD;  [place] [facility] [place] [facility];  Service: Pain Management;  Laterality: Left;    NERVE SURGERY Bilateral 6/4/2024    Procedure: LUMBAR RADIOFREQUENCY ABLATION BILATERAL L3-L5 28890-18, 64636-50 X 2;  Surgeon: Rei Pruitt MD;  Location: [facility];  Service: Pain Management;  Laterality: Bilateral;    SACROILIAC JOINT INJECTION Bilateral 11/7/2024    Procedure: SACROILIAC INJECTION BILATERAL 69228-61;  Surgeon: Rei Pruitt MD;  [place] [facility] [place] [facility];  Service: Pain Management;  Laterality: Bilateral;    SCAR REVISION BREAST Bilateral 06/21/2022    Procedure: SCAR REVISION LEFT CHEST 27 CENTIMETERS AND RIGHT CHEST 25 CENTIMETERS FOR AESTHETIC FLAT CLOSURE FOLLOWING MASTECTOMY;  Surgeon: Waterhouse, Maurine, MD;  Location: [facility] MAIN OR;  Service: Plastics;  Laterality: Bilateral;    TISSUE EXPANDER PLACEMENT Bilateral     due to infection        Anthropometrics        Current Height  Current Weight  BMI kg/m2 Height: 162.6 cm (64\")  Weight: 57.9 kg (127 lb 10.3 oz) (12/27/24 2311)  Body mass index is 21.91 kg/m².   Adjusted BMI (if applicable)    BMI Category Normal/Healthy (18.4 - 24.9)   Ideal Body Weight (IBW) 120 lbs   Usual Body Weight (UBW) 135 lbs   Weight Trend Loss   Weight History Wt Readings from Last 30 Encounters:   12/27/24 2311 57.9 kg (127 lb 10.3 oz)   12/18/24 1404 58.7 kg (129 lb 6.4 oz)   12/17/24 0857 58 kg (127 lb 12.8 oz)   12/12/24 0635 41.9 kg (92 lb 6 oz)   12/10/24 0514 55.6 kg (122 lb 9.2 oz)   12/09/24 0431 55.6 kg (122 lb 9.2 oz)   12/08/24 0528 55.6 kg (122 lb 8 oz)   12/07/24 1351 55.6 kg (122 lb 8 oz)   12/07/24 0128 59 kg (130 lb)   12/04/24 1607 59 kg (130 lb)   11/19/24 1523 59.6 kg " (131 lb 6.4 oz)   11/07/24 0809 59 kg (130 lb)   10/17/24 1552 59.9 kg (132 lb)   09/10/24 1539 62.9 kg (138 lb 9.6 oz)   08/02/24 0950 65.7 kg (144 lb 12.8 oz)   07/16/24 1551 68.1 kg (150 lb 3.2 oz)   06/04/24 0848 73.5 kg (162 lb)   05/14/24 1335 76.4 kg (168 lb 6.4 oz)   05/02/24 1015 75.8 kg (167 lb)   04/11/24 1521 76.1 kg (167 lb 12.8 oz)   04/08/24 1426 73.8 kg (162 lb 9.6 oz)   03/26/24 1415 73.9 kg (163 lb)   03/20/24 1536 74 kg (163 lb 3.2 oz)   03/13/24 1450 72.6 kg (160 lb)   02/28/24 1057 73 kg (161 lb)   02/21/24 0830 72.6 kg (160 lb)   02/19/24 0932 74.1 kg (163 lb 6.4 oz)   02/08/24 1433 73.8 kg (162 lb 12.8 oz)   01/25/24 1526 73.8 kg (162 lb 12.8 oz)   01/11/24 1433 70.3 kg (155 lb)   12/12/23 1106 71.7 kg (158 lb)   12/05/23 1554 71.6 kg (157 lb 12.8 oz)   09/11/23 1026 72.8 kg (160 lb 6.4 oz)   07/20/23 1452 74.7 kg (164 lb 9.6 oz)   06/16/23 1028 76.3 kg (168 lb 3.2 oz)        Estimated/Assessed Needs        Energy Requirements    Weight for Calculation 58 kg    Method for Estimation  30-35 kcal/kg   EST Needs (kcal/day) 1797-5779       Protein Requirements    Weight for Calculation 58 kg   EST Protein Needs (g/kg) 1.2 - 1.5 gm/kg   EST Daily Needs (g/day) 69-87       Fluid Requirements     Method for Estimation 1 mL/kcal    Estimated Needs (mL/day)        Fluid Deficit    Current Na Level (mEq/L)    Desired Na Level (mEq/L)    Estimated Fluid Deficit (L)       Labs       Pertinent Labs    Results from last 7 days   Lab Units 12/29/24  0423 12/28/24  1439 12/28/24  0429 12/27/24  1714   SODIUM mmol/L 140  --  136 138   POTASSIUM mmol/L 4.5 4.1 4.6 3.1*   CHLORIDE mmol/L 105  --  103 100   CO2 mmol/L 25.0  --  24.3 25.0   BUN mg/dL 11  --  11 19   CREATININE mg/dL 0.51*  --  0.64 0.95   CALCIUM mg/dL 9.0  --  9.1 9.6   BILIRUBIN mg/dL 0.2  --  0.2 0.3   ALK PHOS U/L 68  --  70 78   ALT (SGPT) U/L 29  --  26 26   AST (SGOT) U/L 14  --  16 18   GLUCOSE mg/dL 148*  --  188* 84     Results from last  "7 days   Lab Units 12/29/24  0423 12/28/24  0429   MAGNESIUM mg/dL 2.1 2.3   PHOSPHORUS mg/dL 2.8  --    HEMOGLOBIN g/dL 12.3 13.2   HEMATOCRIT % 39.4 40.5   WBC 10*3/mm3 10.20 4.91   ALBUMIN g/dL 3.6 3.5     Results from last 7 days   Lab Units 12/29/24  0423 12/28/24  0429 12/27/24  1714   PLATELETS 10*3/mm3 326 337 394     COVID19   Date Value Ref Range Status   12/27/2024 Not Detected Not Detected - Ref. Range Final     No results found for: \"HGBA1C\"       Medications           Scheduled Medications azithromycin, 500 mg, Oral, Q24H  budesonide-formoterol, 2 puff, Inhalation, BID - RT  buPROPion XL, 300 mg, Oral, QAM  FLUoxetine, 80 mg, Oral, Daily  ipratropium-albuterol, 3 mL, Nebulization, Q4H - RT  levothyroxine, 50 mcg, Oral, Q AM  methylPREDNISolone sodium succinate, 60 mg, Intravenous, Q12H  montelukast, 10 mg, Oral, Daily  multivitamin, 1 tablet, Oral, Nightly  pregabalin, 25 mg, Oral, Nightly  sodium chloride, 10 mL, Intravenous, Q12H  tamoxifen, 20 mg, Oral, Daily  theophylline, 300 mg, Oral, QAM  vitamin B-12, 1,000 mcg, Oral, Daily       Infusions     PRN Medications   acetaminophen **OR** acetaminophen **OR** acetaminophen    albuterol    benzonatate    senna-docusate sodium **AND** polyethylene glycol **AND** bisacodyl **AND** bisacodyl    famotidine    HYDROcodone-acetaminophen    melatonin    metaxalone    nitroglycerin    ondansetron    pantoprazole    Potassium Replacement - Follow Nurse / BPA Driven Protocol    prochlorperazine    sodium chloride    sodium chloride    sodium chloride     Physical Findings          General Findings alert, oriented, on oxygen therapy   Oral/Mouth Cavity WDL   Edema  no edema   Gastrointestinal last bowel movement: 12/25   Skin  skin intact   Tubes/Drains/Lines none   NFPE No clinical signs of muscle wasting or fat loss   --  Current Nutrition Orders & Evaluation of Intake       Oral Nutrition     Food Allergies NKFA   Current PO Diet Diet: Cardiac; Healthy Heart " (2-3 Na+); Fluid Consistency: Thin (IDDSI 0)   Supplement n/a   PO Evaluation     % PO Intake 2 meals per day per pt.     Factors Affecting Intake: altered respiratory status     PES STATEMENT / NUTRITION DIAGNOSIS      Nutrition Dx Problem  Problem: Unintentional Weight Loss  Etiology: Medical Diagnosis - SOB. Pt has a hx of Hashimoto's disease, COPD, DDD, CERD.    Signs/Symptoms: Unintended Weight Change   --  NUTRITION INTERVENTION / PLAN OF CARE      Intervention Goal(s) Maintain nutrition status, Establish goals of care, Meet estimated needs, Tolerate PO , Increase intake, Accepts oral nutrition supplement, Maintain weight, No significant weight loss, and PO intake goal %: 75%         RD Intervention/Action Encourage intake, Continue to monitor, Care plan reviewed, and Recommend/order: Magic Cups BID         Prescription/Orders:       PO Diet       Supplements Magic Cups BID      Enteral Nutrition       Parenteral Nutrition    New Prescription Ordered? Yes   --      Monitor/Evaluation Per protocol, PO intake, Supplement intake, Pertinent labs, Weight, Skin status, GI status, Symptoms, POC/GOC, Swallow function, Hemodynamic stability   Discharge Plan/Needs No discharge needs identified at this time   --    RD to follow per protocol.      Electronically signed by:  Luis Antonio Dougherty RDN, LD  12/29/24 16:24 EST

## 2024-12-29 NOTE — PLAN OF CARE
Goal Outcome Evaluation:        Problem: Adult Inpatient Plan of Care  Goal: Optimal Comfort and Wellbeing  Outcome: Progressing  Intervention: Provide Person-Centered Care  Recent Flowsheet Documentation  Taken 12/29/2024 1405 by Nereida Anderson RN  Trust Relationship/Rapport:   care explained   choices provided   thoughts/feelings acknowledged  Taken 12/29/2024 0840 by Nereida Anderson RN  Trust Relationship/Rapport:   care explained   choices provided   thoughts/feelings acknowledged     Problem: Fall Injury Risk  Goal: Absence of Fall and Fall-Related Injury  Outcome: Progressing  Intervention: Identify and Manage Contributors  Recent Flowsheet Documentation  Taken 12/29/2024 1600 by Nereida Anderson RN  Medication Review/Management: medications reviewed  Taken 12/29/2024 1405 by Nereida Anderson RN  Medication Review/Management: medications reviewed  Taken 12/29/2024 1200 by Nereida Anderson RN  Medication Review/Management: medications reviewed  Taken 12/29/2024 1000 by Nereida Anderson RN  Medication Review/Management: medications reviewed  Taken 12/29/2024 0840 by Nereida Anderson RN  Medication Review/Management: medications reviewed  Intervention: Promote Injury-Free Environment  Recent Flowsheet Documentation  Taken 12/29/2024 1600 by Nereida Anderson RN  Safety Promotion/Fall Prevention:   clutter free environment maintained   fall prevention program maintained   nonskid shoes/slippers when out of bed   room organization consistent   safety round/check completed  Taken 12/29/2024 1405 by Nereida Anderson RN  Safety Promotion/Fall Prevention:   clutter free environment maintained   fall prevention program maintained   nonskid shoes/slippers when out of bed   room organization consistent   safety round/check completed  Taken 12/29/2024 1200 by Nereida Anderson RN  Safety Promotion/Fall Prevention:   clutter free environment maintained   fall prevention  program maintained   nonskid shoes/slippers when out of bed   room organization consistent   safety round/check completed  Taken 12/29/2024 1000 by Nereida Anderson RN  Safety Promotion/Fall Prevention:   clutter free environment maintained   fall prevention program maintained   nonskid shoes/slippers when out of bed   room organization consistent   safety round/check completed  Taken 12/29/2024 0840 by Nereida Anderson RN  Safety Promotion/Fall Prevention:   clutter free environment maintained   fall prevention program maintained   nonskid shoes/slippers when out of bed   room organization consistent   safety round/check completed     Problem: Pain Acute  Goal: Optimal Pain Control and Function  Outcome: Progressing  Intervention: Optimize Psychosocial Wellbeing  Recent Flowsheet Documentation  Taken 12/29/2024 1405 by Nereida Anderson RN  Diversional Activities:   television   smartphone  Taken 12/29/2024 0840 by Nereida Anderson RN  Diversional Activities:   television   smartphone  Intervention: Prevent or Manage Pain  Recent Flowsheet Documentation  Taken 12/29/2024 1600 by Nereida Anderson RN  Medication Review/Management: medications reviewed  Taken 12/29/2024 1405 by Nereida Anderson RN  Medication Review/Management: medications reviewed  Taken 12/29/2024 1200 by Nereida Anderson RN  Medication Review/Management: medications reviewed  Taken 12/29/2024 1000 by Nereida Anderson RN  Medication Review/Management: medications reviewed  Taken 12/29/2024 0840 by Nereida Anderson RN  Medication Review/Management: medications reviewed     Problem: Sepsis/Septic Shock  Goal: Optimal Coping  Intervention: Support Patient and Family Response  Recent Flowsheet Documentation  Taken 12/29/2024 1405 by Nereida Anderson RN  Family/Support System Care:   self-care encouraged   support provided  Taken 12/29/2024 0840 by Nereida Anderson RN  Family/Support System Care:    self-care encouraged   support provided  Goal: Absence of Infection Signs and Symptoms  Intervention: Initiate Sepsis Management  Recent Flowsheet Documentation  Taken 12/29/2024 1600 by Nereida Anderson RN  Infection Prevention:   hand hygiene promoted   rest/sleep promoted  Isolation Precautions:   precautions maintained   droplet  Taken 12/29/2024 1405 by Nereida Anderson RN  Infection Prevention:   hand hygiene promoted   rest/sleep promoted  Isolation Precautions:   precautions maintained   droplet  Taken 12/29/2024 1200 by Nereida Anderson RN  Infection Prevention:   hand hygiene promoted   rest/sleep promoted  Isolation Precautions:   precautions maintained   droplet  Taken 12/29/2024 1000 by Nereida Anderson RN  Infection Prevention:   hand hygiene promoted   rest/sleep promoted  Isolation Precautions:   precautions maintained   droplet  Taken 12/29/2024 0840 by Nereida Anderson RN  Infection Prevention:   hand hygiene promoted   rest/sleep promoted  Isolation Precautions:   precautions maintained   droplet  Intervention: Promote Recovery  Recent Flowsheet Documentation  Taken 12/29/2024 1600 by Nereida Anderson RN  Activity Management: up ad sanjiv  Taken 12/29/2024 1405 by Nereida Anderson RN  Activity Management: up ad sanjiv  Taken 12/29/2024 1200 by Nereida Anderson RN  Activity Management: up ad sanjiv  Taken 12/29/2024 1000 by Nereida Anderson RN  Activity Management: up ad sanjiv  Taken 12/29/2024 0840 by Nereida Anderson RN  Activity Management: up ad sanjiv     Problem: Adult Inpatient Plan of Care  Goal: Absence of Hospital-Acquired Illness or Injury  Intervention: Identify and Manage Fall Risk  Recent Flowsheet Documentation  Taken 12/29/2024 1600 by Nereida Anderson RN  Safety Promotion/Fall Prevention:   clutter free environment maintained   fall prevention program maintained   nonskid shoes/slippers when out of bed   room organization consistent   safety  round/check completed  Taken 12/29/2024 1405 by Nereida Anderson RN  Safety Promotion/Fall Prevention:   clutter free environment maintained   fall prevention program maintained   nonskid shoes/slippers when out of bed   room organization consistent   safety round/check completed  Taken 12/29/2024 1200 by Nereida Anderson RN  Safety Promotion/Fall Prevention:   clutter free environment maintained   fall prevention program maintained   nonskid shoes/slippers when out of bed   room organization consistent   safety round/check completed  Taken 12/29/2024 1000 by Nereida Anderson RN  Safety Promotion/Fall Prevention:   clutter free environment maintained   fall prevention program maintained   nonskid shoes/slippers when out of bed   room organization consistent   safety round/check completed  Taken 12/29/2024 0840 by Nereida Anderson RN  Safety Promotion/Fall Prevention:   clutter free environment maintained   fall prevention program maintained   nonskid shoes/slippers when out of bed   room organization consistent   safety round/check completed  Intervention: Prevent Skin Injury  Recent Flowsheet Documentation  Taken 12/29/2024 1600 by Nereida Anderson RN  Body Position: position changed independently  Taken 12/29/2024 1405 by Nereida Anderson RN  Body Position: position changed independently  Taken 12/29/2024 1200 by Nereida Anderson RN  Body Position: position changed independently  Taken 12/29/2024 1000 by Nereida Anderson RN  Body Position: position changed independently  Taken 12/29/2024 0840 by Nereida Anderson RN  Body Position: position changed independently  Intervention: Prevent and Manage VTE (Venous Thromboembolism) Risk  Recent Flowsheet Documentation  Taken 12/29/2024 0840 by Nereida Anderson RN  VTE Prevention/Management: patient refused intervention  Intervention: Prevent Infection  Recent Flowsheet Documentation  Taken 12/29/2024 1600 by Justin  MARISOL Padron  Infection Prevention:   hand hygiene promoted   rest/sleep promoted  Taken 12/29/2024 1405 by Nereida Anderson RN  Infection Prevention:   hand hygiene promoted   rest/sleep promoted  Taken 12/29/2024 1200 by Nereida Anderson RN  Infection Prevention:   hand hygiene promoted   rest/sleep promoted  Taken 12/29/2024 1000 by Nereida Anderson RN  Infection Prevention:   hand hygiene promoted   rest/sleep promoted  Taken 12/29/2024 0840 by Nereida Anderson RN  Infection Prevention:   hand hygiene promoted   rest/sleep promoted     Problem: Comorbidity Management  Goal: Maintenance of Asthma Control  Intervention: Maintain Asthma Symptom Control  Recent Flowsheet Documentation  Taken 12/29/2024 1600 by Nereida Anderson RN  Medication Review/Management: medications reviewed  Taken 12/29/2024 1405 by Nereida Anderson RN  Medication Review/Management: medications reviewed  Taken 12/29/2024 1200 by Nereida Anderson RN  Medication Review/Management: medications reviewed  Taken 12/29/2024 1000 by Nereida Anderson RN  Medication Review/Management: medications reviewed  Taken 12/29/2024 0840 by Nereida Anderson RN  Medication Review/Management: medications reviewed  Goal: Maintenance of Behavioral Health Symptom Control  Intervention: Maintain Behavioral Health Symptom Control  Recent Flowsheet Documentation  Taken 12/29/2024 1600 by Nereida Anderson RN  Medication Review/Management: medications reviewed  Taken 12/29/2024 1405 by Nereida Anderson RN  Medication Review/Management: medications reviewed  Taken 12/29/2024 1200 by Nereida Anderson RN  Medication Review/Management: medications reviewed  Taken 12/29/2024 1000 by Nereida Anderson RN  Medication Review/Management: medications reviewed  Taken 12/29/2024 0840 by Nereida Anderson RN  Medication Review/Management: medications reviewed  Goal: Maintenance of COPD Symptom Control  Intervention:  Maintain COPD (Chronic Obstructive Pulmonary Disease) Symptom Control  Recent Flowsheet Documentation  Taken 12/29/2024 1600 by Nereida Anderson RN  Medication Review/Management: medications reviewed  Taken 12/29/2024 1405 by Nereida Anderson RN  Medication Review/Management: medications reviewed  Taken 12/29/2024 1200 by Nereida Anderson RN  Medication Review/Management: medications reviewed  Taken 12/29/2024 1000 by Nereida Anderson RN  Medication Review/Management: medications reviewed  Taken 12/29/2024 0840 by Nereida Anderson RN  Medication Review/Management: medications reviewed  Goal: Maintenance of Osteoarthritis Symptom Control  Intervention: Maintain Osteoarthritis Symptom Control  Recent Flowsheet Documentation  Taken 12/29/2024 1600 by Nereida Anderson RN  Activity Management: up ad sanjiv  Medication Review/Management: medications reviewed  Taken 12/29/2024 1405 by Nereida Anderson RN  Activity Management: up ad sanjiv  Medication Review/Management: medications reviewed  Taken 12/29/2024 1200 by Nereida Anderson RN  Activity Management: up ad sanjiv  Medication Review/Management: medications reviewed  Taken 12/29/2024 1000 by Nereida Anderson RN  Activity Management: up ad sanjiv  Medication Review/Management: medications reviewed  Taken 12/29/2024 0840 by Nereida Anderson RN  Activity Management: up ad sanjiv  Medication Review/Management: medications reviewed

## 2024-12-29 NOTE — PLAN OF CARE
Goal Outcome Evaluation:  Plan of Care Reviewed With: patient        Progress: no change  Outcome Evaluation: vitals stable.  pt denied pain.  meds given per orders.  isolation precautions maintained.  will continue to monitor.

## 2024-12-29 NOTE — PROGRESS NOTES
Consult Daily Progress Note  [facility]   12/29/24      Patient Name:  Nereida Myles  MRN:  [id number]   YOB: 1975  Age: 49 y.o.  Sex: female  LOS: 0    Reason for Consult:  Asthma exacerbation, shortness of breath    Hospital Course:   49-year-old with a history of asthma who presented after being found to have significant shortness of breath in clinic and directed to the emergency department.  Admitted for asthma exacerbation.    Interval History:  No acute events overnight  Continues to have symptoms and states that they are worse than previously.  Significant cough and sputum production  No chest pain or palpitations  No nausea vomiting  On 2 L nasal cannula    Physical Exam:  Vitals:    12/29/24 1200   BP: 115/69   Pulse: 78   Resp: 18   Temp: 98.2 °F (36.8 °C)   SpO2: 95%       Intake/Output    Intake/Output Summary (Last 24 hours) at 12/29/2024 1356  Last data filed at 12/29/2024 1200  Gross per 24 hour   Intake 1440 ml   Output --   Net 1440 ml     General: Alert, nontoxic, NAD  HEENT: NC/AT, EOMI, MMM  Neck: Supple, trachea midline  Cardiac: RRR, no murmur, gallops, rubs  Pulmonary: Diminished with wheezing  GI: Soft, non-tender, non-distended, normal bowel sounds  Extremities: Warm, well perfused, no LE edema  Skin: no visible rash  Neuro: CN II - XII grossly intact  Psychiatry: Normal mood and affect      Data Review:  Results from last 7 days   Lab Units 12/29/24  0423 12/28/24  0429 12/27/24  1714   WBC 10*3/mm3 10.20 4.91 8.18   HEMOGLOBIN g/dL 12.3 13.2 14.6   PLATELETS 10*3/mm3 326 337 394     Results from last 7 days   Lab Units 12/29/24  0423 12/28/24  1439 12/28/24  0429 12/27/24  1714   SODIUM mmol/L 140  --  136 138   POTASSIUM mmol/L 4.5 4.1 4.6 3.1*   CHLORIDE mmol/L 105  --  103 100   CO2 mmol/L 25.0  --  24.3 25.0   BUN mg/dL 11  --  11 19   CREATININE mg/dL 0.51*  --  0.64 0.95   GLUCOSE mg/dL 148*  --  188* 84   CALCIUM mg/dL 9.0  --  9.1 9.6    MAGNESIUM mg/dL 2.1  --  2.3  --    PHOSPHORUS mg/dL 2.8  --   --   --    Estimated Creatinine Clearance: 119.3 mL/min (A) (by C-G formula based on SCr of 0.51 mg/dL (L)).    Results from last 7 days   Lab Units 12/29/24  0423 12/28/24  0429 12/27/24 2122 12/27/24  1714   AST (SGOT) U/L 14 16  --  18   ALT (SGPT) U/L 29 26  --  26   LACTATE mmol/L  --   --  1.8  --    D DIMER QUANT MCGFEU/mL  --   --   --  0.28   PLATELETS 10*3/mm3 326 337  --  394               Imaging:  Reviewed chest images personally from past 3 days    ASSESSMENT  /  PLAN:    Acute asthma exacerbation  Acute hypoxic respiratory failure  Hypothyroidism  Depression  Gastroesophageal reflux disease  Lumbar degenerative disc disease  History of breast cancer on tamoxifen     -Patient with worsening respiratory symptoms.  I would have expected improvement with IV steroids for the past 2 days.  Will obtain a CT chest to further evaluate lung parenchyma.  D-dimer on presentation was negative so low suspicion for pulmonary embolism.  -Continue IV steroids at this time  -Patient has received multiple courses of antibiotics so far, at this time she is afebrile, no leukocytosis, procalcitonin negative.  Chest x-ray without any evidence of pneumonia.  I will hold off on antibiotics at this time.  -Bronchodilators increased to every 4 hours  -Did have significant eosinophilia once back in August of this year however repeat differentials do not demonstrate.  If she continues to have significant respiratory symptoms may need to consider a biologic as an outpatient.  -Continue supplemental oxygen with SpO2 goal greater than 90%.     Thank you for allowing me to participate in the care of this patient.  I will continue to follow along with you.    Abdullahi Patel MD  [facility]  Pulmonary and Critical Care Medicine, Interventional Pulmonology    Parts of this note may be an electronic transcription/translation of spoken language to printed  text using the Dragon dictation system.

## 2024-12-30 ENCOUNTER — APPOINTMENT (OUTPATIENT)
Dept: CT IMAGING | Facility: HOSPITAL | Age: 49
End: 2024-12-30
Payer: COMMERCIAL

## 2024-12-30 LAB
ANION GAP SERPL CALCULATED.3IONS-SCNC: 9 MMOL/L (ref 5–15)
BUN SERPL-MCNC: 13 MG/DL (ref 6–20)
BUN/CREAT SERPL: 21 (ref 7–25)
CALCIUM SPEC-SCNC: 8.8 MG/DL (ref 8.6–10.5)
CHLORIDE SERPL-SCNC: 105 MMOL/L (ref 98–107)
CO2 SERPL-SCNC: 26 MMOL/L (ref 22–29)
CREAT SERPL-MCNC: 0.62 MG/DL (ref 0.57–1)
DEPRECATED RDW RBC AUTO: 40.1 FL (ref 37–54)
EGFRCR SERPLBLD CKD-EPI 2021: 108 ML/MIN/1.73
ERYTHROCYTE [DISTWIDTH] IN BLOOD BY AUTOMATED COUNT: 12.1 % (ref 12.3–15.4)
GLUCOSE SERPL-MCNC: 144 MG/DL (ref 65–99)
HCT VFR BLD AUTO: 37.4 % (ref 34–46.6)
HGB BLD-MCNC: 12.4 G/DL (ref 12–15.9)
MAGNESIUM SERPL-MCNC: 2.4 MG/DL (ref 1.6–2.6)
MCH RBC QN AUTO: 30.2 PG (ref 26.6–33)
MCHC RBC AUTO-ENTMCNC: 33.2 G/DL (ref 31.5–35.7)
MCV RBC AUTO: 91.2 FL (ref 79–97)
PHOSPHATE SERPL-MCNC: 2.7 MG/DL (ref 2.5–4.5)
PLATELET # BLD AUTO: 340 10*3/MM3 (ref 140–450)
PMV BLD AUTO: 9.7 FL (ref 6–12)
POTASSIUM SERPL-SCNC: 4.4 MMOL/L (ref 3.5–5.2)
RBC # BLD AUTO: 4.1 10*6/MM3 (ref 3.77–5.28)
SODIUM SERPL-SCNC: 140 MMOL/L (ref 136–145)
WBC NRBC COR # BLD AUTO: 10.32 10*3/MM3 (ref 3.4–10.8)

## 2024-12-30 PROCEDURE — 94799 UNLISTED PULMONARY SVC/PX: CPT

## 2024-12-30 PROCEDURE — 25010000002 METHYLPREDNISOLONE PER 125 MG: Performed by: NURSE PRACTITIONER

## 2024-12-30 PROCEDURE — 94664 DEMO&/EVAL PT USE INHALER: CPT

## 2024-12-30 PROCEDURE — 83735 ASSAY OF MAGNESIUM: CPT | Performed by: HOSPITALIST

## 2024-12-30 PROCEDURE — 25010000002 PROCHLORPERAZINE 10 MG/2ML SOLUTION: Performed by: HOSPITALIST

## 2024-12-30 PROCEDURE — 71250 CT THORAX DX C-: CPT

## 2024-12-30 PROCEDURE — 80048 BASIC METABOLIC PNL TOTAL CA: CPT | Performed by: HOSPITALIST

## 2024-12-30 PROCEDURE — 85027 COMPLETE CBC AUTOMATED: CPT | Performed by: HOSPITALIST

## 2024-12-30 PROCEDURE — 84100 ASSAY OF PHOSPHORUS: CPT | Performed by: HOSPITALIST

## 2024-12-30 RX ADMIN — IPRATROPIUM BROMIDE AND ALBUTEROL SULFATE 3 ML: 2.5; .5 SOLUTION RESPIRATORY (INHALATION) at 19:58

## 2024-12-30 RX ADMIN — IPRATROPIUM BROMIDE AND ALBUTEROL SULFATE 3 ML: 2.5; .5 SOLUTION RESPIRATORY (INHALATION) at 14:49

## 2024-12-30 RX ADMIN — IPRATROPIUM BROMIDE AND ALBUTEROL SULFATE 3 ML: 2.5; .5 SOLUTION RESPIRATORY (INHALATION) at 11:44

## 2024-12-30 RX ADMIN — Medication 10 ML: at 20:11

## 2024-12-30 RX ADMIN — TAMOXIFEN CITRATE 20 MG: 10 TABLET, FILM COATED ORAL at 09:03

## 2024-12-30 RX ADMIN — METHYLPREDNISOLONE SODIUM SUCCINATE 60 MG: 125 INJECTION INTRAMUSCULAR; INTRAVENOUS at 09:13

## 2024-12-30 RX ADMIN — BUDESONIDE AND FORMOTEROL FUMARATE DIHYDRATE 2 PUFF: 160; 4.5 AEROSOL RESPIRATORY (INHALATION) at 07:17

## 2024-12-30 RX ADMIN — MONTELUKAST SODIUM 10 MG: 10 TABLET, FILM COATED ORAL at 09:03

## 2024-12-30 RX ADMIN — METHYLPREDNISOLONE SODIUM SUCCINATE 60 MG: 125 INJECTION INTRAMUSCULAR; INTRAVENOUS at 20:52

## 2024-12-30 RX ADMIN — PREGABALIN 25 MG: 25 CAPSULE ORAL at 20:11

## 2024-12-30 RX ADMIN — IPRATROPIUM BROMIDE AND ALBUTEROL SULFATE 3 ML: 2.5; .5 SOLUTION RESPIRATORY (INHALATION) at 00:04

## 2024-12-30 RX ADMIN — THEOPHYLLINE 300 MG: 300 TABLET, EXTENDED RELEASE ORAL at 09:03

## 2024-12-30 RX ADMIN — BUPROPION HYDROCHLORIDE 300 MG: 300 TABLET, EXTENDED RELEASE ORAL at 09:03

## 2024-12-30 RX ADMIN — PROCHLORPERAZINE EDISYLATE 5 MG: 5 INJECTION, SOLUTION INTRAMUSCULAR; INTRAVENOUS at 09:03

## 2024-12-30 RX ADMIN — AZITHROMYCIN 500 MG: 250 TABLET, FILM COATED ORAL at 09:03

## 2024-12-30 RX ADMIN — IPRATROPIUM BROMIDE AND ALBUTEROL SULFATE 3 ML: 2.5; .5 SOLUTION RESPIRATORY (INHALATION) at 07:13

## 2024-12-30 RX ADMIN — LEVOTHYROXINE SODIUM 50 MCG: 50 TABLET ORAL at 06:10

## 2024-12-30 RX ADMIN — Medication 1000 MCG: at 09:03

## 2024-12-30 RX ADMIN — BUDESONIDE AND FORMOTEROL FUMARATE DIHYDRATE 2 PUFF: 160; 4.5 AEROSOL RESPIRATORY (INHALATION) at 19:59

## 2024-12-30 RX ADMIN — Medication 10 ML: at 09:13

## 2024-12-30 RX ADMIN — FLUOXETINE HYDROCHLORIDE 80 MG: 20 CAPSULE ORAL at 09:03

## 2024-12-30 RX ADMIN — PROCHLORPERAZINE EDISYLATE 5 MG: 5 INJECTION, SOLUTION INTRAMUSCULAR; INTRAVENOUS at 22:28

## 2024-12-30 RX ADMIN — Medication 1 TABLET: at 20:11

## 2024-12-30 NOTE — PROGRESS NOTES
Name: Nereida Myles ADMIT: 2024   : 1975  PCP: Kehrer, Meredith Lea, MD    MRN: [id number] LOS: 0 days   AGE/SEX: 49 y.o. female  ROOM: [facility]     Subjective   Subjective   Feeling about the same today. Still SOA at rest--feels like she can't get a deep breath. No CP. Cough unchanged. No N/V/D/abd pain. Tolerating po. Voiding well. No F/C/NS.       Objective   Objective   Vital Signs  Temp:  [97.3 °F (36.3 °C)-98.4 °F (36.9 °C)] 98.1 °F (36.7 °C)  Heart Rate:  [50-78] 50  Resp:  [16-18] 18  BP: (103-115)/(63-69) 112/64  SpO2:  [94 %-97 %] 95 %  on  Flow (L/min) (Oxygen Therapy):  [2] 2;   Device (Oxygen Therapy): nasal cannula  Body mass index is 21.91 kg/m².  Physical Exam  Vitals and nursing note reviewed.   Constitutional:       General: She is not in acute distress.     Appearance: She is ill-appearing. She is not toxic-appearing or diaphoretic.   HENT:      Head: Normocephalic.      Nose: Nose normal.      Mouth/Throat:      Mouth: Mucous membranes are moist.      Pharynx: Oropharynx is clear.   Eyes:      General: No scleral icterus.        Right eye: No discharge.         Left eye: No discharge.      Extraocular Movements: Extraocular movements intact.      Conjunctiva/sclera: Conjunctivae normal.   Cardiovascular:      Rate and Rhythm: Normal rate and regular rhythm.      Pulses: Normal pulses.   Pulmonary:      Comments: Increased WOB  Global exp wheeze with prolonged exp phase  No rales  Abdominal:      General: Bowel sounds are normal. There is no distension.      Palpations: Abdomen is soft.      Tenderness: There is no abdominal tenderness.   Musculoskeletal:         General: No swelling.      Cervical back: Neck supple.   Skin:     General: Skin is warm and dry.      Capillary Refill: Capillary refill takes less than 2 seconds.      Coloration: Skin is not jaundiced.   Neurological:      General: No focal deficit present.      Mental Status: She is alert. Mental status is at  "baseline.   Psychiatric:         Mood and Affect: Mood normal.         Behavior: Behavior normal.         Thought Content: Thought content normal.       Results Review     I reviewed the patient's new clinical results.  Results from last 7 days   Lab Units 12/30/24 0345 12/29/24 0423 12/28/24 0429 12/27/24  1714   WBC 10*3/mm3 10.32 10.20 4.91 8.18   HEMOGLOBIN g/dL 12.4 12.3 13.2 14.6   PLATELETS 10*3/mm3 340 326 337 394     Results from last 7 days   Lab Units 12/30/24  0345 12/29/24 0423 12/28/24  1439 12/28/24 0429 12/27/24  1714   SODIUM mmol/L 140 140  --  136 138   POTASSIUM mmol/L 4.4 4.5 4.1 4.6 3.1*   CHLORIDE mmol/L 105 105  --  103 100   CO2 mmol/L 26.0 25.0  --  24.3 25.0   BUN mg/dL 13 11  --  11 19   CREATININE mg/dL 0.62 0.51*  --  0.64 0.95   GLUCOSE mg/dL 144* 148*  --  188* 84   EGFR mL/min/1.73 108.0 113.2  --  107.1 72.7     Results from last 7 days   Lab Units 12/29/24 0423 12/28/24 0429 12/27/24  1714   ALBUMIN g/dL 3.6 3.5 4.0   BILIRUBIN mg/dL 0.2 0.2 0.3   ALK PHOS U/L 68 70 78   AST (SGOT) U/L 14 16 18   ALT (SGPT) U/L 29 26 26     Results from last 7 days   Lab Units 12/30/24 0345 12/29/24 0423 12/28/24 0429 12/27/24  1714   CALCIUM mg/dL 8.8 9.0 9.1 9.6   ALBUMIN g/dL  --  3.6 3.5 4.0   MAGNESIUM mg/dL 2.4 2.1 2.3  --    PHOSPHORUS mg/dL 2.7 2.8  --   --      Results from last 7 days   Lab Units 12/29/24 0423 12/27/24  2122   PROCALCITONIN ng/mL 0.03  --    LACTATE mmol/L  --  1.8     No results found for: \"HGBA1C\", \"POCGLU\"    No radiology results for the last day    I have personally reviewed all medications:  Scheduled Medications  azithromycin, 500 mg, Oral, Q24H  budesonide-formoterol, 2 puff, Inhalation, BID - RT  buPROPion XL, 300 mg, Oral, QAM  FLUoxetine, 80 mg, Oral, Daily  ipratropium-albuterol, 3 mL, Nebulization, Q4H - RT  levothyroxine, 50 mcg, Oral, Q AM  methylPREDNISolone sodium succinate, 60 mg, Intravenous, Q12H  montelukast, 10 mg, Oral, " Daily  multivitamin, 1 tablet, Oral, Nightly  pregabalin, 25 mg, Oral, Nightly  sodium chloride, 10 mL, Intravenous, Q12H  tamoxifen, 20 mg, Oral, Daily  theophylline, 300 mg, Oral, QAM  vitamin B-12, 1,000 mcg, Oral, Daily    Infusions   Diet  Diet: Cardiac; Healthy Heart (2-3 Na+); Fluid Consistency: Thin (IDDSI 0)    I have personally reviewed:  [x]  Laboratory   [x]  Microbiology   []  Radiology   []  EKG/Telemetry  []  Cardiology/Vascular   []  Pathology    []  Records       Assessment/Plan     Active Hospital Problems    Diagnosis  POA    **Severe asthma with acute exacerbation [J45.901]  Yes    Hypokalemia [E87.6]  Yes    History of breast cancer [Z85.3]  Not Applicable    Gastroesophageal reflux disease without esophagitis [K21.9]  Yes    DDD (degenerative disc disease), lumbar [M51.369]  Yes    Hypoxia [R09.02]  Yes    Hypothyroidism [E03.9]  Yes    Depression, major [F32.9]  Yes    Anxiety [F41.9]  Yes      Resolved Hospital Problems   No resolved problems to display.       50yo woman with asthma, chronic low back pain due to lumbar DDD, hypoT4, depression/anxiety, GERD, and breast CA, admitted here earlier this month with acute hypoxic respiratory failure due to rhinovirus and bacterial PNA, who was sent to ER at the direction of Pulm office for admission due to persistent cough, SOA, wheezing, and hypoxia.     Asthma exacerbation  Acute hypoxic resp failure  Appreciate Pulm attention to pt  RVP neg here, afebrile, DDimer wnl, WBC wnl, CXR unremarkable  Continue IV SoluMedrol, Symbicort, DuoNebs, Singulair, and Theophylline  Zithromax added 12/29  Continue supplemental O2  Pulm has ordered CT Chest for today as she is not improving     Hypokalemia  Replaced with protocol  Mg++ level fine     GERD  Continue PPI PRN as she takes at home     HypoT4  TSH wnl  Continue L-T4     Depression/Anxiety  Stable, continue Prozac and Wellbutrin     Lumbar DDD  Stable at baseline  Continue Lyrica at HS     H/o Breast  CA  Continue Tamoxifen      SCDs for DVT prophylaxis.  Full code.  Discussed with patient.  Anticipate discharge home with family when cleared by consultants.  Expected discharge date/ time has not been documented.      Satnos Tamez MD  [facility]  12/30/24  08:32 EST

## 2024-12-30 NOTE — PROGRESS NOTES
Consult Daily Progress Note  [facility]   12/30/24      Patient Name:  Nereida Myles  MRN:  [id number]   YOB: 1975  Age: 49 y.o.  Sex: female  LOS: 0    Reason for Consult:  Asthma exacerbation, shortness of breath    Hospital Course:   49-year-old with a history of asthma who presented after being found to have significant shortness of breath in clinic and directed to the emergency department.  Admitted for asthma exacerbation.    Interval History:  No acute events overnight  CT chest from today demonstrates stability  Overall her symptoms have not changed  Continues on 2 L nasal cannula  No chest pain or palpitations  No nausea or vomiting  Continues to have cough and some sputum production    Physical Exam:  Vitals:    12/30/24 1721   BP: 119/75   Pulse: 61   Resp: 17   Temp: 97.9 °F (36.6 °C)   SpO2: 93%       Intake/Output    Intake/Output Summary (Last 24 hours) at 12/30/2024 1722  Last data filed at 12/30/2024 1721  Gross per 24 hour   Intake 1436 ml   Output --   Net 1436 ml     General: Alert, nontoxic, NAD  HEENT: NC/AT, EOMI, MMM  Neck: Supple, trachea midline  Cardiac: RRR, no murmur, gallops, rubs  Pulmonary: Diminished with wheezing  GI: Soft, non-tender, non-distended, normal bowel sounds  Extremities: Warm, well perfused, no LE edema  Skin: no visible rash  Neuro: CN II - XII grossly intact  Psychiatry: Normal mood and affect      Data Review:  Results from last 7 days   Lab Units 12/30/24  0345 12/29/24  0423 12/28/24  0429 12/27/24  1714   WBC 10*3/mm3 10.32 10.20 4.91 8.18   HEMOGLOBIN g/dL 12.4 12.3 13.2 14.6   PLATELETS 10*3/mm3 340 326 337 394     Results from last 7 days   Lab Units 12/30/24  0345 12/29/24  0423 12/28/24  1439 12/28/24  0429 12/27/24  1714   SODIUM mmol/L 140 140  --  136 138   POTASSIUM mmol/L 4.4 4.5 4.1 4.6 3.1*   CHLORIDE mmol/L 105 105  --  103 100   CO2 mmol/L 26.0 25.0  --  24.3 25.0   BUN mg/dL 13 11  --  11 19   CREATININE mg/dL  0.62 0.51*  --  0.64 0.95   GLUCOSE mg/dL 144* 148*  --  188* 84   CALCIUM mg/dL 8.8 9.0  --  9.1 9.6   MAGNESIUM mg/dL 2.4 2.1  --  2.3  --    PHOSPHORUS mg/dL 2.7 2.8  --   --   --    Estimated Creatinine Clearance: 98.1 mL/min (by C-G formula based on SCr of 0.62 mg/dL).    Results from last 7 days   Lab Units 12/30/24  0345 12/29/24  0423 12/28/24  0429 12/27/24 2122 12/27/24  1714   AST (SGOT) U/L  --  14 16  --  18   ALT (SGPT) U/L  --  29 26  --  26   PROCALCITONIN ng/mL  --  0.03  --   --   --    LACTATE mmol/L  --   --   --  1.8  --    D DIMER QUANT MCGFEU/mL  --   --   --   --  0.28   PLATELETS 10*3/mm3 340 326 337  --  394               Imaging:  Reviewed chest images personally from past 3 days    ASSESSMENT  /  PLAN:    Acute asthma exacerbation  Acute hypoxic respiratory failure  Hypothyroidism  Depression  Gastroesophageal reflux disease  Lumbar degenerative disc disease  History of breast cancer on tamoxifen     -Patient symptoms are overall unchanged from previously.  -CT chest does not demonstrate any concerning intraparenchymal pulmonary pathology.  No evidence of pneumonia.  - Continues to have significant wheezing on evaluation, this remains unchanged.  -Continue IV steroids, will transition to oral upon improvement in respiratory symptoms.  -Patient has received multiple courses of antibiotics so far, at this time she is afebrile, no leukocytosis, procalcitonin negative.  Chest x-ray without any evidence of pneumonia.  I will hold off on antibiotics at this time.  -Bronchodilators every 4 hours  -Continue supplemental oxygen with SpO2 goal greater than 90%.  -Continue azithromycin for exacerbation  -Did have significant eosinophilia once back in August of this year however repeat differentials do not demonstrate.  She appears to have quite significant asthma exacerbation resistant to treatment.  Now that she has had multiple episodes in the past month, I believe that she would qualify for  biologic therapy.  Upon discharge from the hospital we will follow-up with her closely in clinic and initiate biologic therapy.  At this time I believe she just requires more time to break her bronchospasm.    Thank you for allowing me to participate in the care of this patient.  I will continue to follow along with you.    Abdullahi Patel MD  [facility]  Pulmonary and Critical Care Medicine, Interventional Pulmonology    Parts of this note may be an electronic transcription/translation of spoken language to printed text using the Dragon dictation system.

## 2024-12-30 NOTE — CASE MANAGEMENT/SOCIAL WORK
Discharge Planning Assessment  [facility]     Patient Name: Nereida Myles  MRN: [id number]  Today's Date: 12/30/2024    Admit Date: 12/27/2024    Plan: Home   Discharge Needs Assessment       Row Name 12/30/24 1441       Living Environment    People in Home child(pelon), adult;spouse    Current Living Arrangements home    Potentially Unsafe Housing Conditions none    Primary Care Provided by self    Provides Primary Care For no one    Family Caregiver if Needed spouse    Quality of Family Relationships helpful;involved;supportive    Able to Return to Prior Arrangements yes       Resource/Environmental Concerns    Transportation Concerns none       Transition Planning    Patient/Family Anticipates Transition to home    Patient/Family Anticipated Services at Transition none    Transportation Anticipated family or friend will provide       Discharge Needs Assessment    Readmission Within the Last 30 Days no previous admission in last 30 days    Equipment Currently Used at Home nebulizer    Anticipated Changes Related to Illness none    Equipment Needed After Discharge none                   Discharge Plan       Row Name 12/30/24 1440       Plan    Plan Home    Patient/Family in Agreement with Plan yes    Provided Post Acute Provider List? N/A    Provided Post Acute Provider Quality & Resource List? N/A    Plan Comments CCP met with patient at bedside. Introduced self and explained role of CCP. Patient confirmed the information on her face sheet is accurate. Patients PCP is Meredith Kehrer. Patients is enrolled into [facility]. Patient is independent at home. PT signed off. Patient has uses HH in the past but cannot remember the name. Patients uses a nebulizer at home. Patient plans home with family at discharge. Family can transport. CCP following.                  Continued Care and Services - Admitted Since 12/27/2024    No active coordination exists for this encounter.          Demographic Summary       Row Name  12/30/24 1441       General Information    Admission Type observation    Arrived From emergency department    Referral Source admission list    Reason for Consult discharge planning    Preferred Language English                   Functional Status       Row Name 12/30/24 1441       Functional Status    Usual Activity Tolerance good    Current Activity Tolerance good       Functional Status, IADL    Medications independent    Meal Preparation independent    Housekeeping independent    Laundry independent    Shopping independent       Mental Status    General Appearance WDL WDL       Mental Status Summary    Recent Changes in Mental Status/Cognitive Functioning no changes       Employment/    Employment Status employed full-time                   Psychosocial    No documentation.                  Abuse/Neglect    No documentation.                  Legal    No documentation.                  Substance Abuse    No documentation.                  Patient Forms    No documentation.

## 2024-12-30 NOTE — PLAN OF CARE
Goal Outcome Evaluation:         Pt A&Ox4, 2L n/c, turns self in bed, refusing bed alarm and SCDs; up ad sanjiv in room.  SR on heart monitor.  Pain treated with PRN pain medication.  Plan for repeat CT of chest today to update POC and check for improvement in scan to help evaluate need for antibiotic introduction.          Problem: Sepsis/Septic Shock  Goal: Optimal Coping  Outcome: Progressing  Intervention: Support Patient and Family Response  Description: Acknowledge, normalize, validate intensity and complexity of patient and support system response to situation.  Provide opportunity for expression of thoughts, feelings and concerns; respond with compassion and reassurance.  Decrease stress and anxiety by providing information about patient's status and treatment.  Facilitate support system presence and participation in care; consider providing a diary in intensive care situation.  Support coping by recognizing current coping strategies; provide aid in developing new strategies.  Assess and monitor for signs and symptoms of psychologic distress, anxiety and depression.  Utilize complementary therapy, such as music, massage or guided imagery.  Engage in proactive and ongoing discussion about goals of care and facilitate shared decision-making.  Connect with community resources for ongoing support, such as counseling and group support.  Recent Flowsheet Documentation  Taken 12/30/2024 0000 by Olena Campos, RN  Supportive Measures: active listening utilized  Taken 12/29/2024 2009 by Olena Campos, RN  Supportive Measures: active listening utilized  Goal: Absence of Infection Signs and Symptoms  Outcome: Progressing  Intervention: Initiate Sepsis Management  Description: Provide fluid therapy, such as crystalloid or albumin, to increase intravascular volume, organ perfusion and oxygen delivery.  Provide respiratory support, such as oxygen therapy, noninvasive or invasive positive pressure ventilation, to  achieve oxygenation and ventilation goal; avoid hyperoxemia.  Obtain cultures prior to initiating antimicrobial therapy when possible. Do not delay treatment for laboratory results in the presence of high suspicion or clinical indicators.  Administer intravenous broad-spectrum antimicrobial therapy promptly.  Implement hemodynamic monitoring to guide intravascular support based on individual targeted parameters.  Determine and address underlying source of infection aggressively; implement transmission-based precautions and isolation, as indicated.  Recent Flowsheet Documentation  Taken 12/30/2024 0600 by Olena Campos RN  Isolation Precautions:   precautions maintained   droplet  Taken 12/30/2024 0400 by Olena Campos RN  Isolation Precautions:   precautions maintained   droplet  Taken 12/30/2024 0200 by Olena Campos RN  Isolation Precautions:   precautions maintained   droplet  Taken 12/30/2024 0000 by Olena Campos RN  Infection Management: aseptic technique maintained  Isolation Precautions:   precautions maintained   droplet  Taken 12/29/2024 2200 by Olena Campos RN  Isolation Precautions:   precautions maintained   droplet  Taken 12/29/2024 2009 by Olena Campos RN  Infection Management: aseptic technique maintained  Isolation Precautions:   precautions maintained   droplet  Intervention: Promote Stabilization  Description: Monitor for signs of fluid responsiveness and overload; consider fluid adjustment and diuretic therapy.  Anticipate use of vasoactive agent to support microperfusion and oxygen delivery; titrate to response.  Monitor laboratory value, diagnostic test and clinical status trends for signs of infection progression and multiple organ failure.  Assess effectiveness of, and potential for, de-escalation of the antimicrobial regimen daily; promote antimicrobial stewardship.  Provide fever-reduction and comfort measures.  Monitor and manage electrolyte imbalance, such as  hypocalcemia.  Use lung protective ventilation measures, such as low volume, inspiratory pressure, optimal positive end-expiratory pressure, to minimize the risk of ventilator-induced lung injury; ensure minute volume demands.  Prepare for supportive therapy, such as prone positioning, corticosteroid therapy, coagulopathy management, CRRT (continuous renal replacement therapy), hemofiltration and cardiac-assist device.  Recent Flowsheet Documentation  Taken 12/29/2024 2009 by Olena Campos, RN  Fluid/Electrolyte Management: fluids provided  Intervention: Promote Recovery  Description: Encourage pulmonary hygiene, such as cough-enhancement and airway-clearance techniques, that may include use of incentive spirometry, deep breathing and cough.  Encourage early rehabilitation and physical activity to optimize functional ability and activity tolerance, as well as minimize delirium.  Promote energy conservation; minimize oxygen demand and consumption by adjusting environment, decreasing stimulation, maintaining normothermia and treating pain.  Optimize fluid balance, nutrition intake, sleep and glycemic control to maintain tissue perfusion and enhance immune response.  Recent Flowsheet Documentation  Taken 12/30/2024 0000 by Olena Campos, RN  Activity Management:   activity encouraged   up ad sanjiv  Sleep/Rest Enhancement:   awakenings minimized   consistent schedule promoted   room darkened  Taken 12/29/2024 2009 by Olena Campos, RN  Activity Management:   activity encouraged   up ad sanjiv  Sleep/Rest Enhancement:   awakenings minimized   consistent schedule promoted   room darkened  Goal: Optimal Nutrition Delivery  Outcome: Progressing     Problem: Adult Inpatient Plan of Care  Goal: Plan of Care Review  Outcome: Progressing  Goal: Optimal Comfort and Wellbeing  Outcome: Progressing  Intervention: Monitor Pain and Promote Comfort  Description: Assess pain level, treatment efficacy and patient response at  regular intervals using a consistent pain scale.  Consider the presence and impact of preexisting chronic pain.  Encourage patient and caregiver involvement in pain assessment, interventions and safety measures.  Promote activity; balance with sleep and rest to enhance healing.  Recent Flowsheet Documentation  Taken 12/29/2024 2009 by Olena Campos, RN  Pain Management Interventions:   care clustered   diversional activity provided   pillow support provided   position adjusted   pain medication given  Intervention: Provide Person-Centered Care  Description: Use a family-focused approach to care; encourage support system presence and participation.  Develop trust and rapport by proactively providing information, encouraging questions, addressing concerns and offering reassurance.  Acknowledge emotional response to hospitalization.  Recognize and utilize personal coping strategies and strengths; develop goals via shared decision-making.  Honor spiritual and cultural preferences.  Recent Flowsheet Documentation  Taken 12/30/2024 0000 by Olena Campos RN  Trust Relationship/Rapport:   care explained   choices provided   emotional support provided   reassurance provided   thoughts/feelings acknowledged   questions encouraged   questions answered  Taken 12/29/2024 2009 by Olena Campos RN  Trust Relationship/Rapport:   care explained   choices provided   reassurance provided   questions encouraged   questions answered   empathic listening provided   thoughts/feelings acknowledged  Goal: Readiness for Transition of Care  Outcome: Progressing     Problem: Fall Injury Risk  Goal: Absence of Fall and Fall-Related Injury  Outcome: Progressing  Intervention: Identify and Manage Contributors  Description: Develop a fall prevention plan, considering patient-centered interventions and family/caregiver involvement; identify and address patient's facilitators and barriers.  Provide reorientation, appropriate sensory  stimulation and routines with changes in mental status to decrease risk of fall.  Promote use of personal vision and auditory aids.  Assess assistance level required for safe and effective self-care; provide support as needed, such as toileting and mobilization. For age 65 and older, implement timed toileting with assistance.  Encourage physical activity, such as performance of mobility and self-care at highest level of patient ability, multicomponent exercise program and provision of appropriate assistive devices.  If fall occurs, assess the severity of injury; implement fall injury protocol. Determine the cause and revise fall injury prevention plan.  Regularly review and advocate for medication adjustment to decrease fall risk; consider administration times, polypharmacy and age.  Balance adequate pain management with potential for oversedation.  Recent Flowsheet Documentation  Taken 12/30/2024 0600 by Olena Campos, RN  Medication Review/Management: medications reviewed  Taken 12/30/2024 0400 by Olena Campos, RN  Medication Review/Management: medications reviewed  Taken 12/30/2024 0200 by Olena Campos, RN  Medication Review/Management: medications reviewed  Taken 12/30/2024 0000 by Olena Campos, RN  Medication Review/Management: medications reviewed  Self-Care Promotion:   BADL personal objects within reach   independence encouraged   adaptive equipment use encouraged  Taken 12/29/2024 2200 by Olena Campos, RN  Medication Review/Management: medications reviewed  Taken 12/29/2024 2009 by Olena Campos, RN  Medication Review/Management:   medications reviewed   high-risk medications identified  Self-Care Promotion:   independence encouraged   BADL personal objects within reach   adaptive equipment use encouraged  Intervention: Promote Injury-Free Environment  Description: Provide a safe, barrier-free environment that encourages independent activity.  Keep care area uncluttered and  well-lighted.  Determine need for increased observation or monitoring.  Avoid use of devices that minimize mobility, such as restraints or indwelling urinary catheter.  Recent Flowsheet Documentation  Taken 12/30/2024 0600 by Olena Campos RN  Safety Promotion/Fall Prevention: safety round/check completed  Taken 12/30/2024 0400 by Olena Campos RN  Safety Promotion/Fall Prevention: safety round/check completed  Taken 12/30/2024 0200 by Olena Campos RN  Safety Promotion/Fall Prevention: safety round/check completed  Taken 12/30/2024 0000 by Olena Campos RN  Safety Promotion/Fall Prevention: safety round/check completed  Taken 12/29/2024 2200 by Olena Campos RN  Safety Promotion/Fall Prevention: safety round/check completed  Taken 12/29/2024 2009 by Olena Campos RN  Safety Promotion/Fall Prevention: safety round/check completed     Problem: Pain Acute  Goal: Optimal Pain Control and Function  Outcome: Progressing  Intervention: Optimize Psychosocial Wellbeing  Description: Facilitate patient's self-control over pain by providing pain information and allowing choices in treatment.  Consider and address emotional response to pain; express compassion and reassurance.  Explore and promote use of coping strategies; address barriers to successful coping.  Evaluate and assist with psychosocial, cultural and spiritual factors impacting pain.  Assess for risk factors for developing chronic pain, such as depression, fear, pain avoidance and pain catastrophizing.  Modify pain perception using techniques, such as distraction, mindfulness, guided imagery, meditation or music.  Consider referral for ongoing coping support, such as education, relaxation training and role of thoughts.  Recent Flowsheet Documentation  Taken 12/30/2024 0000 by Olena Campos RN  Supportive Measures: active listening utilized  Diversional Activities:   smartphone   television  Taken 12/29/2024 2009 by Olena Campos  RN  Supportive Measures: active listening utilized  Diversional Activities:   television   smartphone  Intervention: Develop Pain Management Plan  Description: Acknowledge patient as the expert in pain self-management.  Use a consistent, validated tool for pain assessment; include function and quality of life.  Evaluate risk for opioid use and dependence.  Set pain management goals; determine acceptable level of discomfort to allow for maximal functioning.  Determine mutually agreed-upon pain management plan, including both pharmacologic and nonpharmacologic measures; integrate management of chronic (persistent) pain.  Identify and integrate past successful treatment measures, if able.  Reevaluate plan regularly.  Recent Flowsheet Documentation  Taken 12/29/2024 2009 by Olena Campos, RN  Pain Management Interventions:   care clustered   diversional activity provided   pillow support provided   position adjusted   pain medication given  Intervention: Prevent or Manage Pain  Description: Evaluate pain level, effect of treatment and patient response at regular intervals.  Minimize painful stimuli; coordinate care and adjust environment (e.g., light, noise, unnecessary movement); promote sleep/rest.  Match pharmacologic analgesia to severity and type of pain mechanism (e.g., neuropathic, muscle, inflammatory); consider multimodal approach.  Provide medication at regular intervals; titrate to patient response; premedicate for painful procedures.  Manage breakthrough pain with additional doses; consider rotation or switching medication.  Monitor for signs of substance tolerance (increased dose to reach desired effect, decreased effect with same dose).  Manage medication-induced adverse events and side effects.  Provide multimodal interventions, such as physical activity, therapeutic exercise, yoga, TENS (transcutaneous electrical nerve stimulation) and manual therapy.  Train in functional activity modifications, such  as body mechanics, posture, ergonomics, energy conservation and activity pacing.  Consider addition of complementary or alternative therapy, such as acupuncture, hypnosis or therapeutic touch.  Recent Flowsheet Documentation  Taken 12/30/2024 0600 by Olena Campos RN  Medication Review/Management: medications reviewed  Taken 12/30/2024 0400 by Olena Campos RN  Medication Review/Management: medications reviewed  Taken 12/30/2024 0200 by Olena Campos RN  Medication Review/Management: medications reviewed  Taken 12/30/2024 0000 by Olena Campos RN  Sensory Stimulation Regulation:   care clustered   lighting decreased   television on  Sleep/Rest Enhancement:   awakenings minimized   consistent schedule promoted   room darkened  Medication Review/Management: medications reviewed  Taken 12/29/2024 2200 by Olena Campos RN  Medication Review/Management: medications reviewed  Taken 12/29/2024 2009 by Olena Campos RN  Sensory Stimulation Regulation:   care clustered   lighting decreased   television on  Bowel Elimination Promotion:   adequate fluid intake promoted   ambulation promoted  Sleep/Rest Enhancement:   awakenings minimized   consistent schedule promoted   room darkened  Medication Review/Management:   medications reviewed   high-risk medications identified     Problem: Nausea and Vomiting  Goal: Nausea and Vomiting Relief  Outcome: Progressing  Intervention: Prevent and Manage Nausea and Vomiting  Description: Adjust position to prevent aspiration.  Identify and address underlying cause.  Monitor intake, output and laboratory value trends; advocate for adjustment in treatment with imbalance.  Evaluate medication (addition, withdrawal, toxicity) as potential source or trigger, such as an opioid agent.  Administer antiemetic agent per prescribed regimen.  Assess fluid status and ability to take oral fluids; if unable to provide or achieve oral intake, provide intravenous fluid therapy and  electrolyte replacement.  Minimize sight, smell and taste of foods or odors that trigger nausea.  Encourage oral fluids when able; minimize barriers to oral intake, such as providing food preferences and small, frequent meals.  Promote oral hygiene after each emesis episode and at routine intervals to provide comfort and protection for the mouth.  Consider complementary therapy, such as acupuncture, point P6 stimulation, aromatherapy or controlled breathing.  Provide comfort measures, such as use of a cool cloth and decreased environmental stimuli, including light, noise and odor.  Recent Flowsheet Documentation  Taken 12/30/2024 0000 by Olena Campos RN  Environmental Support:   calm environment promoted   rest periods encouraged  Taken 12/29/2024 2009 by Olena Campos, RN  Fluid/Electrolyte Management: fluids provided  Nausea/Vomiting Interventions:   medication given   sips of clear liquids given   slow deep breathing encouraged  Environmental Support:   calm environment promoted   rest periods encouraged     Problem: Electrolyte Imbalance  Goal: Electrolyte Balance  Outcome: Progressing  Intervention: Monitor and Manage Electrolyte Imbalance  Description: Anticipate the need for intravenous or oral electrolyte replacement, adjustment or restriction.  Monitor weight, intake, output and laboratory values for trends; advocate for adjustment in treatment if imbalance persists.  Assess medication for potential impact on imbalance; advocate need for medication adjustment.  Monitor ECG (electrocardiogram) rate and rhythm for changes that may indicate fluctuation in serum electrolyte levels, such as calcium and potassium.  Recent Flowsheet Documentation  Taken 12/29/2024 2009 by Olena Campos, RN  Fluid/Electrolyte Management: fluids provided

## 2024-12-31 LAB
ANION GAP SERPL CALCULATED.3IONS-SCNC: 7.8 MMOL/L (ref 5–15)
BASOPHILS # BLD AUTO: 0.01 10*3/MM3 (ref 0–0.2)
BASOPHILS NFR BLD AUTO: 0.1 % (ref 0–1.5)
BUN SERPL-MCNC: 15 MG/DL (ref 6–20)
BUN/CREAT SERPL: 25 (ref 7–25)
CALCIUM SPEC-SCNC: 8.5 MG/DL (ref 8.6–10.5)
CHLORIDE SERPL-SCNC: 107 MMOL/L (ref 98–107)
CO2 SERPL-SCNC: 26.2 MMOL/L (ref 22–29)
CREAT SERPL-MCNC: 0.6 MG/DL (ref 0.57–1)
DEPRECATED RDW RBC AUTO: 41.2 FL (ref 37–54)
EGFRCR SERPLBLD CKD-EPI 2021: 108.8 ML/MIN/1.73
EOSINOPHIL # BLD AUTO: 0 10*3/MM3 (ref 0–0.4)
EOSINOPHIL NFR BLD AUTO: 0 % (ref 0.3–6.2)
ERYTHROCYTE [DISTWIDTH] IN BLOOD BY AUTOMATED COUNT: 12.1 % (ref 12.3–15.4)
GLUCOSE SERPL-MCNC: 134 MG/DL (ref 65–99)
HCT VFR BLD AUTO: 37.5 % (ref 34–46.6)
HGB BLD-MCNC: 12.3 G/DL (ref 12–15.9)
IMM GRANULOCYTES # BLD AUTO: 0.08 10*3/MM3 (ref 0–0.05)
IMM GRANULOCYTES NFR BLD AUTO: 0.7 % (ref 0–0.5)
LYMPHOCYTES # BLD AUTO: 0.35 10*3/MM3 (ref 0.7–3.1)
LYMPHOCYTES NFR BLD AUTO: 3 % (ref 19.6–45.3)
MAGNESIUM SERPL-MCNC: 2.2 MG/DL (ref 1.6–2.6)
MCH RBC QN AUTO: 30.4 PG (ref 26.6–33)
MCHC RBC AUTO-ENTMCNC: 32.8 G/DL (ref 31.5–35.7)
MCV RBC AUTO: 92.8 FL (ref 79–97)
MONOCYTES # BLD AUTO: 0.29 10*3/MM3 (ref 0.1–0.9)
MONOCYTES NFR BLD AUTO: 2.5 % (ref 5–12)
NEUTROPHILS NFR BLD AUTO: 10.88 10*3/MM3 (ref 1.7–7)
NEUTROPHILS NFR BLD AUTO: 93.7 % (ref 42.7–76)
NRBC BLD AUTO-RTO: 0 /100 WBC (ref 0–0.2)
PHOSPHATE SERPL-MCNC: 3 MG/DL (ref 2.5–4.5)
PLATELET # BLD AUTO: 329 10*3/MM3 (ref 140–450)
PMV BLD AUTO: 9.5 FL (ref 6–12)
POTASSIUM SERPL-SCNC: 4.1 MMOL/L (ref 3.5–5.2)
RBC # BLD AUTO: 4.04 10*6/MM3 (ref 3.77–5.28)
SODIUM SERPL-SCNC: 141 MMOL/L (ref 136–145)
WBC NRBC COR # BLD AUTO: 11.61 10*3/MM3 (ref 3.4–10.8)

## 2024-12-31 PROCEDURE — 85025 COMPLETE CBC W/AUTO DIFF WBC: CPT | Performed by: HOSPITALIST

## 2024-12-31 PROCEDURE — 84100 ASSAY OF PHOSPHORUS: CPT | Performed by: HOSPITALIST

## 2024-12-31 PROCEDURE — 94799 UNLISTED PULMONARY SVC/PX: CPT

## 2024-12-31 PROCEDURE — 83735 ASSAY OF MAGNESIUM: CPT | Performed by: HOSPITALIST

## 2024-12-31 PROCEDURE — 25010000002 ONDANSETRON PER 1 MG: Performed by: NURSE PRACTITIONER

## 2024-12-31 PROCEDURE — 94664 DEMO&/EVAL PT USE INHALER: CPT

## 2024-12-31 PROCEDURE — 94760 N-INVAS EAR/PLS OXIMETRY 1: CPT

## 2024-12-31 PROCEDURE — 94761 N-INVAS EAR/PLS OXIMETRY MLT: CPT

## 2024-12-31 PROCEDURE — 80048 BASIC METABOLIC PNL TOTAL CA: CPT | Performed by: HOSPITALIST

## 2024-12-31 PROCEDURE — 25010000002 METHYLPREDNISOLONE PER 125 MG: Performed by: NURSE PRACTITIONER

## 2024-12-31 RX ADMIN — METHYLPREDNISOLONE SODIUM SUCCINATE 60 MG: 125 INJECTION INTRAMUSCULAR; INTRAVENOUS at 08:44

## 2024-12-31 RX ADMIN — IPRATROPIUM BROMIDE AND ALBUTEROL SULFATE 3 ML: 2.5; .5 SOLUTION RESPIRATORY (INHALATION) at 07:13

## 2024-12-31 RX ADMIN — TAMOXIFEN CITRATE 20 MG: 10 TABLET, FILM COATED ORAL at 08:44

## 2024-12-31 RX ADMIN — BUPROPION HYDROCHLORIDE 300 MG: 300 TABLET, EXTENDED RELEASE ORAL at 08:44

## 2024-12-31 RX ADMIN — BUDESONIDE AND FORMOTEROL FUMARATE DIHYDRATE 2 PUFF: 160; 4.5 AEROSOL RESPIRATORY (INHALATION) at 19:24

## 2024-12-31 RX ADMIN — FLUOXETINE HYDROCHLORIDE 80 MG: 20 CAPSULE ORAL at 08:44

## 2024-12-31 RX ADMIN — BENZONATATE 200 MG: 100 CAPSULE ORAL at 06:07

## 2024-12-31 RX ADMIN — IPRATROPIUM BROMIDE AND ALBUTEROL SULFATE 3 ML: 2.5; .5 SOLUTION RESPIRATORY (INHALATION) at 19:21

## 2024-12-31 RX ADMIN — IPRATROPIUM BROMIDE AND ALBUTEROL SULFATE 3 ML: 2.5; .5 SOLUTION RESPIRATORY (INHALATION) at 23:18

## 2024-12-31 RX ADMIN — Medication 1 TABLET: at 20:24

## 2024-12-31 RX ADMIN — HYDROCODONE BITARTRATE AND ACETAMINOPHEN 1 TABLET: 5; 325 TABLET ORAL at 23:47

## 2024-12-31 RX ADMIN — BENZONATATE 200 MG: 100 CAPSULE ORAL at 18:15

## 2024-12-31 RX ADMIN — ONDANSETRON 4 MG: 2 INJECTION, SOLUTION INTRAMUSCULAR; INTRAVENOUS at 20:32

## 2024-12-31 RX ADMIN — LEVOTHYROXINE SODIUM 50 MCG: 50 TABLET ORAL at 06:01

## 2024-12-31 RX ADMIN — Medication 10 ML: at 08:42

## 2024-12-31 RX ADMIN — IPRATROPIUM BROMIDE AND ALBUTEROL SULFATE 3 ML: 2.5; .5 SOLUTION RESPIRATORY (INHALATION) at 00:26

## 2024-12-31 RX ADMIN — METHYLPREDNISOLONE SODIUM SUCCINATE 60 MG: 125 INJECTION INTRAMUSCULAR; INTRAVENOUS at 20:24

## 2024-12-31 RX ADMIN — MONTELUKAST SODIUM 10 MG: 10 TABLET, FILM COATED ORAL at 08:44

## 2024-12-31 RX ADMIN — IPRATROPIUM BROMIDE AND ALBUTEROL SULFATE 3 ML: 2.5; .5 SOLUTION RESPIRATORY (INHALATION) at 11:22

## 2024-12-31 RX ADMIN — IPRATROPIUM BROMIDE AND ALBUTEROL SULFATE 3 ML: 2.5; .5 SOLUTION RESPIRATORY (INHALATION) at 15:46

## 2024-12-31 RX ADMIN — Medication 1000 MCG: at 08:43

## 2024-12-31 RX ADMIN — THEOPHYLLINE 300 MG: 300 TABLET, EXTENDED RELEASE ORAL at 08:43

## 2024-12-31 RX ADMIN — PREGABALIN 25 MG: 25 CAPSULE ORAL at 20:24

## 2024-12-31 RX ADMIN — AZITHROMYCIN 500 MG: 250 TABLET, FILM COATED ORAL at 08:43

## 2024-12-31 RX ADMIN — BUDESONIDE AND FORMOTEROL FUMARATE DIHYDRATE 2 PUFF: 160; 4.5 AEROSOL RESPIRATORY (INHALATION) at 07:14

## 2024-12-31 NOTE — PROGRESS NOTES
Consult Daily Progress Note  [facility]   12/31/24      Patient Name:  Nereida Myles  MRN:  [id number]   YOB: 1975  Age: 49 y.o.  Sex: female  LOS: 1    Reason for Consult:  Asthma exacerbation, shortness of breath    Hospital Course:   49-year-old with a history of asthma who presented after being found to have significant shortness of breath in clinic and directed to the emergency department.  Admitted for asthma exacerbation.    Interval History:  No acute events overnight  On 1 L nasal cannula  Symptoms have improved especially at rest  Not very active and staying in bed mostly  No nausea vomiting  No chest pain or palpitations  Continues to have some cough    Physical Exam:  Vitals:    12/31/24 1129   BP: 129/74   Pulse: 67   Resp: 17   Temp: 97.9 °F (36.6 °C)   SpO2:        Intake/Output    Intake/Output Summary (Last 24 hours) at 12/31/2024 1401  Last data filed at 12/31/2024 0815  Gross per 24 hour   Intake 598 ml   Output --   Net 598 ml     General: Alert, nontoxic, NAD  HEENT: NC/AT, EOMI, MMM  Neck: Supple, trachea midline  Cardiac: RRR, no murmur, gallops, rubs  Pulmonary: Diminished with wheezing  GI: Soft, non-tender, non-distended, normal bowel sounds  Extremities: Warm, well perfused, no LE edema  Skin: no visible rash  Neuro: CN II - XII grossly intact  Psychiatry: Normal mood and affect      Data Review:  Results from last 7 days   Lab Units 12/31/24  0343 12/30/24  0345 12/29/24  0423 12/28/24  0429 12/27/24  1714   WBC 10*3/mm3 11.61* 10.32 10.20 4.91 8.18   HEMOGLOBIN g/dL 12.3 12.4 12.3 13.2 14.6   PLATELETS 10*3/mm3 329 340 326 337 394     Results from last 7 days   Lab Units 12/31/24  0343 12/30/24  0345 12/29/24  0423 12/28/24  1439 12/28/24  0429 12/27/24  1714   SODIUM mmol/L 141 140 140  --  136 138   POTASSIUM mmol/L 4.1 4.4 4.5 4.1 4.6 3.1*   CHLORIDE mmol/L 107 105 105  --  103 100   CO2 mmol/L 26.2 26.0 25.0  --  24.3 25.0   BUN mg/dL 15 13 11  --   11 19   CREATININE mg/dL 0.60 0.62 0.51*  --  0.64 0.95   GLUCOSE mg/dL 134* 144* 148*  --  188* 84   CALCIUM mg/dL 8.5* 8.8 9.0  --  9.1 9.6   MAGNESIUM mg/dL 2.2 2.4 2.1  --  2.3  --    PHOSPHORUS mg/dL 3.0 2.7 2.8  --   --   --    Estimated Creatinine Clearance: 101.4 mL/min (by C-G formula based on SCr of 0.6 mg/dL).    Results from last 7 days   Lab Units 12/31/24  0343 12/30/24  0345 12/29/24  0423 12/28/24  0429 12/27/24 2122 12/27/24  1714   AST (SGOT) U/L  --   --  14 16  --  18   ALT (SGPT) U/L  --   --  29 26  --  26   PROCALCITONIN ng/mL  --   --  0.03  --   --   --    LACTATE mmol/L  --   --   --   --  1.8  --    D DIMER QUANT MCGFEU/mL  --   --   --   --   --  0.28   PLATELETS 10*3/mm3 329 340 326 337  --  394               Imaging:  Reviewed chest images personally from past 3 days    ASSESSMENT  /  PLAN:    Acute asthma exacerbation  Acute hypoxic respiratory failure  Hypothyroidism  Depression  Gastroesophageal reflux disease  Lumbar degenerative disc disease  History of breast cancer on tamoxifen     -Patient symptoms are slowly improving.  -CT chest does not demonstrate any concerning intraparenchymal pulmonary pathology.  No evidence of pneumonia.  -Continue IV steroids, anticipate transition to oral tomorrow.  -Patient has received multiple courses of antibiotics so far, at this time she is afebrile, no leukocytosis, procalcitonin negative.  Chest x-ray without any evidence of pneumonia.  I will hold off on antibiotics at this time.  -Bronchodilators every 4 hours  -Continue supplemental oxygen with SpO2 goal greater than 90%.  -Continue azithromycin for exacerbation  -Did have significant eosinophilia once back in August of this year however repeat differentials do not demonstrate.  She appears to have quite significant asthma exacerbation resistant to treatment.  Now that she has had multiple episodes in the past month, I believe that she would qualify for biologic therapy.  Upon discharge  from the hospital we will follow-up with her closely in clinic and initiate biologic therapy.  At this time I believe she just requires more time to break her bronchospasm.    Thank you for allowing me to participate in the care of this patient.  I will continue to follow along with you.    Abdullahi Patel MD  [facility]  Pulmonary and Critical Care Medicine, Interventional Pulmonology    Parts of this note may be an electronic transcription/translation of spoken language to printed text using the Dragon dictation system.

## 2024-12-31 NOTE — PAYOR COMM NOTE
"Nereida Lozano (49 y.o. Female)        PLEASE SEE ATTACHED FOR INPT AUTH.     REF#[id number]    PLEASE CALL  TEKIA  OR  [phone]    THANK YOU    CHICO BATISTA LPN CCP   Date of Birth   1975    Social Security Number       Address   [address] [place] [address] [place] [id number]    Home Phone       MRN   [id number]       Roman Catholic   None    Marital Status                               Admission Date   12/27/24  OBS   12/30- INPT  Admission Type   Emergency    Admitting Provider   Prabhjot Giang MD    Attending Provider   Santos Tamez MD    Department, Room/Bed   [address], P883/1       Discharge Date       Discharge Disposition       Discharge Destination                                 Attending Provider: Santos Tamez MD    Allergies: No Known Allergies    Isolation: None   Infection: None   Code Status: CPR    Ht: 162.6 cm (64\")   Wt: 57.9 kg (127 lb 10.3 oz)    Admission Cmt: None   Principal Problem: Severe asthma with acute exacerbation [J45.901]                   Active Insurance as of 12/27/2024       Primary Coverage       Payor Plan Insurance Group Employer/Plan Group    ANTHEM BLUE CROSS ANTHEM BLUE CROSS BLUE SHIELD PPO [id number]       Payor Plan Address Payor Plan Phone Number Payor Plan Fax Number Effective Dates    [address] [phone]  4/3/2021 - None Entered    [place] [address]         Subscriber Name Subscriber Birth Date Member ID       NUNO LOZANO 6/2/1965 [id number]                     Emergency Contacts        (Rel.) Home Phone Work Phone Mobile Phone    NUNO LOZANO (Spouse) -- -- [phone]    KAYLEYKEVIN SIMS (Mother) -- -- [phone]    TatyanaElaine xiong (Daughter) [phone] -- [phone]              [place]: [facility] [id number]  Tax ID [id number]     History & Physical        Santos Tamez MD at 12/28/24 0741          [facility]   HISTORY AND PHYSICAL    Patient Name: Nereida ALLRED " Shameka  : 1975  MRN: [id number]  Primary Care Physician:  Kehrer, Meredith Lea, MD  Date of admission: 2024    Subjective  Subjective     Chief Complaint: wheezing    History of Present Illness  50yo woman with asthma, chronic low back pain due to lumbar DDD, hypoT4, depression/anxiety, GERD, and breast CA, admitted here earlier this month with acute hypoxic respiratory failure due to rhinovirus and bacterial PNA, who was sent to ER from Pul ([facility]) office for admission due to persistent cough, SOA, wheezing, and hypoxia. Bronchoscopy planned. She was seen by PCP on  and prescribed Prednisone taper and Zithromax for persistent wheezing but did not experience any significant improvement. She is feeling okay this AM. Still wheezing. Coughing less. Had some nausea overnight but responded well to Zofran.      Review of Systems   Constitutional:  Positive for fatigue. Negative for appetite change, chills, diaphoresis and fever.   HENT:  Negative for congestion, nosebleeds, rhinorrhea, sore throat, trouble swallowing and voice change.    Eyes:  Negative for pain and visual disturbance.   Respiratory:  Positive for cough, shortness of breath and wheezing. Negative for choking, chest tightness and stridor.    Cardiovascular:  Negative for chest pain, palpitations and leg swelling.   Gastrointestinal:  Positive for nausea. Negative for abdominal pain, diarrhea and vomiting.   Endocrine: Negative for cold intolerance and heat intolerance.   Genitourinary:  Negative for decreased urine volume, difficulty urinating, dysuria and hematuria.   Musculoskeletal:  Negative for back pain and neck pain.   Skin:  Negative for color change and rash.   Allergic/Immunologic: Negative for environmental allergies and food allergies.   Neurological:  Negative for seizures, syncope, facial asymmetry, speech difficulty and headaches.   Hematological:  Negative for adenopathy. Does not bruise/bleed easily.    Psychiatric/Behavioral:  Negative for behavioral problems, confusion and hallucinations.         Personal History     Past Medical History:   Diagnosis Date    Allergic     Allergic rhinitis     Anxiety     Asthma     has inhaler and neb treatments    BMI 25.0-25.9,adult     Cancer     left breast    Community acquired pneumonia     COPD (chronic obstructive pulmonary disease)     Depression, major     Disease of thyroid gland     Diverticular disease     Foreign body in ear     Fractures 2012    Foot    GERD (gastroesophageal reflux disease)     Hashimoto's thyroiditis     History of abnormal mammogram     Left breast    History of acute sinusitis     History of adenocarcinoma of breast     History of COPD     History of dyspareunia in female     History of kidney stones     History of lump of left breast     History of malignant neoplasm of left breast     History of nausea and vomiting     History of seborrheic dermatitis     History of strep pharyngitis     History of suicidal ideation     Hypothyroidism     Hypoxemia     History of Hypoxemia    Joint pain 2021    Low back pain 2023    This is when it became debilitating    Lumbosacral disc disease ?    Obsessive compulsive disorder     Other screening mammogram     Personal history of asthma     PONV (postoperative nausea and vomiting)     Recurrent genital herpes simplex     History of     Symptomatic states associated with artificial menopause     History of     TMJ dysfunction 1999    Wear a mouth guard       Past Surgical History:   Procedure Laterality Date    APPENDECTOMY      BREAST AUGMENTATION Bilateral     Revised 2005, initial implants 1992    BREAST RECONSTRUCTION, BREAST TISSUE EXPANDER INSERTION Bilateral     CERVICAL CERCLAGE      x 2 during pregnancy, incompetent cervix    CHOLECYSTECTOMY      COLONOSCOPY      EPIDURAL Right 01/11/2024    Procedure: LUMBAR/SACRAL TRANSFORAMINAL EPIDURAL RIGHT L3-4 #1 43559;  Surgeon: Rei Pruitt MD;   Location[facility];  Service: Pain Management;  Laterality: Right;    EPIDURAL Right 9/19/2024    Procedure: LUMBAR/SACRAL TRANSFORAMINAL EPIDURAL RIGHT L3-4 17720;  Surgeon: Rei Pruitt MD;  Location: [facility];  Service: Pain Management;  Laterality: Right;    EPIDURAL BLOCK  1/2024    HAND SURGERY Right     may2022    HYSTERECTOMY      due to uterine prolapse    KIDNEY STONE SURGERY      LUNG SURGERY      bronchoscopy    MASTECTOMY Bilateral 01/2016    removed lymph nodes on left side    MEDIAL BRANCH BLOCK Right 3/13/2024    Procedure: LUMBAR MEDIAL BRANCH BLOCK RIGHT L3-L5 #1 26679, 02962;  Surgeon: Rei Pruitt MD;  Location: [facility];  Service: Pain Management;  Laterality: Right;    MEDIAL BRANCH BLOCK Bilateral 3/27/2024    Procedure: LUMBAR MEDIAL BRANCH BLOCK BILATERAL L3-L5 73854-90, 64494-50 X 2;  Surgeon: Rei Pruitt MD;  Location: [facility];  Service: Pain Management;  Laterality: Bilateral;    MEDIAL BRANCH BLOCK Left 5/2/2024    Procedure: LUMBAR MEDIAL BRANCH BLOCK LEFT L3-L5 #2 71353, 88503;  Surgeon: Rei Pruitt MD;  Location: [facility];  Service: Pain Management;  Laterality: Left;    NERVE SURGERY Bilateral 6/4/2024    Procedure: LUMBAR RADIOFREQUENCY ABLATION BILATERAL L3-L5 08753-73, 64636-50 X 2;  Surgeon: Rei Pruitt MD;  Location: [facility] MA[facility] OR;  Service: Pain Management;  Laterality: Bilateral;    SACROILIAC JOINT INJECTION Bilateral 11/7/2024    Procedure: SACROILIAC INJECTION BILATERAL 66017-78;  Surgeon: Rei Pruitt MD;  Location: [facility] OR;  Service: Pain Management;  Laterality: Bilateral;    SCAR REVISION BREAST Bilateral 06/21/2022    Procedure: SCAR REVISION LEFT CHEST 27 CENTIMETERS AND RIGHT CHEST 25 CENTIMETERS FOR AESTHETIC FLAT CLOSURE FOLLOWING MASTECTOMY;  Surgeon: Waterhouse, Maurine, MD;  Location: [facility] MAIN OR;  Service: Plastics;  Laterality: Bilateral;    TISSUE EXPANDER PLACEMENT Bilateral     due to  infection       Family History: family history includes Arthritis in her father, maternal grandmother, mother, paternal grandfather, and paternal grandmother; Bipolar disorder in her father; Bleeding Disorder in her maternal grandmother; Cancer in her father; Coronary artery disease in an other family member; Depression in her father, maternal grandmother, and mother; Diabetes in an other family member; GI problems in her mother; Heart disease in an other family member; Hyperlipidemia in her father, maternal grandmother, mother, and paternal grandfather; Hypertension in her father, maternal grandfather, maternal grandmother, and paternal grandfather; Skin cancer in an other family member; Thyroid disease in her mother; Uterine cancer in her mother. Otherwise pertinent FHx was reviewed and not pertinent to current issue.    Social History:  reports that she has never smoked. She has never used smokeless tobacco. She reports current alcohol use of about 5.0 standard drinks of alcohol per week. She reports that she does not use drugs.    Home Medications:  Benzoyl Peroxide, FLUoxetine, Fluticasone Furoate-Vilanterol, albuterol, albuterol sulfate HFA, benzonatate, buPROPion XL, clindamycin, diclofenac, levothyroxine, melatonin, metaxalone, montelukast, multivitamin, omeprazole, ondansetron, predniSONE, pregabalin, tamoxifen, theophylline, valACYclovir, and vitamin B-12    Allergies:  No Known Allergies    Objective   Objective     Vitals:   Temp:  [96.6 °F (35.9 °C)-98.2 °F (36.8 °C)] 97.9 °F (36.6 °C)  Heart Rate:  [] 63  Resp:  [18-24] 18  BP: (103-120)/(59-72) 103/59  Flow (L/min) (Oxygen Therapy):  [2] 2    Physical Exam  Vitals and nursing note reviewed.   Constitutional:       General: She is not in acute distress.     Appearance: She is ill-appearing. She is not toxic-appearing or diaphoretic.   HENT:      Head: Normocephalic.      Nose: Nose normal.      Mouth/Throat:      Mouth: Mucous membranes are  moist.      Pharynx: Oropharynx is clear.   Eyes:      General: No scleral icterus.        Right eye: No discharge.         Left eye: No discharge.      Extraocular Movements: Extraocular movements intact.      Conjunctiva/sclera: Conjunctivae normal.   Cardiovascular:      Rate and Rhythm: Normal rate and regular rhythm.      Pulses: Normal pulses.   Pulmonary:      Effort: Pulmonary effort is normal. No respiratory distress.      Breath sounds: No stridor. Wheezing (global) present. No rhonchi or rales.   Abdominal:      General: Bowel sounds are normal. There is no distension.      Palpations: Abdomen is soft.      Tenderness: There is no abdominal tenderness.   Musculoskeletal:         General: No swelling.      Cervical back: Neck supple.   Skin:     General: Skin is warm and dry.      Capillary Refill: Capillary refill takes less than 2 seconds.      Coloration: Skin is not jaundiced.   Neurological:      General: No focal deficit present.      Mental Status: She is alert and oriented to person, place, and time. Mental status is at baseline.      Cranial Nerves: No cranial nerve deficit.      Coordination: Coordination normal.   Psychiatric:         Mood and Affect: Mood normal.         Behavior: Behavior normal.         Thought Content: Thought content normal.         Result Review   Result Review:  I have personally reviewed the results from the time of this admission to 12/28/2024 07:41 EST and agree with these findings:  [x]  Laboratory list / accordion  [x]  Microbiology  [x]  Radiology  []  EKG/Telemetry   []  Cardiology/Vascular   []  Pathology  [x]  Old records  []  Other:  Most notable findings include: CXR NAD  RVP neg, blood culture in progress  BNP and Trop wnl  LA wnl, DDimer wnl  CBC fine  K+ 3.1->4.6, CMP o/w fine      Assessment & Plan  Assessment / Plan     Brief Patient Summary:  Nereida Myles is a 49 y.o. female who presents with persistent exacerbation of chronic asthma.    Active  Hospital Problems:  Active Hospital Problems    Diagnosis     **Severe asthma with acute exacerbation     Hypokalemia     History of breast cancer     Gastroesophageal reflux disease without esophagitis     DDD (degenerative disc disease), lumbar     Hypoxia     Hypothyroidism     Depression, major     Anxiety      Plan:     Asthma exacerbation  Pulm to see, ?bronch  RVP neg here, afebrile, WBC wnl, CXR unremarkable  Continue IV SoluMedrol, Symbicort, DuoNebs, Singulair, and Theophylline  Continue supplemental O2    Hypokalemia  Replaced with protocol  Check Mg++ level    GERD  Continue PPI    HypoT4  Check TSH  Continue L-T4    Depression/Anxiety  Stable, continue Prozac and Wellbutrin    Lumbar DDD  Stable at baseline  Continue Lyrica at HS    H/o Breast CA  Continue Tamoxifen    Further orders to follow as suggested by evolving hospital course     D/w pt      VTE Prophylaxis:  Mechanical VTE prophylaxis orders are present.    CODE STATUS:    Code Status (Patient has no pulse and is not breathing): CPR (Attempt to Resuscitate)  Medical Interventions (Patient has pulse or is breathing): Full Support    Admission Status:  I believe this patient meets observation status.    Santos Tamez MD    Electronically signed by Santos Tamez MD at 12/28/24 0748          Emergency Department Notes          Blanca Parr MD at 12/27/24 1958           EMERGENCY DEPARTMENT ENCOUNTER    Room Number:  24/24  PCP: Kehrer, Meredith Lea, MD  Independent Historians: Patient    HPI:  Chief Complaint: had concerns including Shortness of Breath.      A complete HPI/ROS/PMH/PSH/SH/FH are unobtainable due to: None    Chronic or social conditions impacting patient care (Social Determinants of Health): None      Context: Nereida Myles is a 49 y.o. female with a medical history of asthma, hypothyroidism, lumbar degenerative disc disease who presents to the ED c/o acute shortness of breath sent in by pulmonology for admission.   Patient with 3 weeks of cough and shortness of breath with wheezing.  Patient has been on several rounds of antibiotics and steroids.  Patient currently on prednisone 20 mg.  Patient using her neb treatments and other inhalers per pulmonology recommendations.  However, symptoms persist.  Patient seen by pulmonology and follow-up for her most recent admission.  Patient recommended to come in for CT scan, admission, and probable bronchoscopy.        Review of prior external notes (non-ED) -and- Review of prior external test results outside of this encounter: I reviewed patient's discharge summary from December 12.  Patient was admitted at that time for her asthma with acute hypoxic respiratory failure requiring 2 L of oxygen.  Patient did have multifocal bilateral lower lobe pneumonia and was positive for rhinovirus.  Patient empirically treated with IV Rocephin.    Prescription drug monitoring program review:     N/A    PAST MEDICAL HISTORY  Active Ambulatory Problems     Diagnosis Date Noted    Hashimoto's thyroiditis 11/18/2019    Hypothyroidism 11/18/2019    Depression, major 11/18/2019    Anxiety 11/18/2019    Asthma 11/18/2019    Pneumonia due to COVID-19 virus 01/20/2021    Hypoxia 01/21/2021    Shortness of breath 01/21/2021    Pyelonephritis 01/07/2023    COPD (chronic obstructive pulmonary disease) 01/07/2023    Breast CA 02/28/2023    Finger clubbing 02/28/2023    Severe episode of recurrent major depressive disorder, without psychotic features 05/18/2023    Severe persistent asthma 05/18/2023    Lumbar radiculopathy 12/12/2023    DDD (degenerative disc disease), lumbar 12/12/2023    Lumbar facet arthropathy 12/12/2023    Pain of right hip 02/08/2024    Gastroesophageal reflux disease without esophagitis 08/02/2024    Low serum vitamin B12 08/02/2024    Major depressive disorder, recurrent, mild 08/02/2024    Sacroiliitis 10/17/2024    Acute hypoxic respiratory failure 12/07/2024    Asthma exacerbation  12/07/2024    History of breast cancer 12/07/2024    Rhinovirus 12/12/2024     Resolved Ambulatory Problems     Diagnosis Date Noted    No Resolved Ambulatory Problems     Past Medical History:   Diagnosis Date    Allergic     Allergic rhinitis     BMI 25.0-25.9,adult     Cancer     Community acquired pneumonia     Disease of thyroid gland     Diverticular disease     Foreign body in ear     Fractures 2012    GERD (gastroesophageal reflux disease)     History of abnormal mammogram     History of acute sinusitis     History of adenocarcinoma of breast     History of COPD     History of dyspareunia in female     History of kidney stones     History of lump of left breast     History of malignant neoplasm of left breast     History of nausea and vomiting     History of seborrheic dermatitis     History of strep pharyngitis     History of suicidal ideation     Hypoxemia     Joint pain 2021    Low back pain 2023    Lumbosacral disc disease ?    Obsessive compulsive disorder     Other screening mammogram     Personal history of asthma     PONV (postoperative nausea and vomiting)     Recurrent genital herpes simplex     Symptomatic states associated with artificial menopause     TMJ dysfunction 1999         PAST SURGICAL HISTORY  Past Surgical History:   Procedure Laterality Date    APPENDECTOMY      BREAST AUGMENTATION Bilateral     Revised 2005, initial implants 1992    BREAST RECONSTRUCTION, BREAST TISSUE EXPANDER INSERTION Bilateral     CERVICAL CERCLAGE      x 2 during pregnancy, incompetent cervix    CHOLECYSTECTOMY      COLONOSCOPY      EPIDURAL Right 01/11/2024    Procedure: LUMBAR/SACRAL TRANSFORAMINAL EPIDURAL RIGHT L3-4 #1 64959;  Surgeon: Rei Pruitt MD;  Location[facility];  Service: Pain Management;  Laterality: Right;    EPIDURAL Right 9/19/2024    Procedure: LUMBAR/SACRAL TRANSFORAMINAL EPIDURAL RIGHT L3-4 23270;  Surgeon: Rei Pruitt MD;  [facility];  Service: Pain  Management;  Laterality: Right;    EPIDURAL BLOCK  1/2024    HAND SURGERY Right     may2022    HYSTERECTOMY      due to uterine prolapse    KIDNEY STONE SURGERY      LUNG SURGERY      bronchoscopy    MASTECTOMY Bilateral 01/2016    removed lymph nodes on left side    MEDIAL BRANCH BLOCK Right 3/13/2024    Procedure: LUMBAR MEDIAL BRANCH BLOCK RIGHT L3-L5 #1 60194, 60046;  Surgeon: Rei Pruitt MD;  Location: [facility] MAIN OR;  Service: Pain Management;  Laterality: Right;    MEDIAL BRANCH BLOCK Bilateral 3/27/2024    Procedure: LUMBAR MEDIAL BRANCH BLOCK BILATERAL L3-L5 10843-69, 64494-50 X 2;  Surgeon: Rei Pruitt MD;  Location: [facility] MAIN OR;  Service: Pain Management;  Laterality: Bilateral;    MEDIAL BRANCH BLOCK Left 5/2/2024    Procedure: LUMBAR MEDIAL BRANCH BLOCK LEFT L3-L5 #2 36107, 89373;  Surgeon: Rei Pruitt MD;  Location: [facility] MA[facility] OR;  Service: Pain Management;  Laterality: Left;    NERVE SURGERY Bilateral 6/4/2024    Procedure: LUMBAR RADIOFREQUENCY ABLATION BILATERAL L3-L5 15296-69, 64636-50 X 2;  Surgeon: Rei Pruitt MD;  Location: [facility] MAIN OR;  Service: Pain Management;  Laterality: Bilateral;    SACROILIAC JOINT INJECTION Bilateral 11/7/2024    Procedure: SACROILIAC INJECTION BILATERAL 72867-84;  Surgeon: Rei Pruitt MD;  Location: [facility] OR;  Service: Pain Management;  Laterality: Bilateral;    SCAR REVISION BREAST Bilateral 06/21/2022    Procedure: SCAR REVISION LEFT CHEST 27 CENTIMETERS AND RIGHT CHEST 25 CENTIMETERS FOR AESTHETIC FLAT CLOSURE FOLLOWING MASTECTOMY;  Surgeon: Waterhouse, Maurine, MD;  Location: [facility] MAIN OR;  Service: Plastics;  Laterality: Bilateral;    TISSUE EXPANDER PLACEMENT Bilateral     due to infection         FAMILY HISTORY  Family History   Problem Relation Age of Onset    Thyroid disease Mother     Arthritis Mother     Uterine cancer Mother         in her 40's    Depression Mother     GI problems Mother     Hyperlipidemia Mother      Cancer Father     Bipolar disorder Father     Depression Father     Hyperlipidemia Father     Hypertension Father     Arthritis Father     Arthritis Maternal Grandmother     Bleeding Disorder Maternal Grandmother     Hyperlipidemia Maternal Grandmother     Hypertension Maternal Grandmother     Depression Maternal Grandmother     Hypertension Maternal Grandfather     Hypertension Paternal Grandfather     Hyperlipidemia Paternal Grandfather     Arthritis Paternal Grandfather     Coronary artery disease Other         Maternal GrGF    Skin cancer Other         Maternal GrGF    Diabetes Other         Paternal GrGF    Heart disease Other         FH in females before the age of 65    Arthritis Paternal Grandmother     Malig Hyperthermia Neg Hx          SOCIAL HISTORY  Social History     Socioeconomic History    Marital status:    Tobacco Use    Smoking status: Never    Smokeless tobacco: Never   Vaping Use    Vaping status: Never Used   Substance and Sexual Activity    Alcohol use: Yes     Alcohol/week: 5.0 standard drinks of alcohol     Types: 2 Glasses of wine, 3 Drinks containing 0.5 oz of alcohol per week     Comment: social    Drug use: Never    Sexual activity: Yes     Partners: Male     Birth control/protection: None, Vasectomy, Essure, Hysterectomy         ALLERGIES  Patient has no known allergies.        REVIEW OF SYSTEMS  Review of Systems  Included in HPI  All systems reviewed and negative except for those discussed in HPI.      PHYSICAL EXAM    I have reviewed the triage vital signs and nursing notes.    ED Triage Vitals   Temp Heart Rate Resp BP SpO2   12/27/24 1654 12/27/24 1654 12/27/24 1654 12/27/24 1703 12/27/24 1654   96.6 °F (35.9 °C) 110 22 120/72 96 %      Temp src Heart Rate Source Patient Position BP Location FiO2 (%)   12/27/24 1654 -- 12/27/24 1703 12/27/24 1703 --   Tympanic  Sitting Right arm        Physical Exam  GENERAL: Cooperative and conversant although audibly wheezing female,  alert, no acute distress  SKIN: Warm, dry  HENT: Normocephalic, atraumatic  EYES: no scleral icterus  CV: regular rhythm, accelerated rate  RESPIRATORY: normal effort, inspiratory and expiratory wheezing throughout  ABDOMEN: soft, nontender, nondistended  MUSCULOSKELETAL: no deformity  NEURO: alert, moves all extremities, follows commands                                                              LAB RESULTS  Recent Results (from the past 24 hours)   ECG 12 Lead ED Triage Standing Order; SOA    Collection Time: 12/27/24  5:00 PM   Result Value Ref Range    QT Interval 369 ms    QTC Interval 455 ms   Comprehensive Metabolic Panel    Collection Time: 12/27/24  5:14 PM    Specimen: Arm, Right; Blood   Result Value Ref Range    Glucose 84 65 - 99 mg/dL    BUN 19 6 - 20 mg/dL    Creatinine 0.95 0.57 - 1.00 mg/dL    Sodium 138 136 - 145 mmol/L    Potassium 3.1 (L) 3.5 - 5.2 mmol/L    Chloride 100 98 - 107 mmol/L    CO2 25.0 22.0 - 29.0 mmol/L    Calcium 9.6 8.6 - 10.5 mg/dL    Total Protein 7.0 6.0 - 8.5 g/dL    Albumin 4.0 3.5 - 5.2 g/dL    ALT (SGPT) 26 1 - 33 U/L    AST (SGOT) 18 1 - 32 U/L    Alkaline Phosphatase 78 39 - 117 U/L    Total Bilirubin 0.3 0.0 - 1.2 mg/dL    Globulin 3.0 gm/dL    A/G Ratio 1.3 g/dL    BUN/Creatinine Ratio 20.0 7.0 - 25.0    Anion Gap 13.0 5.0 - 15.0 mmol/L    eGFR 72.7 >60.0 mL/min/1.73   BNP    Collection Time: 12/27/24  5:14 PM    Specimen: Arm, Right; Blood   Result Value Ref Range    proBNP 108.0 0.0 - 900.0 pg/mL   High Sensitivity Troponin T    Collection Time: 12/27/24  5:14 PM    Specimen: Arm, Right; Blood   Result Value Ref Range    HS Troponin T 9 <14 ng/L   Green Top (Gel)    Collection Time: 12/27/24  5:14 PM   Result Value Ref Range    Extra Tube Hold for add-ons.    Lavender Top    Collection Time: 12/27/24  5:14 PM   Result Value Ref Range    Extra Tube hold for add-on    Gold Top - SST    Collection Time: 12/27/24  5:14 PM   Result Value Ref Range    Extra Tube Hold for  add-ons.    Light Blue Top    Collection Time: 12/27/24  5:14 PM   Result Value Ref Range    Extra Tube Hold for add-ons.    CBC Auto Differential    Collection Time: 12/27/24  5:14 PM    Specimen: Arm, Right; Blood   Result Value Ref Range    WBC 8.18 3.40 - 10.80 10*3/mm3    RBC 4.68 3.77 - 5.28 10*6/mm3    Hemoglobin 14.6 12.0 - 15.9 g/dL    Hematocrit 43.2 34.0 - 46.6 %    MCV 92.3 79.0 - 97.0 fL    MCH 31.2 26.6 - 33.0 pg    MCHC 33.8 31.5 - 35.7 g/dL    RDW 12.5 12.3 - 15.4 %    RDW-SD 42.3 37.0 - 54.0 fl    MPV 9.3 6.0 - 12.0 fL    Platelets 394 140 - 450 10*3/mm3    Neutrophil % 75.2 42.7 - 76.0 %    Lymphocyte % 16.4 (L) 19.6 - 45.3 %    Monocyte % 4.5 (L) 5.0 - 12.0 %    Eosinophil % 3.3 0.3 - 6.2 %    Basophil % 0.4 0.0 - 1.5 %    Immature Grans % 0.2 0.0 - 0.5 %    Neutrophils, Absolute 6.15 1.70 - 7.00 10*3/mm3    Lymphocytes, Absolute 1.34 0.70 - 3.10 10*3/mm3    Monocytes, Absolute 0.37 0.10 - 0.90 10*3/mm3    Eosinophils, Absolute 0.27 0.00 - 0.40 10*3/mm3    Basophils, Absolute 0.03 0.00 - 0.20 10*3/mm3    Immature Grans, Absolute 0.02 0.00 - 0.05 10*3/mm3    nRBC 0.0 0.0 - 0.2 /100 WBC   Theophylline Level    Collection Time: 12/27/24  5:14 PM    Specimen: Arm, Right; Blood   Result Value Ref Range    Theophylline Level 9.3 (L) 10.0 - 20.0 mcg/mL   D-dimer, Quantitative    Collection Time: 12/27/24  5:14 PM    Specimen: Arm, Right; Blood   Result Value Ref Range    D-Dimer, Quantitative 0.28 0.00 - 0.51 MCGFEU/mL         RADIOLOGY  XR Chest 1 View    Result Date: 12/27/2024  XR CHEST 1 VW-  HISTORY: 49-year-old female with shortness of breath.  FINDINGS: There is no convincing evidence for pneumonia or CHF. Follow-up with 2 views of the chest is recommended.  This report was finalized on 12/27/2024 6:40 PM by Dr. Monserrat Monroy M.D on Workstation: [id number]         MEDICATIONS GIVEN IN ER  Medications   sodium chloride 0.9 % flush 10 mL (has no administration in time range)   ipratropium-albuterol  (DUO-NEB) nebulizer solution 3 mL (3 mL Nebulization Given 12/27/24 2028)   methylPREDNISolone sodium succinate (SOLU-Medrol) injection 80 mg (80 mg Intravenous Given 12/27/24 2121)         ORDERS PLACED DURING THIS VISIT:  Orders Placed This Encounter   Procedures    Blood Culture - Blood,    Respiratory Panel PCR w/COVID-19(SARS-CoV-2) SATISH/ANA LUISA/BLAIR/PAD/COR/MARIANGEL In-House, NP Swab in UTM/VTM, 2 HR TAT - Swab, Nasopharynx    XR Chest 1 View    [place] Draw    Comprehensive Metabolic Panel    BNP    High Sensitivity Troponin T    CBC Auto Differential    High Sensitivity Troponin T 1Hr    Theophylline Level    Lactic Acid, Plasma    D-dimer, Quantitative    NPO Diet NPO Type: Strict NPO    Undress & Gown    Continuous Pulse Oximetry    Vital Signs    [facility] (on-call MD unless specified) Details    Oxygen Therapy- Nasal Cannula; Titrate 1-6 LPM Per SpO2; 90 - 95%    ECG 12 Lead ED Triage Standing Order; SOA    Insert Peripheral IV    Initiate Observation Status    CBC & Differential    Green Top (Gel)    Lavender Top    Gold Top - SST    Light Blue Top         OUTPATIENT MEDICATION MANAGEMENT:  Current Facility-Administered Medications Ordered in Epic   Medication Dose Route Frequency Provider Last Rate Last Admin    sodium chloride 0.9 % flush 10 mL  10 mL Intravenous PRN Blanca Parr MD         Current Outpatient Medications Ordered in Epic   Medication Sig Dispense Refill    albuterol (PROVENTIL) (2.5 MG/3ML) 0.083% nebulizer solution Take 2.5 mg by nebulization Every 4 (Four) Hours As Needed for Wheezing.      albuterol (PROVENTIL) 4 MG tablet Take 1 tablet by mouth Every Night. 90 tablet 3    albuterol sulfate  (90 Base) MCG/ACT inhaler USE 2 INHALATIONS EVERY 4 HOURS AS NEEDED FOR WHEEZING 34 g 0    benzonatate (TESSALON) 200 MG capsule       Benzoyl Peroxide 10 % liquid 1 Application by Other route Daily.      buPROPion XL (WELLBUTRIN XL) 300 MG 24 hr tablet TAKE 1 TABLET EVERY MORNING 90 tablet 3     clindamycin (CLEOCIN T) 1 % swab APPLY 1 SWAB TOPICALLY TO THE FACE EVERY MORNING      diclofenac (VOLTAREN) 75 MG EC tablet TAKE 1 TABLET TWICE A DAY AS NEEDED FOR PAIN 30 tablet 23    FLUoxetine (PROzac) 40 MG capsule Take 2 capsules by mouth Daily. 180 capsule 3    Fluticasone Furoate-Vilanterol (Breo Ellipta) 100-25 MCG/INH inhaler Inhale 1 puff Daily. 180 each 3    levothyroxine (SYNTHROID, LEVOTHROID) 50 MCG tablet TAKE 1 TABLET DAILY 90 tablet 3    metaxalone (SKELAXIN) 800 MG tablet TAKE 1/2 TO 1 TABLET BY MOUTH FOUR TIMES DAILY AS NEEDED FOR MUSCLE SPASMS 120 tablet 0    montelukast (SINGULAIR) 10 MG tablet TAKE 1 TABLET DAILY 90 tablet 3    Multiple Vitamin (MULTI-VITAMIN DAILY PO) Take 1 tablet by mouth Every Night.      omeprazole (priLOSEC) 40 MG capsule TAKE 1 CAPSULE DAILY 90 capsule 3    ondansetron (ZOFRAN) 4 MG tablet TAKE 1 TABLET EVERY 8 HOURS AS NEEDED FOR NAUSEA OR VOMITING 27 tablet 39    predniSONE (DELTASONE) 10 MG tablet Take 2 tablets by mouth 2 (Two) Times a Day for 3 days, THEN 2 tablets Daily for 4 days, THEN 1 tablet Daily for 7 days. 27 tablet 0    pregabalin (Lyrica) 25 MG capsule Take 1 capsule PO TID x 5 days; if tolerated may slowly increase to 1 or 2 capsules PO  capsule 0    tamoxifen (NOLVADEX) 20 MG chemo tablet Take  by mouth Daily.      theophylline (THEODUR) 300 MG 12 hr tablet Take 1 tablet by mouth Every Morning. 90 tablet 3    valACYclovir (VALTREX) 500 MG tablet TAKE 1 TABLET DAILY 90 tablet 3    vitamin B-12 (CYANOCOBALAMIN) 1000 MCG tablet Take 1 tablet by mouth Daily.           PROCEDURES  Procedures            PROGRESS, DATA ANALYSIS, CONSULTS, AND MEDICAL DECISION MAKING  All labs have been independently interpreted by me.  All radiology studies have been reviewed by me. All EKG's have been independently viewed and interpreted by me.  Discussion below represents my analysis of pertinent findings related to patient's condition, differential diagnosis,  treatment plan and final disposition.    This patient presents with dyspnea, most likely secondary to asthma exacerbation.  The differential diagnosis list includes but is not limited to:   - acute cardiac etiologies like ACS, CHF, pericardial effusion or even tamponade  - acute respiratory etiologies like acute PE, pneumothorax , asthma, COPD exacerbation, allergic etiologies, or infectious etiologies such as PNA   - non-cardiopulmonary causes like toxidromes, metabolic etiologies such as acidemia or electrolyte derangements or even DKA, sepsis, neurologic causes including GBS and myasthenia gravis  Plan EKG, CXR, Labs incl bnp D-dimer and trop and +/- viral swab          ED Course as of 12/27/24 2122   Fri Dec 27, 2024   2100 I viewed patient's chest x-ray and on my interpretation patient has clear lungs and normal heart size [AR]   2118 I discussed patient's case with Lila arana for [facility] was amenable to admitting the patient for further care [AR]      ED Course User Index  [AR] Blanca Parr MD             AS OF 21:22 EST VITALS:    BP - 120/72  HR - 76  TEMP - 96.6 °F (35.9 °C) (Tympanic)  O2 SATS - 99%      COMPLEXITY OF CARE  The patient requires admission.    DIAGNOSIS  Final diagnoses:   Severe persistent asthma with acute exacerbation         DISPOSITION  ED Disposition       ED Disposition   Decision to Admit    Condition   --    Comment   Level of Care: Telemetry [5]   Diagnosis: Severe asthma with acute exacerbation [[id number]]   Admitting Physician: FATMATA MARINA [[id number]]   Attending Physician: FATMATA MARINA [[id number]]   Is patient appropriate for Inpatient Observation Unit?: No [0]                  Please note that portions of this document were completed with a voice recognition program.    Note Disclaimer: At [facility], we believe that sharing information builds trust and better relationships. You are receiving this note because you recently visited [facility]. It is possible  you will see health information before a provider has talked with you about it. This kind of information can be easy to misunderstand. To help you fully understand what it means for your health, we urge you to discuss this note with your provider.         Blanca Parr MD  12/28/24 0006      Electronically signed by Blanca Parr MD at 12/28/24 0006       Oxygen Therapy (since admission)       Date/Time SpO2 Device (Oxygen Therapy) Flow (L/min) (Oxygen Therapy) Oxygen Concentration (%) ETCO2 (mmHg)    12/31/24 1129 -- nasal cannula 2 -- --    12/31/24 1122 95 nasal cannula 2 -- --    12/31/24 0815 -- nasal cannula -- -- --    12/31/24 0743 96 nasal cannula 2 -- --    12/31/24 0718 93 nasal cannula 2 -- --    12/31/24 0713 97 nasal cannula 2 -- --    12/31/24 0411 95 -- -- -- --    12/31/24 0026 95 -- 2 -- --    12/31/24 0000 -- nasal cannula 2 -- --    12/30/24 2000 -- nasal cannula 2 -- --    12/30/24 1959 95 room air -- -- --    12/30/24 1941 96 -- -- -- --    12/30/24 1721 93 room air -- -- --    12/30/24 1449 94 nasal cannula 2 -- --    12/30/24 1441 95 nasal cannula 2 -- --    12/30/24 1355 -- nasal cannula 2 -- --    12/30/24 1144 95 nasal cannula 2 -- --    12/30/24 0936 96 nasal cannula 2 -- --    12/30/24 0902 -- nasal cannula 2 -- --    12/30/24 0713 95 nasal cannula 2 -- --    12/30/24 0415 97 -- -- -- --    12/30/24 0004 95 nasal cannula 2 -- --    12/30/24 0000 -- nasal cannula 2 -- --    12/29/24 2009 -- nasal cannula 2 -- --    12/29/24 1955 96 nasal cannula 2 -- --    12/29/24 1949 96 -- -- -- --    12/29/24 1559 95 nasal cannula 2 -- --    12/29/24 1546 96 nasal cannula 2 -- --    12/29/24 1200 95 nasal cannula 2 -- --    12/29/24 1122 95 nasal cannula 2 -- --    12/29/24 0913 94 nasal cannula 2 -- --    12/29/24 0840 -- nasal cannula 2 -- --    12/29/24 0740 98 nasal cannula 2 -- --    12/29/24 0414 97 -- -- -- --    12/29/24 0014 -- nasal cannula 2 -- --    12/29/24 0003 96 -- --  -- --    12/28/24 2026 -- nasal cannula 2 -- --    12/28/24 2015 -- nasal cannula 2.5 -- --    12/28/24 1947 97 -- -- -- --    12/28/24 1705 98 -- -- -- --    12/28/24 1444 94 nasal cannula 2 -- --    12/28/24 1358 -- nasal cannula 2.5 -- --    12/28/24 1137 -- nasal cannula 2.5 -- --    12/28/24 1118 -- nasal cannula 2 -- --    12/28/24 1112 96 nasal cannula 2 -- --    12/28/24 0845 -- nasal cannula 2 -- --    12/28/24 0824 -- room air -- -- --    12/28/24 0711 96 nasal cannula 2 -- --    12/28/24 0706 97 nasal cannula 2 -- --    12/28/24 0431 98 nasal cannula 2 -- --    12/28/24 0142 -- nasal cannula 2 -- --    12/27/24 2340 98 room air -- -- --    12/27/24 2333 96 room air -- -- --    12/27/24 2215 -- nasal cannula 2 -- --    12/27/24 2212 99 room air -- -- --    12/27/24 2043 92 -- -- -- --    12/27/24 2032 -- room air -- -- --    12/27/24 2028 99 room air -- -- --    12/27/24 2022 94 -- -- -- --    12/27/24 1654 96 -- -- -- --          Intake & Output (last 5 days)         12/26 0701 12/27 0700 12/27 0701 12/28 0700 12/28 0701 12/29 0700 12/29 0701 12/30 0700 12/30 0701 12/31 0700 12/31 0701  01/01 0700    P.O.   1196 120    Total Intake(mL/kg)  600 (10.4) 960 (16.6) 1558 (26.9) 1196 (20.7) 120 (2.1)    Net  +600 +960 +1558 +1196 +120              Urine Unmeasured Occurrence  3 x 3 x             Lines, Drains & Airways       Active LDAs       Name Placement date Placement time Site Days    Peripheral IV 12/30/24 2031 Posterior;Right Forearm 12/30/24 2031  Forearm  less than 1              Inactive LDAs       Name Placement date Placement time Removal date Removal time Site Days    [REMOVED] Peripheral IV 12/27/24 2121 Right Antecubital 12/27/24 2121 12/30/24 2020  Antecubital  2                  Medication Administration Report for Nereida Myles as of 12/27/24 through 12/31/24     Legend:    Given Hold Not Given Due Canceled Entry Other Actions    Time Time (Time) Time Time-Action          Discontinued     Completed     Future     MAR Hold     Linked             Medications 12/27/24 12/28/24 12/29/24 12/30/24 12/31/24      acetaminophen (TYLENOL) tablet 650 mg  Dose: 650 mg  Freq: Every 4 Hours PRN Route: PO  PRN Reason: Mild Pain  Start: 12/27/24 2213     Admin Instructions:   If given for fever, use fever parameter: fever greater than 100.4 °F  Based on patient request - if ordered for moderate or severe pain, provider allows for administration of a medication prescribed for a lower pain scale.    Do not exceed 4 grams of acetaminophen in a 24 hr period. Max dose of 2gm for AST/ALT greater than 120 units/L.    If given for pain, use the following pain scale:   Mild Pain = Pain Score of 1-3, CPOT 1-2  Moderate Pain = Pain Score of 4-6, CPOT 3-4  Severe Pain = Pain Score of 7-10, CPOT 5-8      2245-Given               Or   acetaminophen (TYLENOL) 160 MG/5ML oral solution 650 mg  Dose: 650 mg  Freq: Every 4 Hours PRN Route: PO  PRN Reason: Mild Pain  Start: 12/27/24 2213     Admin Instructions:   If given for fever, use fever parameter: fever greater than 100.4 °F  Based on patient request - if ordered for moderate or severe pain, provider allows for administration of a medication prescribed for a lower pain scale.    Do not exceed 4 grams of acetaminophen in a 24 hr period. Max dose of 2gm for AST/ALT greater than 120 units/L.    If given for pain, use the following pain scale:   Mild Pain = Pain Score of 1-3, CPOT 1-2  Moderate Pain = Pain Score of 4-6, CPOT 3-4  Severe Pain = Pain Score of 7-10, CPOT 5-8      2245-Not Given:  See Alt               Or   acetaminophen (TYLENOL) suppository 650 mg  Dose: 650 mg  Freq: Every 4 Hours PRN Route: RE  PRN Reason: Mild Pain  Start: 12/27/24 2213     Admin Instructions:   If given for fever, use fever parameter: fever greater than 100.4 °F  Based on patient request - if ordered for moderate or severe pain, provider allows for administration of a  medication prescribed for a lower pain scale.    Do not exceed 4 grams of acetaminophen in a 24 hr period. Max dose of 2gm for AST/ALT greater than 120 units/L.    If given for pain, use the following pain scale:   Mild Pain = Pain Score of 1-3, CPOT 1-2  Moderate Pain = Pain Score of 4-6, CPOT 3-4  Severe Pain = Pain Score of 7-10, CPOT 5-8      8466-Not Given:  See Alt               albuterol (PROVENTIL) nebulizer solution 0.083% 2.5 mg/3mL  Dose: 2.5 mg  Freq: Every 6 Hours PRN Route: NEBULIZATION  PRN Reason: Shortness of Air  Start: 12/27/24 2218     Admin Instructions:   Include Respiratory Treatment Education             benzonatate (TESSALON) capsule 200 mg  Dose: 200 mg  Freq: 3 Times Daily PRN Route: PO  PRN Reason: Cough  Start: 12/27/24 2307     Admin Instructions:   Do not crush or chew the capsules or tablets. The drug may not work as designed if the capsule or tablet is crushed or chewed. Swallow whole.  Swallow whole.  Do not crush, chew, or open capsule.          0607-Given            sennosides-docusate (PERICOLACE) 8.6-50 MG per tablet 2 tablet  Dose: 2 tablet  Freq: 2 Times Daily PRN Route: PO  PRN Reason: Constipation  Start: 12/27/24 2213     Admin Instructions:   Start bowel management regimen if patient has not had a bowel movement after 12 hours.             And   polyethylene glycol (MIRALAX) packet 17 g  Dose: 17 g  Freq: Daily PRN Route: PO  PRN Reason: Constipation  PRN Comment: Use if senna-docusate is ineffective  Start: 12/27/24 2213     Admin Instructions:   Use if no bowel movement after 12 hours. Mix in 6-8 ounces of water.  Use 4-8 ounces of water, tea, or juice for each 17 gram dose.             And   bisacodyl (DULCOLAX) EC tablet 5 mg  Dose: 5 mg  Freq: Daily PRN Route: PO  PRN Reason: Constipation  PRN Comment: Use if polyethylene glycol is ineffective  Start: 12/27/24 2213     Admin Instructions:   Use if no bowel movement after 12 hours.  Swallow whole. Do not crush, split,  or chew tablet.             And   bisacodyl (DULCOLAX) suppository 10 mg  Dose: 10 mg  Freq: Daily PRN Route: RE  PRN Reason: Constipation  PRN Comment: Use if bisacodyl oral is ineffective  Start: 12/27/24 2213     Admin Instructions:   Use if no bowel movement after 12 hours.  Hold for diarrhea             famotidine (PEPCID) tablet 20 mg  Dose: 20 mg  Freq: 2 Times Daily PRN Route: PO  PRN Reasons: Heartburn,Indigestion  Start: 12/27/24 2213             HYDROcodone-acetaminophen (NORCO) 5-325 MG per tablet 1 tablet  Dose: 1 tablet  Freq: Every 6 Hours PRN Route: PO  PRN Reason: Moderate Pain  Start: 12/28/24 1331 End: 01/02/25 1330     Admin Instructions:   Based on patient request - if ordered for moderate or severe pain, provider allows for administration of a medication prescribed for a lower pain scale.  [KENAN]    Do not exceed 4 grams of acetaminophen in a 24 hr period. Max dose of 2gm for AST/ALT greater than 120 units/L        If given for pain, use the following pain scale:   Mild Pain = Pain Score of 1-3, CPOT 1-2  Moderate Pain = Pain Score of 4-6, CPOT 3-4  Severe Pain = Pain Score of 7-10, CPOT 5-8       1351-Given         2009-Given             melatonin tablet 5 mg  Dose: 5 mg  Freq: Nightly PRN Route: PO  PRN Reason: Sleep  Start: 12/27/24 2214       0036-Given     2034-Given             metaxalone (SKELAXIN) tablet 400 mg  Dose: 400 mg  Freq: Every 6 Hours PRN Route: PO  PRN Reason: Muscle Spasms  Start: 12/27/24 2313       0043-Given              nitroglycerin (NITROSTAT) SL tablet 0.4 mg  Dose: 0.4 mg  Freq: Every 5 Minutes PRN Route: SL  PRN Reason: Chest Pain  PRN Comment: Only if SBP Greater Than 100  Start: 12/27/24 2213     Admin Instructions:   If Pain Unrelieved After 3 Doses Notify MD  May administer up to 3 doses per episode.             ondansetron (ZOFRAN) injection 4 mg  Dose: 4 mg  Freq: Every 6 Hours PRN Route: IV  PRN Reasons: Nausea,Vomiting  Start: 12/27/24 2213     Admin  "Instructions:   If BOTH ondansetron (ZOFRAN) and promethazine (PHENERGAN) are ordered use ondansetron first and THEN promethazine IF ondansetron is ineffective.      2243-Given         0853-Given     1539-Given        0841-Given     1718-Given            pantoprazole (PROTONIX) EC tablet 40 mg  Dose: 40 mg  Freq: Daily PRN Route: PO  PRN Comment: heartburn  Start: 12/27/24 2314     Admin Instructions:   Swallow whole; do not crush, split, or chew.             Potassium Replacement - Follow Nurse / BPA Driven Protocol  Freq: As Needed Route: XX  PRN Reason: Other  Start: 12/27/24 2306     Admin Instructions:   Open Order & Select \"[facility] Electrolyte Replacement Protocol Algorithm\" to View Details             prochlorperazine (COMPAZINE) injection 5 mg  Dose: 5 mg  Freq: Every 6 Hours PRN Route: IV  PRN Reasons: Nausea,Vomiting  Start: 12/28/24 1727     Admin Instructions:   \"If multiple N/V medications ordered, use in the following order: Ondansetron, Prochlorperazine, Promethazine. Use PO unless patient refuses or patient unable to swallow.\"       1751-Given         1016-Given     2008-Given        0903-Given     2228-Given           sodium chloride 0.9 % flush 10 mL  Dose: 10 mL  Freq: As Needed Route: IV  PRN Reason: Line Care  Start: 12/27/24 2213             sodium chloride 0.9 % flush 10 mL  Dose: 10 mL  Freq: As Needed Route: IV  PRN Reason: Line Care  Start: 12/27/24 1654             sodium chloride 0.9 % infusion 40 mL  Dose: 40 mL  Freq: As Needed Route: IV  PRN Reason: Line Care  Start: 12/27/24 2213     Admin Instructions:   Following administration of an IV intermittent medication, flush line with 40mL NS at 100mL/hr.                    Physician Progress Notes (all)        Abdullahi Patel MD at 12/31/24 1401            Consult Daily Progress Note  [facility]   12/31/24      Patient Name:  Nereida Myles  MRN:  [id number]   YOB: 1975  Age: 49 y.o.  Sex: female  LOS: " 1    Reason for Consult:  Asthma exacerbation, shortness of breath    Hospital Course:   49-year-old with a history of asthma who presented after being found to have significant shortness of breath in clinic and directed to the emergency department.  Admitted for asthma exacerbation.    Interval History:  No acute events overnight  On 1 L nasal cannula  Symptoms have improved especially at rest  Not very active and staying in bed mostly  No nausea vomiting  No chest pain or palpitations  Continues to have some cough    Physical Exam:  Vitals:    12/31/24 1129   BP: 129/74   Pulse: 67   Resp: 17   Temp: 97.9 °F (36.6 °C)   SpO2:        Intake/Output    Intake/Output Summary (Last 24 hours) at 12/31/2024 1401  Last data filed at 12/31/2024 0815  Gross per 24 hour   Intake 598 ml   Output --   Net 598 ml     General: Alert, nontoxic, NAD  HEENT: NC/AT, EOMI, MMM  Neck: Supple, trachea midline  Cardiac: RRR, no murmur, gallops, rubs  Pulmonary: Diminished with wheezing  GI: Soft, non-tender, non-distended, normal bowel sounds  Extremities: Warm, well perfused, no LE edema  Skin: no visible rash  Neuro: CN II - XII grossly intact  Psychiatry: Normal mood and affect      Data Review:  Results from last 7 days   Lab Units 12/31/24  0343 12/30/24  0345 12/29/24  0423 12/28/24  0429 12/27/24  1714   WBC 10*3/mm3 11.61* 10.32 10.20 4.91 8.18   HEMOGLOBIN g/dL 12.3 12.4 12.3 13.2 14.6   PLATELETS 10*3/mm3 329 340 326 337 394     Results from last 7 days   Lab Units 12/31/24  0343 12/30/24  0345 12/29/24  0423 12/28/24  1439 12/28/24  0429 12/27/24  1714   SODIUM mmol/L 141 140 140  --  136 138   POTASSIUM mmol/L 4.1 4.4 4.5 4.1 4.6 3.1*   CHLORIDE mmol/L 107 105 105  --  103 100   CO2 mmol/L 26.2 26.0 25.0  --  24.3 25.0   BUN mg/dL 15 13 11  --  11 19   CREATININE mg/dL 0.60 0.62 0.51*  --  0.64 0.95   GLUCOSE mg/dL 134* 144* 148*  --  188* 84   CALCIUM mg/dL 8.5* 8.8 9.0  --  9.1 9.6   MAGNESIUM mg/dL 2.2 2.4 2.1  --  2.3   --    PHOSPHORUS mg/dL 3.0 2.7 2.8  --   --   --    Estimated Creatinine Clearance: 101.4 mL/min (by C-G formula based on SCr of 0.6 mg/dL).    Results from last 7 days   Lab Units 12/31/24  0343 12/30/24  0345 12/29/24  0423 12/28/24  0429 12/27/24  2122 12/27/24  1714   AST (SGOT) U/L  --   --  14 16  --  18   ALT (SGPT) U/L  --   --  29 26  --  26   PROCALCITONIN ng/mL  --   --  0.03  --   --   --    LACTATE mmol/L  --   --   --   --  1.8  --    D DIMER QUANT MCGFEU/mL  --   --   --   --   --  0.28   PLATELETS 10*3/mm3 329 340 326 337  --  394               Imaging:  Reviewed chest images personally from past 3 days    ASSESSMENT  /  PLAN:    Acute asthma exacerbation  Acute hypoxic respiratory failure  Hypothyroidism  Depression  Gastroesophageal reflux disease  Lumbar degenerative disc disease  History of breast cancer on tamoxifen     -Patient symptoms are slowly improving.  -CT chest does not demonstrate any concerning intraparenchymal pulmonary pathology.  No evidence of pneumonia.  -Continue IV steroids, anticipate transition to oral tomorrow.  -Patient has received multiple courses of antibiotics so far, at this time she is afebrile, no leukocytosis, procalcitonin negative.  Chest x-ray without any evidence of pneumonia.  I will hold off on antibiotics at this time.  -Bronchodilators every 4 hours  -Continue supplemental oxygen with SpO2 goal greater than 90%.  -Continue azithromycin for exacerbation  -Did have significant eosinophilia once back in August of this year however repeat differentials do not demonstrate.  She appears to have quite significant asthma exacerbation resistant to treatment.  Now that she has had multiple episodes in the past month, I believe that she would qualify for biologic therapy.  Upon discharge from the hospital we will follow-up with her closely in clinic and initiate biologic therapy.  At this time I believe she just requires more time to break her bronchospasm.    Thank  you for allowing me to participate in the care of this patient.  I will continue to follow along with you.    Abdullahi Patel MD  [facility]  Pulmonary and Critical Care Medicine, Interventional Pulmonology    Parts of this note may be an electronic transcription/translation of spoken language to printed text using the Dragon dictation system.         Electronically signed by Abdullahi Patel MD at 24 1442       Santos Tamez MD at 24 1343              Name: Nereida Myles ADMIT: 2024   : 1975  PCP: Kehrer, Meredith Lea, MD    MRN: [id number] LOS: 1 days   AGE/SEX: 49 y.o. female  ROOM: [facility]     Subjective   Subjective   Feeling a little better today. Still SOA with exertion but not at rest now. No CP. Cough improving. No N/V/D/abd pain. Tolerating po. Voiding well. No F/C/NS.      Objective   Objective   Vital Signs  Temp:  [97.9 °F (36.6 °C)-98.5 °F (36.9 °C)] 97.9 °F (36.6 °C)  Heart Rate:  [52-71] 67  Resp:  [16-18] 17  BP: (107-129)/(62-75) 129/74  SpO2:  [93 %-97 %] 95 %  on  Flow (L/min) (Oxygen Therapy):  [2] 2;   Device (Oxygen Therapy): nasal cannula  Body mass index is 21.91 kg/m².  Physical Exam  Vitals and nursing note reviewed.   Constitutional:       General: She is not in acute distress.     Appearance: She is ill-appearing. She is not toxic-appearing or diaphoretic.   HENT:      Head: Normocephalic.      Nose: Nose normal.      Mouth/Throat:      Mouth: Mucous membranes are moist.      Pharynx: Oropharynx is clear.   Eyes:      General: No scleral icterus.        Right eye: No discharge.         Left eye: No discharge.      Extraocular Movements: Extraocular movements intact.      Conjunctiva/sclera: Conjunctivae normal.   Cardiovascular:      Rate and Rhythm: Normal rate and regular rhythm.      Pulses: Normal pulses.   Pulmonary:      Comments: Normal WOB  Global exp wheeze but faint, exp phase = insp phase  No rales  Abdominal:      General:  Bowel sounds are normal. There is no distension.      Palpations: Abdomen is soft.      Tenderness: There is no abdominal tenderness.   Musculoskeletal:         General: No swelling.      Cervical back: Neck supple.   Skin:     General: Skin is warm and dry.      Capillary Refill: Capillary refill takes less than 2 seconds.      Coloration: Skin is not jaundiced.   Neurological:      General: No focal deficit present.      Mental Status: She is alert. Mental status is at baseline.   Psychiatric:         Mood and Affect: Mood normal.         Behavior: Behavior normal.         Thought Content: Thought content normal.       Results Review     I reviewed the patient's new clinical results.  Results from last 7 days   Lab Units 12/31/24  0343 12/30/24  0345 12/29/24  0423 12/28/24  0429   WBC 10*3/mm3 11.61* 10.32 10.20 4.91   HEMOGLOBIN g/dL 12.3 12.4 12.3 13.2   PLATELETS 10*3/mm3 329 340 326 337     Results from last 7 days   Lab Units 12/31/24  0343 12/30/24  0345 12/29/24  0423 12/28/24  1439 12/28/24 0429   SODIUM mmol/L 141 140 140  --  136   POTASSIUM mmol/L 4.1 4.4 4.5 4.1 4.6   CHLORIDE mmol/L 107 105 105  --  103   CO2 mmol/L 26.2 26.0 25.0  --  24.3   BUN mg/dL 15 13 11  --  11   CREATININE mg/dL 0.60 0.62 0.51*  --  0.64   GLUCOSE mg/dL 134* 144* 148*  --  188*   EGFR mL/min/1.73 108.8 108.0 113.2  --  107.1     Results from last 7 days   Lab Units 12/29/24  0423 12/28/24  0429 12/27/24  1714   ALBUMIN g/dL 3.6 3.5 4.0   BILIRUBIN mg/dL 0.2 0.2 0.3   ALK PHOS U/L 68 70 78   AST (SGOT) U/L 14 16 18   ALT (SGPT) U/L 29 26 26     Results from last 7 days   Lab Units 12/31/24  0343 12/30/24  0345 12/29/24  0423 12/28/24  0429 12/27/24  1714   CALCIUM mg/dL 8.5* 8.8 9.0 9.1 9.6   ALBUMIN g/dL  --   --  3.6 3.5 4.0   MAGNESIUM mg/dL 2.2 2.4 2.1 2.3  --    PHOSPHORUS mg/dL 3.0 2.7 2.8  --   --      Results from last 7 days   Lab Units 12/29/24  0423 12/27/24  9132   PROCALCITONIN ng/mL 0.03  --    LACTATE mmol/L   "--  1.8     No results found for: \"HGBA1C\", \"POCGLU\"    CT Chest Without Contrast Diagnostic    Result Date: 12/30/2024  1. Mild to moderate bilateral lower lobe atelectasis, scar or pneumonia. 2. Short-term follow-up CT of the chest in approximately 3 months recommended.  Radiation dose reduction techniques were utilized, including automated exposure control and exposure modulation based on body size.        I have personally reviewed all medications:  Scheduled Medications  budesonide-formoterol, 2 puff, Inhalation, BID - RT  buPROPion XL, 300 mg, Oral, QAM  FLUoxetine, 80 mg, Oral, Daily  ipratropium-albuterol, 3 mL, Nebulization, Q4H - RT  levothyroxine, 50 mcg, Oral, Q AM  methylPREDNISolone sodium succinate, 60 mg, Intravenous, Q12H  montelukast, 10 mg, Oral, Daily  multivitamin, 1 tablet, Oral, Nightly  pregabalin, 25 mg, Oral, Nightly  sodium chloride, 10 mL, Intravenous, Q12H  tamoxifen, 20 mg, Oral, Daily  theophylline, 300 mg, Oral, QAM  vitamin B-12, 1,000 mcg, Oral, Daily    Infusions   Diet  Diet: Cardiac; Healthy Heart (2-3 Na+); Fluid Consistency: Thin (IDDSI 0)    I have personally reviewed:  [x]  Laboratory   [x]  Microbiology   [x]  Radiology   []  EKG/Telemetry  []  Cardiology/Vascular   []  Pathology    []  Records      Assessment/Plan     Active Hospital Problems    Diagnosis  POA    **Severe asthma with acute exacerbation [J45.901]  Yes    Hypokalemia [E87.6]  Yes    History of breast cancer [Z85.3]  Not Applicable    Gastroesophageal reflux disease without esophagitis [K21.9]  Yes    DDD (degenerative disc disease), lumbar [M51.369]  Yes    Hypoxia [R09.02]  Yes    Hypothyroidism [E03.9]  Yes    Depression, major [F32.9]  Yes    Anxiety [F41.9]  Yes      Resolved Hospital Problems   No resolved problems to display.       48yo woman with asthma, chronic low back pain due to lumbar DDD, hypoT4, depression/anxiety, GERD, and breast CA, admitted here earlier this month with acute hypoxic " respiratory failure due to rhinovirus and bacterial PNA, who was sent to ER at the direction of Pulm office for admission due to persistent cough, SOA, wheezing, and hypoxia.     Asthma exacerbation  Acute hypoxic resp failure  Appreciate Pulm attention to pt  RVP neg here, afebrile, DDimer wnl, WBC wnl, CXR unremarkable  Continue IV SoluMedrol, Symbicort, DuoNebs, Singulair, and Theophylline  Zithromax added 12/29  Continue supplemental O2  CT Chest unremarkable  Pulm planning biologic agent as outpt when recovered from this episode     Hypokalemia  Replaced with protocol  Mg++ level fine     GERD  Continue PPI PRN as she takes at home     HypoT4  TSH wnl  Continue L-T4     Depression/Anxiety  Stable, continue Prozac and Wellbutrin     Lumbar DDD  Stable at baseline  Continue Lyrica at HS     H/o Breast CA  Continue Tamoxifen      SCDs for DVT prophylaxis.  Full code.  Discussed with patient.  Anticipate discharge home with family when cleared by consultants.  Expected Discharge Date: 1/1/2025; Expected Discharge Time:       Santos Tamez MD  [facility]  12/31/24  13:43 EST      Electronically signed by Santos Tamez MD at 12/31/24 1356       Abdullahi Patel MD at 12/30/24 1722            Consult Daily Progress Note  [facility]   12/30/24      Patient Name:  Nereida Myles  MRN:  [id number]   YOB: 1975  Age: 49 y.o.  Sex: female  LOS: 0    Reason for Consult:  Asthma exacerbation, shortness of breath    Hospital Course:   49-year-old with a history of asthma who presented after being found to have significant shortness of breath in clinic and directed to the emergency department.  Admitted for asthma exacerbation.    Interval History:  No acute events overnight  CT chest from today demonstrates stability  Overall her symptoms have not changed  Continues on 2 L nasal cannula  No chest pain or palpitations  No nausea or vomiting  Continues to have cough  and some sputum production    Physical Exam:  Vitals:    12/30/24 1721   BP: 119/75   Pulse: 61   Resp: 17   Temp: 97.9 °F (36.6 °C)   SpO2: 93%       Intake/Output    Intake/Output Summary (Last 24 hours) at 12/30/2024 1722  Last data filed at 12/30/2024 1721  Gross per 24 hour   Intake 1436 ml   Output --   Net 1436 ml     General: Alert, nontoxic, NAD  HEENT: NC/AT, EOMI, MMM  Neck: Supple, trachea midline  Cardiac: RRR, no murmur, gallops, rubs  Pulmonary: Diminished with wheezing  GI: Soft, non-tender, non-distended, normal bowel sounds  Extremities: Warm, well perfused, no LE edema  Skin: no visible rash  Neuro: CN II - XII grossly intact  Psychiatry: Normal mood and affect      Data Review:  Results from last 7 days   Lab Units 12/30/24 0345 12/29/24  0423 12/28/24  0429 12/27/24  1714   WBC 10*3/mm3 10.32 10.20 4.91 8.18   HEMOGLOBIN g/dL 12.4 12.3 13.2 14.6   PLATELETS 10*3/mm3 340 326 337 394     Results from last 7 days   Lab Units 12/30/24  0345 12/29/24  0423 12/28/24  1439 12/28/24  0429 12/27/24  1714   SODIUM mmol/L 140 140  --  136 138   POTASSIUM mmol/L 4.4 4.5 4.1 4.6 3.1*   CHLORIDE mmol/L 105 105  --  103 100   CO2 mmol/L 26.0 25.0  --  24.3 25.0   BUN mg/dL 13 11  --  11 19   CREATININE mg/dL 0.62 0.51*  --  0.64 0.95   GLUCOSE mg/dL 144* 148*  --  188* 84   CALCIUM mg/dL 8.8 9.0  --  9.1 9.6   MAGNESIUM mg/dL 2.4 2.1  --  2.3  --    PHOSPHORUS mg/dL 2.7 2.8  --   --   --    Estimated Creatinine Clearance: 98.1 mL/min (by C-G formula based on SCr of 0.62 mg/dL).    Results from last 7 days   Lab Units 12/30/24 0345 12/29/24  0423 12/28/24  0429 12/27/24 2122 12/27/24  1714   AST (SGOT) U/L  --  14 16  --  18   ALT (SGPT) U/L  --  29 26  --  26   PROCALCITONIN ng/mL  --  0.03  --   --   --    LACTATE mmol/L  --   --   --  1.8  --    D DIMER QUANT MCGFEU/mL  --   --   --   --  0.28   PLATELETS 10*3/mm3 340 326 337  --  394               Imaging:  Reviewed chest images personally from past 3  days    ASSESSMENT  /  PLAN:    Acute asthma exacerbation  Acute hypoxic respiratory failure  Hypothyroidism  Depression  Gastroesophageal reflux disease  Lumbar degenerative disc disease  History of breast cancer on tamoxifen     -Patient symptoms are overall unchanged from previously.  -CT chest does not demonstrate any concerning intraparenchymal pulmonary pathology.  No evidence of pneumonia.  - Continues to have significant wheezing on evaluation, this remains unchanged.  -Continue IV steroids, will transition to oral upon improvement in respiratory symptoms.  -Patient has received multiple courses of antibiotics so far, at this time she is afebrile, no leukocytosis, procalcitonin negative.  Chest x-ray without any evidence of pneumonia.  I will hold off on antibiotics at this time.  -Bronchodilators every 4 hours  -Continue supplemental oxygen with SpO2 goal greater than 90%.  -Continue azithromycin for exacerbation  -Did have significant eosinophilia once back in August of this year however repeat differentials do not demonstrate.  She appears to have quite significant asthma exacerbation resistant to treatment.  Now that she has had multiple episodes in the past month, I believe that she would qualify for biologic therapy.  Upon discharge from the hospital we will follow-up with her closely in clinic and initiate biologic therapy.  At this time I believe she just requires more time to break her bronchospasm.    Thank you for allowing me to participate in the care of this patient.  I will continue to follow along with you.    Abdullahi Patel MD  [facility]  Pulmonary and Critical Care Medicine, Interventional Pulmonology    Parts of this note may be an electronic transcription/translation of spoken language to printed text using the Dragon dictation system.         Electronically signed by Abdullahi Patel MD at 12/30/24 8273       Santos Tamez MD at 12/30/24 6818              Name:  Nereida Myles ADMIT: 2024   : 1975  PCP: Kehrer, Meredith Lea, MD    MRN: [id number] LOS: 0 days   AGE/SEX: 49 y.o. female  ROOM: [facility]     Subjective   Subjective   Feeling about the same today. Still SOA at rest--feels like she can't get a deep breath. No CP. Cough unchanged. No N/V/D/abd pain. Tolerating po. Voiding well. No F/C/NS.      Objective   Objective   Vital Signs  Temp:  [97.3 °F (36.3 °C)-98.4 °F (36.9 °C)] 98.1 °F (36.7 °C)  Heart Rate:  [50-78] 50  Resp:  [16-18] 18  BP: (103-115)/(63-69) 112/64  SpO2:  [94 %-97 %] 95 %  on  Flow (L/min) (Oxygen Therapy):  [2] 2;   Device (Oxygen Therapy): nasal cannula  Body mass index is 21.91 kg/m².  Physical Exam  Vitals and nursing note reviewed.   Constitutional:       General: She is not in acute distress.     Appearance: She is ill-appearing. She is not toxic-appearing or diaphoretic.   HENT:      Head: Normocephalic.      Nose: Nose normal.      Mouth/Throat:      Mouth: Mucous membranes are moist.      Pharynx: Oropharynx is clear.   Eyes:      General: No scleral icterus.        Right eye: No discharge.         Left eye: No discharge.      Extraocular Movements: Extraocular movements intact.      Conjunctiva/sclera: Conjunctivae normal.   Cardiovascular:      Rate and Rhythm: Normal rate and regular rhythm.      Pulses: Normal pulses.   Pulmonary:      Comments: Increased WOB  Global exp wheeze with prolonged exp phase  No rales  Abdominal:      General: Bowel sounds are normal. There is no distension.      Palpations: Abdomen is soft.      Tenderness: There is no abdominal tenderness.   Musculoskeletal:         General: No swelling.      Cervical back: Neck supple.   Skin:     General: Skin is warm and dry.      Capillary Refill: Capillary refill takes less than 2 seconds.      Coloration: Skin is not jaundiced.   Neurological:      General: No focal deficit present.      Mental Status: She is alert. Mental status is at baseline.  "  Psychiatric:         Mood and Affect: Mood normal.         Behavior: Behavior normal.         Thought Content: Thought content normal.       Results Review     I reviewed the patient's new clinical results.  Results from last 7 days   Lab Units 12/30/24 0345 12/29/24 0423 12/28/24 0429 12/27/24  1714   WBC 10*3/mm3 10.32 10.20 4.91 8.18   HEMOGLOBIN g/dL 12.4 12.3 13.2 14.6   PLATELETS 10*3/mm3 340 326 337 394     Results from last 7 days   Lab Units 12/30/24  0345 12/29/24 0423 12/28/24  1439 12/28/24 0429 12/27/24  1714   SODIUM mmol/L 140 140  --  136 138   POTASSIUM mmol/L 4.4 4.5 4.1 4.6 3.1*   CHLORIDE mmol/L 105 105  --  103 100   CO2 mmol/L 26.0 25.0  --  24.3 25.0   BUN mg/dL 13 11  --  11 19   CREATININE mg/dL 0.62 0.51*  --  0.64 0.95   GLUCOSE mg/dL 144* 148*  --  188* 84   EGFR mL/min/1.73 108.0 113.2  --  107.1 72.7     Results from last 7 days   Lab Units 12/29/24 0423 12/28/24 0429 12/27/24  1714   ALBUMIN g/dL 3.6 3.5 4.0   BILIRUBIN mg/dL 0.2 0.2 0.3   ALK PHOS U/L 68 70 78   AST (SGOT) U/L 14 16 18   ALT (SGPT) U/L 29 26 26     Results from last 7 days   Lab Units 12/30/24 0345 12/29/24 0423 12/28/24 0429 12/27/24  1714   CALCIUM mg/dL 8.8 9.0 9.1 9.6   ALBUMIN g/dL  --  3.6 3.5 4.0   MAGNESIUM mg/dL 2.4 2.1 2.3  --    PHOSPHORUS mg/dL 2.7 2.8  --   --      Results from last 7 days   Lab Units 12/29/24 0423 12/27/24  2122   PROCALCITONIN ng/mL 0.03  --    LACTATE mmol/L  --  1.8     No results found for: \"HGBA1C\", \"POCGLU\"    No radiology results for the last day    I have personally reviewed all medications:  Scheduled Medications  azithromycin, 500 mg, Oral, Q24H  budesonide-formoterol, 2 puff, Inhalation, BID - RT  buPROPion XL, 300 mg, Oral, QAM  FLUoxetine, 80 mg, Oral, Daily  ipratropium-albuterol, 3 mL, Nebulization, Q4H - RT  levothyroxine, 50 mcg, Oral, Q AM  methylPREDNISolone sodium succinate, 60 mg, Intravenous, Q12H  montelukast, 10 mg, Oral, Daily  multivitamin, 1 " tablet, Oral, Nightly  pregabalin, 25 mg, Oral, Nightly  sodium chloride, 10 mL, Intravenous, Q12H  tamoxifen, 20 mg, Oral, Daily  theophylline, 300 mg, Oral, QAM  vitamin B-12, 1,000 mcg, Oral, Daily    Infusions   Diet  Diet: Cardiac; Healthy Heart (2-3 Na+); Fluid Consistency: Thin (IDDSI 0)    I have personally reviewed:  [x]  Laboratory   [x]  Microbiology   []  Radiology   []  EKG/Telemetry  []  Cardiology/Vascular   []  Pathology    []  Records      Assessment/Plan     Active Hospital Problems    Diagnosis  POA    **Severe asthma with acute exacerbation [J45.901]  Yes    Hypokalemia [E87.6]  Yes    History of breast cancer [Z85.3]  Not Applicable    Gastroesophageal reflux disease without esophagitis [K21.9]  Yes    DDD (degenerative disc disease), lumbar [M51.369]  Yes    Hypoxia [R09.02]  Yes    Hypothyroidism [E03.9]  Yes    Depression, major [F32.9]  Yes    Anxiety [F41.9]  Yes      Resolved Hospital Problems   No resolved problems to display.       48yo woman with asthma, chronic low back pain due to lumbar DDD, hypoT4, depression/anxiety, GERD, and breast CA, admitted here earlier this month with acute hypoxic respiratory failure due to rhinovirus and bacterial PNA, who was sent to ER at the direction of Pulm office for admission due to persistent cough, SOA, wheezing, and hypoxia.     Asthma exacerbation  Acute hypoxic resp failure  Appreciate Pulm attention to pt  RVP neg here, afebrile, DDimer wnl, WBC wnl, CXR unremarkable  Continue IV SoluMedrol, Symbicort, DuoNebs, Singulair, and Theophylline  Zithromax added 12/29  Continue supplemental O2  Pulm has ordered CT Chest for today as she is not improving     Hypokalemia  Replaced with protocol  Mg++ level fine     GERD  Continue PPI PRN as she takes at home     HypoT4  TSH wnl  Continue L-T4     Depression/Anxiety  Stable, continue Prozac and Wellbutrin     Lumbar DDD  Stable at baseline  Continue Lyrica at HS     H/o Breast CA  Continue  Tamoxifen      SCDs for DVT prophylaxis.  Full code.  Discussed with patient.  Anticipate discharge home with family when cleared by consultants.  Expected discharge date/ time has not been documented.      Santos Tamez MD  [facility]  12/30/24  08:32 EST      Electronically signed by Santos Tamez MD at 12/30/24 0843       Abdullahi Patel MD at 12/29/24 1356            Consult Daily Progress Note  [facility]   12/29/24      Patient Name:  Nereida Myles  MRN:  [id number]   YOB: 1975  Age: 49 y.o.  Sex: female  LOS: 0    Reason for Consult:  Asthma exacerbation, shortness of breath    Hospital Course:   49-year-old with a history of asthma who presented after being found to have significant shortness of breath in clinic and directed to the emergency department.  Admitted for asthma exacerbation.    Interval History:  No acute events overnight  Continues to have symptoms and states that they are worse than previously.  Significant cough and sputum production  No chest pain or palpitations  No nausea vomiting  On 2 L nasal cannula    Physical Exam:  Vitals:    12/29/24 1200   BP: 115/69   Pulse: 78   Resp: 18   Temp: 98.2 °F (36.8 °C)   SpO2: 95%       Intake/Output    Intake/Output Summary (Last 24 hours) at 12/29/2024 1356  Last data filed at 12/29/2024 1200  Gross per 24 hour   Intake 1440 ml   Output --   Net 1440 ml     General: Alert, nontoxic, NAD  HEENT: NC/AT, EOMI, MMM  Neck: Supple, trachea midline  Cardiac: RRR, no murmur, gallops, rubs  Pulmonary: Diminished with wheezing  GI: Soft, non-tender, non-distended, normal bowel sounds  Extremities: Warm, well perfused, no LE edema  Skin: no visible rash  Neuro: CN II - XII grossly intact  Psychiatry: Normal mood and affect      Data Review:  Results from last 7 days   Lab Units 12/29/24  0423 12/28/24  0429 12/27/24  1714   WBC 10*3/mm3 10.20 4.91 8.18   HEMOGLOBIN g/dL 12.3 13.2 14.6   PLATELETS  10*3/mm3 326 337 394     Results from last 7 days   Lab Units 12/29/24  0423 12/28/24  1439 12/28/24 0429 12/27/24  1714   SODIUM mmol/L 140  --  136 138   POTASSIUM mmol/L 4.5 4.1 4.6 3.1*   CHLORIDE mmol/L 105  --  103 100   CO2 mmol/L 25.0  --  24.3 25.0   BUN mg/dL 11  --  11 19   CREATININE mg/dL 0.51*  --  0.64 0.95   GLUCOSE mg/dL 148*  --  188* 84   CALCIUM mg/dL 9.0  --  9.1 9.6   MAGNESIUM mg/dL 2.1  --  2.3  --    PHOSPHORUS mg/dL 2.8  --   --   --    Estimated Creatinine Clearance: 119.3 mL/min (A) (by C-G formula based on SCr of 0.51 mg/dL (L)).    Results from last 7 days   Lab Units 12/29/24  0423 12/28/24  0429 12/27/24 2122 12/27/24  1714   AST (SGOT) U/L 14 16  --  18   ALT (SGPT) U/L 29 26  --  26   LACTATE mmol/L  --   --  1.8  --    D DIMER QUANT MCGFEU/mL  --   --   --  0.28   PLATELETS 10*3/mm3 326 337  --  394               Imaging:  Reviewed chest images personally from past 3 days    ASSESSMENT  /  PLAN:    Acute asthma exacerbation  Acute hypoxic respiratory failure  Hypothyroidism  Depression  Gastroesophageal reflux disease  Lumbar degenerative disc disease  History of breast cancer on tamoxifen     -Patient with worsening respiratory symptoms.  I would have expected improvement with IV steroids for the past 2 days.  Will obtain a CT chest to further evaluate lung parenchyma.  D-dimer on presentation was negative so low suspicion for pulmonary embolism.  -Continue IV steroids at this time  -Patient has received multiple courses of antibiotics so far, at this time she is afebrile, no leukocytosis, procalcitonin negative.  Chest x-ray without any evidence of pneumonia.  I will hold off on antibiotics at this time.  -Bronchodilators increased to every 4 hours  -Did have significant eosinophilia once back in August of this year however repeat differentials do not demonstrate.  If she continues to have significant respiratory symptoms may need to consider a biologic as an  outpatient.  -Continue supplemental oxygen with SpO2 goal greater than 90%.     Thank you for allowing me to participate in the care of this patient.  I will continue to follow along with you.    Abdullahi Patel MD  [facility]  Pulmonary and Critical Care Medicine, Interventional Pulmonology    Parts of this note may be an electronic transcription/translation of spoken language to printed text using the Dragon dictation system.         Electronically signed by Abdullahi Patel MD at 24 1406       Santos Tamez MD at 24 [id number]              Name: Nereida Myles ADMIT: 2024   : 1975  PCP: Kehrer, Meredith Lea, MD    MRN: [id number] LOS: 0 days   AGE/SEX: 49 y.o. female  ROOM: [facility]     Subjective   Subjective   Feeling about the same today. Maybe a little more SOA. No CP. Cough unchanged. No N/V/D/abd pain. Tolerating po. Voiding well. No F/C/NS.      Objective   Objective   Vital Signs  Temp:  [97.3 °F (36.3 °C)-99.1 °F (37.3 °C)] 97.3 °F (36.3 °C)  Heart Rate:  [52-86] 66  Resp:  [12-18] 16  BP: (106-124)/(66-81) 111/66  SpO2:  [94 %-98 %] 94 %  on  Flow (L/min) (Oxygen Therapy):  [2-2.5] 2;   Device (Oxygen Therapy): nasal cannula  Body mass index is 21.91 kg/m².  Physical Exam  Vitals and nursing note reviewed.   Constitutional:       General: She is not in acute distress.     Appearance: She is ill-appearing. She is not toxic-appearing or diaphoretic.   HENT:      Head: Normocephalic.      Nose: Nose normal.      Mouth/Throat:      Mouth: Mucous membranes are moist.      Pharynx: Oropharynx is clear.   Eyes:      General: No scleral icterus.        Right eye: No discharge.         Left eye: No discharge.      Extraocular Movements: Extraocular movements intact.      Conjunctiva/sclera: Conjunctivae normal.   Cardiovascular:      Rate and Rhythm: Normal rate and regular rhythm.      Pulses: Normal pulses.   Pulmonary:      Comments: Increased WOB  Global exp wheeze  "with prolonged exp phase  No rales  Abdominal:      General: Bowel sounds are normal. There is no distension.      Palpations: Abdomen is soft.      Tenderness: There is no abdominal tenderness.   Musculoskeletal:         General: No swelling.      Cervical back: Neck supple.   Skin:     General: Skin is warm and dry.      Capillary Refill: Capillary refill takes less than 2 seconds.      Coloration: Skin is not jaundiced.   Neurological:      General: No focal deficit present.      Mental Status: She is alert. Mental status is at baseline.   Psychiatric:         Mood and Affect: Mood normal.         Behavior: Behavior normal.         Thought Content: Thought content normal.       Results Review     I reviewed the patient's new clinical results.  Results from last 7 days   Lab Units 12/29/24 0423 12/28/24 0429 12/27/24  1714   WBC 10*3/mm3 10.20 4.91 8.18   HEMOGLOBIN g/dL 12.3 13.2 14.6   PLATELETS 10*3/mm3 326 337 394     Results from last 7 days   Lab Units 12/29/24  0423 12/28/24  1439 12/28/24 0429 12/27/24  1714   SODIUM mmol/L 140  --  136 138   POTASSIUM mmol/L 4.5 4.1 4.6 3.1*   CHLORIDE mmol/L 105  --  103 100   CO2 mmol/L 25.0  --  24.3 25.0   BUN mg/dL 11  --  11 19   CREATININE mg/dL 0.51*  --  0.64 0.95   GLUCOSE mg/dL 148*  --  188* 84   EGFR mL/min/1.73 113.2  --  107.1 72.7     Results from last 7 days   Lab Units 12/29/24 0423 12/28/24 0429 12/27/24  1714   ALBUMIN g/dL 3.6 3.5 4.0   BILIRUBIN mg/dL 0.2 0.2 0.3   ALK PHOS U/L 68 70 78   AST (SGOT) U/L 14 16 18   ALT (SGPT) U/L 29 26 26     Results from last 7 days   Lab Units 12/29/24  0423 12/28/24  0429 12/27/24  1714   CALCIUM mg/dL 9.0 9.1 9.6   ALBUMIN g/dL 3.6 3.5 4.0   MAGNESIUM mg/dL 2.1 2.3  --    PHOSPHORUS mg/dL 2.8  --   --      Results from last 7 days   Lab Units 12/27/24 2122   LACTATE mmol/L 1.8     No results found for: \"HGBA1C\", \"POCGLU\"    No radiology results for the last day    I have personally reviewed all " medications:  Scheduled Medications  budesonide-formoterol, 1 puff, Inhalation, BID - RT  buPROPion XL, 300 mg, Oral, QAM  FLUoxetine, 80 mg, Oral, Daily  ipratropium-albuterol, 3 mL, Nebulization, 4x Daily - RT  levothyroxine, 50 mcg, Oral, Q AM  methylPREDNISolone sodium succinate, 60 mg, Intravenous, Q12H  montelukast, 10 mg, Oral, Daily  multivitamin, 1 tablet, Oral, Nightly  pregabalin, 25 mg, Oral, Nightly  sodium chloride, 10 mL, Intravenous, Q12H  tamoxifen, 20 mg, Oral, Daily  theophylline, 300 mg, Oral, QAM  vitamin B-12, 1,000 mcg, Oral, Daily    Infusions   Diet  Diet: Cardiac; Healthy Heart (2-3 Na+); Fluid Consistency: Thin (IDDSI 0)    I have personally reviewed:  [x]  Laboratory   [x]  Microbiology   []  Radiology   []  EKG/Telemetry  []  Cardiology/Vascular   []  Pathology    [x]  Records      Assessment/Plan     Active Hospital Problems    Diagnosis  POA    **Severe asthma with acute exacerbation [J45.901]  Yes    Hypokalemia [E87.6]  Yes    History of breast cancer [Z85.3]  Not Applicable    Gastroesophageal reflux disease without esophagitis [K21.9]  Yes    DDD (degenerative disc disease), lumbar [M51.369]  Yes    Hypoxia [R09.02]  Yes    Hypothyroidism [E03.9]  Yes    Depression, major [F32.9]  Yes    Anxiety [F41.9]  Yes      Resolved Hospital Problems   No resolved problems to display.       50yo woman with asthma, chronic low back pain due to lumbar DDD, hypoT4, depression/anxiety, GERD, and breast CA, admitted here earlier this month with acute hypoxic respiratory failure due to rhinovirus and bacterial PNA, who was sent to ER at the direction of Pulm office for admission due to persistent cough, SOA, wheezing, and hypoxia.     Asthma exacerbation  Acute hypoxic resp failure  Appreciate Pulm attention to pt  RVP neg here, afebrile, DDimer wnl, WBC wnl, CXR unremarkable  Continue IV SoluMedrol, Symbicort, DuoNebs, Singulair, and Theophylline  Continue supplemental O2     Hypokalemia  Replaced  with protocol  Mg++ level fine     GERD  Continue PPI     HypoT4  TSH wnl  Continue L-T4     Depression/Anxiety  Stable, continue Prozac and Wellbutrin     Lumbar DDD  Stable at baseline  Continue Lyrica at HS     H/o Breast CA  Continue Tamoxifen      SCDs for DVT prophylaxis.  Full code.  Discussed with patient.  Anticipate discharge home with family when cleared by consultants.  Expected discharge date/ time has not been documented.      Santos Tamez MD  [facility]  12/29/24  09:25 EST      Electronically signed by Santos Tamez MD at 12/29/24 0935          Consult Notes (all)        Abdullahi Patel MD at 12/28/24 1737        Consult Orders    1. Inpatient Pulmonology Consult [[id number]] ordered by Daniela Mark APRN at 12/27/24 5630                       Patient Identification:  Nereida Myles  49 y.o.  female  1975  [id number]          LOS 0    Requesting physician:   Santos Tamez MD    Reason for Consultation:    Asthma exacerbation    History of Present Illness:   49-year-old female with a history of asthma, hypothyroidism, depression, gastroesophageal reflux disease, breast cancer who presents with wheezing.    Patient recently was diagnosed with asthma exacerbation and rhinovirus infection earlier in December for which she received steroids with improvement in her symptoms.  Also received a course of antibiotics.  Symptoms initially improved and subsequently worsened for which she was placed on a Z-Rafat and further oral steroids however did not demonstrate significant improvement this time.  Was seen in pulmonary clinic by Lorraine Pena yesterday and found to have significant wheezing and worsening of her respiratory symptoms so was sent to the emergency department for admission.  On evaluation of patient today, states that her breathing is slowly improved however still remains significantly short of breath.  Does have a cough with some sputum  production, no chest pain or palpitations, feels weak and fatigued.  Does have some nausea without any evidence of emesis.  No sick contacts that she complains of.  Overall her asthma has been well-controlled for many years and she has not been hospitalized in the past decade for respiratory symptoms.  Does have Symbicort and albuterol which she uses daily at home.    Past Medical History:  Past Medical History:   Diagnosis Date    Allergic     Allergic rhinitis     Anxiety     Asthma     has inhaler and neb treatments    BMI 25.0-25.9,adult     Cancer     left breast    Community acquired pneumonia     COPD (chronic obstructive pulmonary disease)     Depression, major     Disease of thyroid gland     Diverticular disease     Foreign body in ear     Fractures 2012    Foot    GERD (gastroesophageal reflux disease)     Hashimoto's thyroiditis     History of abnormal mammogram     Left breast    History of acute sinusitis     History of adenocarcinoma of breast     History of COPD     History of dyspareunia in female     History of kidney stones     History of lump of left breast     History of malignant neoplasm of left breast     History of nausea and vomiting     History of seborrheic dermatitis     History of strep pharyngitis     History of suicidal ideation     Hypothyroidism     Hypoxemia     History of Hypoxemia    Joint pain 2021    Low back pain 2023    This is when it became debilitating    Lumbosacral disc disease ?    Obsessive compulsive disorder     Other screening mammogram     Personal history of asthma     PONV (postoperative nausea and vomiting)     Recurrent genital herpes simplex     History of     Symptomatic states associated with artificial menopause     History of     TMJ dysfunction 1999    Wear a mouth guard       Past Surgical History:  Past Surgical History:   Procedure Laterality Date    APPENDECTOMY      BREAST AUGMENTATION Bilateral     Revised 2005, initial implants 1992    BREAST  RECONSTRUCTION, BREAST TISSUE EXPANDER INSERTION Bilateral     CERVICAL CERCLAGE      x 2 during pregnancy, incompetent cervix    CHOLECYSTECTOMY      COLONOSCOPY      EPIDURAL Right 01/11/2024    Procedure: LUMBAR/SACRAL TRANSFORAMINAL EPIDURAL RIGHT L3-4 #1 51148;  Surgeon: Rei Pruitt MD;  Location: [facility];  Service: Pain Management;  Laterality: Right;    EPIDURAL Right 9/19/2024    Procedure: LUMBAR/SACRAL TRANSFORAMINAL EPIDURAL RIGHT L3-4 29876;  Surgeon: Rei Pruitt MD;  Location: [facility];  Service: Pain Management;  Laterality: Right;    EPIDURAL BLOCK  1/2024    HAND SURGERY Right     may2022    HYSTERECTOMY      due to uterine prolapse    KIDNEY STONE SURGERY      LUNG SURGERY      bronchoscopy    MASTECTOMY Bilateral 01/2016    removed lymph nodes on left side    MEDIAL BRANCH BLOCK Right 3/13/2024    Procedure: LUMBAR MEDIAL BRANCH BLOCK RIGHT L3-L5 #1 46601, 76718;  Surgeon: Rei Pruitt MD;  Location: [facility];  Service: Pain Management;  Laterality: Right;    MEDIAL BRANCH BLOCK Bilateral 3/27/2024    Procedure: LUMBAR MEDIAL BRANCH BLOCK BILATERAL L3-L5 11717-29, 64494-50 X 2;  Surgeon: Rei Pruitt MD;  [facility];  Service: Pain Management;  Laterality: Bilateral;    MEDIAL BRANCH BLOCK Left 5/2/2024    Procedure: LUMBAR MEDIAL BRANCH BLOCK LEFT L3-L5 #2 73819, 45000;  Surgeon: Rei Pruitt MD;  [facility];  Service: Pain Management;  Laterality: Left;    NERVE SURGERY Bilateral 6/4/2024    Procedure: LUMBAR RADIOFREQUENCY ABLATION BILATERAL L3-L5 37314-70, 64636-50 X 2;  Surgeon: Rei Pruitt MD;  Location[facility];  Service: Pain Management;  Laterality: Bilateral;    SACROILIAC JOINT INJECTION Bilateral 11/7/2024    Procedure: SACROILIAC INJECTION BILATERAL 86038-80;  Surgeon: Rei Pruitt MD;  Location[facility];  Service: Pain Management;  Laterality: Bilateral;    SCAR REVISION BREAST Bilateral  06/21/2022    Procedure: SCAR REVISION LEFT CHEST 27 CENTIMETERS AND RIGHT CHEST 25 CENTIMETERS FOR AESTHETIC FLAT CLOSURE FOLLOWING MASTECTOMY;  Surgeon: Waterhouse, Maurine, MD;  Location[facility];  Service: Plastics;  Laterality: Bilateral;    TISSUE EXPANDER PLACEMENT Bilateral     due to infection        Home Meds:  Medications Prior to Admission   Medication Sig Dispense Refill Last Dose/Taking    albuterol (PROVENTIL) (2.5 MG/3ML) 0.083% nebulizer solution Take 2.5 mg by nebulization Every 4 (Four) Hours As Needed for Wheezing.   Taking As Needed    albuterol (PROVENTIL) 4 MG tablet Take 1 tablet by mouth Every Night. 90 tablet 3 Taking    albuterol sulfate  (90 Base) MCG/ACT inhaler USE 2 INHALATIONS EVERY 4 HOURS AS NEEDED FOR WHEEZING 34 g 0 Taking    benzonatate (TESSALON) 200 MG capsule Take 1 capsule by mouth Every 6 (Six) Hours As Needed for Cough.   Taking As Needed    Benzoyl Peroxide 10 % liquid 1 Application by Other route Daily.   Taking    buPROPion XL (WELLBUTRIN XL) 300 MG 24 hr tablet TAKE 1 TABLET EVERY MORNING 90 tablet 3 Taking    clindamycin (CLEOCIN T) 1 % swab APPLY 1 SWAB TOPICALLY TO THE FACE EVERY MORNING   Taking    diclofenac (VOLTAREN) 75 MG EC tablet TAKE 1 TABLET TWICE A DAY AS NEEDED FOR PAIN 30 tablet 23 Taking    FLUoxetine (PROzac) 40 MG capsule Take 2 capsules by mouth Daily. 180 capsule 3 Taking    Fluticasone Furoate-Vilanterol (Breo Ellipta) 100-25 MCG/INH inhaler Inhale 1 puff Daily. 180 each 3 Taking    levothyroxine (SYNTHROID, LEVOTHROID) 50 MCG tablet TAKE 1 TABLET DAILY 90 tablet 3 Taking    melatonin 5 MG tablet tablet Take 1 tablet by mouth Every Night.   Taking    metaxalone (SKELAXIN) 800 MG tablet TAKE 1/2 TO 1 TABLET BY MOUTH FOUR TIMES DAILY AS NEEDED FOR MUSCLE SPASMS 120 tablet 0 Taking    montelukast (SINGULAIR) 10 MG tablet TAKE 1 TABLET DAILY 90 tablet 3 Taking    Multiple Vitamin (MULTI-VITAMIN DAILY PO) Take 1 tablet by mouth Every Night.    Taking    omeprazole (priLOSEC) 40 MG capsule TAKE 1 CAPSULE DAILY (Patient taking differently: Take 1 capsule by mouth Daily As Needed (heartburn, reflux).) 90 capsule 3 Taking Differently    ondansetron (ZOFRAN) 4 MG tablet TAKE 1 TABLET EVERY 8 HOURS AS NEEDED FOR NAUSEA OR VOMITING 27 tablet 39 Taking As Needed    predniSONE (DELTASONE) 10 MG tablet Take 2 tablets by mouth 2 (Two) Times a Day for 3 days, THEN 2 tablets Daily for 4 days, THEN 1 tablet Daily for 7 days. 27 tablet 0 Taking    pregabalin (Lyrica) 25 MG capsule Take 1 capsule PO TID x 5 days; if tolerated may slowly increase to 1 or 2 capsules PO TID (Patient taking differently: Take 1 capsule PO TID x 5 days; if tolerated may slowly increase to 1 or 2 capsules PO TID.  The patient stated she can take the med up to 3 times a day, but that she only  takes it at night.) 180 capsule 0 Taking Differently    tamoxifen (NOLVADEX) 20 MG chemo tablet Take 1 tablet by mouth Daily.   Taking    theophylline (THEODUR) 300 MG 12 hr tablet Take 1 tablet by mouth Every Morning. 90 tablet 3 Taking    valACYclovir (VALTREX) 500 MG tablet TAKE 1 TABLET DAILY (Patient taking differently: Take 1 tablet by mouth Daily As Needed (herpes outbreak).) 90 tablet 3 Taking Differently    vitamin B-12 (CYANOCOBALAMIN) 1000 MCG tablet Take 1 tablet by mouth Daily.   Taking         Allergies:  No Known Allergies    Social History:   Social History     Socioeconomic History    Marital status:    Tobacco Use    Smoking status: Never    Smokeless tobacco: Never   Vaping Use    Vaping status: Never Used   Substance and Sexual Activity    Alcohol use: Yes     Alcohol/week: 5.0 standard drinks of alcohol     Types: 2 Glasses of wine, 3 Drinks containing 0.5 oz of alcohol per week     Comment: social    Drug use: Never    Sexual activity: Yes     Partners: Male     Birth control/protection: None, Vasectomy, Essure, Hysterectomy       Family History:  Family History   Problem  "Relation Age of Onset    Thyroid disease Mother     Arthritis Mother     Uterine cancer Mother         in her 40's    Depression Mother     GI problems Mother     Hyperlipidemia Mother     Cancer Father     Bipolar disorder Father     Depression Father     Hyperlipidemia Father     Hypertension Father     Arthritis Father     Arthritis Maternal Grandmother     Bleeding Disorder Maternal Grandmother     Hyperlipidemia Maternal Grandmother     Hypertension Maternal Grandmother     Depression Maternal Grandmother     Hypertension Maternal Grandfather     Hypertension Paternal Grandfather     Hyperlipidemia Paternal Grandfather     Arthritis Paternal Grandfather     Coronary artery disease Other         Maternal GrGF    Skin cancer Other         Maternal GrGF    Diabetes Other         Paternal GrGF    Heart disease Other         FH in females before the age of 65    Arthritis Paternal Grandmother     Malig Hyperthermia Neg Hx        Review of Systems:  12 point review of systems performed and all else negative except as per HPI above.    Objective:    PHYSICAL EXAM:    /79 (BP Location: Left arm, Patient Position: Lying)   Pulse 67   Temp 99.1 °F (37.3 °C) (Oral)   Resp 16   Ht 162.6 cm (64\")   Wt 57.9 kg (127 lb 10.3 oz)   LMP  (LMP Unknown)   SpO2 98%   BMI 21.91 kg/m²  Body mass index is 21.91 kg/m². 98% 57.9 kg (127 lb 10.3 oz)    General: Alert, nontoxic, NAD  HEENT: NC/AT, EOMI, MMM  Neck: Supple, trachea midline  Cardiac: RRR, no murmur, gallops, rubs  Pulmonary: Diffuse expiratory wheezing bilaterally  GI: Soft, non-tender, non-distended, normal bowel sounds  Extremities: Warm, well perfused, no LE edema  Skin: no visible rash  Neuro: CN II - XII grossly intact  Psychiatry: Normal mood and affect    Lab Review:   Results from last 7 days   Lab Units 12/28/24  0429 12/27/24  1714   WBC 10*3/mm3 4.91 8.18   HEMOGLOBIN g/dL 13.2 14.6   PLATELETS 10*3/mm3 337 394     Results from last 7 days   Lab " Units 12/28/24  1439 12/28/24  0429 12/27/24  1714   SODIUM mmol/L  --  136 138   POTASSIUM mmol/L 4.1 4.6 3.1*   CHLORIDE mmol/L  --  103 100   CO2 mmol/L  --  24.3 25.0   BUN mg/dL  --  11 19   CREATININE mg/dL  --  0.64 0.95   GLUCOSE mg/dL  --  188* 84   CALCIUM mg/dL  --  9.1 9.6   MAGNESIUM mg/dL  --  2.3  --    Estimated Creatinine Clearance: 95.1 mL/min (by C-G formula based on SCr of 0.64 mg/dL).    Results from last 7 days   Lab Units 12/28/24  0429 12/27/24  2122 12/27/24  1714   AST (SGOT) U/L 16  --  18   ALT (SGPT) U/L 26  --  26   LACTATE mmol/L  --  1.8  --    D DIMER QUANT MCGFEU/mL  --   --  0.28   PLATELETS 10*3/mm3 337  --  394              Imaging reviewed  Chest imaging from this hospitalization reviewed.       Assessment / Recommendations:    Acute asthma exacerbation  Acute hypoxic respiratory failure  Hypothyroidism  Depression  Gastroesophageal reflux disease  Lumbar degenerative disc disease  History of breast cancer on tamoxifen    -Patient with ongoing respiratory symptoms for the past several weeks.  Did have some mild improvement after steroids however again worsened.  -Significant wheezing on evaluation, continue IV steroids at this time.  Will transition to oral once she is demonstrating clinical improvement  -Patient has received multiple courses of antibiotics so far, at this time she is 80 febrile, no leukocytosis, procalcitonin negative.  Chest x-ray without any evidence of pneumonia.  I will hold off on antibiotics at this time.  -Did have significant eosinophilia once back in August of this year however repeat differentials do not demonstrate.  If she continues to have significant respiratory symptoms may need to consider a biologic as an outpatient.  -CT chest from December 9 did demonstrate some patchy tree-in-bud and groundglass opacities, if she does not demonstrate any improvement in the next 24 to 48 hours, will consider repeat CT chest to evaluate.  No indication for  bronchoscopy at this time.  -Continue supplemental oxygen with SpO2 goal greater than 90%.    Thank you for allowing me to participate in the care of this patient.  I will continue to follow along with you.    Abdullahi Patel MD  [facility], [facility]  Pulmonary and Critical Care Medicine, Interventional Pulmonology    12/28/2024  17:37 EST    Parts of this note may be an electronic transcription/translation of spoken language to printed text using the Dragon dictation system.             Electronically signed by Abdullahi Patel MD at 12/28/24 0398

## 2024-12-31 NOTE — PROGRESS NOTES
"Nutrition Services    Patient Name:  Nereida Myles  YOB: 1975  MRN: [id number]  Admit Date:  12/27/2024  Nutrition Services    Patient Name: Nereida Myles  YOB: 1975  MRN: [id number]  Admission date: 12/27/2024    PROGRESS NOTE      Encounter Information: Checking in on PO intakes:    % PO intake per EMR.        PO Diet: Diet: Cardiac; Healthy Heart (2-3 Na+); Fluid Consistency: Thin (IDDSI 0)   PO Supplements: Magic cups BID - berry   PO Intake:  %       Current nutrition support: --   Nutrition support review: --       Labs (reviewed below): Glu 134        GI Function:  + nausea. On compazine PRN.   Last BM: 12/28       Nutrition Intervention Updates: - Continue magic cups BID  - Encourage PO intakes       Results from last 7 days   Lab Units 12/31/24 0343 12/30/24 0345 12/29/24  0423 12/28/24  1439 12/28/24  0429 12/27/24  1714   SODIUM mmol/L 141 140 140  --  136 138   POTASSIUM mmol/L 4.1 4.4 4.5   < > 4.6 3.1*   CHLORIDE mmol/L 107 105 105  --  103 100   CO2 mmol/L 26.2 26.0 25.0  --  24.3 25.0   BUN mg/dL 15 13 11  --  11 19   CREATININE mg/dL 0.60 0.62 0.51*  --  0.64 0.95   CALCIUM mg/dL 8.5* 8.8 9.0  --  9.1 9.6   BILIRUBIN mg/dL  --   --  0.2  --  0.2 0.3   ALK PHOS U/L  --   --  68  --  70 78   ALT (SGPT) U/L  --   --  29  --  26 26   AST (SGOT) U/L  --   --  14  --  16 18   GLUCOSE mg/dL 134* 144* 148*  --  188* 84    < > = values in this interval not displayed.     Results from last 7 days   Lab Units 12/31/24 0343 12/30/24 0345 12/29/24 0423   MAGNESIUM mg/dL 2.2 2.4 2.1   PHOSPHORUS mg/dL 3.0 2.7 2.8   HEMOGLOBIN g/dL 12.3 12.4 12.3   HEMATOCRIT % 37.5 37.4 39.4     COVID19   Date Value Ref Range Status   12/27/2024 Not Detected Not Detected - Ref. Range Final     No results found for: \"HGBA1C\"    Wt Readings from Last 10 Encounters:   12/27/24 2311 57.9 kg (127 lb 10.3 oz)   12/18/24 1404 58.7 kg (129 lb 6.4 oz)   12/17/24 0857 58 kg (127 lb " 12.8 oz)   12/12/24 0635 41.9 kg (92 lb 6 oz)   12/10/24 0514 55.6 kg (122 lb 9.2 oz)   12/09/24 0431 55.6 kg (122 lb 9.2 oz)   12/08/24 0528 55.6 kg (122 lb 8 oz)   12/07/24 1351 55.6 kg (122 lb 8 oz)   12/07/24 0128 59 kg (130 lb)   12/04/24 1607 59 kg (130 lb)   11/19/24 1523 59.6 kg (131 lb 6.4 oz)   11/07/24 0809 59 kg (130 lb)   10/17/24 1552 59.9 kg (132 lb)   09/10/24 1539 62.9 kg (138 lb 9.6 oz)   08/02/24 0950 65.7 kg (144 lb 12.8 oz)           RD to follow up per protocol.    Electronically signed by:  Serina Britton RD  12/31/24 13:28 EST

## 2024-12-31 NOTE — PROGRESS NOTES
Name: Nereida Myles ADMIT: 2024   : 1975  PCP: Kehrer, Meredith Lea, MD    MRN: [id number] LOS: 1 days   AGE/SEX: 49 y.o. female  ROOM: [facility]     Subjective   Subjective   Feeling a little better today. Still SOA with exertion but not at rest now. No CP. Cough improving. No N/V/D/abd pain. Tolerating po. Voiding well. No F/C/NS.       Objective   Objective   Vital Signs  Temp:  [97.9 °F (36.6 °C)-98.5 °F (36.9 °C)] 97.9 °F (36.6 °C)  Heart Rate:  [52-71] 67  Resp:  [16-18] 17  BP: (107-129)/(62-75) 129/74  SpO2:  [93 %-97 %] 95 %  on  Flow (L/min) (Oxygen Therapy):  [2] 2;   Device (Oxygen Therapy): nasal cannula  Body mass index is 21.91 kg/m².  Physical Exam  Vitals and nursing note reviewed.   Constitutional:       General: She is not in acute distress.     Appearance: She is ill-appearing. She is not toxic-appearing or diaphoretic.   HENT:      Head: Normocephalic.      Nose: Nose normal.      Mouth/Throat:      Mouth: Mucous membranes are moist.      Pharynx: Oropharynx is clear.   Eyes:      General: No scleral icterus.        Right eye: No discharge.         Left eye: No discharge.      Extraocular Movements: Extraocular movements intact.      Conjunctiva/sclera: Conjunctivae normal.   Cardiovascular:      Rate and Rhythm: Normal rate and regular rhythm.      Pulses: Normal pulses.   Pulmonary:      Comments: Normal WOB  Global exp wheeze but faint, exp phase = insp phase  No rales  Abdominal:      General: Bowel sounds are normal. There is no distension.      Palpations: Abdomen is soft.      Tenderness: There is no abdominal tenderness.   Musculoskeletal:         General: No swelling.      Cervical back: Neck supple.   Skin:     General: Skin is warm and dry.      Capillary Refill: Capillary refill takes less than 2 seconds.      Coloration: Skin is not jaundiced.   Neurological:      General: No focal deficit present.      Mental Status: She is alert. Mental status is at baseline.  "  Psychiatric:         Mood and Affect: Mood normal.         Behavior: Behavior normal.         Thought Content: Thought content normal.       Results Review     I reviewed the patient's new clinical results.  Results from last 7 days   Lab Units 12/31/24 0343 12/30/24 0345 12/29/24 0423 12/28/24 0429   WBC 10*3/mm3 11.61* 10.32 10.20 4.91   HEMOGLOBIN g/dL 12.3 12.4 12.3 13.2   PLATELETS 10*3/mm3 329 340 326 337     Results from last 7 days   Lab Units 12/31/24 0343 12/30/24 0345 12/29/24 0423 12/28/24  1439 12/28/24 0429   SODIUM mmol/L 141 140 140  --  136   POTASSIUM mmol/L 4.1 4.4 4.5 4.1 4.6   CHLORIDE mmol/L 107 105 105  --  103   CO2 mmol/L 26.2 26.0 25.0  --  24.3   BUN mg/dL 15 13 11  --  11   CREATININE mg/dL 0.60 0.62 0.51*  --  0.64   GLUCOSE mg/dL 134* 144* 148*  --  188*   EGFR mL/min/1.73 108.8 108.0 113.2  --  107.1     Results from last 7 days   Lab Units 12/29/24 0423 12/28/24 0429 12/27/24  1714   ALBUMIN g/dL 3.6 3.5 4.0   BILIRUBIN mg/dL 0.2 0.2 0.3   ALK PHOS U/L 68 70 78   AST (SGOT) U/L 14 16 18   ALT (SGPT) U/L 29 26 26     Results from last 7 days   Lab Units 12/31/24 0343 12/30/24 0345 12/29/24 0423 12/28/24 0429 12/27/24  1714   CALCIUM mg/dL 8.5* 8.8 9.0 9.1 9.6   ALBUMIN g/dL  --   --  3.6 3.5 4.0   MAGNESIUM mg/dL 2.2 2.4 2.1 2.3  --    PHOSPHORUS mg/dL 3.0 2.7 2.8  --   --      Results from last 7 days   Lab Units 12/29/24 0423 12/27/24  2122   PROCALCITONIN ng/mL 0.03  --    LACTATE mmol/L  --  1.8     No results found for: \"HGBA1C\", \"POCGLU\"    CT Chest Without Contrast Diagnostic    Result Date: 12/30/2024  1. Mild to moderate bilateral lower lobe atelectasis, scar or pneumonia. 2. Short-term follow-up CT of the chest in approximately 3 months recommended.  Radiation dose reduction techniques were utilized, including automated exposure control and exposure modulation based on body size.        I have personally reviewed all medications:  Scheduled " Medications  budesonide-formoterol, 2 puff, Inhalation, BID - RT  buPROPion XL, 300 mg, Oral, QAM  FLUoxetine, 80 mg, Oral, Daily  ipratropium-albuterol, 3 mL, Nebulization, Q4H - RT  levothyroxine, 50 mcg, Oral, Q AM  methylPREDNISolone sodium succinate, 60 mg, Intravenous, Q12H  montelukast, 10 mg, Oral, Daily  multivitamin, 1 tablet, Oral, Nightly  pregabalin, 25 mg, Oral, Nightly  sodium chloride, 10 mL, Intravenous, Q12H  tamoxifen, 20 mg, Oral, Daily  theophylline, 300 mg, Oral, QAM  vitamin B-12, 1,000 mcg, Oral, Daily    Infusions   Diet  Diet: Cardiac; Healthy Heart (2-3 Na+); Fluid Consistency: Thin (IDDSI 0)    I have personally reviewed:  [x]  Laboratory   [x]  Microbiology   [x]  Radiology   []  EKG/Telemetry  []  Cardiology/Vascular   []  Pathology    []  Records       Assessment/Plan     Active Hospital Problems    Diagnosis  POA    **Severe asthma with acute exacerbation [J45.901]  Yes    Hypokalemia [E87.6]  Yes    History of breast cancer [Z85.3]  Not Applicable    Gastroesophageal reflux disease without esophagitis [K21.9]  Yes    DDD (degenerative disc disease), lumbar [M51.369]  Yes    Hypoxia [R09.02]  Yes    Hypothyroidism [E03.9]  Yes    Depression, major [F32.9]  Yes    Anxiety [F41.9]  Yes      Resolved Hospital Problems   No resolved problems to display.       50yo woman with asthma, chronic low back pain due to lumbar DDD, hypoT4, depression/anxiety, GERD, and breast CA, admitted here earlier this month with acute hypoxic respiratory failure due to rhinovirus and bacterial PNA, who was sent to ER at the direction of Pulm office for admission due to persistent cough, SOA, wheezing, and hypoxia.     Asthma exacerbation  Acute hypoxic resp failure  Appreciate Pulm attention to pt  RVP neg here, afebrile, DDimer wnl, WBC wnl, CXR unremarkable  Continue IV SoluMedrol, Symbicort, DuoNebs, Singulair, and Theophylline  Zithromax added 12/29  Continue supplemental O2  CT Chest unremarkable  Pulm  planning biologic agent as outpt when recovered from this episode     Hypokalemia  Replaced with protocol  Mg++ level fine     GERD  Continue PPI PRN as she takes at home     HypoT4  TSH wnl  Continue L-T4     Depression/Anxiety  Stable, continue Prozac and Wellbutrin     Lumbar DDD  Stable at baseline  Continue Lyrica at HS     H/o Breast CA  Continue Tamoxifen      SCDs for DVT prophylaxis.  Full code.  Discussed with patient.  Anticipate discharge home with family when cleared by consultants.  Expected Discharge Date: 1/1/2025; Expected Discharge Time:       Santos Tamez MD  [facility]  12/31/24  13:43 EST

## 2024-12-31 NOTE — NURSING NOTE
Paged IV therapy to request new peripheral IV for pt.  Pt needs U/S for placement normally she advised.  Informed IV nurse on recording.

## 2024-12-31 NOTE — PLAN OF CARE
Goal Outcome Evaluation:         Pt A&Ox4, 2L n/c, SR, BUE tremors, up ad sanjiv to B/R.  Nausea treated with PRN medication; effective.  Pulm following.  No d/c plans confirmed yet.          Problem: Sepsis/Septic Shock  Goal: Optimal Coping  Outcome: Progressing  Intervention: Support Patient and Family Response  Description: Acknowledge, normalize, validate intensity and complexity of patient and support system response to situation.  Provide opportunity for expression of thoughts, feelings and concerns; respond with compassion and reassurance.  Decrease stress and anxiety by providing information about patient's status and treatment.  Facilitate support system presence and participation in care; consider providing a diary in intensive care situation.  Support coping by recognizing current coping strategies; provide aid in developing new strategies.  Assess and monitor for signs and symptoms of psychologic distress, anxiety and depression.  Utilize complementary therapy, such as music, massage or guided imagery.  Engage in proactive and ongoing discussion about goals of care and facilitate shared decision-making.  Connect with community resources for ongoing support, such as counseling and group support.  Recent Flowsheet Documentation  Taken 12/31/2024 0000 by Olena Campos, RN  Supportive Measures: active listening utilized  Taken 12/30/2024 2000 by Olena Campos, RN  Supportive Measures: active listening utilized  Family/Support System Care:   involvement promoted   presence promoted   support provided  Goal: Absence of Infection Signs and Symptoms  Outcome: Progressing  Intervention: Initiate Sepsis Management  Description: Provide fluid therapy, such as crystalloid or albumin, to increase intravascular volume, organ perfusion and oxygen delivery.  Provide respiratory support, such as oxygen therapy, noninvasive or invasive positive pressure ventilation, to achieve oxygenation and ventilation goal; avoid  hyperoxemia.  Obtain cultures prior to initiating antimicrobial therapy when possible. Do not delay treatment for laboratory results in the presence of high suspicion or clinical indicators.  Administer intravenous broad-spectrum antimicrobial therapy promptly.  Implement hemodynamic monitoring to guide intravascular support based on individual targeted parameters.  Determine and address underlying source of infection aggressively; implement transmission-based precautions and isolation, as indicated.  Recent Flowsheet Documentation  Taken 12/31/2024 0400 by Olena Campos, RN  Isolation Precautions:   precautions maintained   droplet  Taken 12/31/2024 0200 by Olena Campos, RN  Isolation Precautions:   precautions maintained   droplet  Taken 12/31/2024 0000 by Olena Campos, RN  Stabilization Measures: legs elevated  Infection Management: aseptic technique maintained  Isolation Precautions:   precautions maintained   droplet  Taken 12/30/2024 2000 by Olena Campos RN  Infection Management: aseptic technique maintained  Isolation Precautions:   precautions maintained   droplet  Intervention: Promote Stabilization  Description: Monitor for signs of fluid responsiveness and overload; consider fluid adjustment and diuretic therapy.  Anticipate use of vasoactive agent to support microperfusion and oxygen delivery; titrate to response.  Monitor laboratory value, diagnostic test and clinical status trends for signs of infection progression and multiple organ failure.  Assess effectiveness of, and potential for, de-escalation of the antimicrobial regimen daily; promote antimicrobial stewardship.  Provide fever-reduction and comfort measures.  Monitor and manage electrolyte imbalance, such as hypocalcemia.  Use lung protective ventilation measures, such as low volume, inspiratory pressure, optimal positive end-expiratory pressure, to minimize the risk of ventilator-induced lung injury; ensure minute volume  demands.  Prepare for supportive therapy, such as prone positioning, corticosteroid therapy, coagulopathy management, CRRT (continuous renal replacement therapy), hemofiltration and cardiac-assist device.  Recent Flowsheet Documentation  Taken 12/31/2024 0000 by Olena Campos RN  Fluid/Electrolyte Management: fluids provided  Taken 12/30/2024 2000 by Olena Campos RN  Fluid/Electrolyte Management: fluids provided  Intervention: Promote Recovery  Description: Encourage pulmonary hygiene, such as cough-enhancement and airway-clearance techniques, that may include use of incentive spirometry, deep breathing and cough.  Encourage early rehabilitation and physical activity to optimize functional ability and activity tolerance, as well as minimize delirium.  Promote energy conservation; minimize oxygen demand and consumption by adjusting environment, decreasing stimulation, maintaining normothermia and treating pain.  Optimize fluid balance, nutrition intake, sleep and glycemic control to maintain tissue perfusion and enhance immune response.  Recent Flowsheet Documentation  Taken 12/31/2024 0400 by Olena Campos, RN  Activity Management:   activity encouraged   up ad sanjiv  Taken 12/31/2024 0000 by Olena Campos RN  Activity Management:   up ad sanjiv   activity encouraged  Airway/Ventilation Management: airway patency maintained  Sleep/Rest Enhancement:   awakenings minimized   consistent schedule promoted   room darkened  Taken 12/30/2024 2000 by Olena Campos RN  Activity Management:   activity encouraged   up ad sanjiv  Airway/Ventilation Management: airway patency maintained  Sleep/Rest Enhancement:   awakenings minimized   consistent schedule promoted   family presence promoted  Goal: Optimal Nutrition Delivery  Outcome: Progressing     Problem: Adult Inpatient Plan of Care  Goal: Plan of Care Review  Outcome: Progressing  Goal: Optimal Comfort and Wellbeing  Outcome: Progressing  Intervention: Provide  Person-Centered Care  Description: Use a family-focused approach to care; encourage support system presence and participation.  Develop trust and rapport by proactively providing information, encouraging questions, addressing concerns and offering reassurance.  Acknowledge emotional response to hospitalization.  Recognize and utilize personal coping strategies and strengths; develop goals via shared decision-making.  Honor spiritual and cultural preferences.  Recent Flowsheet Documentation  Taken 12/31/2024 0000 by Olena Campos RN  Trust Relationship/Rapport:   care explained   choices provided   questions encouraged   reassurance provided   thoughts/feelings acknowledged   questions answered   empathic listening provided  Taken 12/30/2024 2000 by Olena Campos, RN  Trust Relationship/Rapport:   choices provided   care explained   thoughts/feelings acknowledged   reassurance provided   questions encouraged   questions answered   empathic listening provided  Goal: Readiness for Transition of Care  Outcome: Progressing     Problem: Fall Injury Risk  Goal: Absence of Fall and Fall-Related Injury  Outcome: Progressing  Intervention: Identify and Manage Contributors  Description: Develop a fall prevention plan, considering patient-centered interventions and family/caregiver involvement; identify and address patient's facilitators and barriers.  Provide reorientation, appropriate sensory stimulation and routines with changes in mental status to decrease risk of fall.  Promote use of personal vision and auditory aids.  Assess assistance level required for safe and effective self-care; provide support as needed, such as toileting and mobilization. For age 65 and older, implement timed toileting with assistance.  Encourage physical activity, such as performance of mobility and self-care at highest level of patient ability, multicomponent exercise program and provision of appropriate assistive devices.  If fall  occurs, assess the severity of injury; implement fall injury protocol. Determine the cause and revise fall injury prevention plan.  Regularly review and advocate for medication adjustment to decrease fall risk; consider administration times, polypharmacy and age.  Balance adequate pain management with potential for oversedation.  Recent Flowsheet Documentation  Taken 12/31/2024 0000 by Olena Campos, RN  Self-Care Promotion:   independence encouraged   BADL personal objects within reach   adaptive equipment use encouraged  Taken 12/30/2024 2200 by Olena Campos, RN  Medication Review/Management:   medications reviewed   high-risk medications identified  Taken 12/30/2024 2000 by Olena Campos RN  Medication Review/Management:   medications reviewed   high-risk medications identified  Self-Care Promotion:   independence encouraged   BADL personal objects within reach   adaptive equipment use encouraged  Intervention: Promote Injury-Free Environment  Description: Provide a safe, barrier-free environment that encourages independent activity.  Keep care area uncluttered and well-lighted.  Determine need for increased observation or monitoring.  Avoid use of devices that minimize mobility, such as restraints or indwelling urinary catheter.  Recent Flowsheet Documentation  Taken 12/31/2024 0400 by Olena Campos RN  Safety Promotion/Fall Prevention: safety round/check completed  Taken 12/31/2024 0200 by Olena Campos RN  Safety Promotion/Fall Prevention: safety round/check completed  Taken 12/31/2024 0000 by Olena Campos RN  Safety Promotion/Fall Prevention: safety round/check completed  Taken 12/30/2024 2200 by Olena Campos RN  Safety Promotion/Fall Prevention: safety round/check completed  Taken 12/30/2024 2000 by Olena Campos, RN  Safety Promotion/Fall Prevention: safety round/check completed     Problem: Electrolyte Imbalance  Goal: Electrolyte Balance  Outcome: Progressing  Intervention:  Monitor and Manage Electrolyte Imbalance  Description: Anticipate the need for intravenous or oral electrolyte replacement, adjustment or restriction.  Monitor weight, intake, output and laboratory values for trends; advocate for adjustment in treatment if imbalance persists.  Assess medication for potential impact on imbalance; advocate need for medication adjustment.  Monitor ECG (electrocardiogram) rate and rhythm for changes that may indicate fluctuation in serum electrolyte levels, such as calcium and potassium.  Recent Flowsheet Documentation  Taken 12/31/2024 0000 by Olena Campos, RN  Fluid/Electrolyte Management: fluids provided  Taken 12/30/2024 2000 by Olena Campos, RN  Fluid/Electrolyte Management: fluids provided

## 2025-01-01 LAB
ALBUMIN SERPL-MCNC: 3.4 G/DL (ref 3.5–5.2)
ALBUMIN/GLOB SERPL: 1.5 G/DL
ALP SERPL-CCNC: 69 U/L (ref 39–117)
ALT SERPL W P-5'-P-CCNC: 30 U/L (ref 1–33)
ANION GAP SERPL CALCULATED.3IONS-SCNC: 10.9 MMOL/L (ref 5–15)
AST SERPL-CCNC: 16 U/L (ref 1–32)
BACTERIA SPEC AEROBE CULT: NORMAL
BILIRUB SERPL-MCNC: <0.2 MG/DL (ref 0–1.2)
BUN SERPL-MCNC: 15 MG/DL (ref 6–20)
BUN/CREAT SERPL: 25.9 (ref 7–25)
CALCIUM SPEC-SCNC: 9 MG/DL (ref 8.6–10.5)
CHLORIDE SERPL-SCNC: 106 MMOL/L (ref 98–107)
CO2 SERPL-SCNC: 23.1 MMOL/L (ref 22–29)
CREAT SERPL-MCNC: 0.58 MG/DL (ref 0.57–1)
DEPRECATED RDW RBC AUTO: 40.8 FL (ref 37–54)
EGFRCR SERPLBLD CKD-EPI 2021: 109.7 ML/MIN/1.73
ERYTHROCYTE [DISTWIDTH] IN BLOOD BY AUTOMATED COUNT: 12.4 % (ref 12.3–15.4)
GLOBULIN UR ELPH-MCNC: 2.3 GM/DL
GLUCOSE SERPL-MCNC: 168 MG/DL (ref 65–99)
HCT VFR BLD AUTO: 35.9 % (ref 34–46.6)
HGB BLD-MCNC: 12.2 G/DL (ref 12–15.9)
MAGNESIUM SERPL-MCNC: 2 MG/DL (ref 1.6–2.6)
MCH RBC QN AUTO: 31 PG (ref 26.6–33)
MCHC RBC AUTO-ENTMCNC: 34 G/DL (ref 31.5–35.7)
MCV RBC AUTO: 91.3 FL (ref 79–97)
PHOSPHATE SERPL-MCNC: 2.7 MG/DL (ref 2.5–4.5)
PLATELET # BLD AUTO: 319 10*3/MM3 (ref 140–450)
PMV BLD AUTO: 9.5 FL (ref 6–12)
POTASSIUM SERPL-SCNC: 3.4 MMOL/L (ref 3.5–5.2)
POTASSIUM SERPL-SCNC: 3.8 MMOL/L (ref 3.5–5.2)
PROT SERPL-MCNC: 5.7 G/DL (ref 6–8.5)
RBC # BLD AUTO: 3.93 10*6/MM3 (ref 3.77–5.28)
SODIUM SERPL-SCNC: 140 MMOL/L (ref 136–145)
WBC NRBC COR # BLD AUTO: 10.55 10*3/MM3 (ref 3.4–10.8)

## 2025-01-01 PROCEDURE — 83735 ASSAY OF MAGNESIUM: CPT | Performed by: HOSPITALIST

## 2025-01-01 PROCEDURE — 25010000002 PROCHLORPERAZINE 10 MG/2ML SOLUTION: Performed by: HOSPITALIST

## 2025-01-01 PROCEDURE — 63710000001 PREDNISONE PER 1 MG: Performed by: HOSPITALIST

## 2025-01-01 PROCEDURE — 84100 ASSAY OF PHOSPHORUS: CPT | Performed by: HOSPITALIST

## 2025-01-01 PROCEDURE — 25010000002 ONDANSETRON PER 1 MG: Performed by: NURSE PRACTITIONER

## 2025-01-01 PROCEDURE — 94799 UNLISTED PULMONARY SVC/PX: CPT

## 2025-01-01 PROCEDURE — 25010000002 METHYLPREDNISOLONE PER 125 MG: Performed by: NURSE PRACTITIONER

## 2025-01-01 PROCEDURE — 84132 ASSAY OF SERUM POTASSIUM: CPT | Performed by: HOSPITALIST

## 2025-01-01 PROCEDURE — 85027 COMPLETE CBC AUTOMATED: CPT | Performed by: HOSPITALIST

## 2025-01-01 PROCEDURE — 94761 N-INVAS EAR/PLS OXIMETRY MLT: CPT

## 2025-01-01 PROCEDURE — 94664 DEMO&/EVAL PT USE INHALER: CPT

## 2025-01-01 PROCEDURE — 80053 COMPREHEN METABOLIC PANEL: CPT | Performed by: HOSPITALIST

## 2025-01-01 RX ORDER — PREDNISONE 20 MG/1
40 TABLET ORAL
Status: DISCONTINUED | OUTPATIENT
Start: 2025-01-01 | End: 2025-01-02 | Stop reason: HOSPADM

## 2025-01-01 RX ORDER — POTASSIUM CHLORIDE 750 MG/1
40 TABLET, FILM COATED, EXTENDED RELEASE ORAL EVERY 4 HOURS
Status: COMPLETED | OUTPATIENT
Start: 2025-01-01 | End: 2025-01-01

## 2025-01-01 RX ADMIN — IPRATROPIUM BROMIDE AND ALBUTEROL SULFATE 3 ML: 2.5; .5 SOLUTION RESPIRATORY (INHALATION) at 10:47

## 2025-01-01 RX ADMIN — Medication 1000 MCG: at 08:58

## 2025-01-01 RX ADMIN — BUPROPION HYDROCHLORIDE 300 MG: 300 TABLET, EXTENDED RELEASE ORAL at 06:14

## 2025-01-01 RX ADMIN — METHYLPREDNISOLONE SODIUM SUCCINATE 60 MG: 125 INJECTION INTRAMUSCULAR; INTRAVENOUS at 08:58

## 2025-01-01 RX ADMIN — TAMOXIFEN CITRATE 20 MG: 10 TABLET, FILM COATED ORAL at 08:58

## 2025-01-01 RX ADMIN — IPRATROPIUM BROMIDE AND ALBUTEROL SULFATE 3 ML: 2.5; .5 SOLUTION RESPIRATORY (INHALATION) at 15:13

## 2025-01-01 RX ADMIN — HYDROCODONE BITARTRATE AND ACETAMINOPHEN 1 TABLET: 5; 325 TABLET ORAL at 18:22

## 2025-01-01 RX ADMIN — ONDANSETRON 4 MG: 2 INJECTION, SOLUTION INTRAMUSCULAR; INTRAVENOUS at 09:07

## 2025-01-01 RX ADMIN — IPRATROPIUM BROMIDE AND ALBUTEROL SULFATE 3 ML: 2.5; .5 SOLUTION RESPIRATORY (INHALATION) at 23:17

## 2025-01-01 RX ADMIN — PREGABALIN 25 MG: 25 CAPSULE ORAL at 20:21

## 2025-01-01 RX ADMIN — BUDESONIDE AND FORMOTEROL FUMARATE DIHYDRATE 2 PUFF: 160; 4.5 AEROSOL RESPIRATORY (INHALATION) at 19:32

## 2025-01-01 RX ADMIN — FLUOXETINE HYDROCHLORIDE 80 MG: 20 CAPSULE ORAL at 08:58

## 2025-01-01 RX ADMIN — LEVOTHYROXINE SODIUM 50 MCG: 50 TABLET ORAL at 06:14

## 2025-01-01 RX ADMIN — Medication 1 TABLET: at 20:21

## 2025-01-01 RX ADMIN — POTASSIUM CHLORIDE 40 MEQ: 750 TABLET, EXTENDED RELEASE ORAL at 06:14

## 2025-01-01 RX ADMIN — IPRATROPIUM BROMIDE AND ALBUTEROL SULFATE 3 ML: 2.5; .5 SOLUTION RESPIRATORY (INHALATION) at 03:49

## 2025-01-01 RX ADMIN — MONTELUKAST SODIUM 10 MG: 10 TABLET, FILM COATED ORAL at 08:58

## 2025-01-01 RX ADMIN — POTASSIUM CHLORIDE 40 MEQ: 750 TABLET, EXTENDED RELEASE ORAL at 12:35

## 2025-01-01 RX ADMIN — PROCHLORPERAZINE EDISYLATE 5 MG: 5 INJECTION, SOLUTION INTRAMUSCULAR; INTRAVENOUS at 18:26

## 2025-01-01 RX ADMIN — IPRATROPIUM BROMIDE AND ALBUTEROL SULFATE 3 ML: 2.5; .5 SOLUTION RESPIRATORY (INHALATION) at 07:08

## 2025-01-01 RX ADMIN — Medication 10 ML: at 08:58

## 2025-01-01 RX ADMIN — BENZONATATE 200 MG: 100 CAPSULE ORAL at 20:21

## 2025-01-01 RX ADMIN — THEOPHYLLINE 300 MG: 300 TABLET, EXTENDED RELEASE ORAL at 06:14

## 2025-01-01 RX ADMIN — BUDESONIDE AND FORMOTEROL FUMARATE DIHYDRATE 2 PUFF: 160; 4.5 AEROSOL RESPIRATORY (INHALATION) at 07:13

## 2025-01-01 RX ADMIN — IPRATROPIUM BROMIDE AND ALBUTEROL SULFATE 3 ML: 2.5; .5 SOLUTION RESPIRATORY (INHALATION) at 19:31

## 2025-01-01 RX ADMIN — PREDNISONE 40 MG: 20 TABLET ORAL at 12:35

## 2025-01-01 NOTE — PLAN OF CARE
Goal Outcome Evaluation:  Plan of Care Reviewed With: patient        Progress: no change  Outcome Evaluation: pt remains on IV steroids. See MAR for pain management. Deneis other needs. VSS

## 2025-01-01 NOTE — PROGRESS NOTES
Name: Nereida Myles ADMIT: 2024   : 1975  PCP: Kehrer, Meredith Lea, MD    MRN: [id number] LOS: 2 days   AGE/SEX: 49 y.o. female  ROOM: [facility]     Subjective   Subjective   Feeling a little better again today. Still mildly SOA with exertion but no longer SOA at rest. No CP. Cough improving. No N/V/D/abd pain. Tolerating po. Voiding well. No F/C/NS.       Objective   Objective   Vital Signs  Temp:  [97.9 °F (36.6 °C)-98.6 °F (37 °C)] 98.2 °F (36.8 °C)  Heart Rate:  [53-96] 60  Resp:  [16-20] 18  BP: (125-131)/(74-87) 125/87  SpO2:  [90 %-97 %] 97 %  on  Flow (L/min) (Oxygen Therapy):  [0-2] 0;   Device (Oxygen Therapy): room air  Body mass index is 21.91 kg/m².  Physical Exam  Vitals and nursing note reviewed.   Constitutional:       General: She is not in acute distress.     Appearance: She is ill-appearing. She is not toxic-appearing or diaphoretic.   HENT:      Head: Normocephalic.      Nose: Nose normal.      Mouth/Throat:      Mouth: Mucous membranes are moist.      Pharynx: Oropharynx is clear.   Eyes:      General: No scleral icterus.        Right eye: No discharge.         Left eye: No discharge.      Extraocular Movements: Extraocular movements intact.      Conjunctiva/sclera: Conjunctivae normal.   Cardiovascular:      Rate and Rhythm: Normal rate and regular rhythm.      Pulses: Normal pulses.   Pulmonary:      Comments: Normal WOB  Coarse global exp wheeze, exp phase = insp phase  No rales  Abdominal:      General: Bowel sounds are normal. There is no distension.      Palpations: Abdomen is soft.      Tenderness: There is no abdominal tenderness.   Musculoskeletal:         General: No swelling.      Cervical back: Neck supple.   Skin:     General: Skin is warm and dry.      Capillary Refill: Capillary refill takes less than 2 seconds.      Coloration: Skin is not jaundiced.   Neurological:      General: No focal deficit present.      Mental Status: She is alert. Mental status is at  "baseline.   Psychiatric:         Mood and Affect: Mood normal.         Behavior: Behavior normal.         Thought Content: Thought content normal.       Results Review     I reviewed the patient's new clinical results.  Results from last 7 days   Lab Units 01/01/25 0356 12/31/24 0343 12/30/24 0345 12/29/24 0423   WBC 10*3/mm3 10.55 11.61* 10.32 10.20   HEMOGLOBIN g/dL 12.2 12.3 12.4 12.3   PLATELETS 10*3/mm3 319 329 340 326     Results from last 7 days   Lab Units 01/01/25 0356 12/31/24 0343 12/30/24 0345 12/29/24 0423   SODIUM mmol/L 140 141 140 140   POTASSIUM mmol/L 3.4* 4.1 4.4 4.5   CHLORIDE mmol/L 106 107 105 105   CO2 mmol/L 23.1 26.2 26.0 25.0   BUN mg/dL 15 15 13 11   CREATININE mg/dL 0.58 0.60 0.62 0.51*   GLUCOSE mg/dL 168* 134* 144* 148*   EGFR mL/min/1.73 109.7 108.8 108.0 113.2     Results from last 7 days   Lab Units 01/01/25 0356 12/29/24 0423 12/28/24 0429 12/27/24  1714   ALBUMIN g/dL 3.4* 3.6 3.5 4.0   BILIRUBIN mg/dL <0.2 0.2 0.2 0.3   ALK PHOS U/L 69 68 70 78   AST (SGOT) U/L 16 14 16 18   ALT (SGPT) U/L 30 29 26 26     Results from last 7 days   Lab Units 01/01/25 0356 12/31/24 0343 12/30/24 0345 12/29/24  0423 12/28/24  0429 12/27/24  1714 12/27/24  1714   CALCIUM mg/dL 9.0 8.5* 8.8 9.0 9.1  --  9.6   ALBUMIN g/dL 3.4*  --   --  3.6 3.5  --  4.0   MAGNESIUM mg/dL 2.0 2.2 2.4 2.1 2.3   < >  --    PHOSPHORUS mg/dL 2.7 3.0 2.7 2.8  --   --   --     < > = values in this interval not displayed.     Results from last 7 days   Lab Units 12/29/24  0423 12/27/24  2122   PROCALCITONIN ng/mL 0.03  --    LACTATE mmol/L  --  1.8     No results found for: \"HGBA1C\", \"POCGLU\"    CT Chest Without Contrast Diagnostic    Result Date: 12/30/2024  1. Mild to moderate bilateral lower lobe atelectasis, scar or pneumonia. 2. Short-term follow-up CT of the chest in approximately 3 months recommended.  Radiation dose reduction techniques were utilized, including automated exposure control and exposure " modulation based on body size.        I have personally reviewed all medications:  Scheduled Medications  budesonide-formoterol, 2 puff, Inhalation, BID - RT  buPROPion XL, 300 mg, Oral, QAM  FLUoxetine, 80 mg, Oral, Daily  ipratropium-albuterol, 3 mL, Nebulization, Q4H - RT  levothyroxine, 50 mcg, Oral, Q AM  methylPREDNISolone sodium succinate, 60 mg, Intravenous, Q12H  montelukast, 10 mg, Oral, Daily  multivitamin, 1 tablet, Oral, Nightly  potassium chloride ER, 40 mEq, Oral, Q4H  pregabalin, 25 mg, Oral, Nightly  sodium chloride, 10 mL, Intravenous, Q12H  tamoxifen, 20 mg, Oral, Daily  theophylline, 300 mg, Oral, QAM  vitamin B-12, 1,000 mcg, Oral, Daily    Infusions   Diet  Diet: Cardiac; Healthy Heart (2-3 Na+); Fluid Consistency: Thin (IDDSI 0)    I have personally reviewed:  [x]  Laboratory   [x]  Microbiology   []  Radiology   []  EKG/Telemetry  []  Cardiology/Vascular   []  Pathology    []  Records       Assessment/Plan     Active Hospital Problems    Diagnosis  POA    **Severe asthma with acute exacerbation [J45.901]  Yes    Hypokalemia [E87.6]  Yes    History of breast cancer [Z85.3]  Not Applicable    Gastroesophageal reflux disease without esophagitis [K21.9]  Yes    DDD (degenerative disc disease), lumbar [M51.369]  Yes    Hypoxia [R09.02]  Yes    Hypothyroidism [E03.9]  Yes    Depression, major [F32.9]  Yes    Anxiety [F41.9]  Yes      Resolved Hospital Problems   No resolved problems to display.       48yo woman with asthma, chronic low back pain due to lumbar DDD, hypoT4, depression/anxiety, GERD, and breast CA, admitted here earlier this month with acute hypoxic respiratory failure due to rhinovirus and bacterial PNA, who was sent to ER at the direction of Pulm office for admission due to persistent cough, SOA, wheezing, and hypoxia.     Asthma exacerbation  Acute hypoxic resp failure  Appreciate Pulm attention to pt  RVP neg here, afebrile, DDimer wnl, WBC wnl, CXR unremarkable, CT Chest  unremarkable  Continue IV SoluMedrol, Symbicort, DuoNebs, Singulair, and Theophylline  S/p 3d of Zithromax for AECOPD  Weaned to RA since yesterday evening  Await Pulm recs today but suspect she could transition to oral Prednisone today and possibly go home tomorrow  Pulm planning biologic agent as outpt when recovered from this episode     Hypokalemia  Replacing with protocol  Mg++ level fine     GERD  Continue PPI PRN as she takes at home     HypoT4  TSH wnl  Continue L-T4     Depression/Anxiety  Stable, continue Prozac and Wellbutrin     Lumbar DDD  Stable at baseline  Continue Lyrica at HS     H/o Breast CA  Continue Tamoxifen      SCDs for DVT prophylaxis.  Full code.  Discussed with patient.  Anticipate discharge home with family when cleared by consultants. Hopefully tomorrow.  Expected Discharge Date: 1/2/2025; Expected Discharge Time:       Santos Tamez MD  [facility]  01/01/25  09:27 EST

## 2025-01-01 NOTE — PROGRESS NOTES
Consult Daily Progress Note  [facility]   01/01/25      Patient Name:  Nereida Myles  MRN:  [id number]   YOB: 1975  Age: 49 y.o.  Sex: female  LOS: 2    Reason for Consult:  Asthma exacerbation, shortness of breath    Hospital Course:   49-year-old with a history of asthma who presented after being found to have significant shortness of breath in clinic and directed to the emergency department.  Admitted for asthma exacerbation.    Interval History:  No acute events overnight  Weaned to room air  Symptoms continue to slowly improve  Was up and ambulating more over the past 24 hours  No chest pain or palpitations  No nausea or vomiting  Just finished a breathing treatment and feels better    Physical Exam:  Vitals:    01/01/25 0811   BP: 125/87   Pulse: 60   Resp: 18   Temp: 98.2 °F (36.8 °C)   SpO2: 97%       Intake/Output  No intake or output data in the 24 hours ending 01/01/25 1044    General: Alert, nontoxic, NAD  HEENT: NC/AT, EOMI, MMM  Neck: Supple, trachea midline  Cardiac: RRR, no murmur, gallops, rubs  Pulmonary: Diminished bilaterally  GI: Soft, non-tender, non-distended, normal bowel sounds  Extremities: Warm, well perfused, no LE edema  Skin: no visible rash  Neuro: CN II - XII grossly intact  Psychiatry: Normal mood and affect      Data Review:  Results from last 7 days   Lab Units 01/01/25  0356 12/31/24  0343 12/30/24  0345 12/29/24  0423 12/28/24  0429 12/27/24  1714   WBC 10*3/mm3 10.55 11.61* 10.32 10.20 4.91 8.18   HEMOGLOBIN g/dL 12.2 12.3 12.4 12.3 13.2 14.6   PLATELETS 10*3/mm3 319 329 340 326 337 394     Results from last 7 days   Lab Units 01/01/25  0356 12/31/24  0343 12/30/24  0345 12/29/24  0423 12/28/24  1439 12/28/24  0429 12/27/24  1714   SODIUM mmol/L 140 141 140 140  --  136 138   POTASSIUM mmol/L 3.4* 4.1 4.4 4.5 4.1 4.6 3.1*   CHLORIDE mmol/L 106 107 105 105  --  103 100   CO2 mmol/L 23.1 26.2 26.0 25.0  --  24.3 25.0   BUN mg/dL 15 15 13 11  --   11 19   CREATININE mg/dL 0.58 0.60 0.62 0.51*  --  0.64 0.95   GLUCOSE mg/dL 168* 134* 144* 148*  --  188* 84   CALCIUM mg/dL 9.0 8.5* 8.8 9.0  --  9.1 9.6   MAGNESIUM mg/dL 2.0 2.2 2.4 2.1  --  2.3  --    PHOSPHORUS mg/dL 2.7 3.0 2.7 2.8  --   --   --    Estimated Creatinine Clearance: 104.9 mL/min (by C-G formula based on SCr of 0.58 mg/dL).    Results from last 7 days   Lab Units 01/01/25  0356 12/31/24  0343 12/30/24  0345 12/29/24  0423 12/28/24  0429 12/27/24 2122 12/27/24  1714   AST (SGOT) U/L 16  --   --  14 16  --  18   ALT (SGPT) U/L 30  --   --  29 26  --  26   PROCALCITONIN ng/mL  --   --   --  0.03  --   --   --    LACTATE mmol/L  --   --   --   --   --  1.8  --    D DIMER QUANT MCGFEU/mL  --   --   --   --   --   --  0.28   PLATELETS 10*3/mm3 319 329 340 326 337  --  394               Imaging:  Reviewed chest images personally from past 3 days    ASSESSMENT  /  PLAN:    Acute asthma exacerbation  Acute hypoxic respiratory failure  Hypothyroidism  Depression  Gastroesophageal reflux disease  Lumbar degenerative disc disease  History of breast cancer on tamoxifen     -Patient symptoms continue to slowly improve.  -CT chest does not demonstrate any concerning intraparenchymal pulmonary pathology.  No evidence of pneumonia.  -IV steroids transition to oral.  Will need prolonged taper.  -Patient has received multiple courses of antibiotics so far, at this time she is afebrile, no leukocytosis, procalcitonin negative.  Chest x-ray without any evidence of pneumonia.  I will hold off on antibiotics at this time.  -Bronchodilators every 4 hours  -Continue supplemental oxygen with SpO2 goal greater than 90%.  -Azithromycin for exacerbation completed  -Did have significant eosinophilia once back in August of this year however repeat differentials do not demonstrate.  She appears to have quite significant asthma exacerbation resistant to treatment.  Now that she has had multiple episodes in the past month, I  believe that she would qualify for biologic therapy.  Upon discharge from the hospital we will follow-up with her closely in clinic and initiate biologic therapy.    -Anticipate patient stability for discharge in the next 24 to 48 hours.  Have discussed this with the patient and she is amenable.    Thank you for allowing me to participate in the care of this patient.  I will continue to follow along with you.    Abdullahi Patel MD  [facility]  Pulmonary and Critical Care Medicine, Interventional Pulmonology    Parts of this note may be an electronic transcription/translation of spoken language to printed text using the Dragon dictation system.

## 2025-01-01 NOTE — PLAN OF CARE
Problem: Sepsis/Septic Shock  Goal: Optimal Coping  Outcome: Progressing  Intervention: Support Patient and Family Response  Recent Flowsheet Documentation  Taken 12/31/2024 0815 by Jimmy Gabriel RN  Supportive Measures: active listening utilized  Family/Support System Care: support provided  Goal: Absence of Bleeding  Outcome: Progressing  Goal: Blood Glucose Level Within Target Range  Outcome: Progressing  Goal: Absence of Infection Signs and Symptoms  Outcome: Progressing  Intervention: Promote Recovery  Recent Flowsheet Documentation  Taken 12/31/2024 0815 by Jimmy Gabriel RN  Airway/Ventilation Management: pulmonary hygiene promoted  Sleep/Rest Enhancement:   awakenings minimized   consistent schedule promoted  Goal: Optimal Nutrition Delivery  Outcome: Progressing     Problem: Adult Inpatient Plan of Care  Goal: Plan of Care Review  Outcome: Progressing  Flowsheets (Taken 12/31/2024 1913)  Plan of Care Reviewed With: patient  Goal: Patient-Specific Goal (Individualized)  Outcome: Progressing  Goal: Absence of Hospital-Acquired Illness or Injury  Outcome: Progressing  Intervention: Identify and Manage Fall Risk  Recent Flowsheet Documentation  Taken 12/31/2024 1800 by Jimmy Gabriel RN  Safety Promotion/Fall Prevention:   activity supervised   assistive device/personal items within reach   clutter free environment maintained   fall prevention program maintained   gait belt   lighting adjusted   mobility aid in reach   muscle strengthening facilitated   nonskid shoes/slippers when out of bed   room organization consistent   safety round/check completed  Taken 12/31/2024 1600 by Jimmy Gabriel RN  Safety Promotion/Fall Prevention:   activity supervised   assistive device/personal items within reach   clutter free environment maintained   fall prevention program maintained   gait belt   lighting adjusted   mobility aid in reach   muscle strengthening facilitated   nonskid shoes/slippers when out of  bed   room organization consistent   safety round/check completed  Taken 12/31/2024 1400 by Jimmy Gabriel RN  Safety Promotion/Fall Prevention:   activity supervised   assistive device/personal items within reach   clutter free environment maintained   fall prevention program maintained   lighting adjusted   gait belt   mobility aid in reach   muscle strengthening facilitated   nonskid shoes/slippers when out of bed   room organization consistent   safety round/check completed  Taken 12/31/2024 1200 by Jimmy Gabriel RN  Safety Promotion/Fall Prevention:   activity supervised   assistive device/personal items within reach   clutter free environment maintained   fall prevention program maintained   gait belt   lighting adjusted   mobility aid in reach   muscle strengthening facilitated   nonskid shoes/slippers when out of bed   room organization consistent   safety round/check completed  Taken 12/31/2024 1000 by Jimmy Gabriel RN  Safety Promotion/Fall Prevention:   activity supervised   assistive device/personal items within reach   clutter free environment maintained   fall prevention program maintained   gait belt   lighting adjusted   muscle strengthening facilitated   mobility aid in reach   nonskid shoes/slippers when out of bed   room organization consistent   safety round/check completed  Taken 12/31/2024 0815 by Jimmy Gabriel RN  Safety Promotion/Fall Prevention:   assistive device/personal items within reach   activity supervised   clutter free environment maintained   elopement precautions   fall prevention program maintained   lighting adjusted   mobility aid in reach   muscle strengthening facilitated   nonskid shoes/slippers when out of bed   safety round/check completed   room organization consistent  Intervention: Prevent Skin Injury  Recent Flowsheet Documentation  Taken 12/31/2024 1400 by Jimmy Gabriel RN  Body Position: supine  Taken 12/31/2024 0815 by Jimmy Gabriel RN  Body  Position: supine  Goal: Optimal Comfort and Wellbeing  Outcome: Progressing  Intervention: Provide Person-Centered Care  Recent Flowsheet Documentation  Taken 12/31/2024 1400 by Jimmy Gabriel RN  Trust Relationship/Rapport:   care explained   choices provided   questions answered   questions encouraged  Taken 12/31/2024 0815 by Jimmy Gabriel RN  Trust Relationship/Rapport:   care explained   choices provided   questions answered   questions encouraged   empathic listening provided   thoughts/feelings acknowledged   reassurance provided   emotional support provided  Goal: Readiness for Transition of Care  Outcome: Progressing     Problem: Fall Injury Risk  Goal: Absence of Fall and Fall-Related Injury  Outcome: Progressing  Intervention: Identify and Manage Contributors  Recent Flowsheet Documentation  Taken 12/31/2024 1400 by Jimmy Gabriel RN  Self-Care Promotion: independence encouraged  Taken 12/31/2024 1000 by Jimmy Gabriel RN  Medication Review/Management: medications reviewed  Taken 12/31/2024 0815 by Jimmy Gabriel RN  Medication Review/Management: medications reviewed  Intervention: Promote Injury-Free Environment  Recent Flowsheet Documentation  Taken 12/31/2024 1800 by Jimmy Gabriel RN  Safety Promotion/Fall Prevention:   activity supervised   assistive device/personal items within reach   clutter free environment maintained   fall prevention program maintained   gait belt   lighting adjusted   mobility aid in reach   muscle strengthening facilitated   nonskid shoes/slippers when out of bed   room organization consistent   safety round/check completed  Taken 12/31/2024 1600 by Jimmy Gabriel RN  Safety Promotion/Fall Prevention:   activity supervised   assistive device/personal items within reach   clutter free environment maintained   fall prevention program maintained   gait belt   lighting adjusted   mobility aid in reach   muscle strengthening facilitated   nonskid  shoes/slippers when out of bed   room organization consistent   safety round/check completed  Taken 12/31/2024 1400 by Jimmy Gabriel RN  Safety Promotion/Fall Prevention:   activity supervised   assistive device/personal items within reach   clutter free environment maintained   fall prevention program maintained   lighting adjusted   gait belt   mobility aid in reach   muscle strengthening facilitated   nonskid shoes/slippers when out of bed   room organization consistent   safety round/check completed  Taken 12/31/2024 1200 by Jimmy Gabriel RN  Safety Promotion/Fall Prevention:   activity supervised   assistive device/personal items within reach   clutter free environment maintained   fall prevention program maintained   gait belt   lighting adjusted   mobility aid in reach   muscle strengthening facilitated   nonskid shoes/slippers when out of bed   room organization consistent   safety round/check completed  Taken 12/31/2024 1000 by Jimmy Gabriel RN  Safety Promotion/Fall Prevention:   activity supervised   assistive device/personal items within reach   clutter free environment maintained   fall prevention program maintained   gait belt   lighting adjusted   muscle strengthening facilitated   mobility aid in reach   nonskid shoes/slippers when out of bed   room organization consistent   safety round/check completed  Taken 12/31/2024 0815 by Jimmy Gabriel RN  Safety Promotion/Fall Prevention:   assistive device/personal items within reach   activity supervised   clutter free environment maintained   elopement precautions   fall prevention program maintained   lighting adjusted   mobility aid in reach   muscle strengthening facilitated   nonskid shoes/slippers when out of bed   safety round/check completed   room organization consistent     Problem: Pain Acute  Goal: Optimal Pain Control and Function  Outcome: Progressing  Intervention: Optimize Psychosocial Wellbeing  Recent Flowsheet  Documentation  Taken 12/31/2024 0815 by Jimmy Gabriel RN  Supportive Measures: active listening utilized  Intervention: Prevent or Manage Pain  Recent Flowsheet Documentation  Taken 12/31/2024 1000 by Jimmy Gabriel RN  Medication Review/Management: medications reviewed  Taken 12/31/2024 0815 by Jimmy Gabriel RN  Sleep/Rest Enhancement:   awakenings minimized   consistent schedule promoted  Medication Review/Management: medications reviewed     Problem: Nausea and Vomiting  Goal: Nausea and Vomiting Relief  Outcome: Progressing     Problem: Electrolyte Imbalance  Goal: Electrolyte Balance  Outcome: Progressing     Problem: Comorbidity Management  Goal: Maintenance of Asthma Control  Outcome: Progressing  Intervention: Maintain Asthma Symptom Control  Recent Flowsheet Documentation  Taken 12/31/2024 1000 by Jimmy Gabriel RN  Medication Review/Management: medications reviewed  Taken 12/31/2024 0815 by Jimmy Gabriel RN  Medication Review/Management: medications reviewed  Goal: Maintenance of Behavioral Health Symptom Control  Outcome: Progressing  Intervention: Maintain Behavioral Health Symptom Control  Recent Flowsheet Documentation  Taken 12/31/2024 1000 by Jimmy Gabriel RN  Medication Review/Management: medications reviewed  Taken 12/31/2024 0815 by Jimmy Gabriel RN  Medication Review/Management: medications reviewed  Goal: Maintenance of COPD Symptom Control  Outcome: Progressing  Intervention: Maintain COPD (Chronic Obstructive Pulmonary Disease) Symptom Control  Recent Flowsheet Documentation  Taken 12/31/2024 1000 by Jimmy Gabriel RN  Medication Review/Management: medications reviewed  Taken 12/31/2024 0815 by Jimmy Gabriel RN  Medication Review/Management: medications reviewed  Goal: Maintenance of Osteoarthritis Symptom Control  Outcome: Progressing  Intervention: Maintain Osteoarthritis Symptom Control  Recent Flowsheet Documentation  Taken 12/31/2024 1000 by Harvey  MARISOL Marquez  Medication Review/Management: medications reviewed  Taken 12/31/2024 0815 by Jimmy Gabriel RN  Medication Review/Management: medications reviewed   Goal Outcome Evaluation:  Plan of Care Reviewed With: patient

## 2025-01-02 ENCOUNTER — READMISSION MANAGEMENT (OUTPATIENT)
Dept: CALL CENTER | Facility: HOSPITAL | Age: 50
End: 2025-01-02
Payer: COMMERCIAL

## 2025-01-02 ENCOUNTER — TELEPHONE (OUTPATIENT)
Dept: FAMILY MEDICINE CLINIC | Facility: CLINIC | Age: 50
End: 2025-01-02

## 2025-01-02 VITALS
SYSTOLIC BLOOD PRESSURE: 118 MMHG | DIASTOLIC BLOOD PRESSURE: 76 MMHG | TEMPERATURE: 98.1 F | OXYGEN SATURATION: 96 % | BODY MASS INDEX: 21.79 KG/M2 | HEART RATE: 73 BPM | HEIGHT: 64 IN | RESPIRATION RATE: 20 BRPM | WEIGHT: 127.65 LBS

## 2025-01-02 LAB
ANION GAP SERPL CALCULATED.3IONS-SCNC: 9 MMOL/L (ref 5–15)
BUN SERPL-MCNC: 17 MG/DL (ref 6–20)
BUN/CREAT SERPL: 27.4 (ref 7–25)
CALCIUM SPEC-SCNC: 9 MG/DL (ref 8.6–10.5)
CHLORIDE SERPL-SCNC: 105 MMOL/L (ref 98–107)
CO2 SERPL-SCNC: 26 MMOL/L (ref 22–29)
CREAT SERPL-MCNC: 0.62 MG/DL (ref 0.57–1)
DEPRECATED RDW RBC AUTO: 39.9 FL (ref 37–54)
EGFRCR SERPLBLD CKD-EPI 2021: 108 ML/MIN/1.73
ERYTHROCYTE [DISTWIDTH] IN BLOOD BY AUTOMATED COUNT: 12.2 % (ref 12.3–15.4)
GLUCOSE SERPL-MCNC: 116 MG/DL (ref 65–99)
HCT VFR BLD AUTO: 37.7 % (ref 34–46.6)
HGB BLD-MCNC: 12.6 G/DL (ref 12–15.9)
MAGNESIUM SERPL-MCNC: 2.4 MG/DL (ref 1.6–2.6)
MCH RBC QN AUTO: 30.3 PG (ref 26.6–33)
MCHC RBC AUTO-ENTMCNC: 33.4 G/DL (ref 31.5–35.7)
MCV RBC AUTO: 90.6 FL (ref 79–97)
PHOSPHATE SERPL-MCNC: 3.5 MG/DL (ref 2.5–4.5)
PLATELET # BLD AUTO: 338 10*3/MM3 (ref 140–450)
PMV BLD AUTO: 9.5 FL (ref 6–12)
POTASSIUM SERPL-SCNC: 4.5 MMOL/L (ref 3.5–5.2)
RBC # BLD AUTO: 4.16 10*6/MM3 (ref 3.77–5.28)
SODIUM SERPL-SCNC: 140 MMOL/L (ref 136–145)
WBC NRBC COR # BLD AUTO: 14.19 10*3/MM3 (ref 3.4–10.8)

## 2025-01-02 PROCEDURE — 83735 ASSAY OF MAGNESIUM: CPT | Performed by: HOSPITALIST

## 2025-01-02 PROCEDURE — 80048 BASIC METABOLIC PNL TOTAL CA: CPT | Performed by: HOSPITALIST

## 2025-01-02 PROCEDURE — 84100 ASSAY OF PHOSPHORUS: CPT | Performed by: HOSPITALIST

## 2025-01-02 PROCEDURE — 85027 COMPLETE CBC AUTOMATED: CPT | Performed by: HOSPITALIST

## 2025-01-02 PROCEDURE — 63710000001 PREDNISONE PER 1 MG: Performed by: HOSPITALIST

## 2025-01-02 PROCEDURE — 94799 UNLISTED PULMONARY SVC/PX: CPT

## 2025-01-02 PROCEDURE — 94664 DEMO&/EVAL PT USE INHALER: CPT

## 2025-01-02 PROCEDURE — 94761 N-INVAS EAR/PLS OXIMETRY MLT: CPT

## 2025-01-02 RX ORDER — PREDNISONE 10 MG/1
TABLET ORAL
Qty: 18 TABLET | Refills: 0 | Status: SHIPPED | OUTPATIENT
Start: 2025-01-02 | End: 2025-01-11

## 2025-01-02 RX ORDER — IPRATROPIUM BROMIDE AND ALBUTEROL SULFATE 2.5; .5 MG/3ML; MG/3ML
3 SOLUTION RESPIRATORY (INHALATION)
Qty: 360 ML | Refills: 0 | Status: SHIPPED | OUTPATIENT
Start: 2025-01-02

## 2025-01-02 RX ORDER — FLUTICASONE FUROATE, UMECLIDINIUM BROMIDE AND VILANTEROL TRIFENATATE 100; 62.5; 25 UG/1; UG/1; UG/1
1 POWDER RESPIRATORY (INHALATION)
Qty: 60 EACH | Refills: 6 | Status: SHIPPED | OUTPATIENT
Start: 2025-01-02

## 2025-01-02 RX ORDER — PROCHLORPERAZINE MALEATE 10 MG
10 TABLET ORAL EVERY 8 HOURS PRN
Qty: 10 TABLET | Refills: 0 | Status: SHIPPED | OUTPATIENT
Start: 2025-01-02

## 2025-01-02 RX ADMIN — MONTELUKAST SODIUM 10 MG: 10 TABLET, FILM COATED ORAL at 08:29

## 2025-01-02 RX ADMIN — LEVOTHYROXINE SODIUM 50 MCG: 50 TABLET ORAL at 06:08

## 2025-01-02 RX ADMIN — FLUOXETINE HYDROCHLORIDE 80 MG: 20 CAPSULE ORAL at 08:29

## 2025-01-02 RX ADMIN — IPRATROPIUM BROMIDE AND ALBUTEROL SULFATE 3 ML: 2.5; .5 SOLUTION RESPIRATORY (INHALATION) at 07:01

## 2025-01-02 RX ADMIN — Medication 10 ML: at 08:30

## 2025-01-02 RX ADMIN — IPRATROPIUM BROMIDE AND ALBUTEROL SULFATE 3 ML: 2.5; .5 SOLUTION RESPIRATORY (INHALATION) at 10:52

## 2025-01-02 RX ADMIN — BUDESONIDE AND FORMOTEROL FUMARATE DIHYDRATE 2 PUFF: 160; 4.5 AEROSOL RESPIRATORY (INHALATION) at 07:07

## 2025-01-02 RX ADMIN — PREDNISONE 40 MG: 20 TABLET ORAL at 08:29

## 2025-01-02 RX ADMIN — TAMOXIFEN CITRATE 20 MG: 10 TABLET, FILM COATED ORAL at 08:29

## 2025-01-02 RX ADMIN — HYDROCODONE BITARTRATE AND ACETAMINOPHEN 1 TABLET: 5; 325 TABLET ORAL at 08:33

## 2025-01-02 RX ADMIN — Medication 1000 MCG: at 08:30

## 2025-01-02 RX ADMIN — BUPROPION HYDROCHLORIDE 300 MG: 300 TABLET, EXTENDED RELEASE ORAL at 06:08

## 2025-01-02 RX ADMIN — THEOPHYLLINE 300 MG: 300 TABLET, EXTENDED RELEASE ORAL at 06:08

## 2025-01-02 NOTE — PLAN OF CARE
Problem: Sepsis/Septic Shock  Goal: Optimal Coping  Outcome: Progressing  Goal: Absence of Bleeding  Outcome: Progressing  Goal: Blood Glucose Level Within Target Range  Outcome: Progressing  Goal: Absence of Infection Signs and Symptoms  Outcome: Progressing  Goal: Optimal Nutrition Delivery  Outcome: Progressing     Problem: Adult Inpatient Plan of Care  Goal: Plan of Care Review  Outcome: Progressing  Goal: Patient-Specific Goal (Individualized)  Outcome: Progressing  Goal: Absence of Hospital-Acquired Illness or Injury  Outcome: Progressing  Goal: Optimal Comfort and Wellbeing  Outcome: Progressing  Goal: Readiness for Transition of Care  Outcome: Progressing     Problem: Fall Injury Risk  Goal: Absence of Fall and Fall-Related Injury  Outcome: Progressing     Problem: Pain Acute  Goal: Optimal Pain Control and Function  Outcome: Progressing     Problem: Nausea and Vomiting  Goal: Nausea and Vomiting Relief  Outcome: Progressing     Problem: Electrolyte Imbalance  Goal: Electrolyte Balance  Outcome: Progressing     Problem: Comorbidity Management  Goal: Maintenance of Asthma Control  Outcome: Progressing  Goal: Maintenance of Behavioral Health Symptom Control  Outcome: Progressing  Goal: Maintenance of COPD Symptom Control  Outcome: Progressing  Goal: Maintenance of Osteoarthritis Symptom Control  Outcome: Progressing   Goal Outcome Evaluation:

## 2025-01-02 NOTE — PAYOR COMM NOTE
"Nereida Myles (49 y.o. Female)    PLEASE SEE ATTACHED FOR DC NOTICE   Request approval of 1 additional day through 1/2  REF#[id number]   THANK YOU  ROSIO ALEXANDRE RN/ DEPT   [facility]  PH: [phone]  FAX:  [phone]     Date of Birth   1975    Social Security Number       Address   [address] [place] [address]    Home Phone       MRN   [id number]       Sabianist   None    Marital Status                               Admission Date   12/27/24    Admission Type   Emergency    Admitting Provider   Prabhjot Giang MD    Attending Provider       Department, Room/Bed   [facility] [address] [place], P883/1       Discharge Date   1/2/2025    Discharge Disposition   Home or Self Care    Discharge Destination   Home                              Attending Provider: (none)   Allergies: No Known Allergies    Isolation: None   Infection: None   Code Status: CPR    Ht: 162.6 cm (64\")   Wt: 57.9 kg (127 lb 10.3 oz)    Admission Cmt: None   Principal Problem: Severe asthma with acute exacerbation [J45.901]                   Active Insurance as of 12/27/2024       Primary Coverage       Payor Plan Insurance Group Employer/Plan Group    [employer] BLUE [employer]OSS [employer] CR[employer] BLUE [employer] PPO [id number]       Payor Plan Address Payor Plan Phone Number Payor Plan Fax Number Effective Dates    [address] [phone]  4/3/2021 - None Entered    [place] [address]         Subscriber Name Subscriber Birth Date Member ID       NUNO MYLES 6[birth year]2/1965 [id number]                     Emergency Contacts        (Rel.) Home Phone Work Phone Mobile Phone    NUNO MYLES (Spouse) -- -- [phone]    KAYLEYLEVIKEVIN (Mother) -- -- [phone]    TatyanaElaine (Daughter) [phone] -- [phone]              [place]: NPI [id number]  Tax ID [id number]  Medication Administration Report for Nereida Myles as of 1/1/25 through 1/2/25     Legend:    Given " Hold Not Given Due Canceled Entry Other Actions    Time Time (Time) Time Time-Action         Discontinued     Completed     Future     MAR Hold     Linked             Medications 01/01/25 01/02/25      acetaminophen (TYLENOL) tablet 650 mg  Dose: 650 mg  Freq: Every 4 Hours PRN Route: PO  PRN Reason: Mild Pain  Start: 12/27/24 2213     Admin Instructions:   If given for fever, use fever parameter: fever greater than 100.4 °F  Based on patient request - if ordered for moderate or severe pain, provider allows for administration of a medication prescribed for a lower pain scale.    Do not exceed 4 grams of acetaminophen in a 24 hr period. Max dose of 2gm for AST/ALT greater than 120 units/L.    If given for pain, use the following pain scale:   Mild Pain = Pain Score of 1-3, CPOT 1-2  Moderate Pain = Pain Score of 4-6, CPOT 3-4  Severe Pain = Pain Score of 7-10, CPOT 5-8          Or   acetaminophen (TYLENOL) 160 MG/5ML oral solution 650 mg  Dose: 650 mg  Freq: Every 4 Hours PRN Route: PO  PRN Reason: Mild Pain  Start: 12/27/24 2213     Admin Instructions:   If given for fever, use fever parameter: fever greater than 100.4 °F  Based on patient request - if ordered for moderate or severe pain, provider allows for administration of a medication prescribed for a lower pain scale.    Do not exceed 4 grams of acetaminophen in a 24 hr period. Max dose of 2gm for AST/ALT greater than 120 units/L.    If given for pain, use the following pain scale:   Mild Pain = Pain Score of 1-3, CPOT 1-2  Moderate Pain = Pain Score of 4-6, CPOT 3-4  Severe Pain = Pain Score of 7-10, CPOT 5-8          Or   acetaminophen (TYLENOL) suppository 650 mg  Dose: 650 mg  Freq: Every 4 Hours PRN Route: RE  PRN Reason: Mild Pain  Start: 12/27/24 2213     Admin Instructions:   If given for fever, use fever parameter: fever greater than 100.4 °F  Based on patient request - if ordered for moderate or severe pain, provider allows for administration of a  medication prescribed for a lower pain scale.    Do not exceed 4 grams of acetaminophen in a 24 hr period. Max dose of 2gm for AST/ALT greater than 120 units/L.    If given for pain, use the following pain scale:   Mild Pain = Pain Score of 1-3, CPOT 1-2  Moderate Pain = Pain Score of 4-6, CPOT 3-4  Severe Pain = Pain Score of 7-10, CPOT 5-8          albuterol (PROVENTIL) nebulizer solution 0.083% 2.5 mg/3mL  Dose: 2.5 mg  Freq: Every 6 Hours PRN Route: NEBULIZATION  PRN Reason: Shortness of Air  Start: 12/27/24 2218     Admin Instructions:   Include Respiratory Treatment Education          benzonatate (TESSALON) capsule 200 mg  Dose: 200 mg  Freq: 3 Times Daily PRN Route: PO  PRN Reason: Cough  Start: 12/27/24 2307     Admin Instructions:   Do not crush or chew the capsules or tablets. The drug may not work as designed if the capsule or tablet is crushed or chewed. Swallow whole.  Swallow whole.  Do not crush, chew, or open capsule.      2021-Given                sennosides-docusate (PERICOLACE) 8.6-50 MG per tablet 2 tablet  Dose: 2 tablet  Freq: 2 Times Daily PRN Route: PO  PRN Reason: Constipation  Start: 12/27/24 2213     Admin Instructions:   Start bowel management regimen if patient has not had a bowel movement after 12 hours.          And   polyethylene glycol (MIRALAX) packet 17 g  Dose: 17 g  Freq: Daily PRN Route: PO  PRN Reason: Constipation  PRN Comment: Use if senna-docusate is ineffective  Start: 12/27/24 2213     Admin Instructions:   Use if no bowel movement after 12 hours. Mix in 6-8 ounces of water.  Use 4-8 ounces of water, tea, or juice for each 17 gram dose.          And   bisacodyl (DULCOLAX) EC tablet 5 mg  Dose: 5 mg  Freq: Daily PRN Route: PO  PRN Reason: Constipation  PRN Comment: Use if polyethylene glycol is ineffective  Start: 12/27/24 2213     Admin Instructions:   Use if no bowel movement after 12 hours.  Swallow whole. Do not crush, split, or chew tablet.          And   bisacodyl  (DULCOLAX) suppository 10 mg  Dose: 10 mg  Freq: Daily PRN Route: RE  PRN Reason: Constipation  PRN Comment: Use if bisacodyl oral is ineffective  Start: 12/27/24 2213     Admin Instructions:   Use if no bowel movement after 12 hours.  Hold for diarrhea          budesonide-formoterol (SYMBICORT) 160-4.5 MCG/ACT inhaler 2 puff  Dose: 2 puff  Freq: 2 Times Daily - RT Route: IN  Start: 12/29/24 2130     Admin Instructions:    Shake well.  Rinse mouth after use, do not swallow water.  Send aerosols to pharmacy in ziplock bag for proper disposal.      0713-Given     1932-Given           0707-Given     2130             buPROPion XL (WELLBUTRIN XL) 24 hr tablet 300 mg  Dose: 300 mg  Freq: Every Morning Route: PO  Start: 12/28/24 0700     Admin Instructions:   Do not crush or chew the capsules or tablets. The drug may not work as designed if the capsule or tablet is crushed or chewed. Swallow whole.  Caution: Look alike/sound alike drug alert. Swallow whole.  Do not crush, chew, or split tablet.      0614-Given            0608-Given              famotidine (PEPCID) tablet 20 mg  Dose: 20 mg  Freq: 2 Times Daily PRN Route: PO  PRN Reasons: Heartburn,Indigestion  Start: 12/27/24 2213          FLUoxetine (PROzac) capsule 80 mg  Dose: 80 mg  Freq: Daily Route: PO  Start: 12/28/24 0900     Admin Instructions:   Caution: Look alike/sound alike drug alert      0858-Given            0829-Given              HYDROcodone-acetaminophen (NORCO) 5-325 MG per tablet 1 tablet  Dose: 1 tablet  Freq: Every 6 Hours PRN Route: PO  PRN Reason: Moderate Pain  Start: 12/28/24 1331 End: 01/02/25 1330     Admin Instructions:   Based on patient request - if ordered for moderate or severe pain, provider allows for administration of a medication prescribed for a lower pain scale.  [KENAN]    Do not exceed 4 grams of acetaminophen in a 24 hr period. Max dose of 2gm for AST/ALT greater than 120 units/L        If given for pain, use the following pain  scale:   Mild Pain = Pain Score of 1-3, CPOT 1-2  Moderate Pain = Pain Score of 4-6, CPOT 3-4  Severe Pain = Pain Score of 7-10, CPOT 5-8      1822-Given            0833-Given              ipratropium-albuterol (DUO-NEB) nebulizer solution 3 mL  Dose: 3 mL  Freq: Every 4 Hours - RT Route: NEBULIZATION  Start: 12/29/24 1530     Admin Instructions:   Include Respiratory Treatment Education      0349-Given     0708-Given     1047-Given     1513-Given     1931-Given       2317-Given            (0237)-Not Given [C]     0701-Given     1052-Given     1530     1930       2330              levothyroxine (SYNTHROID, LEVOTHROID) tablet 50 mcg  Dose: 50 mcg  Freq: Every Early Morning Route: PO  Start: 12/28/24 0600     Admin Instructions:   Take on empty stomach.      0614-Given            0608-Given              melatonin tablet 5 mg  Dose: 5 mg  Freq: Nightly PRN Route: PO  PRN Reason: Sleep  Start: 12/27/24 2214          metaxalone (SKELAXIN) tablet 400 mg  Dose: 400 mg  Freq: Every 6 Hours PRN Route: PO  PRN Reason: Muscle Spasms  Start: 12/27/24 2313          montelukast (SINGULAIR) tablet 10 mg  Dose: 10 mg  Freq: Daily Route: PO  Start: 12/28/24 0900      0858-Given            0829-Given              multivitamin (THERAGRAN) tablet 1 tablet  Dose: 1 tablet  Freq: Nightly Route: PO  Start: 12/28/24 0015     Admin Instructions:         2021-Given 2100              nitroglycerin (NITROSTAT) SL tablet 0.4 mg  Dose: 0.4 mg  Freq: Every 5 Minutes PRN Route: SL  PRN Reason: Chest Pain  PRN Comment: Only if SBP Greater Than 100  Start: 12/27/24 2213     Admin Instructions:   If Pain Unrelieved After 3 Doses Notify MD  May administer up to 3 doses per episode.          ondansetron (ZOFRAN) injection 4 mg  Dose: 4 mg  Freq: Every 6 Hours PRN Route: IV  PRN Reasons: Nausea,Vomiting  Start: 12/27/24 2213     Admin Instructions:   If BOTH ondansetron (ZOFRAN) and promethazine (PHENERGAN) are ordered use ondansetron first  "and THEN promethazine IF ondansetron is ineffective.      0907-Given     1822-Return to Cabinet              pantoprazole (PROTONIX) EC tablet 40 mg  Dose: 40 mg  Freq: Daily PRN Route: PO  PRN Comment: heartburn  Start: 12/27/24 2314     Admin Instructions:   Swallow whole; do not crush, split, or chew.          Potassium Replacement - Follow Nurse / BPA Driven Protocol  Freq: As Needed Route: XX  PRN Reason: Other  Start: 12/27/24 2306     Admin Instructions:   Open Order & Select \"[facility] Electrolyte Replacement Protocol Algorithm\" to View Details          predniSONE (DELTASONE) tablet 40 mg  Dose: 40 mg  Freq: Daily With Breakfast Route: PO  Start: 01/01/25 1130     Admin Instructions:   Take with food.      1235-Given            0829-Given              pregabalin (LYRICA) capsule 25 mg  Dose: 25 mg  Freq: Nightly Route: PO  Start: 12/28/24 0015     Admin Instructions:         2021-Given            2100              prochlorperazine (COMPAZINE) injection 5 mg  Dose: 5 mg  Freq: Every 6 Hours PRN Route: IV  PRN Reasons: Nausea,Vomiting  Start: 12/28/24 1727     Admin Instructions:   \"If multiple N/V medications ordered, use in the following order: Ondansetron, Prochlorperazine, Promethazine. Use PO unless patient refuses or patient unable to swallow.\"      1826-Given               sodium chloride 0.9 % flush 10 mL  Dose: 10 mL  Freq: As Needed Route: IV  PRN Reason: Line Care  Start: 12/27/24 2213          sodium chloride 0.9 % flush 10 mL  Dose: 10 mL  Freq: Every 12 Hours Scheduled Route: IV  Start: 12/27/24 2315      0629-Canceled Entry     0858-Given     2155-Canceled Entry          0830-Given     2100             sodium chloride 0.9 % flush 10 mL  Dose: 10 mL  Freq: As Needed Route: IV  PRN Reason: Line Care  Start: 12/27/24 1654          sodium chloride 0.9 % infusion 40 mL  Dose: 40 mL  Freq: As Needed Route: IV  PRN Reason: Line Care  Start: 12/27/24 2213     Admin Instructions:   Following administration of " an IV intermittent medication, flush line with 40mL NS at 100mL/hr.          tamoxifen (NOLVADEX) tablet 20 mg  Dose: 20 mg  Freq: Daily Route: PO  Start: 12/28/24 0900     Admin Instructions:   Swallow whole. Do not crush or chew.  Group 1 (Yellow) Hazardous Drug Antineoplastic - See Handling Guide      0858-Given            0829-Given              theophylline (THEODUR) 12 hr tablet 300 mg  Dose: 300 mg  Freq: Every Morning Route: PO  Start: 12/28/24 0700     Admin Instructions:   Do not crush or chew the capsules or tablets. The drug may not work as designed if the capsule or tablet is crushed or chewed. Swallow whole.        0614-Given            0608-Given              vitamin B-12 (CYANOCOBALAMIN) tablet 1,000 mcg  Dose: 1,000 mcg  Freq: Daily Route: PO  Start: 12/28/24 0900      0858-Given            0830-Given              Completed Medications  Medications 01/01/25 01/02/25      azithromycin (ZITHROMAX) tablet 500 mg  Dose: 500 mg  Freq: Every 24 Hours Scheduled Route: PO  Indications Comment: Asthma exacerbation  Start: 12/29/24 1500 End: 12/31/24 0843     Admin Instructions:   Do not administer concurrently with aluminum or magnesium antacids.  Take with food if GI upset occurs.          ipratropium-albuterol (DUO-NEB) nebulizer solution 3 mL  Dose: 3 mL  Freq: Once Route: NEBULIZATION  Start: 12/27/24 2022 End: 12/27/24 2028     Admin Instructions:   Include Respiratory Treatment Education          methylPREDNISolone sodium succinate (SOLU-Medrol) injection 80 mg  Dose: 80 mg  Freq: Once Route: IV  Start: 12/27/24 2022 End: 12/27/24 2121     Admin Instructions:   Caution: Look alike/sound alike drug alert          potassium chloride (K-DUR,KLOR-CON) ER tablet 40 mEq  Dose: 40 mEq  Freq: Every 4 Hours Route: PO  Start: 01/01/25 0600 End: 01/01/25 1235     Admin Instructions:   Do not crush or chew the capsules or tablets. The drug may not work as designed if the capsule or tablet is crushed or chewed.  Swallow whole.  Swallow whole; do not crush, split, or chew.      0614-Given     1235-Given              Discontinued Medications  Medications 01/01/25 01/02/25      albuterol (PROVENTIL) tablet 4 mg  Dose: 4 mg  Freq: Nightly Route: PO  Start: 12/28/24 0015 End: 12/27/24 2318          budesonide-formoterol (SYMBICORT) 160-4.5 MCG/ACT inhaler 1 puff  Dose: 1 puff  Freq: 2 Times Daily - RT Route: IN  Start: 12/28/24 0015 End: 12/29/24 1402     Admin Instructions:    Shake well.  Rinse mouth after use, do not swallow water.  Send aerosols to pharmacy in ziplock bag for proper disposal.          ipratropium-albuterol (DUO-NEB) nebulizer solution 3 mL  Dose: 3 mL  Freq: 4 Times Daily - RT Route: NEBULIZATION  Start: 12/27/24 2315 End: 12/29/24 1358     Admin Instructions:   Include Respiratory Treatment Education          methylPREDNISolone sodium succinate (SOLU-Medrol) injection 60 mg  Dose: 60 mg  Freq: Every 12 Hours Route: IV  Start: 12/28/24 0800 End: 01/01/25 1044     Admin Instructions:   Caution: Look alike/sound alike drug alert      0858-Given               potassium chloride (K-DUR,KLOR-CON) ER tablet 40 mEq  Dose: 40 mEq  Freq: Every 4 Hours Route: PO  Start: 12/28/24 0000 End: 12/28/24 1159     Admin Instructions:   Do not crush or chew the capsules or tablets. The drug may not work as designed if the capsule or tablet is crushed or chewed. Swallow whole.  Swallow whole; do not crush, split, or chew.                         Physician Progress Notes (last 48 hours)        Abdullahi Patel MD at 01/02/25 0913            Consult Daily Progress Note  [facility]   01/02/25      Patient Name:  Nereida Myles  MRN:  [id number]   YOB: 1975  Age: 49 y.o.  Sex: female  LOS: 3    Reason for Consult:  Asthma exacerbation, shortness of breath    Hospital Course:   49-year-old with a history of asthma who presented after being found to have significant shortness of breath in clinic  and directed to the emergency department.  Admitted for asthma exacerbation.    Interval History:  No acute events overnight  Respiratory status is improved  No nausea vomiting  No chest pain or palpitations  Multiple questions regarding discharge.    Physical Exam:  Vitals:    01/02/25 0825   BP: 118/76   Pulse:    Resp: 16   Temp: 98.1 °F (36.7 °C)   SpO2: 96%       Intake/Output    Intake/Output Summary (Last 24 hours) at 1/2/2025 0913  Last data filed at 1/1/2025 1752  Gross per 24 hour   Intake 360 ml   Output --   Net 360 ml       General: Alert, nontoxic, NAD  HEENT: NC/AT, EOMI, MMM  Neck: Supple, trachea midline  Cardiac: RRR, no murmur, gallops, rubs  Pulmonary: Diminished bilaterally  GI: Soft, non-tender, non-distended, normal bowel sounds  Extremities: Warm, well perfused, no LE edema  Skin: no visible rash  Neuro: CN II - XII grossly intact  Psychiatry: Normal mood and affect      Data Review:  Results from last 7 days   Lab Units 01/02/25  0341 01/01/25  0356 12/31/24  0343 12/30/24  0345 12/29/24  0423 12/28/24  0429 12/27/24  1714   WBC 10*3/mm3 14.19* 10.55 11.61* 10.32 10.20 4.91 8.18   HEMOGLOBIN g/dL 12.6 12.2 12.3 12.4 12.3 13.2 14.6   PLATELETS 10*3/mm3 338 319 329 340 326 337 394     Results from last 7 days   Lab Units 01/02/25  0341 01/01/25  1408 01/01/25  0356 12/31/24  0343 12/30/24  0345 12/29/24  0423 12/28/24  1439 12/28/24  0429 12/27/24  1714   SODIUM mmol/L 140  --  140 141 140 140  --  136 138   POTASSIUM mmol/L 4.5 3.8 3.4* 4.1 4.4 4.5 4.1 4.6 3.1*   CHLORIDE mmol/L 105  --  106 107 105 105  --  103 100   CO2 mmol/L 26.0  --  23.1 26.2 26.0 25.0  --  24.3 25.0   BUN mg/dL 17  --  15 15 13 11  --  11 19   CREATININE mg/dL 0.62  --  0.58 0.60 0.62 0.51*  --  0.64 0.95   GLUCOSE mg/dL 116*  --  168* 134* 144* 148*  --  188* 84   CALCIUM mg/dL 9.0  --  9.0 8.5* 8.8 9.0  --  9.1 9.6   MAGNESIUM mg/dL 2.4  --  2.0 2.2 2.4 2.1  --  2.3  --    PHOSPHORUS mg/dL 3.5  --  2.7 3.0 2.7 2.8   --   --   --    Estimated Creatinine Clearance: 98.1 mL/min (by C-G formula based on SCr of 0.62 mg/dL).    Results from last 7 days   Lab Units 01/02/25  0341 01/01/25  0356 12/31/24  0343 12/30/24  0345 12/29/24  0423 12/28/24  0429 12/27/24  2122 12/27/24  1714   AST (SGOT) U/L  --  16  --   --  14 16  --  18   ALT (SGPT) U/L  --  30  --   --  29 26  --  26   PROCALCITONIN ng/mL  --   --   --   --  0.03  --   --   --    LACTATE mmol/L  --   --   --   --   --   --  1.8  --    D DIMER QUANT MCGFEU/mL  --   --   --   --   --   --   --  0.28   PLATELETS 10*3/mm3 338 319 329   < > 326 337  --  394    < > = values in this interval not displayed.               Imaging:  Reviewed chest images personally from past 3 days    ASSESSMENT  /  PLAN:    Acute asthma exacerbation  Acute hypoxic respiratory failure  Hypothyroidism  Depression  Gastroesophageal reflux disease  Lumbar degenerative disc disease  History of breast cancer on tamoxifen     -Patient symptoms have overall significantly improved.  -CT chest does not demonstrate any concerning intraparenchymal pulmonary pathology.  No evidence of pneumonia.  -Patient has been on a prolonged taper, will send her home with a slow taper over the course of 9 days.  Start 30 mg tomorrow with a decrease by 10 mg every 3 days.  -No indication for antibiotics  -I have instructed her to discontinue her Breo and started her on Trelegy as outpatient therapy.  -Not requiring any supplemental oxygen  -Completed course of azithromycin for exacerbation  -Previously patient has been on Dupixent for 1 year with stable symptoms as a result she was discontinued.  Due to her severe repeat exacerbations, she will need to be considered for biologic therapy again as an outpatient.  Did have some eosinophilia back in August prior to steroid courses.  -I have ordered her home nebulizer, previous one was almost a decade old  -She is set up for follow-up in pulmonary clinic on January 17 with   Esterle  -Stable for discharge from pulmonary standpoint.    Thank you for allowing me to participate in the care of this patient.  Pulmonary will sign off at this time.  Please contact us if further questions or concerns arise.    Abdullahi Patel MD  [facility]  Pulmonary and Critical Care Medicine, Interventional Pulmonology    Parts of this note may be an electronic transcription/translation of spoken language to printed text using the Dragon dictation system.         Electronically signed by Abdullahi Patel MD at 01/02/25 1023       Abdullahi Patel MD at 01/01/25 1043            Consult Daily [facility]  [facility]   01/01/25      Patient Name:  Nereida Myles  MRN:  [id number]   YOB: 1975  Age: 49 y.o.  Sex: female  LOS: 2    Reason for Consult:  Asthma exacerbation, shortness of breath    Hospital Course:   49-year-old with a history of asthma who presented after being found to have significant shortness of breath in clinic and directed to the emergency department.  Admitted for asthma exacerbation.    Interval History:  No acute events overnight  Weaned to room air  Symptoms continue to slowly improve  Was up and ambulating more over the past 24 hours  No chest pain or palpitations  No nausea or vomiting  Just finished a breathing treatment and feels better    Physical Exam:  Vitals:    01/01/25 0811   BP: 125/87   Pulse: 60   Resp: 18   Temp: 98.2 °F (36.8 °C)   SpO2: 97%       Intake/Output  No intake or output data in the 24 hours ending 01/01/25 1044    General: Alert, nontoxic, NAD  HEENT: NC/AT, EOMI, MMM  Neck: Supple, trachea midline  Cardiac: RRR, no murmur, gallops, rubs  Pulmonary: Diminished bilaterally  GI: Soft, non-tender, non-distended, normal bowel sounds  Extremities: Warm, well perfused, no LE edema  Skin: no visible rash  Neuro: CN II - XII grossly intact  Psychiatry: Normal mood and affect      Data Review:  Results from last 7  days   Lab Units 01/01/25 0356 12/31/24 0343 12/30/24 0345 12/29/24 0423 12/28/24 0429 12/27/24  1714   WBC 10*3/mm3 10.55 11.61* 10.32 10.20 4.91 8.18   HEMOGLOBIN g/dL 12.2 12.3 12.4 12.3 13.2 14.6   PLATELETS 10*3/mm3 319 329 340 326 337 394     Results from last 7 days   Lab Units 01/01/25 0356 12/31/24 0343 12/30/24 0345 12/29/24 0423 12/28/24  1439 12/28/24 0429 12/27/24  1714   SODIUM mmol/L 140 141 140 140  --  136 138   POTASSIUM mmol/L 3.4* 4.1 4.4 4.5 4.1 4.6 3.1*   CHLORIDE mmol/L 106 107 105 105  --  103 100   CO2 mmol/L 23.1 26.2 26.0 25.0  --  24.3 25.0   BUN mg/dL 15 15 13 11  --  11 19   CREATININE mg/dL 0.58 0.60 0.62 0.51*  --  0.64 0.95   GLUCOSE mg/dL 168* 134* 144* 148*  --  188* 84   CALCIUM mg/dL 9.0 8.5* 8.8 9.0  --  9.1 9.6   MAGNESIUM mg/dL 2.0 2.2 2.4 2.1  --  2.3  --    PHOSPHORUS mg/dL 2.7 3.0 2.7 2.8  --   --   --    Estimated Creatinine Clearance: 104.9 mL/min (by C-G formula based on SCr of 0.58 mg/dL).    Results from last 7 days   Lab Units 01/01/25 0356 12/31/24 0343 12/30/24 0345 12/29/24 0423 12/28/24 0429 12/27/24 2122 12/27/24  1714   AST (SGOT) U/L 16  --   --  14 16  --  18   ALT (SGPT) U/L 30  --   --  29 26  --  26   PROCALCITONIN ng/mL  --   --   --  0.03  --   --   --    LACTATE mmol/L  --   --   --   --   --  1.8  --    D DIMER QUANT MCGFEU/mL  --   --   --   --   --   --  0.28   PLATELETS 10*3/mm3 319 329 340 326 337  --  394               Imaging:  Reviewed chest images personally from past 3 days    ASSESSMENT  /  PLAN:    Acute asthma exacerbation  Acute hypoxic respiratory failure  Hypothyroidism  Depression  Gastroesophageal reflux disease  Lumbar degenerative disc disease  History of breast cancer on tamoxifen     -Patient symptoms continue to slowly improve.  -CT chest does not demonstrate any concerning intraparenchymal pulmonary pathology.  No evidence of pneumonia.  -IV steroids transition to oral.  Will need prolonged taper.  -Patient has  received multiple courses of antibiotics so far, at this time she is afebrile, no leukocytosis, procalcitonin negative.  Chest x-ray without any evidence of pneumonia.  I will hold off on antibiotics at this time.  -Bronchodilators every 4 hours  -Continue supplemental oxygen with SpO2 goal greater than 90%.  -Azithromycin for exacerbation completed  -Did have significant eosinophilia once back in August of this year however repeat differentials do not demonstrate.  She appears to have quite significant asthma exacerbation resistant to treatment.  Now that she has had multiple episodes in the past month, I believe that she would qualify for biologic therapy.  Upon discharge from the hospital we will follow-up with her closely in clinic and initiate biologic therapy.    -Anticipate patient stability for discharge in the next 24 to 48 hours.  Have discussed this with the patient and she is amenable.    Thank you for allowing me to participate in the care of this patient.  I will continue to follow along with you.    Abdullahi Patel MD  [facility]  Pulmonary and Critical Care Medicine, Interventional Pulmonology    Parts of this note may be an electronic transcription/translation of spoken language to printed text using the Dragon dictation system.         Electronically signed by Abdullhai Patel MD at 25 1101       Santos Tamez MD at 25 0903              Name: Nereida Myles ADMIT: 2024   : 1975  PCP: Kehrer, Meredith Lea, MD    MRN: [id number] LOS: 2 days   AGE/SEX: 49 y.o. female  ROOM: [facility]     Subjective   Subjective   Feeling a little better again today. Still mildly SOA with exertion but no longer SOA at rest. No CP. Cough improving. No N/V/D/abd pain. Tolerating po. Voiding well. No F/C/NS.      Objective   Objective   Vital Signs  Temp:  [97.9 °F (36.6 °C)-98.6 °F (37 °C)] 98.2 °F (36.8 °C)  Heart Rate:  [53-96] 60  Resp:  [16-20] 18  BP: (125-131)/(74-87)  125/87  SpO2:  [90 %-97 %] 97 %  on  Flow (L/min) (Oxygen Therapy):  [0-2] 0;   Device (Oxygen Therapy): room air  Body mass index is 21.91 kg/m².  Physical Exam  Vitals and nursing note reviewed.   Constitutional:       General: She is not in acute distress.     Appearance: She is ill-appearing. She is not toxic-appearing or diaphoretic.   HENT:      Head: Normocephalic.      Nose: Nose normal.      Mouth/Throat:      Mouth: Mucous membranes are moist.      Pharynx: Oropharynx is clear.   Eyes:      General: No scleral icterus.        Right eye: No discharge.         Left eye: No discharge.      Extraocular Movements: Extraocular movements intact.      Conjunctiva/sclera: Conjunctivae normal.   Cardiovascular:      Rate and Rhythm: Normal rate and regular rhythm.      Pulses: Normal pulses.   Pulmonary:      Comments: Normal WOB  Coarse global exp wheeze, exp phase = insp phase  No rales  Abdominal:      General: Bowel sounds are normal. There is no distension.      Palpations: Abdomen is soft.      Tenderness: There is no abdominal tenderness.   Musculoskeletal:         General: No swelling.      Cervical back: Neck supple.   Skin:     General: Skin is warm and dry.      Capillary Refill: Capillary refill takes less than 2 seconds.      Coloration: Skin is not jaundiced.   Neurological:      General: No focal deficit present.      Mental Status: She is alert. Mental status is at baseline.   Psychiatric:         Mood and Affect: Mood normal.         Behavior: Behavior normal.         Thought Content: Thought content normal.       Results Review     I reviewed the patient's new clinical results.  Results from last 7 days   Lab Units 01/01/25  0356 12/31/24  0343 12/30/24  0345 12/29/24  0423   WBC 10*3/mm3 10.55 11.61* 10.32 10.20   HEMOGLOBIN g/dL 12.2 12.3 12.4 12.3   PLATELETS 10*3/mm3 319 329 340 326     Results from last 7 days   Lab Units 01/01/25  0356 12/31/24  0343 12/30/24  0345 12/29/24  0423   SODIUM  "mmol/L 140 141 140 140   POTASSIUM mmol/L 3.4* 4.1 4.4 4.5   CHLORIDE mmol/L 106 107 105 105   CO2 mmol/L 23.1 26.2 26.0 25.0   BUN mg/dL 15 15 13 11   CREATININE mg/dL 0.58 0.60 0.62 0.51*   GLUCOSE mg/dL 168* 134* 144* 148*   EGFR mL/min/1.73 109.7 108.8 108.0 113.2     Results from last 7 days   Lab Units 01/01/25  0356 12/29/24  0423 12/28/24  0429 12/27/24  1714   ALBUMIN g/dL 3.4* 3.6 3.5 4.0   BILIRUBIN mg/dL <0.2 0.2 0.2 0.3   ALK PHOS U/L 69 68 70 78   AST (SGOT) U/L 16 14 16 18   ALT (SGPT) U/L 30 29 26 26     Results from last 7 days   Lab Units 01/01/25  0356 12/31/24  0343 12/30/24  0345 12/29/24  0423 12/28/24  0429 12/27/24  1714 12/27/24  1714   CALCIUM mg/dL 9.0 8.5* 8.8 9.0 9.1  --  9.6   ALBUMIN g/dL 3.4*  --   --  3.6 3.5  --  4.0   MAGNESIUM mg/dL 2.0 2.2 2.4 2.1 2.3   < >  --    PHOSPHORUS mg/dL 2.7 3.0 2.7 2.8  --   --   --     < > = values in this interval not displayed.     Results from last 7 days   Lab Units 12/29/24  0423 12/27/24  2122   PROCALCITONIN ng/mL 0.03  --    LACTATE mmol/L  --  1.8     No results found for: \"HGBA1C\", \"POCGLU\"    CT Chest Without Contrast Diagnostic    Result Date: 12/30/2024  1. Mild to moderate bilateral lower lobe atelectasis, scar or pneumonia. 2. Short-term follow-up CT of the chest in approximately 3 months recommended.  Radiation dose reduction techniques were utilized, including automated exposure control and exposure modulation based on body size.        I have personally reviewed all medications:  Scheduled Medications  budesonide-formoterol, 2 puff, Inhalation, BID - RT  buPROPion XL, 300 mg, Oral, QAM  FLUoxetine, 80 mg, Oral, Daily  ipratropium-albuterol, 3 mL, Nebulization, Q4H - RT  levothyroxine, 50 mcg, Oral, Q AM  methylPREDNISolone sodium succinate, 60 mg, Intravenous, Q12H  montelukast, 10 mg, Oral, Daily  multivitamin, 1 tablet, Oral, Nightly  potassium chloride ER, 40 mEq, Oral, Q4H  pregabalin, 25 mg, Oral, Nightly  sodium chloride, 10 " mL, Intravenous, Q12H  tamoxifen, 20 mg, Oral, Daily  theophylline, 300 mg, Oral, QAM  vitamin B-12, 1,000 mcg, Oral, Daily    Infusions   Diet  Diet: Cardiac; Healthy Heart (2-3 Na+); Fluid Consistency: Thin (IDDSI 0)    I have personally reviewed:  [x]  Laboratory   [x]  Microbiology   []  Radiology   []  EKG/Telemetry  []  Cardiology/Vascular   []  Pathology    []  Records      Assessment/Plan     Active Hospital Problems    Diagnosis  POA    **Severe asthma with acute exacerbation [J45.901]  Yes    Hypokalemia [E87.6]  Yes    History of breast cancer [Z85.3]  Not Applicable    Gastroesophageal reflux disease without esophagitis [K21.9]  Yes    DDD (degenerative disc disease), lumbar [M51.369]  Yes    Hypoxia [R09.02]  Yes    Hypothyroidism [E03.9]  Yes    Depression, major [F32.9]  Yes    Anxiety [F41.9]  Yes      Resolved Hospital Problems   No resolved problems to display.       48yo woman with asthma, chronic low back pain due to lumbar DDD, hypoT4, depression/anxiety, GERD, and breast CA, admitted here earlier this month with acute hypoxic respiratory failure due to rhinovirus and bacterial PNA, who was sent to ER at the direction of Pulm office for admission due to persistent cough, SOA, wheezing, and hypoxia.     Asthma exacerbation  Acute hypoxic resp failure  Appreciate Pulm attention to pt  RVP neg here, afebrile, DDimer wnl, WBC wnl, CXR unremarkable, CT Chest unremarkable  Continue IV SoluMedrol, Symbicort, DuoNebs, Singulair, and Theophylline  S/p 3d of Zithromax for AECOPD  Weaned to RA since yesterday evening  Await Pulm recs today but suspect she could transition to oral Prednisone today and possibly go home tomorrow  Pulm planning biologic agent as outpt when recovered from this episode     Hypokalemia  Replacing with protocol  Mg++ level fine     GERD  Continue PPI PRN as she takes at home     HypoT4  TSH wnl  Continue L-T4     Depression/Anxiety  Stable, continue Prozac and Wellbutrin      Lumbar DDD  Stable at baseline  Continue Lyrica at HS     H/o Breast CA  Continue Tamoxifen      SCDs for DVT prophylaxis.  Full code.  Discussed with patient.  Anticipate discharge home with family when cleared by consultants. Hopefully tomorrow.  Expected Discharge Date: 1/2/2025; Expected Discharge Time:       Santos Tamez MD  [facility]  01/01/25  09:27 EST      Electronically signed by Santos Tamez MD at 01/01/25 0935       Abdullahi Patel MD at 12/31/24 1401            Consult Daily Progress Note  [facility]   12/31/24      Patient Name:  Nereida Myles  MRN:  [id number]   YOB: 1975  Age: 49 y.o.  Sex: female  LOS: 1    Reason for Consult:  Asthma exacerbation, shortness of breath    Hospital Course:   49-year-old with a history of asthma who presented after being found to have significant shortness of breath in clinic and directed to the emergency department.  Admitted for asthma exacerbation.    Interval History:  No acute events overnight  On 1 L nasal cannula  Symptoms have improved especially at rest  Not very active and staying in bed mostly  No nausea vomiting  No chest pain or palpitations  Continues to have some cough    Physical Exam:  Vitals:    12/31/24 1129   BP: 129/74   Pulse: 67   Resp: 17   Temp: 97.9 °F (36.6 °C)   SpO2:        Intake/Output    Intake/Output Summary (Last 24 hours) at 12/31/2024 1401  Last data filed at 12/31/2024 0815  Gross per 24 hour   Intake 598 ml   Output --   Net 598 ml     General: Alert, nontoxic, NAD  HEENT: NC/AT, EOMI, MMM  Neck: Supple, trachea midline  Cardiac: RRR, no murmur, gallops, rubs  Pulmonary: Diminished with wheezing  GI: Soft, non-tender, non-distended, normal bowel sounds  Extremities: Warm, well perfused, no LE edema  Skin: no visible rash  Neuro: CN II - XII grossly intact  Psychiatry: Normal mood and affect      Data Review:  Results from last 7 days   Lab Units 12/31/24  0737  12/30/24 0345 12/29/24 0423 12/28/24 0429 12/27/24  1714   WBC 10*3/mm3 11.61* 10.32 10.20 4.91 8.18   HEMOGLOBIN g/dL 12.3 12.4 12.3 13.2 14.6   PLATELETS 10*3/mm3 329 340 326 337 394     Results from last 7 days   Lab Units 12/31/24 0343 12/30/24 0345 12/29/24 0423 12/28/24  1439 12/28/24 0429 12/27/24  1714   SODIUM mmol/L 141 140 140  --  136 138   POTASSIUM mmol/L 4.1 4.4 4.5 4.1 4.6 3.1*   CHLORIDE mmol/L 107 105 105  --  103 100   CO2 mmol/L 26.2 26.0 25.0  --  24.3 25.0   BUN mg/dL 15 13 11  --  11 19   CREATININE mg/dL 0.60 0.62 0.51*  --  0.64 0.95   GLUCOSE mg/dL 134* 144* 148*  --  188* 84   CALCIUM mg/dL 8.5* 8.8 9.0  --  9.1 9.6   MAGNESIUM mg/dL 2.2 2.4 2.1  --  2.3  --    PHOSPHORUS mg/dL 3.0 2.7 2.8  --   --   --    Estimated Creatinine Clearance: 101.4 mL/min (by C-G formula based on SCr of 0.6 mg/dL).    Results from last 7 days   Lab Units 12/31/24 0343 12/30/24 0345 12/29/24 0423 12/28/24 0429 12/27/24 2122 12/27/24  1714   AST (SGOT) U/L  --   --  14 16  --  18   ALT (SGPT) U/L  --   --  29 26  --  26   PROCALCITONIN ng/mL  --   --  0.03  --   --   --    LACTATE mmol/L  --   --   --   --  1.8  --    D DIMER QUANT MCGFEU/mL  --   --   --   --   --  0.28   PLATELETS 10*3/mm3 329 340 326 337  --  394               Imaging:  Reviewed chest images personally from past 3 days    ASSESSMENT  /  PLAN:    Acute asthma exacerbation  Acute hypoxic respiratory failure  Hypothyroidism  Depression  Gastroesophageal reflux disease  Lumbar degenerative disc disease  History of breast cancer on tamoxifen     -Patient symptoms are slowly improving.  -CT chest does not demonstrate any concerning intraparenchymal pulmonary pathology.  No evidence of pneumonia.  -Continue IV steroids, anticipate transition to oral tomorrow.  -Patient has received multiple courses of antibiotics so far, at this time she is afebrile, no leukocytosis, procalcitonin negative.  Chest x-ray without any evidence of pneumonia.   I will hold off on antibiotics at this time.  -Bronchodilators every 4 hours  -Continue supplemental oxygen with SpO2 goal greater than 90%.  -Continue azithromycin for exacerbation  -Did have significant eosinophilia once back in August of this year however repeat differentials do not demonstrate.  She appears to have quite significant asthma exacerbation resistant to treatment.  Now that she has had multiple episodes in the past month, I believe that she would qualify for biologic therapy.  Upon discharge from the hospital we will follow-up with her closely in clinic and initiate biologic therapy.  At this time I believe she just requires more time to break her bronchospasm.    Thank you for allowing me to participate in the care of this patient.  I will continue to follow along with you.    Abdullahi Patel MD  [facility]  Pulmonary and Critical Care Medicine, Interventional Pulmonology    Parts of this note may be an electronic transcription/translation of spoken language to printed text using the Dragon dictation system.         Electronically signed by Abdullahi Patel MD at 24 1442       Santos Tamez MD at 24 1343              Name: Nereida Myles ADMIT: 2024   : 1975  PCP: Kehrer, Meredith Lea, MD    MRN: [id number] LOS: 1 days   AGE/SEX: 49 y.o. female  ROOM: [facility]     Subjective   Subjective   Feeling a little better today. Still SOA with exertion but not at rest now. No CP. Cough improving. No N/V/D/abd pain. Tolerating po. Voiding well. No F/C/NS.      Objective   Objective   Vital Signs  Temp:  [97.9 °F (36.6 °C)-98.5 °F (36.9 °C)] 97.9 °F (36.6 °C)  Heart Rate:  [52-71] 67  Resp:  [16-18] 17  BP: (107-129)/(62-75) 129/74  SpO2:  [93 %-97 %] 95 %  on  Flow (L/min) (Oxygen Therapy):  [2] 2;   Device (Oxygen Therapy): nasal cannula  Body mass index is 21.91 kg/m².  Physical Exam  Vitals and nursing note reviewed.   Constitutional:       General: She is not  in acute distress.     Appearance: She is ill-appearing. She is not toxic-appearing or diaphoretic.   HENT:      Head: Normocephalic.      Nose: Nose normal.      Mouth/Throat:      Mouth: Mucous membranes are moist.      Pharynx: Oropharynx is clear.   Eyes:      General: No scleral icterus.        Right eye: No discharge.         Left eye: No discharge.      Extraocular Movements: Extraocular movements intact.      Conjunctiva/sclera: Conjunctivae normal.   Cardiovascular:      Rate and Rhythm: Normal rate and regular rhythm.      Pulses: Normal pulses.   Pulmonary:      Comments: Normal WOB  Global exp wheeze but faint, exp phase = insp phase  No rales  Abdominal:      General: Bowel sounds are normal. There is no distension.      Palpations: Abdomen is soft.      Tenderness: There is no abdominal tenderness.   Musculoskeletal:         General: No swelling.      Cervical back: Neck supple.   Skin:     General: Skin is warm and dry.      Capillary Refill: Capillary refill takes less than 2 seconds.      Coloration: Skin is not jaundiced.   Neurological:      General: No focal deficit present.      Mental Status: She is alert. Mental status is at baseline.   Psychiatric:         Mood and Affect: Mood normal.         Behavior: Behavior normal.         Thought Content: Thought content normal.       Results Review     I reviewed the patient's new clinical results.  Results from last 7 days   Lab Units 12/31/24  0343 12/30/24  0345 12/29/24  0423 12/28/24  0429   WBC 10*3/mm3 11.61* 10.32 10.20 4.91   HEMOGLOBIN g/dL 12.3 12.4 12.3 13.2   PLATELETS 10*3/mm3 329 340 326 337     Results from last 7 days   Lab Units 12/31/24  0343 12/30/24  0345 12/29/24  0423 12/28/24  1439 12/28/24  0429   SODIUM mmol/L 141 140 140  --  136   POTASSIUM mmol/L 4.1 4.4 4.5 4.1 4.6   CHLORIDE mmol/L 107 105 105  --  103   CO2 mmol/L 26.2 26.0 25.0  --  24.3   BUN mg/dL 15 13 11  --  11   CREATININE mg/dL 0.60 0.62 0.51*  --  0.64   GLUCOSE  "mg/dL 134* 144* 148*  --  188*   EGFR mL/min/1.73 108.8 108.0 113.2  --  107.1     Results from last 7 days   Lab Units 12/29/24  0423 12/28/24  0429 12/27/24  1714   ALBUMIN g/dL 3.6 3.5 4.0   BILIRUBIN mg/dL 0.2 0.2 0.3   ALK PHOS U/L 68 70 78   AST (SGOT) U/L 14 16 18   ALT (SGPT) U/L 29 26 26     Results from last 7 days   Lab Units 12/31/24  0343 12/30/24  0345 12/29/24 0423 12/28/24 0429 12/27/24  1714   CALCIUM mg/dL 8.5* 8.8 9.0 9.1 9.6   ALBUMIN g/dL  --   --  3.6 3.5 4.0   MAGNESIUM mg/dL 2.2 2.4 2.1 2.3  --    PHOSPHORUS mg/dL 3.0 2.7 2.8  --   --      Results from last 7 days   Lab Units 12/29/24 0423 12/27/24  2122   PROCALCITONIN ng/mL 0.03  --    LACTATE mmol/L  --  1.8     No results found for: \"HGBA1C\", \"POCGLU\"    CT Chest Without Contrast Diagnostic    Result Date: 12/30/2024  1. Mild to moderate bilateral lower lobe atelectasis, scar or pneumonia. 2. Short-term follow-up CT of the chest in approximately 3 months recommended.  Radiation dose reduction techniques were utilized, including automated exposure control and exposure modulation based on body size.        I have personally reviewed all medications:  Scheduled Medications  budesonide-formoterol, 2 puff, Inhalation, BID - RT  buPROPion XL, 300 mg, Oral, QAM  FLUoxetine, 80 mg, Oral, Daily  ipratropium-albuterol, 3 mL, Nebulization, Q4H - RT  levothyroxine, 50 mcg, Oral, Q AM  methylPREDNISolone sodium succinate, 60 mg, Intravenous, Q12H  montelukast, 10 mg, Oral, Daily  multivitamin, 1 tablet, Oral, Nightly  pregabalin, 25 mg, Oral, Nightly  sodium chloride, 10 mL, Intravenous, Q12H  tamoxifen, 20 mg, Oral, Daily  theophylline, 300 mg, Oral, QAM  vitamin B-12, 1,000 mcg, Oral, Daily    Infusions   Diet  Diet: Cardiac; Healthy Heart (2-3 Na+); Fluid Consistency: Thin (IDDSI 0)    I have personally reviewed:  [x]  Laboratory   [x]  Microbiology   [x]  Radiology   []  EKG/Telemetry  []  Cardiology/Vascular   []  Pathology    []  " Records      Assessment/Plan     Active Hospital Problems    Diagnosis  POA    **Severe asthma with acute exacerbation [J45.901]  Yes    Hypokalemia [E87.6]  Yes    History of breast cancer [Z85.3]  Not Applicable    Gastroesophageal reflux disease without esophagitis [K21.9]  Yes    DDD (degenerative disc disease), lumbar [M51.369]  Yes    Hypoxia [R09.02]  Yes    Hypothyroidism [E03.9]  Yes    Depression, major [F32.9]  Yes    Anxiety [F41.9]  Yes      Resolved Hospital Problems   No resolved problems to display.       50yo woman with asthma, chronic low back pain due to lumbar DDD, hypoT4, depression/anxiety, GERD, and breast CA, admitted here earlier this month with acute hypoxic respiratory failure due to rhinovirus and bacterial PNA, who was sent to ER at the direction of Pulm office for admission due to persistent cough, SOA, wheezing, and hypoxia.     Asthma exacerbation  Acute hypoxic resp failure  Appreciate Pulm attention to pt  RVP neg here, afebrile, DDimer wnl, WBC wnl, CXR unremarkable  Continue IV SoluMedrol, Symbicort, DuoNebs, Singulair, and Theophylline  Zithromax added 12/29  Continue supplemental O2  CT Chest unremarkable  Pulm planning biologic agent as outpt when recovered from this episode     Hypokalemia  Replaced with protocol  Mg++ level fine     GERD  Continue PPI PRN as she takes at home     HypoT4  TSH wnl  Continue L-T4     Depression/Anxiety  Stable, continue Prozac and Wellbutrin     Lumbar DDD  Stable at baseline  Continue Lyrica at HS     H/o Breast CA  Continue Tamoxifen      SCDs for DVT prophylaxis.  Full code.  Discussed with patient.  Anticipate discharge home with family when cleared by consultants.  Expected Discharge Date: 1/1/2025; Expected Discharge Time:       Santos Tamez MD  [facility]  12/31/24  13:43 EST      Electronically signed by Santos Tamez MD at 12/31/24 1356       Consult Notes (last 48 hours)  Notes from 12/31/24 1241 through  25 1241   No notes of this type exist for this encounter.          Discharge Summary        Santos Tamez MD at 25 1120              Patient Name: Nereida Myles  : 1975  MRN: [id number]    Date of Admission: 2024  Date of Discharge:  2025  Primary Care Physician: Kehrer, Meredith Lea, MD      Chief Complaint:   Shortness of Breath      Discharge Diagnoses     Active Hospital Problems    Diagnosis  POA    **Severe asthma with acute exacerbation [J45.901]  Yes    Hypokalemia [E87.6]  Yes    History of breast cancer [Z85.3]  Not Applicable    Gastroesophageal reflux disease without esophagitis [K21.9]  Yes    DDD (degenerative disc disease), lumbar [M51.369]  Yes    Hypoxia [R09.02]  Yes    Hypothyroidism [E03.9]  Yes    Depression, major [F32.9]  Yes    Anxiety [F41.9]  Yes      Resolved Hospital Problems   No resolved problems to display.        Hospital Course     Very pleasant 48yo woman with asthma, chronic low back pain due to lumbar DDD, hypoT4, depression/anxiety, GERD, and breast CA, admitted here earlier this month with acute hypoxic respiratory failure due to rhinovirus and bacterial PNA, who was sent to ER at the direction of Pulm office for admission due to persistent cough, SOA, wheezing, and hypoxia. Please see below for details of admission by problem:      Asthma exacerbation  Acute hypoxic resp failure  Appreciate Pulm attention to pt  RVP neg here, afebrile, DDimer wnl, WBC wnl, CXR unremarkable, CT Chest unremarkable  Treated with IV SoluMedrol, Symbicort, DuoNebs, Singulair, and Theophylline  S/p 3d of Zithromax for AECOPD  Weaned to RA now  Pulm okay with dc today on Prednisone taper  Pulm planning biologic agent as outpt when recovered from this episode     Hypokalemia  Replaced with protocol  Mg++ level fine     GERD  Continued PPI PRN as she takes at home     HypoT4  TSH wnl  Continued L-T4     Depression/Anxiety  Stable, continued Prozac and Wellbutrin      Lumbar DDD  Stable at baseline  Continued Lyrica at HS     H/o Breast CA  Continued Tamoxifen        SCDs for DVT prophylaxis while here.  Full code confirmed.  Discussed with patient and Dr. Patel (Pul).  Discharge home with family today on Prednisone taper and Trelegy.  F/u with Dr. Baker (Pulm) on Jan 17th  F/u with Dr. Kehrer (PCP) at next available appt.    Day of Discharge     Subjective:  Feeling a little better again today. Still mildly SOA with exertion but no longer SOA at rest. No CP. Cough improving. No N/V/D/abd pain. Tolerating po. Voiding well. No F/C/NS. Eager to go home.       Physical Exam:  Temp:  [97.9 °F (36.6 °C)-98.6 °F (37 °C)] 98.1 °F (36.7 °C)  Heart Rate:  [51-83] 73  Resp:  [16-20] 20  BP: (118-130)/(76-92) 118/76  Body mass index is 21.91 kg/m².  Physical Exam  Vitals and nursing note reviewed.   Constitutional:       General: She is not in acute distress.     Appearance: She is ill-appearing. She is not toxic-appearing or diaphoretic.   Cardiovascular:      Rate and Rhythm: Normal rate and regular rhythm.      Pulses: Normal pulses.   Pulmonary:      Comments: Normal WOB  Coarse global exp wheeze, exp phase = insp phase  No rales  Abdominal:      General: Bowel sounds are normal. There is no distension.      Palpations: Abdomen is soft.      Tenderness: There is no abdominal tenderness.   Musculoskeletal:         General: No swelling.      Cervical back: Neck supple.   Skin:     General: Skin is warm and dry.      Capillary Refill: Capillary refill takes less than 2 seconds.      Coloration: Skin is not jaundiced.   Neurological:      General: No focal deficit present.      Mental Status: She is alert. Mental status is at baseline.   Psychiatric:         Mood and Affect: Mood normal.         Behavior: Behavior normal.         Thought Content: Thought content normal.         Consultants     Consult Orders (all) (From admission, onward)       Start     Ordered    12/28/24 7748   Inpatient Pulmonology Consult  [place]         Specialty:  Pulmonary Disease  Provider:  Demarcus Baker MD    12/27/24 2219 12/27/24 2318  Inpatient Nutrition Consult  Once        Provider:  (Not yet assigned)    12/27/24 2320    12/27/24 2101  [facility] (on-call MD unless specified) Details  Once        Specialty:  Hospitalist  Provider:  (Not yet assigned)    12/27/24 2100                  Procedures     * Surgery not found *    Imaging Results (All)       Procedure Component Value Units Date/Time    CT Chest Without Contrast Diagnostic [[id number]] Collected: 12/30/24 1639     Updated: 01/01/25 2354    Narrative:      CT OF THE CHEST WITHOUT CONTRAST 12/30/2024     HISTORY: Shortness of breath.     TECHNIQUE: Spiral images were obtained from the lung apices to the upper  abdomen. No intravenous contrast was given.        FINDINGS: There are some mild patchy areas of atelectasis or  inflammatory infiltrate in the bilateral lower lobes. Tiny 4 mm to 5 mm  subpleural nodular density is seen in the left upper lobe on image 36.     No pathologically enlarged hilar or mediastinal lymph nodes are seen. No  aortic or coronary calcification is seen. There is some fluid in  pericardial recesses.     Gallbladder has been removed. Low-density right hepatic lobe lesion is  seen most likely a hepatic cyst.       Impression:      1. Mild to moderate bilateral lower lobe atelectasis, scar or pneumonia.  2. Short-term follow-up CT of the chest in approximately 3 months  recommended.     Radiation dose reduction techniques were utilized, including automated  exposure control and exposure modulation based on body size.        This report was finalized on 1/1/2025 11:51 PM by Dr. Jimmy Azar M.D on Workstation: [id number]       XR Chest 1 View [[id number]] Collected: 12/27/24 1839     Updated: 12/27/24 1843    Narrative:      XR CHEST 1 VW-     HISTORY: 49-year-old female with shortness of breath.     FINDINGS: There is no  "convincing evidence for pneumonia or CHF.  Follow-up with 2 views of the chest is recommended.     This report was finalized on 12/27/2024 6:40 PM by Dr. Monserrat Monroy M.D  on Workstation: [id number]                 Pertinent Labs     Results from last 7 days   Lab Units 01/02/25  0341 01/01/25  0356 12/31/24  0343 12/30/24  0345   WBC 10*3/mm3 14.19* 10.55 11.61* 10.32   HEMOGLOBIN g/dL 12.6 12.2 12.3 12.4   PLATELETS 10*3/mm3 338 319 329 340     Results from last 7 days   Lab Units 01/02/25  0341 01/01/25  1408 01/01/25  0356 12/31/24  0343 12/30/24  0345   SODIUM mmol/L 140  --  140 141 140   POTASSIUM mmol/L 4.5 3.8 3.4* 4.1 4.4   CHLORIDE mmol/L 105  --  106 107 105   CO2 mmol/L 26.0  --  23.1 26.2 26.0   BUN mg/dL 17  --  15 15 13   CREATININE mg/dL 0.62  --  0.58 0.60 0.62   GLUCOSE mg/dL 116*  --  168* 134* 144*   EGFR mL/min/1.73 108.0  --  109.7 108.8 108.0     Results from last 7 days   Lab Units 01/01/25  0356 12/29/24  0423 12/28/24  0429 12/27/24  1714   ALBUMIN g/dL 3.4* 3.6 3.5 4.0   BILIRUBIN mg/dL <0.2 0.2 0.2 0.3   ALK PHOS U/L 69 68 70 78   AST (SGOT) U/L 16 14 16 18   ALT (SGPT) U/L 30 29 26 26     Results from last 7 days   Lab Units 01/02/25  0341 01/01/25  0356 12/31/24  0343 12/30/24  0345 12/29/24  0423 12/28/24  0429 12/28/24  0429 12/27/24  1714 12/27/24  1714   CALCIUM mg/dL 9.0 9.0 8.5* 8.8 9.0  --  9.1  --  9.6   ALBUMIN g/dL  --  3.4*  --   --  3.6  --  3.5  --  4.0   MAGNESIUM mg/dL 2.4 2.0 2.2 2.4 2.1  --  2.3   < >  --    PHOSPHORUS mg/dL 3.5 2.7 3.0 2.7 2.8   < >  --   --   --     < > = values in this interval not displayed.       Results from last 7 days   Lab Units 12/27/24 2122 12/27/24 1714   HSTROP T ng/L 9 9   PROBNP pg/mL  --  108.0   D DIMER QUANT MCGFEU/mL  --  0.28           Invalid input(s): \"LDLCALC\"  Results from last 7 days   Lab Units 12/27/24 1714   BLOODCX  No growth at 5 days     Results from last 7 days   Lab Units 12/27/24 2124   COVID19  Not Detected "       Test Results Pending at Discharge     Pending Results       None              Discharge Details        Discharge Medications        New Medications        Instructions Start Date   ipratropium-albuterol 0.5-2.5 mg/3 ml nebulizer  Commonly known as: DUO-NEB   3 mL, Nebulization, Every 4 Hours - RT      prochlorperazine 10 MG tablet  Commonly known as: COMPAZINE   10 mg, Oral, Every 8 Hours PRN      Trelegy Ellipta 100-62.5-25 MCG/ACT inhaler  Generic drug: Fluticasone-Umeclidin-Vilant   1 puff, Inhalation, Daily - RT             Changes to Medications        Instructions Start Date   omeprazole 40 MG capsule  Commonly known as: priLOSEC  What changed:   when to take this  reasons to take this   40 mg, Oral, Daily      predniSONE 10 MG tablet  Commonly known as: DELTASONE  What changed: See the new instructions.   Take 3 tablets by mouth Daily for 3 days, THEN 2 tablets Daily for 3 days, THEN 1 tablet Daily for 3 days.   Start Date: January 2, 2025     pregabalin 25 MG capsule  Commonly known as: Lyrica  What changed: additional instructions   Take 1 capsule PO TID x 5 days; if tolerated may slowly increase to 1 or 2 capsules PO TID      valACYclovir 500 MG tablet  Commonly known as: VALTREX  What changed:   when to take this  reasons to take this   TAKE 1 TABLET DAILY             Continue These Medications        Instructions Start Date   albuterol (2.5 MG/3ML) 0.083% nebulizer solution  Commonly known as: PROVENTIL   2.5 mg, Every 4 Hours PRN      albuterol sulfate  (90 Base) MCG/ACT inhaler  Commonly known as: PROVENTIL HFA;VENTOLIN HFA;PROAIR HFA   USE 2 INHALATIONS EVERY 4 HOURS AS NEEDED FOR WHEEZING      albuterol 4 MG tablet  Commonly known as: PROVENTIL   4 mg, Oral, Nightly      benzonatate 200 MG capsule  Commonly known as: TESSALON   Take 1 capsule by mouth Every 6 (Six) Hours As Needed for Cough.      buPROPion  MG 24 hr tablet  Commonly known as: WELLBUTRIN XL   300 mg, Oral, Every  Morning      FLUoxetine 40 MG capsule  Commonly known as: PROzac   80 mg, Oral, Daily      levothyroxine 50 MCG tablet  Commonly known as: SYNTHROID, LEVOTHROID   50 mcg, Oral, Daily      melatonin 5 MG tablet tablet   5 mg, Nightly      metaxalone 800 MG tablet  Commonly known as: SKELAXIN   TAKE 1/2 TO 1 TABLET BY MOUTH FOUR TIMES DAILY AS NEEDED FOR MUSCLE SPASMS      montelukast 10 MG tablet  Commonly known as: SINGULAIR   10 mg, Oral, Daily      multivitamin tablet tablet  Commonly known as: THERAGRAN   1 tablet, Nightly      tamoxifen 20 MG chemo tablet  Commonly known as: NOLVADEX   20 mg, Daily      theophylline 300 MG 12 hr tablet  Commonly known as: THEODUR   300 mg, Oral, Every Morning      vitamin B-12 1000 MCG tablet  Commonly known as: CYANOCOBALAMIN   1,000 mcg, Daily             Stop These Medications      Benzoyl Peroxide 10 % liquid     Breo Ellipta 100-25 MCG/INH aerosol powder   Generic drug: Fluticasone Furoate-Vilanterol     clindamycin 1 % swab  Commonly known as: CLEOCIN T     diclofenac 75 MG EC tablet  Commonly known as: VOLTAREN     ondansetron 4 MG tablet  Commonly known as: ZOFRAN              No Known Allergies    Discharge Disposition:  Home or Self Care      Discharge Diet:  Diet Order   Procedures    Diet: Cardiac; Healthy Heart (2-3 Na+); Fluid Consistency: Thin (IDDSI 0)       Discharge Activity:   As tolerated    CODE STATUS:    Code Status and Medical Interventions: CPR (Attempt to Resuscitate); Full Support   Ordered at: 12/27/24 2220     Code Status (Patient has no pulse and is not breathing):    CPR (Attempt to Resuscitate)     Medical Interventions (Patient has pulse or is breathing):    Full Support       Future Appointments   Date Time Provider Department Center   2/17/2025  9:45 AM Kehrer, Meredith Lea, MD [facility]     Additional Instructions for the Follow-ups that You Need to Schedule       Discharge Follow-up with PCP   As directed       Currently Documented  PCP:    Kehrer, Meredith Lea, MD    PCP Phone Number:    [phone]     Follow Up Details: Dr. Kehrer (PCP) at next available appt        Discharge Follow-up with Specified Provider: Dr. Baker ([facility]) on Jan 17th   As directed      To: Dr. Baker ([facility]) on Jan 17th               Follow-up Information       Kehrer, Meredith Lea, MD .    Specialty: Family Medicine  Why: Dr. Kehrer (PCP) at next available appt  Contact information:  [address] [id number] [phone]                             Additional Instructions for the Follow-ups that You Need to Schedule       Discharge Follow-up with PCP   As directed       Currently Documented PCP:    Kehrer, Meredith Lea, MD    PCP Phone Number:    [phone]     Follow Up Details: Dr. Kehrer (PCP) at next available appt        Discharge Follow-up with Specified Provider: Dr. Baker ([facility]) on Jan 17th   As directed      To: Dr. Baker ([facility]) on Jan 17th            Time Spent on Discharge:  Greater than 30 minutes      Santos Tamez MD  [facility]  01/02/25  11:20 EST      Electronically signed by Santos Tamez MD at 01/02/25 2487

## 2025-01-02 NOTE — PLAN OF CARE
Problem: Sepsis/Septic Shock  Goal: Optimal Coping  1/2/2025 1149 by Jenn Mendoza RN  Outcome: Adequate for Care Transition  1/2/2025 1149 by Jenn Mendoza RN  Outcome: Progressing  1/2/2025 1027 by Jenn Mendoza RN  Outcome: Progressing  Goal: Absence of Bleeding  1/2/2025 1149 by Jenn Mendoza RN  Outcome: Adequate for Care Transition  1/2/2025 1149 by Jenn Mendoza RN  Outcome: Progressing  1/2/2025 1027 by Jenn Mendoza RN  Outcome: Progressing  Goal: Blood Glucose Level Within Target Range  1/2/2025 1149 by Jenn Mendoza RN  Outcome: Adequate for Care Transition  1/2/2025 1149 by Jenn Mendoza RN  Outcome: Progressing  1/2/2025 1027 by Jenn Mendoza RN  Outcome: Progressing  Goal: Absence of Infection Signs and Symptoms  1/2/2025 1149 by Jenn Mendoza RN  Outcome: Adequate for Care Transition  1/2/2025 1149 by Jenn Mendoza RN  Outcome: Progressing  1/2/2025 1027 by Jenn Mendoza RN  Outcome: Progressing  Goal: Optimal Nutrition Delivery  1/2/2025 1149 by Jenn Mendoza RN  Outcome: Adequate for Care Transition  1/2/2025 1149 by Jenn Mendoza RN  Outcome: Progressing  1/2/2025 1027 by Jenn Mendoza RN  Outcome: Progressing     Problem: Adult Inpatient Plan of Care  Goal: Plan of Care Review  1/2/2025 1149 by Jenn Mendoza RN  Outcome: Adequate for Care Transition  1/2/2025 1027 by Jenn Mendoza RN  Outcome: Progressing  Goal: Patient-Specific Goal (Individualized)  1/2/2025 1149 by Jenn Mendoza RN  Outcome: Adequate for Care Transition  1/2/2025 1027 by Jenn Mendoza RN  Outcome: Progressing  Goal: Absence of Hospital-Acquired Illness or Injury  1/2/2025 1149 by Jenn Mendoza RN  Outcome: Adequate for Care Transition  1/2/2025 1027 by Jenn Mendoza RN  Outcome: Progressing  Intervention: Identify and Manage Fall Risk  Recent Flowsheet Documentation  Taken 1/2/2025 1000 by Jenn Mendoza RN  Safety Promotion/Fall Prevention:   fall prevention program maintained   assistive device/personal items within reach   clutter free  environment maintained   safety round/check completed   room organization consistent   nonskid shoes/slippers when out of bed  Taken 1/2/2025 0800 by Jenn Mendoza RN  Safety Promotion/Fall Prevention:   fall prevention program maintained   assistive device/personal items within reach   clutter free environment maintained   safety round/check completed   room organization consistent   nonskid shoes/slippers when out of bed  Intervention: Prevent Skin Injury  Recent Flowsheet Documentation  Taken 1/2/2025 1000 by Jenn Mendoza RN  Body Position:   position changed independently   sitting up in bed  Taken 1/2/2025 0800 by Jenn Mendoza RN  Body Position:   position changed independently   sitting up in bed   weight shifting  Goal: Optimal Comfort and Wellbeing  1/2/2025 1149 by Jenn Mendoza RN  Outcome: Adequate for Care Transition  1/2/2025 1027 by Jenn Mendoza RN  Outcome: Progressing  Goal: Readiness for Transition of Care  1/2/2025 1149 by Jenn Mendoza RN  Outcome: Adequate for Care Transition  1/2/2025 1027 by Jenn Mendoza RN  Outcome: Progressing     Problem: Fall Injury Risk  Goal: Absence of Fall and Fall-Related Injury  1/2/2025 1149 by Jenn Mendoza RN  Outcome: Adequate for Care Transition  1/2/2025 1027 by Jenn Mendoza RN  Outcome: Progressing  Intervention: Promote Injury-Free Environment  Recent Flowsheet Documentation  Taken 1/2/2025 1000 by Jenn Mendoza RN  Safety Promotion/Fall Prevention:   fall prevention program maintained   assistive device/personal items within reach   clutter free environment maintained   safety round/check completed   room organization consistent   nonskid shoes/slippers when out of bed  Taken 1/2/2025 0800 by Jenn Mendoza RN  Safety Promotion/Fall Prevention:   fall prevention program maintained   assistive device/personal items within reach   clutter free environment maintained   safety round/check completed   room organization consistent   nonskid shoes/slippers when out of  bed     Problem: Pain Acute  Goal: Optimal Pain Control and Function  1/2/2025 1149 by Jenn Mendoza RN  Outcome: Adequate for Care Transition  1/2/2025 1027 by Jenn Mendoza RN  Outcome: Progressing     Problem: Nausea and Vomiting  Goal: Nausea and Vomiting Relief  1/2/2025 1149 by Jenn Mendoza RN  Outcome: Adequate for Care Transition  1/2/2025 1027 by Jenn Mendoza RN  Outcome: Progressing     Problem: Comorbidity Management  Goal: Maintenance of Asthma Control  1/2/2025 1149 by Jenn Mendoza RN  Outcome: Adequate for Care Transition  1/2/2025 1027 by Jenn Mendoza RN  Outcome: Progressing  Goal: Maintenance of Behavioral Health Symptom Control  1/2/2025 1149 by Jenn Mendoza RN  Outcome: Adequate for Care Transition  1/2/2025 1027 by Jenn Mendoza RN  Outcome: Progressing  Goal: Maintenance of COPD Symptom Control  1/2/2025 1149 by Jenn Mendoza RN  Outcome: Adequate for Care Transition  1/2/2025 1027 by Jenn Mendoza RN  Outcome: Progressing  Goal: Maintenance of Osteoarthritis Symptom Control  1/2/2025 1149 by Jenn Mendoza RN  Outcome: Adequate for Care Transition  1/2/2025 1027 by Jenn Mendoza RN  Outcome: Progressing   Goal Outcome Evaluation:

## 2025-01-02 NOTE — DISCHARGE INSTRUCTIONS
You were treated for severe asthma exacerbation during hospitalization.      You have been prescribed Trelegy as an alternative therapy for your asthma.  You were to use Trelegy daily in the morning and discontinue Breo.      Your prednisone taper.  You will take 30 mg daily for 3 days and decrease by 10 mg every 3 days for total of 9-day therapy for your taper.

## 2025-01-02 NOTE — NURSING NOTE
Pt states today is the first day she is not wheezing. Lungs are still hurting though. Pain meds help. Pt is using incentive spirometer well.

## 2025-01-02 NOTE — CASE MANAGEMENT/SOCIAL WORK
Case Management Discharge Note      Final Note: dc home. [facility] unable to deliver nebulizer as patient received one last year    Provided Post Acute Provider List?: N/A  Provided Post Acute Provider Quality & Resource List?: N/A    Selected Continued Care - Discharged on 1/2/2025 Admission date: 12/27/2024 - Discharge disposition: Home or Self Care      Destination    No services have been selected for the patient.                Durable Medical Equipment    No services have been selected for the patient.                Dialysis/Infusion    No services have been selected for the patient.                Home Medical Care    No services have been selected for the patient.                Therapy    No services have been selected for the patient.                Community Resources    No services have been selected for the patient.                Community & DME    No services have been selected for the patient.                    Transportation Services  Private: Car    Final Discharge Disposition Code: 01 - home or self-care

## 2025-01-02 NOTE — OUTREACH NOTE
Prep Survey      Flowsheet Row Responses   [facility] patient discharged from? [place]   Is LACE score < 7 ? No   [facility]ligibility [facility]   Date of Admission 12/27/24   Date of Discharge 01/02/25   Discharge Disposition Home-Health Care [facility]   Discharge diagnosis Severe asthma with acute exacerbation   Does the patient have one of the following disease processes/diagnoses(primary or secondary)? Other   Does the patient have Home health ordered? No   Is there a DME ordered? Yes   [facility]   Prep survey completed? Yes            EVERARDO LARES - Registered Nurse

## 2025-01-02 NOTE — DISCHARGE PLACEMENT REQUEST
"Nereida Lozano (49 y.o. Female)       Date of Birth   1975    Social Security Number       Address   [address] [place] [address] [address] [id number]    Home Phone       MRN   [id number]       Gnosticism   None    Marital Status                               Admission Date   12/27/24    Admission Type   Emergency    Admitting Provider   Prabhjot Giang MD    Attending Provider   Santos Tamez MD    Department, Room/Bed   [address], 83/1       Discharge Date       Discharge Disposition   Home or Self Care    Discharge Destination                                 Attending Provider: Santos Tamez MD    Allergies: No Known Allergies    Isolation: None   Infection: None   Code Status: CPR    Ht: 162.6 cm (64\")   Wt: 57.9 kg (127 lb 10.3 oz)    Admission Cmt: None   Principal Problem: Severe asthma with acute exacerbation [J45.901]                   Active Insurance as of 12/27/2024       Primary Coverage       Payor Plan Insurance Group Employer/Plan Group    ANTHEM BLUE CROSS ANTHEM Triage BLUE SHIELD PPO [id number]       Payor Plan Address Payor Plan Phone Number Payor Plan Fax Number Effective Dates    [address] [phone]  4/3/2021 - None Entered    [place] [address]         Subscriber Name Subscriber Birth Date Member ID       NUNO LOZANO NIDIA [birth year]1965 [id number]                     Emergency Contacts        (Rel.) Home Phone Work Phone Mobile Phone    BLAKENUNO (Spouse) -- -- [phone]    KAYLEYLEVIKEVIN (Mother) -- -- [phone]    Elaine Joe (Daughter) [phone] -- [phone]                " Problem: PAIN - ADULT  Goal: Verbalizes/displays adequate comfort level or baseline comfort level  Description: Interventions:  - Encourage patient to monitor pain and request assistance  - Assess pain using appropriate pain scale (0-10 pain scale)  - Administer analgesics based on type and severity of pain and evaluate response  - Implement non-pharmacological measures as appropriate and evaluate response  - Consider cultural and social influences on pain and pain management  - Notify physician/advanced practitioner if interventions unsuccessful or patient reports new pain  Outcome: Progressing     Problem: INFECTION - ADULT  Goal: Absence or prevention of progression during hospitalization  Description: INTERVENTIONS:  - Assess and monitor for signs and symptoms of infection  - Monitor lab/diagnostic results  - Administer medications as ordered  - Instruct and encourage patient and family to use good hand hygiene technique  Outcome: Progressing     Problem: DISCHARGE PLANNING  Goal: Discharge to home or other facility with appropriate resources  Description: INTERVENTIONS:  - Identify barriers to discharge w/patient and caregiver  - Arrange for needed discharge resources and transportation as appropriate  - Identify discharge learning needs (meds, wound care, etc )  - Refer to Case Management Department for coordinating discharge planning if the patient needs post-hospital services based on physician/advanced practitioner order or complex needs related to functional status, cognitive ability, or social support system  Outcome: Progressing     Problem: Knowledge Deficit  Goal: Patient/family/caregiver demonstrates understanding of disease process, treatment plan, medications, and discharge instructions  Description: Complete learning assessment and assess knowledge base    Interventions:  - Provide teaching at level of understanding  - Provide teaching via preferred learning methods  Outcome: Progressing Problem: METABOLIC, FLUID AND ELECTROLYTES - ADULT  Goal: Electrolytes maintained within normal limits  Description: INTERVENTIONS:  - Monitor labs and assess patient for signs and symptoms of electrolyte imbalances  - Administer electrolyte replacement as ordered  - Monitor response to electrolyte replacements, including repeat lab results as appropriate  - Instruct patient on fluid and nutrition as appropriate  Outcome: Progressing  Goal: Fluid balance maintained  Description: INTERVENTIONS:  - Monitor labs   - Monitor I/O and WT  - Instruct patient on fluid and nutrition as appropriate  - Assess for signs & symptoms of volume excess or deficit  Outcome: Progressing

## 2025-01-02 NOTE — CASE MANAGEMENT/SOCIAL WORK
Continued Stay Note  [facility]     Patient Name: Nereida Myles  MRN: [id number]  Today's Date: 1/2/2025    Admit Date: 12/27/2024    Plan: Home   Discharge Plan       Row Name 01/02/25 1140       Plan    Plan Comments DC orders in EPIC. [facility] to nebulizer prior to dc. Spoke with patient at bedside and she does not anticiapte any additonal needs.                   Discharge Codes    No documentation.                 Expected Discharge Date and Time       Expected Discharge Date Expected Discharge Time    Jan 2, 2025               Kelsey Gaviria RN

## 2025-01-02 NOTE — PLAN OF CARE
Problem: Sepsis/Septic Shock  Goal: Optimal Coping  Outcome: Progressing     Problem: Sepsis/Septic Shock  Goal: Absence of Bleeding  Outcome: Progressing   Goal Outcome Evaluation: AAOX4. Norco ordered for pain management. Up ad sanjiv. Able to make needs known. Possible discharge home today. Call light within reach. All needs currently met. POC ongoing.

## 2025-01-02 NOTE — NURSING NOTE
Pt D/C to home today. All belongings with pt, nebulizer being delivered by [facility]. AVS gone over with pt.

## 2025-01-02 NOTE — DISCHARGE SUMMARY
Patient Name: Nereida Myles  : 1975  MRN: [id number]    Date of Admission: 2024  Date of Discharge:  2025  Primary Care Physician: Kehrer, Meredith Lea, MD      Chief Complaint:   Shortness of Breath      Discharge Diagnoses     Active Hospital Problems    Diagnosis  POA    **Severe asthma with acute exacerbation [J45.901]  Yes    Hypokalemia [E87.6]  Yes    History of breast cancer [Z85.3]  Not Applicable    Gastroesophageal reflux disease without esophagitis [K21.9]  Yes    DDD (degenerative disc disease), lumbar [M51.369]  Yes    Hypoxia [R09.02]  Yes    Hypothyroidism [E03.9]  Yes    Depression, major [F32.9]  Yes    Anxiety [F41.9]  Yes      Resolved Hospital Problems   No resolved problems to display.        Hospital Course     Very pleasant 48yo woman with asthma, chronic low back pain due to lumbar DDD, hypoT4, depression/anxiety, GERD, and breast CA, admitted here earlier this month with acute hypoxic respiratory failure due to rhinovirus and bacterial PNA, who was sent to ER at the direction of Pulm office for admission due to persistent cough, SOA, wheezing, and hypoxia. Please see below for details of admission by problem:      Asthma exacerbation  Acute hypoxic resp failure  Appreciate Pulm attention to pt  RVP neg here, afebrile, DDimer wnl, WBC wnl, CXR unremarkable, CT Chest unremarkable  Treated with IV SoluMedrol, Symbicort, DuoNebs, Singulair, and Theophylline  S/p 3d of Zithromax for AECOPD  Weaned to RA now  Pulm okay with dc today on Prednisone taper  Pulm planning biologic agent as outpt when recovered from this episode     Hypokalemia  Replaced with protocol  Mg++ level fine     GERD  Continued PPI PRN as she takes at home     HypoT4  TSH wnl  Continued L-T4     Depression/Anxiety  Stable, continued Prozac and Wellbutrin     Lumbar DDD  Stable at baseline  Continued Lyrica at HS     H/o Breast CA  Continued Tamoxifen        SCDs for DVT prophylaxis while here.  Full  code confirmed.  Discussed with patient and Dr. Patel (Pulm).  Discharge home with family today on Prednisone taper and Trelegy.  F/u with Dr. Baker (Pulm) on Jan 17th  F/u with Dr. Kehrer (PCP) at next available appt.    Day of Discharge     Subjective:  Feeling a little better again today. Still mildly SOA with exertion but no longer SOA at rest. No CP. Cough improving. No N/V/D/abd pain. Tolerating po. Voiding well. No F/C/NS. Eager to go home.       Physical Exam:  Temp:  [97.9 °F (36.6 °C)-98.6 °F (37 °C)] 98.1 °F (36.7 °C)  Heart Rate:  [51-83] 73  Resp:  [16-20] 20  BP: (118-130)/(76-92) 118/76  Body mass index is 21.91 kg/m².  Physical Exam  Vitals and nursing note reviewed.   Constitutional:       General: She is not in acute distress.     Appearance: She is ill-appearing. She is not toxic-appearing or diaphoretic.   Cardiovascular:      Rate and Rhythm: Normal rate and regular rhythm.      Pulses: Normal pulses.   Pulmonary:      Comments: Normal WOB  Coarse global exp wheeze, exp phase = insp phase  No rales  Abdominal:      General: Bowel sounds are normal. There is no distension.      Palpations: Abdomen is soft.      Tenderness: There is no abdominal tenderness.   Musculoskeletal:         General: No swelling.      Cervical back: Neck supple.   Skin:     General: Skin is warm and dry.      Capillary Refill: Capillary refill takes less than 2 seconds.      Coloration: Skin is not jaundiced.   Neurological:      General: No focal deficit present.      Mental Status: She is alert. Mental status is at baseline.   Psychiatric:         Mood and Affect: Mood normal.         Behavior: Behavior normal.         Thought Content: Thought content normal.         Consultants     Consult Orders (all) (From admission, onward)       Start     Ordered    12/28/24 0702  Inpatient Pulmonology Consult  [place]         Specialty:  Pulmonary Disease  Provider:  Demarcus Baker MD    12/27/24 4014    12/27/24 3664   Inpatient Nutrition Consult  Once        Provider:  (Not yet assigned)    12/27/24 2320    12/27/24 2101  [facility] (on-call MD unless specified) Details  Once        Specialty:  Hospitalist  Provider:  (Not yet assigned)    12/27/24 2100                  Procedures     * Surgery not found *    Imaging Results (All)       Procedure Component Value Units Date/Time    CT Chest Without Contrast Diagnostic [[id number]] Collected: 12/30/24 1639     Updated: 01/01/25 2354    Narrative:      CT OF THE CHEST WITHOUT CONTRAST 12/30/2024     HISTORY: Shortness of breath.     TECHNIQUE: Spiral images were obtained from the lung apices to the upper  abdomen. No intravenous contrast was given.        FINDINGS: There are some mild patchy areas of atelectasis or  inflammatory infiltrate in the bilateral lower lobes. Tiny 4 mm to 5 mm  subpleural nodular density is seen in the left upper lobe on image 36.     No pathologically enlarged hilar or mediastinal lymph nodes are seen. No  aortic or coronary calcification is seen. There is some fluid in  pericardial recesses.     Gallbladder has been removed. Low-density right hepatic lobe lesion is  seen most likely a hepatic cyst.       Impression:      1. Mild to moderate bilateral lower lobe atelectasis, scar or pneumonia.  2. Short-term follow-up CT of the chest in approximately 3 months  recommended.     Radiation dose reduction techniques were utilized, including automated  exposure control and exposure modulation based on body size.        This report was finalized on 1/1/2025 11:51 PM by Dr. Jimmy Azar M.D on Workstation: [id number]       XR Chest 1 View [[id number]] Collected: 12/27/24 1839     Updated: 12/27/24 1843    Narrative:      XR CHEST 1 VW-     HISTORY: 49-year-old female with shortness of breath.     FINDINGS: There is no convincing evidence for pneumonia or CHF.  Follow-up with 2 views of the chest is recommended.     This report was finalized on 12/27/2024 6:40 PM  "by Dr. Monserrat Monroy M.D  on Workstation: [id number]                 Pertinent Labs     Results from last 7 days   Lab Units 01/02/25  0341 01/01/25  0356 12/31/24  0343 12/30/24  0345   WBC 10*3/mm3 14.19* 10.55 11.61* 10.32   HEMOGLOBIN g/dL 12.6 12.2 12.3 12.4   PLATELETS 10*3/mm3 338 319 329 340     Results from last 7 days   Lab Units 01/02/25  0341 01/01/25  1408 01/01/25  0356 12/31/24  0343 12/30/24  0345   SODIUM mmol/L 140  --  140 141 140   POTASSIUM mmol/L 4.5 3.8 3.4* 4.1 4.4   CHLORIDE mmol/L 105  --  106 107 105   CO2 mmol/L 26.0  --  23.1 26.2 26.0   BUN mg/dL 17  --  15 15 13   CREATININE mg/dL 0.62  --  0.58 0.60 0.62   GLUCOSE mg/dL 116*  --  168* 134* 144*   EGFR mL/min/1.73 108.0  --  109.7 108.8 108.0     Results from last 7 days   Lab Units 01/01/25  0356 12/29/24  0423 12/28/24  0429 12/27/24  1714   ALBUMIN g/dL 3.4* 3.6 3.5 4.0   BILIRUBIN mg/dL <0.2 0.2 0.2 0.3   ALK PHOS U/L 69 68 70 78   AST (SGOT) U/L 16 14 16 18   ALT (SGPT) U/L 30 29 26 26     Results from last 7 days   Lab Units 01/02/25  0341 01/01/25  0356 12/31/24  0343 12/30/24  0345 12/29/24  0423 12/28/24  0429 12/28/24  0429 12/27/24  1714 12/27/24  1714   CALCIUM mg/dL 9.0 9.0 8.5* 8.8 9.0  --  9.1  --  9.6   ALBUMIN g/dL  --  3.4*  --   --  3.6  --  3.5  --  4.0   MAGNESIUM mg/dL 2.4 2.0 2.2 2.4 2.1  --  2.3   < >  --    PHOSPHORUS mg/dL 3.5 2.7 3.0 2.7 2.8   < >  --   --   --     < > = values in this interval not displayed.       Results from last 7 days   Lab Units 12/27/24 2122 12/27/24 1714   HSTROP T ng/L 9 9   PROBNP pg/mL  --  108.0   D DIMER QUANT MCGFEU/mL  --  0.28           Invalid input(s): \"LDLCALC\"  Results from last 7 days   Lab Units 12/27/24 1714   BLOODCX  No growth at 5 days     Results from last 7 days   Lab Units 12/27/24 2124   COVID19  Not Detected       Test Results Pending at Discharge     Pending Results       None              Discharge Details        Discharge Medications        New " Medications        Instructions Start Date   ipratropium-albuterol 0.5-2.5 mg/3 ml nebulizer  Commonly known as: DUO-NEB   3 mL, Nebulization, Every 4 Hours - RT      prochlorperazine 10 MG tablet  Commonly known as: COMPAZINE   10 mg, Oral, Every 8 Hours PRN      Trelegy Ellipta 100-62.5-25 MCG/ACT inhaler  Generic drug: Fluticasone-Umeclidin-Vilant   1 puff, Inhalation, Daily - RT             Changes to Medications        Instructions Start Date   omeprazole 40 MG capsule  Commonly known as: priLOSEC  What changed:   when to take this  reasons to take this   40 mg, Oral, Daily      predniSONE 10 MG tablet  Commonly known as: DELTASONE  What changed: See the new instructions.   Take 3 tablets by mouth Daily for 3 days, THEN 2 tablets Daily for 3 days, THEN 1 tablet Daily for 3 days.   Start Date: January 2, 2025     pregabalin 25 MG capsule  Commonly known as: Lyrica  What changed: additional instructions   Take 1 capsule PO TID x 5 days; if tolerated may slowly increase to 1 or 2 capsules PO TID      valACYclovir 500 MG tablet  Commonly known as: VALTREX  What changed:   when to take this  reasons to take this   TAKE 1 TABLET DAILY             Continue These Medications        Instructions Start Date   albuterol (2.5 MG/3ML) 0.083% nebulizer solution  Commonly known as: PROVENTIL   2.5 mg, Every 4 Hours PRN      albuterol sulfate  (90 Base) MCG/ACT inhaler  Commonly known as: PROVENTIL HFA;VENTOLIN HFA;PROAIR HFA   USE 2 INHALATIONS EVERY 4 HOURS AS NEEDED FOR WHEEZING      albuterol 4 MG tablet  Commonly known as: PROVENTIL   4 mg, Oral, Nightly      benzonatate 200 MG capsule  Commonly known as: TESSALON   Take 1 capsule by mouth Every 6 (Six) Hours As Needed for Cough.      buPROPion  MG 24 hr tablet  Commonly known as: WELLBUTRIN XL   300 mg, Oral, Every Morning      FLUoxetine 40 MG capsule  Commonly known as: PROzac   80 mg, Oral, Daily      levothyroxine 50 MCG tablet  Commonly known as:  SYNTHROID, LEVOTHROID   50 mcg, Oral, Daily      melatonin 5 MG tablet tablet   5 mg, Nightly      metaxalone 800 MG tablet  Commonly known as: SKELAXIN   TAKE 1/2 TO 1 TABLET BY MOUTH FOUR TIMES DAILY AS NEEDED FOR MUSCLE SPASMS      montelukast 10 MG tablet  Commonly known as: SINGULAIR   10 mg, Oral, Daily      multivitamin tablet tablet  Commonly known as: THERAGRAN   1 tablet, Nightly      tamoxifen 20 MG chemo tablet  Commonly known as: NOLVADEX   20 mg, Daily      theophylline 300 MG 12 hr tablet  Commonly known as: THEODUR   300 mg, Oral, Every Morning      vitamin B-12 1000 MCG tablet  Commonly known as: CYANOCOBALAMIN   1,000 mcg, Daily             Stop These Medications      Benzoyl Peroxide 10 % liquid     Breo Ellipta 100-25 MCG/INH aerosol powder   Generic drug: Fluticasone Furoate-Vilanterol     clindamycin 1 % swab  Commonly known as: CLEOCIN T     diclofenac 75 MG EC tablet  Commonly known as: VOLTAREN     ondansetron 4 MG tablet  Commonly known as: ZOFRAN              No Known Allergies    Discharge Disposition:  Home or Self Care      Discharge Diet:  Diet Order   Procedures    Diet: Cardiac; Healthy Heart (2-3 Na+); Fluid Consistency: Thin (IDDSI 0)       Discharge Activity:   As tolerated    CODE STATUS:    Code Status and Medical Interventions: CPR (Attempt to Resuscitate); Full Support   Ordered at: 12/27/24 2220     Code Status (Patient has no pulse and is not breathing):    CPR (Attempt to Resuscitate)     Medical Interventions (Patient has pulse or is breathing):    Full Support       Future Appointments   Date Time Provider Department Center   2/17/2025  9:45 AM Kehrer, Meredith Lea, MD [facility]     Additional Instructions for the Follow-ups that You Need to Schedule       Discharge Follow-up with PCP   As directed       Currently Documented PCP:    Kehrer, Meredith Lea, MD    PCP Phone Number:    [phone]     Follow Up Details: Dr. Kehrer (PCP) at next available appt         Discharge Follow-up with Specified Provider: Dr. Baker ([facility]) on Jan 17th   As directed      To: Dr. Baekr ([facility]) on Jan 17th               Follow-up Information       Kehrer, Meredith Lea, MD .    Specialty: Family Medicine  Why: Dr. Kehrer (PCP) at next available appt  Contact information:  [address]  [place] [id number]  [phone]                             Additional Instructions for the Follow-ups that You Need to Schedule       Discharge Follow-up with PCP   As directed       Currently Documented PCP:    Kehrer, Meredith Lea, MD    PCP Phone Number:    [phone]     Follow Up Details: Dr. Kehrer (PCP) at next available appt        Discharge Follow-up with Specified Provider: Dr. Baker ([facility]) on Jan 17th   As directed      To: Dr. Baker ([facility]) on Jan 17th            Time Spent on Discharge:  Greater than 30 minutes      Santos Tamez MD  [facility]  01/02/25  11:20 EST

## 2025-01-02 NOTE — PROGRESS NOTES
Consult Daily Progress Note  [facility]   01/02/25      Patient Name:  Nereida Myles  MRN:  [id number]   YOB: 1975  Age: 49 y.o.  Sex: female  LOS: 3    Reason for Consult:  Asthma exacerbation, shortness of breath    Hospital Course:   49-year-old with a history of asthma who presented after being found to have significant shortness of breath in clinic and directed to the emergency department.  Admitted for asthma exacerbation.    Interval History:  No acute events overnight  Respiratory status is improved  No nausea vomiting  No chest pain or palpitations  Multiple questions regarding discharge.    Physical Exam:  Vitals:    01/02/25 0825   BP: 118/76   Pulse:    Resp: 16   Temp: 98.1 °F (36.7 °C)   SpO2: 96%       Intake/Output    Intake/Output Summary (Last 24 hours) at 1/2/2025 0913  Last data filed at 1/1/2025 1752  Gross per 24 hour   Intake 360 ml   Output --   Net 360 ml       General: Alert, nontoxic, NAD  HEENT: NC/AT, EOMI, MMM  Neck: Supple, trachea midline  Cardiac: RRR, no murmur, gallops, rubs  Pulmonary: Diminished bilaterally  GI: Soft, non-tender, non-distended, normal bowel sounds  Extremities: Warm, well perfused, no LE edema  Skin: no visible rash  Neuro: CN II - XII grossly intact  Psychiatry: Normal mood and affect      Data Review:  Results from last 7 days   Lab Units 01/02/25  0341 01/01/25  0356 12/31/24  0343 12/30/24  0345 12/29/24  0423 12/28/24  0429 12/27/24  1714   WBC 10*3/mm3 14.19* 10.55 11.61* 10.32 10.20 4.91 8.18   HEMOGLOBIN g/dL 12.6 12.2 12.3 12.4 12.3 13.2 14.6   PLATELETS 10*3/mm3 338 319 329 340 326 337 394     Results from last 7 days   Lab Units 01/02/25  0341 01/01/25  1408 01/01/25  0356 12/31/24  0343 12/30/24  0345 12/29/24  0423 12/28/24  1439 12/28/24  0429 12/27/24  1714   SODIUM mmol/L 140  --  140 141 140 140  --  136 138   POTASSIUM mmol/L 4.5 3.8 3.4* 4.1 4.4 4.5 4.1 4.6 3.1*   CHLORIDE mmol/L 105  --  106 107 105 105  --   103 100   CO2 mmol/L 26.0  --  23.1 26.2 26.0 25.0  --  24.3 25.0   BUN mg/dL 17  --  15 15 13 11  --  11 19   CREATININE mg/dL 0.62  --  0.58 0.60 0.62 0.51*  --  0.64 0.95   GLUCOSE mg/dL 116*  --  168* 134* 144* 148*  --  188* 84   CALCIUM mg/dL 9.0  --  9.0 8.5* 8.8 9.0  --  9.1 9.6   MAGNESIUM mg/dL 2.4  --  2.0 2.2 2.4 2.1  --  2.3  --    PHOSPHORUS mg/dL 3.5  --  2.7 3.0 2.7 2.8  --   --   --    Estimated Creatinine Clearance: 98.1 mL/min (by C-G formula based on SCr of 0.62 mg/dL).    Results from last 7 days   Lab Units 01/02/25  0341 01/01/25  0356 12/31/24  0343 12/30/24  0345 12/29/24  0423 12/28/24  0429 12/27/24  2122 12/27/24  1714   AST (SGOT) U/L  --  16  --   --  14 16  --  18   ALT (SGPT) U/L  --  30  --   --  29 26  --  26   PROCALCITONIN ng/mL  --   --   --   --  0.03  --   --   --    LACTATE mmol/L  --   --   --   --   --   --  1.8  --    D DIMER QUANT MCGFEU/mL  --   --   --   --   --   --   --  0.28   PLATELETS 10*3/mm3 338 319 329   < > 326 337  --  394    < > = values in this interval not displayed.               Imaging:  Reviewed chest images personally from past 3 days    ASSESSMENT  /  PLAN:    Acute asthma exacerbation  Acute hypoxic respiratory failure  Hypothyroidism  Depression  Gastroesophageal reflux disease  Lumbar degenerative disc disease  History of breast cancer on tamoxifen     -Patient symptoms have overall significantly improved.  -CT chest does not demonstrate any concerning intraparenchymal pulmonary pathology.  No evidence of pneumonia.  -Patient has been on a prolonged taper, will send her home with a slow taper over the course of 9 days.  Start 30 mg tomorrow with a decrease by 10 mg every 3 days.  -No indication for antibiotics  -I have instructed her to discontinue her Breo and started her on Trelegy as outpatient therapy.  -Not requiring any supplemental oxygen  -Completed course of azithromycin for exacerbation  -Previously patient has been on Dupixent for 1 year  with stable symptoms as a result she was discontinued.  Due to her severe repeat exacerbations, she will need to be considered for biologic therapy again as an outpatient.  Did have some eosinophilia back in August prior to steroid courses.  -I have ordered her home nebulizer, previous one was almost a decade old  -She is set up for follow-up in pulmonary clinic on January 17 with Dr. Samuel Lind for discharge from pulmonary standpoint.    Thank you for allowing me to participate in the care of this patient.  Pulmonary will sign off at this time.  Please contact us if further questions or concerns arise.    Abdullahi Patel MD  [facility]  Pulmonary and Critical Care Medicine, Interventional Pulmonology    Parts of this note may be an electronic transcription/translation of spoken language to printed text using the Dragon dictation system.

## 2025-01-03 ENCOUNTER — TRANSITIONAL CARE MANAGEMENT TELEPHONE ENCOUNTER (OUTPATIENT)
Dept: CALL CENTER | Facility: HOSPITAL | Age: 50
End: 2025-01-03
Payer: COMMERCIAL

## 2025-01-03 NOTE — OUTREACH NOTE
Call Center TCM Note      Flowsheet Row Responses   [facility] patient discharged from? [facility]   Does the patient have one of the following disease processes/diagnoses(primary or secondary)? Other   TCM attempt successful? Yes   Call start time 0837   Call end time 0843   Discharge diagnosis Severe asthma with acute exacerbation   Person spoke with today (if not patient) and relationship patient   Meds reviewed with patient/caregiver? Yes   Is the patient having any side effects they believe may be caused by any medication additions or changes? No   Does the patient have all medications ordered at discharge? Yes   Is the patient taking all medications as directed (includes completed medication regime)? Yes   Comments [facility] 1/17/25   Does the patient have an appointment with their PCP within 7-14 days of discharge? Yes  [1/9/2025 at 2:00 PM]   Psychosocial issues? No   Did the patient receive a copy of their discharge instructions? Yes   Nursing interventions Reviewed instructions with patient   What is the patient's perception of their health status since discharge? Improving   Is the patient/caregiver able to teach back signs and symptoms related to disease process for when to call PCP? Yes   Is the patient/caregiver able to teach back signs and symptoms related to disease process for when to call 911? Yes   Is the patient/caregiver able to teach back the hierarchy of who to call/visit for symptoms/problems? PCP, Specialist, Home health nurse, Urgent Care, ED, 911 Yes   If the patient is a current smoker, are they able to teach back resources for cessation? Not a smoker   TCM call completed? Yes   Wrap up additional comments Pt denies any SOB. Reviewed AVS/meds with pt. Pt educated on doing albuteral inhaler 15 minutes before Breo to help with effectiveness of inhalers,  pt is not taking Trelegy r/t insurance coverage. Pt verified PCP, Pulmonary fu appts.   Call end time 0843   Would this  patient benefit from a Referral to [facility] Social Work? No   Is the patient interested in additional calls from an ambulatory ? No            Giovana Mcleod RN    1/3/2025, 08:45 EST

## 2025-01-09 ENCOUNTER — OFFICE VISIT (OUTPATIENT)
Dept: FAMILY MEDICINE CLINIC | Facility: CLINIC | Age: 50
End: 2025-01-09
Payer: COMMERCIAL

## 2025-01-09 VITALS
WEIGHT: 128 LBS | OXYGEN SATURATION: 98 % | SYSTOLIC BLOOD PRESSURE: 114 MMHG | BODY MASS INDEX: 21.85 KG/M2 | TEMPERATURE: 98.3 F | HEIGHT: 64 IN | HEART RATE: 97 BPM | DIASTOLIC BLOOD PRESSURE: 68 MMHG

## 2025-01-09 DIAGNOSIS — E87.6 HYPOKALEMIA: ICD-10-CM

## 2025-01-09 DIAGNOSIS — E06.3 HYPOTHYROIDISM DUE TO HASHIMOTO'S THYROIDITIS: ICD-10-CM

## 2025-01-09 DIAGNOSIS — J96.01 ACUTE HYPOXIC RESPIRATORY FAILURE: ICD-10-CM

## 2025-01-09 DIAGNOSIS — Z09 HOSPITAL DISCHARGE FOLLOW-UP: Primary | ICD-10-CM

## 2025-01-09 DIAGNOSIS — J45.51 SEVERE PERSISTENT ASTHMA WITH ACUTE EXACERBATION: ICD-10-CM

## 2025-01-09 DIAGNOSIS — B34.8 RHINOVIRUS: ICD-10-CM

## 2025-01-09 RX ORDER — ALBUTEROL SULFATE 90 UG/1
2 INHALANT RESPIRATORY (INHALATION) EVERY 4 HOURS PRN
Qty: 34 G | Refills: 0 | Status: SHIPPED | OUTPATIENT
Start: 2025-01-09

## 2025-01-09 NOTE — PROGRESS NOTES
Transitional Care Follow Up Visit  Subjective     Nereida Myles is a 49 y.o. female who presents for a transitional care management visit.    Within 48 business hours after discharge our office contacted her via telephone to coordinate her care and needs.      I reviewed and discussed the details of that call along with the discharge summary, hospital problems, inpatient lab results, inpatient diagnostic studies, and consultation reports with Nereida.     Current outpatient and discharge medications have been reconciled for the patient.  Reviewed by: Meredith Lea Kehrer, MD          1/2/2025     5:20 PM   Date of TCM Phone Call   [facility]   Date of Admission 12/27/2024   Date of Discharge 1/2/2025   Discharge Disposition Home-[facility]     Risk for Readmission (LACE) Score: 12 (1/2/2025  6:00 AM)      History of Present Illness   Course During Hospital Stay:  see below  History of Present Illness  The patient is a 49-year-old female who presents for a hospital discharge follow-up.    She was initially hospitalized on 12/07/2024 and discharged on 12/12/2024, followed by a subsequent admission on 12/27/2024, which lasted until 01/02/2025. Both hospitalizations were due to the same episode of acute respiratory failure. During her second hospitalization, she received intravenous steroids and an additional course of azithromycin. Despite these interventions, her condition did not improve until the third day of her stay. She was also administered q4 treatments and supplemental oxygen at a concentration of 2 percent. Her potassium levels remained low throughout the hospitalization, a common occurrence when she falls ill. She tested negative for all viral infections. Upon discharge, she was prescribed a prednisone taper, with two days remaining on the regimen. She has an upcoming appointment with Dr. Baker on 01/17/2025. She reports satisfactory pulse oximetry readings but experiences  shortness of breath easily. She has been working from home for the past week and is prepared to return to work on Monday. She requests [facility] paperwork for intermittent absences as well as hospitalization. She also requests a prescription for ProAir inhaler as she has run out of her current supply. She does not frequently require rescue inhalers but prefers to have one on hand. She is currently using Symbicort, with plans to switch to Breo upon completion. A transition to Trelegy has been suggested, but insurance complications have arisen. She is also taking oral albuterol 4 mg.    She has been experiencing a sore throat for the past few mornings, which she attributes to postnasal drainage.    MEDICATIONS  Current: Symbicort, Breo, prednisone, ProAir, albuterol        The following portions of the patient's history were reviewed and updated as appropriate: allergies, current medications, past family history, past medical history, past social history, past surgical history, and problem list.    Review of Systems    Objective   Physical Exam  Physical Exam  Patient is alert and in no acute distress.  Nose appears normal. Tympanic membranes are clear. Oral mucosa is clear.  Lungs are clear.       Results  Laboratory Studies  Potassium was low during hospitalization. Blood sugar was slightly elevated.     Assessment & Plan   Diagnoses and all orders for this visit:    1. Hospital discharge follow-up (Primary)    2. Acute hypoxic respiratory failure    3. Severe persistent asthma with acute exacerbation    4. Rhinovirus    5. Hypokalemia    6. Hypothyroidism due to Hashimoto's thyroiditis             Assessment & Plan  1. Post-hospitalization follow-up.  Her white blood cell count is expected to be elevated due to recent steroid use. Blood glucose levels were slightly elevated, likely due to steroid use, but this is not a cause for concern. A metabolic panel will be ordered today to monitor her potassium levels. [facility]  paperwork will be completed for her. She has been advised to discuss the potential switch to Trelegy with Dr. Baker during their upcoming appointment. A prescription for ProAir inhaler will be sent to [facility].    2. Acute respiratory failure.  She was hospitalized twice for the same episode of acute respiratory failure. During the second hospitalization, she received IV steroids and azithromycin. She was discharged on a prednisone taper and has two more days of 10 mg tablets left. She feels comfortable weaning off prednisone. She is currently on Symbicort and will switch to Breo once Symbicort is finished. She is advised to continue using albuterol nebulizer treatments instead of duo nebs due to the ipratropium content.    3. Sore throat.  She reports a sore throat and some drainage, likely due to increased indoor time with her dogs. Mucinex is recommended for symptom relief.      Patient or patient representative verbalized consent for the use of Ambient Listening during the visit with  Meredith Lea Kehrer, MD for chart documentation. 1/9/2025  14:32 EST

## 2025-01-10 LAB
BUN SERPL-MCNC: 16 MG/DL (ref 6–24)
BUN/CREAT SERPL: 21 (ref 9–23)
CALCIUM SERPL-MCNC: 9 MG/DL (ref 8.7–10.2)
CHLORIDE SERPL-SCNC: 105 MMOL/L (ref 96–106)
CO2 SERPL-SCNC: 24 MMOL/L (ref 20–29)
CREAT SERPL-MCNC: 0.75 MG/DL (ref 0.57–1)
EGFRCR SERPLBLD CKD-EPI 2021: 96 ML/MIN/1.73
GLUCOSE SERPL-MCNC: 101 MG/DL (ref 70–99)
POTASSIUM SERPL-SCNC: 3.5 MMOL/L (ref 3.5–5.2)
SODIUM SERPL-SCNC: 144 MMOL/L (ref 134–144)

## 2025-01-13 RX ORDER — MONTELUKAST SODIUM 10 MG/1
10 TABLET ORAL DAILY
Qty: 90 TABLET | Refills: 3 | Status: SHIPPED | OUTPATIENT
Start: 2025-01-13

## 2025-01-13 NOTE — TELEPHONE ENCOUNTER
Rx Refill Note  Requested Prescriptions     Pending Prescriptions Disp Refills    montelukast (SINGULAIR) 10 MG tablet [Pharmacy Med Name: MONTELUKAST SODIUM TABS 10MG] 90 tablet 3     Sig: TAKE 1 TABLET DAILY      Last office visit with prescribing clinician: 1/9/2025   Last telemedicine visit with prescribing clinician: Visit date not found   Next office visit with prescribing clinician: 2/17/2025                         Would you like a call back once the refill request has been completed: [] Yes [] No    If the office needs to give you a call back, can they leave a voicemail: [] Yes [] No    Rema Carlin MA  01/13/25, 08:15 EST

## 2025-01-14 ENCOUNTER — READMISSION MANAGEMENT (OUTPATIENT)
Dept: CALL CENTER | Facility: HOSPITAL | Age: 50
End: 2025-01-14
Payer: COMMERCIAL

## 2025-01-14 NOTE — OUTREACH NOTE
Medical Week 2 Survey      Flowsheet Row Responses   [facility] patient discharged from? [place]   Does the patient have one of the following disease processes/diagnoses(primary or secondary)? Other   Week 2 attempt successful? Yes   Call start time 1449   Discharge diagnosis Severe asthma with acute exacerbation   Call end time 1514   Person spoke with today (if not patient) and relationship patient   Meds reviewed with patient/caregiver? Yes   Is the patient having any side effects they believe may be caused by any medication additions or changes? No   Does the patient have all medications ordered at discharge? Yes   Is the patient taking all medications as directed (includes completed medication regime)? Yes   Does the patient have a primary care provider?  Yes   Does the patient have an appointment with their PCP within 7 days of discharge? Yes   Has the patient kept scheduled appointments due by today? Yes   Psychosocial issues? No   What is the patient's perception of their health status since discharge? Improving   Is the patient/caregiver able to teach back signs and symptoms related to disease process for when to call PCP? Yes   Is the patient/caregiver able to teach back signs and symptoms related to disease process for when to call 911? Yes   Is the patient/caregiver able to teach back the hierarchy of who to call/visit for symptoms/problems? PCP, Specialist, Home health nurse, Urgent Care, ED, 911 Yes   Week 2 Call Completed? Yes   Is the patient interested in additional calls from an ambulatory ? No   Would this patient benefit from a Referral to Amb Social Work? No   Wrap up additional comments Pt denies any SOB. No medication issues. Pt using inhaler as ordered. Pt had PCP fu appt, and has upcoming pulmonology appt.   Call end time 1514            Giovana MARTELL - Registered Nurse

## 2025-01-20 DIAGNOSIS — F33.42 RECURRENT MAJOR DEPRESSIVE DISORDER, IN FULL REMISSION: ICD-10-CM

## 2025-01-20 RX ORDER — FLUOXETINE 40 MG/1
80 CAPSULE ORAL DAILY
Qty: 180 CAPSULE | Refills: 3 | Status: SHIPPED | OUTPATIENT
Start: 2025-01-20

## 2025-01-20 NOTE — TELEPHONE ENCOUNTER
Rx Refill Note  Requested Prescriptions     Pending Prescriptions Disp Refills    FLUoxetine (PROzac) 40 MG capsule [Pharmacy Med Name: FLUOXETINE HCL CAPS 40MG] 180 capsule 3     Sig: TAKE 2 CAPSULES DAILY      Last office visit with prescribing clinician: 1/9/2025   Last telemedicine visit with prescribing clinician: Visit date not found   Next office visit with prescribing clinician: 2/17/2025                         Would you like a call back once the refill request has been completed: [] Yes [] No    If the office needs to give you a call back, can they leave a voicemail: [] Yes [] No    Rema Carlin MA  01/20/25, 07:34 EST

## 2025-02-03 DIAGNOSIS — M54.16 LUMBAR RADICULOPATHY: ICD-10-CM

## 2025-02-03 DIAGNOSIS — M46.1 SACROILIITIS: ICD-10-CM

## 2025-02-03 RX ORDER — IBUPROFEN 600 MG/1
TABLET ORAL
Qty: 60 TABLET | Refills: 23 | OUTPATIENT
Start: 2025-02-03

## 2025-02-03 RX ORDER — PREGABALIN 25 MG/1
CAPSULE ORAL
Qty: 180 CAPSULE | Refills: 0 | OUTPATIENT
Start: 2025-02-03

## 2025-02-05 ENCOUNTER — PREP FOR SURGERY (OUTPATIENT)
Dept: SURGERY | Facility: SURGERY CENTER | Age: 50
End: 2025-02-05
Payer: COMMERCIAL

## 2025-02-05 ENCOUNTER — OFFICE VISIT (OUTPATIENT)
Dept: PAIN MEDICINE | Facility: CLINIC | Age: 50
End: 2025-02-05
Payer: COMMERCIAL

## 2025-02-05 VITALS
HEIGHT: 64 IN | HEART RATE: 71 BPM | WEIGHT: 137.8 LBS | DIASTOLIC BLOOD PRESSURE: 77 MMHG | TEMPERATURE: 98.3 F | OXYGEN SATURATION: 99 % | RESPIRATION RATE: 18 BRPM | SYSTOLIC BLOOD PRESSURE: 126 MMHG | BODY MASS INDEX: 23.52 KG/M2

## 2025-02-05 DIAGNOSIS — M51.362 DEGENERATION OF INTERVERTEBRAL DISC OF LUMBAR REGION WITH DISCOGENIC BACK PAIN AND LOWER EXTREMITY PAIN: ICD-10-CM

## 2025-02-05 DIAGNOSIS — M54.16 LUMBAR RADICULOPATHY: Primary | ICD-10-CM

## 2025-02-05 DIAGNOSIS — M47.816 LUMBAR FACET ARTHROPATHY: ICD-10-CM

## 2025-02-05 DIAGNOSIS — M46.1 SACROILIITIS: ICD-10-CM

## 2025-02-05 PROCEDURE — 99214 OFFICE O/P EST MOD 30 MIN: CPT | Performed by: PHYSICIAN ASSISTANT

## 2025-02-05 RX ORDER — SODIUM CHLORIDE 0.9 % (FLUSH) 0.9 %
10 SYRINGE (ML) INJECTION AS NEEDED
OUTPATIENT
Start: 2025-02-05

## 2025-02-05 RX ORDER — SODIUM CHLORIDE 0.9 % (FLUSH) 0.9 %
10 SYRINGE (ML) INJECTION EVERY 12 HOURS SCHEDULED
OUTPATIENT
Start: 2025-02-05

## 2025-02-05 RX ORDER — CLINDAMYCIN PHOSPHATE 10 MG/ML
SOLUTION TOPICAL
COMMUNITY
Start: 2025-01-09

## 2025-02-05 RX ORDER — PREGABALIN 25 MG/1
CAPSULE ORAL
Qty: 270 CAPSULE | Refills: 0 | Status: SHIPPED | OUTPATIENT
Start: 2025-02-05

## 2025-02-05 RX ORDER — DICLOFENAC SODIUM 75 MG/1
75 TABLET, DELAYED RELEASE ORAL
COMMUNITY
Start: 2025-02-01

## 2025-02-05 RX ORDER — ONDANSETRON 4 MG/1
TABLET, FILM COATED ORAL
COMMUNITY
Start: 2025-02-01

## 2025-02-05 NOTE — PROGRESS NOTES
CHIEF COMPLAINT  Follow-up for back pain.    Subjective   Nereida Myles is a 49 y.o. female  who presents for follow-up.  She has a history of chronic back and right leg pain.  Since her last office visit the patient has noted significant worsening of pain in a bandlike distribution across the lumbar spine radiating into the right hip and groin as well as affecting the anterior portion of the right thigh.  Due to the worsening of pain she is also having left-sided symptoms radiating to the left groin and anterior thigh though symptoms are worse on the right than left.  Continues to have near complete resolution of numbness and tingling within the right lower extremity which is managed with ongoing use of pregabalin.    Current medication regimen consists of pregabalin 25 mg which she usually takes 2 p.o. nightly but over the last several weeks has been having to take 1 in the morning.  Tolerates without adverse effects.  Failed previous trial of gabapentin due to sedation.    Medical history complicated with history of breast cancer with bilateral mastectomy.     Pain today 7/10 VAS in severity.      Back Pain  This is a chronic problem. The current episode started more than 1 year ago. The problem occurs constantly. The problem has been worse since onset. The pain is present in the lumbar spine. The quality of the pain is described as aching and burning. The pain radiates to the right thigh and left thigh. The pain is at a severity of 7/10. The pain is moderate (Increases to 6/7/10 with activity). The pain is The same all the time. The symptoms are aggravated by position, twisting and bending (activity). Pertinent negatives include no abdominal pain, dysuria, fever, headaches, numbness or weakness. Risk factors include history of cancer.        PEG Assessment   What number best describes your pain on average in the past week?7  What number best describes how, during the past week, pain has interfered with your  enjoyment of life?9  What number best describes how, during the past week, pain has interfered with your general activity?  9    Review of Pertinent Medical Data ---  Review of discharge summary from date of admission 12/27/2024 - 1/2/2025 at [facility] second admission.  Patient presented with acute hypoxic respiratory failure due to rhinovirus and bacterial PNA and was sent to the ER at the [facility] of pulmonary office due to persistent cough, shortness of air, wheezing and hypoxia.  Was placed on another round of steroids as well as antibiotics           MRI OF THE THORACIC SPINE WITH AND WITHOUT CONTRAST AND MRI OF THE  LUMBAR SPINE WITH AND WITHOUT CONTRAST ON 01/09/2023     CLINICAL HISTORY: Mid and low back pain and flank pain where some  left-sided infection is suspected, history of breast cancer with  mastectomy.     MRI OF THE THORACIC SPINE TECHNIQUE: Sagittal T1, proton density and  fat-suppressed T2 and postcontrast sagittal fat-suppressed T1-weighted  images were obtained of the thoracic spine. In addition axial T1 and T2  and postcontrast axial T1-weighted images were obtained from C7 to L1.     FINDINGS: The thoracic cord is normal in signal intensity. At T8-9 there  is a posterior central disc bulge or small disc protrusion that indents  the ventral aspect of the thecal sac and comes close to the ventral  aspect of the cord resulting in minimal if any canal narrowing. There is  no foraminal narrowing. Otherwise the thoracic disc spaces and facets  are within normal limits with no disc herniation, canal or foraminal  narrowing seen in the thoracic spine. Marrow signal intensity in the  thoracic spine is within normal limits. The paravertebral soft tissue  structures are normal in appearance.     IMPRESSION:  1. At T8-9 there is a posterior central disc bulge or small disc  protrusion that only minimally narrows the thecal sac. Otherwise, the  thoracic spine MRI is normal with no evidence of spinal  infection in the  thoracic spine.     MRI OF THE LUMBAR SPINE TECHNIQUE: Sagittal T1, proton density fast spin  echo T2 and repeat fat-suppressed T2 and postcontrast sagittal  fat-suppressed T1-weighted images were obtained of the lumbar spine. In  addition, thin cut axial T1-weighted images were obtained angled through  the interspaces from L3 to S1 and axial T2 and postcontrast axial  T1-weighted images were obtained from T12 to S2.     COMPARISON: There are no prior lumbar spine imaging studies from [facility] for comparison.     FINDINGS: The distal thoracic cord and the conus are normal in signal  intensity. The conus terminates in the posterior midline of the thecal  sac at the level of the inferior body of L2 which is probably lower  limits of normal.     The T12-L1, L1-2 and L2-3 disc space and facets are normal with no canal  or foraminal narrowing from T12 to L3.     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     At L3-4 there is minimal right and mild left facet overgrowth and a  small amount of fluid in the left facets. There is some mild disc space  narrowing and degenerative endplate changes most pronounced in the right  side of the endplates along the inner margin of the levoscoliotic curve.  There is a 3 mm retrolisthesis of L3 with respect to L4 with disc  material bulging along the posterior superior endplate of L4. There is  essentially no narrowing of the thecal sac. There is mild right and no  left foraminal narrowing.     At L4-5 there is minimal facet overgrowth. There is 3 mm retrolisthesis  of L4 with respect to L5 and some bulging disc material extending along  the posterior superior endplate of L5. There is no canal narrowing.  There is minimal right and mild left foraminal narrowing.     At L5-S1 there is minimal facet overgrowth. There is disc space  narrowing, mild degenerative endplate changes, 3-4 mm  retrolisthesis of  L5 with respect to S1, and minimal posterior spurring. There is no canal  or lateral recess narrowing. There is mild spurring into the inferior  aspect of the foramina and there is mild bilateral foraminal narrowing.     Other than the degenerative marrow edema in the right side of the  endplates at L3-4 and minimal degenerative marrow edema of the right  posterior lateral endplates at L4-5, marrow signal intensity in the  lumbar spine is within normal limits. I see no evidence of spinal  infection in the lumbar spine.     IMPRESSION:  1. No convincing acute abnormality is seen in the lumbar spine with no  direct evidence of spinal infection within the lumbar spine. 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  2. Disc space and facets are normal in appearance with no canal or  foraminal narrowing from T10 down to L3.  3. There is disc space narrowing and degenerative endplate changes at  L3-4 most pronounced in the right side of the endplates along the inner  margin of the levoscoliotic curve and there is a 3-4 mm retrolisthesis  of L3 with respect to L4 and minimal facet overgrowth but essentially no  canal narrowing and there is only mild right foraminal narrowing at  L3-4.  4. At L4-5 there is minimal facet overgrowth and a 3-4 mm retrolisthesis  of L4 with respect to L5. There is no canal narrowing. There is minimal  right and mild left foraminal narrowing at L4-5.   5. At L5-S1 there is disc space narrowing, degenerative endplate  changes, and 3-4 mm retrolisthesis of L5 with respect to S1. There is no  canal or lateral recess narrowing. There is spurring into the foramina  and mild bilateral foraminal narrowing.  6. There is mild bone marrow edema in the right side of the endplates at  L3-4 and right posterior lateral endplates at L4-5 which is felt to  almost certainly be degenerative marrow edema. The remainder of the  lumbar  "spine is within normal limits     This report was finalized on 1/10/2023 7:44 AM by Dr. Isidro Tellez M.D.    The following portions of the patient's history were reviewed and updated as appropriate: allergies, current medications, past family history, past medical history, past social history, past surgical history, and problem list.    Review of Systems   Constitutional:  Negative for fever.   Gastrointestinal:  Negative for abdominal pain, constipation and diarrhea.   Genitourinary:  Negative for difficulty urinating and dysuria.   Musculoskeletal:  Positive for back pain.   Neurological:  Negative for weakness, numbness and headaches.   Psychiatric/Behavioral:  Positive for sleep disturbance. Negative for suicidal ideas. The patient is nervous/anxious.      I have reviewed and confirmed the accuracy of the ROS as documented by the MA/PERICO/LINDA Tam  Vitals:    02/05/25 1418   BP: 126/77   Pulse: 71   Resp: 18   Temp: 98.3 °F (36.8 °C)   SpO2: 99%   Weight: 62.5 kg (137 lb 12.8 oz)   Height: 162.6 cm (64\")   PainSc:   7   PainLoc: Back         Objective   Physical Exam  Vitals and nursing note reviewed.   Constitutional:       Appearance: Normal appearance. She is normal weight.      Comments: MILDLY ANXIOUS   HENT:      Head: Normocephalic.   Cardiovascular:      Pulses:           Dorsalis pedis pulses are 1+ on the right side and 1+ on the left side.   Pulmonary:      Effort: Pulmonary effort is normal.   Musculoskeletal:      Lumbar back: Tenderness present. Positive right straight leg raise test.        Back:    Skin:     General: Skin is warm and dry.   Neurological:      General: No focal deficit present.      Mental Status: She is alert and oriented to person, place, and time.      Cranial Nerves: Cranial nerves 2-12 are intact.      Sensory: Sensation is intact.      Motor: Motor function is intact.      Gait: Gait is intact.      Deep Tendon Reflexes:      Reflex Scores:       Patellar reflexes " are 1+ on the right side and 1+ on the left side.  Psychiatric:         Mood and Affect: Mood normal.         Behavior: Behavior normal.         Thought Content: Thought content normal.         Judgment: Judgment normal.       PHQ-2 Depression Screening  Little interest or pleasure in doing things? Not at all   Feeling down, depressed, or hopeless? Not at all   PHQ-2 Total Score 0       Tobacco Use: Low Risk  (2/5/2025)    Patient History     Smoking Tobacco Use: Never     Smokeless Tobacco Use: Never     Passive Exposure: Not on file           Assessment & Plan   Diagnoses and all orders for this visit:    1. Lumbar radiculopathy (Primary)  -     pregabalin (Lyrica) 25 MG capsule; Take 1 capsule PO TID  Dispense: 270 capsule; Refill: 0    2. Degeneration of intervertebral disc of lumbar region with discogenic back pain and lower extremity pain    3. Sacroiliitis  -     pregabalin (Lyrica) 25 MG capsule; Take 1 capsule PO TID  Dispense: 270 capsule; Refill: 0    4. Lumbar facet arthropathy        Nereida Myles reports a pain score of 7.  Given her pain assessment as noted, treatment options were discussed and the following options were decided upon as a follow-up plan to address the patient's pain: continuation of current treatment plan for pain, prescription for non-opiod analgesics, steroid injections, and use of non-medical modalities (ice, heat, stretching and/or behavior modifications).      --- Due to progressive worsening of pain which is also now affecting the left back and anterior thigh I will have her return for bilateral L3-4 lumbar TFESI under fluoroscopy guidance.  --- I will increase pregabalin 25 mg to 1 p.o. 3 times daily and we will send in 90-day prescription to the pharmacy.  --- Plan for follow-up 6 weeks after undergoing injective therapy.             KATELYN REPORT  As part of the patient's treatment plan, I am prescribing controlled substances. The patient has been made aware of  appropriate use of such medications, including potential risk of somnolence, limited ability to drive and/or work safely, and the potential for dependence or overdose. It has also been made clear that these medications are for use by this patient only, without concomitant use of alcohol or other substances unless prescribed.     Patient has completed prescribing agreement detailing terms of continued prescribing of controlled substances, including monitoring KATELYN reports, urine drug screening, and pill counts if necessary. The patient is aware that inappropriate use will results in cessation of prescribing such medications.    As the clinician, I personally reviewed the KATELYN from 2/5/2025 while the patient was in the office today.    History and physical exam exhibit continued safe and appropriate use of controlled substances.     Dictated utilizing Dragon dictation.

## 2025-02-05 NOTE — H&P (VIEW-ONLY)
CHIEF COMPLAINT  Follow-up for back pain.    Subjective   Nereida Myles is a 49 y.o. female  who presents for follow-up.  She has a history of chronic back and right leg pain.  Since her last office visit the patient has noted significant worsening of pain in a bandlike distribution across the lumbar spine radiating into the right hip and groin as well as affecting the anterior portion of the right thigh.  Due to the worsening of pain she is also having left-sided symptoms radiating to the left groin and anterior thigh though symptoms are worse on the right than left.  Continues to have near complete resolution of numbness and tingling within the right lower extremity which is managed with ongoing use of pregabalin.    Current medication regimen consists of pregabalin 25 mg which she usually takes 2 p.o. nightly but over the last several weeks has been having to take 1 in the morning.  Tolerates without adverse effects.  Failed previous trial of gabapentin due to sedation.    Medical history complicated with history of breast cancer with bilateral mastectomy.     Pain today 7/10 VAS in severity.      Back Pain  This is a chronic problem. The current episode started more than 1 year ago. The problem occurs constantly. The problem has been worse since onset. The pain is present in the lumbar spine. The quality of the pain is described as aching and burning. The pain radiates to the right thigh and left thigh. The pain is at a severity of 7/10. The pain is moderate (Increases to 6/7/10 with activity). The pain is The same all the time. The symptoms are aggravated by position, twisting and bending (activity). Pertinent negatives include no abdominal pain, dysuria, fever, headaches, numbness or weakness. Risk factors include history of cancer.        PEG Assessment   What number best describes your pain on average in the past week?7  What number best describes how, during the past week, pain has interfered with your  enjoyment of life?9  What number best describes how, during the past week, pain has interfered with your general activity?  9    Review of Pertinent Medical Data ---  Review of discharge summary from date of admission 12/27/2024 - 1/2/2025 at [facility] second admission.  Patient presented with acute hypoxic respiratory failure due to rhinovirus and bacterial PNA and was sent to the ER at the [facility] of pulmonary office due to persistent cough, shortness of air, wheezing and hypoxia.  Was placed on another round of steroids as well as antibiotics           MRI OF THE THORACIC SPINE WITH AND WITHOUT CONTRAST AND MRI OF THE  LUMBAR SPINE WITH AND WITHOUT CONTRAST ON 01/09/2023     CLINICAL HISTORY: Mid and low back pain and flank pain where some  left-sided infection is suspected, history of breast cancer with  mastectomy.     MRI OF THE THORACIC SPINE TECHNIQUE: Sagittal T1, proton density and  fat-suppressed T2 and postcontrast sagittal fat-suppressed T1-weighted  images were obtained of the thoracic spine. In addition axial T1 and T2  and postcontrast axial T1-weighted images were obtained from C7 to L1.     FINDINGS: The thoracic cord is normal in signal intensity. At T8-9 there  is a posterior central disc bulge or small disc protrusion that indents  the ventral aspect of the thecal sac and comes close to the ventral  aspect of the cord resulting in minimal if any canal narrowing. There is  no foraminal narrowing. Otherwise the thoracic disc spaces and facets  are within normal limits with no disc herniation, canal or foraminal  narrowing seen in the thoracic spine. Marrow signal intensity in the  thoracic spine is within normal limits. The paravertebral soft tissue  structures are normal in appearance.     IMPRESSION:  1. At T8-9 there is a posterior central disc bulge or small disc  protrusion that only minimally narrows the thecal sac. Otherwise, the  thoracic spine MRI is normal with no evidence of spinal  infection in the  thoracic spine.     MRI OF THE LUMBAR SPINE TECHNIQUE: Sagittal T1, proton density fast spin  echo T2 and repeat fat-suppressed T2 and postcontrast sagittal  fat-suppressed T1-weighted images were obtained of the lumbar spine. In  addition, thin cut axial T1-weighted images were obtained angled through  the interspaces from L3 to S1 and axial T2 and postcontrast axial  T1-weighted images were obtained from T12 to S2.     COMPARISON: There are no prior lumbar spine imaging studies from [facility] for comparison.     FINDINGS: The distal thoracic cord and the conus are normal in signal  intensity. The conus terminates in the posterior midline of the thecal  sac at the level of the inferior body of L2 which is probably lower  limits of normal.     The T12-L1, L1-2 and L2-3 disc space and facets are normal with no canal  or foraminal narrowing from T12 to L3.     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     At L3-4 there is minimal right and mild left facet overgrowth and a  small amount of fluid in the left facets. There is some mild disc space  narrowing and degenerative endplate changes most pronounced in the right  side of the endplates along the inner margin of the levoscoliotic curve.  There is a 3 mm retrolisthesis of L3 with respect to L4 with disc  material bulging along the posterior superior endplate of L4. There is  essentially no narrowing of the thecal sac. There is mild right and no  left foraminal narrowing.     At L4-5 there is minimal facet overgrowth. There is 3 mm retrolisthesis  of L4 with respect to L5 and some bulging disc material extending along  the posterior superior endplate of L5. There is no canal narrowing.  There is minimal right and mild left foraminal narrowing.     At L5-S1 there is minimal facet overgrowth. There is disc space  narrowing, mild degenerative endplate changes, 3-4 mm  retrolisthesis of  L5 with respect to S1, and minimal posterior spurring. There is no canal  or lateral recess narrowing. There is mild spurring into the inferior  aspect of the foramina and there is mild bilateral foraminal narrowing.     Other than the degenerative marrow edema in the right side of the  endplates at L3-4 and minimal degenerative marrow edema of the right  posterior lateral endplates at L4-5, marrow signal intensity in the  lumbar spine is within normal limits. I see no evidence of spinal  infection in the lumbar spine.     IMPRESSION:  1. No convincing acute abnormality is seen in the lumbar spine with no  direct evidence of spinal infection within the lumbar spine. 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  2. Disc space and facets are normal in appearance with no canal or  foraminal narrowing from T10 down to L3.  3. There is disc space narrowing and degenerative endplate changes at  L3-4 most pronounced in the right side of the endplates along the inner  margin of the levoscoliotic curve and there is a 3-4 mm retrolisthesis  of L3 with respect to L4 and minimal facet overgrowth but essentially no  canal narrowing and there is only mild right foraminal narrowing at  L3-4.  4. At L4-5 there is minimal facet overgrowth and a 3-4 mm retrolisthesis  of L4 with respect to L5. There is no canal narrowing. There is minimal  right and mild left foraminal narrowing at L4-5.   5. At L5-S1 there is disc space narrowing, degenerative endplate  changes, and 3-4 mm retrolisthesis of L5 with respect to S1. There is no  canal or lateral recess narrowing. There is spurring into the foramina  and mild bilateral foraminal narrowing.  6. There is mild bone marrow edema in the right side of the endplates at  L3-4 and right posterior lateral endplates at L4-5 which is felt to  almost certainly be degenerative marrow edema. The remainder of the  lumbar  "spine is within normal limits     This report was finalized on 1/10/2023 7:44 AM by Dr. Isidro Tellez M.D.    The following portions of the patient's history were reviewed and updated as appropriate: allergies, current medications, past family history, past medical history, past social history, past surgical history, and problem list.    Review of Systems   Constitutional:  Negative for fever.   Gastrointestinal:  Negative for abdominal pain, constipation and diarrhea.   Genitourinary:  Negative for difficulty urinating and dysuria.   Musculoskeletal:  Positive for back pain.   Neurological:  Negative for weakness, numbness and headaches.   Psychiatric/Behavioral:  Positive for sleep disturbance. Negative for suicidal ideas. The patient is nervous/anxious.      I have reviewed and confirmed the accuracy of the ROS as documented by the MA/PERICO/LINDA Tam  Vitals:    02/05/25 1418   BP: 126/77   Pulse: 71   Resp: 18   Temp: 98.3 °F (36.8 °C)   SpO2: 99%   Weight: 62.5 kg (137 lb 12.8 oz)   Height: 162.6 cm (64\")   PainSc:   7   PainLoc: Back         Objective   Physical Exam  Vitals and nursing note reviewed.   Constitutional:       Appearance: Normal appearance. She is normal weight.      Comments: MILDLY ANXIOUS   HENT:      Head: Normocephalic.   Cardiovascular:      Pulses:           Dorsalis pedis pulses are 1+ on the right side and 1+ on the left side.   Pulmonary:      Effort: Pulmonary effort is normal.   Musculoskeletal:      Lumbar back: Tenderness present. Positive right straight leg raise test.        Back:    Skin:     General: Skin is warm and dry.   Neurological:      General: No focal deficit present.      Mental Status: She is alert and oriented to person, place, and time.      Cranial Nerves: Cranial nerves 2-12 are intact.      Sensory: Sensation is intact.      Motor: Motor function is intact.      Gait: Gait is intact.      Deep Tendon Reflexes:      Reflex Scores:       Patellar reflexes " are 1+ on the right side and 1+ on the left side.  Psychiatric:         Mood and Affect: Mood normal.         Behavior: Behavior normal.         Thought Content: Thought content normal.         Judgment: Judgment normal.       PHQ-2 Depression Screening  Little interest or pleasure in doing things? Not at all   Feeling down, depressed, or hopeless? Not at all   PHQ-2 Total Score 0       Tobacco Use: Low Risk  (2/5/2025)    Patient History     Smoking Tobacco Use: Never     Smokeless Tobacco Use: Never     Passive Exposure: Not on file           Assessment & Plan   Diagnoses and all orders for this visit:    1. Lumbar radiculopathy (Primary)  -     pregabalin (Lyrica) 25 MG capsule; Take 1 capsule PO TID  Dispense: 270 capsule; Refill: 0    2. Degeneration of intervertebral disc of lumbar region with discogenic back pain and lower extremity pain    3. Sacroiliitis  -     pregabalin (Lyrica) 25 MG capsule; Take 1 capsule PO TID  Dispense: 270 capsule; Refill: 0    4. Lumbar facet arthropathy        Nereida Myles reports a pain score of 7.  Given her pain assessment as noted, treatment options were discussed and the following options were decided upon as a follow-up plan to address the patient's pain: continuation of current treatment plan for pain, prescription for non-opiod analgesics, steroid injections, and use of non-medical modalities (ice, heat, stretching and/or behavior modifications).      --- Due to progressive worsening of pain which is also now affecting the left back and anterior thigh I will have her return for bilateral L3-4 lumbar TFESI under fluoroscopy guidance.  --- I will increase pregabalin 25 mg to 1 p.o. 3 times daily and we will send in 90-day prescription to the pharmacy.  --- Plan for follow-up 6 weeks after undergoing injective therapy.             KATELYN REPORT  As part of the patient's treatment plan, I am prescribing controlled substances. The patient has been made aware of  appropriate use of such medications, including potential risk of somnolence, limited ability to drive and/or work safely, and the potential for dependence or overdose. It has also been made clear that these medications are for use by this patient only, without concomitant use of alcohol or other substances unless prescribed.     Patient has completed prescribing agreement detailing terms of continued prescribing of controlled substances, including monitoring KATELYN reports, urine drug screening, and pill counts if necessary. The patient is aware that inappropriate use will results in cessation of prescribing such medications.    As the clinician, I personally reviewed the KATELYN from 2/5/2025 while the patient was in the office today.    History and physical exam exhibit continued safe and appropriate use of controlled substances.     Dictated utilizing Dragon dictation.

## 2025-02-11 DIAGNOSIS — M51.362 DEGENERATION OF INTERVERTEBRAL DISC OF LUMBAR REGION WITH DISCOGENIC BACK PAIN AND LOWER EXTREMITY PAIN: Primary | ICD-10-CM

## 2025-02-11 RX ORDER — TRAMADOL HYDROCHLORIDE 50 MG/1
50 TABLET ORAL EVERY 6 HOURS PRN
Qty: 20 TABLET | Refills: 0 | Status: SHIPPED | OUTPATIENT
Start: 2025-02-11

## 2025-02-17 ENCOUNTER — OFFICE VISIT (OUTPATIENT)
Dept: FAMILY MEDICINE CLINIC | Facility: CLINIC | Age: 50
End: 2025-02-17
Payer: COMMERCIAL

## 2025-02-17 VITALS
WEIGHT: 134.6 LBS | OXYGEN SATURATION: 100 % | HEIGHT: 64 IN | BODY MASS INDEX: 22.98 KG/M2 | HEART RATE: 77 BPM | DIASTOLIC BLOOD PRESSURE: 70 MMHG | SYSTOLIC BLOOD PRESSURE: 128 MMHG | TEMPERATURE: 97.4 F

## 2025-02-17 DIAGNOSIS — E06.3 HYPOTHYROIDISM DUE TO HASHIMOTO'S THYROIDITIS: Primary | ICD-10-CM

## 2025-02-17 DIAGNOSIS — R23.3 EASY BRUISING: ICD-10-CM

## 2025-02-17 DIAGNOSIS — J45.50 SEVERE PERSISTENT ASTHMA, UNSPECIFIED WHETHER COMPLICATED: ICD-10-CM

## 2025-02-17 DIAGNOSIS — F33.42 RECURRENT MAJOR DEPRESSIVE DISORDER, IN FULL REMISSION: ICD-10-CM

## 2025-02-17 LAB
BASOPHILS # BLD AUTO: 0.05 10*3/MM3 (ref 0–0.2)
BASOPHILS NFR BLD AUTO: 1.2 % (ref 0–1.5)
EOSINOPHIL # BLD AUTO: 0.12 10*3/MM3 (ref 0–0.4)
EOSINOPHIL NFR BLD AUTO: 2.9 % (ref 0.3–6.2)
ERYTHROCYTE [DISTWIDTH] IN BLOOD BY AUTOMATED COUNT: 13 % (ref 12.3–15.4)
HCT VFR BLD AUTO: 37.7 % (ref 34–46.6)
HGB BLD-MCNC: 12.4 G/DL (ref 12–15.9)
IMM GRANULOCYTES # BLD AUTO: 0.01 10*3/MM3 (ref 0–0.05)
IMM GRANULOCYTES NFR BLD AUTO: 0.2 % (ref 0–0.5)
LYMPHOCYTES # BLD AUTO: 0.97 10*3/MM3 (ref 0.7–3.1)
LYMPHOCYTES NFR BLD AUTO: 23.4 % (ref 19.6–45.3)
MCH RBC QN AUTO: 30.2 PG (ref 26.6–33)
MCHC RBC AUTO-ENTMCNC: 32.9 G/DL (ref 31.5–35.7)
MCV RBC AUTO: 92 FL (ref 79–97)
MONOCYTES # BLD AUTO: 0.4 10*3/MM3 (ref 0.1–0.9)
MONOCYTES NFR BLD AUTO: 9.6 % (ref 5–12)
NEUTROPHILS # BLD AUTO: 2.6 10*3/MM3 (ref 1.7–7)
NEUTROPHILS NFR BLD AUTO: 62.7 % (ref 42.7–76)
NRBC BLD AUTO-RTO: 0 /100 WBC (ref 0–0.2)
PLATELET # BLD AUTO: 372 10*3/MM3 (ref 140–450)
RBC # BLD AUTO: 4.1 10*6/MM3 (ref 3.77–5.28)
TSH SERPL DL<=0.005 MIU/L-ACNC: 1.7 UIU/ML (ref 0.27–4.2)
WBC # BLD AUTO: 4.15 10*3/MM3 (ref 3.4–10.8)

## 2025-02-17 PROCEDURE — 99214 OFFICE O/P EST MOD 30 MIN: CPT | Performed by: FAMILY MEDICINE

## 2025-02-17 NOTE — PROGRESS NOTES
Chief Complaint  Med Refill, Hypothyroidism, Anxiety, Depression, and Bleeding/Bruising    Subjective        Nereida Myles presents to [facility]    Follow up  Symptoms are: recurrent.   Onset was 1 to 5 years.   Symptoms occur: constantly.  Symptoms include: joint pain and change in stool.   Pertinent negative symptoms include no abdominal pain, no anorexia, no chest pain, no chills, no congestion, no cough, no diaphoresis, no fatigue, no fever, no headaches, no joint swelling, no myalgias, no nausea, no neck pain, no numbness, no rash, no sore throat, no swollen glands, no dysuria, no vertigo, no visual change, no vomiting and no weakness.   Treatment and/or Medications comments include: Lyrica Tramodal     History of Present Illness  The patient is a 49-year-old female who presents for a routine follow-up on hypothyroidism, depression, and other medical issues.    She reports overall well-being, with the exception of persistent aches and pains. She has been experiencing severe hip pain, which she attributes to her back rather than the hip itself. Radiographic imaging has confirmed this diagnosis. She is scheduled for an epidural injection, a procedure she was unable to undergo 2 to 3 months prior due to unforeseen circumstances. She is also scheduled for surgery in 05/2025, similar to a previous procedure she underwent on the opposite side. She reports satisfaction with the outcome of the previous surgery, despite a recovery period of approximately 9 to 10 weeks. She experienced significant disability for the first month post-surgery but gradually regained functionality thereafter. Her current medication regimen includes Lyrica, tramadol, and metaxalone for muscle relaxation.    She has been managing her asthma effectively under the care of Dr. Baker, who recently transitioned her to Trelegy. She has only been on Trelegy for a few days due to insurance-related delays. Her  "current treatment plan includes the use of a nebulizer, Trelegy, and an albuterol inhaler. She continues to take theophylline, albuterol 4 mg, and Singulair.    She reports doing well with her depression.    She has been experiencing increased bruising on her legs, a symptom that has been previously evaluated and found to be non-concerning. However, she notes a recent exacerbation of this condition.    She feels like her thyroid is in good range.    MEDICATIONS  Trelegy, albuterol inhaler, theophylline, Singulair, Lyrica, tramadol, metaxalone       Objective   Vital Signs:  /70   Pulse 77   Temp 97.4 °F (36.3 °C)   Ht 162.6 cm (64\")   Wt 61.1 kg (134 lb 9.6 oz)   SpO2 100%   BMI 23.10 kg/m²   Estimated body mass index is 23.1 kg/m² as calculated from the following:    Height as of this encounter: 162.6 cm (64\").    Weight as of this encounter: 61.1 kg (134 lb 9.6 oz).       BMI is within normal parameters. No other follow-up for BMI required.      Physical Exam   Physical Exam  Pupils are equal. Oral exam was performed. Mucosa is moist. TMs are clear.  Neck is supple.  Lungs are clear.  Heart is regular.  Extremities are without edema.       Result Review :          Results                Assessment and Plan     Diagnoses and all orders for this visit:    1. Hypothyroidism due to Hashimoto's thyroiditis (Primary)  -     TSH    2. Recurrent major depressive disorder, in full remission    3. Severe persistent asthma, unspecified whether complicated    4. Easy bruising  -     CBC & Differential      Assessment & Plan  1. Hypothyroidism.  It has been approximately 2 months since her last TSH evaluation during a hospital admission for illness. At that time, her hemoglobin levels were within normal limits, but there was an observed elevation in her white blood cell count. A re-evaluation of her TSH levels is warranted. Additionally, a complete blood count (CBC) will be conducted to ensure that her bone marrow " has not experienced any stress or damage due to her recent illness.    2. Depression.  She reports doing well with her depression.    3. Asthma.  She has been switched to Trelegy and has been using it for a few days. She is also on theophylline, albuterol 4 mg, and Singulair.    4. Bruising.  She reports increased bruising on her legs and other areas. Previous tests were negative, but the condition seems to be worsening. A re-evaluation of her blood count will be conducted to investigate further.    5. Hip pain.  She reports significant hip pain, which has been attributed to her back issues. She is scheduled for an epidural injection soon. She is currently on Lyrica, tramadol, and metaxalone for pain management.            Follow Up     Return in about 6 months (around 8/17/2025) for Annual physical.  Patient was given instructions and counseling regarding her condition or for health maintenance advice. Please see specific information pulled into the AVS if appropriate.    Patient or patient representative verbalized consent for the use of Ambient Listening during the visit with  Meredith Lea Kehrer, MD for chart documentation. 2/17/2025  09:56 EST

## 2025-02-24 DIAGNOSIS — F33.2 SEVERE EPISODE OF RECURRENT MAJOR DEPRESSIVE DISORDER, WITHOUT PSYCHOTIC FEATURES: ICD-10-CM

## 2025-02-24 RX ORDER — BUPROPION HYDROCHLORIDE 300 MG/1
300 TABLET ORAL EVERY MORNING
Qty: 90 TABLET | Refills: 3 | Status: SHIPPED | OUTPATIENT
Start: 2025-02-24

## 2025-02-24 NOTE — TELEPHONE ENCOUNTER
Rx Refill Note  Requested Prescriptions     Pending Prescriptions Disp Refills    buPROPion XL (WELLBUTRIN XL) 300 MG 24 hr tablet [Pharmacy Med Name: BUPROPION HCL XL TABS 300MG] 90 tablet 3     Sig: TAKE 1 TABLET EVERY MORNING      Last office visit with prescribing clinician: 2/17/2025   Last telemedicine visit with prescribing clinician: Visit date not found   Next office visit with prescribing clinician: 8/29/2025                         Would you like a call back once the refill request has been completed: [] Yes [] No    If the office needs to give you a call back, can they leave a voicemail: [] Yes [] No    Rema Carlin MA  02/24/25, 08:18 EST

## 2025-02-26 RX ORDER — MIDAZOLAM HYDROCHLORIDE 2 MG/2ML
2 INJECTION, SOLUTION INTRAMUSCULAR; INTRAVENOUS ONCE
Status: COMPLETED | OUTPATIENT
Start: 2025-02-27 | End: 2025-02-27

## 2025-02-26 RX ORDER — FENTANYL CITRATE 50 UG/ML
50 INJECTION, SOLUTION INTRAMUSCULAR; INTRAVENOUS ONCE
Status: COMPLETED | OUTPATIENT
Start: 2025-02-27 | End: 2025-02-27

## 2025-02-26 RX ORDER — ONDANSETRON 2 MG/ML
4 INJECTION INTRAMUSCULAR; INTRAVENOUS ONCE
Status: COMPLETED | OUTPATIENT
Start: 2025-02-27 | End: 2025-02-27

## 2025-02-27 ENCOUNTER — HOSPITAL ENCOUNTER (OUTPATIENT)
Dept: GENERAL RADIOLOGY | Facility: SURGERY CENTER | Age: 50
Setting detail: HOSPITAL OUTPATIENT SURGERY
End: 2025-02-27
Payer: COMMERCIAL

## 2025-02-27 ENCOUNTER — HOSPITAL ENCOUNTER (OUTPATIENT)
Facility: SURGERY CENTER | Age: 50
Setting detail: HOSPITAL OUTPATIENT SURGERY
Discharge: HOME | End: 2025-02-27
Attending: ANESTHESIOLOGY | Admitting: ANESTHESIOLOGY
Payer: COMMERCIAL

## 2025-02-27 VITALS
HEIGHT: 65 IN | WEIGHT: 137 LBS | TEMPERATURE: 97.3 F | SYSTOLIC BLOOD PRESSURE: 126 MMHG | DIASTOLIC BLOOD PRESSURE: 77 MMHG | RESPIRATION RATE: 16 BRPM | HEART RATE: 60 BPM | BODY MASS INDEX: 22.82 KG/M2 | OXYGEN SATURATION: 95 %

## 2025-02-27 DIAGNOSIS — M54.16 LUMBAR RADICULOPATHY: ICD-10-CM

## 2025-02-27 DIAGNOSIS — Z41.9 SURGERY, ELECTIVE: ICD-10-CM

## 2025-02-27 PROCEDURE — 25010000002 FENTANYL CITRATE (PF) 100 MCG/2ML SOLUTION: Performed by: ANESTHESIOLOGY

## 2025-02-27 PROCEDURE — 77002 NEEDLE LOCALIZATION BY XRAY: CPT

## 2025-02-27 PROCEDURE — 25010000002 MIDAZOLAM PER 1MG: Performed by: ANESTHESIOLOGY

## 2025-02-27 PROCEDURE — 64483 NJX AA&/STRD TFRM EPI L/S 1: CPT | Performed by: ANESTHESIOLOGY

## 2025-02-27 PROCEDURE — 76000 FLUOROSCOPY <1 HR PHYS/QHP: CPT

## 2025-02-27 PROCEDURE — 25010000002 ONDANSETRON PER 1 MG: Performed by: ANESTHESIOLOGY

## 2025-02-27 PROCEDURE — 25510000001 IOPAMIDOL 61 % SOLUTION 30 ML VIAL: Performed by: ANESTHESIOLOGY

## 2025-02-27 PROCEDURE — 25010000002 METHYLPREDNISOLONE PER 80 MG: Performed by: ANESTHESIOLOGY

## 2025-02-27 PROCEDURE — 25010000002 LIDOCAINE PF 1% 1 % SOLUTION 5 ML VIAL: Performed by: ANESTHESIOLOGY

## 2025-02-27 PROCEDURE — 25010000002 BUPIVACAINE (PF) 0.5 % SOLUTION 10 ML VIAL: Performed by: ANESTHESIOLOGY

## 2025-02-27 RX ORDER — SODIUM CHLORIDE 0.9 % (FLUSH) 0.9 %
10 SYRINGE (ML) INJECTION EVERY 12 HOURS SCHEDULED
Status: DISCONTINUED | OUTPATIENT
Start: 2025-02-27 | End: 2025-02-27 | Stop reason: HOSPADM

## 2025-02-27 RX ORDER — MIDAZOLAM HYDROCHLORIDE 1 MG/ML
2 INJECTION, SOLUTION INTRAMUSCULAR; INTRAVENOUS ONCE
Status: DISCONTINUED | OUTPATIENT
Start: 2025-02-27 | End: 2025-02-27 | Stop reason: HOSPADM

## 2025-02-27 RX ORDER — SODIUM CHLORIDE 0.9 % (FLUSH) 0.9 %
10 SYRINGE (ML) INJECTION AS NEEDED
Status: DISCONTINUED | OUTPATIENT
Start: 2025-02-27 | End: 2025-02-27 | Stop reason: HOSPADM

## 2025-02-27 RX ORDER — MIDAZOLAM HYDROCHLORIDE 1 MG/ML
2 INJECTION, SOLUTION INTRAMUSCULAR; INTRAVENOUS ONCE
Status: COMPLETED | OUTPATIENT
Start: 2025-02-27 | End: 2025-02-27

## 2025-02-27 RX ADMIN — FENTANYL CITRATE 50 MCG: 50 INJECTION INTRAMUSCULAR; INTRAVENOUS at 13:26

## 2025-02-27 RX ADMIN — ONDANSETRON 4 MG: 2 INJECTION INTRAMUSCULAR; INTRAVENOUS at 13:23

## 2025-02-27 RX ADMIN — MIDAZOLAM 2 MG: 1 INJECTION INTRAMUSCULAR; INTRAVENOUS at 13:32

## 2025-02-27 RX ADMIN — MIDAZOLAM 2 MG: 1 INJECTION INTRAMUSCULAR; INTRAVENOUS at 13:24

## 2025-02-27 NOTE — OP NOTE
Bilateral L3 Lumbar Transforaminal Epidural Steroid Injection  [facility]    PREOPERATIVE DIAGNOSIS:  Lumbar Radiculopathy    POSTOPERATIVE DIAGNOSIS:  Same as preop diagnosis    PROCEDURE:  CPT 62554(-50) --  Diagnostic Transforaminal Epidural Steroid Injection at the L3 level, bilaterally    PRE-PROCEDURE DISCUSSION WITH PATIENT:    Risks and complications were discussed with the patient prior to starting the procedure and informed consent was obtained.  We discussed various topics including but not limited to bleeding, infection, injury, nerve injury, paralysis, coma, death, postprocedural painful flare-up, postprocedural site soreness, and a lack of pain relief.  We discussed the diagnostic aspect of transforaminal epidural / selective nerve root blockade.    SURGEON:  Rei Pruitt MD    REASON FOR PROCEDURE:    Degenerative changes are noted in the area. and Radiating pattern of pain is likely consistent with degenerative changes in the area.    SEDATION:  Versed 4mg & Fentanyl 50 mcg IV, Zofran 4mg IV, The use of increased procedural sedation was carefully considered, and for this particular patient the need for additional procedural sedation seemed necessary in this instance to safely perform the procedure., and The patient had higher than average levels of procedural anxiety and the need to provide additional procedural sedation was needed to safely proceed.  ANESTHETIC:  Marcaine 0.25%  STEROID:  Methylprednisolone (DEPO MEDROL) 80mg/ml    DESCRIPTON OF PROCEDURE:  After obtaining informed consent, an I.V. was started in the preoperative area. The patient taken to the operating room and was placed in the prone position with a pillow under the abdomen.  All pressure points were well padded.  EKG, blood pressure, and pulse oximeter were monitored.  The lumbosacral area was prepped with Chloraprep and draped in a sterile fashion. Under fluoroscopic guidance in an oblique dimension, the  transverse process of the aforementioned vertebra at the junction of the body at 6 o'clock position on one side was identified. Skin and subcutaneous tissue was anesthetized with 1% lidocaine. A 22-gauge spinal needle was introduced under fluoroscopic guidance at the above junction into the foramen without parasthesias and into the epidural space. After confirming the position of the needle with PA, lateral, and oblique fluoroscopic views, aspiration was checked and was clear of blood or CSF.  Next, 1 mL of Omnipaque was injected. After seeing adequate spread on the corresponding nerve root, a total volume 2mL of injectate containing 0.5ml of the above mentioned local anesthetic, 1 ml saline,  and half of the above mentioned corticosteroid was injected into the epidural space.    The needle was removed intact.      Next, the same vertebral level and corresponding foramen on the contralateral side was visualized with fluoroscopy in an oblique view, and a similar approach was used to place the spinal needle in that foramen.  After confirming the position of the needle with PA, lateral, and oblique fluoroscopic views, aspiration was checked and was clear of blood or CSF.  Next, 1 mL of Omnipaque was injected. After seeing adequate spread on the corresponding nerve root, a total volume 2mL of injectate containing 0.5ml of the above mentioned local anesthetic, 1 ml saline, and half of the above mentioned corticosteroid was injected into the epidural space. The needle was removed intact.  Vital signs were stable throughout.      ESTIMATED BLOOD LOSS:  <5 mL  SPECIMENS:  none    COMPLICATIONS:   No complications were noted., There was no indication of vascular uptake on live injection of contrast dye., There was no indication of intrathecal uptake on live injection of contrast dye., There was not any evidence of dural puncture.  , and The patient did not have any signs of postprocedure numbness nor weakness.    TOLERANCE &  DISCHARGE CONDITION:    The patient tolerated the procedure well.  The patient was transported to the recovery area without difficulties.  The patient was discharged to home under the care of family in stable and satisfactory condition.    PLAN OF CARE:  The patient was given our standard instruction sheet.  The patient will Return to clinic 6 wks.  The patient will resume all medications as per the medication reconciliation sheet.

## 2025-02-27 NOTE — DISCHARGE INSTRUCTIONS
[facility] [place] - Post-procedure Instructions          --  While there are no absolute restrictions, it is recommended that you do not perform strenuous activity today. In the morning, you may resume your level of activity as before your block.    --  If you have a band-aid at your injection site, please remove it later today. Observe the area for any redness, swelling, pus-like drainage, or a temperature over 101°. If any of these symptoms occur, please call your doctor at [phone]. If after office hours, leave a message and the on-call provider will return your call.    --  Ice may be applied to your injection site. It is recommended you avoid direct heat (heating pad; hot tub) for 1-2 days.    --  Call [facility] [place] at [phone] if you experience persistent headache, persistent bleeding from the injection site, or severe pain not relieved by heat or oral medication.    --  Do not make important decisions today.    --  Due to the effects of the block and/or the I.V. Sedation, DO NOT drive or operate hazardous machinery for 12 hours.  Local anesthetics may cause numbness after procedure and precautions must be taken with regards to operating equipment as well as with walking, even if ambulating with assistance of another person or with an assistive device.    --  Do not drink alcohol for 12 hours.    -- You may return to work tomorrow, or as directed by your referring doctor.    --  Occasionally you may notice a slight increase in your pain after the procedure. This should start to improve within the next 24-48 hours. Radiofrequency ablation procedure pain may last 3-4 weeks.    --  It may take as long as 3-4 days before you notice a gradual improvement in your pain and/or other symptoms.    -- You may continue to take your prescribed pain medication as needed.    --  Some normal possible side effects of steroid use could include fluid retention, increased blood sugar, dull headache,  increased sweating, increased appetite, mood swings and flushing.    --  Diabetics are recommended to watch their blood glucose level closely for 24-48 hours after the injection.    --  Must stay in PACU for 20 min upon arrival and prove no leg weakness before being discharged.    --  IN THE EVENT OF A LIFE THREATENING EMERGENCY, (CHEST PAIN, BREATHING DIFFICULTIES, PARALYSIS…) YOU SHOULD GO TO YOUR NEAREST EMERGENCY ROOM.    --  You should be contacted by our office within 2-3 days to schedule follow up or next appointment date.  If not contacted within 7 days, please call the office at [phone]

## 2025-03-13 DIAGNOSIS — J45.51 SEVERE PERSISTENT ASTHMA WITH ACUTE EXACERBATION: ICD-10-CM

## 2025-03-13 RX ORDER — ALBUTEROL SULFATE 4 MG/1
4 TABLET ORAL NIGHTLY
Qty: 90 TABLET | Refills: 3 | Status: SHIPPED | OUTPATIENT
Start: 2025-03-13

## 2025-03-13 NOTE — TELEPHONE ENCOUNTER
Rx Refill Note  Requested Prescriptions     Pending Prescriptions Disp Refills    albuterol (PROVENTIL) 4 MG tablet [Pharmacy Med Name: ALBUTEROL SULFATE TABS 4MG] 90 tablet 3     Sig: TAKE 1 TABLET EVERY NIGHT      Last office visit with prescribing clinician: 2/17/2025   Last telemedicine visit with prescribing clinician: Visit date not found   Next office visit with prescribing clinician: 8/29/2025                         Would you like a call back once the refill request has been completed: [] Yes [] No    If the office needs to give you a call back, can they leave a voicemail: [] Yes [] No    Rema Carlin MA  03/13/25, 08:16 EDT

## 2025-04-09 DIAGNOSIS — M54.16 LUMBAR RADICULOPATHY: ICD-10-CM

## 2025-04-09 DIAGNOSIS — M51.369 DDD (DEGENERATIVE DISC DISEASE), LUMBAR: ICD-10-CM

## 2025-04-10 ENCOUNTER — PREP FOR SURGERY (OUTPATIENT)
Dept: SURGERY | Facility: SURGERY CENTER | Age: 50
End: 2025-04-10
Payer: COMMERCIAL

## 2025-04-10 ENCOUNTER — OFFICE VISIT (OUTPATIENT)
Dept: PAIN MEDICINE | Facility: CLINIC | Age: 50
End: 2025-04-10
Payer: COMMERCIAL

## 2025-04-10 VITALS
TEMPERATURE: 95.9 F | HEART RATE: 68 BPM | SYSTOLIC BLOOD PRESSURE: 118 MMHG | OXYGEN SATURATION: 98 % | BODY MASS INDEX: 23.39 KG/M2 | HEIGHT: 65 IN | DIASTOLIC BLOOD PRESSURE: 92 MMHG | WEIGHT: 140.4 LBS

## 2025-04-10 DIAGNOSIS — M47.816 LUMBAR FACET ARTHROPATHY: Primary | ICD-10-CM

## 2025-04-10 DIAGNOSIS — M46.1 SACROILIITIS: ICD-10-CM

## 2025-04-10 DIAGNOSIS — M51.362 DEGENERATION OF INTERVERTEBRAL DISC OF LUMBAR REGION WITH DISCOGENIC BACK PAIN AND LOWER EXTREMITY PAIN: ICD-10-CM

## 2025-04-10 DIAGNOSIS — M54.16 LUMBAR RADICULOPATHY: Primary | ICD-10-CM

## 2025-04-10 PROCEDURE — 99214 OFFICE O/P EST MOD 30 MIN: CPT | Performed by: PHYSICIAN ASSISTANT

## 2025-04-10 RX ORDER — TRAMADOL HYDROCHLORIDE 50 MG/1
50 TABLET ORAL EVERY 6 HOURS PRN
Qty: 30 TABLET | Refills: 0 | Status: SHIPPED | OUTPATIENT
Start: 2025-04-10

## 2025-04-10 RX ORDER — PREGABALIN 75 MG/1
75 CAPSULE ORAL 2 TIMES DAILY
Qty: 60 CAPSULE | Refills: 0 | Status: SHIPPED | OUTPATIENT
Start: 2025-04-10

## 2025-04-10 RX ORDER — METAXALONE 800 MG/1
TABLET ORAL
Qty: 120 TABLET | Refills: 0 | Status: SHIPPED | OUTPATIENT
Start: 2025-04-10

## 2025-04-10 RX ORDER — SODIUM CHLORIDE 0.9 % (FLUSH) 0.9 %
10 SYRINGE (ML) INJECTION AS NEEDED
OUTPATIENT
Start: 2025-04-10

## 2025-04-10 RX ORDER — SODIUM CHLORIDE 0.9 % (FLUSH) 0.9 %
10 SYRINGE (ML) INJECTION EVERY 12 HOURS SCHEDULED
OUTPATIENT
Start: 2025-04-10

## 2025-04-10 NOTE — H&P (VIEW-ONLY)
CHIEF COMPLAINT    Back pain. Patient is currently experiencing right hip/sciatica pain pain.    Subjective   Nereida Myles is a 49 y.o. female  who presents to the office for follow-up of procedure.  She completed a Bilateral L3 Lumbar Transforaminal Epidural Steroid Injection  on  2/27/25 performed by Dr. Pruitt for management of back pain. Patient reports 80% ongoing relief from the procedure of the discogenic pain.  This patient is pleased to report that she obtained over 60% reduction of pain x 7 months following radiofrequency ablation however she is beginning to have some recrudescence of axial low back pain.  Her pain is primarily within the right lower back radiating into the right groin and occasionally and into the anterior portion of the thigh with less her left-sided symptoms at this time.    She continues on sparing use of tramadol 50 mg which is very helpful for her pain usually taking it at bedtime along with pregabalin 25 mg which she is taking 2 p.o. nightly.  Last several weeks she has occasionally had to take 50 mg 2-3 times daily which she tolerated and felt that it provided better benefit.    Medical history complicated with history of breast cancer with bilateral mastectomy.     Patient is scheduled to have left hand surgery on 5/27/2025.    Pain today 4/10 VAS in severity.      Back Pain  This is a chronic problem. The current episode started more than 1 year ago. The problem occurs constantly. The problem has been improved since onset. The pain is present in the lumbar spine. The quality of the pain is described as aching and burning. The pain radiates to the right thigh and left thigh. The pain is at a severity of 4/10. The pain is moderate (Increases to 6/7/10 with activity). The pain is The same all the time. The symptoms are aggravated by position, twisting and bending (activity). Pertinent negatives include no abdominal pain, dysuria, fever, headaches, numbness or weakness. Risk  "factors include history of cancer.        PEG Assessment   What number best describes your pain on average in the past week?4  What number best describes how, during the past week, pain has interfered with your enjoyment of life?5  What number best describes how, during the past week, pain has interfered with your general activity?  5    Review of Pertinent Medical Data ---  No new imaging for review on today    The following portions of the patient's history were reviewed and updated as appropriate: allergies, current medications, past family history, past medical history, past social history, past surgical history, and problem list.    Review of Systems   Constitutional:  Negative for chills and fever.   Respiratory:  Negative for cough and shortness of breath.    Gastrointestinal:  Negative for abdominal pain, constipation and diarrhea.   Genitourinary:  Negative for difficulty urinating and dysuria.   Musculoskeletal:  Positive for back pain.   Neurological:  Negative for dizziness, weakness, light-headedness, numbness and headaches.   Psychiatric/Behavioral:  Negative for agitation.      I have reviewed and confirmed the accuracy of the ROS as documented by the MA/LPN/RN LINDA Villarreal   Vitals:    04/10/25 1554   BP: 118/92   BP Location: Right arm   Patient Position: Sitting   Pulse: 68   Temp: 95.9 °F (35.5 °C)   TempSrc: Temporal   SpO2: 98%   Weight: 63.7 kg (140 lb 6.4 oz)   Height: 165.1 cm (65\")   PainSc: 4    PainLoc: Back         Objective   Physical Exam  Vitals and nursing note reviewed.   Constitutional:       Appearance: Normal appearance. She is normal weight.   HENT:      Head: Normocephalic.   Pulmonary:      Effort: Pulmonary effort is normal.   Musculoskeletal:      Lumbar back: Tenderness present. Decreased range of motion.        Back:    Skin:     General: Skin is warm and dry.   Neurological:      General: No focal deficit present.      Mental Status: She is alert and oriented to " person, place, and time.      Cranial Nerves: Cranial nerves 2-12 are intact.      Sensory: Sensation is intact.      Motor: Motor function is intact.      Gait: Gait is intact.   Psychiatric:         Mood and Affect: Mood normal.         Behavior: Behavior normal.         Thought Content: Thought content normal.         Judgment: Judgment normal.           Assessment & Plan   Diagnoses and all orders for this visit:    1. Lumbar radiculopathy (Primary)  -     pregabalin (LYRICA) 75 MG capsule; Take 1 capsule by mouth 2 (Two) Times a Day.  Dispense: 60 capsule; Refill: 0  -     traMADol (ULTRAM) 50 MG tablet; Take 1 tablet by mouth Every 6 (Six) Hours As Needed for Moderate Pain.  Dispense: 30 tablet; Refill: 0    2. Sacroiliitis  -     pregabalin (LYRICA) 75 MG capsule; Take 1 capsule by mouth 2 (Two) Times a Day.  Dispense: 60 capsule; Refill: 0    3. Degeneration of intervertebral disc of lumbar region with discogenic back pain and lower extremity pain  -     traMADol (ULTRAM) 50 MG tablet; Take 1 tablet by mouth Every 6 (Six) Hours As Needed for Moderate Pain.  Dispense: 30 tablet; Refill: 0        Nereida Myles reports a pain score of 4.  Given her pain assessment as noted, treatment options were discussed and the following options were decided upon as a follow-up plan to address the patient's pain: continuation of current treatment plan for pain, prescription for non-opiod analgesics, prescription for opiod analgesics, and use of non-medical modalities (ice, heat, stretching and/or behavior modifications).    PHQ-2 Depression Screening  Little interest or pleasure in doing things? Not at all   Feeling down, depressed, or hopeless? Not at all   PHQ-2 Total Score 0         --- Patient screened positive for depression based on a PHQ-9 score of  on . Follow-up recommendations include:  None needed .      --- Due to recrudescence of axial low back pain I will have the patient scheduled to return for therapeutic  lumbar radiofrequency ablation of bilateral L3-L5 medial branch nerves under fluoroscopy guidance.  --- Refill provided of tramadol 50 mg to be taken 1 p.o. daily. Patient appears stable with current regimen. No adverse effects. Regarding continuation of opioids, there is no evidence of aberrant behavior or any red flags.  The patient continues with appropriate response to opioid therapy. ADL's remain intact by self.   --- I have discussed with the patient that postsurgical care will be under the guidance of the surgeon until they have a release back to our office for further coverage of her chronic pain medications.  The patient has been instructed that they may not utilize both prescriptions simultaneously.  --- Since the patient has tolerated pregabalin 50 mg up to 3 times a day I will increase her dose to pregabalin 75 mg 1 p.o. twice daily.  Prescription has been sent to the pharmacy.       KATELYN REPORT  As part of the patient's treatment plan, I am prescribing controlled substances. The patient has been made aware of appropriate use of such medications, including potential risk of somnolence, limited ability to drive and/or work safely, and the potential for dependence or overdose. It has also been made clear that these medications are for use by this patient only, without concomitant use of alcohol or other substances unless prescribed.     Patient has completed prescribing agreement detailing terms of continued prescribing of controlled substances, including monitoring KATELYN reports, urine drug screening, and pill counts if necessary. The patient is aware that inappropriate use will results in cessation of prescribing such medications.    As the clinician, I personally reviewed the KATELYN from 4/10/2025 while the patient was in the office today.    History and physical exam exhibit continued safe and appropriate use of controlled substances.             Dictated utilizing Dragon dictation.

## 2025-04-10 NOTE — TELEPHONE ENCOUNTER
Rx Refill Note  Requested Prescriptions     Pending Prescriptions Disp Refills    metaxalone (SKELAXIN) 800 MG tablet [Pharmacy Med Name: METAXALONE 800MG TABLETS] 120 tablet 0     Sig: TAKE 1/2 TO 1 TABLET BY MOUTH FOUR TIMES DAILY AS NEEDED FOR MUSCLE SPASMS      Last office visit with prescribing clinician: 2/17/2025   Last telemedicine visit with prescribing clinician: Visit date not found   Next office visit with prescribing clinician: 8/29/2025                         Would you like a call back once the refill request has been completed: [] Yes [] No    If the office needs to give you a call back, can they leave a voicemail: [] Yes [] No    Rema Ac MA  04/10/25, 07:00 EDT

## 2025-04-11 ENCOUNTER — TRANSCRIBE ORDERS (OUTPATIENT)
Dept: SURGERY | Facility: SURGERY CENTER | Age: 50
End: 2025-04-11
Payer: COMMERCIAL

## 2025-04-11 ENCOUNTER — TELEPHONE (OUTPATIENT)
Dept: PAIN MEDICINE | Facility: CLINIC | Age: 50
End: 2025-04-11

## 2025-04-11 DIAGNOSIS — Z41.9 SURGERY, ELECTIVE: Primary | ICD-10-CM

## 2025-04-11 NOTE — TELEPHONE ENCOUNTER
Caller: Nereida Myles    Relationship to patient: Self    Best call back number: [phone]    Chief complaint: BACK PAIN     Type of visit: PROCEDURE WITH DR ELIZABETH    Requested date: 4-8-25     If rescheduling, when is the original appointment: 4-6-25     Additional notes:WOULD LIKE AFTERNOON APPT IF POSSIBLE.

## 2025-04-15 DIAGNOSIS — J45.51 SEVERE PERSISTENT ASTHMA WITH ACUTE EXACERBATION: ICD-10-CM

## 2025-04-15 DIAGNOSIS — K21.9 GASTROESOPHAGEAL REFLUX DISEASE WITHOUT ESOPHAGITIS: ICD-10-CM

## 2025-04-15 RX ORDER — THEOPHYLLINE 300 MG/1
300 TABLET, EXTENDED RELEASE ORAL EVERY MORNING
Qty: 90 TABLET | Refills: 3 | Status: SHIPPED | OUTPATIENT
Start: 2025-04-15

## 2025-04-15 RX ORDER — OMEPRAZOLE 40 MG/1
40 CAPSULE, DELAYED RELEASE ORAL DAILY
Qty: 90 CAPSULE | Refills: 3 | Status: SHIPPED | OUTPATIENT
Start: 2025-04-15

## 2025-04-15 NOTE — TELEPHONE ENCOUNTER
Rx Refill Note  Requested Prescriptions     Pending Prescriptions Disp Refills    theophylline (THEODUR) 300 MG 12 hr tablet [Pharmacy Med Name: THEOPHYLLINE ER TABS 300MG] 90 tablet 3     Sig: TAKE 1 TABLET EVERY MORNING    omeprazole (priLOSEC) 40 MG capsule [Pharmacy Med Name: OMEPRAZOLE DR CAPS 40MG] 90 capsule 3     Sig: TAKE 1 CAPSULE DAILY      Last office visit with prescribing clinician: 2/17/2025   Last telemedicine visit with prescribing clinician: Visit date not found   Next office visit with prescribing clinician: 8/29/2025                         Would you like a call back once the refill request has been completed: [] Yes [] No    If the office needs to give you a call back, can they leave a voicemail: [] Yes [] No    Rema Carlin MA  04/15/25, 07:56 EDT

## 2025-05-07 DIAGNOSIS — E06.3 HYPOTHYROIDISM DUE TO HASHIMOTO'S THYROIDITIS: ICD-10-CM

## 2025-05-07 RX ORDER — MIDAZOLAM HYDROCHLORIDE 2 MG/2ML
4 INJECTION, SOLUTION INTRAMUSCULAR; INTRAVENOUS ONCE
Status: COMPLETED | OUTPATIENT
Start: 2025-05-08 | End: 2025-05-08

## 2025-05-07 RX ORDER — FENTANYL CITRATE 50 UG/ML
50 INJECTION, SOLUTION INTRAMUSCULAR; INTRAVENOUS ONCE
Refills: 0 | Status: COMPLETED | OUTPATIENT
Start: 2025-05-08 | End: 2025-05-08

## 2025-05-07 RX ORDER — ONDANSETRON 2 MG/ML
4 INJECTION INTRAMUSCULAR; INTRAVENOUS ONCE
Status: COMPLETED | OUTPATIENT
Start: 2025-05-08 | End: 2025-05-08

## 2025-05-07 RX ORDER — LEVOTHYROXINE SODIUM 50 UG/1
50 TABLET ORAL DAILY
Qty: 90 TABLET | Refills: 3 | Status: SHIPPED | OUTPATIENT
Start: 2025-05-07

## 2025-05-07 NOTE — TELEPHONE ENCOUNTER
Rx Refill Note  Requested Prescriptions     Pending Prescriptions Disp Refills    levothyroxine (SYNTHROID, LEVOTHROID) 50 MCG tablet [Pharmacy Med Name: L-THYROXINE (SYNTHROID) TABS 50MCG] 90 tablet 3     Sig: TAKE 1 TABLET DAILY      Last office visit with prescribing clinician: 2/17/2025   Last telemedicine visit with prescribing clinician: Visit date not found   Next office visit with prescribing clinician: 8/29/2025                         Would you like a call back once the refill request has been completed: [] Yes [] No    If the office needs to give you a call back, can they leave a voicemail: [] Yes [] No    Rema Ac MA  05/07/25, 07:09 EDT

## 2025-05-08 ENCOUNTER — HOSPITAL ENCOUNTER (OUTPATIENT)
Facility: SURGERY CENTER | Age: 50
Setting detail: HOSPITAL OUTPATIENT SURGERY
Discharge: HOME | End: 2025-05-08
Attending: ANESTHESIOLOGY | Admitting: ANESTHESIOLOGY
Payer: COMMERCIAL

## 2025-05-08 ENCOUNTER — HOSPITAL ENCOUNTER (OUTPATIENT)
Dept: GENERAL RADIOLOGY | Facility: SURGERY CENTER | Age: 50
Setting detail: HOSPITAL OUTPATIENT SURGERY
End: 2025-05-08
Payer: COMMERCIAL

## 2025-05-08 VITALS
OXYGEN SATURATION: 93 % | TEMPERATURE: 99.5 F | RESPIRATION RATE: 16 BRPM | DIASTOLIC BLOOD PRESSURE: 78 MMHG | WEIGHT: 137 LBS | SYSTOLIC BLOOD PRESSURE: 119 MMHG | HEART RATE: 62 BPM | HEIGHT: 65 IN | BODY MASS INDEX: 22.82 KG/M2

## 2025-05-08 DIAGNOSIS — M47.816 LUMBAR FACET ARTHROPATHY: ICD-10-CM

## 2025-05-08 DIAGNOSIS — Z41.9 SURGERY, ELECTIVE: ICD-10-CM

## 2025-05-08 PROCEDURE — 99152 MOD SED SAME PHYS/QHP 5/>YRS: CPT | Performed by: ANESTHESIOLOGY

## 2025-05-08 PROCEDURE — 64635 DESTROY LUMB/SAC FACET JNT: CPT | Performed by: ANESTHESIOLOGY

## 2025-05-08 PROCEDURE — 76000 FLUOROSCOPY <1 HR PHYS/QHP: CPT

## 2025-05-08 PROCEDURE — 25010000002 BUPIVACAINE (PF) 0.5 % SOLUTION 10 ML VIAL: Performed by: ANESTHESIOLOGY

## 2025-05-08 PROCEDURE — 25010000002 ONDANSETRON PER 1 MG: Performed by: ANESTHESIOLOGY

## 2025-05-08 PROCEDURE — 25010000002 LIDOCAINE PF 2% 2 % SOLUTION 5 ML VIAL: Performed by: ANESTHESIOLOGY

## 2025-05-08 PROCEDURE — 25010000002 MIDAZOLAM PER 1 MG: Performed by: ANESTHESIOLOGY

## 2025-05-08 PROCEDURE — 64636 DESTROY L/S FACET JNT ADDL: CPT | Performed by: ANESTHESIOLOGY

## 2025-05-08 PROCEDURE — 25010000002 MIDAZOLAM PER 1MG: Performed by: ANESTHESIOLOGY

## 2025-05-08 PROCEDURE — 25010000002 FENTANYL CITRATE (PF) 50 MCG/ML SOLUTION: Performed by: ANESTHESIOLOGY

## 2025-05-08 PROCEDURE — 25010000002 LIDOCAINE PF 1% 1 % SOLUTION 30 ML VIAL: Performed by: ANESTHESIOLOGY

## 2025-05-08 RX ORDER — MIDAZOLAM HYDROCHLORIDE 1 MG/ML
2 INJECTION, SOLUTION INTRAMUSCULAR; INTRAVENOUS ONCE
Status: COMPLETED | OUTPATIENT
Start: 2025-05-08 | End: 2025-05-08

## 2025-05-08 RX ORDER — SODIUM CHLORIDE 0.9 % (FLUSH) 0.9 %
10 SYRINGE (ML) INJECTION EVERY 12 HOURS SCHEDULED
Status: DISCONTINUED | OUTPATIENT
Start: 2025-05-08 | End: 2025-05-08 | Stop reason: HOSPADM

## 2025-05-08 RX ORDER — SODIUM CHLORIDE 0.9 % (FLUSH) 0.9 %
10 SYRINGE (ML) INJECTION AS NEEDED
Status: DISCONTINUED | OUTPATIENT
Start: 2025-05-08 | End: 2025-05-08 | Stop reason: HOSPADM

## 2025-05-08 RX ADMIN — ONDANSETRON 4 MG: 2 INJECTION INTRAMUSCULAR; INTRAVENOUS at 13:54

## 2025-05-08 RX ADMIN — FENTANYL CITRATE 50 MCG: 50 INJECTION, SOLUTION INTRAMUSCULAR; INTRAVENOUS at 13:54

## 2025-05-08 RX ADMIN — MIDAZOLAM 2 MG: 1 INJECTION INTRAMUSCULAR; INTRAVENOUS at 13:59

## 2025-05-08 RX ADMIN — MIDAZOLAM 4 MG: 1 INJECTION INTRAMUSCULAR; INTRAVENOUS at 13:53

## 2025-05-08 NOTE — DISCHARGE INSTRUCTIONS
[facility] [place] [facility] - Post-procedure Instructions          --  While there are no absolute restrictions, it is recommended that you do not perform strenuous activity today. In the morning, you may resume your level of activity as before your block.    --  If you have a band-aid at your injection site, please remove it later today. Observe the area for any redness, swelling, pus-like drainage, or a temperature over 101°. If any of these symptoms occur, please call your doctor at [phone]. If after office hours, leave a message and the on-call provider will return your call.    --  Ice may be applied to your injection site. It is recommended you avoid direct heat (heating pad; hot tub) for 1-2 days.    --  Call [facility] at [phone] if you experience persistent headache, persistent bleeding from the injection site, or severe pain not relieved by heat or oral medication.    --  Do not make important decisions today.    --  Due to the effects of the block and/or the I.V. Sedation, DO NOT drive or operate hazardous machinery for 12 hours.  Local anesthetics may cause numbness after procedure and precautions must be taken with regards to operating equipment as well as with walking, even if ambulating with assistance of another person or with an assistive device.    --  Do not drink alcohol for 12 hours.    -- You may return to work tomorrow, or as directed by your referring doctor.    --  Occasionally you may notice a slight increase in your pain after the procedure. This should start to improve within the next 24-48 hours. Radiofrequency ablation procedure pain may last 3-4 weeks.    --  It may take as long as 3-4 days before you notice a gradual improvement in your pain and/or other symptoms.    -- You may continue to take your prescribed pain medication as needed.    --  Some normal possible side effects of steroid use could include fluid retention, increased blood sugar, dull headache,  increased sweating, increased appetite, mood swings and flushing.    --  Diabetics are recommended to watch their blood glucose level closely for 24-48 hours after the injection.    --  Must stay in PACU for 20 min upon arrival and prove no leg weakness before being discharged.    --  IN THE EVENT OF A LIFE THREATENING EMERGENCY, (CHEST PAIN, BREATHING DIFFICULTIES, PARALYSIS…) YOU SHOULD GO TO YOUR NEAREST EMERGENCY ROOM.    --  You should be contacted by our office within 2-3 days to schedule follow up or next appointment date.  If not contacted within 7 days, please call the office at [phone]

## 2025-05-08 NOTE — OP NOTE
Bilateral L3-5 Lumbar Medial Branch RADIOFREQUENCY  [facility]      PREOPERATIVE DIAGNOSIS:  Lumbar spondylosis without myelopathy    POSTOPERATIVE DIAGNOSIS:  Lumbar spondylosis without myelopathy    PROCEDURE:   Diagnostic Bilateral Lumbar Medial Branch Nerve thermal radiofrequency lesioning, with fluoroscopy:  L3, L4, and L5 nerves (at the L4 and L5 transverse processes and the sacral alar groove) to thermally treat the innervation to facet joints L4-5 and L5-S1  47746-40 -- Bilateral L/S facet neuro destr., 1st Level  96466-34 -- Bilateral L/S facet neuro destr., 2nd  Level    PRE-PROCEDURE DISCUSSION WITH PATIENT:    Risks and complications were discussed with the patient prior to starting the procedure and informed consent was obtained.      SURGEON:  Rei Pruitt MD    REASON FOR PROCEDURE:    The patient complains of pain that seems to have a significant axial component and Previous clinically significant therapeutic effect after prior Radiofrequency procedure    SEDATION:  Versed 4mg & Fentanyl 50 mcg IV and Zofran 4mg IV  TIME OF PROCEDURE:   The intraoperative procedure time after administration of the sedative was (6223-2075) 13 minutes.     ANESTHETIC:  Lidocaine 2%  STEROID:  NONE      DESCRIPTON OF PROCEDURE:  After obtaining informed consent, IV access  was obtained in the preoperative area.   The patient was taken to the operating room.  The patient was placed in the prone position with a pillow under the abdomen. All pressure points were well padded.  EKG, blood pressure, and pulse oximeter were monitored.  The patient was monitored and sedated by the RN under my direction. The lumbosacral area was prepped with Chloraprep and draped in a sterile fashion.     Under fluoroscopic guidance the transverse processes of the L4 and L5 vertebrae at the junctions of the superior articular processes were identified on the right.  Also identified was the groove between the ala and the  superior articular process of the sacrum on the ipsilateral side.  Skin and subcutaneous tissue were anesthetized with 1ml of 1% lidocaine above each of these points. Then, 18g 10cm radiofrequency probe needles were advanced in this fluoro view to the above junctions.  Aspiration was negative for blood and CSF.  After confirming the position of the needle with fluoroscope in all views, testing was initiated.  First, sensory testing was started on each needle a 1V and 50Hz and slowly decreased until painful pressure stimulation diminished at 0.5V.  Next, motor testing was confirmed to be negative at 3V and 2Hz for any radicular stimulation.  Then 1mL of the local anesthetic was instilled in each needle.  Two minutes elapsed, and during this time a lateral fluoroscopic view was confirmed again to ensure the needles had not advanced nor retracted.  Then, Radiofrequency Lesioning was initiated for 2 minutes at 80 degrees Celsius.  Needles were removed intact from each of the areas.     A similar procedure was repeated to address the L3, L4, and L5 nerves on the contralateral side.   Onset of analgesia was noted.  Vital signs remained stable throughout.      ESTIMATED BLOOD LOSS:  <5 mL  SPECIMENS:  none    COMPLICATIONS:   No complications were noted.    TOLERANCE & DISCHARGE CONDITION:    The patient tolerated the procedure well.  The patient was transported to the recovery area without difficulties.  The patient was discharged to home under the care of family in stable and satisfactory condition.    PLAN OF CARE:  The patient was given our standard instruction sheet.  The patient will  Return to clinic 6 wks.  The patient will resume all medications as per the medication reconciliation sheet.

## 2025-05-14 ENCOUNTER — HOSPITAL ENCOUNTER (OUTPATIENT)
Dept: CARDIOLOGY | Facility: HOSPITAL | Age: 50
Discharge: HOME | End: 2025-05-14
Payer: COMMERCIAL

## 2025-05-14 ENCOUNTER — TRANSCRIBE ORDERS (OUTPATIENT)
Dept: LAB | Facility: HOSPITAL | Age: 50
End: 2025-05-14
Payer: COMMERCIAL

## 2025-05-14 ENCOUNTER — LAB (OUTPATIENT)
Dept: LAB | Facility: HOSPITAL | Age: 50
End: 2025-05-14
Payer: COMMERCIAL

## 2025-05-14 DIAGNOSIS — Z01.818 PRE-OP TESTING: Primary | ICD-10-CM

## 2025-05-14 DIAGNOSIS — Z01.818 PRE-OP TESTING: ICD-10-CM

## 2025-05-14 LAB
ANION GAP SERPL CALCULATED.3IONS-SCNC: 5.7 MMOL/L (ref 5–15)
BUN SERPL-MCNC: 12 MG/DL (ref 6–20)
BUN/CREAT SERPL: 16.2 (ref 7–25)
CALCIUM SPEC-SCNC: 9 MG/DL (ref 8.6–10.5)
CHLORIDE SERPL-SCNC: 104 MMOL/L (ref 98–107)
CO2 SERPL-SCNC: 26.3 MMOL/L (ref 22–29)
CREAT SERPL-MCNC: 0.74 MG/DL (ref 0.57–1)
EGFRCR SERPLBLD CKD-EPI 2021: 98.1 ML/MIN/1.73
GLUCOSE SERPL-MCNC: 83 MG/DL (ref 65–99)
POTASSIUM SERPL-SCNC: 3.7 MMOL/L (ref 3.5–5.2)
QT INTERVAL: 442 MS
QTC INTERVAL: 435 MS
SODIUM SERPL-SCNC: 136 MMOL/L (ref 136–145)

## 2025-05-14 PROCEDURE — 36415 COLL VENOUS BLD VENIPUNCTURE: CPT

## 2025-05-14 PROCEDURE — 93005 ELECTROCARDIOGRAM TRACING: CPT | Performed by: PLASTIC SURGERY

## 2025-05-14 PROCEDURE — 80048 BASIC METABOLIC PNL TOTAL CA: CPT

## 2025-05-23 ENCOUNTER — PATIENT MESSAGE (OUTPATIENT)
Dept: FAMILY MEDICINE CLINIC | Facility: CLINIC | Age: 50
End: 2025-05-23
Payer: COMMERCIAL

## 2025-05-23 DIAGNOSIS — M46.1 SACROILIITIS: ICD-10-CM

## 2025-05-23 DIAGNOSIS — M54.16 LUMBAR RADICULOPATHY: ICD-10-CM

## 2025-05-23 RX ORDER — BENZONATATE 200 MG/1
200 CAPSULE ORAL 3 TIMES DAILY PRN
Qty: 90 CAPSULE | Refills: 0 | Status: SHIPPED | OUTPATIENT
Start: 2025-05-23

## 2025-05-23 RX ORDER — PREGABALIN 25 MG/1
CAPSULE ORAL
Qty: 180 CAPSULE | OUTPATIENT
Start: 2025-05-23

## 2025-06-24 ENCOUNTER — PREP FOR SURGERY (OUTPATIENT)
Dept: SURGERY | Facility: SURGERY CENTER | Age: 50
End: 2025-06-24
Payer: COMMERCIAL

## 2025-06-24 ENCOUNTER — TRANSCRIBE ORDERS (OUTPATIENT)
Dept: SURGERY | Facility: SURGERY CENTER | Age: 50
End: 2025-06-24
Payer: COMMERCIAL

## 2025-06-24 ENCOUNTER — OFFICE VISIT (OUTPATIENT)
Dept: PAIN MEDICINE | Facility: CLINIC | Age: 50
End: 2025-06-24
Payer: COMMERCIAL

## 2025-06-24 VITALS
HEIGHT: 65 IN | WEIGHT: 140.6 LBS | DIASTOLIC BLOOD PRESSURE: 86 MMHG | BODY MASS INDEX: 23.43 KG/M2 | OXYGEN SATURATION: 97 % | SYSTOLIC BLOOD PRESSURE: 118 MMHG | TEMPERATURE: 97.5 F | HEART RATE: 78 BPM

## 2025-06-24 DIAGNOSIS — Z41.9 SURGERY, ELECTIVE: Primary | ICD-10-CM

## 2025-06-24 DIAGNOSIS — M51.369 DDD (DEGENERATIVE DISC DISEASE), LUMBAR: ICD-10-CM

## 2025-06-24 DIAGNOSIS — M51.362 DEGENERATION OF INTERVERTEBRAL DISC OF LUMBAR REGION WITH DISCOGENIC BACK PAIN AND LOWER EXTREMITY PAIN: ICD-10-CM

## 2025-06-24 DIAGNOSIS — M46.1 SACROILIITIS: Primary | ICD-10-CM

## 2025-06-24 DIAGNOSIS — M51.34 THORACIC DEGENERATIVE DISC DISEASE: ICD-10-CM

## 2025-06-24 DIAGNOSIS — M47.816 LUMBAR FACET ARTHROPATHY: ICD-10-CM

## 2025-06-24 DIAGNOSIS — M62.838 MUSCLE SPASM: ICD-10-CM

## 2025-06-24 DIAGNOSIS — M54.16 LUMBAR RADICULOPATHY: ICD-10-CM

## 2025-06-24 DIAGNOSIS — Z79.899 ENCOUNTER FOR LONG-TERM (CURRENT) USE OF HIGH-RISK MEDICATION: ICD-10-CM

## 2025-06-24 LAB
POC AMPHETAMINES: NEGATIVE
POC BARBITURATES: NEGATIVE
POC BENZODIAZEPHINES: NEGATIVE
POC BUPRENORPHINE: NEGATIVE
POC COCAINE: NEGATIVE
POC METHADONE: NEGATIVE
POC METHAMPHETAMINE SCREEN URINE: NEGATIVE
POC OPIATES: NEGATIVE
POC OXYCODONE: NEGATIVE
POC PHENCYCLIDINE: NEGATIVE
POC THC: NEGATIVE

## 2025-06-24 PROCEDURE — 80305 DRUG TEST PRSMV DIR OPT OBS: CPT | Performed by: PHYSICIAN ASSISTANT

## 2025-06-24 PROCEDURE — 99214 OFFICE O/P EST MOD 30 MIN: CPT | Performed by: PHYSICIAN ASSISTANT

## 2025-06-24 RX ORDER — SODIUM CHLORIDE 0.9 % (FLUSH) 0.9 %
10 SYRINGE (ML) INJECTION EVERY 12 HOURS SCHEDULED
OUTPATIENT
Start: 2025-06-24

## 2025-06-24 RX ORDER — PREGABALIN 75 MG/1
75 CAPSULE ORAL 2 TIMES DAILY
Qty: 60 CAPSULE | Refills: 0 | Status: SHIPPED | OUTPATIENT
Start: 2025-06-24

## 2025-06-24 RX ORDER — METAXALONE 800 MG/1
800 TABLET ORAL 4 TIMES DAILY
Qty: 120 TABLET | Refills: 1 | Status: SHIPPED | OUTPATIENT
Start: 2025-06-24

## 2025-06-24 RX ORDER — TRAMADOL HYDROCHLORIDE 50 MG/1
TABLET ORAL
Qty: 30 TABLET | Refills: 0 | Status: SHIPPED | OUTPATIENT
Start: 2025-06-24

## 2025-06-24 RX ORDER — SODIUM CHLORIDE 0.9 % (FLUSH) 0.9 %
10 SYRINGE (ML) INJECTION AS NEEDED
OUTPATIENT
Start: 2025-06-24
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
## 2025-07-01 NOTE — PROGRESS NOTES
Patient Name: Nereida Myles   YOB: 1975  Referring Primary Care Physician: Kehrer, Meredith Lea, MD      Chief Complaint:    Chief Complaint   Patient presents with    Lumbar Spine - Pain, Initial Evaluation        Previous Treatment:   T & L-Spine MRI pending, order was placed on 06/24/2025 and scheduled on 08/18/2025    MRI:   2023 - MRI of L & T Spine W & W/O     Pain Management:   12/12/2023 - Ongoing as of 06/24/2025 - [facility] - Wade Graham PA - Barnett, Darel D., MD    Most recent Injections:   05/08/2025 - RFA MITCHELL L3-L5  02/27/2025 - TFEST MITCHELL L3-4  11/07/2024 - MITCHELL SI joint            Back Pain  Chronicity:  Chronic  Onset:  More than 1 year ago  Frequency:  Constantly  Progression since onset:  Worsening  Pain location:  Lumbar spine  Pain quality:  Aching and stabbing (Pulling)  Radiates to: Mitchell hips, Mitchell groin referring down the RLE into rt knee.  Pain-numeric:  4/10 (10/10 at worse)  Pain severity:  Moderate  Exacerbated by: Stairs, in/out car, getting dressed.  Associated symptoms: leg pain    Associated symptoms: no bladder incontinence, no bowel incontinence, no numbness, no tingling and no weakness    Risk factors:  History of cancer (Carcinoma of left breast, stage 2)  Treatments tried:  Physical therapy and ice (tens unit)  Improvement on treatment:  Mild       HPI:  Nereida Myles is a 49 y.o. female who presents to [facility] for evaluation of above complaints.  She has longstanding history of mid and low back pain.  She is currently a patient at [facility] pain [facility] where she has had lumbar ablations, SI joint injections and epidurals.  She says with the ablation that does offer her relief the back pain, but since her most recent ablation has had increase in the right buttock and hip pain.  The pain does also refer into the groin and inner thigh.  The SI joint injections also help but  she says over time they have not lasted as long.  She does have a right SI joint injection scheduled in 2 weeks.  She says with the injections although they help the areas that are being injected, it seems to unmask other pains between the mid back, low back, hip and buttock.  She denies any specific injury associated with her current symptoms.  She had lumbar and thoracic MRIs 2 and half years ago and is scheduled to repeat this next month.  This is a new patient to the practice and new to me.  Prior pertinent records were reviewed.    PFSH:  See attached    ROS: As per HPI, otherwise negative    Objective:      49 y.o. female  Body mass index is 23.5 kg/m²., 64 kg (141 lb 3.2 oz), @HT@  Vitals:    07/02/25 0916   Temp: 96.8 °F (36 °C)     Pain Score    07/02/25 0916   PainSc: 4   Comment: 10/10 at worse   PainLoc: Back            Spine Musculoskeletal Exam    Gait    Gait is normal.    Inspection    Coronal balance: no imbalance    Sagittal balance: no imbalance    Palpation    Thoracolumbar    Right      Muscle tone: normal    Left      Muscle tone: normal    Strength    Thoracolumbar    Thoracolumbar motor exam is normal.       Right      Hip abductors are affected by pain.       Hip flexion is affected by pain.       Sensory    Thoracolumbar    Thoracolumbar sensation is normal.    Reflexes    Right      Quadriceps: 1/4      Achilles: 1/4     Left      Quadriceps: 1/4      Achilles: 1/4    Special Tests    Thoracolumbar    Joshua's: absent      Right      SARAH test: positive      Gaenslen's: positive      SLR: back pain        IMAGING:     Indication: pain related symptoms,  Views: 2V AP&LAT lumbar  Findings: Minimal levoscoliosis mid lumbar spine, grade 1 retrolisthesis L3 on L4, disc space settling L3-S1, mild facet degenerative change lower lumbar spine  Comparison views: Lumbar MRI 1/9/2023 revealed levoscoliosis apex L3-4, degenerative disc and endplate changes L3-4 with retrolisthesis L3 on L4 and facet  hypertrophy without significant canal or foraminal narrowing, facet arthropathy and retrolisthesis L4 and L5 with mild left great and right foraminal narrowing but no central stenosis at this level, degenerative disc and endplate changes with mild retrolisthesis L5 on S1 without canal or lateral recess narrowing with mild bilateral foraminal narrowing at this level, degenerative marrow edema to the right L3-4 and L4-5.    Thoracic MRI showed disc bulge T8-9 with minimal central canal stenosis, no other areas of canal or foraminal compromise.    Agree with report.      Official radiology interpretation will follow and be available in the patient medical records. Patient will be notified of any pertinent discrepancies.    Assessment:           Diagnoses and all orders for this visit:    1. Pain (Primary)  -     XR Spine Lumbar 2 or 3 View    2. Sacroiliac inflammation    3. Right hip pain             Plan:  Her symptoms are fairly diffuse, but she does complain primarily of right buttock pain referring into the lateral hip, groin and inner thigh.  Provocative testing between the SI joint and hip are equivocal.  She has upcoming thoracic and lumbar MRIs ordered by pain management.  Will see if those can be done sooner.  Depending on results, may either have her evaluated by one of our hip specialist or order hip MRI for clarification since she is having some groin and inner thigh pain.  She may be a good candidate for a right intra-articular hip injection.  If the thoracic and lumbar MRIs show anything of surgical significance, will refer her to neurosurgery for evaluation.  She will notify the office once the MRIs are done since they were ordered by another provider so that they can be reviewed.    No follow-ups on file.    EMR Dragon/Transcription Disclaimer:   Much of this encounter note is an electronic transcription/translation of spoken language to printed text utilizing Dragon dictation.  Red flags have been  discussed at this or previous visits to include but not limited weakness in extremities, worsening pain that does not respond to conservative treatment and bowel or bladder dysfunction. These are reasons to present to ER and patient has been informed.    The diagnosis(es), natural history, pathophysiology and treatment for diagnosis(es) were discussed. Opportunity given and questions answered. Biomechanics of pertinent body areas discussed.    EXERCISES:  Advice on benefits of, and types of regular/moderate exercise pertaining to diagnosis.  Continue HEP. For back or neck pain, recommend pilates and or yoga as tolerated. Generally it is best to start any new exercise under the guidance of a  or therapist.   MEDICATIONS:  When prescribe, the risks, benefits, warnings,side effects and alternatives of medications discussed. Advised against long term use of narcotics.   PAIN CONTROL:  Cold, heat, OTC lidocaine patches and/or ointment as needed. Avoid direct skin contact with ice. Ice 15-20 minutes 3-4 times daily as needed. For SI joint pain, recommend ice bath in water about 50 degrees for 5 consecutive days, add ice slowly to help with adjustment and may cover with warm towel or robe to help with cold tolerance. If using lidocaine, do not apply heat in conjunction as this can cause a burn.   MEDICAL RECORDS reviewed from other provider(s) for past and current medical history pertinent to this visit..

## 2025-07-02 ENCOUNTER — OFFICE VISIT (OUTPATIENT)
Dept: ORTHOPEDIC SURGERY | Facility: CLINIC | Age: 50
End: 2025-07-02
Payer: COMMERCIAL

## 2025-07-02 VITALS — HEIGHT: 65 IN | WEIGHT: 141.2 LBS | TEMPERATURE: 96.8 F | BODY MASS INDEX: 23.53 KG/M2

## 2025-07-02 DIAGNOSIS — M46.1 SACROILIAC INFLAMMATION: ICD-10-CM

## 2025-07-02 DIAGNOSIS — M25.551 RIGHT HIP PAIN: ICD-10-CM

## 2025-07-02 DIAGNOSIS — R52 PAIN: Primary | ICD-10-CM

## 2025-07-15 ENCOUNTER — HOSPITAL ENCOUNTER (OUTPATIENT)
Dept: GENERAL RADIOLOGY | Facility: SURGERY CENTER | Age: 50
Setting detail: HOSPITAL OUTPATIENT SURGERY
End: 2025-07-15
Payer: COMMERCIAL

## 2025-07-15 ENCOUNTER — HOSPITAL ENCOUNTER (OUTPATIENT)
Facility: SURGERY CENTER | Age: 50
Setting detail: HOSPITAL OUTPATIENT SURGERY
Discharge: HOME | End: 2025-07-15
Attending: ANESTHESIOLOGY | Admitting: ANESTHESIOLOGY
Payer: COMMERCIAL

## 2025-07-15 VITALS
OXYGEN SATURATION: 96 % | SYSTOLIC BLOOD PRESSURE: 111 MMHG | HEART RATE: 64 BPM | DIASTOLIC BLOOD PRESSURE: 66 MMHG | TEMPERATURE: 98 F | RESPIRATION RATE: 16 BRPM

## 2025-07-15 DIAGNOSIS — M46.1 SACROILIITIS: ICD-10-CM

## 2025-07-15 DIAGNOSIS — Z41.9 SURGERY, ELECTIVE: ICD-10-CM

## 2025-07-15 PROCEDURE — G0260 INJ FOR SACROILIAC JT ANESTH: HCPCS | Performed by: ANESTHESIOLOGY

## 2025-07-15 PROCEDURE — 25010000002 DEXAMETHASONE SODIUM PHOSPHATE 100 MG/10ML SOLUTION 10 ML VIAL: Performed by: ANESTHESIOLOGY

## 2025-07-15 PROCEDURE — 27096 INJECT SACROILIAC JOINT: CPT | Performed by: ANESTHESIOLOGY

## 2025-07-15 PROCEDURE — 77002 NEEDLE LOCALIZATION BY XRAY: CPT

## 2025-07-15 PROCEDURE — 25010000002 BUPIVACAINE (PF) 0.5 % SOLUTION 10 ML VIAL: Performed by: ANESTHESIOLOGY

## 2025-07-15 PROCEDURE — 25010000002 MIDAZOLAM PER 1 MG: Performed by: ANESTHESIOLOGY

## 2025-07-15 PROCEDURE — 25010000002 LIDOCAINE PF 1% 1 % SOLUTION 5 ML VIAL: Performed by: ANESTHESIOLOGY

## 2025-07-15 PROCEDURE — 25510000001 IOPAMIDOL 61 % SOLUTION 30 ML VIAL: Performed by: ANESTHESIOLOGY

## 2025-07-15 PROCEDURE — 76000 FLUOROSCOPY <1 HR PHYS/QHP: CPT

## 2025-07-15 RX ORDER — SODIUM CHLORIDE 0.9 % (FLUSH) 0.9 %
10 SYRINGE (ML) INJECTION AS NEEDED
Status: DISCONTINUED | OUTPATIENT
Start: 2025-07-15 | End: 2025-07-15 | Stop reason: HOSPADM

## 2025-07-15 RX ORDER — MIDAZOLAM HYDROCHLORIDE 1 MG/ML
4 INJECTION, SOLUTION INTRAMUSCULAR; INTRAVENOUS ONCE
Status: COMPLETED | OUTPATIENT
Start: 2025-07-15 | End: 2025-07-15

## 2025-07-15 RX ORDER — SODIUM CHLORIDE 0.9 % (FLUSH) 0.9 %
10 SYRINGE (ML) INJECTION EVERY 12 HOURS SCHEDULED
Status: DISCONTINUED | OUTPATIENT
Start: 2025-07-15 | End: 2025-07-15 | Stop reason: HOSPADM

## 2025-07-15 RX ADMIN — MIDAZOLAM 4 MG: 1 INJECTION INTRAMUSCULAR; INTRAVENOUS at 13:35

## 2025-07-15 NOTE — OP NOTE
RIGHT Sacroiliac Joint Injection  [facility]    PREOPERATIVE DIAGNOSIS:   Sacroiliac joint dysfunction on the RIGHT    POSTOPERATIVE DIAGNOSIS:  Sacroiliac joint dysfunction on the RIGHT    PROCEDURE:  Sacroiliac Joint Injection, on the RIGHT, with fluoroscopic guidance    PRE-PROCEDURE DISCUSSION WITH PATIENT:    Risks and complications were discussed with the patient prior to starting the procedure and informed consent was obtained.  We discussed various topics including but not limited to bleeding, infection, injury, postprocedural site soreness, painful flareup, worsening of clinical picture, paralysis, coma, and death.     SURGEON:  Rei Pruitt MD    REASON FOR PROCEDURE:    Patient has pain consistent with SI pathology on history and physical exam. Patient has other lumbrosacral pathology that makes the injection diagnostically necessary. Positive sacroiliac provocation maneuvers noted.   Previous clinically significant therapeutic effect of SI joint injection.      SEDATION:  Versed 4mg IV, The use of increased procedural sedation was carefully considered, and for this particular patient the need for additional procedural sedation seemed necessary in this instance to safely perform the procedure., and The patient had higher than average levels of procedural anxiety and the need to provide additional procedural sedation was needed to safely proceed.  ANESTHETIC AGENT:  Marcaine 0.5%  STEROID AGENT:  dexamethasone 10mg    DESCRIPTON OF PROCEDURE:  After obtaining informed consent, IV access was obtained in the preoperative area.  The patient was transported to the operative suite and placed in the prone position with a pillow under the pelvic area. EKG, blood pressure, and pulse oximeter were monitored. The lumbosacral area was prepped with Chloraprep and draped in a sterile fashion.     Under fluoroscopic guidance the inferior most portion of the RIGHT sacroiliac joint was identified.  The overlying skin and subcutaneous tissue was anesthetized with 1% lidocaine. A 22-gauge spinal needle was introduced from the inferior most portion of the joint into the sacroiliac joint under fluoroscopic guidance in the AP dimension with slight oblique rotation to the contralateral side.  Aspiration was negative.  After confirming the position of the needle with fluoroscopy, 1 mL of Omnipaque was injected and after seeing appropriate spread into the joint a total of 2.5 mL of Marcaine, with the steroid, was injected very slowly.  Vital signs remained stable.  The onset of analgesia was noted.    ESTIMATED BLOOD LOSS:  minimal  SPECIMENS:  None    COMPLICATIONS:  No complications were noted. and There was no indication of vascular uptake on live injection of contrast dye.    TOLERANCE & DISCHARGE CONDITION:    The patient tolerated the procedure well.  The patient was transported to the recovery area without difficulties.  The patient was discharged to home under the care of family in stable and satisfactory condition.    PLAN OF CARE:  The patient was given our standard instruction sheet and will resume all medications as per the medication reconciliation sheet.  The patient will Return to clinic 6 wks.  The patient is instructed to keep a pain log hourly for 8 hours after the procedure.

## 2025-07-15 NOTE — DISCHARGE INSTRUCTIONS
[facility] - Post-procedure Instructions          --  While there are no absolute restrictions, it is recommended that you do not perform strenuous activity today. In the morning, you may resume your level of activity as before your block.    --  If you have a band-aid at your injection site, please remove it later today. Observe the area for any redness, swelling, pus-like drainage, or a temperature over 101°. If any of these symptoms occur, please call your doctor at [phone]. If after office hours, leave a message and the on-call provider will return your call.    --  Ice may be applied to your injection site. It is recommended you avoid direct heat (heating pad; hot tub) for 1-2 days.    --  Call [facility] at [phone] if you experience persistent headache, persistent bleeding from the injection site, or severe pain not relieved by heat or oral medication.    --  Do not make important decisions today.    --  Due to the effects of the block and/or the I.V. Sedation, DO NOT drive or operate hazardous machinery for 12 hours.  Local anesthetics may cause numbness after procedure and precautions must be taken with regards to operating equipment as well as with walking, even if ambulating with assistance of another person or with an assistive device.    --  Do not drink alcohol for 12 hours.    -- You may return to work tomorrow, or as directed by your referring doctor.    --  Occasionally you may notice a slight increase in your pain after the procedure. This should start to improve within the next 24-48 hours. Radiofrequency ablation procedure pain may last 3-4 weeks.    --  It may take as long as 3-4 days before you notice a gradual improvement in your pain and/or other symptoms.    -- You may continue to take your prescribed pain medication as needed.    --  Some normal possible side effects of steroid use could include fluid retention, increased blood sugar, dull headache,  increased sweating, increased appetite, mood swings and flushing.    --  Diabetics are recommended to watch their blood glucose level closely for 24-48 hours after the injection.    --  Must stay in PACU for 20 min upon arrival and prove no leg weakness before being discharged.    --  IN THE EVENT OF A LIFE THREATENING EMERGENCY, (CHEST PAIN, BREATHING DIFFICULTIES, PARALYSIS…) YOU SHOULD GO TO YOUR NEAREST EMERGENCY ROOM.    --  You should be contacted by our office within 2-3 days to schedule follow up or next appointment date.  If not contacted within 7 days, please call the office at [phone]

## 2025-07-28 ENCOUNTER — HOSPITAL ENCOUNTER (OUTPATIENT)
Dept: MRI IMAGING | Facility: HOSPITAL | Age: 50
Discharge: HOME | End: 2025-07-28
Payer: COMMERCIAL

## 2025-07-28 DIAGNOSIS — M51.34 THORACIC DEGENERATIVE DISC DISEASE: ICD-10-CM

## 2025-07-28 DIAGNOSIS — M51.362 DEGENERATION OF INTERVERTEBRAL DISC OF LUMBAR REGION WITH DISCOGENIC BACK PAIN AND LOWER EXTREMITY PAIN: ICD-10-CM

## 2025-07-28 PROCEDURE — A9579 GAD-BASE MR CONTRAST NOS,1ML: HCPCS | Performed by: PHYSICIAN ASSISTANT

## 2025-07-28 PROCEDURE — 72158 MRI LUMBAR SPINE W/O & W/DYE: CPT

## 2025-07-28 PROCEDURE — 25010000002 GADOTERIDOL PER 1 ML: Performed by: PHYSICIAN ASSISTANT

## 2025-07-28 PROCEDURE — 72157 MRI CHEST SPINE W/O & W/DYE: CPT

## 2025-07-28 RX ORDER — GADOTERIDOL 279.3 MG/ML
15 INJECTION INTRAVENOUS
Status: COMPLETED | OUTPATIENT
Start: 2025-07-28 | End: 2025-07-28

## 2025-07-28 RX ADMIN — GADOTERIDOL 15 ML: 279.3 INJECTION, SOLUTION INTRAVENOUS at 17:24

## 2025-08-06 ENCOUNTER — PREP FOR SURGERY (OUTPATIENT)
Dept: SURGERY | Facility: SURGERY CENTER | Age: 50
End: 2025-08-06
Payer: COMMERCIAL

## 2025-08-06 DIAGNOSIS — M16.11 PRIMARY OSTEOARTHRITIS OF RIGHT HIP: Primary | ICD-10-CM

## 2025-08-06 RX ORDER — SODIUM CHLORIDE 0.9 % (FLUSH) 0.9 %
10 SYRINGE (ML) INJECTION AS NEEDED
OUTPATIENT
Start: 2025-08-06

## 2025-08-06 RX ORDER — SODIUM CHLORIDE 0.9 % (FLUSH) 0.9 %
10 SYRINGE (ML) INJECTION EVERY 12 HOURS SCHEDULED
OUTPATIENT
Start: 2025-08-06

## 2025-08-08 ENCOUNTER — TRANSCRIBE ORDERS (OUTPATIENT)
Dept: SURGERY | Facility: SURGERY CENTER | Age: 50
End: 2025-08-08
Payer: COMMERCIAL

## 2025-08-08 DIAGNOSIS — Z41.9 SURGERY, ELECTIVE: Primary | ICD-10-CM

## 2025-08-14 ENCOUNTER — OFFICE VISIT (OUTPATIENT)
Dept: FAMILY MEDICINE CLINIC | Facility: CLINIC | Age: 50
End: 2025-08-14
Payer: COMMERCIAL

## 2025-08-14 VITALS
OXYGEN SATURATION: 99 % | HEART RATE: 72 BPM | TEMPERATURE: 98.3 F | SYSTOLIC BLOOD PRESSURE: 118 MMHG | BODY MASS INDEX: 24.96 KG/M2 | DIASTOLIC BLOOD PRESSURE: 70 MMHG | WEIGHT: 149.8 LBS | HEIGHT: 65 IN

## 2025-08-14 DIAGNOSIS — R23.2 HOT FLASHES: ICD-10-CM

## 2025-08-14 DIAGNOSIS — R53.83 OTHER FATIGUE: ICD-10-CM

## 2025-08-14 DIAGNOSIS — Z00.00 ROUTINE ADULT HEALTH MAINTENANCE: Primary | ICD-10-CM

## 2025-08-14 DIAGNOSIS — Z23 NEED FOR SHINGLES VACCINE: ICD-10-CM

## 2025-08-14 DIAGNOSIS — E06.3 HYPOTHYROIDISM DUE TO HASHIMOTO'S THYROIDITIS: ICD-10-CM

## 2025-08-14 DIAGNOSIS — R63.5 WEIGHT GAIN: ICD-10-CM

## 2025-08-14 DIAGNOSIS — F33.42 RECURRENT MAJOR DEPRESSIVE DISORDER, IN FULL REMISSION: ICD-10-CM

## 2025-08-14 DIAGNOSIS — J45.51 SEVERE PERSISTENT ASTHMA WITH ACUTE EXACERBATION: ICD-10-CM

## 2025-08-14 PROCEDURE — 90750 HZV VACC RECOMBINANT IM: CPT | Performed by: FAMILY MEDICINE

## 2025-08-14 PROCEDURE — 99396 PREV VISIT EST AGE 40-64: CPT | Performed by: FAMILY MEDICINE

## 2025-08-14 RX ORDER — VENLAFAXINE HYDROCHLORIDE 75 MG/1
75 CAPSULE, EXTENDED RELEASE ORAL DAILY
Qty: 30 CAPSULE | Refills: 1 | Status: SHIPPED | OUTPATIENT
Start: 2025-08-14

## 2025-08-15 LAB
ALBUMIN SERPL-MCNC: 4.2 G/DL (ref 3.8–4.9)
ALP SERPL-CCNC: 69 IU/L (ref 44–121)
ALT SERPL-CCNC: 20 IU/L (ref 0–32)
AST SERPL-CCNC: 24 IU/L (ref 0–40)
BASOPHILS # BLD AUTO: 0 X10E3/UL (ref 0–0.2)
BASOPHILS NFR BLD AUTO: 1 %
BILIRUB SERPL-MCNC: 0.2 MG/DL (ref 0–1.2)
BUN SERPL-MCNC: 18 MG/DL (ref 6–24)
BUN/CREAT SERPL: 24 (ref 9–23)
CALCIUM SERPL-MCNC: 9.6 MG/DL (ref 8.7–10.2)
CHLORIDE SERPL-SCNC: 105 MMOL/L (ref 96–106)
CHOLEST SERPL-MCNC: 186 MG/DL (ref 100–199)
CO2 SERPL-SCNC: 21 MMOL/L (ref 20–29)
CORTIS SERPL-MCNC: 11.9 UG/DL (ref 6.2–19.4)
CREAT SERPL-MCNC: 0.75 MG/DL (ref 0.57–1)
EGFRCR SERPLBLD CKD-EPI 2021: 96 ML/MIN/1.73
EOSINOPHIL # BLD AUTO: 0.2 X10E3/UL (ref 0–0.4)
EOSINOPHIL NFR BLD AUTO: 4 %
ERYTHROCYTE [DISTWIDTH] IN BLOOD BY AUTOMATED COUNT: 13.5 % (ref 11.7–15.4)
GLOBULIN SER CALC-MCNC: 2.2 G/DL (ref 1.5–4.5)
GLUCOSE SERPL-MCNC: 77 MG/DL (ref 70–99)
HCT VFR BLD AUTO: 36.2 % (ref 34–46.6)
HDLC SERPL-MCNC: 101 MG/DL
HGB BLD-MCNC: 11.7 G/DL (ref 11.1–15.9)
IMM GRANULOCYTES # BLD AUTO: 0 X10E3/UL (ref 0–0.1)
IMM GRANULOCYTES NFR BLD AUTO: 0 %
LDLC SERPL CALC-MCNC: 73 MG/DL (ref 0–99)
LYMPHOCYTES # BLD AUTO: 1.2 X10E3/UL (ref 0.7–3.1)
LYMPHOCYTES NFR BLD AUTO: 18 %
MCH RBC QN AUTO: 29.5 PG (ref 26.6–33)
MCHC RBC AUTO-ENTMCNC: 32.3 G/DL (ref 31.5–35.7)
MCV RBC AUTO: 91 FL (ref 79–97)
MONOCYTES # BLD AUTO: 0.6 X10E3/UL (ref 0.1–0.9)
MONOCYTES NFR BLD AUTO: 9 %
NEUTROPHILS # BLD AUTO: 4.6 X10E3/UL (ref 1.4–7)
NEUTROPHILS NFR BLD AUTO: 68 %
PLATELET # BLD AUTO: 358 X10E3/UL (ref 150–450)
POTASSIUM SERPL-SCNC: 4.1 MMOL/L (ref 3.5–5.2)
PROT SERPL-MCNC: 6.4 G/DL (ref 6–8.5)
RBC # BLD AUTO: 3.96 X10E6/UL (ref 3.77–5.28)
SODIUM SERPL-SCNC: 141 MMOL/L (ref 134–144)
TRIGL SERPL-MCNC: 64 MG/DL (ref 0–149)
TSH SERPL DL<=0.005 MIU/L-ACNC: 1.44 UIU/ML (ref 0.45–4.5)
VIT B12 SERPL-MCNC: 1792 PG/ML (ref 232–1245)
VLDLC SERPL CALC-MCNC: 12 MG/DL (ref 5–40)
WBC # BLD AUTO: 6.6 X10E3/UL (ref 3.4–10.8)

## 2025-08-26 ENCOUNTER — PREP FOR SURGERY (OUTPATIENT)
Dept: SURGERY | Facility: SURGERY CENTER | Age: 50
End: 2025-08-26
Payer: COMMERCIAL

## 2025-08-26 ENCOUNTER — OFFICE VISIT (OUTPATIENT)
Dept: PAIN MEDICINE | Facility: CLINIC | Age: 50
End: 2025-08-26
Payer: COMMERCIAL

## 2025-08-26 VITALS
OXYGEN SATURATION: 97 % | DIASTOLIC BLOOD PRESSURE: 78 MMHG | WEIGHT: 150.4 LBS | HEART RATE: 70 BPM | BODY MASS INDEX: 25.06 KG/M2 | HEIGHT: 65 IN | TEMPERATURE: 96.9 F | SYSTOLIC BLOOD PRESSURE: 110 MMHG

## 2025-08-26 DIAGNOSIS — M47.816 LUMBAR FACET ARTHROPATHY: ICD-10-CM

## 2025-08-26 DIAGNOSIS — M51.362 DEGENERATION OF INTERVERTEBRAL DISC OF LUMBAR REGION WITH DISCOGENIC BACK PAIN AND LOWER EXTREMITY PAIN: ICD-10-CM

## 2025-08-26 DIAGNOSIS — M46.1 SACROILIITIS: Primary | ICD-10-CM

## 2025-08-26 DIAGNOSIS — M51.362 DEGENERATION OF INTERVERTEBRAL DISC OF LUMBAR REGION WITH DISCOGENIC BACK PAIN AND LOWER EXTREMITY PAIN: Primary | ICD-10-CM

## 2025-08-26 DIAGNOSIS — M16.11 PRIMARY OSTEOARTHRITIS OF RIGHT HIP: Primary | ICD-10-CM

## 2025-08-26 DIAGNOSIS — M16.0 PRIMARY OSTEOARTHRITIS OF BOTH HIPS: ICD-10-CM

## 2025-08-26 DIAGNOSIS — M54.16 LUMBAR RADICULOPATHY: ICD-10-CM

## 2025-08-26 PROCEDURE — 99214 OFFICE O/P EST MOD 30 MIN: CPT | Performed by: PHYSICIAN ASSISTANT

## 2025-08-26 RX ORDER — HYDROCODONE BITARTRATE AND ACETAMINOPHEN 5; 325 MG/1; MG/1
1 TABLET ORAL EVERY 8 HOURS PRN
Qty: 18 TABLET | Refills: 0 | Status: SHIPPED | OUTPATIENT
Start: 2025-08-26

## 2025-08-26 RX ORDER — SODIUM CHLORIDE 0.9 % (FLUSH) 0.9 %
10 SYRINGE (ML) INJECTION AS NEEDED
OUTPATIENT
Start: 2025-08-26

## 2025-08-26 RX ORDER — PREGABALIN 75 MG/1
75 CAPSULE ORAL NIGHTLY
Qty: 90 CAPSULE | Refills: 0 | Status: SHIPPED | OUTPATIENT
Start: 2025-08-26

## 2025-08-26 RX ORDER — SODIUM CHLORIDE 0.9 % (FLUSH) 0.9 %
10 SYRINGE (ML) INJECTION EVERY 12 HOURS SCHEDULED
OUTPATIENT
Start: 2025-08-26

## (undated) DEVICE — Device

## (undated) DEVICE — NDL HYPO PRECISIONGLIDE REG 25G 1 1/2

## (undated) DEVICE — NEEDLE, QUINCKE 22GX3.5": Brand: MEDLINE INDUSTRIES, INC.

## (undated) DEVICE — EPIDURAL TRAY: Brand: MEDLINE INDUSTRIES, INC.

## (undated) DEVICE — CANN ACTIVETIP CRV 18G 10MM 10CM

## (undated) DEVICE — PAD GRND CATHAY W/CABL DISP

## (undated) DEVICE — NDL SPINE 22G 31/2IN BLK

## (undated) DEVICE — UNIVERSAL PACK: Brand: CONVERTORS

## (undated) DEVICE — Device: Brand: PORTEX

## (undated) DEVICE — GLV SURG TRIUMPH PF LTX 7.5 STRL

## (undated) DEVICE — INTENDED FOR TISSUE SEPARATION, AND OTHER PROCEDURES THAT REQUIRE A SHARP SURGICAL BLADE TO PUNCTURE OR CUT.: Brand: BARD-PARKER ® CARBON RIB-BACK BLADES

## (undated) DEVICE — TOWEL,OR,DSP,ST,BLUE,STD,4/PK,20PK/CS: Brand: MEDLINE

## (undated) DEVICE — BANDAGE,GAUZE,BULKEE II,4.5"X4.1YD,STRL: Brand: MEDLINE

## (undated) DEVICE — 3M™ STERI-STRIP™ REINFORCED ADHESIVE SKIN CLOSURES, R1547, 1/2 IN X 4 IN (12 MM X 100 MM), 6 STRIPS/ENVELOPE: Brand: 3M™ STERI-STRIP™

## (undated) DEVICE — BNDG ELAS ELITE V/CLOSE 6IN 5YD LF STRL

## (undated) DEVICE — SKIN PREP TRAY W/CHG: Brand: MEDLINE INDUSTRIES, INC.

## (undated) DEVICE — CANN NASL O2 INF

## (undated) DEVICE — ANTIBACTERIAL UNDYED BRAIDED (POLYGLACTIN 910), SYNTHETIC ABSORBABLE SUTURE: Brand: COATED VICRYL

## (undated) DEVICE — NDL SPINE 22G 5IN BLK

## (undated) DEVICE — STRIP CLS WND SUTURESTRIP/PLS 0.5X5IN TP1105

## (undated) DEVICE — GLV SURG BIOGEL LTX PF 6 1/2

## (undated) DEVICE — SOL NACL 0.9PCT 1000ML

## (undated) DEVICE — PK CHST BRST 40

## (undated) DEVICE — PAD GRND E/S NT200IX RF/GEN W/CABL DISP